# Patient Record
Sex: FEMALE | Race: WHITE | NOT HISPANIC OR LATINO | Employment: OTHER | URBAN - METROPOLITAN AREA
[De-identification: names, ages, dates, MRNs, and addresses within clinical notes are randomized per-mention and may not be internally consistent; named-entity substitution may affect disease eponyms.]

---

## 2017-01-09 ENCOUNTER — DOCUMENTATION ONLY (OUTPATIENT)
Dept: OTHER | Facility: CLINIC | Age: 77
End: 2017-01-09

## 2017-01-09 DIAGNOSIS — E03.9 ACQUIRED HYPOTHYROIDISM: Primary | ICD-10-CM

## 2017-01-09 DIAGNOSIS — Z71.89 ADVANCED DIRECTIVES, COUNSELING/DISCUSSION: Primary | Chronic | ICD-10-CM

## 2017-01-09 RX ORDER — LEVOTHYROXINE SODIUM 50 UG/1
50 TABLET ORAL DAILY
Qty: 90 TABLET | Refills: 1 | Status: SHIPPED | OUTPATIENT
Start: 2017-01-09 | End: 2017-07-13

## 2017-01-09 NOTE — TELEPHONE ENCOUNTER
Levothyroxine 50mcg    Last Written Prescription Date: 09/08/2016  Last Quantity: 90, # refills: 0  Last Office Visit with G, P or [facility] prescribing provider: 11/21/2016        TSH   Date Value Ref Range Status   09/08/2016 1.11 0.40 - 4.00 mU/L Final     Viky Champagne CPhT  [facility]  [phone]

## 2017-01-09 NOTE — TELEPHONE ENCOUNTER
Levothyroxine 50 mcg  Prescription approved per [facility] to the [facility] Refill Protocol.    Thank You   [facility]

## 2017-01-30 ENCOUNTER — MYC MEDICAL ADVICE (OUTPATIENT)
Dept: FAMILY MEDICINE | Facility: CLINIC | Age: 77
End: 2017-01-30

## 2017-01-30 NOTE — TELEPHONE ENCOUNTER
Called patient to schedule appointment, 4 pm spot is taken, so rescheduled her for Wednesday am at 10.  Has seen urology, and has had Botox injections, but unable to do so currently at that office.   Bernadette Ramos, BEN  Triage Nurse

## 2017-02-01 ENCOUNTER — OFFICE VISIT (OUTPATIENT)
Dept: FAMILY MEDICINE | Facility: CLINIC | Age: 77
End: 2017-02-01
Payer: COMMERCIAL

## 2017-02-01 VITALS
SYSTOLIC BLOOD PRESSURE: 128 MMHG | DIASTOLIC BLOOD PRESSURE: 66 MMHG | OXYGEN SATURATION: 99 % | TEMPERATURE: 97.8 F | WEIGHT: 218.7 LBS | RESPIRATION RATE: 17 BRPM | HEART RATE: 59 BPM | BODY MASS INDEX: 37.52 KG/M2

## 2017-02-01 DIAGNOSIS — R35.0 URINARY FREQUENCY: Primary | ICD-10-CM

## 2017-02-01 DIAGNOSIS — N32.81 OAB (OVERACTIVE BLADDER): ICD-10-CM

## 2017-02-01 LAB
ALBUMIN UR-MCNC: NEGATIVE MG/DL
APPEARANCE UR: CLEAR
BILIRUB UR QL STRIP: NEGATIVE
COLOR UR AUTO: YELLOW
GLUCOSE UR STRIP-MCNC: NEGATIVE MG/DL
HGB UR QL STRIP: NEGATIVE
KETONES UR STRIP-MCNC: NEGATIVE MG/DL
LEUKOCYTE ESTERASE UR QL STRIP: NEGATIVE
NITRATE UR QL: NEGATIVE
PH UR STRIP: 6.5 PH (ref 5–7)
SP GR UR STRIP: 1.02 (ref 1–1.03)
URN SPEC COLLECT METH UR: NORMAL
UROBILINOGEN UR STRIP-ACNC: 0.2 EU/DL (ref 0.2–1)

## 2017-02-01 PROCEDURE — 99213 OFFICE O/P EST LOW 20 MIN: CPT | Performed by: INTERNAL MEDICINE

## 2017-02-01 PROCEDURE — 81003 URINALYSIS AUTO W/O SCOPE: CPT | Performed by: INTERNAL MEDICINE

## 2017-02-01 NOTE — MR AVS SNAPSHOT
After Visit Summary   2/1/2017    Lexii Stratton    MRN: [id number]           Patient Information     Date Of Birth          1940        Visit Information        Provider Department      2/1/2017 10:00 AM Jesenia Madrigal MD [facility]        Today's Diagnoses     Urinary frequency    -  1     OAB (overactive bladder)           Care Instructions    Overactive Bladder  No infection  Follow up with Dr. Bai        Follow-ups after your visit        Who to contact     If you have questions or need follow up information about today's clinic visit or your schedule please contact [facility] directly at [phone].  Normal or non-critical lab and imaging results will be communicated to you by MyChart, letter or phone within 4 business days after the clinic has received the results. If you do not hear from us within 7 days, please contact the clinic through [employer] or phone. If you have a critical or abnormal lab result, we will notify you by phone as soon as possible.  Submit refill requests through [employer] or call your pharmacy and they will forward the refill request to us. Please allow 3 business days for your refill to be completed.          Additional Information About Your Visit        MyChart Information     [employer] gives you secure access to your electronic health record. If you see a primary care provider, you can also send messages to your care team and make appointments. If you have questions, please call your primary care clinic.  If you do not have a primary care provider, please call [phone] and they will assist you.        Care EveryWhere ID     This is your Care EveryWhere ID. This could be used by other organizations to access your [facility] medical records  [id number]        Your Vitals Were     Pulse Temperature Respirations Pulse Oximetry Last Period       59 97.8  F (36.6  C) (Oral) 17 99% (Approximate)        Blood Pressure from  Last 3 Encounters:   02/01/17 128/66   11/21/16 122/64   11/12/16 139/66    Weight from Last 3 Encounters:   02/01/17 218 lb 11.2 oz (99.202 kg)   11/21/16 218 lb 9.6 oz (99.156 kg)   11/09/16 213 lb 6.4 oz (96.798 kg)              We Performed the Following     UA reflex to Microscopic and Culture        Primary Care Provider Office Phone # Fax #    Jesenia Madrigal [phone]       [facility] [address]  [facility] [address]        Thank you!     Thank you for choosing [facility]  for your care. Our goal is always to provide you with excellent care. Hearing back from our patients is one way we can continue to improve our services. Please take a few minutes to complete the written survey that you may receive in the mail after your visit with us. Thank you!             Your Updated Medication List - Protect others around you: Learn how to safely use, store and throw away your medicines at www.disposemymeds.org.          This list is accurate as of: 2/1/17 10:43 AM.  Always use your most recent med list.                   Brand Name Dispense Instructions for use    acetaminophen 325 MG tablet    TYLENOL    500 tablet    Take 3 tablets (975 mg) by mouth every 8 hours       ACIDOPHILUS PROBIOTIC BLEND Caps     30 capsule    Take 1 capsule by mouth daily       amLODIPine 5 MG tablet    NORVASC    90 tablet    Take 1 tablet (5 mg) by mouth At Bedtime       calcium + D 600-200 MG-UNIT Tabs   Generic drug:  calcium carbonate-vitamin D      1 TABLET  DAILY WITH FOOD       CENTRUM SILVER per tablet     30    1 TABLET DAILY       COLACE PO      Take 100 mg by mouth daily       fenofibrate 160 MG tablet     3090 tablet    Take 1 tablet (160 mg) by mouth daily Requests generic due to formulary       ferrous gluconate 324 (38 FE) MG tablet    FERGON    100 tablet    Take 1 tablet (324 mg) by mouth daily (with breakfast)       levothyroxine 50 MCG tablet    SYNTHROID     90 tablet    Take 1 tablet (50 mcg) by mouth daily       lisinopril 30 MG tablet    PRINIVIL,ZESTRIL    90 tablet    Take 1 tablet (30 mg) by mouth daily 1 po daily for HTN Profile Rx: patient will contact pharmacy when needed       meloxicam 15 MG tablet    MOBIC    30 tablet    Take 1 tablet (15 mg) by mouth daily as needed       omega-3 fatty acids 1200 MG capsule      1 TABLET DAILY       pantoprazole 20 MG EC tablet    PROTONIX    30 tablet    Take 1 tablet (20 mg) by mouth daily       potassium 99 MG Tabs      Take 1 tablet by mouth daily (with dinner)       simvastatin 20 MG tablet    ZOCOR    90 tablet    Take 1 tablet (20 mg) by mouth At Bedtime       UNABLE TO FIND      MEDICATION NAME Cranberry.  2 capsules daily       VITAMIN D (CHOLECALCIFEROL) PO      Take 2,000 Units by mouth daily

## 2017-02-01 NOTE — PROGRESS NOTES
SUBJECTIVE:                                                    Lexii Stratton is a 76 year old female who presents to clinic today for the following health issues:      URINARY TRACT SYMPTOMS     Onset: Symptoms presented 4-5 days ago.  Description: Increased urinary urgency and frequency  Painful urination (Dysuria): No   Blood in urine (Hematuria): No   Delay in urine (Hesitency): No     Intensity: Moderate to severe, waxes and wanes.    Progression of Symptoms: Worsening.    Accompanying Signs & Symptoms:  Fever/chills: Chills  Flank pain: No   Nausea and vomiting: No   Any vaginal symptoms: None  Abdominal/Pelvic Pain: No    History:   History of frequent UTI's: YES  History of kidney stones: No   Sexually Active: No   Possibility of pregnancy: No    Precipitating factors:   Unknown, no changes in diet.    Therapies Tried and outcome: Cranberry juice in the evening and increased fluid intake.       Problem list and histories reviewed & adjusted, as indicated.  Additional history: as documented    Labs reviewed in EPIC  Problem list, Medication list, Allergies, and Medical/Social/Surgical histories reviewed in [facility] and updated as appropriate.      REVIEW OF SYSTEMS:  C: POSITIVE for chills; NEGATIVE for fever or change in weight  R: NEGATIVE for significant cough or SOB  CV: NEGATIVE for chest pain, palpitations or peripheral edema  GI: NEGATIVE for nausea, abdominal pain, heartburn, or change in bowel habits  : POSITIVE for nocturia and increased urinary urgency and frequency - use of Cranberry Juice; NEGATIVE for dysuria, hematuria, or hesitancy  M: NEGATIVE for significant arthralgias or myalgia  N: NEGATIVE for weakness, dizziness or paresthesias  P: NEGATIVE for changes in mood or affect    This document serves as a record of the services and decisions personally performed and made by Jesenia Madrigal MD. It was created on her behalf by Urvashi Haley, a trained medical scribe. The creation of this  document is based the provider's statements to the medical scribe.  Urvashi Haley, February 1, 2017 10:30 AM     OBJECTIVE:                                                    /66 mmHg  Pulse 59  Temp(Src) 97.8  F (36.6  C) (Oral)  Resp 17  Wt 99.202 kg (218 lb 11.2 oz)  SpO2 99%  LMP  (Approximate)  Body mass index is 37.52 kg/(m^2).    EXAM:  GENERAL: Patient appears healthy, alert and not distressed.  EYES: Eyes appear grossly normal to inspection, with normal conjunctivae and sclerae  RESP: Lungs are clear to auscultation - no rales, rhonchi or wheezes present  CV: Regular rate and rhythm, normal S1 S2 heart sounds, no ectopy, peripheral pulses normal, no carotid bruit; Mild right-sided peripheral edema present  ABDOMEN: Soft, nontender, no hepatosplenomegaly, no masses, normal bowel sounds  MS: No gross musculoskeletal defects noted, no edema, gait is age appropriate without ataxia  NEURO: Patient exhibits normal strength and tone, normal speech and mentation  PSYCH: Mentation appears normal, affect is normal/bright    Diagnostic Test Results:  Results for orders placed or performed in visit on 02/01/17 (from the past 24 hour(s))   UA reflex to Microscopic and Culture   Result Value Ref Range    Color Urine Yellow     Appearance Urine Clear     Glucose Urine Negative NEG mg/dL    Bilirubin Urine Negative NEG    Ketones Urine Negative NEG mg/dL    Specific Gravity Urine 1.020 1.003 - 1.035    Blood Urine Negative NEG    pH Urine 6.5 5.0 - 7.0 pH    Protein Albumin Urine Negative NEG mg/dL    Urobilinogen Urine 0.2 0.2 - 1.0 EU/dL    Nitrite Urine Negative NEG    Leukocyte Esterase Urine Negative NEG    Source Midstream Urine          ASSESSMENT/PLAN:                                                    (R35.0) Urinary frequency  (primary encounter diagnosis)  Comment: Urinalysis at time of visit; Results are unremarkable with no signs of acute infection.  Plan: UA reflex to Microscopic and Culture           (N32.81) OAB (overactive bladder)  Comment: Following with Dr. B[facility]; Tried several medications without benefit; Interstim x2; Botox injections in bladder x 5; No evidence of acute infection currently; Recommended to follow-up with Dr. Bai for re-evaluation.   Plan: Follow-up with Dr. Bai at [facility].      The information in this document, created by a medical scribe for me, accurately reflects the services I personally performed and the decisions made by me. I have reviewed and approved this document for accuracy.  Dr. Jesenia Madrigal, 10:40 AM, February 1, 2017    Jesenia Madrigal MD  Internal Medicine   [facility]

## 2017-02-01 NOTE — NURSING NOTE
"Chief Complaint   Patient presents with     Urinary Problem       Initial /66 mmHg  Pulse 59  Temp(Src) 97.8  F (36.6  C) (Oral)  Resp 17  Wt 218 lb 11.2 oz (99.202 kg)  SpO2 99%  LMP  (Approximate) Estimated body mass index is 37.52 kg/(m^2) as calculated from the following:    Height as of 11/9/16: 5' 4\" (1.626 m).    Weight as of this encounter: 218 lb 11.2 oz (99.202 kg).  BP completed using cuff size: large    "

## 2017-02-17 DIAGNOSIS — M15.0 PRIMARY OSTEOARTHRITIS INVOLVING MULTIPLE JOINTS: ICD-10-CM

## 2017-02-17 RX ORDER — MELOXICAM 15 MG/1
15 TABLET ORAL DAILY PRN
Qty: 30 TABLET | Refills: 2 | Status: SHIPPED | OUTPATIENT
Start: 2017-02-17 | End: 2017-05-18

## 2017-02-17 NOTE — TELEPHONE ENCOUNTER
Meloxicam 15mg      Last Written Prescription Date: 06/13/2016  Last Quantity: 30, # refills: 6  Last Office Visit with G, P or [facility] prescribing provider: 02/01/2017       Creatinine   Date Value Ref Range Status   11/09/2016 0.90 0.52 - 1.04 mg/dL Final     Lab Results   Component Value Date    AST 40 09/08/2016     Lab Results   Component Value Date    ALT 22 09/08/2016     BP Readings from Last 3 Encounters:   02/01/17 128/66   11/21/16 122/64   11/12/16 139/66     Viky Champagne CPhT  [facility]  [phone]

## 2017-03-15 ENCOUNTER — HOSPITAL ENCOUNTER (OUTPATIENT)
Dept: MAMMOGRAPHY | Facility: CLINIC | Age: 77
Discharge: HOME | End: 2017-03-15
Attending: INTERNAL MEDICINE | Admitting: INTERNAL MEDICINE
Payer: MEDICARE

## 2017-03-15 DIAGNOSIS — Z12.31 VISIT FOR SCREENING MAMMOGRAM: ICD-10-CM

## 2017-03-15 PROCEDURE — G0202 SCR MAMMO BI INCL CAD: HCPCS

## 2017-03-15 PROCEDURE — 77063 BREAST TOMOSYNTHESIS BI: CPT

## 2017-03-17 ENCOUNTER — HOSPITAL ENCOUNTER (OUTPATIENT)
Dept: MAMMOGRAPHY | Facility: CLINIC | Age: 77
Discharge: HOME | End: 2017-03-17
Attending: INTERNAL MEDICINE | Admitting: INTERNAL MEDICINE
Payer: MEDICARE

## 2017-03-17 DIAGNOSIS — R92.8 ABNORMAL MAMMOGRAM: ICD-10-CM

## 2017-03-17 PROCEDURE — G0206 DX MAMMO INCL CAD UNI: HCPCS

## 2017-03-28 ENCOUNTER — TRANSFERRED RECORDS (OUTPATIENT)
Dept: HEALTH INFORMATION MANAGEMENT | Facility: CLINIC | Age: 77
End: 2017-03-28

## 2017-04-05 DIAGNOSIS — E78.5 HYPERLIPIDEMIA LDL GOAL <130: ICD-10-CM

## 2017-04-06 ENCOUNTER — MYC MEDICAL ADVICE (OUTPATIENT)
Dept: FAMILY MEDICINE | Facility: CLINIC | Age: 77
End: 2017-04-06

## 2017-04-06 DIAGNOSIS — H40.9 GLAUCOMA: Primary | ICD-10-CM

## 2017-04-06 RX ORDER — FENOFIBRATE 160 MG/1
TABLET ORAL
Qty: 90 TABLET | Refills: 1 | Status: SHIPPED | OUTPATIENT
Start: 2017-04-06 | End: 2017-10-16

## 2017-04-06 NOTE — TELEPHONE ENCOUNTER
Prescription approved per [facility] Refill Protocol.  Bernadette Ramos, RN  Triage Nurse

## 2017-04-06 NOTE — TELEPHONE ENCOUNTER
fenofibrate 160 MG       Last Written Prescription Date: 9/8/16  Last Fill Quantity: 90, # refills: 1    Last Office Visit with G, P or [facility] prescribing provider:  2/1/17   Future Office Visit:      Cholesterol   Date Value Ref Range Status   09/08/2016 142 <200 mg/dL Final     HDL Cholesterol   Date Value Ref Range Status   09/08/2016 43 (L) >49 mg/dL Final     LDL Cholesterol Calculated   Date Value Ref Range Status   09/08/2016 75 <100 mg/dL Final     Comment:     Desirable:       <100 mg/dl     Triglycerides   Date Value Ref Range Status   09/08/2016 120 <150 mg/dL Final     Comment:     Fasting specimen     Cholesterol/HDL Ratio   Date Value Ref Range Status   06/29/2015 3.3 0.0 - 5.0 Final     ALT   Date Value Ref Range Status   09/08/2016 22 0 - 50 U/L Final

## 2017-04-07 RX ORDER — TRAVOPROST OPHTHALMIC SOLUTION 0.04 MG/ML
1 SOLUTION OPHTHALMIC AT BEDTIME
Qty: 2.5 ML | Refills: 1 | COMMUNITY
Start: 2017-04-07 | End: 2021-02-24

## 2017-04-07 NOTE — TELEPHONE ENCOUNTER
Tried to add this drop to her medication list as historical.  Will it interfere with her other meds?  Bernadette Ramos, RN  Triage Nurse

## 2017-04-18 ENCOUNTER — TRANSFERRED RECORDS (OUTPATIENT)
Dept: HEALTH INFORMATION MANAGEMENT | Facility: CLINIC | Age: 77
End: 2017-04-18

## 2017-04-28 ENCOUNTER — OFFICE VISIT (OUTPATIENT)
Dept: URGENT CARE | Facility: URGENT CARE | Age: 77
End: 2017-04-28
Payer: COMMERCIAL

## 2017-04-28 VITALS
BODY MASS INDEX: 38.28 KG/M2 | HEART RATE: 96 BPM | DIASTOLIC BLOOD PRESSURE: 56 MMHG | SYSTOLIC BLOOD PRESSURE: 146 MMHG | OXYGEN SATURATION: 96 % | WEIGHT: 223 LBS | TEMPERATURE: 101.2 F

## 2017-04-28 DIAGNOSIS — N10 PYELONEPHRITIS, ACUTE: Primary | ICD-10-CM

## 2017-04-28 DIAGNOSIS — R30.0 DYSURIA: ICD-10-CM

## 2017-04-28 LAB
ALBUMIN UR-MCNC: 30 MG/DL
APPEARANCE UR: ABNORMAL
BACTERIA #/AREA URNS HPF: ABNORMAL /HPF
BILIRUB UR QL STRIP: NEGATIVE
COLOR UR AUTO: YELLOW
GLUCOSE UR STRIP-MCNC: NEGATIVE MG/DL
HGB UR QL STRIP: ABNORMAL
KETONES UR STRIP-MCNC: NEGATIVE MG/DL
LEUKOCYTE ESTERASE UR QL STRIP: ABNORMAL
NITRATE UR QL: NEGATIVE
PH UR STRIP: 7 PH (ref 5–7)
RBC #/AREA URNS AUTO: ABNORMAL /HPF (ref 0–2)
SP GR UR STRIP: 1.01 (ref 1–1.03)
URN SPEC COLLECT METH UR: ABNORMAL
UROBILINOGEN UR STRIP-ACNC: 0.2 EU/DL (ref 0.2–1)
WBC #/AREA URNS AUTO: ABNORMAL /HPF (ref 0–2)

## 2017-04-28 PROCEDURE — 87086 URINE CULTURE/COLONY COUNT: CPT | Performed by: PHYSICIAN ASSISTANT

## 2017-04-28 PROCEDURE — 87186 SC STD MICRODIL/AGAR DIL: CPT | Performed by: PHYSICIAN ASSISTANT

## 2017-04-28 PROCEDURE — 99214 OFFICE O/P EST MOD 30 MIN: CPT | Performed by: PHYSICIAN ASSISTANT

## 2017-04-28 PROCEDURE — 81001 URINALYSIS AUTO W/SCOPE: CPT | Performed by: FAMILY MEDICINE

## 2017-04-28 PROCEDURE — 87088 URINE BACTERIA CULTURE: CPT | Performed by: PHYSICIAN ASSISTANT

## 2017-04-28 RX ORDER — CIPROFLOXACIN 500 MG/1
500 TABLET, FILM COATED ORAL 2 TIMES DAILY
Qty: 20 TABLET | Refills: 0 | Status: SHIPPED | OUTPATIENT
Start: 2017-04-28 | End: 2017-05-10

## 2017-04-28 NOTE — PROGRESS NOTES
SUBJECTIVE:   Lexii Stratton is a 76 year old female who  presents today for a possible UTI. Symptoms of dysuria and frequency have been going on for 1day(s).  Hematuria no.  sudden onsetand moderate.  She has had a low grade fever since last night, greatest temp 100.4. No history of vaginal discharge. This patient does have a history of urinary tract infections. Patient denies long duration, rigors, flank pain, Vomiting, significant nausea or diarrhea, taking Coumadin and GFR less than 30 within the last year or vaginal discharge, vaginal odor and vaginal itching.     Patient has overactive bladder -- 6 botox injection, first couple worked well. She had one 10 days ago and nothing changed. She has tried 7 different medications. Interstem X2.   Past Medical History:   Diagnosis Date     Chronic infection     Recent UTI cleared now     Esophageal reflux      Hypertension     No cardiologist     Incontinence of urine      Osteoarthritis      Other and unspecified hyperlipidemia      Other chronic pain     back     Peptic ulcer, unspecified site, unspecified as acute or chronic, without mention of hemorrhage, perforation, or obstruction      Small bowel obstruction (H)      Thyroid disease hypothyroidism     Urinary incontinence      Current Outpatient Prescriptions   Medication Sig Dispense Refill     travoprost, BAK Free, (TRAVATAN Z) 0.004 % ophthalmic solution Apply 1 drop to eye At Bedtime 2.5 mL 1     fenofibrate 160 MG tablet TAKE ONE TABLET BY MOUTH EVERY DAY 90 tablet 1     meloxicam (MOBIC) 15 MG tablet Take 1 tablet (15 mg) by mouth daily as needed 30 tablet 2     levothyroxine (SYNTHROID) 50 MCG tablet Take 1 tablet (50 mcg) by mouth daily 90 tablet 1     acetaminophen (TYLENOL) 325 MG tablet Take 3 tablets (975 mg) by mouth every 8 hours 500 tablet 1     UNABLE TO FIND MEDICATION NAME Cranberry.  2 capsules daily       VITAMIN D, CHOLECALCIFEROL, PO Take 2,000 Units by mouth daily       Docusate Sodium  (COLACE PO) Take 100 mg by mouth daily       simvastatin (ZOCOR) 20 MG tablet Take 1 tablet (20 mg) by mouth At Bedtime 90 tablet 1     pantoprazole (PROTONIX) 20 MG tablet Take 1 tablet (20 mg) by mouth daily 30 tablet 11     amLODIPine (NORVASC) 5 MG tablet Take 1 tablet (5 mg) by mouth At Bedtime 90 tablet 1     lisinopril (PRINIVIL,ZESTRIL) 30 MG tablet Take 1 tablet (30 mg) by mouth daily 1 po daily for HTN  Profile Rx: patient will contact pharmacy when needed 90 tablet 1     Probiotic Product (ACIDOPHILUS PROBIOTIC BLEND) CAPS Take 1 capsule by mouth daily 30 capsule 0     ferrous gluconate (FERGON) 324 (38 FE) MG tablet Take 1 tablet (324 mg) by mouth daily (with breakfast) 100 tablet 0     potassium 99 MG TABS Take 1 tablet by mouth daily (with dinner)        CALCIUM + D 600-200 MG-UNIT PO TABS 1 TABLET  DAILY WITH FOOD       OMEGA-3 FATTY ACIDS 1200 MG OR CAPS 1 TABLET DAILY  0     CENTRUM SILVER OR TABS 1 TABLET DAILY 30 0     Social History   Substance Use Topics     Smoking status: Never Smoker     Smokeless tobacco: Never Used     Alcohol use No       ROS:   Review of systems negative except as stated above.    OBJECTIVE:  /56 (BP Location: Right arm, Patient Position: Chair, Cuff Size: Adult Large)  Pulse 96  Temp 101.2  F (38.4  C) (Tympanic)  Wt 223 lb (101.2 kg)  LMP  (Approximate)  SpO2 96%  BMI 38.28 kg/m2  GENERAL APPEARANCE: healthy, alert and no distress  RESP: lungs clear to auscultation - no rales, rhonchi or wheezes  CV: regular rates and rhythm, normal S1 S2, no murmur noted  ABDOMEN:  soft, nontender, no HSM or masses and bowel sounds normal  BACK: Left mild CVA tenderness  SKIN: no suspicious lesions or rashes    Results for orders placed or performed in visit on 04/28/17   UA reflex to Microscopic and Culture   Result Value Ref Range    Color Urine Yellow     Appearance Urine Cloudy     Glucose Urine Negative NEG mg/dL    Bilirubin Urine Negative NEG    Ketones Urine  Negative NEG mg/dL    Specific Gravity Urine 1.015 1.003 - 1.035    Blood Urine Small (A) NEG    pH Urine 7.0 5.0 - 7.0 pH    Protein Albumin Urine 30 (A) NEG mg/dL    Urobilinogen Urine 0.2 0.2 - 1.0 EU/dL    Nitrite Urine Negative NEG    Leukocyte Esterase Urine Moderate (A) NEG    Source Midstream Urine    Urine Microscopic   Result Value Ref Range    WBC Urine  (A) 0 - 2 /HPF    RBC Urine O - 2 0 - 2 /HPF    Bacteria Urine Many (A) NEG /HPF       ASSESSMENT/PLAN:  1. Pyelonephritis, acute  - ciprofloxacin (CIPRO) 500 MG tablet; Take 1 tablet (500 mg) by mouth 2 times daily  Dispense: 20 tablet; Refill: 0    2. Dysuria  - UA reflex to Microscopic and Culture  - Urine Microscopic    Went over serious condition that pyelo is and to have very low threshold for presenting to the ED, including vomiting, severe pain in the back, worsening dysuria, increasing fever, lightheadedness or dizziness. Patient agrees with treatment plan.     Shahnaz Vasquez PA-C

## 2017-04-28 NOTE — MR AVS SNAPSHOT
"              After Visit Summary   4/28/2017    Lexii Stratton    MRN: [id number]           Patient Information     Date Of Birth          1940        Visit Information        Provider Department      4/28/2017 1:00 PM Shahnaz Vasquez PA-C [facility]        Today's Diagnoses     Dysuria    -  1    Nonspecific finding on examination of urine        Pyelonephritis, acute          Care Instructions      Kidney Infection (Adult, Female)    An infection of the kidney is also called \"pyelonephritis.\" It usually starts as a bladder infection (\"cystitis\") that spreads to the kidneys. Pyelonephritis is more serious than a bladder infection. It can cause severe illness if not treated properly.  The usual symptoms include an aching pain in the back, side, or lower abdomen. Other symptoms may include fever, chills, nausea, vomiting, blood in the urine, an urge to urinate, and a burning sensation when urinating. Treatment is usually oral antibiotics, or in more severe cases, intramuscular or IV antibiotics. These are started right away and may be changed once urine culture results determine the infecting organisms.  Home care  The following are general care guidelines:  1. Stay home from work or school. Rest in bed until your fever breaks and you are feeling better.  2. Drink lots of fluid (at least 6 to 8 glasses a day, unless you must restrict fluids for other medical reasons). This will force the medicine into your urinary system and flush the bacteria out of your body.  3. Avoid sex until you have finished all of your medicine and your symptoms are gone.  4. Avoid caffeine, alcohol, and spicy foods. These foods may irritate the kidney and bladder.  5. You may use acetaminophen or ibuprofen to control pain, unless another pain medicine was prescribed. [NOTE: If you have chronic liver or kidney disease or ever had a stomach ulcer or GI bleeding, talk with your doctor before using these " medicines.]  Follow-up care  Follow up with your doctor or as advised by our staff for a repeat urine test in 10 days. This will ensure that your infection is fully cleared.  [NOTE: If you had an X-ray, CT scan, or other diagnostic test, it will be reviewed by a specialist. You will be notified of any new findings that may affect your care.]  When to seek medical care  Get prompt medical attention if any of the following occur:    Fever over 100.4 F (38.0 C) after 48 hours of treatment    No improvement by the third day of treatment    Increasing back or abdominal pain    Repeated vomiting or inability to take oral medicine    Weakness, dizziness, or fainting    3822-2290 [employer]. [address] [place] [address], [place] [address]. All rights reserved. This information is not intended as a substitute for professional medical care. Always follow your healthcare professional's instructions.              Follow-ups after your visit        Your next 10 appointments already scheduled     May 10, 2017  3:00 PM CDT   Office Visit with Jesenia Madrigal MD   [facility] ([facility])    [address] [place] [id number] [phone]           Bring a current list of meds and any records pertaining to this visit.  For Physicals, please bring immunization records and any forms needing to be filled out.  Please arrive 10 minutes early to complete paperwork.              Who to contact     If you have questions or need follow up information about today's clinic visit or your schedule please contact [facility] directly at [phone].  Normal or non-critical lab and imaging results will be communicated to you by MyChart, letter or phone within 4 business days after the clinic has received the results. If you do not hear from us within 7 days, please contact the clinic through MyChart or phone. If you have a critical or abnormal lab result, we will notify  you by phone as soon as possible.  Submit refill requests through [employer] or call your pharmacy and they will forward the refill request to us. Please allow 3 business days for your refill to be completed.          Additional Information About Your Visit        [employer] Information     [employer] gives you secure access to your electronic health record. If you see a primary care provider, you can also send messages to your care team and make appointments. If you have questions, please call your primary care clinic.  If you do not have a primary care provider, please call [phone] and they will assist you.        Care EveryWhere ID     This is your Care EveryWhere ID. This could be used by other organizations to access your [facility] medical records  [id number]        Your Vitals Were     Pulse Temperature Last Period Pulse Oximetry BMI (Body Mass Index)       96 101.2  F (38.4  C) (Tympanic) (Approximate) 96% 38.28 kg/m2        Blood Pressure from Last 3 Encounters:   04/28/17 146/56   02/01/17 128/66   11/21/16 122/64    Weight from Last 3 Encounters:   04/28/17 223 lb (101.2 kg)   02/01/17 218 lb 11.2 oz (99.2 kg)   11/21/16 218 lb 9.6 oz (99.2 kg)              We Performed the Following     UA reflex to Microscopic and Culture     Urine Culture Aerobic Bacterial     Urine Microscopic          Today's Medication Changes          These changes are accurate as of: 4/28/17  1:34 PM.  If you have any questions, ask your nurse or doctor.               Start taking these medicines.        Dose/Directions    ciprofloxacin 500 MG tablet   Commonly known as:  CIPRO   Used for:  Pyelonephritis, acute   Started by:  Shahnaz Vasquez PA-C        Dose:  500 mg   Take 1 tablet (500 mg) by mouth 2 times daily   Quantity:  20 tablet   Refills:  0            Where to get your medicines      These medications were sent to [facility] - [address] [facility] [address] [facility] [address], [place] [address]     Phone:  [phone]     ciprofloxacin 500 MG tablet                Primary Care Provider Office Phone # Fax #    Jesenia Madrigal [phone]       [facility] [address]  [facility] [address]        Thank you!     Thank you for choosing [facility]  for your care. Our goal is always to provide you with excellent care. Hearing back from our patients is one way we can continue to improve our services. Please take a few minutes to complete the written survey that you may receive in the mail after your visit with us. Thank you!             Your Updated Medication List - Protect others around you: Learn how to safely use, store and throw away your medicines at www.disposemymeds.org.          This list is accurate as of: 4/28/17  1:34 PM.  Always use your most recent med list.                   Brand Name Dispense Instructions for use    acetaminophen 325 MG tablet    TYLENOL    500 tablet    Take 3 tablets (975 mg) by mouth every 8 hours       ACIDOPHILUS PROBIOTIC BLEND Caps     30 capsule    Take 1 capsule by mouth daily       amLODIPine 5 MG tablet    NORVASC    90 tablet    Take 1 tablet (5 mg) by mouth At Bedtime       calcium + D 600-200 MG-UNIT Tabs   Generic drug:  calcium carbonate-vitamin D      1 TABLET  DAILY WITH FOOD       CENTRUM SILVER per tablet     30    1 TABLET DAILY       ciprofloxacin 500 MG tablet    CIPRO    20 tablet    Take 1 tablet (500 mg) by mouth 2 times daily       COLACE PO      Take 100 mg by mouth daily       fenofibrate 160 MG tablet     90 tablet    TAKE ONE TABLET BY MOUTH EVERY DAY       ferrous gluconate 324 (38 FE) MG tablet    FERGON    100 tablet    Take 1 tablet (324 mg) by mouth daily (with breakfast)       levothyroxine 50 MCG tablet    SYNTHROID    90 tablet    Take 1 tablet (50 mcg) by mouth daily       lisinopril 30 MG tablet    PRINIVIL,ZESTRIL    90 tablet    Take 1 tablet (30 mg) by mouth  daily 1 po daily for HTN Profile Rx: patient will contact pharmacy when needed       meloxicam 15 MG tablet    MOBIC    30 tablet    Take 1 tablet (15 mg) by mouth daily as needed       omega-3 fatty acids 1200 MG capsule      1 TABLET DAILY       pantoprazole 20 MG EC tablet    PROTONIX    30 tablet    Take 1 tablet (20 mg) by mouth daily       potassium 99 MG Tabs      Take 1 tablet by mouth daily (with dinner)       simvastatin 20 MG tablet    ZOCOR    90 tablet    Take 1 tablet (20 mg) by mouth At Bedtime       travoprost (BAK Free) 0.004 % ophthalmic solution    TRAVATAN Z    2.5 mL    Apply 1 drop to eye At Bedtime       UNABLE TO FIND      MEDICATION NAME Cranberry.  2 capsules daily       VITAMIN D (CHOLECALCIFEROL) PO      Take 2,000 Units by mouth daily

## 2017-04-28 NOTE — PATIENT INSTRUCTIONS
"  Kidney Infection (Adult, Female)    An infection of the kidney is also called \"pyelonephritis.\" It usually starts as a bladder infection (\"cystitis\") that spreads to the kidneys. Pyelonephritis is more serious than a bladder infection. It can cause severe illness if not treated properly.  The usual symptoms include an aching pain in the back, side, or lower abdomen. Other symptoms may include fever, chills, nausea, vomiting, blood in the urine, an urge to urinate, and a burning sensation when urinating. Treatment is usually oral antibiotics, or in more severe cases, intramuscular or IV antibiotics. These are started right away and may be changed once urine culture results determine the infecting organisms.  Home care  The following are general care guidelines:  1. Stay home from work or school. Rest in bed until your fever breaks and you are feeling better.  2. Drink lots of fluid (at least 6 to 8 glasses a day, unless you must restrict fluids for other medical reasons). This will force the medicine into your urinary system and flush the bacteria out of your body.  3. Avoid sex until you have finished all of your medicine and your symptoms are gone.  4. Avoid caffeine, alcohol, and spicy foods. These foods may irritate the kidney and bladder.  5. You may use acetaminophen or ibuprofen to control pain, unless another pain medicine was prescribed. [NOTE: If you have chronic liver or kidney disease or ever had a stomach ulcer or GI bleeding, talk with your doctor before using these medicines.]  Follow-up care  Follow up with your doctor or as advised by our staff for a repeat urine test in 10 days. This will ensure that your infection is fully cleared.  [NOTE: If you had an X-ray, CT scan, or other diagnostic test, it will be reviewed by a specialist. You will be notified of any new findings that may affect your care.]  When to seek medical care  Get prompt medical attention if any of the following occur:    Fever " over 100.4 F (38.0 C) after 48 hours of treatment    No improvement by the third day of treatment    Increasing back or abdominal pain    Repeated vomiting or inability to take oral medicine    Weakness, dizziness, or fainting    1124-0484 [employer]. [address] [place] [address], [place] [address]. All rights reserved. This information is not intended as a substitute for professional medical care. Always follow your healthcare professional's instructions.

## 2017-04-28 NOTE — NURSING NOTE
"Chief Complaint   Patient presents with     Urgent Care     Urinary Problem     Pt states has had burning, frequency, and fever x 1 day.        Initial /56 (BP Location: Right arm, Patient Position: Chair, Cuff Size: Adult Large)  Pulse 108  Temp 101.2  F (38.4  C) (Tympanic)  Wt 223 lb (101.2 kg)  LMP  (Approximate)  SpO2 96%  BMI 38.28 kg/m2 Estimated body mass index is 38.28 kg/(m^2) as calculated from the following:    Height as of 11/9/16: 5' 4\" (1.626 m).    Weight as of this encounter: 223 lb (101.2 kg).  Medication Reconciliation: unable or not appropriate to perform   Radha Saldana CMA (AAMA) 4/28/2017 1:15 PM    "

## 2017-04-30 DIAGNOSIS — N10 ACUTE PYELONEPHRITIS: Primary | ICD-10-CM

## 2017-04-30 LAB
BACTERIA SPEC CULT: ABNORMAL
MICRO REPORT STATUS: ABNORMAL
MICROORGANISM SPEC CULT: ABNORMAL
SPECIMEN SOURCE: ABNORMAL

## 2017-04-30 RX ORDER — CEPHALEXIN 500 MG/1
500 CAPSULE ORAL 2 TIMES DAILY
Qty: 20 CAPSULE | Refills: 0 | Status: SHIPPED | OUTPATIENT
Start: 2017-04-30 | End: 2017-08-21

## 2017-04-30 NOTE — TELEPHONE ENCOUNTER
Clinic Action Needed:No  Reason for Call: Pt returned the UC call and Shahnaz Vasquez wanted the Cipro changed to keflex becuase the bacteria cultured in the UC is resistant to Cipro. The patient wanted Rx sent to the [facility] in [place].  I sent it and let the patient know to pick it up today and stop the Cipro and get at least one dose of the keflex in today. Advised to finish full course of antibiotics as prescribed and drink plenty of fluids. And follow up with your PCP as the ED provider recommended. Patient voices understanding and is in agreement with this plan.  Routed to: None    Celia Wallace RN  [facility] RN  Lung Nodule and ED Lab Result F/u RN

## 2017-05-09 ENCOUNTER — TRANSFERRED RECORDS (OUTPATIENT)
Dept: HEALTH INFORMATION MANAGEMENT | Facility: CLINIC | Age: 77
End: 2017-05-09

## 2017-05-10 ENCOUNTER — OFFICE VISIT (OUTPATIENT)
Dept: FAMILY MEDICINE | Facility: CLINIC | Age: 77
End: 2017-05-10
Payer: COMMERCIAL

## 2017-05-10 VITALS
HEIGHT: 65 IN | HEART RATE: 80 BPM | TEMPERATURE: 97.6 F | BODY MASS INDEX: 37.32 KG/M2 | WEIGHT: 224 LBS | RESPIRATION RATE: 20 BRPM | DIASTOLIC BLOOD PRESSURE: 60 MMHG | OXYGEN SATURATION: 94 % | SYSTOLIC BLOOD PRESSURE: 134 MMHG

## 2017-05-10 DIAGNOSIS — L60.3 DYSTROPHIC NAIL: Primary | ICD-10-CM

## 2017-05-10 DIAGNOSIS — I10 ESSENTIAL HYPERTENSION, BENIGN: ICD-10-CM

## 2017-05-10 DIAGNOSIS — E66.01 MORBID OBESITY, UNSPECIFIED OBESITY TYPE (H): ICD-10-CM

## 2017-05-10 PROCEDURE — 99213 OFFICE O/P EST LOW 20 MIN: CPT | Performed by: INTERNAL MEDICINE

## 2017-05-10 NOTE — PATIENT INSTRUCTIONS
I recommend you start applying TEA TREE OIL to your nails on a daily basis, especially at nighttime. It may be available in our pharmacy.

## 2017-05-10 NOTE — NURSING NOTE
"Chief Complaint   Patient presents with     Finger     Finger Nail       Initial /60 (BP Location: Other (Comment), Patient Position: Chair, Cuff Size: Adult Large)  Pulse 80  Temp 97.6  F (36.4  C) (Oral)  Resp 20  Ht 5' 4.5\" (1.638 m)  Wt 224 lb (101.6 kg)  LMP  (Approximate)  SpO2 94%  Breastfeeding? No  BMI 37.86 kg/m2 Estimated body mass index is 37.86 kg/(m^2) as calculated from the following:    Height as of this encounter: 5' 4.5\" (1.638 m).    Weight as of this encounter: 224 lb (101.6 kg).  Medication Reconciliation: lisette Navarro CMA (AAMA) 5/10/2017 2:58 PM      "

## 2017-05-10 NOTE — MR AVS SNAPSHOT
After Visit Summary   5/10/2017    Lexii Stratton    MRN: [id number]           Patient Information     Date Of Birth          1940        Visit Information        Provider Department      5/10/2017 3:00 PM Jesenia Madrigal MD [facility]        Today's Diagnoses     Dystrophic nail    -  1    Essential hypertension, benign          Care Instructions    I recommend you start applying TEA TREE OIL to your nails on a daily basis, especially at nighttime. It may be available in our pharmacy.        Follow-ups after your visit        Who to contact     If you have questions or need follow up information about today's clinic visit or your schedule please contact [facility] directly at [phone].  Normal or non-critical lab and imaging results will be communicated to you by MyChart, letter or phone within 4 business days after the clinic has received the results. If you do not hear from us within 7 days, please contact the clinic through [employer] or phone. If you have a critical or abnormal lab result, we will notify you by phone as soon as possible.  Submit refill requests through [employer] or call your pharmacy and they will forward the refill request to us. Please allow 3 business days for your refill to be completed.          Additional Information About Your Visit        MyChart Information     [employer] gives you secure access to your electronic health record. If you see a primary care provider, you can also send messages to your care team and make appointments. If you have questions, please call your primary care clinic.  If you do not have a primary care provider, please call [phone] and they will assist you.        Care EveryWhere ID     This is your Care EveryWhere ID. This could be used by other organizations to access your [place] medical records  [id number]        Your Vitals Were     Pulse Temperature Respirations Height Last Period Pulse  "Oximetry    80 97.6  F (36.4  C) (Oral) 20 5' 4.5\" (1.638 m) (Approximate) 94%    Breastfeeding? BMI (Body Mass Index)                No 37.86 kg/m2           Blood Pressure from Last 3 Encounters:   05/10/17 134/60   04/28/17 146/56   02/01/17 128/66    Weight from Last 3 Encounters:   05/10/17 224 lb (101.6 kg)   04/28/17 223 lb (101.2 kg)   02/01/17 218 lb 11.2 oz (99.2 kg)              Today, you had the following     No orders found for display       Primary Care Provider Office Phone # Fax #    Jesenia Madrigal [phone]       [facility] [address] [facility] [address]        Thank you!     Thank you for choosing [facility]  for your care. Our goal is always to provide you with excellent care. Hearing back from our patients is one way we can continue to improve our services. Please take a few minutes to complete the written survey that you may receive in the mail after your visit with us. Thank you!             Your Updated Medication List - Protect others around you: Learn how to safely use, store and throw away your medicines at www.disposemymeds.org.          This list is accurate as of: 5/10/17  3:31 PM.  Always use your most recent med list.                   Brand Name Dispense Instructions for use    acetaminophen 325 MG tablet    TYLENOL    500 tablet    Take 3 tablets (975 mg) by mouth every 8 hours       ACIDOPHILUS PROBIOTIC BLEND Caps     30 capsule    Take 1 capsule by mouth daily       amLODIPine 5 MG tablet    NORVASC    90 tablet    Take 1 tablet (5 mg) by mouth At Bedtime       calcium + D 600-200 MG-UNIT Tabs   Generic drug:  calcium carbonate-vitamin D      1 TABLET  DAILY WITH FOOD       CENTRUM SILVER per tablet     30    1 TABLET DAILY       cephALEXin 500 MG capsule    KEFLEX    20 capsule    Take 1 capsule (500 mg) by mouth 2 times daily       COLACE PO      Take 100 mg by mouth daily       fenofibrate 160 MG tablet     90 " tablet    TAKE ONE TABLET BY MOUTH EVERY DAY       ferrous gluconate 324 (38 FE) MG tablet    FERGON    100 tablet    Take 1 tablet (324 mg) by mouth daily (with breakfast)       levothyroxine 50 MCG tablet    SYNTHROID    90 tablet    Take 1 tablet (50 mcg) by mouth daily       lisinopril 30 MG tablet    PRINIVIL,ZESTRIL    90 tablet    Take 1 tablet (30 mg) by mouth daily 1 po daily for HTN Profile Rx: patient will contact pharmacy when needed       meloxicam 15 MG tablet    MOBIC    30 tablet    Take 1 tablet (15 mg) by mouth daily as needed       omega-3 fatty acids 1200 MG capsule      1 TABLET DAILY       pantoprazole 20 MG EC tablet    PROTONIX    30 tablet    Take 1 tablet (20 mg) by mouth daily       potassium 99 MG Tabs      Take 1 tablet by mouth daily (with dinner)       simvastatin 20 MG tablet    ZOCOR    90 tablet    Take 1 tablet (20 mg) by mouth At Bedtime       travoprost (BAK Free) 0.004 % ophthalmic solution    TRAVATAN Z    2.5 mL    Apply 1 drop to eye At Bedtime       UNABLE TO FIND      MEDICATION NAME Cranberry.  2 capsules daily       VITAMIN D (CHOLECALCIFEROL) PO      Take 2,000 Units by mouth daily

## 2017-05-10 NOTE — PROGRESS NOTES
"  SUBJECTIVE:                                                    Lexii Stratton is a 76 year old female who presents to clinic today for the following health issues:      Finger Nail Problem    Duration: 8-10 months    Description (location/character/radiation): Fungal growth on right thumbnail and fourth fingernail.    Intensity: Moderate    Accompanying signs and symptoms: Peeling and breaking with intermittent redness.    History (similar episodes/previous evaluation): None    Precipitating or alleviating factors: None    Therapies tried and outcome: She has been clipping off her nails, but that has not completely resolved the fungal growth.       Problem list and histories reviewed & adjusted, as indicated.  Additional history: as documented    Labs reviewed in EPIC    Reviewed and updated as needed this visit by clinical staff       Reviewed and updated as needed this visit by Provider         REVIEW OF SYSTEMS:  C: NEGATIVE for fever, chills, or change in weight  I: POSITIVE for fungal growth on the right thumbnail and fourth fingernail; NEGATIVE for worrisome rashes, moles or lesions  R: NEGATIVE for significant cough or SOB  CV: NEGATIVE for chest pain, palpitations or peripheral edema  P: NEGATIVE for changes in mood or affect    This document serves as a record of the services and decisions personally performed and made by Jesenia Madrigal MD. It was created on her behalf by Urvashi Haley, a trained medical scribe. The creation of this document is based the provider's statements to the medical scribe.  Urvashi Haley, May 10, 2017 3:22 PM     OBJECTIVE:                                                    /60 (BP Location: Other (Comment), Patient Position: Chair, Cuff Size: Adult Large)  Pulse 80  Temp 97.6  F (36.4  C) (Oral)  Resp 20  Ht 1.638 m (5' 4.5\")  Wt 101.6 kg (224 lb)  LMP  (Approximate)  SpO2 94%  Breastfeeding? No  BMI 37.86 kg/m2  Body mass index is 37.86 " "kg/(m^2).    EXAM:  GENERAL: Patient appears healthy, alert and not distressed.  EYES: Eyes appear grossly normal to inspection, with normal conjunctivae and sclerae  RESP: Lungs are clear to auscultation - no rales, rhonchi or wheezes present  CV: Regular rate and rhythm, normal S1 S2 heart sounds, no ectopy, no peripheral edema present,  SKIN: Trophic changes noted to the right thumb (appears flattened) and to the fourth finger (lateral nail edge) with minimal presence of fungal growth; No suspicious rashes, lesions, or moles  NEURO: Patient exhibits normal strength and tone, normal speech and mentation  PSYCH: Mentation appears normal, affect is normal/bright    Diagnostic Test Results:  No results found for this or any previous visit (from the past 24 hour(s)).      ASSESSMENT/PLAN:                                                    (L60.3) Dystrophic nail  (primary encounter diagnosis)  Comment: Notable trophic changes to the right thumb, which appears flattened, and to the fourth finger, specifically the lateral edge of the nail. Minimal fungal growth noted in these areas.  Hx of artificial nails; best to keep nails short and see if they will grow out; encourage regular use of Tea Tree Oil.  Could consider nail removal ad  See if nail may grow back.  Plan: Recommended she use topical tea tree oil on a daily basis.     (I10) Essential hypertension, benign  Comment: Blood pressure is stable and optimally controlled. Amlodipine and Lisinopril are effective and well-tolerated, with no reported side effects including dry cough. Not in need of refills at this time. No dose adjustments today.     Plan: Will continue to monitor her blood pressure with every clinical visit.     Morbid obesity  Estimated body mass index is 37.86 kg/(m^2) as calculated from the following:    Height as of this encounter: 5' 4.5\" (1.638 m).    Weight as of this encounter: 224 lb (101.6 kg).   Encourage regular exercise and heathy " diet.    The information in this document, created by a medical scribe for me, accurately reflects the services I personally performed and the decisions made by me. I have reviewed and approved this document for accuracy.  Dr. Jesenia Madrigal, May 10, 2017, 3:33 PM     Jesenai Madrigal MD  Internal Medicine   [facility]

## 2017-05-18 DIAGNOSIS — M15.0 PRIMARY OSTEOARTHRITIS INVOLVING MULTIPLE JOINTS: ICD-10-CM

## 2017-05-18 DIAGNOSIS — I10 ESSENTIAL HYPERTENSION, BENIGN: ICD-10-CM

## 2017-05-18 DIAGNOSIS — R73.01 IMPAIRED FASTING GLUCOSE: ICD-10-CM

## 2017-05-18 NOTE — TELEPHONE ENCOUNTER
meloxicam (MOBIC) 15 MG      Last Written Prescription Date: 2/17/17  Last Quantity: 30, # refills: 2  Last Office Visit with [facility], [facility] or [facility] prescribing provider: 5/10/17       Creatinine   Date Value Ref Range Status   11/09/2016 0.90 0.52 - 1.04 mg/dL Final     Lab Results   Component Value Date    AST 40 09/08/2016     Lab Results   Component Value Date    ALT 22 09/08/2016     BP Readings from Last 3 Encounters:   05/10/17 134/60   04/28/17 146/56   02/01/17 128/66       lisinopril (PRINIVIL,ZESTRIL) 30 MG      Last Written Prescription Date: 9/8/16  Last Fill Quantity: 90, # refills: 1  Last Office Visit with [facility], [facility] or [facility] prescribing provider: 5/10/17       Potassium   Date Value Ref Range Status   11/09/2016 3.8 3.4 - 5.3 mmol/L Final     Creatinine   Date Value Ref Range Status   11/09/2016 0.90 0.52 - 1.04 mg/dL Final     BP Readings from Last 3 Encounters:   05/10/17 134/60   04/28/17 146/56   02/01/17 128/66

## 2017-05-22 RX ORDER — MELOXICAM 15 MG/1
TABLET ORAL
Qty: 30 TABLET | Refills: 2 | Status: SHIPPED | OUTPATIENT
Start: 2017-05-22 | End: 2017-08-13

## 2017-05-22 RX ORDER — LISINOPRIL 30 MG/1
TABLET ORAL
Qty: 90 TABLET | Refills: 1 | Status: SHIPPED | OUTPATIENT
Start: 2017-05-22 | End: 2017-11-01

## 2017-05-22 NOTE — TELEPHONE ENCOUNTER
Prescription [place] per [facility] [place].  Bernadette Ramos, RN  Triage Nurse

## 2017-05-24 DIAGNOSIS — E78.5 HYPERLIPIDEMIA LDL GOAL <130: ICD-10-CM

## 2017-05-25 NOTE — TELEPHONE ENCOUNTER
simvastatin (ZOCOR) 20 MG     Last Written Prescription Date: 11/2/16  Last Fill Quantity: 90, # refills: 1  Last Office Visit with G, P or [facility] prescribing provider: 5/10/17       Lab Results   Component Value Date    CHOL 142 09/08/2016     Lab Results   Component Value Date    HDL 43 09/08/2016     Lab Results   Component Value Date    LDL 75 09/08/2016     Lab Results   Component Value Date    TRIG 120 09/08/2016     Lab Results   Component Value Date    CHOLHDLRATIO 3.3 06/29/2015

## 2017-05-26 RX ORDER — SIMVASTATIN 20 MG
TABLET ORAL
Qty: 90 TABLET | Refills: 0 | Status: SHIPPED | OUTPATIENT
Start: 2017-05-26 | End: 2017-08-21

## 2017-05-26 NOTE — TELEPHONE ENCOUNTER
Prescription approved per [facility] Refill Protocol.      Laure Reid RN

## 2017-06-06 ENCOUNTER — TRANSFERRED RECORDS (OUTPATIENT)
Dept: HEALTH INFORMATION MANAGEMENT | Facility: CLINIC | Age: 77
End: 2017-06-06

## 2017-07-13 DIAGNOSIS — E03.9 ACQUIRED HYPOTHYROIDISM: ICD-10-CM

## 2017-07-13 NOTE — TELEPHONE ENCOUNTER
levothyroxine (SYNTHROID) 50 MCG     Last Written Prescription Date: 1/9/17  Last Quantity: 90, # refills: 1  Last Office Visit with G, P or [facility] prescribing provider: 5/10/17        TSH   Date Value Ref Range Status   09/08/2016 1.11 0.40 - 4.00 mU/L Final

## 2017-07-14 ENCOUNTER — TRANSFERRED RECORDS (OUTPATIENT)
Dept: HEALTH INFORMATION MANAGEMENT | Facility: CLINIC | Age: 77
End: 2017-07-14

## 2017-07-14 RX ORDER — LEVOTHYROXINE SODIUM 50 UG/1
50 TABLET ORAL DAILY
Qty: 90 TABLET | Refills: 0 | Status: SHIPPED | OUTPATIENT
Start: 2017-07-14 | End: 2017-10-16

## 2017-07-14 NOTE — TELEPHONE ENCOUNTER
Prescription approved per [facility] Refill Protocol.  Next thyroid labs in September.  Bernadette Ramos, RN  Triage Nurse

## 2017-07-15 ENCOUNTER — MYC MEDICAL ADVICE (OUTPATIENT)
Dept: FAMILY MEDICINE | Facility: CLINIC | Age: 77
End: 2017-07-15

## 2017-08-06 DIAGNOSIS — I10 ESSENTIAL HYPERTENSION, BENIGN: ICD-10-CM

## 2017-08-07 RX ORDER — AMLODIPINE BESYLATE 5 MG/1
TABLET ORAL
Qty: 90 TABLET | Refills: 2 | Status: SHIPPED | OUTPATIENT
Start: 2017-08-07 | End: 2018-05-13

## 2017-08-07 NOTE — TELEPHONE ENCOUNTER
amLODIPine (NORVASC) 5 MG      Last Written Prescription Date: 9/8/17  Last Fill Quantity: 90, # refills: 1    Last Office Visit with G, [facility] or [facility] prescribing provider:  5/10/17   Future Office Visit:    Next 5 appointments (look out 90 days)     Aug 21, 2017 10:00 AM CDT   Pre-Op physical with Jesenia Madrigal MD   [facility]    [address] [facility] [address] [phone]                    BP Readings from Last 3 Encounters:   05/10/17 134/60   04/28/17 146/56   02/01/17 128/66

## 2017-08-07 NOTE — TELEPHONE ENCOUNTER
Prescription approved per [facility] Refill Protocol.  Wendi Goncalves RN

## 2017-08-13 DIAGNOSIS — M15.0 PRIMARY OSTEOARTHRITIS INVOLVING MULTIPLE JOINTS: ICD-10-CM

## 2017-08-14 NOTE — TELEPHONE ENCOUNTER
meloxicam (MOBIC) 15 MG      Last Written Prescription Date: 5/22/17  Last Quantity: 30, # refills: 2  Last Office Visit with [facility] or [facility] prescribing provider: 5/10/17  Next 5 appointments (look out 90 days)     Aug 21, 2017 10:00 AM CDT   Pre-Op physical with Jesenia Madrigal MD   [facility]    [address] [place] [address] [phone]                   Creatinine   Date Value Ref Range Status   11/09/2016 0.90 0.52 - 1.04 mg/dL Final     Lab Results   Component Value Date    AST 40 09/08/2016     Lab Results   Component Value Date    ALT 22 09/08/2016     BP Readings from Last 3 Encounters:   05/10/17 134/60   04/28/17 146/56   02/01/17 128/66

## 2017-08-15 RX ORDER — MELOXICAM 15 MG/1
TABLET ORAL
Qty: 30 TABLET | Refills: 2 | Status: SHIPPED | OUTPATIENT
Start: 2017-08-15 | End: 2017-11-21

## 2017-08-21 ENCOUNTER — OFFICE VISIT (OUTPATIENT)
Dept: FAMILY MEDICINE | Facility: CLINIC | Age: 77
End: 2017-08-21
Payer: COMMERCIAL

## 2017-08-21 ENCOUNTER — TRANSFERRED RECORDS (OUTPATIENT)
Dept: HEALTH INFORMATION MANAGEMENT | Facility: CLINIC | Age: 77
End: 2017-08-21

## 2017-08-21 VITALS
HEIGHT: 65 IN | DIASTOLIC BLOOD PRESSURE: 64 MMHG | WEIGHT: 224.5 LBS | TEMPERATURE: 98.6 F | RESPIRATION RATE: 17 BRPM | SYSTOLIC BLOOD PRESSURE: 120 MMHG | OXYGEN SATURATION: 100 % | HEART RATE: 64 BPM | BODY MASS INDEX: 37.4 KG/M2

## 2017-08-21 DIAGNOSIS — I10 ESSENTIAL HYPERTENSION, BENIGN: ICD-10-CM

## 2017-08-21 DIAGNOSIS — E78.5 HYPERLIPIDEMIA LDL GOAL <130: ICD-10-CM

## 2017-08-21 DIAGNOSIS — E66.01 MORBID OBESITY DUE TO EXCESS CALORIES (H): ICD-10-CM

## 2017-08-21 DIAGNOSIS — Z01.818 PREOP GENERAL PHYSICAL EXAM: Primary | ICD-10-CM

## 2017-08-21 DIAGNOSIS — N32.81 OAB (OVERACTIVE BLADDER): ICD-10-CM

## 2017-08-21 DIAGNOSIS — E03.9 ACQUIRED HYPOTHYROIDISM: ICD-10-CM

## 2017-08-21 LAB — HGB BLD-MCNC: 9.3 G/DL (ref 11.7–15.7)

## 2017-08-21 PROCEDURE — 84443 ASSAY THYROID STIM HORMONE: CPT | Performed by: INTERNAL MEDICINE

## 2017-08-21 PROCEDURE — 36415 COLL VENOUS BLD VENIPUNCTURE: CPT | Performed by: INTERNAL MEDICINE

## 2017-08-21 PROCEDURE — 84439 ASSAY OF FREE THYROXINE: CPT | Performed by: INTERNAL MEDICINE

## 2017-08-21 PROCEDURE — 99214 OFFICE O/P EST MOD 30 MIN: CPT | Performed by: INTERNAL MEDICINE

## 2017-08-21 PROCEDURE — 80048 BASIC METABOLIC PNL TOTAL CA: CPT | Performed by: INTERNAL MEDICINE

## 2017-08-21 PROCEDURE — 93000 ELECTROCARDIOGRAM COMPLETE: CPT | Performed by: INTERNAL MEDICINE

## 2017-08-21 PROCEDURE — 85018 HEMOGLOBIN: CPT | Performed by: INTERNAL MEDICINE

## 2017-08-21 NOTE — TELEPHONE ENCOUNTER
Medication Detail      Disp Refills Start End ALVARADO   simvastatin (ZOCOR) 20 MG tablet 90 tablet 0 5/26/2017  No   Sig: TAKE ONE TABLET BY MOUTH EVERY NIGHT AT BEDTIME       Last Office Visit with [facility], [facility] or [facility] prescribing provider: 8/21/17       Lab Results   Component Value Date    CHOL 142 09/08/2016     Lab Results   Component Value Date    HDL 43 09/08/2016     Lab Results   Component Value Date    LDL 75 09/08/2016     Lab Results   Component Value Date    TRIG 120 09/08/2016     Lab Results   Component Value Date    CHOLHDLRATIO 3.3 06/29/2015

## 2017-08-21 NOTE — MR AVS SNAPSHOT
After Visit Summary   8/21/2017    Lexii Stratton    MRN: [id number]           Patient Information     Date Of Birth          1940        Visit Information        Provider Department      8/21/2017 10:00 AM Jesenia Madrigal MD [facility]        Today's Diagnoses     Preop general physical exam    -  1    OAB (overactive bladder)        Essential hypertension, benign        Hyperlipidemia LDL goal <130        Morbid obesity due to excess calories (H)        Acquired hypothyroidism          Care Instructions      Before Your Surgery      Call your surgeon if there is any change in your health. This includes signs of a cold or flu (such as a sore throat, runny nose, cough, rash or fever).    Do not smoke, drink alcohol or take over the counter medicine (unless your surgeon or primary care doctor tells you to) for the 24 hours before and after surgery.    If you take prescribed drugs: Follow your doctor s orders about which medicines to take and which to stop until after surgery.    Eating and drinking prior to surgery: follow the instructions from your surgeon    Take a shower or bath the night before surgery. Use the soap your surgeon gave you to gently clean your skin. If you do not have soap from your surgeon, use your regular soap. Do not shave or scrub the surgery site.  Wear clean pajamas and have clean sheets on your bed.           Follow-ups after your visit        Your next 10 appointments already scheduled     Sep 06, 2017   Procedure with Eyal Bai MD   [facility] (--)    [address]  [facility] [id number] [phone]              Who to contact     If you have questions or need follow up information about today's clinic visit or your schedule please contact [facility] directly at [phone].  Normal or non-critical lab and imaging results will be communicated to you by MyChart, letter or phone  "within 4 business days after the clinic has received the results. If you do not hear from us within 7 days, please contact the clinic through [employer] or phone. If you have a critical or abnormal lab result, we will notify you by phone as soon as possible.  Submit refill requests through [employer] or call your pharmacy and they will forward the refill request to us. Please allow 3 business days for your refill to be completed.          Additional Information About Your Visit        [employer] Information     [employer] gives you secure access to your electronic health record. If you see a primary care provider, you can also send messages to your care team and make appointments. If you have questions, please call your primary care clinic.  If you do not have a primary care provider, please call [phone] and they will assist you.        Care EveryWhere ID     This is your Care EveryWhere ID. This could be used by other organizations to access your [facility] medical records  [id number]        Your Vitals Were     Pulse Temperature Respirations Height Last Period Pulse Oximetry    64 98.6  F (37  C) (Oral) 17 5' 4.5\" (1.638 m) (Approximate) 100%    BMI (Body Mass Index)                   37.94 kg/m2            Blood Pressure from Last 3 Encounters:   08/21/17 120/64   05/10/17 134/60   04/28/17 146/56    Weight from Last 3 Encounters:   08/21/17 224 lb 8 oz (101.8 kg)   05/10/17 224 lb (101.6 kg)   04/28/17 223 lb (101.2 kg)              We Performed the Following     Basic metabolic panel     EKG 12-lead complete w/read - Clinics     Hemoglobin     T4 free     TSH        Primary Care Provider Office Phone # Fax #    Jesenia Priscilla Madrigal [phone]       [address] [facility]  [facility] [address]        Equal Access to Services     LUCY MILLS : Kory Martin, parisa jarvis, molly moreno, navin boateng. So [facility] [phone].    ATENCIÓN: Si habla " español, tiene a irene disposición servicios gratuitos de asistencia lingüística. Keegan sands [phone].    We comply with applicable federal civil rights laws and [place] laws. We do not discriminate on the basis of race, color, national origin, age, disability sex, sexual orientation or gender identity.            Thank you!     Thank you for choosing [facility]  for your care. Our goal is always to provide you with excellent care. Hearing back from our patients is one way we can continue to improve our services. Please take a few minutes to complete the written survey that you may receive in the mail after your visit with us. Thank you!             Your Updated Medication List - Protect others around you: Learn how to safely use, store and throw away your medicines at www.disposemymeds.org.          This list is accurate as of: 8/21/17 11:20 AM.  Always use your most recent med list.                   Brand Name Dispense Instructions for use Diagnosis    acetaminophen 325 MG tablet    TYLENOL    500 tablet    Take 3 tablets (975 mg) by mouth every 8 hours    Status post total replacement of right hip       ACIDOPHILUS PROBIOTIC BLEND Caps     30 capsule    Take 1 capsule by mouth daily        amLODIPine 5 MG tablet    NORVASC    90 tablet    TAKE ONE TABLET BY MOUTH EVERY NIGHT AT BEDTIME    Essential hypertension, benign       calcium + D 600-200 MG-UNIT Tabs   Generic drug:  calcium carbonate-vitamin D      1 TABLET  DAILY WITH FOOD    Preop general physical exam, Umbilical hernia       CENTRUM SILVER per tablet     30    1 TABLET DAILY    Need for prophylactic vaccination and inoculation against influenza, Routine general medical examination at a health care facility, Generalized osteoarthrosis, unspecified site, Other and unspecified hyperlipidemia, Esophageal reflux, Obesity, unspecified, Urinary sys symptom NEC, Other malaise and fatigue, Sleep disturbance, unspecified       COLACE PO       Take 100 mg by mouth daily        fenofibrate 160 MG tablet     90 tablet    TAKE ONE TABLET BY MOUTH EVERY DAY    Hyperlipidemia LDL goal <130       ferrous gluconate 324 (38 FE) MG tablet    FERGON    100 tablet    Take 1 tablet (324 mg) by mouth daily (with breakfast)    Anemia, unspecified       levothyroxine 50 MCG tablet    SYNTHROID/LEVOTHROID    90 tablet    Take 1 tablet (50 mcg) by mouth daily Thyroid labs due 9/17.    Acquired hypothyroidism       lisinopril 30 MG tablet    PRINIVIL,ZESTRIL    90 tablet    TAKE ONE TABLET BY MOUTH EVERY DAY FOR HYPERTENSION    Essential hypertension, benign, Impaired fasting glucose       meloxicam 15 MG tablet    MOBIC    30 tablet    TAKE ONE TABLET BY MOUTH EVERY DAY AS NEEDED    Primary osteoarthritis involving multiple joints       omega-3 fatty acids 1200 MG capsule      1 TABLET DAILY    Preop general physical exam, Stress incontinence - female       pantoprazole 20 MG EC tablet    PROTONIX    30 tablet    Take 1 tablet (20 mg) by mouth daily    Gastroesophageal reflux disease without esophagitis       potassium 99 MG Tabs      Take 1 tablet by mouth daily (with dinner)        simvastatin 20 MG tablet    ZOCOR    90 tablet    TAKE ONE TABLET BY MOUTH EVERY NIGHT AT BEDTIME    Hyperlipidemia LDL goal <130       travoprost (BAK Free) 0.004 % ophthalmic solution    TRAVATAN Z    2.5 mL    Apply 1 drop to eye At Bedtime        UNABLE TO FIND      MEDICATION NAME Cranberry.  2 capsules daily        VITAMIN D (CHOLECALCIFEROL) PO      Take 2,000 Units by mouth daily

## 2017-08-21 NOTE — NURSING NOTE
"Chief Complaint   Patient presents with     Pre-Op Exam       Initial /64  Pulse 64  Temp 98.6  F (37  C) (Oral)  Resp 17  Ht 5' 4.5\" (1.638 m)  Wt 224 lb 8 oz (101.8 kg)  LMP  (Approximate)  SpO2 100%  BMI 37.94 kg/m2 Estimated body mass index is 37.94 kg/(m^2) as calculated from the following:    Height as of this encounter: 5' 4.5\" (1.638 m).    Weight as of this encounter: 224 lb 8 oz (101.8 kg).  Medication Reconciliation: complete    "

## 2017-08-21 NOTE — PROGRESS NOTES
[facility]  [address] [facility] [id number] [phone]  Dept: [phone]    PRE-OP EVALUATION:  Today's date: 2017    Lexii Stratton (: 1940) presents for pre-operative evaluation assessment as requested by Dr. Eyal Bai.  She requires evaluation and anesthesia risk assessment prior to undergoing surgery/procedure for treatment of urinary bladder issues .  Proposed procedure: cystoscopy    Date of Surgery/ Procedure: 17  Time of Surgery/ Procedure: 12:00 pm  Hospital/Surgical Facility: [facility]  Fax number for surgical facility:   Primary Physician: Jesenia Madrigal  Type of Anesthesia Anticipated: General    Patient has a Health Care Directive or Living Will:  YES     Preop Questions 2017   1.  Do you have a history of heart attack, stroke, stent, bypass or surgery on an artery in the head, neck, heart or legs? No   2.  Do you ever have any pain or discomfort in your chest? No   3.  Do you have a history of  Heart Failure? No   4.   Are you troubled by shortness of breath when:  walking on a level surface, or up a slight hill, or at night? YES - SOB with activity on stairs. Flat ground without difficulty   5.  Do you currently have a cold, bronchitis or other respiratory infection? No   6.  Do you have a cough, shortness of breath, or wheezing? No   7.  Do you sometimes get pains in the calves of your legs when you walk? No   8. Do you or anyone in your family have previous history of blood clots? No   9.  Do you or does anyone in your family have a serious bleeding problem such as prolonged bleeding following surgeries or cuts? No   10. Have you ever had problems with anemia or been told to take iron pills? YES - Iron tablets daily;   Lab Results   Component Value Date          HGB 9.2 2016         11. Have you had any abnormal blood loss such as black, tarry or bloody stools, or abnormal vaginal bleeding? No   12. Have  "you ever had a blood transfusion? YES - no complications   13. Have you or any of your relatives ever had problems with anesthesia? YES - Mother had issues with waking up and then confused and disoriented afterwards.    14. Do you have sleep apnea, excessive snoring or daytime drowsiness? No   15. Do you have any prosthetic heart valves? No   16. Do you have prosthetic joints? YES - bilateral knees and right hip    17. Is there any chance that you may be pregnant? No       HPI:                                                      Brief HPI related to upcoming procedure: Cystoscopy under general anesthesia scheduled for \"stretching of bladder\" to treat OAB, urinary frequency, urgency and nocturia. She has tried non surgical methods including medication, Botox injections and InterStim implant, without continuous relief. She was asked to hold fish oil and aspirin prior to surgery. She denies palpitation, chest pain. She mentions exertional shortness of breath alleviated with rest.    She takes Colace every day for constipation. Hx of partial bowel obstruction. She denies nausea, diarrhea and heartburn.    Skin concern: Changing skin spot on her right shin for 3 weeks. Initially, it was brown in appearance. Now it is is light pink and intermittently itchy. She has been applying lotion.    HYPERTENSION - Patient has longstanding history of mod-severe HTN , currently denies any symptoms referable to elevated blood pressure. Specifically denies chest pain, palpitations, dyspnea, orthopnea, PND or peripheral edema. Blood pressure readings have been in normal range. Current medication regimen is as listed below. Patient denies any side effects of medication.                                                                                                                                                                                          .  HYPERLIPIDEMIA - Patient has a long history of significant Hyperlipidemia requiring " medication for treatment with recent good control. Patient reports no problems or side effects with the medication.                                                                                                                                                       .  ASTHMA - Patient has a longstanding history of moderate-severe Asthma . Patient has been doing well overall continues on medication regimen consisting of iron supplement without adverse reactions or side effects.                                                                                                                                                 .    MEDICAL HISTORY:                                                    Patient Active Problem List    Diagnosis Date Noted     Hyperlipidemia LDL goal <130 10/31/2010     Priority: High     Impaired fasting glucose 07/16/2007     Priority: High     Essential hypertension, benign 06/25/2006     Priority: High     OAB (overactive bladder) 02/01/2017     Priority: Medium     [facility] Dr. Bai; has tried 7 medications without benefit, Interstim x 2, Botox in bladder, x 5; no infection now. Ongoing symptoms; defer to Urology.       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 Provider to review       Acquired hypothyroidism 11/11/2004     Priority: Medium     Other symptoms involving urinary system 10/05/2002     Priority: Medium      Problem list name updated by automated process. Provider to review and confirm       Generalized osteoarthrosis, unspecified site 10/03/2002     Priority: Medium     Esophageal reflux 10/03/2002     Priority: Medium     Obesity 10/03/2002     Priority: Medium     Problem list name updated by automated process. Provider to review       Advance Care Planning 06/07/2011     Priority: Low     Advance Care Planning 1/9/2017: Receipt of ACP document:  Received: Health Care Directive which was witnessed or notarized on 5/27/09.  Document previously scanned on 11/14/16.  Validation form completed and sent to be scanned.  Code Status reflects choices in most recent ACP document.  Confirmed/documented designated decision maker(s).  Added by Beatriz Esteban RN, Advance Care Planning Liaison.          Past Medical History:   Diagnosis Date     Chronic infection     Recent UTI cleared now     Esophageal reflux      Hypertension     No cardiologist     Incontinence of urine      Osteoarthritis      Other and unspecified hyperlipidemia      Other chronic pain     back     Peptic ulcer, unspecified site, unspecified as acute or chronic, without mention of hemorrhage, perforation, or obstruction      Small bowel obstruction (H)      Thyroid disease hypothyroidism     Urinary incontinence      Past Surgical History:   Procedure Laterality Date     ARTHROPLASTY HIP Right 11/9/2016    Procedure: ARTHROPLASTY HIP;  Surgeon: Angel Lund MD;  Location: [facility] OR     AS REPAIR INCISIONAL HERNIA,REDUCIBLE  1998    inc hernia ( [facility] surgery)     C NONSPECIFIC PROCEDURE  1946    T&A     C NONSPECIFIC PROCEDURE  1984    Vaginal Hysterectomy (has her ovaries)     C NONSPECIFIC PROCEDURE  1973    PPTL     C NONSPECIFIC PROCEDURE  1994    L shoulder to remove bone spurs     C NONSPECIFIC PROCEDURE  2004    cysto and durasphere Dr Demarco     C NONSPECIFIC PROCEDURE  2005    cysto and durasphere     C NONSPECIFIC PROCEDURE  2007     cysto and durasphere     C NONSPECIFIC PROCEDURE  2009    retropubic TVT sling     CHOLECYSTECTOMY  1987     COLONOSCOPY  12/3/2011    Procedure:COLONOSCOPY; COLONOSCOPY; Surgeon:SEMAJ GRAHAM; Location: GI     ENT SURGERY      Tonsillectomy     ESOPHAGOSCOPY, GASTROSCOPY, DUODENOSCOPY (EGD), COMBINED N/A 9/26/2016    Procedure: COMBINED ESOPHAGOSCOPY, GASTROSCOPY, DUODENOSCOPY (EGD), BIOPSY SINGLE OR MULTIPLE;  Surgeon: Marco Monaco MD;  Location:  GI     GYN SURGERY      Hysterectomy     HERNIA REPAIR, UMBILICAL  7/8/2010    Dr. Kirt Franco times 3     ORTHOPEDIC SURGERY  2009    [facility] - Dr. Lund- bilateral knee replacement     Current Outpatient Prescriptions   Medication Sig Dispense Refill     meloxicam (MOBIC) 15 MG tablet TAKE ONE TABLET BY MOUTH EVERY DAY AS NEEDED 30 tablet 2     amLODIPine (NORVASC) 5 MG tablet TAKE ONE TABLET BY MOUTH EVERY NIGHT AT BEDTIME 90 tablet 2     levothyroxine (SYNTHROID/LEVOTHROID) 50 MCG tablet Take 1 tablet (50 mcg) by mouth daily Thyroid labs due 9/17. 90 tablet 0     simvastatin (ZOCOR) 20 MG tablet TAKE ONE TABLET BY MOUTH EVERY NIGHT AT BEDTIME 90 tablet 0     lisinopril (PRINIVIL,ZESTRIL) 30 MG tablet TAKE ONE TABLET BY MOUTH EVERY DAY FOR HYPERTENSION 90 tablet 1     travoprost, BAK Free, (TRAVATAN Z) 0.004 % ophthalmic solution Apply 1 drop to eye At Bedtime 2.5 mL 1     fenofibrate 160 MG tablet TAKE ONE TABLET BY MOUTH EVERY DAY 90 tablet 1     acetaminophen (TYLENOL) 325 MG tablet Take 3 tablets (975 mg) by mouth every 8 hours 500 tablet 1     UNABLE TO FIND MEDICATION NAME Cranberry.  2 capsules daily       VITAMIN D, CHOLECALCIFEROL, PO Take 2,000 Units by mouth daily       Docusate Sodium (COLACE PO) Take 100 mg by mouth daily       pantoprazole (PROTONIX) 20 MG tablet Take 1 tablet (20 mg) by mouth daily 30 tablet 11     Probiotic Product (ACIDOPHILUS PROBIOTIC BLEND) CAPS Take 1 capsule by mouth daily 30 capsule 0     ferrous gluconate (FERGON) 324  (38 FE) MG tablet Take 1 tablet (324 mg) by mouth daily (with breakfast) 100 tablet 0     potassium 99 MG TABS Take 1 tablet by mouth daily (with dinner)        CALCIUM + D 600-200 MG-UNIT PO TABS 1 TABLET  DAILY WITH FOOD       OMEGA-3 FATTY ACIDS 1200 MG OR CAPS 1 TABLET DAILY  0     CENTRUM SILVER OR TABS 1 TABLET DAILY 30 0     OTC products: None, except as noted above    Allergies   Allergen Reactions     Augmentin Diarrhea     Codeine Nausea and Vomiting     Hydrocodone      Keeps patient awake     Naproxen Itching and Rash     Sulfa Drugs Nausea      Latex Allergy: NO    Social History   Substance Use Topics     Smoking status: Never Smoker     Smokeless tobacco: Never Used     Alcohol use No     History   Drug Use No       REVIEW OF SYSTEMS:                                                    CONSTITUTIONAL:NEGATIVE for fever, chills, change in weight  INTEGUMENTARY/SKIN: POSITIVE for healing lesion on right shin NEGATIVE for worrisome rashes, moles or lesions  ENT/MOUTH: NEGATIVE for ear, mouth and throat problems  RESP:NEGATIVE for significant cough or SOB  CV: Hypertension - use of amlodipine and lisinopril NEGATIVE for chest pain, palpitations or peripheral edema  GI: POSITIVE for constipation - use of Colace NEGATIVE for nausea, abdominal pain, heartburn, or change in bowel habits  : POSITIVE for urinary frequency, urgency and nocturia NEGATIVE for dysuria, or hematuria  MUSCULOSKELETAL: Right total hip arthroplasty in 11/2016 NEGATIVE for significant arthralgias or myalgia  NEURO: NEGATIVE for weakness, dizziness or paresthesias  ENDOCRINE: Hyperlipidemia and hypothyroidism - use of simvastatin, fenofibrate and levothyroxine NEGATIVE for temperature intolerance, skin/hair changes  HEME/ALLERGY/IMMUNE: Hx of Anemia - use of iron supplement NEGATIVE for bleeding problems  PSYCHIATRIC: NEGATIVE for changes in mood or affect    Review of Systems are otherwise negative      This document serves as a record  "of the services and decisions personally performed and made by Jesenia Madrigal MD. It was created on her behalf by Deepa Stern, a trained medical scribe. The creation of this document is based on the provider's statements to the medical scribe.  Deepa Stern 11:20 AM August 21, 2017    EXAM:                                                    /64  Pulse 64  Temp 98.6  F (37  C) (Oral)  Resp 17  Ht 1.638 m (5' 4.5\")  Wt 101.8 kg (224 lb 8 oz)  LMP  (Approximate)  SpO2 100%  BMI 37.94 kg/m2    GENERAL: healthy, alert and no distress.  EYES: Eyes grossly normal to inspection, conjunctivae and sclerae normal   HENT: ear canals and TM's normal, nose and mouth without ulcers or lesions   NECK: no adenopathy, no asymmetry, masses, or scars and thyroid normal to palpation,   RESP: lungs clear to auscultation - no rales, rhonchi or wheezes   CV: regular rate and rhythm, normal S1 S2, no ectopy, no peripheral edema, strong peripheral pulses. no carotid bruit  ABDOMEN: soft, nontender, no hepatosplenomegaly, no masses, bowel sounds normal  MS: no gross musculoskeletal defects noted, no edema, FROM (passive ROM)  DTR in extremities normal; No CVA tenderness  SKIN: Right shin: skin intact, no open lesions. no suspicious rashes, lesions, or moles.  NEURO: Normal strength and tone, mentation intact and speech normal  PSYCH: mentation appears normal, affect normal/bright    DIAGNOSTICS:                                                      EKG: appears normal, NSR, rate 76, normal axis, normal intervals, no acute ST/T changes c/w ischemia, no LVH by voltage criteria      Labs Drawn and in Process:   Unresulted Labs Ordered in the Past 30 Days of this Admission     Date and Time Order Name Status Description    8/21/2017 1120 BASIC METABOLIC PANEL In process     8/21/2017 1120 T4 FREE In process     8/21/2017 1120 TSH In process     8/21/2017 1120 HEMOGLOBIN In process           Recent Labs   Lab Test  " "11/21/16   1456  11/11/16   0626   11/09/16   1225  11/02/16   1409  10/06/16   1225   09/14/16   1105  09/08/16   1138   06/29/15   0858   07/03/09   0650  07/02/09   0635   HGB  9.2*  7.9*   < >   --   10.5*  Canceled, Test credited   Duplicate request  CORRECTED ON 11/03 AT 1021: PREVIOUSLY REPORTED AS 10.5    11.7   < >  9.9*  10.5*   < >   --    < >  10.5*  7.4*   PLT   --    --    --    --   258  217   --    --   244   < >   --    < >   --    --    INR   --    --    --    --    --    --    --    --    --    --    --    --   1.65*  1.44*   NA   --    --    --    --    --    --    --   139  138   < >  141   < >   --    --    POTASSIUM   --    --    --   3.8   --    --    --   4.3  3.9   < >  4.4   < >   --    --    CR   --    --    --   0.90   --    --    --   0.92  1.17*   < >  0.85   < >   --    --    A1C   --    --    --    --    --    --    --    --   6.0   --   6.4*   < >   --    --     < > = values in this interval not displayed.        IMPRESSION:                                                    Reason for surgery/procedure: Overactive bladder, urinary frequency and urgency  Diagnosis/reason for consult: evaluation and anesthesia risk assessment     The proposed surgical procedure is considered LOW risk.    REVISED CARDIAC RISK INDEX  The patient has the following serious cardiovascular risks for perioperative complications such as (MI, PE, VFib and 3  AV Block):  No serious cardiac risks  INTERPRETATION: 0 risks: Class I (very low risk - 0.4% complication rate)    The patient has the following additional risks for perioperative complications:  No identified additional risks      ICD-10-CM    1. Preop general physical exam Z01.818 EKG 12-lead complete w/read - Clinics     Hemoglobin     Basic metabolic panel   2. OAB (overactive bladder) N32.81     Procedure to \"stretch the bladder\"   3. Essential hypertension, benign I10 Hemoglobin     Basic metabolic panel   4. Hyperlipidemia LDL goal <130 E78.5  "   5. Morbid obesity due to excess calories (H) E66.01    6. Acquired hypothyroidism E03.9 TSH     T4 free       RECOMMENDATIONS:                                                      Patient to hold all medications the morning of surgery     --Pt advised to avoid NSAIDS (Motrin, Ibuprofen, Aleve or Naprosyn);  If needed, Tylenol or Acetaminophen are fine to use. She is also to hold Fish oil one week prior  --meds reviewed; may hold all AM meds on the morning of surgery. She can resume meds upon return home  --Pain medications, time off from work and FMLA following surgery deferred to surgeon.     APPROVAL GIVEN to proceed with proposed procedure, without further diagnostic evaluation     The information in this document, created by the medical scribe for me, accurately reflects the services I personally performed and the decisions made by me. I have reviewed and approved this document for accuracy prior to leaving the patient care area.  August 21, 2017 11:21 AM    Signed Electronically by: Jesenia Madrigal MD    Copy of this evaluation report is provided to requesting physician.    [place] Preop Guidelines

## 2017-08-22 LAB
ANION GAP SERPL CALCULATED.3IONS-SCNC: 5 MMOL/L (ref 3–14)
BUN SERPL-MCNC: 28 MG/DL (ref 7–30)
CALCIUM SERPL-MCNC: 9.5 MG/DL (ref 8.5–10.1)
CHLORIDE SERPL-SCNC: 107 MMOL/L (ref 94–109)
CO2 SERPL-SCNC: 29 MMOL/L (ref 20–32)
CREAT SERPL-MCNC: 0.89 MG/DL (ref 0.52–1.04)
GFR SERPL CREATININE-BSD FRML MDRD: 61 ML/MIN/1.7M2
GLUCOSE SERPL-MCNC: 116 MG/DL (ref 70–99)
POTASSIUM SERPL-SCNC: 4.4 MMOL/L (ref 3.4–5.3)
SODIUM SERPL-SCNC: 141 MMOL/L (ref 133–144)
T4 FREE SERPL-MCNC: 1.34 NG/DL (ref 0.76–1.46)
TSH SERPL DL<=0.005 MIU/L-ACNC: 1.68 MU/L (ref 0.4–4)

## 2017-08-23 RX ORDER — SIMVASTATIN 20 MG
TABLET ORAL
Qty: 90 TABLET | Refills: 2 | Status: SHIPPED | OUTPATIENT
Start: 2017-08-23 | End: 2018-05-28

## 2017-08-31 NOTE — H&P (VIEW-ONLY)
[facility]  [id number] [facility] [id number] [phone]  Dept: [phone]    PRE-OP EVALUATION:  Today's date: 2017    Lexii Stratton (: 1940) presents for pre-operative evaluation assessment as requested by Dr. Eyal Bai.  She requires evaluation and anesthesia risk assessment prior to undergoing surgery/procedure for treatment of urinary bladder issues .  Proposed procedure: cystoscopy    Date of Surgery/ Procedure: 17  Time of Surgery/ Procedure: 12:00 pm  Hospital/Surgical Facility: [facility]  Fax number for surgical facility:   Primary Physician: Jesenia Madrigal  Type of Anesthesia Anticipated: General    Patient has a Health Care Directive or Living Will:  YES     Preop Questions 2017   1.  Do you have a history of heart attack, stroke, stent, bypass or surgery on an artery in the head, neck, heart or legs? No   2.  Do you ever have any pain or discomfort in your chest? No   3.  Do you have a history of  Heart Failure? No   4.   Are you troubled by shortness of breath when:  walking on a level surface, or up a slight hill, or at night? YES - SOB with activity on stairs. Flat ground without difficulty   5.  Do you currently have a cold, bronchitis or other respiratory infection? No   6.  Do you have a cough, shortness of breath, or wheezing? No   7.  Do you sometimes get pains in the calves of your legs when you walk? No   8. Do you or anyone in your family have previous history of blood clots? No   9.  Do you or does anyone in your family have a serious bleeding problem such as prolonged bleeding following surgeries or cuts? No   10. Have you ever had problems with anemia or been told to take iron pills? YES - Iron tablets daily;   Lab Results   Component Value Date          HGB 9.2 2016         11. Have you had any abnormal blood loss such as black, tarry or bloody stools, or abnormal vaginal bleeding? No   12. Have  "you ever had a blood transfusion? YES - no complications   13. Have you or any of your relatives ever had problems with anesthesia? YES - Mother had issues with waking up and then confused and disoriented afterwards.    14. Do you have sleep apnea, excessive snoring or daytime drowsiness? No   15. Do you have any prosthetic heart valves? No   16. Do you have prosthetic joints? YES - bilateral knees and right hip    17. Is there any chance that you may be pregnant? No       HPI:                                                      Brief HPI related to upcoming procedure: Cystoscopy under general anesthesia scheduled for \"stretching of bladder\" to treat OAB, urinary frequency, urgency and nocturia. She has tried non surgical methods including medication, Botox injections and InterStim implant, without continuous relief. She was asked to hold fish oil and aspirin prior to surgery. She denies palpitation, chest pain. She mentions exertional shortness of breath alleviated with rest.    She takes Colace every day for constipation. Hx of partial bowel obstruction. She denies nausea, diarrhea and heartburn.    Skin concern: Changing skin spot on her right shin for 3 weeks. Initially, it was brown in appearance. Now it is is light pink and intermittently itchy. She has been applying lotion.    HYPERTENSION - Patient has longstanding history of mod-severe HTN , currently denies any symptoms referable to elevated blood pressure. Specifically denies chest pain, palpitations, dyspnea, orthopnea, PND or peripheral edema. Blood pressure readings have been in normal range. Current medication regimen is as listed below. Patient denies any side effects of medication.                                                                                                                                                                                          .  HYPERLIPIDEMIA - Patient has a long history of significant Hyperlipidemia requiring " medication for treatment with recent good control. Patient reports no problems or side effects with the medication.                                                                                                                                                       .  ASTHMA - Patient has a longstanding history of moderate-severe Asthma . Patient has been doing well overall continues on medication regimen consisting of iron supplement without adverse reactions or side effects.                                                                                                                                                 .    MEDICAL HISTORY:                                                    Patient Active Problem List    Diagnosis Date Noted     Hyperlipidemia LDL goal <130 10/31/2010     Priority: High     Impaired fasting glucose 07/16/2007     Priority: High     Essential hypertension, benign 06/25/2006     Priority: High     OAB (overactive bladder) 02/01/2017     Priority: Medium     [facility] Dr. Bai; has tried 7 medications without benefit, Interstim x 2, Botox in bladder, x 5; no infection now. Ongoing symptoms; defer to Urology.       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 Provider to review       Acquired hypothyroidism 11/11/2004     Priority: Medium     Other symptoms involving urinary system 10/05/2002     Priority: Medium      Problem list name updated by automated process. Provider to review and confirm       Generalized osteoarthrosis, unspecified site 10/03/2002     Priority: Medium     Esophageal reflux 10/03/2002     Priority: Medium     Obesity 10/03/2002     Priority: Medium     Problem list name updated by automated process. Provider to review       Advance Care Planning 06/07/2011     Priority: Low     Advance Care Planning 1/9/2017: Receipt of ACP document:  Received: Health Care Directive which was witnessed or notarized on 5/27/09.  Document previously scanned on 11/14/16.  Validation form completed and sent to be scanned.  Code Status reflects choices in most recent ACP document.  Confirmed/documented designated decision maker(s).  Added by Beatriz Esteban RN, Advance Care Planning Liaison.          Past Medical History:   Diagnosis Date     Chronic infection     Recent UTI cleared now     Esophageal reflux      Hypertension     No cardiologist     Incontinence of urine      Osteoarthritis      Other and unspecified hyperlipidemia      Other chronic pain     back     Peptic ulcer, unspecified site, unspecified as acute or chronic, without mention of hemorrhage, perforation, or obstruction      Small bowel obstruction (H)      Thyroid disease hypothyroidism     Urinary incontinence      Past Surgical History:   Procedure Laterality Date     ARTHROPLASTY HIP Right 11/9/2016    Procedure: ARTHROPLASTY HIP;  Surgeon: Angel Lund MD;  Location: [facility] OR     AS REPAIR INCISIONAL HERNIA,REDUCIBLE  1998    inc hernia ( [facility] surgery)     C NONSPECIFIC PROCEDURE  1946    T&A     C NONSPECIFIC PROCEDURE  1984    Vaginal Hysterectomy (has her ovaries)     C NONSPECIFIC PROCEDURE  1973    PPTL     C NONSPECIFIC PROCEDURE  1994    L shoulder to remove bone spurs     C NONSPECIFIC PROCEDURE  2004    cysto and durasphere Dr Demarco     C NONSPECIFIC PROCEDURE  2005    cysto and durasphere     C NONSPECIFIC PROCEDURE  2007     cysto and durasphere     C NONSPECIFIC PROCEDURE  2009    retropubic TVT sling     CHOLECYSTECTOMY  1987     COLONOSCOPY  12/3/2011    Procedure:COLONOSCOPY; COLONOSCOPY; Surgeon:SEMAJ GRAHAM; Location: GI     ENT SURGERY      Tonsillectomy     ESOPHAGOSCOPY, GASTROSCOPY, DUODENOSCOPY (EGD), COMBINED N/A 9/26/2016    Procedure: COMBINED ESOPHAGOSCOPY, GASTROSCOPY, DUODENOSCOPY (EGD), BIOPSY SINGLE OR MULTIPLE;  Surgeon: Marco Monaco MD;  Location:  GI     GYN SURGERY      Hysterectomy     HERNIA REPAIR, UMBILICAL  7/8/2010    Dr. Kirt Franco times 3     ORTHOPEDIC SURGERY  2009    [facility] - Dr. Lund- bilateral knee replacement     Current Outpatient Prescriptions   Medication Sig Dispense Refill     meloxicam (MOBIC) 15 MG tablet TAKE ONE TABLET BY MOUTH EVERY DAY AS NEEDED 30 tablet 2     amLODIPine (NORVASC) 5 MG tablet TAKE ONE TABLET BY MOUTH EVERY NIGHT AT BEDTIME 90 tablet 2     levothyroxine (SYNTHROID/LEVOTHROID) 50 MCG tablet Take 1 tablet (50 mcg) by mouth daily Thyroid labs due 9/17. 90 tablet 0     simvastatin (ZOCOR) 20 MG tablet TAKE ONE TABLET BY MOUTH EVERY NIGHT AT BEDTIME 90 tablet 0     lisinopril (PRINIVIL,ZESTRIL) 30 MG tablet TAKE ONE TABLET BY MOUTH EVERY DAY FOR HYPERTENSION 90 tablet 1     travoprost, BAK Free, (TRAVATAN Z) 0.004 % ophthalmic solution Apply 1 drop to eye At Bedtime 2.5 mL 1     fenofibrate 160 MG tablet TAKE ONE TABLET BY MOUTH EVERY DAY 90 tablet 1     acetaminophen (TYLENOL) 325 MG tablet Take 3 tablets (975 mg) by mouth every 8 hours 500 tablet 1     UNABLE TO FIND MEDICATION NAME Cranberry.  2 capsules daily       VITAMIN D, CHOLECALCIFEROL, PO Take 2,000 Units by mouth daily       Docusate Sodium (COLACE PO) Take 100 mg by mouth daily       pantoprazole (PROTONIX) 20 MG tablet Take 1 tablet (20 mg) by mouth daily 30 tablet 11     Probiotic Product (ACIDOPHILUS PROBIOTIC BLEND) CAPS Take 1 capsule by mouth daily 30 capsule 0     ferrous gluconate (FERGON) 324  (38 FE) MG tablet Take 1 tablet (324 mg) by mouth daily (with breakfast) 100 tablet 0     potassium 99 MG TABS Take 1 tablet by mouth daily (with dinner)        CALCIUM + D 600-200 MG-UNIT PO TABS 1 TABLET  DAILY WITH FOOD       OMEGA-3 FATTY ACIDS 1200 MG OR CAPS 1 TABLET DAILY  0     CENTRUM SILVER OR TABS 1 TABLET DAILY 30 0     OTC products: None, except as noted above    Allergies   Allergen Reactions     Augmentin Diarrhea     Codeine Nausea and Vomiting     Hydrocodone      Keeps patient awake     Naproxen Itching and Rash     Sulfa Drugs Nausea      Latex Allergy: NO    Social History   Substance Use Topics     Smoking status: Never Smoker     Smokeless tobacco: Never Used     Alcohol use No     History   Drug Use No       REVIEW OF SYSTEMS:                                                    CONSTITUTIONAL:NEGATIVE for fever, chills, change in weight  INTEGUMENTARY/SKIN: POSITIVE for healing lesion on right shin NEGATIVE for worrisome rashes, moles or lesions  ENT/MOUTH: NEGATIVE for ear, mouth and throat problems  RESP:NEGATIVE for significant cough or SOB  CV: Hypertension - use of amlodipine and lisinopril NEGATIVE for chest pain, palpitations or peripheral edema  GI: POSITIVE for constipation - use of Colace NEGATIVE for nausea, abdominal pain, heartburn, or change in bowel habits  : POSITIVE for urinary frequency, urgency and nocturia NEGATIVE for dysuria, or hematuria  MUSCULOSKELETAL: Right total hip arthroplasty in 11/2016 NEGATIVE for significant arthralgias or myalgia  NEURO: NEGATIVE for weakness, dizziness or paresthesias  ENDOCRINE: Hyperlipidemia and hypothyroidism - use of simvastatin, fenofibrate and levothyroxine NEGATIVE for temperature intolerance, skin/hair changes  HEME/ALLERGY/IMMUNE: Hx of Anemia - use of iron supplement NEGATIVE for bleeding problems  PSYCHIATRIC: NEGATIVE for changes in mood or affect    Review of Systems are otherwise negative      This document serves as a record  "of the services and decisions personally performed and made by Jesenia Madrigal MD. It was created on her behalf by Deepa Stern, a trained medical scribe. The creation of this document is based on the provider's statements to the medical scribe.  Deepa Stern 11:20 AM August 21, 2017    EXAM:                                                    /64  Pulse 64  Temp 98.6  F (37  C) (Oral)  Resp 17  Ht 1.638 m (5' 4.5\")  Wt 101.8 kg (224 lb 8 oz)  LMP  (Approximate)  SpO2 100%  BMI 37.94 kg/m2    GENERAL: healthy, alert and no distress.  EYES: Eyes grossly normal to inspection, conjunctivae and sclerae normal   HENT: ear canals and TM's normal, nose and mouth without ulcers or lesions   NECK: no adenopathy, no asymmetry, masses, or scars and thyroid normal to palpation,   RESP: lungs clear to auscultation - no rales, rhonchi or wheezes   CV: regular rate and rhythm, normal S1 S2, no ectopy, no peripheral edema, strong peripheral pulses. no carotid bruit  ABDOMEN: soft, nontender, no hepatosplenomegaly, no masses, bowel sounds normal  MS: no gross musculoskeletal defects noted, no edema, FROM (passive ROM)  DTR in extremities normal; No CVA tenderness  SKIN: Right shin: skin intact, no open lesions. no suspicious rashes, lesions, or moles.  NEURO: Normal strength and tone, mentation intact and speech normal  PSYCH: mentation appears normal, affect normal/bright    DIAGNOSTICS:                                                      EKG: appears normal, NSR, rate 76, normal axis, normal intervals, no acute ST/T changes c/w ischemia, no LVH by voltage criteria      Labs Drawn and in Process:   Unresulted Labs Ordered in the Past 30 Days of this Admission     Date and Time Order Name Status Description    8/21/2017 1120 BASIC METABOLIC PANEL In process     8/21/2017 1120 T4 FREE In process     8/21/2017 1120 TSH In process     8/21/2017 1120 HEMOGLOBIN In process           Recent Labs   Lab Test  " "11/21/16   1456  11/11/16   0626   11/09/16   1225  11/02/16   1409  10/06/16   1225   09/14/16   1105  09/08/16   1138   06/29/15   0858   07/03/09   0650  07/02/09   0635   HGB  9.2*  7.9*   < >   --   10.5*  Canceled, Test credited   Duplicate request  CORRECTED ON 11/03 AT 1021: PREVIOUSLY REPORTED AS 10.5    11.7   < >  9.9*  10.5*   < >   --    < >  10.5*  7.4*   PLT   --    --    --    --   258  217   --    --   244   < >   --    < >   --    --    INR   --    --    --    --    --    --    --    --    --    --    --    --   1.65*  1.44*   NA   --    --    --    --    --    --    --   139  138   < >  141   < >   --    --    POTASSIUM   --    --    --   3.8   --    --    --   4.3  3.9   < >  4.4   < >   --    --    CR   --    --    --   0.90   --    --    --   0.92  1.17*   < >  0.85   < >   --    --    A1C   --    --    --    --    --    --    --    --   6.0   --   6.4*   < >   --    --     < > = values in this interval not displayed.        IMPRESSION:                                                    Reason for surgery/procedure: Overactive bladder, urinary frequency and urgency  Diagnosis/reason for consult: evaluation and anesthesia risk assessment     The proposed surgical procedure is considered LOW risk.    REVISED CARDIAC RISK INDEX  The patient has the following serious cardiovascular risks for perioperative complications such as (MI, PE, VFib and 3  AV Block):  No serious cardiac risks  INTERPRETATION: 0 risks: Class I (very low risk - 0.4% complication rate)    The patient has the following additional risks for perioperative complications:  No identified additional risks      ICD-10-CM    1. Preop general physical exam Z01.818 EKG 12-lead complete w/read - Clinics     Hemoglobin     Basic metabolic panel   2. OAB (overactive bladder) N32.81     Procedure to \"stretch the bladder\"   3. Essential hypertension, benign I10 Hemoglobin     Basic metabolic panel   4. Hyperlipidemia LDL goal <130 E78.5  "   5. Morbid obesity due to excess calories (H) E66.01    6. Acquired hypothyroidism E03.9 TSH     T4 free       RECOMMENDATIONS:                                                      Patient to hold all medications the morning of surgery     --Pt advised to avoid NSAIDS (Motrin, Ibuprofen, Aleve or Naprosyn);  If needed, Tylenol or Acetaminophen are fine to use. She is also to hold Fish oil one week prior  --meds reviewed; may hold all AM meds on the morning of surgery. She can resume meds upon return home  --Pain medications, time off from work and FMLA following surgery deferred to surgeon.     APPROVAL GIVEN to proceed with proposed procedure, without further diagnostic evaluation     The information in this document, created by the medical scribe for me, accurately reflects the services I personally performed and the decisions made by me. I have reviewed and approved this document for accuracy prior to leaving the patient care area.  August 21, 2017 11:21 AM    Signed Electronically by: Jesenia Madrigal MD    Copy of this evaluation report is provided to requesting physician.    [facility] Preop Guidelines

## 2017-09-06 ENCOUNTER — ANESTHESIA (OUTPATIENT)
Dept: SURGERY | Facility: CLINIC | Age: 77
End: 2017-09-06
Payer: MEDICARE

## 2017-09-06 ENCOUNTER — HOSPITAL ENCOUNTER (OUTPATIENT)
Facility: CLINIC | Age: 77
Discharge: HOME | End: 2017-09-06
Attending: UROLOGY | Admitting: UROLOGY
Payer: MEDICARE

## 2017-09-06 ENCOUNTER — ANESTHESIA EVENT (OUTPATIENT)
Dept: SURGERY | Facility: CLINIC | Age: 77
End: 2017-09-06
Payer: MEDICARE

## 2017-09-06 ENCOUNTER — TRANSFERRED RECORDS (OUTPATIENT)
Dept: HEALTH INFORMATION MANAGEMENT | Facility: CLINIC | Age: 77
End: 2017-09-06

## 2017-09-06 ENCOUNTER — SURGERY (OUTPATIENT)
Age: 77
End: 2017-09-06

## 2017-09-06 VITALS
SYSTOLIC BLOOD PRESSURE: 149 MMHG | HEIGHT: 65 IN | BODY MASS INDEX: 36.99 KG/M2 | TEMPERATURE: 97.2 F | OXYGEN SATURATION: 99 % | WEIGHT: 222 LBS | RESPIRATION RATE: 16 BRPM | DIASTOLIC BLOOD PRESSURE: 68 MMHG

## 2017-09-06 DIAGNOSIS — N32.81 OAB (OVERACTIVE BLADDER): Primary | ICD-10-CM

## 2017-09-06 PROCEDURE — 25000125 ZZHC RX 250: Performed by: UROLOGY

## 2017-09-06 PROCEDURE — 25000128 H RX IP 250 OP 636: Performed by: NURSE ANESTHETIST, CERTIFIED REGISTERED

## 2017-09-06 PROCEDURE — 71000012 ZZH RECOVERY PHASE 1 LEVEL 1 FIRST HR: Performed by: UROLOGY

## 2017-09-06 PROCEDURE — 36000050 ZZH SURGERY LEVEL 2 1ST 30 MIN: Performed by: UROLOGY

## 2017-09-06 PROCEDURE — 40000170 ZZH STATISTIC PRE-PROCEDURE ASSESSMENT II: Performed by: UROLOGY

## 2017-09-06 PROCEDURE — 71000027 ZZH RECOVERY PHASE 2 EACH 15 MINS: Performed by: UROLOGY

## 2017-09-06 PROCEDURE — 27210794 ZZH OR GENERAL SUPPLY STERILE: Performed by: UROLOGY

## 2017-09-06 PROCEDURE — A9270 NON-COVERED ITEM OR SERVICE: HCPCS | Mod: GY | Performed by: UROLOGY

## 2017-09-06 PROCEDURE — 37000008 ZZH ANESTHESIA TECHNICAL FEE, 1ST 30 MIN: Performed by: UROLOGY

## 2017-09-06 PROCEDURE — 25800025 ZZH RX 258: Performed by: UROLOGY

## 2017-09-06 PROCEDURE — 36000052 ZZH SURGERY LEVEL 2 EA 15 ADDTL MIN: Performed by: UROLOGY

## 2017-09-06 PROCEDURE — 27210995 ZZH RX 272: Performed by: UROLOGY

## 2017-09-06 PROCEDURE — 25000566 ZZH SEVOFLURANE, EA 15 MIN: Performed by: UROLOGY

## 2017-09-06 PROCEDURE — 37000009 ZZH ANESTHESIA TECHNICAL FEE, EACH ADDTL 15 MIN: Performed by: UROLOGY

## 2017-09-06 PROCEDURE — 25000128 H RX IP 250 OP 636: Performed by: UROLOGY

## 2017-09-06 PROCEDURE — 25000125 ZZHC RX 250: Performed by: NURSE ANESTHETIST, CERTIFIED REGISTERED

## 2017-09-06 RX ORDER — CLINDAMYCIN HCL 300 MG
300 CAPSULE ORAL 3 TIMES DAILY
Qty: 15 CAPSULE | Refills: 0 | Status: SHIPPED | OUTPATIENT
Start: 2017-09-06 | End: 2017-09-11

## 2017-09-06 RX ORDER — NALOXONE HYDROCHLORIDE 0.4 MG/ML
.1-.4 INJECTION, SOLUTION INTRAMUSCULAR; INTRAVENOUS; SUBCUTANEOUS
Status: DISCONTINUED | OUTPATIENT
Start: 2017-09-06 | End: 2017-09-06 | Stop reason: HOSPADM

## 2017-09-06 RX ORDER — DEXAMETHASONE SODIUM PHOSPHATE 4 MG/ML
INJECTION, SOLUTION INTRA-ARTICULAR; INTRALESIONAL; INTRAMUSCULAR; INTRAVENOUS; SOFT TISSUE PRN
Status: DISCONTINUED | OUTPATIENT
Start: 2017-09-06 | End: 2017-09-06

## 2017-09-06 RX ORDER — MEPERIDINE HYDROCHLORIDE 25 MG/ML
12.5 INJECTION INTRAMUSCULAR; INTRAVENOUS; SUBCUTANEOUS
Status: DISCONTINUED | OUTPATIENT
Start: 2017-09-06 | End: 2017-09-06 | Stop reason: HOSPADM

## 2017-09-06 RX ORDER — LIDOCAINE HYDROCHLORIDE 20 MG/ML
INJECTION, SOLUTION INFILTRATION; PERINEURAL PRN
Status: DISCONTINUED | OUTPATIENT
Start: 2017-09-06 | End: 2017-09-06

## 2017-09-06 RX ORDER — SODIUM CHLORIDE, SODIUM LACTATE, POTASSIUM CHLORIDE, CALCIUM CHLORIDE 600; 310; 30; 20 MG/100ML; MG/100ML; MG/100ML; MG/100ML
INJECTION, SOLUTION INTRAVENOUS CONTINUOUS PRN
Status: DISCONTINUED | OUTPATIENT
Start: 2017-09-06 | End: 2017-09-06

## 2017-09-06 RX ORDER — ONDANSETRON 2 MG/ML
4 INJECTION INTRAMUSCULAR; INTRAVENOUS EVERY 30 MIN PRN
Status: DISCONTINUED | OUTPATIENT
Start: 2017-09-06 | End: 2017-09-06 | Stop reason: HOSPADM

## 2017-09-06 RX ORDER — ALBUTEROL SULFATE 0.83 MG/ML
2.5 SOLUTION RESPIRATORY (INHALATION) EVERY 4 HOURS PRN
Status: DISCONTINUED | OUTPATIENT
Start: 2017-09-06 | End: 2017-09-06 | Stop reason: HOSPADM

## 2017-09-06 RX ORDER — ONDANSETRON 2 MG/ML
INJECTION INTRAMUSCULAR; INTRAVENOUS PRN
Status: DISCONTINUED | OUTPATIENT
Start: 2017-09-06 | End: 2017-09-06

## 2017-09-06 RX ORDER — HYDROMORPHONE HYDROCHLORIDE 1 MG/ML
.3-.5 INJECTION, SOLUTION INTRAMUSCULAR; INTRAVENOUS; SUBCUTANEOUS EVERY 10 MIN PRN
Status: DISCONTINUED | OUTPATIENT
Start: 2017-09-06 | End: 2017-09-06 | Stop reason: HOSPADM

## 2017-09-06 RX ORDER — FENTANYL CITRATE 50 UG/ML
INJECTION, SOLUTION INTRAMUSCULAR; INTRAVENOUS PRN
Status: DISCONTINUED | OUTPATIENT
Start: 2017-09-06 | End: 2017-09-06

## 2017-09-06 RX ORDER — ONDANSETRON 4 MG/1
4 TABLET, ORALLY DISINTEGRATING ORAL EVERY 30 MIN PRN
Status: DISCONTINUED | OUTPATIENT
Start: 2017-09-06 | End: 2017-09-06 | Stop reason: HOSPADM

## 2017-09-06 RX ORDER — OXYCODONE HYDROCHLORIDE 5 MG/1
5 TABLET ORAL ONCE
Status: DISCONTINUED | OUTPATIENT
Start: 2017-09-06 | End: 2017-09-06 | Stop reason: HOSPADM

## 2017-09-06 RX ORDER — CEFAZOLIN SODIUM 2 G/100ML
2 INJECTION, SOLUTION INTRAVENOUS
Status: COMPLETED | OUTPATIENT
Start: 2017-09-06 | End: 2017-09-06

## 2017-09-06 RX ORDER — SODIUM CHLORIDE, SODIUM LACTATE, POTASSIUM CHLORIDE, CALCIUM CHLORIDE 600; 310; 30; 20 MG/100ML; MG/100ML; MG/100ML; MG/100ML
INJECTION, SOLUTION INTRAVENOUS CONTINUOUS
Status: DISCONTINUED | OUTPATIENT
Start: 2017-09-06 | End: 2017-09-06 | Stop reason: HOSPADM

## 2017-09-06 RX ORDER — HYDRALAZINE HYDROCHLORIDE 20 MG/ML
2.5-5 INJECTION INTRAMUSCULAR; INTRAVENOUS EVERY 10 MIN PRN
Status: DISCONTINUED | OUTPATIENT
Start: 2017-09-06 | End: 2017-09-06 | Stop reason: HOSPADM

## 2017-09-06 RX ORDER — LABETALOL HYDROCHLORIDE 5 MG/ML
10 INJECTION, SOLUTION INTRAVENOUS
Status: DISCONTINUED | OUTPATIENT
Start: 2017-09-06 | End: 2017-09-06 | Stop reason: HOSPADM

## 2017-09-06 RX ORDER — PROPOFOL 10 MG/ML
INJECTION, EMULSION INTRAVENOUS PRN
Status: DISCONTINUED | OUTPATIENT
Start: 2017-09-06 | End: 2017-09-06

## 2017-09-06 RX ORDER — FENTANYL CITRATE 50 UG/ML
25-50 INJECTION, SOLUTION INTRAMUSCULAR; INTRAVENOUS
Status: DISCONTINUED | OUTPATIENT
Start: 2017-09-06 | End: 2017-09-06 | Stop reason: HOSPADM

## 2017-09-06 RX ADMIN — DEXAMETHASONE SODIUM PHOSPHATE 4 MG: 4 INJECTION, SOLUTION INTRA-ARTICULAR; INTRALESIONAL; INTRAMUSCULAR; INTRAVENOUS; SOFT TISSUE at 14:15

## 2017-09-06 RX ADMIN — FENTANYL CITRATE 50 MCG: 50 INJECTION, SOLUTION INTRAMUSCULAR; INTRAVENOUS at 13:59

## 2017-09-06 RX ADMIN — SODIUM CHLORIDE, POTASSIUM CHLORIDE, SODIUM LACTATE AND CALCIUM CHLORIDE: 600; 310; 30; 20 INJECTION, SOLUTION INTRAVENOUS at 13:49

## 2017-09-06 RX ADMIN — FENTANYL CITRATE 25 MCG: 50 INJECTION, SOLUTION INTRAMUSCULAR; INTRAVENOUS at 14:29

## 2017-09-06 RX ADMIN — ATROPA BELLADONNA AND OPIUM 30 MG: 16.2; 3 SUPPOSITORY RECTAL at 14:23

## 2017-09-06 RX ADMIN — MIDAZOLAM HYDROCHLORIDE 1 MG: 1 INJECTION, SOLUTION INTRAMUSCULAR; INTRAVENOUS at 13:53

## 2017-09-06 RX ADMIN — CEFAZOLIN SODIUM 2 G: 2 INJECTION, SOLUTION INTRAVENOUS at 14:02

## 2017-09-06 RX ADMIN — LIDOCAINE HYDROCHLORIDE 50 MG: 20 INJECTION, SOLUTION INFILTRATION; PERINEURAL at 13:59

## 2017-09-06 RX ADMIN — FENTANYL CITRATE 25 MCG: 50 INJECTION, SOLUTION INTRAMUSCULAR; INTRAVENOUS at 14:17

## 2017-09-06 RX ADMIN — LIDOCAINE HYDROCHLORIDE 10 ML: 20 JELLY TOPICAL at 14:23

## 2017-09-06 RX ADMIN — ONDANSETRON 4 MG: 2 INJECTION INTRAMUSCULAR; INTRAVENOUS at 14:15

## 2017-09-06 RX ADMIN — WATER 1000 ML: 100 IRRIGANT IRRIGATION at 14:10

## 2017-09-06 RX ADMIN — PROPOFOL 150 MG: 10 INJECTION, EMULSION INTRAVENOUS at 13:59

## 2017-09-06 RX ADMIN — WATER 3000 ML: 100 INJECTION, SOLUTION INTRAVENOUS at 14:10

## 2017-09-06 ASSESSMENT — LIFESTYLE VARIABLES: TOBACCO_USE: 0

## 2017-09-06 ASSESSMENT — ENCOUNTER SYMPTOMS
SEIZURES: 0
DYSRHYTHMIAS: 0

## 2017-09-06 ASSESSMENT — COPD QUESTIONNAIRES: COPD: 0

## 2017-09-06 NOTE — ANESTHESIA POSTPROCEDURE EVALUATION
Patient: Lexii Stratton    Procedure(s):  CYSTOSCOPY AND HYDRODISTENTION  - Wound Class: II-Clean Contaminated    Diagnosis:URINE INCONTINENCE   Diagnosis Additional Information: No value filed.    Anesthesia Type:  General, LMA    Note:  Anesthesia Post Evaluation    Patient location during evaluation: PACU  Patient participation: Able to fully participate in evaluation  Level of consciousness: awake  Pain management: adequate  Airway patency: patent  Cardiovascular status: acceptable  Respiratory status: acceptable  Hydration status: acceptable  PONV: none     Anesthetic complications: None          Last vitals:  Vitals:    09/06/17 1224 09/06/17 1434   BP: 197/77 140/72   Resp: 18 19   Temp: 36.1  C (97  F) 36.6  C (97.9  F)   SpO2: 99% 100%         Electronically Signed By: Amanda Palacios MD, MD  September 6, 2017  2:54 PM

## 2017-09-06 NOTE — IP AVS SNAPSHOT
[place] [address]    [place] [address]    Phone:  [phone]                                       After Visit Summary   9/6/2017    Lexii Stratton    MRN: [id number]           After Visit Summary Signature Page     I have received my discharge instructions, and my questions have been answered. I have discussed any challenges I see with this plan with the nurse or doctor.    ..........................................................................................................................................  Patient/Patient Representative Signature      ..........................................................................................................................................  Patient Representative Print Name and Relationship to Patient    ..................................................               ................................................  Date                                            Time    ..........................................................................................................................................  Reviewed by Signature/Title    ...................................................              ..............................................  Date                                                            Time

## 2017-09-06 NOTE — ANESTHESIA CARE TRANSFER NOTE
Patient: Lexii Stratton    Procedure(s):  CYSTOSCOPY AND HYDRODISTENTION  - Wound Class: II-Clean Contaminated    Diagnosis: URINE INCONTINENCE   Diagnosis Additional Information: No value filed.    Anesthesia Type:   General, LMA     Note:  Airway :Face Mask  Patient transferred to:PACU  Comments: Patient with good spontaneous respirations, tidal volumes 300-500cc. Patient awake, follows commands, LMA removed. Dentition unchanged from preop. Patient transported to recovery with oxygen. IV patent. Report given to RN, care transferred, questions answered.  Vitals in PACU:  Bp 140/72  HR 74  RR 18  Sats 100%      Vitals: (Last set prior to Anesthesia Care Transfer)    CRNA VITALS  9/6/2017 1400 - 9/6/2017 1439      9/6/2017             Pulse: 80    SpO2: 97 %    Resp Rate (set): 10    EKG: NSR                Electronically Signed By: ANDREW Mccormack CRNA  September 6, 2017  2:39 PM

## 2017-09-06 NOTE — ANESTHESIA PREPROCEDURE EVALUATION
Procedure: Procedure(s):  CYSTOSCOPY  Preop diagnosis: URINE INCONTINENCE     Allergies   Allergen Reactions     Augmentin Diarrhea     Codeine Nausea and Vomiting     Hydrocodone      Keeps patient awake     Naproxen Itching and Rash     Sulfa Drugs Nausea     Past Medical History:   Diagnosis Date     Chronic infection     Recent UTI cleared now     Esophageal reflux      Hypertension     No cardiologist     Incontinence of urine      Osteoarthritis      Other and unspecified hyperlipidemia      Other chronic pain     back     Peptic ulcer, unspecified site, unspecified as acute or chronic, without mention of hemorrhage, perforation, or obstruction      Small bowel obstruction (H)      Thyroid disease hypothyroidism     Urinary incontinence      Past Surgical History:   Procedure Laterality Date     ARTHROPLASTY HIP Right 11/9/2016    Procedure: ARTHROPLASTY HIP;  Surgeon: Angel Lund MD;  Location:  OR     AS REPAIR INCISIONAL HERNIA,REDUCIBLE  1998    inc hernia ( [facility] surgery)     C NONSPECIFIC PROCEDURE  1946    T&A     C NONSPECIFIC PROCEDURE  1984    Vaginal Hysterectomy (has her ovaries)     C NONSPECIFIC PROCEDURE  1973    PPTL     C NONSPECIFIC PROCEDURE  1994    L shoulder to remove bone spurs     C NONSPECIFIC PROCEDURE  2004    cysto and durasphere Dr Demarco     C NONSPECIFIC PROCEDURE  2005    cysto and durasphere     C NONSPECIFIC PROCEDURE  2007    cysto and durasphere     C NONSPECIFIC PROCEDURE  2009    retropubic TVT sling     CHOLECYSTECTOMY  1987     COLONOSCOPY  12/3/2011    Procedure:COLONOSCOPY; COLONOSCOPY; Surgeon:SEMAJ GRAHAM; Location: GI     ENT SURGERY      Tonsillectomy     ESOPHAGOSCOPY, GASTROSCOPY, DUODENOSCOPY (EGD), COMBINED N/A 9/26/2016    Procedure: COMBINED ESOPHAGOSCOPY, GASTROSCOPY, DUODENOSCOPY (EGD), BIOPSY SINGLE OR MULTIPLE;  Surgeon: Marco Monaco MD;  Location:  GI     GYN SURGERY      Hysterectomy     HERNIA REPAIR, UMBILICAL  7/8/2010     Dr. Kirt Franco times 3     ORTHOPEDIC SURGERY  2009    [facility] - Dr. Lund- bilateral knee replacement     Prior to Admission medications    Medication Sig Start Date End Date Taking? Authorizing Provider   simvastatin (ZOCOR) 20 MG tablet TAKE ONE TABLET BY MOUTH EVERY NIGHT AT BEDTIME 8/23/17   Jesenia Madrigal MD   meloxicam (MOBIC) 15 MG tablet TAKE ONE TABLET BY MOUTH EVERY DAY AS NEEDED 8/15/17   Jesenia Madrigal MD   amLODIPine (NORVASC) 5 MG tablet TAKE ONE TABLET BY MOUTH EVERY NIGHT AT BEDTIME 8/7/17   Jesenia Madrigal MD   levothyroxine (SYNTHROID/LEVOTHROID) 50 MCG tablet Take 1 tablet (50 mcg) by mouth daily Thyroid labs due 9/17. 7/14/17   Jesenia Madrigal MD   lisinopril (PRINIVIL,ZESTRIL) 30 MG tablet TAKE ONE TABLET BY MOUTH EVERY DAY FOR HYPERTENSION 5/22/17   Jesenia Madrigal MD   travoprost, BAK Free, (TRAVATAN Z) 0.004 % ophthalmic solution Apply 1 drop to eye At Bedtime 4/7/17   Jesenia Madrigal MD   fenofibrate 160 MG tablet TAKE ONE TABLET BY MOUTH EVERY DAY 4/6/17   Jesenia Madrigal MD   acetaminophen (TYLENOL) 325 MG tablet Take 3 tablets (975 mg) by mouth every 8 hours 11/11/16   Kecia Hoyt PA-C   UNABLE TO FIND MEDICATION NAME Cranberry.  2 capsules daily    Reported, Patient   VITAMIN D, CHOLECALCIFEROL, PO Take 2,000 Units by mouth daily    Reported, Patient   Docusate Sodium (COLACE PO) Take 100 mg by mouth daily    Reported, Patient   pantoprazole (PROTONIX) 20 MG tablet Take 1 tablet (20 mg) by mouth daily 9/20/16   Jesenia Madrigal MD   Probiotic Product (ACIDOPHILUS PROBIOTIC BLEND) CAPS Take 1 capsule by mouth daily 3/14/16   Jesenia Madrigal MD   ferrous gluconate (FERGON) 324 (38 FE) MG tablet Take 1 tablet (324 mg) by mouth daily (with breakfast) 5/22/15   Jesenia Madrigal MD   potassium 99 MG TABS Take 1 tablet by mouth daily (with dinner)     Reported, Patient   CALCIUM + D 600-200 MG-UNIT PO TABS 1 TABLET  DAILY  WITH FOOD    Reported, Patient   OMEGA-3 FATTY ACIDS 1200 MG OR CAPS 1 TABLET DAILY 1/16/09   John Daniels MD   CENTRUM SILVER OR TABS 1 TABLET DAILY 10/5/04   John Daniels MD     Current Facility-Administered Medications Ordered in Epic   Medication Dose Route Frequency Last Rate Last Dose     ceFAZolin sodium-dextrose (ANCEF) infusion 2 g  2 g Intravenous Pre-Op/Pre-procedure x 1 dose         No current [facility]-ordered outpatient prescriptions on file.     Wt Readings from Last 1 Encounters:   08/21/17 101.8 kg (224 lb 8 oz)     Temp Readings from Last 1 Encounters:   08/21/17 37  C (98.6  F) (Oral)     BP Readings from Last 6 Encounters:   08/21/17 120/64   05/10/17 134/60   04/28/17 146/56   02/01/17 128/66   11/21/16 122/64   11/12/16 139/66     Pulse Readings from Last 4 Encounters:   08/21/17 64   05/10/17 80   04/28/17 96   02/01/17 59     Resp Readings from Last 1 Encounters:   08/21/17 17     SpO2 Readings from Last 1 Encounters:   08/21/17 100%     Recent Labs   Lab Test  08/21/17   1120  11/09/16   1225  09/14/16   1105   NA  141   --   139   POTASSIUM  4.4  3.8  4.3   CHLORIDE  107   --   105   CO2  29   --   26   ANIONGAP  5   --   8   GLC  116*   --   129*   BUN  28   --   18   CR  0.89  0.90  0.92   CINDY  9.5   --   9.2     Recent Labs   Lab Test  08/21/17   1120  11/21/16   1456   11/02/16   1409  10/06/16   1225   WBC   --    --    --   7.7  6.2   HGB  9.3*  9.2*   < >  10.5*  Canceled, Test credited   Duplicate request  CORRECTED ON 11/03 AT 1021: PREVIOUSLY REPORTED AS 10.5    11.7   PLT   --    --    --   258  217    < > = values in this interval not displayed.     Recent Labs   Lab Test  07/03/09   0650  07/02/09   0635   INR  1.65*  1.44*     ECG: NSR, no st or twave abnormalities.       Anesthesia Evaluation     .             ROS/MED HX    ENT/Pulmonary:     (+)RAMIRO risk factors hypertension, obese, , . .   (-) tobacco use, asthma, COPD and sleep apnea   Neurologic:      (+)other neuro low back pain   (-) seizures, CVA and migraines   Cardiovascular:     (+) Dyslipidemia, hypertension----. : . . . :. .      (-) CAD, BAL, arrhythmias and valvular problems/murmurs   METS/Exercise Tolerance:  3 - Able to walk 1-2 blocks without stopping   Hematologic:     (+) Anemia, -     (-) history of blood clots and other hematologic disorder   Musculoskeletal:   (+) arthritis, , , -       GI/Hepatic:     (+) GERD Asymptomatic on medication, Other GI/Hepatic hx SBO, peptic ulcer disease     (-) liver disease   Renal/Genitourinary:     (+) Other Renal/ Genitourinary, urgency, frequency, incontinence   (-) renal disease and nephrolithiasis   Endo:     (+) thyroid problem Obesity, .   (-) Type I DM and Type II DM   Psychiatric:         Infectious Disease:        (-) Recent Fever   Malignancy:         Other:    (+) H/O Chronic Pain,                   Physical Exam  Normal systems: cardiovascular and pulmonary    Airway   Mallampati: III  TM distance: >3 FB  Neck ROM: full    Dental   (+) caps    Cardiovascular   Rhythm and rate: regular and normal  (-) no murmur    Pulmonary    breath sounds clear to auscultation                    Anesthesia Plan      History & Physical Review      ASA Status:  3 .    NPO Status:  > 8 hours    Plan for General and LMA with Propofol induction. Maintenance will be Balanced.    PONV prophylaxis:  Ondansetron (or other 5HT-3) and Dexamethasone or Solumedrol       Postoperative Care  Postoperative pain management:  Multi-modal analgesia.      Consents  Anesthetic plan, risks, benefits and alternatives discussed with:  Patient..                          .

## 2017-09-06 NOTE — IP AVS SNAPSHOT
MRN:[id number]                      After Visit Summary   9/6/2017    Lexii Stratton    MRN: [id number]           Thank you!     Thank you for choosing [facility] for your care. Our goal is always to provide you with excellent care. Hearing back from our patients is one way we can continue to improve our services. Please take a few minutes to complete the written survey that you may receive in the mail after you visit with us. Thank you!        Patient Information     Date Of Birth          1940        About your hospital stay     You were admitted on:  September 6, 2017 You last received care in the:  [facility] PACU    You were discharged on:  September 6, 2017       Who to Call     For medical emergencies, please call 911.  For non-urgent questions about your medical care, please call your primary care provider or clinic, [phone]  For questions related to your surgery, please call your surgery clinic        Attending Provider     Provider Specialty    Eyal Bai MD Urology       Primary Care Provider Office Phone # Fax #    Jesenia Priscilla Madrigal [phone]      After Care Instructions     Diet Instructions       Resume pre procedure diet            Discharge Instructions       Patient to arrange for follow up appointment in 6-8 weeks            Encourage fluids       Encourage fluids at home to keep urine clear to light pink                  Your next 10 appointments already scheduled     Sep 14, 2017 10:00 AM CDT   MA DIAGNOSTIC DIGITAL LEFT with [id number]   [facility] ([facility])    [address], [address]  [facility] [id number] [phone]           Do not use any powder, lotion or deodorant under your arms or on your breast. If you do, we will ask you to remove it before your exam.  Wear comfortable, two-piece clothing.  If you have any allergies, tell your care team.  Bring any previous mammograms from  "other facilities or have them mailed to the breast center.  Three-dimensional (3D) mammograms are available at [facility] locations in [facility], [facility], [facility], and [place]. [facility] locations include [place] and [facility] in [place]. Benefits of 3D mammograms include: - Improved rate of cancer detection - Decreases your chance of having to go back for more tests, which means fewer: - \"False-positive\" results (This means that there is an abnormal area but it isn't cancer.) - Invasive testing procedures, such as a biopsy or surgery - Can provide clearer images of the breast if you have dense breast tissue. 3D mammography is an optional exam that anyone can have with a 2D mammogram. It doesn't replace or take the place of a 2D mammogram. 2D mammograms remain an effective screening test for all women.  Not all insurance companies cover the cost of a 3D mammogram. Check with your insurance.              Further instructions from your care team       Same Day Surgery Discharge Instructions for  Sedation and General Anesthesia       It's not unusual to feel dizzy, light-headed or faint for up to 24 hours after surgery or while taking pain medication.  If you have these symptoms: sit for a few minutes before standing and have someone assist you when you get up to walk or use the bathroom.      You should rest and relax for the next 24 hours. We recommend you make arrangements to have an adult stay with you for at least 24 hours after your discharge.  Avoid hazardous and strenuous activity.      DO NOT DRIVE any vehicle or operate mechanical equipment for 24 hours following the end of your surgery.  Even though you may feel normal, your reactions may be affected by the medication you have received.      Do not drink alcoholic beverages for 24 hours following surgery.       Slowly progress to your regular diet as you feel able. It's not unusual to feel nauseated and/or vomit " after receiving anesthesia.  If you develop these symptoms, drink clear liquids (apple juice, ginger ale, broth, 7-up, etc. ) until you feel better.  If your nausea and vomiting persists for 24 hours, please notify your surgeon.        All narcotic pain medications, along with inactivity and anesthesia, can cause constipation. Drinking plenty of liquids and increasing fiber intake will help.      For any questions of a medical nature, call your surgeon.      Do not make important decisions for 24 hours.      If you had general anesthesia, you may have a sore throat for a couple of days related to the breathing tube used during surgery.  You may use Cepacol lozenges to help with this discomfort.  If it worsens or if you develop a fever, contact your surgeon.       If you feel your pain is not well managed with the pain medications prescribed by your surgeon, please contact your surgeon's office to let them know so they can address your concerns.     Cystoscopy Discharge Instructions      Diet:    Return to the diet that you were on before the procedure, unless you are given specific diet instructions.    It is important to drink 6-8 glasses of fluids per day at home - at least 3-4 glasses should be water.    Activity:    Walk short distances and increase as your strength allows.    You may climb stairs.    Do not do strenuous exercise or heavy lifting until approved by surgeon.    Do not drive while taking narcotic pain medications.    Bathing:    You may take a shower.    Call your physician if these signs/symptoms are present:    Pain that is not relieved by a short rest or ordered pain medications.    Temperature at or above 101.0 F or chills.    Inability or difficulty urinating.  Excessive blood in urine.    Any questions or concerns.    **If you have questions or concerns about your procedure,   call Dr. Bai at [phone]**      Pending Results     No orders found from 9/4/2017 to 9/7/2017.           "  Admission Information     Date & Time Provider Department Dept. Phone    9/6/2017 Eyal Bai MD [facility] [phone]      Your Vitals Were     Blood Pressure Temperature Respirations Height Weight Last Period    156/76 97.2  F (36.2  C) 28 1.638 m (5' 4.5\") 100.7 kg (222 lb) (Approximate)    Pulse Oximetry BMI (Body Mass Index)                98% 37.52 kg/m2          MyChart Information     [employer] gives you secure access to your electronic health record. If you see a primary care provider, you can also send messages to your care team and make appointments. If you have questions, please call your primary care clinic.  If you do not have a primary care provider, please call [phone] and they will assist you.        Care EveryWhere ID     This is your Care EveryWhere ID. This could be used by other organizations to access your [facility] medical records  [id number]        Equal Access to Services     LUCY MILLS AH: Hadsarah shettyo Sotyra, waaxda luqadaha, qaybta kaalmada adebeatriz, navin boateng. So [facility] [phone].    ATENCIÓN: Si habla español, tiene a irene disposición servicios gratuitos de asistencia lingüística. Llame al [phone].    We comply with applicable federal civil rights laws and [place] laws. We do not discriminate on the basis of race, color, national origin, age, disability sex, sexual orientation or gender identity.               Review of your medicines      START taking        Dose / Directions    clindamycin 300 MG capsule   Commonly known as:  CLEOCIN   Used for:  OAB (overactive bladder)        Dose:  300 mg   Take 1 capsule (300 mg) by mouth 3 times daily for 5 days   Quantity:  15 capsule   Refills:  0         CONTINUE these medicines which have NOT CHANGED        Dose / Directions    acetaminophen 325 MG tablet   Commonly known as:  TYLENOL   Used for:  Status post total replacement of right hip        Dose:  975 mg "   Take 3 tablets (975 mg) by mouth every 8 hours   Quantity:  500 tablet   Refills:  1       ACIDOPHILUS PROBIOTIC BLEND Caps        Dose:  1 capsule   Take 1 capsule by mouth daily   Quantity:  30 capsule   Refills:  0       amLODIPine 5 MG tablet   Commonly known as:  NORVASC   Used for:  Essential hypertension, benign        TAKE ONE TABLET BY MOUTH EVERY NIGHT AT BEDTIME   Quantity:  90 tablet   Refills:  2       ASPIRIN PO        Dose:  81 mg   Take 81 mg by mouth daily   Refills:  0       calcium + D 600-200 MG-UNIT Tabs   Used for:  Preop general physical exam, Umbilical hernia   Generic drug:  calcium carbonate-vitamin D        1 TABLET  DAILY WITH FOOD   Refills:  0       CENTRUM SILVER per tablet   Used for:  Need for prophylactic vaccination and inoculation against influenza, Routine general medical examination at a health care facility, Generalized osteoarthrosis, unspecified site, Other and unspecified hyperlipidemia, Esophageal reflux, Obesity, unspecified, Urinary sys symptom NEC, Other malaise and fatigue, Sleep disturbance, unspecified        1 TABLET DAILY   Quantity:  30   Refills:  0       COLACE PO        Dose:  100 mg   Take 100 mg by mouth daily   Refills:  0       fenofibrate 160 MG tablet   Used for:  Hyperlipidemia LDL goal <130        TAKE ONE TABLET BY MOUTH EVERY DAY   Quantity:  90 tablet   Refills:  1       ferrous gluconate 324 (38 FE) MG tablet   Commonly known as:  FERGON   Used for:  Anemia, unspecified        Dose:  1 tablet   Take 1 tablet (324 mg) by mouth daily (with breakfast)   Quantity:  100 tablet   Refills:  0       levothyroxine 50 MCG tablet   Commonly known as:  SYNTHROID/LEVOTHROID   Used for:  Acquired hypothyroidism        Dose:  50 mcg   Take 1 tablet (50 mcg) by mouth daily Thyroid labs due 9/17.   Quantity:  90 tablet   Refills:  0       lisinopril 30 MG tablet   Commonly known as:  PRINIVIL,ZESTRIL   Used for:  Essential hypertension, benign, Impaired fasting  glucose        TAKE ONE TABLET BY MOUTH EVERY DAY FOR HYPERTENSION   Quantity:  90 tablet   Refills:  1       meloxicam 15 MG tablet   Commonly known as:  MOBIC   Used for:  Primary osteoarthritis involving multiple joints        TAKE ONE TABLET BY MOUTH EVERY DAY AS NEEDED   Quantity:  30 tablet   Refills:  2       omega-3 fatty acids 1200 MG capsule   Used for:  Preop general physical exam, Stress incontinence - female        1 TABLET DAILY   Refills:  0       pantoprazole 20 MG EC tablet   Commonly known as:  PROTONIX   Used for:  Gastroesophageal reflux disease without esophagitis        Dose:  20 mg   Take 1 tablet (20 mg) by mouth daily   Quantity:  30 tablet   Refills:  11       potassium 99 MG Tabs        Dose:  1 tablet   Take 1 tablet by mouth daily (with dinner)   Refills:  0       simvastatin 20 MG tablet   Commonly known as:  ZOCOR   Used for:  Hyperlipidemia LDL goal <130        TAKE ONE TABLET BY MOUTH EVERY NIGHT AT BEDTIME   Quantity:  90 tablet   Refills:  2       travoprost (BAK Free) 0.004 % ophthalmic solution   Commonly known as:  TRAVATAN Z        Dose:  1 drop   Apply 1 drop to eye At Bedtime   Quantity:  2.5 mL   Refills:  1       UNABLE TO FIND        MEDICATION NAME Cranberry.  2 capsules daily   Refills:  0       VITAMIN D (CHOLECALCIFEROL) PO        Dose:  2000 Units   Take 2,000 Units by mouth daily   Refills:  0            Where to get your medicines      Some of these will need a paper prescription and others can be bought over the counter. Ask your nurse if you have questions.     Bring a paper prescription for each of these medications     clindamycin 300 MG capsule                Protect others around you: Learn how to safely use, store and throw away your medicines at www.disposemymeds.org.             Medication List: This is a list of all your medications and when to take them. Check marks below indicate your daily home schedule. Keep this list as a reference.      Medications            Morning Afternoon Evening Bedtime As Needed    acetaminophen 325 MG tablet   Commonly known as:  TYLENOL   Take 3 tablets (975 mg) by mouth every 8 hours                                ACIDOPHILUS PROBIOTIC BLEND Caps   Take 1 capsule by mouth daily                                amLODIPine 5 MG tablet   Commonly known as:  NORVASC   TAKE ONE TABLET BY MOUTH EVERY NIGHT AT BEDTIME                                ASPIRIN PO   Take 81 mg by mouth daily                                calcium + D 600-200 MG-UNIT Tabs   1 TABLET  DAILY WITH FOOD   Generic drug:  calcium carbonate-vitamin D                                CENTRUM SILVER per tablet   1 TABLET DAILY                                clindamycin 300 MG capsule   Commonly known as:  CLEOCIN   Take 1 capsule (300 mg) by mouth 3 times daily for 5 days                                COLACE PO   Take 100 mg by mouth daily                                fenofibrate 160 MG tablet   TAKE ONE TABLET BY MOUTH EVERY DAY                                ferrous gluconate 324 (38 FE) MG tablet   Commonly known as:  FERGON   Take 1 tablet (324 mg) by mouth daily (with breakfast)                                levothyroxine 50 MCG tablet   Commonly known as:  SYNTHROID/LEVOTHROID   Take 1 tablet (50 mcg) by mouth daily Thyroid labs due 9/17.                                lisinopril 30 MG tablet   Commonly known as:  PRINIVIL,ZESTRIL   TAKE ONE TABLET BY MOUTH EVERY DAY FOR HYPERTENSION                                meloxicam 15 MG tablet   Commonly known as:  MOBIC   TAKE ONE TABLET BY MOUTH EVERY DAY AS NEEDED                                omega-3 fatty acids 1200 MG capsule   1 TABLET DAILY                                pantoprazole 20 MG EC tablet   Commonly known as:  PROTONIX   Take 1 tablet (20 mg) by mouth daily                                potassium 99 MG Tabs   Take 1 tablet by mouth daily (with dinner)                                simvastatin 20 MG  tablet   Commonly known as:  ZOCOR   TAKE ONE TABLET BY MOUTH EVERY NIGHT AT BEDTIME                                travoprost (TIMI Free) 0.004 % ophthalmic solution   Commonly known as:  TRAVATAN Z   Apply 1 drop to eye At Bedtime                                UNABLE TO FIND   MEDICATION NAME Cranberry.  2 capsules daily                                VITAMIN D (CHOLECALCIFEROL) PO   Take 2,000 Units by mouth daily

## 2017-09-06 NOTE — DISCHARGE INSTRUCTIONS
Same Day Surgery Discharge Instructions for  Sedation and General Anesthesia       It's not unusual to feel dizzy, light-headed or faint for up to 24 hours after surgery or while taking pain medication.  If you have these symptoms: sit for a few minutes before standing and have someone assist you when you get up to walk or use the bathroom.      You should rest and relax for the next 24 hours. We recommend you make arrangements to have an adult stay with you for at least 24 hours after your discharge.  Avoid hazardous and strenuous activity.      DO NOT DRIVE any vehicle or operate mechanical equipment for 24 hours following the end of your surgery.  Even though you may feel normal, your reactions may be affected by the medication you have received.      Do not drink alcoholic beverages for 24 hours following surgery.       Slowly progress to your regular diet as you feel able. It's not unusual to feel nauseated and/or vomit after receiving anesthesia.  If you develop these symptoms, drink clear liquids (apple juice, ginger ale, broth, 7-up, etc. ) until you feel better.  If your nausea and vomiting persists for 24 hours, please notify your surgeon.        All narcotic pain medications, along with inactivity and anesthesia, can cause constipation. Drinking plenty of liquids and increasing fiber intake will help.      For any questions of a medical nature, call your surgeon.      Do not make important decisions for 24 hours.      If you had general anesthesia, you may have a sore throat for a couple of days related to the breathing tube used during surgery.  You may use Cepacol lozenges to help with this discomfort.  If it worsens or if you develop a fever, contact your surgeon.       If you feel your pain is not well managed with the pain medications prescribed by your surgeon, please contact your surgeon's office to let them know so they can address your concerns.     Cystoscopy Discharge  Instructions      Diet:    Return to the diet that you were on before the procedure, unless you are given specific diet instructions.    It is important to drink 6-8 glasses of fluids per day at home - at least 3-4 glasses should be water.    Activity:    Walk short distances and increase as your strength allows.    You may climb stairs.    Do not do strenuous exercise or heavy lifting until approved by surgeon.    Do not drive while taking narcotic pain medications.    Bathing:    You may take a shower.    Call your physician if these signs/symptoms are present:    Pain that is not relieved by a short rest or ordered pain medications.    Temperature at or above 101.0 F or chills.    Inability or difficulty urinating.  Excessive blood in urine.    Any questions or concerns.    **If you have questions or concerns about your procedure,   call Dr. Bai at [phone]**

## 2017-09-06 NOTE — ANESTHESIA POSTPROCEDURE EVALUATION
Patient: Lexii Stratton    Procedure(s):  CYSTOSCOPY AND HYDRODISTENTION  - Wound Class: II-Clean Contaminated    Diagnosis:URINE INCONTINENCE   Diagnosis Additional Information: No value filed.    Anesthesia Type:  General, LMA    Note:  Anesthesia Post Evaluation    Patient location during evaluation: PACU  Patient participation: Able to fully participate in evaluation  Level of consciousness: awake and alert  Pain management: adequate  Airway patency: patent  Cardiovascular status: acceptable, blood pressure returned to baseline and hemodynamically stable  Respiratory status: acceptable  Hydration status: acceptable  PONV: none     Anesthetic complications: None          Last vitals:  Vitals:    09/06/17 1224   BP: 197/77   Resp: 18   Temp: 36.1  C (97  F)   SpO2: 99%         Electronically Signed By: Gela Aguilar MD  September 6, 2017  2:30 PM

## 2017-09-07 NOTE — OP NOTE
DATE OF PROCEDURE:  2017      PROCEDURE:  Cystoscopy and hydrodistention of the bladder.      PREOPERATIVE DIAGNOSIS:  Overactive bladder, frequency.      POSTOPERATIVE DIAGNOSIS:  Overactive bladder, frequency.      OPERATIVE NOTE:  Informed consent was obtained from Lexii Stratton.  She was brought to the operating room, given anesthesia and placed in lithotomy position.  Sterile prep and drape were applied and surgical timeout was taken.  Cystoscope was introduced into the bladder and inspection revealed no mucosal lesions.  The bladder was distended under 80-90 cm of water pressure and this was held in for 10 minutes.  Bladder was then drained approximately 650 mL capacity.  Inspection of the bladder and revealed a few tiny areas of petechial hemorrhaging.  Bladder was drained again and scope was removed and 10 mL of viscous lidocaine was injected per urethra and then B&O suppository was given rectally.  She tolerated the procedure well and there were no complications essentially no blood loss.  She will be discharged home from the recovery room and follow up in 6-8 weeks in clinic.         EYAL BAI MD             D: 2017 14:32   T: 2017 19:23   MT: [id number]      Name:     LEXII STRATTON   MRN:      [id number]        Account:        [id number]   :      1940           Procedure Date: 2017      Document: [id number]       cc: Eyal Bai MD

## 2017-09-14 ENCOUNTER — HOSPITAL ENCOUNTER (OUTPATIENT)
Dept: MAMMOGRAPHY | Facility: CLINIC | Age: 77
Discharge: HOME | End: 2017-09-14
Attending: INTERNAL MEDICINE | Admitting: INTERNAL MEDICINE
Payer: MEDICARE

## 2017-09-14 DIAGNOSIS — R92.0 BREAST MICROCALCIFICATION, MAMMOGRAPHIC: ICD-10-CM

## 2017-09-14 PROCEDURE — G0206 DX MAMMO INCL CAD UNI: HCPCS

## 2017-09-26 DIAGNOSIS — K21.9 GASTROESOPHAGEAL REFLUX DISEASE WITHOUT ESOPHAGITIS: ICD-10-CM

## 2017-09-26 NOTE — TELEPHONE ENCOUNTER
pantoprazole (PROTONIX) 20 MG      Last Written Prescription Date: 9/20/16  Last Fill Quantity: 30,  # refills: 11   Last Office Visit with [facility], [facility] or [facility] prescribing provider: 8/21/17

## 2017-09-27 RX ORDER — PANTOPRAZOLE SODIUM 20 MG/1
TABLET, DELAYED RELEASE ORAL
Qty: 30 TABLET | Refills: 11 | Status: SHIPPED | OUTPATIENT
Start: 2017-09-27 | End: 2018-09-06

## 2017-10-17 ENCOUNTER — TRANSFERRED RECORDS (OUTPATIENT)
Dept: HEALTH INFORMATION MANAGEMENT | Facility: CLINIC | Age: 77
End: 2017-10-17

## 2017-11-01 ENCOUNTER — OFFICE VISIT (OUTPATIENT)
Dept: FAMILY MEDICINE | Facility: CLINIC | Age: 77
End: 2017-11-01
Payer: COMMERCIAL

## 2017-11-01 VITALS
SYSTOLIC BLOOD PRESSURE: 134 MMHG | DIASTOLIC BLOOD PRESSURE: 70 MMHG | TEMPERATURE: 98.1 F | BODY MASS INDEX: 37.34 KG/M2 | RESPIRATION RATE: 17 BRPM | OXYGEN SATURATION: 99 % | HEART RATE: 65 BPM | WEIGHT: 224.1 LBS | HEIGHT: 65 IN

## 2017-11-01 DIAGNOSIS — R73.01 IMPAIRED FASTING GLUCOSE: ICD-10-CM

## 2017-11-01 DIAGNOSIS — E78.5 HYPERLIPIDEMIA LDL GOAL <130: ICD-10-CM

## 2017-11-01 DIAGNOSIS — I10 ESSENTIAL HYPERTENSION, BENIGN: ICD-10-CM

## 2017-11-01 LAB — HBA1C MFR BLD: 5.9 % (ref 4.3–6)

## 2017-11-01 PROCEDURE — 99214 OFFICE O/P EST MOD 30 MIN: CPT | Performed by: INTERNAL MEDICINE

## 2017-11-01 PROCEDURE — 36415 COLL VENOUS BLD VENIPUNCTURE: CPT | Performed by: INTERNAL MEDICINE

## 2017-11-01 PROCEDURE — 83036 HEMOGLOBIN GLYCOSYLATED A1C: CPT | Performed by: INTERNAL MEDICINE

## 2017-11-01 PROCEDURE — 80061 LIPID PANEL: CPT | Performed by: INTERNAL MEDICINE

## 2017-11-01 PROCEDURE — 80053 COMPREHEN METABOLIC PANEL: CPT | Performed by: INTERNAL MEDICINE

## 2017-11-01 RX ORDER — FENOFIBRATE 160 MG/1
160 TABLET ORAL DAILY
Qty: 90 TABLET | Refills: 3 | Status: SHIPPED | OUTPATIENT
Start: 2017-11-01 | End: 2018-09-06

## 2017-11-01 RX ORDER — LISINOPRIL 30 MG/1
TABLET ORAL
Qty: 90 TABLET | Refills: 3 | Status: SHIPPED | OUTPATIENT
Start: 2017-11-01 | End: 2018-05-31

## 2017-11-01 NOTE — NURSING NOTE
"Chief Complaint   Patient presents with     Refill Request     Hypertension     Lipids       Initial /70  Pulse 65  Temp 98.1  F (36.7  C) (Oral)  Resp 17  Ht 5' 4.5\" (1.638 m)  Wt 224 lb 1.6 oz (101.7 kg)  LMP  (Approximate)  SpO2 99%  BMI 37.87 kg/m2 Estimated body mass index is 37.87 kg/(m^2) as calculated from the following:    Height as of this encounter: 5' 4.5\" (1.638 m).    Weight as of this encounter: 224 lb 1.6 oz (101.7 kg).  Medication Reconciliation: complete    "

## 2017-11-01 NOTE — MR AVS SNAPSHOT
"              After Visit Summary   11/1/2017    Lexii Stratton    MRN: [id number]           Patient Information     Date Of Birth          1940        Visit Information        Provider Department      11/1/2017 2:30 PM Jesenia Madrigal MD [facility]        Today's Diagnoses     Hyperlipidemia LDL goal <130        Essential hypertension, benign        Impaired fasting glucose           Follow-ups after your visit        Who to contact     If you have questions or need follow up information about today's clinic visit or your schedule please contact [facility] directly at [phone].  Normal or non-critical lab and imaging results will be communicated to you by [employer], letter or phone within 4 business days after the clinic has received the results. If you do not hear from us within 7 days, please contact the clinic through [employer] or phone. If you have a critical or abnormal lab result, we will notify you by phone as soon as possible.  Submit refill requests through [employer] or call your pharmacy and they will forward the refill request to us. Please allow 3 business days for your refill to be completed.          Additional Information About Your Visit        MyChart Information     [employer] gives you secure access to your electronic health record. If you see a primary care provider, you can also send messages to your care team and make appointments. If you have questions, please call your primary care clinic.  If you do not have a primary care provider, please call [phone] and they will assist you.        Care EveryWhere ID     This is your Care EveryWhere ID. This could be used by other organizations to access your [facility] medical records  [id number]        Your Vitals Were     Pulse Temperature Respirations Height Last Period Pulse Oximetry    65 98.1  F (36.7  C) (Oral) 17 5' 4.5\" (1.638 m) (Approximate) 99%    BMI (Body Mass Index)                   37.87 " kg/m2            Blood Pressure from Last 3 Encounters:   11/01/17 134/70   09/06/17 149/68   08/21/17 120/64    Weight from Last 3 Encounters:   11/01/17 224 lb 1.6 oz (101.7 kg)   09/06/17 222 lb (100.7 kg)   08/21/17 224 lb 8 oz (101.8 kg)              We Performed the Following     ALT     Comprehensive metabolic panel     Hemoglobin A1c     Lipid panel reflex to direct LDL          Today's Medication Changes          These changes are accurate as of: 11/1/17  3:18 PM.  If you have any questions, ask your nurse or doctor.               These medicines have changed or have updated prescriptions.        Dose/Directions    fenofibrate 160 MG tablet   This may have changed:  See the new instructions.   Used for:  Hyperlipidemia LDL goal <130   Changed by:  Jesenia Madrigal MD        Dose:  160 mg   Take 1 tablet (160 mg) by mouth daily   Quantity:  90 tablet   Refills:  3       lisinopril 30 MG tablet   Commonly known as:  PRINIVIL,ZESTRIL   This may have changed:  See the new instructions.   Used for:  Essential hypertension, benign   Changed by:  Jesenia Madrigal MD        TAKE ONE TABLET BY MOUTH EVERY DAY FOR HYPERTENSION   Quantity:  90 tablet   Refills:  3            Where to get your medicines      These medications were sent to [facility] [place]  [place] - [address]  [address] [place] [address]     Phone:  [phone]     fenofibrate 160 MG tablet    lisinopril 30 MG tablet                Primary Care Provider Office Phone # Fax #    Jesenia Madrigal [phone]       [address] [address] [facility] [address]        Equal Access to Services     [facility]: Hadii aad ku hadasho Soomaali, waaxda luqadaha, qaybta kaalmada adeegyada, navin boateng. So [facility] [phone].    ATENCIÓN: Si habla español, tiene a irene disposición servicios gratuitos de asistencia lingüística. Llame al [phone].    We comply with  applicable federal civil rights laws and [place] laws. We do not discriminate on the basis of race, color, national origin, age, disability, sex, sexual orientation, or gender identity.            Thank you!     Thank you for choosing [facility]  for your care. Our goal is always to provide you with excellent care. Hearing back from our patients is one way we can continue to improve our services. Please take a few minutes to complete the written survey that you may receive in the mail after your visit with us. Thank you!             Your Updated Medication List - Protect others around you: Learn how to safely use, store and throw away your medicines at www.disposemymeds.org.          This list is accurate as of: 11/1/17  3:18 PM.  Always use your most recent med list.                   Brand Name Dispense Instructions for use Diagnosis    acetaminophen 325 MG tablet    TYLENOL    500 tablet    Take 3 tablets (975 mg) by mouth every 8 hours    Status post total replacement of right hip       ACIDOPHILUS PROBIOTIC BLEND Caps     30 capsule    Take 1 capsule by mouth daily        amLODIPine 5 MG tablet    NORVASC    90 tablet    TAKE ONE TABLET BY MOUTH EVERY NIGHT AT BEDTIME    Essential hypertension, benign       ASPIRIN PO      Take 81 mg by mouth daily        calcium + D 600-200 MG-UNIT Tabs   Generic drug:  calcium carbonate-vitamin D      1 TABLET  DAILY WITH FOOD    Preop general physical exam, Umbilical hernia       CENTRUM SILVER per tablet     30    1 TABLET DAILY    Need for prophylactic vaccination and inoculation against influenza, Routine general medical examination at a health care facility, Generalized osteoarthrosis, unspecified site, Other and unspecified hyperlipidemia, Esophageal reflux, Obesity, unspecified, Urinary sys symptom NEC, Other malaise and fatigue, Sleep disturbance, unspecified       COLACE PO      Take 100 mg by mouth daily        fenofibrate 160 MG tablet     90  tablet    Take 1 tablet (160 mg) by mouth daily    Hyperlipidemia LDL goal <130       ferrous gluconate 324 (38 FE) MG tablet    FERGON    100 tablet    Take 1 tablet (324 mg) by mouth daily (with breakfast)    Anemia, unspecified       levothyroxine 50 MCG tablet    SYNTHROID/LEVOTHROID    90 tablet    Take 1 tablet (50 mcg) by mouth daily    Acquired hypothyroidism       lisinopril 30 MG tablet    PRINIVIL,ZESTRIL    90 tablet    TAKE ONE TABLET BY MOUTH EVERY DAY FOR HYPERTENSION    Essential hypertension, benign       meloxicam 15 MG tablet    MOBIC    30 tablet    TAKE ONE TABLET BY MOUTH EVERY DAY AS NEEDED    Primary osteoarthritis involving multiple joints       omega-3 fatty acids 1200 MG capsule      1 TABLET DAILY    Preop general physical exam, Stress incontinence - female       pantoprazole 20 MG EC tablet    PROTONIX    30 tablet    TAKE ONE TABLET BY MOUTH EVERY DAY    Gastroesophageal reflux disease without esophagitis       potassium 99 MG Tabs      Take 1 tablet by mouth daily (with dinner)        simvastatin 20 MG tablet    ZOCOR    90 tablet    TAKE ONE TABLET BY MOUTH EVERY NIGHT AT BEDTIME    Hyperlipidemia LDL goal <130       travoprost (BAK Free) 0.004 % ophthalmic solution    TRAVATAN Z    2.5 mL    Apply 1 drop to eye At Bedtime        UNABLE TO FIND      MEDICATION NAME Cranberry.  2 capsules daily        VITAMIN D (CHOLECALCIFEROL) PO      Take 2,000 Units by mouth daily

## 2017-11-01 NOTE — PROGRESS NOTES
SUBJECTIVE:   Lexii Stratton is a 76 year old female who presents to clinic today for the following health issues:    Medication Follow-up for Cholesterol and Blood Pressure    Taking Medication as prescribed: yes    Side Effects:  None    Medication Helping Symptoms:  Yes  Was advised she needs labs prior to refills.        Hyperlipidemia Follow-Up    Rate your low fat/cholesterol diet?: fair    Taking statin?  Yes    Other lipid medications/supplements?:  Fenofibrate, without side effects    Patient is currently taking Simvastatin 20 mg QD and Fenofibrate 160 mg QD  Recent Labs   Lab Test  09/08/16   1138  06/29/15   0858  02/13/14   0958   CHOL  142  160  140   HDL  43*  48*  38*   LDL  75  79  78   TRIG  120  163*  118   CHOLHDLRATIO   --   3.3  3.7       Hypertension Follow-up    Patient is currently taking Lisinopril 30 mg QD and Amlodipine 5 mg QD  BP Readings from Last 3 Encounters:   11/01/17 134/70   09/06/17 149/68   08/21/17 120/64       Problems taking medications regularly: No    Medication side effects: none    Diet: regular (no restrictions)    Problem list and histories reviewed & adjusted, as indicated.  Additional history: as documented    Labs reviewed in EPIC    Reviewed and updated as needed this visit by clinical staff       Reviewed and updated as needed this visit by Provider       ROS:  CONSTITUTIONAL: NEGATIVE for fever, chills, change in weight  RESP: NEGATIVE for significant cough or SOB  CV: Hypertension - use of amlodipine and lisinopril; NEGATIVE for chest pain, palpitations or peripheral edema  GI: Hx of GERD - use of protonix; Hx of partial bowel obstruction, 3 episodes in 1 month; NEGATIVE for nausea, abdominal pain, heartburn, or change in bowel habits  : POSITIVE for nocturia x5; Overactive bladder, follows with urology, Dr. Bai  MUSCULOSKELETAL: Hx of primary osteoarthritis, multiple joints - use of meloxicam; NEGATIVE for significant arthralgias or myalgia  NEURO:  "NEGATIVE for weakness, dizziness or paresthesias  ENDOCRINE: Hyperlipidemia - use of fenofibrate and simvastatin; Hypothyroidism - use of levothyroxine; Fasting for labs today; NEGATIVE for temperature intolerance, skin/hair changes  HEME/ALLERGY/IMMUNE: Hx of anemia - use of fergon  PSYCHIATRIC: NEGATIVE for changes in mood or affect    This document serves as a record of the services and decisions personally performed and made by Jesenia Madrigal MD. It was created on her behalf by Kriss Harper, a trained medical scribe. The creation of this document is based on the provider's statements to the medical scribe.   Kriss Harper, 3:06 PM, November 1, 2017    OBJECTIVE:     /70  Pulse 65  Temp 98.1  F (36.7  C) (Oral)  Resp 17  Ht 1.638 m (5' 4.5\")  Wt 101.7 kg (224 lb 1.6 oz)  LMP  (Approximate)  SpO2 99%  BMI 37.87 kg/m2  Body mass index is 37.87 kg/(m^2).     EXAM:  GENERAL: healthy, alert and no distress  NECK: no adenopathy, no asymmetry, masses, or scars, thyroid normal to palpation and no carotid bruits  RESP: lungs clear to auscultation - no rales, rhonchi or wheezes  CV: regular rates and rhythm, normal S1 S2, no murmur, peripheral pulses strong and no peripheral edema  ABDOMEN: soft, nontender, without hepatosplenomegaly or masses and bowel sounds normal  MS: extremities normal- no gross deformities noted  SKIN: no suspicious lesions or rashes  NEURO: Normal strength and tone, sensory exam grossly normal and mentation intact  PSYCH: mentation appears normal and affect normal/bright    Diagnostic Test Results:  none     ASSESSMENT/PLAN:     (E78.5) Hyperlipidemia LDL goal <130  Comment: LDL at goal with fenofibrate and simvastatin; no change in medications/dosage at this time; fasting for labs today; will continue to monitor  Plan: fenofibrate 160 MG tablet, Comprehensive         metabolic panel    (I10) Essential hypertension, benign  Comment: BP reviewed and well-controlled with " lisinopril and amlodipine; /70 today; will continue to monitor; no change in medications/dosage at this time  Plan: lisinopril (PRINIVIL,ZESTRIL) 30 MG tablet,         Comprehensive metabolic panel    (R73.01) Impaired fasting glucose  Comment: fasting for labs today; will continue to monitor.  Lab Results   Component Value Date    A1C 5.9 11/01/2017    A1C 6.0 09/08/2016     Plan: Comprehensive metabolic panel, Hemoglobin A1C; healthy diet and regular exercise      MEDICATIONS:   Orders Placed This Encounter   Medications     fenofibrate 160 MG tablet     Sig: Take 1 tablet (160 mg) by mouth daily     Dispense:  90 tablet     Refill:  3     lisinopril (PRINIVIL,ZESTRIL) 30 MG tablet     Sig: TAKE ONE TABLET BY MOUTH EVERY DAY FOR HYPERTENSION     Dispense:  90 tablet     Refill:  3     Medications Discontinued During This Encounter   Medication Reason     fenofibrate 160 MG tablet Reorder     lisinopril (PRINIVIL,ZESTRIL) 30 MG tablet Reorder       Jesenia Madrigal MD  Internal Medicine  [facility]    The information in this document, created by a medical scribe for me, accurately reflects the services I personally performed and the decisions made by me. I have reviewed and approved this document for accuracy.  Dr. Jesenia Madrigal, 3:21 PM, November 1, 2017

## 2017-11-02 LAB
ALBUMIN SERPL-MCNC: 3.4 G/DL (ref 3.4–5)
ALP SERPL-CCNC: 64 U/L (ref 40–150)
ALT SERPL W P-5'-P-CCNC: 19 U/L (ref 0–50)
ANION GAP SERPL CALCULATED.3IONS-SCNC: 11 MMOL/L (ref 3–14)
AST SERPL W P-5'-P-CCNC: 29 U/L (ref 0–45)
BILIRUB SERPL-MCNC: 0.4 MG/DL (ref 0.2–1.3)
BUN SERPL-MCNC: 23 MG/DL (ref 7–30)
CALCIUM SERPL-MCNC: 9.5 MG/DL (ref 8.5–10.1)
CHLORIDE SERPL-SCNC: 108 MMOL/L (ref 94–109)
CHOLEST SERPL-MCNC: 157 MG/DL
CO2 SERPL-SCNC: 23 MMOL/L (ref 20–32)
CREAT SERPL-MCNC: 0.85 MG/DL (ref 0.52–1.04)
GFR SERPL CREATININE-BSD FRML MDRD: 65 ML/MIN/1.7M2
GLUCOSE SERPL-MCNC: 107 MG/DL (ref 70–99)
HDLC SERPL-MCNC: 50 MG/DL
LDLC SERPL CALC-MCNC: 89 MG/DL
NONHDLC SERPL-MCNC: 107 MG/DL
POTASSIUM SERPL-SCNC: 4.4 MMOL/L (ref 3.4–5.3)
PROT SERPL-MCNC: 7 G/DL (ref 6.8–8.8)
SODIUM SERPL-SCNC: 142 MMOL/L (ref 133–144)
TRIGL SERPL-MCNC: 91 MG/DL

## 2017-11-08 ENCOUNTER — TRANSFERRED RECORDS (OUTPATIENT)
Dept: HEALTH INFORMATION MANAGEMENT | Facility: CLINIC | Age: 77
End: 2017-11-08

## 2017-11-21 DIAGNOSIS — M15.0 PRIMARY OSTEOARTHRITIS INVOLVING MULTIPLE JOINTS: ICD-10-CM

## 2017-11-24 RX ORDER — MELOXICAM 15 MG/1
TABLET ORAL
Qty: 30 TABLET | Refills: 2 | Status: SHIPPED | OUTPATIENT
Start: 2017-11-24 | End: 2018-02-14

## 2017-11-24 NOTE — TELEPHONE ENCOUNTER
jacinta       Last Written Prescription Date: 8/15/17  Last Quantity: 30, # refills: 2  Last Office Visit with [facility], [facility] or [facility] prescribing provider: 11/01/17       Creatinine   Date Value Ref Range Status   11/01/2017 0.85 0.52 - 1.04 mg/dL Final     Lab Results   Component Value Date    AST 29 11/01/2017     Lab Results   Component Value Date    ALT 19 11/01/2017     BP Readings from Last 3 Encounters:   11/01/17 134/70   09/06/17 149/68   08/21/17 120/64     Will check with pharmacy to see if they have this on hand, sent with refills in august as prn.  Pharmacy does not have refills on file, so these are sent.  Bernadette Ramos, RN  Triage Nurse

## 2018-02-12 ENCOUNTER — TRANSFERRED RECORDS (OUTPATIENT)
Dept: HEALTH INFORMATION MANAGEMENT | Facility: CLINIC | Age: 78
End: 2018-02-12

## 2018-02-13 ENCOUNTER — MYC MEDICAL ADVICE (OUTPATIENT)
Dept: FAMILY MEDICINE | Facility: CLINIC | Age: 78
End: 2018-02-13

## 2018-02-13 ENCOUNTER — OFFICE VISIT (OUTPATIENT)
Dept: FAMILY MEDICINE | Facility: CLINIC | Age: 78
End: 2018-02-13
Payer: COMMERCIAL

## 2018-02-13 VITALS
BODY MASS INDEX: 38.55 KG/M2 | HEART RATE: 77 BPM | TEMPERATURE: 97.9 F | DIASTOLIC BLOOD PRESSURE: 68 MMHG | OXYGEN SATURATION: 100 % | WEIGHT: 231.4 LBS | HEIGHT: 65 IN | SYSTOLIC BLOOD PRESSURE: 126 MMHG | RESPIRATION RATE: 16 BRPM

## 2018-02-13 DIAGNOSIS — H57.12 LEFT EYE PAIN: Primary | ICD-10-CM

## 2018-02-13 DIAGNOSIS — H53.8 BLURRED VISION: ICD-10-CM

## 2018-02-13 DIAGNOSIS — Z86.69 HISTORY OF GLAUCOMA: ICD-10-CM

## 2018-02-13 PROCEDURE — 99214 OFFICE O/P EST MOD 30 MIN: CPT | Performed by: PHYSICIAN ASSISTANT

## 2018-02-13 NOTE — MR AVS SNAPSHOT
After Visit Summary   2/13/2018    Lexii Stratton    MRN: [id number]           Patient Information     Date Of Birth          1940        Visit Information        Provider Department      2/13/2018 2:30 PM Sunshine Dos Santos PA-C [facility]        Today's Diagnoses     Left eye pain    -  1    Blurred vision        History of glaucoma           Follow-ups after your visit        Your next 10 appointments already scheduled     Feb 21, 2018  3:00 PM CST   New Patient Visit with Jenny Ayon MD   [facility] ([facility])    [address]  [address]  [place] [address] [phone]              Who to contact     If you have questions or need follow up information about today's clinic visit or your schedule please contact [facility] directly at [phone].  Normal or non-critical lab and imaging results will be communicated to you by MyChart, letter or phone within 4 business days after the clinic has received the results. If you do not hear from us within 7 days, please contact the clinic through [employer] or phone. If you have a critical or abnormal lab result, we will notify you by phone as soon as possible.  Submit refill requests through [employer] or call your pharmacy and they will forward the refill request to us. Please allow 3 business days for your refill to be completed.          Additional Information About Your Visit        MyChart Information     [employer] gives you secure access to your electronic health record. If you see a primary care provider, you can also send messages to your care team and make appointments. If you have questions, please call your primary care clinic.  If you do not have a primary care provider, please call [phone] and they will assist you.        Care EveryWhere ID     This is your Care EveryWhere ID. This could be used by other organizations to  "access your [place] medical records  [id number]        Your Vitals Were     Pulse Temperature Respirations Height Last Period Pulse Oximetry    77 97.9  F (36.6  C) (Oral) 16 5' 4.5\" (1.638 m) (LMP Unknown) 100%    Breastfeeding? BMI (Body Mass Index)                No 39.11 kg/m2           Blood Pressure from Last 3 Encounters:   02/13/18 126/68   11/01/17 134/70   09/06/17 149/68    Weight from Last 3 Encounters:   02/13/18 231 lb 6.4 oz (105 kg)   11/01/17 224 lb 1.6 oz (101.7 kg)   09/06/17 222 lb (100.7 kg)              Today, you had the following     No orders found for display       Primary Care Provider Office Phone # Fax #    Jesenia Madrigal [phone] [address] [address] [address]        Equal Access to Services     MIRIAM MILLS : Hadii aad ku hadasho Soomaali, waaxda luqadaha, qaybta kaalmada adeegyada, waxay idiin hayadolfon dave kharash laailyn . So [facility] [phone].    ATENCIÓN: Si habla español, tiene a irene disposición servicios gratuitos de asistencia lingüística. Llame al [phone].    We comply with applicable federal civil rights laws and [place] laws. We do not discriminate on the basis of race, color, national origin, age, disability, sex, sexual orientation, or gender identity.            Thank you!     Thank you for choosing [facility]  for your care. Our goal is always to provide you with excellent care. Hearing back from our patients is one way we can continue to improve our services. Please take a few minutes to complete the written survey that you may receive in the mail after your visit with us. Thank you!             Your Updated Medication List - Protect others around you: Learn how to safely use, store and throw away your medicines at www.disposemymeds.org.          This list is accurate as of 2/13/18  2:58 PM.  Always use your most recent med list.                   Brand Name Dispense Instructions for use Diagnosis    " acetaminophen 325 MG tablet    TYLENOL    500 tablet    Take 3 tablets (975 mg) by mouth every 8 hours    Status post total replacement of right hip       ACIDOPHILUS PROBIOTIC BLEND Caps     30 capsule    Take 1 capsule by mouth daily        amLODIPine 5 MG tablet    NORVASC    90 tablet    TAKE ONE TABLET BY MOUTH EVERY NIGHT AT BEDTIME    Essential hypertension, benign       ASPIRIN PO      Take 81 mg by mouth daily        calcium + D 600-200 MG-UNIT Tabs   Generic drug:  calcium carbonate-vitamin D      1 TABLET  DAILY WITH FOOD    Preop general physical exam, Umbilical hernia       CENTRUM SILVER per tablet     30    1 TABLET DAILY    Need for prophylactic vaccination and inoculation against influenza, Routine general medical examination at a health care facility, Generalized osteoarthrosis, unspecified site, Other and unspecified hyperlipidemia, Esophageal reflux, Obesity, unspecified, Urinary sys symptom NEC, Other malaise and fatigue, Sleep disturbance, unspecified       COLACE PO      Take 100 mg by mouth daily        fenofibrate 160 MG tablet     90 tablet    Take 1 tablet (160 mg) by mouth daily    Hyperlipidemia LDL goal <130       ferrous gluconate 324 (38 FE) MG tablet    FERGON    100 tablet    Take 1 tablet (324 mg) by mouth daily (with breakfast)    Anemia, unspecified       levothyroxine 50 MCG tablet    SYNTHROID/LEVOTHROID    90 tablet    Take 1 tablet (50 mcg) by mouth daily    Acquired hypothyroidism       lisinopril 30 MG tablet    PRINIVIL,ZESTRIL    90 tablet    TAKE ONE TABLET BY MOUTH EVERY DAY FOR HYPERTENSION    Essential hypertension, benign       meloxicam 15 MG tablet    MOBIC    30 tablet    TAKE ONE TABLET BY MOUTH EVERY DAY AS NEEDED    Primary osteoarthritis involving multiple joints       omega-3 fatty acids 1200 MG capsule      1 TABLET DAILY    Preop general physical exam, Stress incontinence - female       pantoprazole 20 MG EC tablet    PROTONIX    30 tablet    TAKE ONE  TABLET BY MOUTH EVERY DAY    Gastroesophageal reflux disease without esophagitis       potassium 99 MG Tabs      Take 1 tablet by mouth daily (with dinner)        simvastatin 20 MG tablet    ZOCOR    90 tablet    TAKE ONE TABLET BY MOUTH EVERY NIGHT AT BEDTIME    Hyperlipidemia LDL goal <130       travoprost (TIMI Free) 0.004 % ophthalmic solution    TRAVATAN Z    2.5 mL    Apply 1 drop to eye At Bedtime        UNABLE TO FIND      MEDICATION NAME Cranberry.  2 capsules daily        VITAMIN D (CHOLECALCIFEROL) PO      Take 2,000 Units by mouth daily

## 2018-02-13 NOTE — PROGRESS NOTES
SUBJECTIVE:   Lexii Stratton is a 77 year old female who presents to clinic today for the following health issues:    Eye(s) Problem      Duration: 8-9 days    Description:  Location: left  Pain: YES, severe  Redness: no   Discharge: no    Accompanying signs and symptoms: blurry vision in left eye    History (Trauma, foreign body exposure,): patient reports it feels like there is a film but can't get anything out    Precipitating or alleviating factors (contact use): None    Therapies tried and outcome: saline nasal spray and humidifier; neither helped; tylenol only slightly helped with pain    Saw an ENT who suggested seeing an eye doctor; has an appt on Friday      Patient is here today complaining of left eye pain  Started about 8 days ago out of the blue  She feels a shooting pain up into her nose and into her eye socket  No headache, no ear pain, cough or cold symptoms  She notes some left eye blurred vision which is also getting worse  No eye redness, discharge, change in color vision, double vision, pain with eye movements, does not feel like there is a foreign body in there  Saw her ENT on Monday, he recommend eye appt  Has eye check on Friday- sees [facility]  Has hx of glaucoma      Problem list and histories reviewed & adjusted, as indicated.  Additional history: as documented    Patient Active Problem List   Diagnosis     Generalized osteoarthrosis, unspecified site     Esophageal reflux     Obesity     Other symptoms involving urinary system     Acquired hypothyroidism     Hypersomnia with sleep apnea     Essential hypertension, benign     Impaired fasting glucose     Hyperlipidemia LDL goal <130     Advance Care Planning     Chest pain syndrome     Partial small bowel obstruction     Anemia, unspecified     Poor iron absorption     Iron and its compounds causing adverse effect in therapeutic use(E934.0)     S/P total hip arthroplasty     OAB (overactive bladder)     Morbid obesity due to excess  calories (H)     Past Surgical History:   Procedure Laterality Date     ARTHROPLASTY HIP Right 11/9/2016    Procedure: ARTHROPLASTY HIP;  Surgeon: Angel Lund MD;  Location:  OR     AS REPAIR INCISIONAL HERNIA,REDUCIBLE  1998    inc hernia ( Yamileth surgery)     C NONSPECIFIC PROCEDURE  1946    T&A     C NONSPECIFIC PROCEDURE  1984    Vaginal Hysterectomy (has her ovaries)     C NONSPECIFIC PROCEDURE  1973    PPTL     C NONSPECIFIC PROCEDURE  1994    L shoulder to remove bone spurs     C NONSPECIFIC PROCEDURE  2004    cysto and durasphere Dr Demarco     C NONSPECIFIC PROCEDURE  2005    cysto and durasphere     C NONSPECIFIC PROCEDURE  2007    cysto and durasphere     C NONSPECIFIC PROCEDURE  2009    retropubic TVT sling     CHOLECYSTECTOMY  1987     COLONOSCOPY  12/3/2011    Procedure:COLONOSCOPY; COLONOSCOPY; Surgeon:SEMAJ GRAHAM; Location: GI     CYSTOSCOPY N/A 9/6/2017    Procedure: CYSTOSCOPY;  CYSTOSCOPY AND HYDRODISTENTION ;  Surgeon: Eyal Bai MD;  Location:  OR     ENT SURGERY      Tonsillectomy     ESOPHAGOSCOPY, GASTROSCOPY, DUODENOSCOPY (EGD), COMBINED N/A 9/26/2016    Procedure: COMBINED ESOPHAGOSCOPY, GASTROSCOPY, DUODENOSCOPY (EGD), BIOPSY SINGLE OR MULTIPLE;  Surgeon: Marco Monaco MD;  Location:  GI     GENITOURINARY SURGERY       GYN SURGERY      Hysterectomy     HERNIA REPAIR, UMBILICAL  7/8/2010    Dr. Kirt Franco times 3     ORTHOPEDIC SURGERY  2009    [facility] - Dr. Lund- bilateral knee replacement       Social History   Substance Use Topics     Smoking status: Never Smoker     Smokeless tobacco: Never Used     Alcohol use No     Family History   Problem Relation Age of Onset     HEART DISEASE Mother      heart surgery , new valve     Gallbladder Disease Mother      Coronary Artery Disease Mother      Hyperlipidemia Mother      Asthma Mother      HEART DISEASE Maternal Grandmother      Coronary Artery Disease Maternal Grandmother      HEART DISEASE Maternal  Grandfather      Coronary Artery Disease Maternal Grandfather      CANCER Father      throat ca,  76     Coronary Artery Disease Father      DIABETES Brother      Half Brother     Cardiovascular Brother      lung ca, Half brother     CANCER Brother      half brother  lung          Current Outpatient Prescriptions   Medication Sig Dispense Refill     meloxicam (MOBIC) 15 MG tablet TAKE ONE TABLET BY MOUTH EVERY DAY AS NEEDED 30 tablet 2     fenofibrate 160 MG tablet Take 1 tablet (160 mg) by mouth daily 90 tablet 3     lisinopril (PRINIVIL,ZESTRIL) 30 MG tablet TAKE ONE TABLET BY MOUTH EVERY DAY FOR HYPERTENSION 90 tablet 3     levothyroxine (SYNTHROID/LEVOTHROID) 50 MCG tablet Take 1 tablet (50 mcg) by mouth daily 90 tablet 1     pantoprazole (PROTONIX) 20 MG EC tablet TAKE ONE TABLET BY MOUTH EVERY DAY 30 tablet 11     ASPIRIN PO Take 81 mg by mouth daily       simvastatin (ZOCOR) 20 MG tablet TAKE ONE TABLET BY MOUTH EVERY NIGHT AT BEDTIME 90 tablet 2     amLODIPine (NORVASC) 5 MG tablet TAKE ONE TABLET BY MOUTH EVERY NIGHT AT BEDTIME 90 tablet 2     travoprost, BAK Free, (TRAVATAN Z) 0.004 % ophthalmic solution Apply 1 drop to eye At Bedtime 2.5 mL 1     acetaminophen (TYLENOL) 325 MG tablet Take 3 tablets (975 mg) by mouth every 8 hours 500 tablet 1     UNABLE TO FIND MEDICATION NAME Cranberry.  2 capsules daily       VITAMIN D, CHOLECALCIFEROL, PO Take 2,000 Units by mouth daily       Docusate Sodium (COLACE PO) Take 100 mg by mouth daily       Probiotic Product (ACIDOPHILUS PROBIOTIC BLEND) CAPS Take 1 capsule by mouth daily 30 capsule 0     ferrous gluconate (FERGON) 324 (38 FE) MG tablet Take 1 tablet (324 mg) by mouth daily (with breakfast) 100 tablet 0     potassium 99 MG TABS Take 1 tablet by mouth daily (with dinner)        CALCIUM + D 600-200 MG-UNIT PO TABS 1 TABLET  DAILY WITH FOOD       OMEGA-3 FATTY ACIDS 1200 MG OR CAPS 1 TABLET DAILY  0     CENTRUM SILVER OR TABS 1 TABLET DAILY 30 0  "    Allergies   Allergen Reactions     Augmentin Diarrhea     Codeine Nausea and Vomiting     Hydrocodone      Keeps patient awake     Naproxen Itching and Rash     Sulfa Drugs Nausea       Reviewed and updated as needed this visit by clinical staff  Tobacco  Allergies  Med Hx  Surg Hx  Fam Hx  Soc Hx      Reviewed and updated as needed this visit by Provider         ROS:  Constitutional, HEENT, cardiovascular, pulmonary, gi and gu systems are negative, except as otherwise noted.    OBJECTIVE:     /68 (BP Location: Right arm, Patient Position: Chair, Cuff Size: Adult Regular)  Pulse 77  Temp 97.9  F (36.6  C) (Oral)  Resp 16  Ht 5' 4.5\" (1.638 m)  Wt 231 lb 6.4 oz (105 kg)  LMP  (LMP Unknown)  SpO2 100%  Breastfeeding? No  BMI 39.11 kg/m2  Body mass index is 39.11 kg/(m^2).  GENERAL: healthy, alert and no distress  EYES: Eyes grossly normal to inspection, PERRL and conjunctivae and sclerae normal  HENT: ear canals and TM's normal, nose and mouth without ulcers or lesions  NECK: no adenopathy, no asymmetry, masses, or scars and thyroid normal to palpation  RESP: lungs clear to auscultation - no rales, rhonchi or wheezes  CV: regular rate and rhythm, normal S1 S2, no S3 or S4, + 2/6 systolic murmur, click or rub, no peripheral edema and peripheral pulses strong  MS: no gross musculoskeletal defects noted, no edema    Diagnostic Test Results:  none     Vision screen:  Right eye: 20/40  Left eye: 20/80    ASSESSMENT/PLAN:             1. Left eye pain    2. Blurred vision    3. History of glaucoma    New problem, unclear cause, am very concerned given level of pain, blurred vision and symptoms worsening. Recommend eye exam today, referred to [facility]- was able to secure appt today, she went over after this visit for further assessment.    Discussed that if vision loss, s/sxs of stroke, needs to go to the ER.      Risks, benefits and alternatives were discussed with patient. Agreeable to " the plan of care.    Sunshine Dos Santos PA-C  [facility]

## 2018-02-13 NOTE — NURSING NOTE
"Chief Complaint   Patient presents with     Sinus Problem       Initial /68 (BP Location: Right arm, Patient Position: Chair, Cuff Size: Adult Regular)  Pulse 77  Temp 97.9  F (36.6  C) (Oral)  Resp 16  Ht 5' 4.5\" (1.638 m)  Wt 231 lb 6.4 oz (105 kg)  LMP  (LMP Unknown)  SpO2 100%  Breastfeeding? No  BMI 39.11 kg/m2 Estimated body mass index is 39.11 kg/(m^2) as calculated from the following:    Height as of this encounter: 5' 4.5\" (1.638 m).    Weight as of this encounter: 231 lb 6.4 oz (105 kg).  Medication Reconciliation: complete   Jimbo Roman CMA (AAMA)    "

## 2018-02-14 DIAGNOSIS — M15.0 PRIMARY OSTEOARTHRITIS INVOLVING MULTIPLE JOINTS: ICD-10-CM

## 2018-02-14 NOTE — TELEPHONE ENCOUNTER
"Requested Prescriptions   Pending Prescriptions Disp Refills     meloxicam (MOBIC) 15 MG tablet [Pharmacy Med Name: MELOXICAM 15MG TABS]  Last Written Prescription Date:  11/24/17  Last Fill Quantity: 30 TABLET,  # refills: 2   Last office visit: 2/13/2018 with prescribing provider:  2/13/18   Future Office Visit:     30 tablet 2     Sig: TAKE ONE TABLET BY MOUTH EVERY DAY AS NEEDED    NSAID Medications Failed    2/14/2018  9:35 AM       Failed - Patient is age 6-64 years       Failed - Normal CBC on file in past 12 months    Recent Labs   Lab Test  08/21/17   1120   11/02/16   1409   WBC   --    --   7.7   RBC   --    --   3.61*   HGB  9.3*   < >  10.5*  Canceled, Test credited   Duplicate request  CORRECTED ON 11/03 AT 1021: PREVIOUSLY REPORTED AS 10.5     HCT   --    --   33.6*   PLT   --    --   258    < > = values in this interval not displayed.            Passed - Blood pressure under 140/90 in past 12 months    BP Readings from Last 3 Encounters:   02/13/18 126/68   11/01/17 134/70   09/06/17 149/68                Passed - Normal ALT on file in past 12 months    Recent Labs   Lab Test  11/01/17   1519   ALT  19            Passed - Normal AST on file in past 12 months    Recent Labs   Lab Test  11/01/17   1519   AST  29            Passed - Recent or future visit with authorizing provider's specialty    Patient had office visit in the last year or has a visit in the next 30 days with authorizing provider.  See \"Patient Info\" tab in inbasket, or \"Choose Columns\" in Meds & Orders section of the refill encounter.            Passed - No active pregnancy on record       Passed - Normal serum creatinine on file in past 12 months    Recent Labs   Lab Test  11/01/17   1519   CR  0.85            Passed - No positive pregnancy test in past 12 months          "

## 2018-02-16 DIAGNOSIS — M15.0 PRIMARY OSTEOARTHRITIS INVOLVING MULTIPLE JOINTS: ICD-10-CM

## 2018-02-16 LAB
ERYTHROCYTE [DISTWIDTH] IN BLOOD BY AUTOMATED COUNT: 13.7 % (ref 10–15)
HCT VFR BLD AUTO: 28.9 % (ref 35–47)
HGB BLD-MCNC: 8.4 G/DL (ref 11.7–15.7)
MCH RBC QN AUTO: 25.8 PG (ref 26.5–33)
MCHC RBC AUTO-ENTMCNC: 29.1 G/DL (ref 31.5–36.5)
MCV RBC AUTO: 89 FL (ref 78–100)
PLATELET # BLD AUTO: 284 10E9/L (ref 150–450)
RBC # BLD AUTO: 3.25 10E12/L (ref 3.8–5.2)
WBC # BLD AUTO: 7.8 10E9/L (ref 4–11)

## 2018-02-16 PROCEDURE — 85027 COMPLETE CBC AUTOMATED: CPT | Performed by: INTERNAL MEDICINE

## 2018-02-16 PROCEDURE — 36415 COLL VENOUS BLD VENIPUNCTURE: CPT | Performed by: INTERNAL MEDICINE

## 2018-02-16 RX ORDER — MELOXICAM 15 MG/1
TABLET ORAL
Qty: 30 TABLET | Refills: 0 | Status: SHIPPED | OUTPATIENT
Start: 2018-02-16 | End: 2018-03-21

## 2018-02-16 NOTE — TELEPHONE ENCOUNTER
Medication is being filled for 1 time refill only due to:  needs labs.   Refill sent, lab order in place for CBC. Pharmacy will have her do this today  If she can. Need to add her to the lab schedule. This is done, patient is going there  Now.   Bernadette Ramos, BEN  Triage Nurse

## 2018-02-19 ASSESSMENT — ENCOUNTER SYMPTOMS
FLANK PAIN: 0
STIFFNESS: 1
NECK PAIN: 0
NIGHT SWEATS: 0
DECREASED APPETITE: 0
DYSURIA: 0
EYE IRRITATION: 1
WEIGHT LOSS: 0
POLYDIPSIA: 0
EYE PAIN: 1
FEVER: 0
INCREASED ENERGY: 0
FATIGUE: 1
MYALGIAS: 1
HEMATURIA: 0
POLYPHAGIA: 0
HALLUCINATIONS: 0
DIFFICULTY URINATING: 0
DOUBLE VISION: 0
BACK PAIN: 1
ALTERED TEMPERATURE REGULATION: 1
MUSCLE CRAMPS: 1
EYE WATERING: 1
JOINT SWELLING: 0
EYE REDNESS: 0
ARTHRALGIAS: 1
MUSCLE WEAKNESS: 0
WEIGHT GAIN: 0
CHILLS: 1

## 2018-02-21 ENCOUNTER — OFFICE VISIT (OUTPATIENT)
Dept: UROLOGY | Facility: CLINIC | Age: 78
End: 2018-02-21
Payer: COMMERCIAL

## 2018-02-21 VITALS
WEIGHT: 230 LBS | BODY MASS INDEX: 39.27 KG/M2 | DIASTOLIC BLOOD PRESSURE: 70 MMHG | HEART RATE: 80 BPM | HEIGHT: 64 IN | SYSTOLIC BLOOD PRESSURE: 142 MMHG

## 2018-02-21 DIAGNOSIS — R35.1 NOCTURIA: Primary | ICD-10-CM

## 2018-02-21 DIAGNOSIS — R32 URINARY INCONTINENCE, UNSPECIFIED TYPE: ICD-10-CM

## 2018-02-21 DIAGNOSIS — R39.15 URINARY URGENCY: ICD-10-CM

## 2018-02-21 DIAGNOSIS — N39.46 MIXED INCONTINENCE: ICD-10-CM

## 2018-02-21 LAB
ALBUMIN UR-MCNC: NEGATIVE MG/DL
APPEARANCE UR: CLEAR
BILIRUB UR QL STRIP: NEGATIVE
COLOR UR AUTO: YELLOW
GLUCOSE UR STRIP-MCNC: NEGATIVE MG/DL
HGB UR QL STRIP: NEGATIVE
KETONES UR STRIP-MCNC: NEGATIVE MG/DL
LEUKOCYTE ESTERASE UR QL STRIP: NEGATIVE
NITRATE UR QL: NEGATIVE
PH UR STRIP: 6 PH (ref 5–7)
RESIDUAL VOLUME (RV) (EXTERNAL): 62
SOURCE: NORMAL
SP GR UR STRIP: 1.02 (ref 1–1.03)
UROBILINOGEN UR STRIP-ACNC: 0.2 EU/DL (ref 0.2–1)

## 2018-02-21 PROCEDURE — 51798 US URINE CAPACITY MEASURE: CPT | Performed by: UROLOGY

## 2018-02-21 PROCEDURE — 81003 URINALYSIS AUTO W/O SCOPE: CPT | Performed by: UROLOGY

## 2018-02-21 PROCEDURE — 99203 OFFICE O/P NEW LOW 30 MIN: CPT | Mod: 25 | Performed by: UROLOGY

## 2018-02-21 ASSESSMENT — ENCOUNTER SYMPTOMS
EXERCISE INTOLERANCE: 0
POSTURAL DYSPNEA: 0
NECK MASS: 0
COUGH: 0
DEPRESSION: 0
DYSPNEA ON EXERTION: 0
CONSTIPATION: 0
SEIZURES: 0
BRUISES/BLEEDS EASILY: 0
HEARTBURN: 0
FEVER: 0
HOT FLASHES: 0
HALLUCINATIONS: 0
EYE REDNESS: 0
SPEECH CHANGE: 0
STIFFNESS: 1
NIGHT SWEATS: 0
RESPIRATORY PAIN: 0
PALPITATIONS: 0
DOUBLE VISION: 0
MUSCLE WEAKNESS: 0
PANIC: 0
MYALGIAS: 1
HYPOTENSION: 0
DECREASED LIBIDO: 0
FATIGUE: 1
INSOMNIA: 0
DIFFICULTY URINATING: 0
TACHYCARDIA: 0
BREAST PAIN: 0
LEG SWELLING: 0
BLOATING: 0
SYNCOPE: 0
RECTAL PAIN: 0
JAUNDICE: 0
CLAUDICATION: 0
WEIGHT LOSS: 0
SLEEP DISTURBANCES DUE TO BREATHING: 0
MEMORY LOSS: 0
BREAST MASS: 0
SORE THROAT: 0
BOWEL INCONTINENCE: 0
TASTE DISTURBANCE: 0
LEG PAIN: 0
WEAKNESS: 0
DIARRHEA: 0
DISTURBANCES IN COORDINATION: 0
INCREASED ENERGY: 0
BLOOD IN STOOL: 0
ARTHRALGIAS: 1
WHEEZING: 0
EXTREMITY NUMBNESS: 0
DECREASED APPETITE: 0
LIGHT-HEADEDNESS: 0
PARALYSIS: 0
ORTHOPNEA: 0
DIZZINESS: 0
HYPERTENSION: 0
LOSS OF CONSCIOUSNESS: 0
SNORES LOUDLY: 0
BACK PAIN: 1
POLYPHAGIA: 0
NAIL CHANGES: 0
EYE IRRITATION: 1
SMELL DISTURBANCE: 0
NECK PAIN: 0
DECREASED CONCENTRATION: 0
TROUBLE SWALLOWING: 0
ALTERED TEMPERATURE REGULATION: 1
HEMATURIA: 0
POOR WOUND HEALING: 0
HEADACHES: 0
WEIGHT GAIN: 0
EYE PAIN: 1
SINUS PAIN: 0
JOINT SWELLING: 0
VOMITING: 0
SWOLLEN GLANDS: 0
ABDOMINAL PAIN: 0
SPUTUM PRODUCTION: 0
NAUSEA: 0
EYE WATERING: 1
COUGH DISTURBING SLEEP: 0
RECTAL BLEEDING: 0
POLYDIPSIA: 0
HEMOPTYSIS: 0
HOARSE VOICE: 0
SHORTNESS OF BREATH: 0
NUMBNESS: 0
SINUS CONGESTION: 0
MUSCLE CRAMPS: 1
FLANK PAIN: 0
NERVOUS/ANXIOUS: 0
TINGLING: 0
SKIN CHANGES: 0
CHILLS: 1
DYSURIA: 0
TREMORS: 0

## 2018-02-21 ASSESSMENT — PAIN SCALES - GENERAL: PAINLEVEL: NO PAIN (0)

## 2018-02-21 NOTE — PATIENT INSTRUCTIONS
Please do the bladder diary    Please return to see us for a cystoscopy (procedure to look in your bladder) and pelvic exam    It was a pleasure meeting with you today.  Thank you for allowing me and my team the privilege of caring for you today.  YOU are the reason we are here, and I truly hope we provided you with the excellent service you deserve.  Please let us know if there is anything else we can do for you so that we can be sure you are leaving completely satisfied with your care experience.

## 2018-02-21 NOTE — LETTER
2/21/2018     RE: Lexii Stratton  [address] [address] [facility] [address]     Dear Colleague,    Thank you for referring your patient, Lexii Stratton, to the [facility] at [facility]. Please see a copy of my visit note below.    February 21, 2018    Referring Provider/Primary Care Provider: Jesenia Madrigal    CC: Urinary incontinence    HPI:  Lexii Stratton is a 77 year old female who presents for evaluation of her pelvic floor symptoms.  She has a long history of urinary incontinence and has seen other providers.    Her bladder problems date back to the late 1990s when she had UTIs.  She saw Dr Dawn at that point and did biofeedback.  She then tried several medications which did not help and then was referred to Dr Demarco in 2004.     Durasphere in 2004, 2005, 2007    Dr Singh did a TVT 2009    PNE with Dr Romero but cancelled the implant secondary to needing knee surgery in 2009    Started seeing Dr Bai at  in 2012.  Did a stage I interstim which did not help and was removed.  Has had 7 botox injections first in 2013, most recent 4/2017.  Then in 9/2017 had a hydrodistension to 650mL which she reports did not change her symptoms.    UDS 7/2017-reports normal compliancs, low capacity of 185 mL  UDS 1/2013 senior care 227 mL    Past Medical History:   Diagnosis Date     Arthritis      Chronic infection     Recent UTI cleared now     Esophageal reflux      Hernia, abdominal      Hypertension     No cardiologist     Incontinence of urine      Mumps     6 years old     Obese      Osteoarthritis      Other and unspecified hyperlipidemia      Other chronic pain     back     Peptic ulcer, unspecified site, unspecified as acute or chronic, without mention of hemorrhage, perforation, or obstruction      Small bowel obstruction      Thyroid disease hypothyroidism     Urinary incontinence      Walking troubles      Past Surgical  History:   Procedure Laterality Date     ARTHROPLASTY HIP Right 11/9/2016    Procedure: ARTHROPLASTY HIP;  Surgeon: Angel Lund MD;  Location:  OR     AS REPAIR INCISIONAL HERNIA,REDUCIBLE  1998    inc hernia ( Yamileth surgery)     BLADDER SURGERY      hyperdistention surgery and sling     C NONSPECIFIC PROCEDURE  1946    T&A     C NONSPECIFIC PROCEDURE  1984    Vaginal Hysterectomy (has her ovaries)     C NONSPECIFIC PROCEDURE  1973    PPTL     C NONSPECIFIC PROCEDURE  1994    L shoulder to remove bone spurs     C NONSPECIFIC PROCEDURE  2004    cysto and durasphere Dr Demarco     C NONSPECIFIC PROCEDURE  2005    cysto and durasphere     C NONSPECIFIC PROCEDURE  2007    cysto and durasphere     C NONSPECIFIC PROCEDURE  2009    retropubic TVT sling     CHOLECYSTECTOMY  1987     COLONOSCOPY  12/3/2011    Procedure:COLONOSCOPY; COLONOSCOPY; Surgeon:SEMAJ GRAHAM; Location: GI     CYSTOSCOPY N/A 9/6/2017    Procedure: CYSTOSCOPY;  CYSTOSCOPY AND HYDRODISTENTION ;  Surgeon: Eyal Bai MD;  Location:  OR     ENT SURGERY      Tonsillectomy     ESOPHAGOSCOPY, GASTROSCOPY, DUODENOSCOPY (EGD), COMBINED N/A 9/26/2016    Procedure: COMBINED ESOPHAGOSCOPY, GASTROSCOPY, DUODENOSCOPY (EGD), BIOPSY SINGLE OR MULTIPLE;  Surgeon: Marco Monaco MD;  Location:  GI     GENITOURINARY SURGERY       GYN SURGERY      Hysterectomy     HERNIA REPAIR, UMBILICAL  7/8/2010    Dr. Kirt Franco times 3     ORTHOPEDIC SURGERY  2009    [facility] - Dr. Lund- bilateral knee replacement     Social History     Social History     Marital status: Single     Spouse name: N/A     Number of children: N/A     Years of education: N/A     Occupational History     Not on file.     Social History Main Topics     Smoking status: Never Smoker     Smokeless tobacco: Never Used     Alcohol use No     Drug use: No     Sexual activity: Not Currently     Other Topics Concern     Parent/Sibling W/ Cabg, Mi Or Angioplasty Before 65f 55m?  No     Social History Narrative     Family History   Problem Relation Age of Onset     HEART DISEASE Mother      heart surgery , new valve     Gallbladder Disease Mother      Coronary Artery Disease Mother      Hyperlipidemia Mother      Asthma Mother      HEART DISEASE Maternal Grandmother      Coronary Artery Disease Maternal Grandmother      HEART DISEASE Maternal Grandfather      Coronary Artery Disease Maternal Grandfather      CANCER Father      throat ca,  76     Coronary Artery Disease Father      DIABETES Brother      Half Brother     Cardiovascular Brother      lung ca, Half brother     CANCER Brother      half brother  lung      Review of Systems     Constitutional:  Positive for chills and fatigue. Negative for fever, weight loss, weight gain, decreased appetite, night sweats, recent stressors, height loss, post-operative complications, incisional pain, hallucinations, increased energy, hyperactivity and confused.   HENT:  Negative for ear pain, hearing loss, tinnitus, nosebleeds, trouble swallowing, hoarse voice, mouth sores, sore throat, ear discharge, tooth pain, gum tenderness, taste disturbance, smell disturbance, hearing aid, bleeding gums, dry mouth, sinus pain, sinus congestion and neck mass.    Eyes:  Positive for pain, eye pain, decreased vision, eye watering and eye irritation. Negative for double vision, redness, eye bulging, eye dryness, flashing lights, spots, floaters, strabismus, tunnel vision and jaundice.   Respiratory:   Negative for cough, hemoptysis, sputum production, shortness of breath, wheezing, sleep disturbances due to breathing, snores loudly, respiratory pain, dyspnea on exertion, cough disturbing sleep and postural dyspnea.    Cardiovascular:  Negative for chest pain, dyspnea on exertion, palpitations, orthopnea, claudication, leg swelling, fingers/toes turn blue, hypertension, hypotension, syncope, history of heart murmur, chest pain on exertion, chest pain at rest,  pacemaker, few scattered varicosities, leg pain, sleep disturbances due to breathing, tachycardia, light-headedness, exercise intolerance and edema.   Gastrointestinal:  Negative for heartburn, nausea, vomiting, abdominal pain, diarrhea, constipation, blood in stool, melena, rectal pain, bloating, hemorrhoids, bowel incontinence, jaundice, rectal bleeding, coffee ground emesis and change in stool.   Genitourinary:  Positive for bladder incontinence and nocturia. Negative for dysuria, urgency, hematuria, flank pain, vaginal discharge, difficulty urinating, genital sores, dyspareunia, decreased libido, voiding less frequently, arousal difficulty, abnormal vaginal bleeding, excessive menstruation, menstrual changes, hot flashes, vaginal dryness and postmenopausal bleeding.   Musculoskeletal:  Positive for myalgias, back pain, arthralgias, stiffness and muscle cramps. Negative for joint swelling, neck pain, bone pain, muscle weakness and fracture.   Skin:  Negative for nail changes, itching, poor wound healing, rash, hair changes, skin changes, acne, warts, poor wound healing, scarring, flaky skin, Raynaud's phenomenon, sensitivity to sunlight and skin thickening.   Neurological:  Negative for dizziness, tingling, tremors, speech change, seizures, loss of consciousness, weakness, light-headedness, numbness, headaches, disturbances in coordination, extremity numbness, memory loss, difficulty walking and paralysis.   Endo/Heme:  Negative for anemia, swollen glands and bruises/bleeds easily.   Psychiatric/Behavioral:  Negative for depression, hallucinations, memory loss, decreased concentration, mood swings and panic attacks.    Breast:  Negative for breast discharge, breast mass, breast pain and nipple retraction.   Endocrine:  Positive for altered temperature regulation.Negative for polyphagia, polydipsia, unwanted hair growth and change in facial hair.    Allergies   Allergen Reactions     Augmentin Diarrhea     Codeine  "Nausea and Vomiting     Hydrocodone      Keeps patient awake     Naproxen Itching and Rash     Sulfa Drugs Nausea     Current Outpatient Prescriptions   Medication     meloxicam (MOBIC) 15 MG tablet     fenofibrate 160 MG tablet     lisinopril (PRINIVIL,ZESTRIL) 30 MG tablet     levothyroxine (SYNTHROID/LEVOTHROID) 50 MCG tablet     pantoprazole (PROTONIX) 20 MG EC tablet     ASPIRIN PO     simvastatin (ZOCOR) 20 MG tablet     amLODIPine (NORVASC) 5 MG tablet     travoprost, TIMI Free, (TRAVATAN Z) 0.004 % ophthalmic solution     acetaminophen (TYLENOL) 325 MG tablet     UNABLE TO FIND     VITAMIN D, CHOLECALCIFEROL, PO     Docusate Sodium (COLACE PO)     Probiotic Product (ACIDOPHILUS PROBIOTIC BLEND) CAPS     ferrous gluconate (FERGON) 324 (38 FE) MG tablet     potassium 99 MG TABS     CALCIUM + D 600-200 MG-UNIT PO TABS     OMEGA-3 FATTY ACIDS 1200 MG OR CAPS     CENTRUM SILVER OR TABS     No current facility-administered medications for this visit.      /70  Pulse 80  Ht 1.626 m (5' 4\")  Wt 104.3 kg (230 lb)  LMP  (LMP Unknown)  Breastfeeding? No  BMI 39.48 kg/m2 No LMP recorded (lmp unknown). Patient has had a hysterectomy. Body mass index is 39.48 kg/(m^2).  She is alert and oriented.  She is well groomed, comfortable in no acute distress. Normal mood and affect.   Non-labored breathing. Normocephalic without masses, lesions, obvious abnormalities.  Pelvic exam deferred to time of cystoscopy    Urine dip negative    PVR 62 mL by bladder scan    A/P: Lexii Stratton is a 77 year old F with mixed urinary incontinence, nocturia, urinary urgency despite multiple treatments    Voiding diary    Given her multiple treatments have asked that she return for cystoscopy and pelvic exam in the near future    35 minutes were spent with the patient today, > 50% in reviewing records, counseling and coordination of care    Jenny Ayon MD MPH    Urology    CC  Patient Care Team:  Jesenia Madrigal " MD Priscilla as PCP - General (Internal Medicine)

## 2018-02-21 NOTE — MR AVS SNAPSHOT
After Visit Summary   2/21/2018    Lexii Stratton    MRN: [id number]           Patient Information     Date Of Birth          1940        Visit Information        Provider Department      2/21/2018 3:00 PM Jenny Ayon MD [facility]        Today's Diagnoses     Nocturia    -  1    Urinary incontinence, unspecified type        Urinary urgency        Mixed incontinence          Care Instructions    Please do the bladder diary    Please return to see us for a cystoscopy (procedure to look in your bladder) and pelvic exam    It was a pleasure meeting with you today.  Thank you for allowing me and my team the privilege of caring for you today.  YOU are the reason we are here, and I truly hope we provided you with the excellent service you deserve.  Please let us know if there is anything else we can do for you so that we can be sure you are leaving completely satisfied with your care experience.            Follow-ups after your visit        Your next 10 appointments already scheduled     Mar 21, 2018 11:30 AM CDT   Cystoscopy with Jenny Ayon MD, UA CYF   [facility] ([facility])    [address]  [address]  [facility] [id number] [phone]              Who to contact     If you have questions or need follow up information about today's clinic visit or your schedule please contact [facility]OLOG[facility] directly at [phone].  Normal or non-critical lab and imaging results will be communicated to you by MyChart, letter or phone within 4 business days after the clinic has received the results. If you do not hear from us within 7 days, please contact the clinic through MyChart or phone. If you have a critical or abnormal lab result, we will notify you by phone as soon as possible.  Submit refill requests through [employer] or call your pharmacy and  "they will forward the refill request to us. Please allow 3 business days for your refill to be completed.          Additional Information About Your Visit        MyChart Information     [employer] gives you secure access to your electronic health record. If you see a primary care provider, you can also send messages to your care team and make appointments. If you have questions, please call your primary care clinic.  If you do not have a primary care provider, please call [phone] and they will assist you.        Care EveryWhere ID     This is your Care EveryWhere ID. This could be used by other organizations to access your [facility] medical records  [id number]        Your Vitals Were     Pulse Height Last Period Breastfeeding? BMI (Body Mass Index)       80 1.626 m (5' 4\") (LMP Unknown) No 39.48 kg/m2        Blood Pressure from Last 3 Encounters:   02/21/18 142/70   02/13/18 126/68   11/01/17 134/70    Weight from Last 3 Encounters:   02/21/18 104.3 kg (230 lb)   02/13/18 105 kg (231 lb 6.4 oz)   11/01/17 101.7 kg (224 lb 1.6 oz)              We Performed the Following     MEASURE POST-VOID RESIDUAL URINE/BLADDER CAPACITY, US NON-IMAGING     UA without Microscopic        Primary Care Provider Office Phone # Fax #    Jesenia Priscilla Madrigal [phone] [address] [facility] [address]        Equal Access to Services     [facility]: Hadii aad ku hadasho Soomaali, waaxda luqadaha, qaybta kaalmada adeegyada, navin roman . So [facility] [phone].    ATENCIÓN: Si habla español, tiene a irene disposición servicios gratuitos de asistencia lingüística. Llame al [phone].    We comply with applicable federal civil rights laws and [place] laws. We do not discriminate on the basis of race, color, national origin, age, disability, sex, sexual orientation, or gender identity.            Thank you!     Thank you for choosing [facility]OLOGY [facility] " [facility]  for your care. Our goal is always to provide you with excellent care. Hearing back from our patients is one way we can continue to improve our services. Please take a few minutes to complete the written survey that you may receive in the mail after your visit with us. Thank you!             Your Updated Medication List - Protect others around you: Learn how to safely use, store and throw away your medicines at www.disposemymeds.org.          This list is accurate as of 2/21/18  3:48 PM.  Always use your most recent med list.                   Brand Name Dispense Instructions for use Diagnosis    acetaminophen 325 MG tablet    TYLENOL    500 tablet    Take 3 tablets (975 mg) by mouth every 8 hours    Status post total replacement of right hip       ACIDOPHILUS PROBIOTIC BLEND Caps     30 capsule    Take 1 capsule by mouth daily        amLODIPine 5 MG tablet    NORVASC    90 tablet    TAKE ONE TABLET BY MOUTH EVERY NIGHT AT BEDTIME    Essential hypertension, benign       ASPIRIN PO      Take 81 mg by mouth daily        calcium + D 600-200 MG-UNIT Tabs   Generic drug:  calcium carbonate-vitamin D      1 TABLET  DAILY WITH FOOD    Preop general physical exam, Umbilical hernia       CENTRUM SILVER per tablet     30    1 TABLET DAILY    Need for prophylactic vaccination and inoculation against influenza, Routine general medical examination at a health care facility, Generalized osteoarthrosis, unspecified site, Other and unspecified hyperlipidemia, Esophageal reflux, Obesity, unspecified, Urinary sys symptom NEC, Other malaise and fatigue, Sleep disturbance, unspecified       COLACE PO      Take 100 mg by mouth daily        fenofibrate 160 MG tablet     90 tablet    Take 1 tablet (160 mg) by mouth daily    Hyperlipidemia LDL goal <130       ferrous gluconate 324 (38 FE) MG tablet    FERGON    100 tablet    Take 1 tablet (324 mg) by mouth daily (with breakfast)    Anemia, unspecified       levothyroxine 50 MCG  tablet    SYNTHROID/LEVOTHROID    90 tablet    Take 1 tablet (50 mcg) by mouth daily    Acquired hypothyroidism       lisinopril 30 MG tablet    PRINIVIL,ZESTRIL    90 tablet    TAKE ONE TABLET BY MOUTH EVERY DAY FOR HYPERTENSION    Essential hypertension, benign       meloxicam 15 MG tablet    MOBIC    30 tablet    TAKE ONE TABLET BY MOUTH EVERY DAY AS NEEDED    Primary osteoarthritis involving multiple joints       omega-3 fatty acids 1200 MG capsule      1 TABLET DAILY    Preop general physical exam, Stress incontinence - female       pantoprazole 20 MG EC tablet    PROTONIX    30 tablet    TAKE ONE TABLET BY MOUTH EVERY DAY    Gastroesophageal reflux disease without esophagitis       potassium 99 MG Tabs      Take 1 tablet by mouth daily (with dinner)        simvastatin 20 MG tablet    ZOCOR    90 tablet    TAKE ONE TABLET BY MOUTH EVERY NIGHT AT BEDTIME    Hyperlipidemia LDL goal <130       travoprost (BAK Free) 0.004 % ophthalmic solution    TRAVATAN Z    2.5 mL    Apply 1 drop to eye At Bedtime        UNABLE TO FIND      MEDICATION NAME Cranberry.  2 capsules daily        VITAMIN D (CHOLECALCIFEROL) PO      Take 2,000 Units by mouth daily

## 2018-02-21 NOTE — PROGRESS NOTES
February 21, 2018    Referring Provider/Primary Care Provider: Jesenia Madrigal    CC: Urinary incontinence    HPI:  Lexii Stratton is a 77 year old female who presents for evaluation of her pelvic floor symptoms.  She has a long history of urinary incontinence and has seen other providers.    Her bladder problems date back to the late 1990s when she had UTIs.  She saw Dr Dawn at that point and did biofeedback.  She then tried several medications which did not help and then was referred to Dr Demarco in 2004.     Durasphere in 2004, 2005, 2007    Dr Singh did a TVT 2009    PNE with Dr Romero but cancelled the implant secondary to needing knee surgery in 2009    Started seeing Dr Bai at  in 2012.  Did a stage I interstim which did not help and was removed.  Has had 7 botox injections first in 2013, most recent 4/2017.  Then in 9/2017 had a hydrodistension to 650mL which she reports did not change her symptoms.    UDS 7/2017-reports normal compliancs, low capacity of 185 mL  UDS 1/2013 alf 227 mL    Past Medical History:   Diagnosis Date     Arthritis      Chronic infection     Recent UTI cleared now     Esophageal reflux      Hernia, abdominal      Hypertension     No cardiologist     Incontinence of urine      Mumps     6 years old     Obese      Osteoarthritis      Other and unspecified hyperlipidemia      Other chronic pain     back     Peptic ulcer, unspecified site, unspecified as acute or chronic, without mention of hemorrhage, perforation, or obstruction      Small bowel obstruction      Thyroid disease hypothyroidism     Urinary incontinence      Walking troubles      Past Surgical History:   Procedure Laterality Date     ARTHROPLASTY HIP Right 11/9/2016    Procedure: ARTHROPLASTY HIP;  Surgeon: Angel Lund MD;  Location: [facility] OR     AS REPAIR INCISIONAL HERNIA,REDUCIBLE  1998    inc hernia ( Yamileth surgery)     BLADDER SURGERY      hyperdistention surgery and sling     C NONSPECIFIC  PROCEDURE  1946    T&A     C NONSPECIFIC PROCEDURE  1984    Vaginal Hysterectomy (has her ovaries)     C NONSPECIFIC PROCEDURE  1973    PPTL     C NONSPECIFIC PROCEDURE  1994    L shoulder to remove bone spurs     C NONSPECIFIC PROCEDURE  2004    cysto and durasphere Dr Demarco     C NONSPECIFIC PROCEDURE  2005    cysto and durasphere     C NONSPECIFIC PROCEDURE  2007    cysto and durasphere     C NONSPECIFIC PROCEDURE  2009    retropubic TVT sling     CHOLECYSTECTOMY  1987     COLONOSCOPY  12/3/2011    Procedure:COLONOSCOPY; COLONOSCOPY; Surgeon:SEMAJ GRAHAM; Location: GI     CYSTOSCOPY N/A 9/6/2017    Procedure: CYSTOSCOPY;  CYSTOSCOPY AND HYDRODISTENTION ;  Surgeon: Eyal Bai MD;  Location:  OR     ENT SURGERY      Tonsillectomy     ESOPHAGOSCOPY, GASTROSCOPY, DUODENOSCOPY (EGD), COMBINED N/A 9/26/2016    Procedure: COMBINED ESOPHAGOSCOPY, GASTROSCOPY, DUODENOSCOPY (EGD), BIOPSY SINGLE OR MULTIPLE;  Surgeon: Marco Monaco MD;  Location:  GI     GENITOURINARY SURGERY       GYN SURGERY      Hysterectomy     HERNIA REPAIR, UMBILICAL  7/8/2010    Dr. Kirt Franco times 3     ORTHOPEDIC SURGERY  2009    [facility] - Dr. Lund- bilateral knee replacement     Social History     Social History     Marital status: Single     Spouse name: N/A     Number of children: N/A     Years of education: N/A     Occupational History     Not on file.     Social History Main Topics     Smoking status: Never Smoker     Smokeless tobacco: Never Used     Alcohol use No     Drug use: No     Sexual activity: Not Currently     Other Topics Concern     Parent/Sibling W/ Cabg, Mi Or Angioplasty Before 65f 55m? No     Social History Narrative     Family History   Problem Relation Age of Onset     HEART DISEASE Mother      heart surgery , new valve     Gallbladder Disease Mother      Coronary Artery Disease Mother      Hyperlipidemia Mother      Asthma Mother      HEART DISEASE Maternal Grandmother      Coronary Artery  Disease Maternal Grandmother      HEART DISEASE Maternal Grandfather      Coronary Artery Disease Maternal Grandfather      CANCER Father      throat ca,  76     Coronary Artery Disease Father      DIABETES Brother      Half Brother     Cardiovascular Brother      lung ca, Half brother     CANCER Brother      half brother  lung      Review of Systems     Constitutional:  Positive for chills and fatigue. Negative for fever, weight loss, weight gain, decreased appetite, night sweats, recent stressors, height loss, post-operative complications, incisional pain, hallucinations, increased energy, hyperactivity and confused.   HENT:  Negative for ear pain, hearing loss, tinnitus, nosebleeds, trouble swallowing, hoarse voice, mouth sores, sore throat, ear discharge, tooth pain, gum tenderness, taste disturbance, smell disturbance, hearing aid, bleeding gums, dry mouth, sinus pain, sinus congestion and neck mass.    Eyes:  Positive for pain, eye pain, decreased vision, eye watering and eye irritation. Negative for double vision, redness, eye bulging, eye dryness, flashing lights, spots, floaters, strabismus, tunnel vision and jaundice.   Respiratory:   Negative for cough, hemoptysis, sputum production, shortness of breath, wheezing, sleep disturbances due to breathing, snores loudly, respiratory pain, dyspnea on exertion, cough disturbing sleep and postural dyspnea.    Cardiovascular:  Negative for chest pain, dyspnea on exertion, palpitations, orthopnea, claudication, leg swelling, fingers/toes turn blue, hypertension, hypotension, syncope, history of heart murmur, chest pain on exertion, chest pain at rest, pacemaker, few scattered varicosities, leg pain, sleep disturbances due to breathing, tachycardia, light-headedness, exercise intolerance and edema.   Gastrointestinal:  Negative for heartburn, nausea, vomiting, abdominal pain, diarrhea, constipation, blood in stool, melena, rectal pain, bloating, hemorrhoids,  bowel incontinence, jaundice, rectal bleeding, coffee ground emesis and change in stool.   Genitourinary:  Positive for bladder incontinence and nocturia. Negative for dysuria, urgency, hematuria, flank pain, vaginal discharge, difficulty urinating, genital sores, dyspareunia, decreased libido, voiding less frequently, arousal difficulty, abnormal vaginal bleeding, excessive menstruation, menstrual changes, hot flashes, vaginal dryness and postmenopausal bleeding.   Musculoskeletal:  Positive for myalgias, back pain, arthralgias, stiffness and muscle cramps. Negative for joint swelling, neck pain, bone pain, muscle weakness and fracture.   Skin:  Negative for nail changes, itching, poor wound healing, rash, hair changes, skin changes, acne, warts, poor wound healing, scarring, flaky skin, Raynaud's phenomenon, sensitivity to sunlight and skin thickening.   Neurological:  Negative for dizziness, tingling, tremors, speech change, seizures, loss of consciousness, weakness, light-headedness, numbness, headaches, disturbances in coordination, extremity numbness, memory loss, difficulty walking and paralysis.   Endo/Heme:  Negative for anemia, swollen glands and bruises/bleeds easily.   Psychiatric/Behavioral:  Negative for depression, hallucinations, memory loss, decreased concentration, mood swings and panic attacks.    Breast:  Negative for breast discharge, breast mass, breast pain and nipple retraction.   Endocrine:  Positive for altered temperature regulation.Negative for polyphagia, polydipsia, unwanted hair growth and change in facial hair.    Allergies   Allergen Reactions     Augmentin Diarrhea     Codeine Nausea and Vomiting     Hydrocodone      Keeps patient awake     Naproxen Itching and Rash     Sulfa Drugs Nausea     Current Outpatient Prescriptions   Medication     meloxicam (MOBIC) 15 MG tablet     fenofibrate 160 MG tablet     lisinopril (PRINIVIL,ZESTRIL) 30 MG tablet     levothyroxine  "(SYNTHROID/LEVOTHROID) 50 MCG tablet     pantoprazole (PROTONIX) 20 MG EC tablet     ASPIRIN PO     simvastatin (ZOCOR) 20 MG tablet     amLODIPine (NORVASC) 5 MG tablet     travoprost, TIMI Free, (TRAVATAN Z) 0.004 % ophthalmic solution     acetaminophen (TYLENOL) 325 MG tablet     UNABLE TO FIND     VITAMIN D, CHOLECALCIFEROL, PO     Docusate Sodium (COLACE PO)     Probiotic Product (ACIDOPHILUS PROBIOTIC BLEND) CAPS     ferrous gluconate (FERGON) 324 (38 FE) MG tablet     potassium 99 MG TABS     CALCIUM + D 600-200 MG-UNIT PO TABS     OMEGA-3 FATTY ACIDS 1200 MG OR CAPS     CENTRUM SILVER OR TABS     No current facility-administered medications for this visit.      /70  Pulse 80  Ht 1.626 m (5' 4\")  Wt 104.3 kg (230 lb)  LMP  (LMP Unknown)  Breastfeeding? No  BMI 39.48 kg/m2 No LMP recorded (lmp unknown). Patient has had a hysterectomy. Body mass index is 39.48 kg/(m^2).  She is alert and oriented.  She is well groomed, comfortable in no acute distress. Normal mood and affect.   Non-labored breathing. Normocephalic without masses, lesions, obvious abnormalities.  Pelvic exam deferred to time of cystoscopy    Urine dip negative    PVR 62 mL by bladder scan    A/P: Lexii Stratton is a 77 year old F with mixed urinary incontinence, nocturia, urinary urgency despite multiple treatments    Voiding diary    Given her multiple treatments have asked that she return for cystoscopy and pelvic exam in the near future    35 minutes were spent with the patient today, > 50% in reviewing records, counseling and coordination of care    Jenny Ayon MD MPH    Urology    CC  Patient Care Team:  Jesenia Madrigal MD as PCP - General (Internal Medicine)                "

## 2018-02-21 NOTE — NURSING NOTE
Chief Complaint   Patient presents with     Incontinence     Patient is experiencing some leakage.     Nocturia     Patient said that she is up atleast 4 times a night to urinate.      Gege Soto LPN 2:58 PM February 21, 2018

## 2018-03-07 ENCOUNTER — TRANSFERRED RECORDS (OUTPATIENT)
Dept: HEALTH INFORMATION MANAGEMENT | Facility: CLINIC | Age: 78
End: 2018-03-07

## 2018-03-18 ENCOUNTER — TRANSFERRED RECORDS (OUTPATIENT)
Dept: HEALTH INFORMATION MANAGEMENT | Facility: CLINIC | Age: 78
End: 2018-03-18

## 2018-03-21 ENCOUNTER — OFFICE VISIT (OUTPATIENT)
Dept: UROLOGY | Facility: CLINIC | Age: 78
End: 2018-03-21
Payer: COMMERCIAL

## 2018-03-21 VITALS
HEIGHT: 65 IN | SYSTOLIC BLOOD PRESSURE: 140 MMHG | DIASTOLIC BLOOD PRESSURE: 72 MMHG | WEIGHT: 220 LBS | BODY MASS INDEX: 36.65 KG/M2 | HEART RATE: 80 BPM

## 2018-03-21 DIAGNOSIS — M79.18 MYALGIA OF PELVIC FLOOR: ICD-10-CM

## 2018-03-21 DIAGNOSIS — M15.0 PRIMARY OSTEOARTHRITIS INVOLVING MULTIPLE JOINTS: ICD-10-CM

## 2018-03-21 DIAGNOSIS — M62.89 PELVIC FLOOR DYSFUNCTION: ICD-10-CM

## 2018-03-21 DIAGNOSIS — R35.1 NOCTURIA: Primary | ICD-10-CM

## 2018-03-21 DIAGNOSIS — R39.15 URGENCY OF URINATION: ICD-10-CM

## 2018-03-21 DIAGNOSIS — N39.46 MIXED INCONTINENCE: Primary | ICD-10-CM

## 2018-03-21 LAB
ALBUMIN UR-MCNC: NEGATIVE MG/DL
APPEARANCE UR: CLEAR
BILIRUB UR QL STRIP: NEGATIVE
COLOR UR AUTO: YELLOW
GLUCOSE UR STRIP-MCNC: NEGATIVE MG/DL
HGB UR QL STRIP: NEGATIVE
KETONES UR STRIP-MCNC: NEGATIVE MG/DL
LEUKOCYTE ESTERASE UR QL STRIP: NEGATIVE
NITRATE UR QL: NEGATIVE
PH UR STRIP: 6 PH (ref 5–7)
SOURCE: NORMAL
SP GR UR STRIP: 1.02 (ref 1–1.03)
UROBILINOGEN UR STRIP-ACNC: 0.2 EU/DL (ref 0.2–1)

## 2018-03-21 PROCEDURE — 52000 CYSTOURETHROSCOPY: CPT | Performed by: UROLOGY

## 2018-03-21 PROCEDURE — 99207 ZZC NO CHARGE LOS: CPT | Performed by: UROLOGY

## 2018-03-21 PROCEDURE — 81003 URINALYSIS AUTO W/O SCOPE: CPT | Performed by: UROLOGY

## 2018-03-21 ASSESSMENT — PAIN SCALES - GENERAL: PAINLEVEL: SEVERE PAIN (6)

## 2018-03-21 NOTE — NURSING NOTE
Prior to the start of the procedure and with procedural staff participation, I verbally confirmed the patient s identity using two indicators, relevant allergies, that the procedure was appropriate and matched the consent or emergent situation, and that the correct equipment/implants were available. Immediately prior to starting the procedure I conducted the Time Out with the procedural staff and re-confirmed the patient s name, procedure, and site/side. (The Joint Commission universal protocol was followed.)  Yes    Sedation (Moderate or Deep): None  Lexii was prepped with Betadine. Jaimie Peña LPN

## 2018-03-21 NOTE — MR AVS SNAPSHOT
"              After Visit Summary   3/21/2018    Lexii Stratton    MRN: [id number]           Patient Information     Date Of Birth          1940        Visit Information        Provider Department      3/21/2018 11:30 AM Jenny Ayon MD; UA CYF [facility] [place]        Today's Diagnoses     Mixed incontinence    -  1    Urgency of urination        Myalgia of pelvic floor        Pelvic floor dysfunction          Care Instructions    Websites with free information:    American Urogynecologic Society patient website: www.voicesforpfd.org    Total Control Program: www.totalcontrolprogram.com    Please see one of the dedicated pelvic floor physical therapist ([facility] [phone])  Bren is in [facility]    Try to limit your fluids after dinner    Please return to see me in 4 months, sooner if needed    It was a pleasure meeting with you today.  Thank you for allowing me and my team the privilege of caring for you today.  YOU are the reason we are here, and I truly hope we provided you with the excellent service you deserve.  Please let us know if there is anything else we can do for you so that we can be sure you are leaving completely satisfied with your care experience.      AFTER YOUR CYSTOSCOPY        You have just completed a cystoscopy, or \"cysto\", which allowed your physician to learn more about your bladder (or to remove a stent placed after surgery). We suggest that you continue to avoid caffeine, fruit juice, and alcohol for the next 24 hours, however, you are encouraged to return to your normal activities.         A few things that are considered normal after your cystoscopy:     * Small amount of bleeding (or spotting) that clears within the next 24 hours     * Slight burning sensation with urination     * Sensation to of needing to avoid more frequently     * The feeling of \"air\" in your urine     * " Mild discomfort that is relieved with Tylenol        Please contact our office promptly if you:     * Develop a fever above 101 degrees     * Are unable to urinate     * Develop bright red blood that does not stop     * Severe pain or swelling         Please contact our office with any concerns or questions @ [phone]          Follow-ups after your visit        Additional Services     [facility] Physical Therapy Referral       **This order will print in the [facility] Scheduling Office**    Physical Therapy, Hand Therapy and Chiropractic Care are available through:    *[facility]  *[facility]  *[facility]    Call one number to schedule at any of the above locations: [phone].    Your provider has referred you to: Physical [place] at [facility] or [facility]    Indication/Reason for Referral: Women's Health (Please Complete Special Programs SmartList)  Onset of Illness: years  Therapy Orders: Evaluate and Treat  Special Programs: None and Women's Health: Pelvic Dysfunction: myofascial tenderness of the pelvic floor, pelvic floor dysfunction  Pelvic Floor Weakness: mixed incontinence, urinary urgency and  Biofeedback, E-Stim/TENS/TENS Rental if deemed appropriate by therapist, Exercise/HEP and Myofascial Release/Massage (internal)  Special Request: Exercise: Home Exercise Program  Manual Therapy: Myofascial Release/Massage (internal)  Modalities: As Indicated E-Stim/TENS    Additional Comments for the Therapist or Chiropractor: Emelia hauser please.  Core strengthening    Please be aware that coverage of these services is subject to the terms and limitations of your health insurance plan.  Call member services at your health plan with any benefit or coverage questions.      Please bring the following to your appointment:    *Your personal calendar for scheduling future appointments  *Comfortable clothing                  Your next 10 appointments already scheduled     Jul 18, 2018  "11:45 AM CDT   Return Visit with Jenny Ayon MD   [facility] ([facility])    [address]  [address]  [facility] [address] [phone]              Who to contact     If you have questions or need follow up information about today's clinic visit or your schedule please contact [facility] directly at [phone].  Normal or non-critical lab and imaging results will be communicated to you by [employer], letter or phone within 4 business days after the clinic has received the results. If you do not hear from us within 7 days, please contact the clinic through [employer] or phone. If you have a critical or abnormal lab result, we will notify you by phone as soon as possible.  Submit refill requests through [employer] or call your pharmacy and they will forward the refill request to us. Please allow 3 business days for your refill to be completed.          Additional Information About Your Visit        [employer] Information     [employer] gives you secure access to your electronic health record. If you see a primary care provider, you can also send messages to your care team and make appointments. If you have questions, please call your primary care clinic.  If you do not have a primary care provider, please call [phone] and they will assist you.        Care EveryWhere ID     This is your Care EveryWhere ID. This could be used by other organizations to access your [place] medical records  [id number]        Your Vitals Were     Pulse Height Last Period BMI (Body Mass Index)          80 1.638 m (5' 4.5\") (LMP Unknown) 37.18 kg/m2         Blood Pressure from Last 3 Encounters:   03/21/18 140/72   02/21/18 142/70   02/13/18 126/68    Weight from Last 3 Encounters:   03/21/18 99.8 kg (220 lb)   02/21/18 104.3 kg (230 lb)   02/13/18 105 kg (231 lb 6.4 oz)              We Performed the Following     CYSTOURETHROSCOPY     " [facility] Physical Therapy Referral        Primary Care Provider Office Phone # Fax #    Jesenia Madrigal [phone]       [address]  [facility] [address]        Equal Access to Services     LUCY MILLS : Hadsarah hook ku diogeneso Soabaali, waaxda luqadaha, qaybta kaalmada adeegyada, navin prieton dave mondragon laCrowbay boateng. So [facility] [phone].    ATENCIÓN: Si habla español, tiene a irene disposición servicios gratuitos de asistencia lingüística. Llame al [phone].    We comply with applicable federal civil rights laws and [place] laws. We do not discriminate on the basis of race, color, national origin, age, disability, sex, sexual orientation, or gender identity.            Thank you!     Thank you for choosing [facility] [place]  for your care. Our goal is always to provide you with excellent care. Hearing back from our patients is one way we can continue to improve our services. Please take a few minutes to complete the written survey that you may receive in the mail after your visit with us. Thank you!             Your Updated Medication List - Protect others around you: Learn how to safely use, store and throw away your medicines at www.disposemymeds.org.          This list is accurate as of 3/21/18 12:25 PM.  Always use your most recent med list.                   Brand Name Dispense Instructions for use Diagnosis    acetaminophen 325 MG tablet    TYLENOL    500 tablet    Take 3 tablets (975 mg) by mouth every 8 hours    Status post total replacement of right hip       ACIDOPHILUS PROBIOTIC BLEND Caps     30 capsule    Take 1 capsule by mouth daily        amLODIPine 5 MG tablet    NORVASC    90 tablet    TAKE ONE TABLET BY MOUTH EVERY NIGHT AT BEDTIME    Essential hypertension, benign       ASPIRIN PO      Take 81 mg by mouth daily        calcium + D 600-200 MG-UNIT Tabs   Generic drug:  calcium carbonate-vitamin D      1 TABLET  DAILY WITH FOOD     Preop general physical exam, Umbilical hernia       CENTRUM SILVER per tablet     30    1 TABLET DAILY    Need for prophylactic vaccination and inoculation against influenza, Routine general medical examination at a health care facility, Generalized osteoarthrosis, unspecified site, Other and unspecified hyperlipidemia, Esophageal reflux, Obesity, unspecified, Urinary sys symptom NEC, Other malaise and fatigue, Sleep disturbance, unspecified       COLACE PO      Take 100 mg by mouth daily        fenofibrate 160 MG tablet     90 tablet    Take 1 tablet (160 mg) by mouth daily    Hyperlipidemia LDL goal <130       ferrous gluconate 324 (38 FE) MG tablet    FERGON    100 tablet    Take 1 tablet (324 mg) by mouth daily (with breakfast)    Anemia, unspecified       levothyroxine 50 MCG tablet    SYNTHROID/LEVOTHROID    90 tablet    Take 1 tablet (50 mcg) by mouth daily    Acquired hypothyroidism       lisinopril 30 MG tablet    PRINIVIL,ZESTRIL    90 tablet    TAKE ONE TABLET BY MOUTH EVERY DAY FOR HYPERTENSION    Essential hypertension, benign       magnesium citrate solution      Take 296 mLs by mouth once        meloxicam 15 MG tablet    MOBIC    30 tablet    TAKE ONE TABLET BY MOUTH EVERY DAY AS NEEDED    Primary osteoarthritis involving multiple joints       omega-3 fatty acids 1200 MG capsule      1 TABLET DAILY    Preop general physical exam, Stress incontinence - female       pantoprazole 20 MG EC tablet    PROTONIX    30 tablet    TAKE ONE TABLET BY MOUTH EVERY DAY    Gastroesophageal reflux disease without esophagitis       potassium 99 MG Tabs      Take 1 tablet by mouth daily (with dinner)        simvastatin 20 MG tablet    ZOCOR    90 tablet    TAKE ONE TABLET BY MOUTH EVERY NIGHT AT BEDTIME    Hyperlipidemia LDL goal <130       travoprost (BAK Free) 0.004 % ophthalmic solution    TRAVATAN Z    2.5 mL    Apply 1 drop to eye At Bedtime        UNABLE TO FIND      MEDICATION NAME Cranberry.  2 capsules daily         VITAMIN D (CHOLECALCIFEROL) PO      Take 2,000 Units by mouth daily

## 2018-03-21 NOTE — PROGRESS NOTES
"March 21, 2018    Return visit    Patient returns today for follow up. She denies any changes in her health since last visit.    Her voiding diary shows that she can void anywhere from 2-6 oz with most voids being about 4oz.  The duration between voids is anywhere from 30 minutes to 4.5 hrs.  From the intake side it is notable for the fact that she drinks a lot of water in the evening and prior to bed for which she states it is to take her medications.  She also at times has large amounts of fluid all at once.    /72  Pulse 80  Ht 1.638 m (5' 4.5\")  Wt 99.8 kg (220 lb)  LMP  (LMP Unknown)  BMI 37.18 kg/m2  She is comfortable, in no distress, non-labored breathing.  Abdomen is soft, non-tender, non-distended.  Normal external female genitalia.  Negative CST.  Pelvic exam is remarkable for diffuse myofascial tenderness of the pelvic floor, 0/5 kegels.    Cystoscopy Note: After informed consent was obtained patient was prepped and draped in the standard fashion.  The flexible cystoscope was inserted into a normal appearing urethral meatus.  The urothelium was carefully examined and there were no tumors, masses, stones, foreign bodies, or other urothelial abnormalities noted.  Bilateral ureteral orifices were noted in the normal orthotopic position and both effluxed clear urine.  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  The urethra was carefully examined upon removing the cystoscope and was remarkable for durasphere material underneath a thin urothelium but there was no obvious erosion of bulking material or sling.  Patient tolerated the procedure without complications noted.      A/P: 77 year old F with urinary urgency frequency, mixed incontinence with history of multiple multiple procedures and interventions with myofascial tenderness of the pelvic floor and pelvic floor dysfunction    We discussed how her pelvic floor symptoms are " related to the physical exam findings and her pelvic floor myofascial dysfunction.  We discussed how the recommended treatment is dedicated pelvic floor therapy.  We discussed how the pelvic floor physical therapy works and patient is agreeable.  Referral was placed.      Also discussed intake modification to decrease her night time fluid intake    RTC 4 months, sooner if needed    Jenny Ayon MD MPH   of Urology    CC  Patient Care Team:  Jesenia Madrigal MD as PCP - General (Internal Medicine)

## 2018-03-21 NOTE — PATIENT INSTRUCTIONS
"Websites with free information:    [employer] patient website: www.voicesforpfd.org    Total Control Program: www.totalcontrolprogram.com    Please see one of the dedicated pelvic floor physical therapist ([facility] [phone])  Bren is in [facility]    Try to limit your fluids after dinner    Please return to see me in 4 months, sooner if needed    It was a pleasure meeting with you today.  Thank you for allowing me and my team the privilege of caring for you today.  YOU are the reason we are here, and I truly hope we provided you with the excellent service you deserve.  Please let us know if there is anything else we can do for you so that we can be sure you are leaving completely satisfied with your care experience.      AFTER YOUR CYSTOSCOPY        You have just completed a cystoscopy, or \"cysto\", which allowed your physician to learn more about your bladder (or to remove a stent placed after surgery). We suggest that you continue to avoid caffeine, fruit juice, and alcohol for the next 24 hours, however, you are encouraged to return to your normal activities.         A few things that are considered normal after your cystoscopy:     * Small amount of bleeding (or spotting) that clears within the next 24 hours     * Slight burning sensation with urination     * Sensation to of needing to avoid more frequently     * The feeling of \"air\" in your urine     * Mild discomfort that is relieved with Tylenol        Please contact our office promptly if you:     * Develop a fever above 101 degrees     * Are unable to urinate     * Develop bright red blood that does not stop     * Severe pain or swelling         Please contact our office with any concerns or questions @ [phone]  "

## 2018-03-21 NOTE — LETTER
"3/21/2018       RE: Lexii Stratton  [address] [address] [place] [address]     Dear Colleague,    Thank you for referring your patient, Lexii Stratton, to the [facility] [place] at [facility]. Please see a copy of my visit note below.    March 21, 2018    Return visit    Patient returns today for follow up. She denies any changes in her health since last visit.    Her voiding diary shows that she can void anywhere from 2-6 oz with most voids being about 4oz.  The duration between voids is anywhere from 30 minutes to 4.5 hrs.  From the intake side it is notable for the fact that she drinks a lot of water in the evening and prior to bed for which she states it is to take her medications.  She also at times has large amounts of fluid all at once.    /72  Pulse 80  Ht 1.638 m (5' 4.5\")  Wt 99.8 kg (220 lb)  LMP  (LMP Unknown)  BMI 37.18 kg/m2  She is comfortable, in no distress, non-labored breathing.  Abdomen is soft, non-tender, non-distended.  Normal external female genitalia.  Negative CST.  Pelvic exam is remarkable for diffuse myofascial tenderness of the pelvic floor, 0/5 kegels.    Cystoscopy Note: After informed consent was obtained patient was prepped and draped in the standard fashion.  The flexible cystoscope was inserted into a normal appearing urethral meatus.  The urothelium was carefully examined and there were no tumors, masses, stones, foreign bodies, or other urothelial abnormalities noted.  Bilateral ureteral orifices were noted in the normal orthotopic position and both effluxed clear urine.  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  The urethra was carefully examined upon removing the cystoscope and was remarkable for durasphere material underneath a thin urothelium but there was no obvious erosion of bulking material or sling.  Patient " tolerated the procedure without complications noted.      A/P: 77 year old F with urinary urgency frequency, mixed incontinence with history of multiple multiple procedures and interventions with myofascial tenderness of the pelvic floor and pelvic floor dysfunction    We discussed how her pelvic floor symptoms are related to the physical exam findings and her pelvic floor myofascial dysfunction.  We discussed how the recommended treatment is dedicated pelvic floor therapy.  We discussed how the pelvic floor physical therapy works and patient is agreeable.  Referral was placed.      Also discussed intake modification to decrease her night time fluid intake    RTC 4 months, sooner if needed    Jenny Ayon MD MPH   of Urology    CC  Patient Care Team:  Jesenia Madrigal MD as PCP - General (Internal Medicine)

## 2018-03-22 NOTE — TELEPHONE ENCOUNTER
Routing refill request to provider for review/approval because:  Labs out of range:  CBC. Please review lab done in February.  Bernadette Ramos, BEN  Triage Nurse

## 2018-03-22 NOTE — TELEPHONE ENCOUNTER
"Requested Prescriptions   Pending Prescriptions Disp Refills     meloxicam (MOBIC) 15 MG tablet [Pharmacy Med Name: MELOXICAM 15MG TABS]  Last Written Prescription Date:  2/16/2018  Last Fill Quantity: 30 tablet,  # refills: 0   Last office visit: 2/13/2018 with prescribing provider:  Sunshine Dos Santos PA-C   Future Office Visit:     30 tablet 0     Sig: TAKE ONE TABLET BY MOUTH EVERY DAY AS NEEDED    NSAID Medications Failed    3/21/2018  7:15 PM       Failed - Blood pressure under 140/90 in past 12 months    BP Readings from Last 3 Encounters:   03/21/18 140/72   02/21/18 142/70   02/13/18 126/68                Failed - Patient is age 6-64 years       Passed - Normal ALT on file in past 12 months    Recent Labs   Lab Test  11/01/17   1519   ALT  19            Passed - Normal AST on file in past 12 months    Recent Labs   Lab Test  11/01/17   1519   AST  29            Passed - Recent (12 mo) or future (30 days) visit within the authorizing provider's specialty    Patient had office visit in the last 12 months or has a visit in the next 30 days with authorizing provider or within the authorizing provider's specialty.  See \"Patient Info\" tab in inbasket, or \"Choose Columns\" in Meds & Orders section of the refill encounter.           Passed - Normal CBC on file in past 12 months    Recent Labs   Lab Test  02/16/18   1030   WBC  7.8   RBC  3.25*   HGB  8.4*   HCT  28.9*   PLT  284            Passed - No active pregnancy on record       Passed - Normal serum creatinine on file in past 12 months    Recent Labs   Lab Test  11/01/17   1519   CR  0.85            Passed - No positive pregnancy test in past 12 months          "

## 2018-03-23 RX ORDER — MELOXICAM 15 MG/1
15 TABLET ORAL DAILY
Qty: 90 TABLET | Refills: 0 | Status: ON HOLD | OUTPATIENT
Start: 2018-03-23 | End: 2018-03-31

## 2018-03-23 NOTE — TELEPHONE ENCOUNTER
Last seen 11/1/2017  Will be due to reassess labs and meds in 6 months; due 5/1/2018 ; interim refill provided.

## 2018-03-28 ENCOUNTER — HOSPITAL ENCOUNTER (INPATIENT)
Facility: CLINIC | Age: 78
LOS: 3 days | Discharge: HOME | DRG: 378 | End: 2018-03-31
Attending: EMERGENCY MEDICINE | Admitting: HOSPITALIST
Payer: MEDICARE

## 2018-03-28 ENCOUNTER — APPOINTMENT (OUTPATIENT)
Dept: GENERAL RADIOLOGY | Facility: CLINIC | Age: 78
DRG: 378 | End: 2018-03-28
Attending: EMERGENCY MEDICINE
Payer: MEDICARE

## 2018-03-28 ENCOUNTER — APPOINTMENT (OUTPATIENT)
Dept: ULTRASOUND IMAGING | Facility: CLINIC | Age: 78
DRG: 378 | End: 2018-03-28
Attending: HOSPITALIST
Payer: MEDICARE

## 2018-03-28 DIAGNOSIS — R06.09 DYSPNEA ON EXERTION: ICD-10-CM

## 2018-03-28 DIAGNOSIS — D62 ANEMIA DUE TO BLOOD LOSS, ACUTE: ICD-10-CM

## 2018-03-28 DIAGNOSIS — K92.2 GASTROINTESTINAL HEMORRHAGE, UNSPECIFIED GASTROINTESTINAL HEMORRHAGE TYPE: ICD-10-CM

## 2018-03-28 LAB
ALBUMIN SERPL-MCNC: 2.6 G/DL (ref 3.4–5)
ALP SERPL-CCNC: 56 U/L (ref 40–150)
ALT SERPL W P-5'-P-CCNC: 14 U/L (ref 0–50)
ANION GAP SERPL CALCULATED.3IONS-SCNC: 8 MMOL/L (ref 3–14)
APTT PPP: 28 SEC (ref 22–37)
AST SERPL W P-5'-P-CCNC: 13 U/L (ref 0–45)
BASOPHILS # BLD AUTO: 0.1 10E9/L (ref 0–0.2)
BASOPHILS NFR BLD AUTO: 0.5 %
BILIRUB SERPL-MCNC: 0.2 MG/DL (ref 0.2–1.3)
BLD PROD TYP BPU: NORMAL
BLD UNIT ID BPU: 0
BLOOD PRODUCT CODE: NORMAL
BPU ID: NORMAL
BUN SERPL-MCNC: 32 MG/DL (ref 7–30)
CALCIUM SERPL-MCNC: 8.7 MG/DL (ref 8.5–10.1)
CHLORIDE SERPL-SCNC: 108 MMOL/L (ref 94–109)
CO2 SERPL-SCNC: 26 MMOL/L (ref 20–32)
CREAT SERPL-MCNC: 0.99 MG/DL (ref 0.52–1.04)
DIFFERENTIAL METHOD BLD: ABNORMAL
ELLIPTOCYTES BLD QL SMEAR: SLIGHT
EOSINOPHIL # BLD AUTO: 0.2 10E9/L (ref 0–0.7)
EOSINOPHIL NFR BLD AUTO: 1.9 %
ERYTHROCYTE [DISTWIDTH] IN BLOOD BY AUTOMATED COUNT: 14.4 % (ref 10–15)
GFR SERPL CREATININE-BSD FRML MDRD: 54 ML/MIN/1.7M2
GLUCOSE SERPL-MCNC: 197 MG/DL (ref 70–99)
HCT VFR BLD AUTO: 19.7 % (ref 35–47)
HGB BLD-MCNC: 5.7 G/DL (ref 11.7–15.7)
HYPOCHROMIA BLD QL: PRESENT
IMM GRANULOCYTES # BLD: 0 10E9/L (ref 0–0.4)
IMM GRANULOCYTES NFR BLD: 0.4 %
INR PPP: 1.15 (ref 0.86–1.14)
LYMPHOCYTES # BLD AUTO: 1 10E9/L (ref 0.8–5.3)
LYMPHOCYTES NFR BLD AUTO: 10.8 %
MCH RBC QN AUTO: 24.8 PG (ref 26.5–33)
MCHC RBC AUTO-ENTMCNC: 28.9 G/DL (ref 31.5–36.5)
MCV RBC AUTO: 86 FL (ref 78–100)
MONOCYTES # BLD AUTO: 0.6 10E9/L (ref 0–1.3)
MONOCYTES NFR BLD AUTO: 6.7 %
NEUTROPHILS # BLD AUTO: 7.3 10E9/L (ref 1.6–8.3)
NEUTROPHILS NFR BLD AUTO: 79.7 %
NRBC # BLD AUTO: 0 10*3/UL
NRBC BLD AUTO-RTO: 0 /100
PLATELET # BLD AUTO: 275 10E9/L (ref 150–450)
PLATELET # BLD EST: ABNORMAL 10*3/UL
POIKILOCYTOSIS BLD QL SMEAR: SLIGHT
POTASSIUM SERPL-SCNC: 4 MMOL/L (ref 3.4–5.3)
PROT SERPL-MCNC: 5.9 G/DL (ref 6.8–8.8)
RBC # BLD AUTO: 2.3 10E12/L (ref 3.8–5.2)
SODIUM SERPL-SCNC: 142 MMOL/L (ref 133–144)
TRANSFUSION STATUS PATIENT QL: NORMAL
TRANSFUSION STATUS PATIENT QL: NORMAL
TSH SERPL DL<=0.005 MIU/L-ACNC: 0.98 MU/L (ref 0.4–4)
WBC # BLD AUTO: 9.1 10E9/L (ref 4–11)

## 2018-03-28 PROCEDURE — 86923 COMPATIBILITY TEST ELECTRIC: CPT | Performed by: EMERGENCY MEDICINE

## 2018-03-28 PROCEDURE — 25000125 ZZHC RX 250: Performed by: EMERGENCY MEDICINE

## 2018-03-28 PROCEDURE — P9016 RBC LEUKOCYTES REDUCED: HCPCS | Performed by: EMERGENCY MEDICINE

## 2018-03-28 PROCEDURE — 85730 THROMBOPLASTIN TIME PARTIAL: CPT | Performed by: EMERGENCY MEDICINE

## 2018-03-28 PROCEDURE — 86900 BLOOD TYPING SEROLOGIC ABO: CPT | Performed by: EMERGENCY MEDICINE

## 2018-03-28 PROCEDURE — 25000128 H RX IP 250 OP 636: Performed by: EMERGENCY MEDICINE

## 2018-03-28 PROCEDURE — 99223 1ST HOSP IP/OBS HIGH 75: CPT | Mod: AI | Performed by: HOSPITALIST

## 2018-03-28 PROCEDURE — 93005 ELECTROCARDIOGRAM TRACING: CPT

## 2018-03-28 PROCEDURE — 86901 BLOOD TYPING SEROLOGIC RH(D): CPT | Performed by: EMERGENCY MEDICINE

## 2018-03-28 PROCEDURE — 12000000 ZZH R&B MED SURG/OB

## 2018-03-28 PROCEDURE — 80053 COMPREHEN METABOLIC PANEL: CPT | Performed by: EMERGENCY MEDICINE

## 2018-03-28 PROCEDURE — 96374 THER/PROPH/DIAG INJ IV PUSH: CPT

## 2018-03-28 PROCEDURE — 86850 RBC ANTIBODY SCREEN: CPT | Performed by: EMERGENCY MEDICINE

## 2018-03-28 PROCEDURE — 84443 ASSAY THYROID STIM HORMONE: CPT | Performed by: EMERGENCY MEDICINE

## 2018-03-28 PROCEDURE — 99285 EMERGENCY DEPT VISIT HI MDM: CPT | Mod: 25

## 2018-03-28 PROCEDURE — 36430 TRANSFUSION BLD/BLD COMPNT: CPT

## 2018-03-28 PROCEDURE — 96361 HYDRATE IV INFUSION ADD-ON: CPT

## 2018-03-28 PROCEDURE — 85025 COMPLETE CBC W/AUTO DIFF WBC: CPT | Performed by: EMERGENCY MEDICINE

## 2018-03-28 PROCEDURE — 85610 PROTHROMBIN TIME: CPT | Performed by: EMERGENCY MEDICINE

## 2018-03-28 PROCEDURE — 93971 EXTREMITY STUDY: CPT | Mod: LT

## 2018-03-28 PROCEDURE — 71046 X-RAY EXAM CHEST 2 VIEWS: CPT

## 2018-03-28 RX ORDER — SODIUM CHLORIDE 9 MG/ML
INJECTION, SOLUTION INTRAVENOUS CONTINUOUS
Status: DISCONTINUED | OUTPATIENT
Start: 2018-03-28 | End: 2018-03-29

## 2018-03-28 RX ORDER — DOCUSATE SODIUM 100 MG/1
100 CAPSULE, LIQUID FILLED ORAL AT BEDTIME
COMMUNITY
End: 2019-08-27

## 2018-03-28 RX ORDER — MULTIVITAMIN WITH IRON
1 TABLET ORAL DAILY
COMMUNITY
End: 2018-03-28

## 2018-03-28 RX ORDER — DOCUSATE SODIUM 100 MG/1
100 CAPSULE, LIQUID FILLED ORAL EVERY OTHER DAY
COMMUNITY
End: 2019-08-27

## 2018-03-28 RX ORDER — ASPIRIN 81 MG/1
81 TABLET ORAL AT BEDTIME
Status: ON HOLD | COMMUNITY
End: 2018-03-31

## 2018-03-28 RX ORDER — CEFTRIAXONE 2 G/1
2 INJECTION, POWDER, FOR SOLUTION INTRAMUSCULAR; INTRAVENOUS ONCE
Status: DISCONTINUED | OUTPATIENT
Start: 2018-03-28 | End: 2018-03-28

## 2018-03-28 RX ADMIN — PANTOPRAZOLE SODIUM 40 MG: 40 INJECTION, POWDER, FOR SOLUTION INTRAVENOUS at 22:14

## 2018-03-28 RX ADMIN — SODIUM CHLORIDE: 9 INJECTION, SOLUTION INTRAVENOUS at 21:33

## 2018-03-28 ASSESSMENT — ENCOUNTER SYMPTOMS
DIARRHEA: 1
HEMATURIA: 0
ANAL BLEEDING: 1
COUGH: 0
FREQUENCY: 0
FEVER: 0
DIFFICULTY URINATING: 0
DYSURIA: 0
SHORTNESS OF BREATH: 1
WEAKNESS: 1
RHINORRHEA: 0

## 2018-03-28 NOTE — Clinical Note
Admitting Physician: JUANCHO RATLIFF [[id number]]   [id number]   Bed Type: Adult ICU [6]   Special needs: Fall Risk [8]   Bed request comments: ICU ADMIT / BED REQUEST AT 3074

## 2018-03-28 NOTE — IP AVS SNAPSHOT
[place] [address] [facility]    [address]    [facility] [address]    Phone:  [phone]    Fax:  [phone]                                       After Visit Summary   3/28/2018    Lexii Stratton    MRN: [id number]           After Visit Summary Signature Page     I have received my discharge instructions, and my questions have been answered. I have discussed any challenges I see with this plan with the nurse or doctor.    ..........................................................................................................................................  Patient/Patient Representative Signature      ..........................................................................................................................................  Patient Representative Print Name and Relationship to Patient    ..................................................               ................................................  Date                                            Time    ..........................................................................................................................................  Reviewed by Signature/Title    ...................................................              ..............................................  Date                                                            Time

## 2018-03-28 NOTE — IP AVS SNAPSHOT
MRN:[id number]                      After Visit Summary   3/28/2018    Lexii Stratton    MRN: [id number]           Thank you!     Thank you for choosing [facility] for your care. Our goal is always to provide you with excellent care. Hearing back from our patients is one way we can continue to improve our services. Please take a few minutes to complete the written survey that you may receive in the mail after you visit. If you would like to speak to someone directly about your visit please contact Patient Relations at [phone]. Thank you!          Patient Information     Date Of Birth          1940        Designated Caregiver       Most Recent Value    Caregiver    Will someone help with your care after discharge? no      About your hospital stay     You were admitted on:  March 28, 2018 You last received care in the:  [place] [address] [facility]    You were discharged on:  March 31, 2018       Who to Call     For medical emergencies, please call 911.  For non-urgent questions about your medical care, please call your primary care provider or clinic, [phone]  For questions related to your surgery, please call your surgery clinic        Attending Provider     Provider Specialty    Nakul Aden MD Emergency Medicine    Chas, John Christian DO Internal Medicine       Primary Care Provider Office Phone # Fax #    Jesenia Priscilla Madrigal [phone]      After Care Instructions     Activity       Your activity upon discharge: activity as tolerated            Diet       Follow this diet upon discharge: regular                  Follow-up Appointments     Follow-up and recommended labs and tests        See primary provider next week and have hemoglobin rechecked.  It is 8 today  Increase iron supplement to twice a day and metamucil to three times a day  Stop aspirin and Mobic                  Your next 10 appointments already scheduled     Apr  "16, 2018 10:25 AM CDT   (Arrive by 10:10 AM)   [facility] with Bren Floris Weide, PT   [facility] ([facility]  )    [address]  [address]  [facility] [address]   [phone]            Apr 23, 2018 10:25 AM CDT   [facility] with Bren Floris Weide, PT   [facility] ([facility]  )    [address]  [address]  [facility] [address]   [phone]            Apr 30, 2018 10:25 AM CDT   [facility] with Bren Floris Weide, PT   [facility] ([facility]  )    [address]  [address]  [facility] [address]   [phone]            Jul 18, 2018 11:45 AM CDT   Return Visit with Jenny Ayon MD   [facility] [place] ([facility])    [address]  [address]  [facility] [id number] [phone]              Pending Results     No orders found from 3/26/2018 to 3/29/2018.            Statement of Approval     Ordered          03/31/18 1220  I have reviewed and agree with all the recommendations and orders detailed in this document.  EFFECTIVE NOW     Approved and electronically signed by:  Maximino Shabazz MD             Admission Information     Date & Time Provider Department Dept. Phone    3/28/2018 John Doherty,  [place] [address] [facility] [phone]      Your Vitals Were     Blood Pressure Pulse Temperature Respirations Height Weight    154/63 (BP Location: Left arm) 59 98.4  F (36.9  C) (Oral) 20 1.626 m (5' 4\") 103.1 kg (227 lb 6.4 oz)    Last Period Pulse Oximetry BMI (Body Mass Index)             (LMP Unknown) 96% 39.03 kg/m2         MyChart Information     [facility] gives you secure access to your electronic health record. If you see a primary care provider, you can also send messages to your care team and make appointments. If you have questions, please call your primary care clinic.  If you do not have a primary care provider, please call " [phone] and they will assist you.        Care EveryWhere ID     This is your Care EveryWhere ID. This could be used by other organizations to access your [place] medical records  [id number]        Equal Access to Services     LUCY MILLS : Hadii aad ku hadhiltonbertha Smallali, wageraldineda luqadaha, qaybta kaalmada josh, navin jacin hayaaaneta calixtojajamahsa boateng. So [facility] [phone].    ATENCIÓN: Si habla español, tiene a irene disposición servicios gratuitos de asistencia lingüística. Ll[facility] [phone].    We comply with applicable federal civil rights laws and [place] laws. We do not discriminate on the basis of race, color, national origin, age, disability, sex, sexual orientation, or gender identity.               Review of your medicines      CONTINUE these medicines which may have CHANGED, or have new prescriptions. If we are uncertain of the size of tablets/capsules you have at home, strength may be listed as something that might have changed.        Dose / Directions    ferrous gluconate 324 (38 FE) MG tablet   Commonly known as:  FERGON   This may have changed:  when to take this        Dose:  1 tablet   Take 1 tablet (324 mg) by mouth 2 times daily   Quantity:  100 tablet   Refills:  0       psyllium 0.52 G capsule   Commonly known as:  METAMUCIL   This may have changed:  when to take this        Dose:  2 capsule   Take 2 capsules (1.04 g) by mouth 3 times daily To take with fluid   Quantity:  540 capsule   Refills:  0         CONTINUE these medicines which have NOT CHANGED        Dose / Directions    ACIDOPHILUS PROBIOTIC BLEND Caps        Dose:  1 capsule   Take 1 capsule by mouth daily (with breakfast)   Quantity:  30 capsule   Refills:  0       amLODIPine 5 MG tablet   Commonly known as:  NORVASC   Used for:  Essential hypertension, benign        TAKE ONE TABLET BY MOUTH EVERY NIGHT AT BEDTIME   Quantity:  90 tablet   Refills:  2       CENTRUM SILVER per tablet   Used for:  Need for prophylactic  vaccination and inoculation against influenza, Routine general medical examination at a health care facility, Generalized osteoarthrosis, unspecified site, Other and unspecified hyperlipidemia, Esophageal reflux, Obesity, unspecified, Urinary sys symptom NEC, Other malaise and fatigue, Sleep disturbance, unspecified        1 TABLET DAILY   Quantity:  30   Refills:  0       * docusate sodium 100 MG capsule   Commonly known as:  COLACE        Dose:  100 mg   Take 100 mg by mouth every other day Every other day at Breakfast   Refills:  0       * COLACE 100 MG capsule   Generic drug:  docusate sodium        Dose:  100 mg   Take 100 mg by mouth At Bedtime   Refills:  0       fenofibrate 160 MG tablet   Used for:  Hyperlipidemia LDL goal <130        Dose:  160 mg   Take 1 tablet (160 mg) by mouth daily   Quantity:  90 tablet   Refills:  3       levothyroxine 50 MCG tablet   Commonly known as:  SYNTHROID/LEVOTHROID   Used for:  Acquired hypothyroidism        Dose:  50 mcg   Take 1 tablet (50 mcg) by mouth daily   Quantity:  90 tablet   Refills:  1       lisinopril 30 MG tablet   Commonly known as:  PRINIVIL,ZESTRIL   Used for:  Essential hypertension, benign        TAKE ONE TABLET BY MOUTH EVERY DAY FOR HYPERTENSION   Quantity:  90 tablet   Refills:  3       MAGNESIUM PO        Dose:  1 tablet   Take 1 tablet by mouth daily (with breakfast)   Refills:  0       omega-3 fatty acids 1200 MG capsule   Used for:  Preop general physical exam, Stress incontinence - female        1 TABLET DAILY   Refills:  0       pantoprazole 20 MG EC tablet   Commonly known as:  PROTONIX   Used for:  Gastroesophageal reflux disease without esophagitis        TAKE ONE TABLET BY MOUTH EVERY DAY   Quantity:  30 tablet   Refills:  11       potassium 99 MG Tabs        Dose:  1 tablet   Take 1 tablet by mouth daily (with dinner)   Refills:  0       RA CALCIUM PLUS MINERALS/VIT D PO        Dose:  1 tablet   Take 1 tablet by mouth every other day Every  other day at Lunch   Refills:  0       simvastatin 20 MG tablet   Commonly known as:  ZOCOR   Used for:  Hyperlipidemia LDL goal <130        TAKE ONE TABLET BY MOUTH EVERY NIGHT AT BEDTIME   Quantity:  90 tablet   Refills:  2       travoprost (BAK Free) 0.004 % ophthalmic solution   Commonly known as:  TRAVATAN Z        Dose:  1 drop   Place 1 drop into both eyes At Bedtime   Quantity:  2.5 mL   Refills:  1       UNABLE TO FIND        daily (with dinner) MEDICATION NAME Cranberry.  2 capsules daily   Refills:  0       VITAMIN D (CHOLECALCIFEROL) PO        Dose:  2000 Units   Take 2,000 Units by mouth every other day Every other day at Supper   Refills:  0       * Notice:  This list has 2 medication(s) that are the same as other medications prescribed for you. Read the directions carefully, and ask your doctor or other care provider to review them with you.      STOP taking     aspirin EC 81 MG EC tablet           meloxicam 15 MG tablet   Commonly known as:  MOBIC                Where to get your medicines      Some of these will need a paper prescription and others can be bought over the counter. Ask your nurse if you have questions.     You don't need a prescription for these medications     ferrous gluconate 324 (38 FE) MG tablet    psyllium 0.52 G capsule                Protect others around you: Learn how to safely use, store and throw away your medicines at www.disposemymeds.org.             Medication List: This is a list of all your medications and when to take them. Check marks below indicate your daily home schedule. Keep this list as a reference.      Medications           Morning Afternoon Evening Bedtime As Needed    ACIDOPHILUS PROBIOTIC BLEND Caps   Take 1 capsule by mouth daily (with breakfast)                                   amLODIPine 5 MG tablet   Commonly known as:  NORVASC   TAKE ONE TABLET BY MOUTH EVERY NIGHT AT BEDTIME   Last time this was given:  5 mg on 3/30/2018  9:20 PM                                    CENTRUM SILVER per tablet   1 TABLET DAILY                                   * docusate sodium 100 MG capsule   Commonly known as:  COLACE   Take 100 mg by mouth every other day Every other day at Breakfast                                * COLACE 100 MG capsule   Take 100 mg by mouth At Bedtime   Generic drug:  docusate sodium                                   fenofibrate 160 MG tablet   Take 1 tablet (160 mg) by mouth daily                                   ferrous gluconate 324 (38 FE) MG tablet   Commonly known as:  FERGON   Take 1 tablet (324 mg) by mouth 2 times daily                                      levothyroxine 50 MCG tablet   Commonly known as:  SYNTHROID/LEVOTHROID   Take 1 tablet (50 mcg) by mouth daily   Last time this was given:  50 mcg on 3/31/2018  6:58 AM                                   lisinopril 30 MG tablet   Commonly known as:  PRINIVIL,ZESTRIL   TAKE ONE TABLET BY MOUTH EVERY DAY FOR HYPERTENSION                                   MAGNESIUM PO   Take 1 tablet by mouth daily (with breakfast)                                   omega-3 fatty acids 1200 MG capsule   1 TABLET DAILY                                   pantoprazole 20 MG EC tablet   Commonly known as:  PROTONIX   TAKE ONE TABLET BY MOUTH EVERY DAY   Last time this was given:  40 mg on 3/31/2018  8:54 AM                                   potassium 99 MG Tabs   Take 1 tablet by mouth daily (with dinner)                                   psyllium 0.52 G capsule   Commonly known as:  METAMUCIL   Take 2 capsules (1.04 g) by mouth 3 times daily To take with fluid                                         RA CALCIUM PLUS MINERALS/VIT D PO   Take 1 tablet by mouth every other day Every other day at Lunch                                simvastatin 20 MG tablet   Commonly known as:  ZOCOR   TAKE ONE TABLET BY MOUTH EVERY NIGHT AT BEDTIME                                   travoprost (TIMI Free) 0.004 % ophthalmic solution    Commonly known as:  TRAVATAN Z   Place 1 drop into both eyes At Bedtime   Last time this was given:  1 drop on 3/30/2018  9:54 PM                                   UNABLE TO FIND   daily (with dinner) MEDICATION NAME Cranberry.  2 capsules daily                                   VITAMIN D (CHOLECALCIFEROL) PO   Take 2,000 Units by mouth every other day Every other day at Supper                                * Notice:  This list has 2 medication(s) that are the same as other medications prescribed for you. Read the directions carefully, and ask your doctor or other care provider to review them with you.

## 2018-03-29 PROBLEM — K92.2 GI BLEED: Status: ACTIVE | Noted: 2018-03-29

## 2018-03-29 LAB
ABO + RH BLD: NORMAL
ABO + RH BLD: NORMAL
ANION GAP SERPL CALCULATED.3IONS-SCNC: 6 MMOL/L (ref 3–14)
BLD GP AB SCN SERPL QL: NORMAL
BLD PROD TYP BPU: NORMAL
BLD UNIT ID BPU: 0
BLD UNIT ID BPU: 0
BLOOD BANK CMNT PATIENT-IMP: NORMAL
BLOOD PRODUCT CODE: NORMAL
BLOOD PRODUCT CODE: NORMAL
BPU ID: NORMAL
BPU ID: NORMAL
BUN SERPL-MCNC: 28 MG/DL (ref 7–30)
CALCIUM SERPL-MCNC: 8.3 MG/DL (ref 8.5–10.1)
CHLORIDE SERPL-SCNC: 111 MMOL/L (ref 94–109)
CO2 SERPL-SCNC: 26 MMOL/L (ref 20–32)
COLONOSCOPY: NORMAL
CREAT SERPL-MCNC: 0.88 MG/DL (ref 0.52–1.04)
ERYTHROCYTE [DISTWIDTH] IN BLOOD BY AUTOMATED COUNT: 14.5 % (ref 10–15)
FERRITIN SERPL-MCNC: 14 NG/ML (ref 8–252)
GFR SERPL CREATININE-BSD FRML MDRD: 63 ML/MIN/1.7M2
GLUCOSE SERPL-MCNC: 151 MG/DL (ref 70–99)
HCT VFR BLD AUTO: 24.3 % (ref 35–47)
HGB BLD-MCNC: 6.7 G/DL (ref 11.7–15.7)
HGB BLD-MCNC: 7.2 G/DL (ref 11.7–15.7)
HGB BLD-MCNC: 7.6 G/DL (ref 11.7–15.7)
INTERPRETATION ECG - MUSE: NORMAL
MCH RBC QN AUTO: 25.1 PG (ref 26.5–33)
MCHC RBC AUTO-ENTMCNC: 29.6 G/DL (ref 31.5–36.5)
MCV RBC AUTO: 85 FL (ref 78–100)
NUM BPU REQUESTED: 3
PLATELET # BLD AUTO: 246 10E9/L (ref 150–450)
POTASSIUM SERPL-SCNC: 4 MMOL/L (ref 3.4–5.3)
RBC # BLD AUTO: 2.87 10E12/L (ref 3.8–5.2)
SODIUM SERPL-SCNC: 143 MMOL/L (ref 133–144)
SPECIMEN EXP DATE BLD: NORMAL
TRANSFUSION STATUS PATIENT QL: NORMAL
WBC # BLD AUTO: 8.1 10E9/L (ref 4–11)

## 2018-03-29 PROCEDURE — 99207 ZZC MOONLIGHTING INDICATOR: CPT | Performed by: INTERNAL MEDICINE

## 2018-03-29 PROCEDURE — 25000128 H RX IP 250 OP 636: Performed by: INTERNAL MEDICINE

## 2018-03-29 PROCEDURE — 25000128 H RX IP 250 OP 636: Performed by: HOSPITALIST

## 2018-03-29 PROCEDURE — 45378 DIAGNOSTIC COLONOSCOPY: CPT | Performed by: INTERNAL MEDICINE

## 2018-03-29 PROCEDURE — 80048 BASIC METABOLIC PNL TOTAL CA: CPT | Performed by: HOSPITALIST

## 2018-03-29 PROCEDURE — P9016 RBC LEUKOCYTES REDUCED: HCPCS | Performed by: EMERGENCY MEDICINE

## 2018-03-29 PROCEDURE — 85027 COMPLETE CBC AUTOMATED: CPT | Performed by: HOSPITALIST

## 2018-03-29 PROCEDURE — 0DJD8ZZ INSPECTION OF LOWER INTESTINAL TRACT, VIA NATURAL OR ARTIFICIAL OPENING ENDOSCOPIC: ICD-10-PCS | Performed by: INTERNAL MEDICINE

## 2018-03-29 PROCEDURE — G0500 MOD SEDAT ENDO SERVICE >5YRS: HCPCS | Performed by: INTERNAL MEDICINE

## 2018-03-29 PROCEDURE — 85018 HEMOGLOBIN: CPT | Performed by: HOSPITALIST

## 2018-03-29 PROCEDURE — 25000132 ZZH RX MED GY IP 250 OP 250 PS 637: Mod: GY | Performed by: INTERNAL MEDICINE

## 2018-03-29 PROCEDURE — 36415 COLL VENOUS BLD VENIPUNCTURE: CPT | Performed by: HOSPITALIST

## 2018-03-29 PROCEDURE — 12000000 ZZH R&B MED SURG/OB

## 2018-03-29 PROCEDURE — 25000125 ZZHC RX 250: Performed by: HOSPITALIST

## 2018-03-29 PROCEDURE — 99232 SBSQ HOSP IP/OBS MODERATE 35: CPT | Performed by: INTERNAL MEDICINE

## 2018-03-29 PROCEDURE — 82728 ASSAY OF FERRITIN: CPT | Performed by: HOSPITALIST

## 2018-03-29 RX ORDER — NALOXONE HYDROCHLORIDE 0.4 MG/ML
.1-.4 INJECTION, SOLUTION INTRAMUSCULAR; INTRAVENOUS; SUBCUTANEOUS
Status: DISCONTINUED | OUTPATIENT
Start: 2018-03-29 | End: 2018-03-31 | Stop reason: HOSPADM

## 2018-03-29 RX ORDER — SODIUM FERRIC GLUCONATE COMPLEX IN SUCROSE 12.5 MG/ML
125 INJECTION INTRAVENOUS ONCE
Status: DISCONTINUED | OUTPATIENT
Start: 2018-03-29 | End: 2018-03-29

## 2018-03-29 RX ORDER — FENTANYL CITRATE 50 UG/ML
INJECTION, SOLUTION INTRAMUSCULAR; INTRAVENOUS PRN
Status: DISCONTINUED | OUTPATIENT
Start: 2018-03-29 | End: 2018-03-29 | Stop reason: HOSPADM

## 2018-03-29 RX ORDER — PROCHLORPERAZINE MALEATE 5 MG
5 TABLET ORAL EVERY 6 HOURS PRN
Status: DISCONTINUED | OUTPATIENT
Start: 2018-03-29 | End: 2018-03-31 | Stop reason: HOSPADM

## 2018-03-29 RX ORDER — POTASSIUM CHLORIDE 1.5 G/1.58G
20-40 POWDER, FOR SOLUTION ORAL
Status: DISCONTINUED | OUTPATIENT
Start: 2018-03-29 | End: 2018-03-31 | Stop reason: HOSPADM

## 2018-03-29 RX ORDER — NALOXONE HYDROCHLORIDE 0.4 MG/ML
.1-.4 INJECTION, SOLUTION INTRAMUSCULAR; INTRAVENOUS; SUBCUTANEOUS
Status: ACTIVE | OUTPATIENT
Start: 2018-03-29 | End: 2018-03-30

## 2018-03-29 RX ORDER — PROCHLORPERAZINE 25 MG
12.5 SUPPOSITORY, RECTAL RECTAL EVERY 12 HOURS PRN
Status: DISCONTINUED | OUTPATIENT
Start: 2018-03-29 | End: 2018-03-31 | Stop reason: HOSPADM

## 2018-03-29 RX ORDER — POLYETHYLENE GLYCOL 3350 17 G/17G
238 POWDER, FOR SOLUTION ORAL ONCE
Status: COMPLETED | OUTPATIENT
Start: 2018-03-29 | End: 2018-03-29

## 2018-03-29 RX ORDER — POTASSIUM CHLORIDE 7.45 MG/ML
10 INJECTION INTRAVENOUS
Status: DISCONTINUED | OUTPATIENT
Start: 2018-03-29 | End: 2018-03-31 | Stop reason: HOSPADM

## 2018-03-29 RX ORDER — FLUMAZENIL 0.1 MG/ML
0.2 INJECTION, SOLUTION INTRAVENOUS
Status: ACTIVE | OUTPATIENT
Start: 2018-03-29 | End: 2018-03-30

## 2018-03-29 RX ORDER — ONDANSETRON 4 MG/1
4 TABLET, ORALLY DISINTEGRATING ORAL EVERY 6 HOURS PRN
Status: DISCONTINUED | OUTPATIENT
Start: 2018-03-29 | End: 2018-03-31 | Stop reason: HOSPADM

## 2018-03-29 RX ORDER — POLYETHYLENE GLYCOL 3350 17 G/17G
238 POWDER, FOR SOLUTION ORAL ONCE
Status: DISCONTINUED | OUTPATIENT
Start: 2018-03-29 | End: 2018-03-29

## 2018-03-29 RX ORDER — DIPHENHYDRAMINE HYDROCHLORIDE 50 MG/ML
50 INJECTION INTRAMUSCULAR; INTRAVENOUS
Status: DISCONTINUED | OUTPATIENT
Start: 2018-03-29 | End: 2018-03-31 | Stop reason: HOSPADM

## 2018-03-29 RX ORDER — POTASSIUM CL/LIDO/0.9 % NACL 10MEQ/0.1L
10 INTRAVENOUS SOLUTION, PIGGYBACK (ML) INTRAVENOUS
Status: DISCONTINUED | OUTPATIENT
Start: 2018-03-29 | End: 2018-03-31 | Stop reason: HOSPADM

## 2018-03-29 RX ORDER — SODIUM CHLORIDE, SODIUM LACTATE, POTASSIUM CHLORIDE, CALCIUM CHLORIDE 600; 310; 30; 20 MG/100ML; MG/100ML; MG/100ML; MG/100ML
INJECTION, SOLUTION INTRAVENOUS CONTINUOUS
Status: DISCONTINUED | OUTPATIENT
Start: 2018-03-29 | End: 2018-03-31 | Stop reason: HOSPADM

## 2018-03-29 RX ORDER — POTASSIUM CHLORIDE 1500 MG/1
20-40 TABLET, EXTENDED RELEASE ORAL
Status: DISCONTINUED | OUTPATIENT
Start: 2018-03-29 | End: 2018-03-31 | Stop reason: HOSPADM

## 2018-03-29 RX ORDER — ONDANSETRON 2 MG/ML
4 INJECTION INTRAMUSCULAR; INTRAVENOUS EVERY 6 HOURS PRN
Status: DISCONTINUED | OUTPATIENT
Start: 2018-03-29 | End: 2018-03-31 | Stop reason: HOSPADM

## 2018-03-29 RX ORDER — POTASSIUM CHLORIDE 29.8 MG/ML
20 INJECTION INTRAVENOUS
Status: DISCONTINUED | OUTPATIENT
Start: 2018-03-29 | End: 2018-03-31 | Stop reason: HOSPADM

## 2018-03-29 RX ORDER — MAGNESIUM SULFATE HEPTAHYDRATE 40 MG/ML
4 INJECTION, SOLUTION INTRAVENOUS EVERY 4 HOURS PRN
Status: DISCONTINUED | OUTPATIENT
Start: 2018-03-29 | End: 2018-03-31 | Stop reason: HOSPADM

## 2018-03-29 RX ORDER — ACETAMINOPHEN 325 MG/1
650 TABLET ORAL EVERY 4 HOURS PRN
Status: DISCONTINUED | OUTPATIENT
Start: 2018-03-29 | End: 2018-03-31 | Stop reason: HOSPADM

## 2018-03-29 RX ORDER — LIDOCAINE 40 MG/G
CREAM TOPICAL
Status: DISCONTINUED | OUTPATIENT
Start: 2018-03-29 | End: 2018-03-31 | Stop reason: HOSPADM

## 2018-03-29 RX ORDER — METHYLPREDNISOLONE SODIUM SUCCINATE 125 MG/2ML
125 INJECTION, POWDER, LYOPHILIZED, FOR SOLUTION INTRAMUSCULAR; INTRAVENOUS
Status: DISCONTINUED | OUTPATIENT
Start: 2018-03-29 | End: 2018-03-31 | Stop reason: HOSPADM

## 2018-03-29 RX ADMIN — PANTOPRAZOLE SODIUM 40 MG: 40 INJECTION, POWDER, FOR SOLUTION INTRAVENOUS at 21:31

## 2018-03-29 RX ADMIN — PANTOPRAZOLE SODIUM 40 MG: 40 INJECTION, POWDER, FOR SOLUTION INTRAVENOUS at 08:10

## 2018-03-29 RX ADMIN — SODIUM CHLORIDE, POTASSIUM CHLORIDE, SODIUM LACTATE AND CALCIUM CHLORIDE: 600; 310; 30; 20 INJECTION, SOLUTION INTRAVENOUS at 14:01

## 2018-03-29 RX ADMIN — IRON SUCROSE 300 MG: 20 INJECTION, SOLUTION INTRAVENOUS at 17:56

## 2018-03-29 RX ADMIN — POLYETHYLENE GLYCOL 3350 238 G: 17 POWDER, FOR SOLUTION ORAL at 05:57

## 2018-03-29 RX ADMIN — SODIUM CHLORIDE, POTASSIUM CHLORIDE, SODIUM LACTATE AND CALCIUM CHLORIDE: 600; 310; 30; 20 INJECTION, SOLUTION INTRAVENOUS at 05:56

## 2018-03-29 ASSESSMENT — ACTIVITIES OF DAILY LIVING (ADL)
ADLS_ACUITY_SCORE: 14
ADLS_ACUITY_SCORE: 15
ADLS_ACUITY_SCORE: 14
ADLS_ACUITY_SCORE: 14

## 2018-03-29 NOTE — PROGRESS NOTES
H&P update:    Patient also mentioned a fall a few weeks back, injuring her left leg that is still bruised and painful. It is clearly swollen, larger than the right. Will check venous US to r/o DVT.    John Doherty  March 28, 2018

## 2018-03-29 NOTE — ED NOTES
Pt comes in with bloody diarrhea x3 that started 2 hours PTA. Pt also reports SOB and weakness for last 3 days, progressively getting worse.

## 2018-03-29 NOTE — H&P
HISTORY AND PHYSICAL     Admitted:     03/28/2018      PRIMARY CARE PHYSICIAN:  Dr. Jesenia Madrigal MD.      CHIEF COMPLAINT:  Shortness of breath.      HISTORY OF PRESENT ILLNESS:  This is a 77-year-old female with a history of hyperlipidemia, hypertension, small-bowel obstruction, hypothyroidism and chronic pain presenting to [facility] for evaluation of shortness of breath.  The patient indicates that actually she has been experiencing shortness of breath for about a week.  This is most concerning and bothersome when she is exerting herself.  This has slowly worsened over the course of the past week.  Earlier today she had about 2 episodes of diarrhea.  Subsequent episodes of diarrhea were accompanied by a significant amount of bright red blood.  She did not notice the passing of any clots.  She denies any abdominal pain, nausea or vomiting.  Due to these issues, she came to the Emergency Department for evaluation.      I discussed the case with Dr. Aden, the Emergency Room physician.  The patient's vital signs were stable upon arrival.  Heart rate was 98, blood pressure was 168/75 with no fever and good oxygen saturation and normal respiratory rate.  Labs show a hemoglobin of 5.7, dropped down from 8.4 about 1 month ago.  Basic metabolic panel, liver function tests are largely unremarkable except for some hypoalbuminemia.  Platelets are normal and white blood cells are as well.  INR is 1.15.  The patient is being provided 2 units of packed red blood cells and 40 units of Protonix.  She was given some IV fluids.  A chest x-ray is negative and EKG shows normal sinus rhythm without evidence of ischemia.      PAST MEDICAL HISTORY:   1. Osteoarthritis.   2. Gastroesophageal reflux disease.   3. Abdominal hernia.   4. Hypertension.   5. Obesity.   6. Chronic pain syndrome.   7. Hyperlipidemia.   8. Peptic ulcer disease.   9. History of small-bowel obstruction.   10. Hypothyroidism.   11. Urinary  incontinence.   12. Iron deficiency anemia and anemia of chronic disease.      Prior to Admission medications    Medication Sig Last Dose Taking? Auth Provider   aspirin EC 81 MG EC tablet Take 81 mg by mouth At Bedtime 3/27/2018 at Unknown time Yes Unknown, Entered By History   psyllium (METAMUCIL) 0.52 G capsule Take 2 capsules by mouth At Bedtime To take with fluid  Yes Unknown, Entered By History   Calcium Carbonate-Vit D-Min (RA CALCIUM PLUS MINERALS/VIT D PO) Take 1 tablet by mouth every other day Every other day at Lunch 3/27/2018 at Unknown time Yes Unknown, Entered By History   docusate sodium (COLACE) 100 MG capsule Take 100 mg by mouth every other day Every other day at Breakfast  Yes Unknown, Entered By History   docusate sodium (COLACE) 100 MG capsule Take 100 mg by mouth At Bedtime  Yes Unknown, Entered By History   MAGNESIUM PO Take 1 tablet by mouth daily (with breakfast)  Yes Unknown, Entered By History   meloxicam (MOBIC) 15 MG tablet Take 1 tablet (15 mg) by mouth daily Due for follow up by 5/1/2018 3/28/2018 at am Yes Jesenia Madrigal MD   fenofibrate 160 MG tablet Take 1 tablet (160 mg) by mouth daily 3/27/2018 at Unknown time Yes Jesenia Madrigal MD   lisinopril (PRINIVIL,ZESTRIL) 30 MG tablet TAKE ONE TABLET BY MOUTH EVERY DAY FOR HYPERTENSION 3/27/2018 at Unknown time Yes Jesenia Madrigal MD   levothyroxine (SYNTHROID/LEVOTHROID) 50 MCG tablet Take 1 tablet (50 mcg) by mouth daily 3/28/2018 at am Yes Jesenia Madrigal MD   pantoprazole (PROTONIX) 20 MG EC tablet TAKE ONE TABLET BY MOUTH EVERY DAY 3/27/2018 at hs Yes Jesenia Madrigal MD   simvastatin (ZOCOR) 20 MG tablet TAKE ONE TABLET BY MOUTH EVERY NIGHT AT BEDTIME 3/27/2018 at hs Yes Jesenia Madrigal MD   amLODIPine (NORVASC) 5 MG tablet TAKE ONE TABLET BY MOUTH EVERY NIGHT AT BEDTIME 3/27/2018 at Unknown time Yes Jesenia Madrigal MD   travoprost, TIMI Free, (TRAVATAN Z) 0.004 % ophthalmic solution Place 1  drop into both eyes At Bedtime  3/27/2018 at Unknown time Yes Jesenia Madrigal MD   UNABLE TO FIND daily (with dinner) MEDICATION NAME Cranberry.  2 capsules daily   Yes Reported, Patient   VITAMIN D, CHOLECALCIFEROL, PO Take 2,000 Units by mouth every other day Every other day at Supper 3/27/2018 at Unknown time Yes Reported, Patient   Probiotic Product (ACIDOPHILUS PROBIOTIC BLEND) CAPS Take 1 capsule by mouth daily (with breakfast)  3/28/2018 at am Yes Jesenia Madrigal MD   ferrous gluconate (FERGON) 324 (38 FE) MG tablet Take 1 tablet (324 mg) by mouth daily (with breakfast) 3/28/2018 at am Yes Jesenia Madrigal MD   potassium 99 MG TABS Take 1 tablet by mouth daily (with dinner)  3/27/2018 at Unknown time Yes Reported, Patient   OMEGA-3 FATTY ACIDS 1200 MG OR CAPS 1 TABLET DAILY 3/28/2018 at Unknown time Yes John Daniels MD   CENTRUM SILVER OR TABS 1 TABLET DAILY 3/27/2018 at Unknown time Yes John Daniels MD     FAMILY HISTORY:  Assessed and noncontributory.      SOCIAL HISTORY:  The patient denies smoking, drinking or drug use.      ALLERGIES:   1. AUGMENTIN.   2. CODEINE.   3. HYDROCODONE.   4. NAPROXEN.   5. SULFA.      REVIEW OF SYSTEMS:  A 10-point review of systems was performed and the pertinent positive findings are presented in history of present illness.  The remainder of the review of systems is negative.      PHYSICAL EXAMINATION:   VITAL SIGNS:  Temperature 98.2, heart rate 98, blood pressure 168/75, respiratory rate 20, oxygen saturation 99% on room air.   GENERAL:  No apparent distress, awake and oriented x 3.   HEENT:  Normocephalic, atraumatic.  Extraocular movements are intact.   NECK:  Supple, without JVD.   CARDIOVASCULAR:  Regular rate and rhythm without murmurs, rubs or gallops.   PULMONARY:  Clear to auscultation bilaterally.   ABDOMEN:  Soft, nontender, nondistended, bowel sounds are present.   EXTREMITIES:  No cyanosis, clubbing or edema.   SKIN:   No rashes, ulcers or jaundice.   NEUROLOGICAL:  Cranial nerves II through XII are grossly intact.  No focal neurologic deficits.   PSYCHIATRIC:  Mood and affect are appropriate.      LABORATORY AND IMAGING:  Personally reviewed and discussed pertinent findings in the HPI.      ASSESSMENT AND PLAN:  This is a 77-year-old female with a history of hypertension, hyperlipidemia, gastroesophageal reflux disease and peptic ulcer disease, hypothyroidism, chronic pain syndrome, previous small-bowel obstruction resolved without surgical intervention and iron deficiency anemia with anemia of chronic disease presenting to [facility] for evaluation of rectal bleeding.   1.  Rectal bleeding:  This is currently of unclear etiology.  I have reviewed previous CT scans and do not appreciate any evidence of masses, nor diverticulosis.  She had a colonoscopy and EGD in 2016 by Dr. Monaco for an evaluation of iron-deficiency anemia with no significant abnormalities identified.  The current bleeding certainly sounds to be lower in origin.  Nevertheless, I will start her on an intravenous proton pump inhibitor.  She will be on IV fluids overnight and placed n.p.o.  A formal GI consultation will be requested in anticipation of a sigmoidoscopy or colonoscopy in the morning.  She is having symptomatic anemia, so will give her 2 units of packed red blood cells and continue serial hemoglobins overnight.   2.  Acute on chronic symptomatic anemia:  The patient has baseline iron deficiency anemia and anemia of chronic disease that has been worked up in the past by Gastroenterology.  Again, there has not been any clearcut cause identified.  Currently, her hemoglobin is 5.7 with a value of 8.4 about a month and a half ago.  She is clearly symptomatic and pale and short of breath.  I will give her 2 units packed red blood cells at this time and continue to check serial hemoglobins overnight.   3.  History of hypertension:  In the  context of active GI bleeding.  I would like to hold her antihypertensive medications, which include amlodipine and lisinopril.   4.  Hyperlipidemia:  We will hold her prior to admission simvastatin.   5.  Previous small-bowel obstruction:  This responded with an NG tube and conservative treatment.  No surgical intervention warranted.  She does have a history of some abdominal hernia as it appears based on the electronic medical review.   6.  The patient will be admitted to inpatient status.  Would expect a 2-midnight hospitalization.   7.  CODE STATUS:  Full code as discussed with the patient.         JUACNHO RATLIFF MD             D: 2018   T: 2018   MT: [email or web address]      Name:     AKSHAT ONEIL   MRN:      [id number]        Account:      [id number]   :      1940        Admitted:     2018                   Document: [id number]

## 2018-03-29 NOTE — H&P
Pre-Endoscopy History and Physical     Lexii Stratton MRN# [id number]   YOB: 1940 Age: 77 year old     Date of Procedure: 3/28/2018  Primary care provider: Jesenia Madrigal  Type of Endoscopy: Colonoscopy with possible biopsy, possible polypectomy  Reason for Procedure: bleed  Type of Anesthesia Anticipated: Conscious Sedation    HPI:    Lexii is a 77 year old female who will be undergoing the above procedure.      A history and physical has been performed. The patient's medications and allergies have been reviewed. The risks and benefits of the procedure and the sedation options and risks were discussed with the patient.  All questions were answered and informed consent was obtained.      She denies a personal or family history of anesthesia complications or bleeding disorders.     Patient Active Problem List   Diagnosis     Generalized osteoarthrosis, unspecified site     Esophageal reflux     Obesity     Other symptoms involving urinary system     Acquired hypothyroidism     Hypersomnia with sleep apnea     Essential hypertension, benign     Impaired fasting glucose     Hyperlipidemia LDL goal <130     Advance Care Planning     Chest pain syndrome     Partial small bowel obstruction     Anemia, unspecified     Poor iron absorption     Iron and its compounds causing adverse effect in therapeutic use(E934.0)     S/P total hip arthroplasty     OAB (overactive bladder)     Morbid obesity due to excess calories (H)     GI bleed        Past Medical History:   Diagnosis Date     Arthritis      Chronic infection     Recent UTI cleared now     Esophageal reflux      Hernia, abdominal      Hypertension     No cardiologist     Incontinence of urine      Mumps     6 years old     Obese      Osteoarthritis      Other and unspecified hyperlipidemia      Other chronic pain     back     Peptic ulcer, unspecified site, unspecified as acute or chronic, without mention of hemorrhage, perforation, or  obstruction      Small bowel obstruction      Thyroid disease hypothyroidism     Urinary incontinence      Walking troubles         Past Surgical History:   Procedure Laterality Date     ARTHROPLASTY HIP Right 11/9/2016    Procedure: ARTHROPLASTY HIP;  Surgeon: Angel Lund MD;  Location:  OR     AS REPAIR INCISIONAL HERNIA,REDUCIBLE  1998    inc hernia ( Yamileth surgery)     BLADDER SURGERY      hyperdistention surgery and sling     C NONSPECIFIC PROCEDURE  1946    T&A     C NONSPECIFIC PROCEDURE  1984    Vaginal Hysterectomy (has her ovaries)     C NONSPECIFIC PROCEDURE  1973    PPTL     C NONSPECIFIC PROCEDURE  1994    L shoulder to remove bone spurs     C NONSPECIFIC PROCEDURE  2004    cysto and durasphere Dr Demarco     C NONSPECIFIC PROCEDURE  2005    cysto and durasphere     C NONSPECIFIC PROCEDURE  2007    cysto and durasphere     C NONSPECIFIC PROCEDURE  2009    retropubic TVT sling     CHOLECYSTECTOMY  1987     COLONOSCOPY  12/3/2011    Procedure:COLONOSCOPY; COLONOSCOPY; Surgeon:SEMAJ GRAHAM; Location: GI     CYSTOSCOPY N/A 9/6/2017    Procedure: CYSTOSCOPY;  CYSTOSCOPY AND HYDRODISTENTION ;  Surgeon: Eyal Bai MD;  Location:  OR     ENT SURGERY      Tonsillectomy     ESOPHAGOSCOPY, GASTROSCOPY, DUODENOSCOPY (EGD), COMBINED N/A 9/26/2016    Procedure: COMBINED ESOPHAGOSCOPY, GASTROSCOPY, DUODENOSCOPY (EGD), BIOPSY SINGLE OR MULTIPLE;  Surgeon: Marco Monaco MD;  Location:  GI     GENITOURINARY SURGERY       GYN SURGERY      Hysterectomy     HERNIA REPAIR, UMBILICAL  7/8/2010    Dr. Kirt Franco times 3     ORTHOPEDIC SURGERY  2009    O - Dr. Lund- bilateral knee replacement       Social History   Substance Use Topics     Smoking status: Never Smoker     Smokeless tobacco: Never Used     Alcohol use No       Family History   Problem Relation Age of Onset     HEART DISEASE Mother      heart surgery , new valve     Gallbladder Disease Mother      Coronary Artery  Disease Mother      Hyperlipidemia Mother      Asthma Mother      HEART DISEASE Maternal Grandmother      Coronary Artery Disease Maternal Grandmother      HEART DISEASE Maternal Grandfather      Coronary Artery Disease Maternal Grandfather      CANCER Father      throat ca,  76     Coronary Artery Disease Father      DIABETES Brother      Half Brother     Cardiovascular Brother      lung ca, Half brother     CANCER Brother      half brother  lung        Prior to Admission medications    Medication Sig Start Date End Date Taking? Authorizing Provider   aspirin EC 81 MG EC tablet Take 81 mg by mouth At Bedtime   Yes Unknown, Entered By History   psyllium (METAMUCIL) 0.52 G capsule Take 2 capsules by mouth At Bedtime To take with fluid   Yes Unknown, Entered By History   Calcium Carbonate-Vit D-Min (RA CALCIUM PLUS MINERALS/VIT D PO) Take 1 tablet by mouth every other day Every other day at Lunch   Yes Unknown, Entered By History   docusate sodium (COLACE) 100 MG capsule Take 100 mg by mouth every other day Every other day at Breakfast   Yes Unknown, Entered By History   docusate sodium (COLACE) 100 MG capsule Take 100 mg by mouth At Bedtime   Yes Unknown, Entered By History   MAGNESIUM PO Take 1 tablet by mouth daily (with breakfast)   Yes Unknown, Entered By History   meloxicam (MOBIC) 15 MG tablet Take 1 tablet (15 mg) by mouth daily Due for follow up by 2018 3/23/18  Yes Jesenia Madrigal MD   fenofibrate 160 MG tablet Take 1 tablet (160 mg) by mouth daily 17  Yes Jesenia Madrigal MD   lisinopril (PRINIVIL,ZESTRIL) 30 MG tablet TAKE ONE TABLET BY MOUTH EVERY DAY FOR HYPERTENSION 17  Yes Jesenia Madrigal MD   levothyroxine (SYNTHROID/LEVOTHROID) 50 MCG tablet Take 1 tablet (50 mcg) by mouth daily 10/17/17  Yes Jesenia Madrigal MD   pantoprazole (PROTONIX) 20 MG EC tablet TAKE ONE TABLET BY MOUTH EVERY DAY 17  Yes Jesenia Madrigal MD   simvastatin (ZOCOR) 20 MG tablet  "TAKE ONE TABLET BY MOUTH EVERY NIGHT AT BEDTIME 8/23/17  Yes Jesenia Madrigal MD   amLODIPine (NORVASC) 5 MG tablet TAKE ONE TABLET BY MOUTH EVERY NIGHT AT BEDTIME 8/7/17  Yes Jesenia Madrigal MD   travoprost, TIMI Free, (TRAVATAN Z) 0.004 % ophthalmic solution Place 1 drop into both eyes At Bedtime  4/7/17  Yes Jesenia Madrigal MD   UNABLE TO FIND daily (with dinner) MEDICATION NAME Cranberry.  2 capsules daily    Yes Reported, Patient   VITAMIN D, CHOLECALCIFEROL, PO Take 2,000 Units by mouth every other day Every other day at Supper   Yes Reported, Patient   Probiotic Product (ACIDOPHILUS PROBIOTIC BLEND) CAPS Take 1 capsule by mouth daily (with breakfast)  3/14/16  Yes Jesenia Madrigal MD   ferrous gluconate (FERGON) 324 (38 FE) MG tablet Take 1 tablet (324 mg) by mouth daily (with breakfast) 5/22/15  Yes Jesenia Madrigal MD   potassium 99 MG TABS Take 1 tablet by mouth daily (with dinner)    Yes Reported, Patient   OMEGA-3 FATTY ACIDS 1200 MG OR CAPS 1 TABLET DAILY 1/16/09  Yes John Daniels MD   CENTRUM SILVER OR TABS 1 TABLET DAILY 10/5/04  Yes John Daniels MD       Allergies   Allergen Reactions     Augmentin Diarrhea     Codeine Nausea and Vomiting     Hydrocodone      Keeps patient awake     Naproxen Itching and Rash     Sulfa Drugs Nausea        REVIEW OF SYSTEMS:   5 point ROS negative except as noted above in HPI, including Gen., Resp., CV, GI &  system review.    PHYSICAL EXAM:   /81 (BP Location: Left arm)  Pulse 77  Temp 98.3  F (36.8  C) (Axillary)  Resp 20  Ht 1.626 m (5' 4\")  Wt 107.5 kg (237 lb 1.6 oz)  LMP  (LMP Unknown)  SpO2 100%  BMI 40.7 kg/m2 Estimated body mass index is 40.7 kg/(m^2) as calculated from the following:    Height as of this encounter: 1.626 m (5' 4\").    Weight as of this encounter: 107.5 kg (237 lb 1.6 oz).   GENERAL APPEARANCE: alert, and oriented  MENTAL STATUS: alert  AIRWAY EXAM: Mallampatti Class II " (visualization of the soft palate, fauces, and uvula)  RESP: lungs clear to auscultation - no rales, rhonchi or wheezes  CV: regular rates and rhythm  DIAGNOSTICS:    Not indicated    IMPRESSION   ASA Class 2 - Mild systemic disease    PLAN:   Plan for Colonoscopy with possible biopsy, possible polypectomy. We discussed the risks, benefits and alternatives and the patient wished to proceed.    The above has been forwarded to the consulting provider.      Signed Electronically by: Marco Gusman  March 29, 2018

## 2018-03-29 NOTE — PHARMACY-ADMISSION MEDICATION HISTORY
Admission medication history interview status for this patient is complete. See [facility] admission navigator for allergy information, prior to admission medications and immunization status.     Medication history interview source(s):Patient  Medication history resources (including written lists, pill bottles, clinic record):Med list    Changes made to PTA medication list:  Added: Metamucil caps, Colace qod in am;  Deleted: Magnesium citrate x once;  Changed: Calcium/d to qod; Times when takes meds      Medication reconciliation/reorder completed by provider prior to medication history? Yes    Do you take OTC medications (eg tylenol, ibuprofen, fish oil, eye/ear drops, etc)? Yes    For patients on insulin therapy: N      Prior to Admission medications    Medication Sig Last Dose Taking? Auth Provider   magnesium  Take 1 tablet by mouth daily  Yes Reported, Patient   aspirin EC 81 MG EC tablet Take 81 mg by mouth At Bedtime 3/27/2018 at Unknown time Yes Unknown, Entered By History   psyllium (METAMUCIL) 0.52 G capsule Take 2 capsules by mouth At Bedtime  Yes Unknown, Entered By History   Calcium Carbonate-Vit D-Min (RA CALCIUM PLUS MINERALS/VIT D PO) Take 1 tablet by mouth every other day Every other day at Lunch 3/27/2018 at Unknown time Yes Unknown, Entered By History   docusate sodium (COLACE) 100 MG capsule Take 100 mg by mouth every other day Every other day at Breakfast  Yes Unknown, Entered By History   docusate sodium (COLACE) 100 MG capsule Take 100 mg by mouth At Bedtime  Yes Unknown, Entered By History   meloxicam (MOBIC) 15 MG tablet Take 1 tablet (15 mg) by mouth daily Due for follow up by 5/1/2018 3/28/2018 at am Yes Jesenia Madrigal MD   fenofibrate 160 MG tablet Take 1 tablet (160 mg) by mouth daily 3/27/2018 at Unknown time Yes Jesenia Madrigal MD   lisinopril (PRINIVIL,ZESTRIL) 30 MG tablet TAKE ONE TABLET BY MOUTH EVERY DAY FOR HYPERTENSION 3/27/2018 at Unknown time Yes Jesenia Madrigal  MD   levothyroxine (SYNTHROID/LEVOTHROID) 50 MCG tablet Take 1 tablet (50 mcg) by mouth daily 3/28/2018 at am Yes Jesenia Madrigal MD   pantoprazole (PROTONIX) 20 MG EC tablet TAKE ONE TABLET BY MOUTH EVERY DAY 3/27/2018 at hs Yes Jesenia Madrigal MD   simvastatin (ZOCOR) 20 MG tablet TAKE ONE TABLET BY MOUTH EVERY NIGHT AT BEDTIME 3/27/2018 at hs Yes Jesenia Madrigal MD   amLODIPine (NORVASC) 5 MG tablet TAKE ONE TABLET BY MOUTH EVERY NIGHT AT BEDTIME 3/27/2018 at Unknown time Yes Jesenia Madrigal MD   travoprost, TIMI Free, (TRAVATAN Z) 0.004 % ophthalmic solution Place 1 drop into both eyes At Bedtime  3/27/2018 at Unknown time Yes Jesenia Madrigal MD   UNABLE TO FIND daily (with dinner) MEDICATION NAME Cranberry.  2 capsules daily   Yes Reported, Patient   VITAMIN D, CHOLECALCIFEROL, PO Take 2,000 Units by mouth every other day Every other day at Supper 3/27/2018 at Unknown time Yes Reported, Patient   Probiotic Product (ACIDOPHILUS PROBIOTIC BLEND) CAPS Take 1 capsule by mouth daily (with breakfast)  3/28/2018 at am Yes Jesenia Madrigal MD   ferrous gluconate (FERGON) 324 (38 FE) MG tablet Take 1 tablet (324 mg) by mouth daily (with breakfast) 3/28/2018 at am Yes Jesenia Madrigal MD   potassium 99 MG TABS Take 1 tablet by mouth daily (with dinner)  3/27/2018 at Unknown time Yes Reported, Patient   OMEGA-3 FATTY ACIDS 1200 MG OR CAPS 1 TABLET DAILY 3/28/2018 at Unknown time Yes John Daniels MD   CENTRUM SILVER OR TABS 1 TABLET DAILY 3/27/2018 at Unknown time Yes John Daniels MD

## 2018-03-29 NOTE — ED NOTES
"[facility]  ED Nurse Handoff Report    Lexii Stratton is a 77 year old female   ED Chief complaint: Shortness of Breath and Rectal Bleeding  . ED Diagnosis:   Final diagnoses:   Gastrointestinal hemorrhage, unspecified gastrointestinal hemorrhage type   Anemia due to blood loss, acute   Dyspnea on exertion     Allergies:   Allergies   Allergen Reactions     Augmentin Diarrhea     Codeine Nausea and Vomiting     Hydrocodone      Keeps patient awake     Naproxen Itching and Rash     Sulfa Drugs Nausea       Code Status: Full Code  Activity level - Baseline/Home:  Independent. Activity Level - Current:   Stand with Assist. Lift room needed: No. Bariatric: No   Needed: No   Isolation: No. Infection: Not Applicable.     Vital Signs:   Vitals:    03/28/18 2010 03/28/18 2015 03/28/18 2209 03/28/18 2215   BP: 168/75  176/74 155/62   Pulse: 98  90    Resp: 20  22    Temp: 98.2  F (36.8  C)  97.8  F (36.6  C)    TempSrc: Oral      SpO2: 99% 98%  96%   Weight: 101.2 kg (223 lb)      Height: 1.626 m (5' 4\")          Cardiac Rhythm:  ,    NSR  Pain level:  0  Patient confused: No. Patient Falls Risk: Yes.   Elimination Status: Has voided   Patient Report - Initial Complaint: Pt comes in with bloody diarrhea x3 that started 2 hours PTA. Pt also reports SOB and weakness for last 3 days, progressively getting worse.   Focused Assessment:   Gastrointestinal - GI WDL:  WDL except (C/O diarrhea with blood for past 2 hours) Gastrointestinal Comment: denies pain or cramping BH    20:22 Respiratory Respiratory - Respiratory WDL:  WDL except (C/O SOB with exertion for a few days)       Tests Performed: Xray, Extremity US, Labs,   Abnormal Results:   Labs Ordered and Resulted from Time of ED Arrival Up to the Time of Departure from the ED   CBC WITH PLATELETS DIFFERENTIAL - Abnormal; Notable for the following:        Result Value    RBC Count 2.30 (*)     Hemoglobin 5.7 (*)     Hematocrit 19.7 (*)     MCH 24.8 " (*)     MCHC 28.9 (*)     All other components within normal limits   COMPREHENSIVE METABOLIC PANEL - Abnormal; Notable for the following:     Glucose 197 (*)     Urea Nitrogen 32 (*)     GFR Estimate 54 (*)     Albumin 2.6 (*)     Protein Total 5.9 (*)     All other components within normal limits   INR - Abnormal; Notable for the following:     INR 1.15 (*)     All other components within normal limits   PARTIAL THROMBOPLASTIN TIME   TSH WITH FREE T4 REFLEX   CARDIAC CONTINUOUS MONITORING   VITAL SIGNS   PULSE OXIMETRY NURSING   PERIPHERAL IV CATHETER   CARDIAC CONTINUOUS MONITORING   ABO/RH TYPE AND SCREEN   RED BLOOD CELL PREPARE ORDER UNIT   BLOOD COMPONENT       Treatments provided: Blood transfusion 1 unit.  One unit pending.  Protonix. Fluids.   Family Comments: No family with patient  OBS brochure/video discussed/provided to patient:  N/A  ED Medications:   Medications   sodium chloride 0.9% infusion ( Intravenous New Bag 3/28/18 2133)   pantoprazole (PROTONIX) 40 mg IV push injection (40 mg Intravenous Given 3/28/18 2214)     Drips infusing:  Yes  For the majority of the shift, the patient's behavior Green. Interventions performed were None.     Severe Sepsis OR Septic Shock Diagnosis Present: No      ED Nurse Name/Phone Number: Bernadette Orantes,   10:32 PM    RECEIVING UNIT ED HANDOFF REVIEW    Above ED Nurse Handoff Report was reviewed: Yes  Reviewed by: Della Chambers on March 28, 2018 at 10:50 PM

## 2018-03-29 NOTE — PLAN OF CARE
Problem: Patient Care Overview  Goal: Plan of Care/Patient Progress Review  VSS. Denied pain. Bowel prep beginning of shift, multiple watery/bloody stools. Colonoscopy completed, see gi notes. Continues on Tele: Sb/SR St depression. Up w/ SBA. Started on Clear liquid diet. Serial hgb, last check of 7.6, to receive iron infusion. 2+ edema LLE. Urinary frequency/urgency. IV fluids decreased to 75 ml's per hour.

## 2018-03-29 NOTE — ED PROVIDER NOTES
"  History     Chief Complaint:  Shortness of breath and Rectal Bleeding    HPI   Lexii Stratton is a 77 year old female with a history of hyperlipidemia, hypertension, small bowel obstruction, and other chronic pain who presents to the emergency department today for evaluation of shortness of breath and rectal bleeding. The patient reports a few days ago she started becoming weak with short of breath which is worse with walking. These have been progressively worsening since the onset. Then two hours prior to arrival she developed bloody diarrhea which she describes as \"a lot\" of bright red blood with stool. No clots. With progressively worsening shortness of breath and new onset bloody diarrhea she presents to the ED for further evaluation. The patient denies cough, rhinorrhea, fevers, urinary symptoms, and use of OTC pain relievers. Of note, the patient fell on ice three weeks ago and still has some left knee pain and swelling. No other injuries.     Allergies:  Augmentin  Codeine  Hydrocodone  Naproxen  Sulfa Drugs     Medications:    magnesium 250 MG tablet  meloxicam (MOBIC) 15 MG tablet  magnesium citrate solution  fenofibrate 160 MG tablet  lisinopril (PRINIVIL,ZESTRIL) 30 MG tablet  levothyroxine (SYNTHROID/LEVOTHROID) 50 MCG tablet  pantoprazole (PROTONIX) 20 MG EC tablet  ASPIRIN PO  simvastatin (ZOCOR) 20 MG tablet  amLODIPine (NORVASC) 5 MG tablet  Docusate Sodium (COLACE PO)  ferrous gluconate (FERGON) 324 (38 FE) MG tablet  potassium 99 MG TABS  OMEGA-3 FATTY ACIDS 1200 MG OR CAPS  CENTRUM SILVER OR TABS    Past Medical History:    Arthritis  Chronic infection  Esophageal reflux  Abdominal hernia  Mumps  Obesity  Osteoarthritis   Hyperlipidemia   Other chronic pain  Peptic ulcer  Small bowel obstruction  Thyroid disease    Past Surgical History:    Arthroplasty hip - bilateral   Hernia repair  Hyperdistention surgery and sling  Tonsillectomy & Adenoidectomy   Vaginal hysterectomy  PPTL  L shoulder to " "remove bone spurs  Cysto and durasphere x2  Retropubic TVT sling  Cholecystectomy  Colonoscopy  Cystoscopy  EGD combined    Family History:    Heart disease  Gallbladder disease  CAD  Hyperlipidemia   Asthma  Throat cancer  Diabetes  Lung cancer    Social History:  The patient was alone.  Smoking Status: Never  Smokeless Tobacco: Never  Alcohol Use: No  Marital Status:  Single      Review of Systems   Constitutional: Negative for fever.   HENT: Negative for rhinorrhea.    Respiratory: Positive for shortness of breath. Negative for cough.    Gastrointestinal: Positive for anal bleeding and diarrhea.   Genitourinary: Negative for decreased urine volume, difficulty urinating, dysuria, frequency, hematuria and urgency.   Neurological: Positive for weakness.   All other systems reviewed and are negative.    Physical Exam     Patient Vitals for the past 24 hrs:   BP Temp Temp src Pulse Heart Rate Resp SpO2 Height Weight   03/28/18 2215 155/62 - - - 85 - 96 % - -   03/28/18 2209 176/74 97.8  F (36.6  C) - 90 - 22 - - -   03/28/18 2015 - - - - 90 - 98 % - -   03/28/18 2010 168/75 98.2  F (36.8  C) Oral 98 98 20 99 % 1.626 m (5' 4\") 101.2 kg (223 lb)     Physical Exam  General: The patient is alert, in no respiratory distress.    HENT: Mucous membranes moist.    Cardiovascular: Regular rate and rhythm. Good pulses in all four extremities. Normal capillary refill and skin turgor.     Respiratory: Lungs are clear. No nasal flaring. No retractions. No wheezing, no crackles.    Gastrointestinal: Abdomen soft. No guarding, no rebound. No palpable hernias. No abdominal tenderness.     Hematologic: Sclera consistent with anemic female.     Rectal: Bright red blood external on anus. No palpable hemorrhoid. Maroon to bright red blood stool per rectum on exam.     Musculoskeletal: No gross deformity.     Skin: No rashes or petechiae.     Neurologic: The patient is alert and oriented x3. GCS 15. No testable cranial nerve deficit. " Follows commands with clear and appropriate speech. Gives appropriate answers. Good strength in all extremities. No gross neurologic deficit. Gross sensation intact. Pupils are round and reactive. No meningismus.     Lymphatic: No cervical adenopathy. No lower extremity swelling.    Psychiatric: The patient is non-tearful.    Emergency Department Course     ECG:  Indication: shortness of breath   Completed at 2017.  Read at 2120.   Normal sinus rhythm  Normal ECG   Rate 88 bpm. NH interval 176. QRS duration 88. QT/QTc 378/457. P-R-T axes 32 77 12.    Imaging:  Radiology findings were communicated with the patient who voiced understanding of the findings.  XR Chest 2 Views   IMPRESSION: Negative  Report per radiology     Laboratory:  Laboratory findings were communicated with the patient who voiced understanding of the findings.  TSH with free T4 reflex: 0.98  CBC: HGB 5.7 (LL) o/w WNL. (WBC 9.1, )   CMP: Glucose 197 (H), BUN 32 (H), GFR Estimate 54 (L), Albumin 2.6 (L), Protein total 5.9 (L) o/w WNL (Creatinine 0.99)  INR: 1.15 (H)   PTT: 28  ABO/Rh type and screen: O+ (antibody screen negative)    Interventions:  2133 NS Infusion 1,00mL/hr IV  2214 Protonix 40mg IV   2 units of blood      Emergency Department Course:  Nursing notes and vitals reviewed.  The patient was sent for a XR Chest 2 Views while in the emergency department, results above.   IV was inserted and blood was drawn for laboratory testing, results above.  2005: I performed an exam of the patient as documented above.   2126: Patient rechecked and updated.   2141: I spoke with Dr. Doherty of the hospitalist service regarding patient's presentation, findings, and plan of care.  2150: I spoke with Dr. Doherty after his assessment regarding patient's plan of care.   2152:  I spoke with Dr. Monaco of the [facility] service regarding patient's presentation, findings, and plan of care. He has no other recommendations at this time.   Findings and  plan explained to the Patient who consents to admission. Discussed the patient with Dr. Doherty, who will admit the patient to a adult med tele bed for further monitoring, evaluation, and treatment.  I personally reviewed the laboratory and imaging results with the Patient and answered all related questions prior to admission.    Impression & Plan      Medical Decision Making:  The patient's chart does say that she has a history of peptic ulcer, however, on review of her previous endoscopy two years ago, there was no mention of any gastric abnormality. She is on no blood thinners. She mainly reports she has been short of breath over the last several days. She has baseline anemia, but worrisome that she first developed diarrhea and the last 2-3 stools have been bloody. I did discuss the case with Dr. Monaco and we considered the possibility of ischemic bowel, however, the patient has no signs of any abdominal pain or tenderness and I did question her about this because she has a history of bowel obstruction. Her abdomen feels fine, and she is only short of breath on exertion. The rectal exam here did show bright red blood, but there was not a large amount of blood coming out and no palpable hemorrhoids. I order two units to help resuscitate her hemoglobin. She was not particularly tachycardic or hypotensive and the patient was admitted to a medical bed in good condition.     Critical Care time was 60 minutes for this patient excluding procedures.    Diagnosis:    ICD-10-CM    1. Gastrointestinal hemorrhage, unspecified gastrointestinal hemorrhage type K92.2 ABO/Rh type and screen     Blood component     Blood component   2. Anemia due to blood loss, acute D62    3. Dyspnea on exertion R06.09      Disposition:  Admitted to med [facility]    Scribe Disclosure:  YOKASTA, Lenora Funes, am serving as a scribe at 8:05 PM on 3/28/2018 to document services personally performed by Nakul Aden MD based on my observations and the  provider's statements to me.     3/28/2018   [facility] EMERGENCY DEPARTMENT       Nakul Aden MD  03/29/18 0025

## 2018-03-29 NOTE — PROVIDER NOTIFICATION
"Admitting hospitalist paged \"Serial hgb being drawn, latest one 6.7. Venofer currently infusing. Should we give a unit of blood?\"    Dr Jarquin answered page, give one unit of blood, re-check hgb in AM  "

## 2018-03-29 NOTE — PROGRESS NOTES
xcover  Notified about decreasing Hgb trend and earlier bleeding per rectum symptoms.  Transfuse 1 unit of PRBC tonight. Stop IVF during transfusion and will defer resumption of IVF in the morning rounder.    Repeat HGB in AM

## 2018-03-29 NOTE — PROGRESS NOTES
[facility]  Hospitalist Progress Note for 03/29/2018          Assessment and Plan:   Ms Lexii Stratton is a 77 yr old is a 77-year-old female with a history of hypertension, hyperlipidemia, gastroesophageal reflux disease and peptic ulcer disease, hypothyroidism, chronic pain syndrome, previous small-bowel obstruction resolved without surgical intervention and iron deficiency anemia with anemia of chronic disease presenting to [facility] for evaluation of rectal bleeding.      Acute Rectal bleeding:   Hx recurrent GI bleed   Unclear etiology. Hemodynamically stable.Previous CT scans and do not appreciate any evidence of masses, nor diverticulosis.  s/p several colonoscopies  and EGD last in 2016(colonoscopy showed 5 mm tubular adenoma). Previously seen by Dr. Monaco for an evaluation of iron-deficiency anemia with no significant abnormalities identified.   - s/p colonoscopy this am -neg   - GI following, will wait for their further recommendations  - continue IVF, start cl liq diet, cont IV PPI    - Stop PTA ASA & Mobic  will be requested in anticipation of a sigmoidoscopy or colonoscopy in the morning.  She is having symptomatic anemia, so will give her 2 units of packed red blood cells and continue serial hemoglobins overnight.      Acute blood loss anemia on chronic symptomatic anemia:    The patient has baseline iron deficiency anemia and anemia of chronic disease, with hgb of 8.4 about a month and a half ago &  has been worked up in the past by Gastroenterology.  Again, there has not been any clearcut cause identified.   - admission hemoglobin is 5.7   - s/p 2 units PRBC transfused 3/28, f/u hgb 7.2   - cont BRBPR,  hgb stable 7.6   - ferritin level low -likely falsely low 2/2 PRBC received yest  - give 1 dose of Venofer,   - continue to check serial hemoglobins overnight.   - may need a  bowel pill camera study test     History of hypertension:   - BP acceptable   - continue  "to hold PTA  antihypertensive medications for now( amlodipine and lisinopril)     Hyperlipidemia:   - hold her prior to admission simvastatin.      Chronic pain/OA:  - on ASA, Mobic  - both held due to recurrent GIB     Previous small-bowel obstruction:  This responded with an NG tube and conservative treatment.  No surgical intervention warranted.  She does have a history of some abdominal hernia as it appears based on the electronic medical review.       DVT prevention: SCDs   CODE STATUS:  Full code as discussed with the patient.   Disposition: poss d/c in 1-2 days.    Siri Alas MD.  Hospitalist [phone] (7am -6 pm)                     Interval History:   Continued BRBPR- bu less post colonoscopy.denies abd pain.              Medications:       pantoprazole (PROTONIX) IV  40 mg Intravenous BID     sodium chloride (PF)  3 mL Intracatheter Q8H     naloxone, melatonin, - MEDICATION INSTRUCTIONS -, acetaminophen, potassium chloride, potassium chloride, potassium chloride, potassium chloride with lidocaine, potassium chloride, magnesium sulfate, ondansetron **OR** ondansetron, prochlorperazine **OR** prochlorperazine **OR** prochlorperazine, lidocaine (buffered or not buffered), lidocaine 4%, sodium chloride (PF), - MEDICATION INSTRUCTIONS -, - MEDICATION INSTRUCTIONS -, - MEDICATION INSTRUCTIONS -, sodium chloride (PF), naloxone, flumazenil               Physical Exam:   Blood pressure 134/65, pulse 77, temperature 98.3  F (36.8  C), temperature source Axillary, resp. rate 8, height 1.626 m (5' 4\"), weight 107.5 kg (237 lb 1.6 oz), SpO2 96 %, not currently breastfeeding.  Wt Readings from Last 4 Encounters:   18 107.5 kg (237 lb 1.6 oz)   18 99.8 kg (220 lb)   18 104.3 kg (230 lb)   18 105 kg (231 lb 6.4 oz)         Vital Sign Ranges  Temperature Temp  Av.4  F (36.9  C)  Min: 97.8  F (36.6  C)  Max: 98.9  F (37.2  C)   Blood pressure Systolic (24hrs), Av , Min:117 , " Max:176        Diastolic (24hrs), Av, Min:45, Max:81      Pulse Pulse  Av.3  Min: 77  Max: 98   Respirations Resp  Av.1  Min: 8  Max: 28   Pulse oximetry SpO2  Av %  Min: 92 %  Max: 100 %         Intake/Output Summary (Last 24 hours) at 18 1402  Last data filed at 18 1400   Gross per 24 hour   Intake          1295.67 ml   Output              600 ml   Net           695.67 ml       Constitutional: Awake, alert, cooperative, no apparent distress   Lungs: Clear to auscultation bilaterally, no crackles or wheezing   Cardiovascular: Regular rate and rhythm, normal S1 and S2, and no murmur noted   Abdomen: Normal bowel sounds, soft, non-distended, non-tender   Skin: No rashes, no cyanosis, no edema   Neuro:                Data:   All laboratory data reviewed

## 2018-03-29 NOTE — ED NOTES
Assisted patient to bathroom.  Large amount of bright red blood noted in toilet.  Patient very SOB with exertion.

## 2018-03-29 NOTE — PROGRESS NOTES
"SPIRITUAL HEALTH SERVICES  SPIRITUAL ASSESSMENT Progress Note  [facility] Med. Surg. 5    PRIMARY FOCUS:     Goals of care    Emotional/spiritual/Latter day distress    Support for coping    ILLNESS CIRCUMSTANCES:   Reviewed documentation. Reflective conversation shared with pt Lexii which integrated elements of illness and family narratives.     Context of Serious Illness/Symptom(s) - Lexii was admitted with a GI bleed and will have a colonoscopy at noon.    Persons/Resources Involved - Lexii named her three children (two sons and a daughter) and their families and her Jew.    DISTRESS:     Emotional/Existential/Relational Distress - none directly expressed.    Spiritual/Muslim Distress - none named.    Social/Cultural/Economic Distress - none mentioned.    SPIRIT (Coping):     Mormon/Eli - \"I've been Faith all my life.\"  She is affiliated with [employer] in [place]; she plans to call her Jew to let them know she is here.    Spiritual Practice(s) - Jew attendance, prayer.  Lexii welcomed prayer.    Emotional/Existential/Relational Connections - none mentioned beyond family and Jew.    SENSE-MAKING:    Goals of Care - colonoscopy at noon.  Informed Lexii how to request further  support.    Meaning/Sense-Making - Lexii talked about her four grandchildren: two in college and two 10 year-olds (twins).  She provide childcare for the twins when their parents are at work (they are with an aunt the next two days).    PLAN: No further plans; I and other chaplains remain available per pt/family request.    John Coy M.Div., [facility]  Staff   Pager [phone]  "

## 2018-03-29 NOTE — PLAN OF CARE
Problem: Patient Care Overview  Goal: Plan of Care/Patient Progress Review  Outcome: No Change  x1 assist with transfers A&Ox4. Pt admitted to unit around 0000 on 3/28/18 from ED with SOB and bright red loose stools. Hgb 5.7. 2 units RBC given. Regularly incontinent of urine with frequency/urgency Elevated blood pressure with hx of htn. 02 sats within range, tachypnea with accessory muscle use. No complaints of lightheadedness/dizziness. Tele monitoring sinus rhythm with HR in 70's. Colon prep initiated at 0530 for colonoscopy scheduled for 12-1pm today. GI consult.

## 2018-03-30 LAB
HGB BLD-MCNC: 7.8 G/DL (ref 11.7–15.7)
HGB BLD-MCNC: 8.5 G/DL (ref 11.7–15.7)
MAGNESIUM SERPL-MCNC: 2 MG/DL (ref 1.6–2.3)

## 2018-03-30 PROCEDURE — 25000132 ZZH RX MED GY IP 250 OP 250 PS 637: Mod: GY | Performed by: INTERNAL MEDICINE

## 2018-03-30 PROCEDURE — 99207 ZZC MOONLIGHTING INDICATOR: CPT | Performed by: INTERNAL MEDICINE

## 2018-03-30 PROCEDURE — 99232 SBSQ HOSP IP/OBS MODERATE 35: CPT | Performed by: INTERNAL MEDICINE

## 2018-03-30 PROCEDURE — 85018 HEMOGLOBIN: CPT | Performed by: INTERNAL MEDICINE

## 2018-03-30 PROCEDURE — 36415 COLL VENOUS BLD VENIPUNCTURE: CPT | Performed by: HOSPITALIST

## 2018-03-30 PROCEDURE — 25000125 ZZHC RX 250: Performed by: HOSPITALIST

## 2018-03-30 PROCEDURE — 36415 COLL VENOUS BLD VENIPUNCTURE: CPT | Performed by: INTERNAL MEDICINE

## 2018-03-30 PROCEDURE — 83735 ASSAY OF MAGNESIUM: CPT | Performed by: INTERNAL MEDICINE

## 2018-03-30 PROCEDURE — 12000000 ZZH R&B MED SURG/OB

## 2018-03-30 PROCEDURE — A9270 NON-COVERED ITEM OR SERVICE: HCPCS | Mod: GY | Performed by: INTERNAL MEDICINE

## 2018-03-30 PROCEDURE — 85018 HEMOGLOBIN: CPT | Performed by: HOSPITALIST

## 2018-03-30 RX ORDER — TRAVOPROST OPHTHALMIC SOLUTION 0.04 MG/ML
1 SOLUTION OPHTHALMIC AT BEDTIME
Status: DISCONTINUED | OUTPATIENT
Start: 2018-03-30 | End: 2018-03-31 | Stop reason: HOSPADM

## 2018-03-30 RX ORDER — LEVOTHYROXINE SODIUM 50 UG/1
50 TABLET ORAL
Status: DISCONTINUED | OUTPATIENT
Start: 2018-03-31 | End: 2018-03-31 | Stop reason: HOSPADM

## 2018-03-30 RX ORDER — AMLODIPINE BESYLATE 5 MG/1
5 TABLET ORAL AT BEDTIME
Status: DISCONTINUED | OUTPATIENT
Start: 2018-03-30 | End: 2018-03-31 | Stop reason: HOSPADM

## 2018-03-30 RX ADMIN — TRAVOPROST 1 DROP: 0.04 SOLUTION/ DROPS OPHTHALMIC at 21:54

## 2018-03-30 RX ADMIN — PANTOPRAZOLE SODIUM 40 MG: 40 INJECTION, POWDER, FOR SOLUTION INTRAVENOUS at 10:17

## 2018-03-30 RX ADMIN — AMLODIPINE BESYLATE 5 MG: 5 TABLET ORAL at 21:20

## 2018-03-30 RX ADMIN — PANTOPRAZOLE SODIUM 40 MG: 40 INJECTION, POWDER, FOR SOLUTION INTRAVENOUS at 21:20

## 2018-03-30 ASSESSMENT — ACTIVITIES OF DAILY LIVING (ADL)
ADLS_ACUITY_SCORE: 15

## 2018-03-30 NOTE — PROGRESS NOTES
[facility]  Hospitalist Progress Note for 03/30/2018          Assessment and Plan:   Ms Lexii Stratton is a 77 yr old is a 77-year-old female with a history of hypertension, hyperlipidemia, gastroesophageal reflux disease and peptic ulcer disease, hypothyroidism, chronic pain syndrome, previous small-bowel obstruction resolved without surgical intervention and iron deficiency anemia with anemia of chronic disease presenting to [facility] for evaluation of rectal bleeding.       Acute Rectal bleeding:   Hx recurrent GI bleed   Unclear etiology. Hemodynamically stable.Previous CT scans and do not appreciate any evidence of masses, nor diverticulosis.  s/p several colonoscopies  and EGD last in 2016(colonoscopy showed 5 mm tubular adenoma). Previously seen by Dr. Monaco for an evaluation of iron-deficiency anemia with no significant abnormalities identified.   - s/p colonoscopy 3/29 -ve  , did receive 1 more U PRBC last evening, hgb 7.8 this am  - clinically improving, had a sm brown stool with a little bl this am, no and pain, tolerating cl liq diet  - decrease  IVF, start cl liq diet, cont IV PPI    - Stopped PTA ASA & Mobic- not to be restarted at d/c       Acute blood loss anemia on chronic symptomatic anemia:    Hx iron deficiency anemia /Chronic anemia:   with hgb of 8.4 about a month and a half ago &  has been worked up in the past by Gastroenterology.  Again, there has not been any clearcut cause identified.   - admission hemoglobin is 5.7   - s/p 2 units PRBC transfused 3/28 & 1 U later that day for a Hgb 6.7 f/u hgb 7.2   - cont BRBPR,  hgb this am  7.8 , repeat pending  - ferritin level low -likely falsely low 2/2 PRBC received yest  - received 1 dose of Venofer last night, cont po iron at d/c   - continue to check serial hemoglobins Q 12hrs       History of hypertension:   - BP acceptable , starting to increase  -  PTA  ( amlodipine and lisinopril) have been on hold  -  "decrease IVF to TKO   - restart PTA Amlodipine     Hypothyroidism:  - nl TSH, resumed PTA Levothyroxine     Hyperlipidemia:   - hold her prior to admission simvastatin.       Chronic pain/OA:  - on ASA, Mobic  - both held due to recurrent GIB      Previous small-bowel obstruction:  This responded with an NG tube and conservative treatment.  No surgical intervention warranted.  She does have a history of some abdominal hernia as it appears based on the electronic medical review.        DVT prevention: SCDs   CODE STATUS:  Full code as discussed with the patient.   Disposition: poss d/c in am.     Siri Alas MD.  Hospitalist [phone] (7am -6 pm)                      Interval History:   Anxious- explained why her home BP & statin meds have been on hold. Restarted thyroid/ glaucoma meds.              Medications:       pantoprazole (PROTONIX) IV  40 mg Intravenous BID     sodium chloride (PF)  3 mL Intracatheter Q8H     naloxone, melatonin, - MEDICATION INSTRUCTIONS -, acetaminophen, potassium chloride, potassium chloride, potassium chloride, potassium chloride with lidocaine, potassium chloride, magnesium sulfate, ondansetron **OR** ondansetron, prochlorperazine **OR** prochlorperazine **OR** prochlorperazine, lidocaine (buffered or not buffered), lidocaine 4%, sodium chloride (PF), - MEDICATION INSTRUCTIONS -, - MEDICATION INSTRUCTIONS -, - MEDICATION INSTRUCTIONS -, sodium chloride (PF), naloxone, EPINEPHrine, diphenhydrAMINE, methylPREDNISolone, ranitidine               Physical Exam:   Blood pressure 145/47, pulse 76, temperature 98.2  F (36.8  C), temperature source Oral, resp. rate 18, height 1.626 m (5' 4\"), weight 107.5 kg (237 lb 1.6 oz), SpO2 97 %, not currently breastfeeding.  Wt Readings from Last 4 Encounters:   18 107.5 kg (237 lb 1.6 oz)   18 99.8 kg (220 lb)   18 104.3 kg (230 lb)   18 105 kg (231 lb 6.4 oz)         Vital Sign Ranges  Temperature Temp  Av.4  F " (36.9  C)  Min: 97.9  F (36.6  C)  Max: 98.7  F (37.1  C)   Blood pressure Systolic (24hrs), Av , Min:117 , Max:174        Diastolic (24hrs), Av, Min:45, Max:78      Pulse Pulse  Av.5  Min: 71  Max: 76   Respirations Resp  Av.1  Min: 8  Max: 24   Pulse oximetry SpO2  Av.4 %  Min: 92 %  Max: 100 %         Intake/Output Summary (Last 24 hours) at 18 1203  Last data filed at 18 0322   Gross per 24 hour   Intake             1893 ml   Output                0 ml   Net             1893 ml       Constitutional: Awake, alert, cooperative, no apparent distress   Lungs: Clear to auscultation bilaterally, no crackles or wheezing   Cardiovascular: Regular rate and rhythm, normal S1 and S2, and no murmur noted   Abdomen: Normal bowel sounds, soft, non-distended, non-tender   Skin: No rashes, no cyanosis, no edema   Neuro:                Data:   All laboratory data reviewed

## 2018-03-30 NOTE — PLAN OF CARE
Problem: Patient Care Overview  Goal: Plan of Care/Patient Progress Review  Outcome: Improving  Pt up with standby assist. No complaints of SOB or dizziness but labored respirations observed with activity and ambulation in room. VSS. BS active and no BM on shift. Last BM was 3/29/18, formed and brown. Serial hgb recheck this morning. GI is following, clear liquid diet with possible capsule endoscopy today. Moderate edema noted in LLE due to fall/knee injury 3weeks ago and in LUE. IV saline locked. Hospitalst to determine if IVF will be restarted this AM.

## 2018-03-30 NOTE — PLAN OF CARE
Problem: Patient Care Overview  Goal: Plan of Care/Patient Progress Review  Outcome: No Change  Pt up SBA. Alert and oriented x4. Tolerating a clear liq diet w/ fair appetite.  Denies pain. 4 stools on this shift. Small blood x3, brown x1. Hgb 6.7, admitting provider paged (Dr Jarquin), 1 unit of blood currently running. Re-check blood in AM. Iron sucrose given this shift. Pt c/o mild SOB with activity. BP max 174/64. Generalized swelling noted. D/C TBD.

## 2018-03-30 NOTE — PLAN OF CARE
Problem: Gastrointestinal Bleeding (Adult)  Goal: Signs and Symptoms of Listed Potential Problems Will be Absent, Minimized or Managed (Gastrointestinal Bleeding)  Signs and symptoms of listed potential problems will be absent, minimized or managed by discharge/transition of care (reference Gastrointestinal Bleeding (Adult) CPG).   Outcome: No Change  Pt is A&O X3. She is a stand by assist for transfers and is on a clear liquid diet. Pt has been needing to urinate frequently, has urgency, and at times is incontinent of urine. Pt has one sm loose bowel movement this AM that was brown with streaks of bright red blood in it. Pt denies any pain. Hgb was 7.8. Nursing staff spoke with GI Dr over the phone and per GI, pt could DC home if ok with hospitalist. Pt updated and became tearful and frustrated. Nursing staff spoke with primary hospitalist and GI Dr. Plan pending.

## 2018-03-31 VITALS
DIASTOLIC BLOOD PRESSURE: 63 MMHG | RESPIRATION RATE: 20 BRPM | SYSTOLIC BLOOD PRESSURE: 154 MMHG | HEIGHT: 64 IN | BODY MASS INDEX: 38.82 KG/M2 | WEIGHT: 227.4 LBS | TEMPERATURE: 98.4 F | HEART RATE: 59 BPM | OXYGEN SATURATION: 96 %

## 2018-03-31 LAB — HGB BLD-MCNC: 8 G/DL (ref 11.7–15.7)

## 2018-03-31 PROCEDURE — 99239 HOSP IP/OBS DSCHRG MGMT >30: CPT | Performed by: INTERNAL MEDICINE

## 2018-03-31 PROCEDURE — A9270 NON-COVERED ITEM OR SERVICE: HCPCS | Mod: GY | Performed by: INTERNAL MEDICINE

## 2018-03-31 PROCEDURE — 25000132 ZZH RX MED GY IP 250 OP 250 PS 637: Mod: GY | Performed by: HOSPITALIST

## 2018-03-31 PROCEDURE — 36415 COLL VENOUS BLD VENIPUNCTURE: CPT | Performed by: HOSPITALIST

## 2018-03-31 PROCEDURE — 85018 HEMOGLOBIN: CPT | Performed by: HOSPITALIST

## 2018-03-31 PROCEDURE — 25000132 ZZH RX MED GY IP 250 OP 250 PS 637: Mod: GY | Performed by: INTERNAL MEDICINE

## 2018-03-31 PROCEDURE — A9270 NON-COVERED ITEM OR SERVICE: HCPCS | Mod: GY | Performed by: HOSPITALIST

## 2018-03-31 RX ORDER — PANTOPRAZOLE SODIUM 40 MG/1
40 TABLET, DELAYED RELEASE ORAL
Status: DISCONTINUED | OUTPATIENT
Start: 2018-03-31 | End: 2018-03-31 | Stop reason: HOSPADM

## 2018-03-31 RX ORDER — FERROUS GLUCONATE 324(38)MG
1 TABLET ORAL 2 TIMES DAILY
Qty: 100 TABLET | Refills: 0
Start: 2018-03-31 | End: 2024-05-21

## 2018-03-31 RX ADMIN — Medication 1 MG: at 02:22

## 2018-03-31 RX ADMIN — PANTOPRAZOLE SODIUM 40 MG: 40 TABLET, DELAYED RELEASE ORAL at 08:54

## 2018-03-31 RX ADMIN — LEVOTHYROXINE SODIUM 50 MCG: 50 TABLET ORAL at 06:58

## 2018-03-31 ASSESSMENT — ACTIVITIES OF DAILY LIVING (ADL)
ADLS_ACUITY_SCORE: 15
ADLS_ACUITY_SCORE: 11
ADLS_ACUITY_SCORE: 11
ADLS_ACUITY_SCORE: 15

## 2018-03-31 NOTE — PLAN OF CARE
"Problem: Patient Care Overview  Goal: Plan of Care/Patient Progress Review  Outcome: Adequate for Discharge Date Met: 03/31/18  Patient discharged to home, patient verbalized understanding of discharge information. Patient brought to ER entrance via volunteer services with a wheelchair.     Orientation: a&ox4  VS: /63 (BP Location: Left arm)  Pulse 59  Temp 98.4  F (36.9  C) (Oral)  Resp 20  Ht 1.626 m (5' 4\")  Wt 103.1 kg (227 lb 6.4 oz)  LMP  (LMP Unknown)  SpO2 96%  BMI 39.03 kg/m2  LS: clear   Cardiac: murmur detected   GI: passing gas, small BM, regular diet   : frequency   Skin: intact   Activity: SBA  Pain: denies         "

## 2018-03-31 NOTE — PROGRESS NOTES
She is doing better today. Her BM is brown now. She has no belly pain. She feels back to baseline. She was on Mobic and asa and will avoid them in the future. She tolerated solid food. She can probably go home today and should be on a ppi chronically. Her abdominal exam is normal today. She should be on Metamucil to help her constipation (2 caps tid)

## 2018-03-31 NOTE — PLAN OF CARE
Problem: Patient Care Overview  Goal: Plan of Care/Patient Progress Review  Outcome: Improving  Alert and oriented. Denies pain. IV out, not replaced, possible discharge today. hgb this AM 8.0. No complaints of dizziness. Gait steady.

## 2018-03-31 NOTE — PLAN OF CARE
Problem: Patient Care Overview  Goal: Plan of Care/Patient Progress Review  Outcome: No Change  Pt admitted on 03/28 for GI bleed. Hbg came back this shift at 8.5. Scheduled for BID. No BM's this shift. Pt on tele, sinus rhythm. VSS, BP was elevated. MD prescribed amlodopine. IVF 10ml/hr. Regular diet, tolerated well.

## 2018-03-31 NOTE — DISCHARGE SUMMARY
Admit Date:     03/28/2018   Discharge Date:     03/31/2018      PRINCIPAL FINAL DIAGNOSES:  Acute blood loss anemia secondary to gastrointestinal bleeding.        BRIEF HISTORY:  She was transfused 3 units packed red blood cells this admission.  Colonoscopy this admission demonstrated no acute bleeding source.  She has had a previous workup with gastroenterology in the past including EGD and colonoscopy for iron deficiency anemia with no clear cause identified.  There has been discussion in the past about a capsule endoscopy, but this has not been performed.      RECOMMENDATIONS:  This admission including stopping aspirin and Mobic.  By discharge hemoglobin had stabilized and she was no longer having any bloody bowel movements with resolution of GI bleeding.      PAST MEDICAL HISTORY:   1.  Iron deficiency anemia.   2.  Previous GI workup as above.   3.  Hypertension.   4.  Hypothyroidism.   5.  Hyperlipidemia.   6.  Chronic pain.   7.  Previous small bowel obstruction.   8.  Osteoarthritis.   9.  Reflux disease.   10.  Abdominal hernia.   11.  Obesity with a BMI of 39.   12.  Chronic pain syndrome.   13.  Peptic ulcer disease.   14.  Hypothyroidism.   15.  Urinary incontinence.      PRINCIPAL PROCEDURES THIS ADMISSION:   1.  GI consultation by Dr. Gusman.   2.  Colonoscopy showing no acute findings to explain a GI bleed.    3.  Lower extremity ultrasound showing no evidence of DVT.   4.  Chest x-ray.      REASON FOR ADMISSION:  Please see dictated history and physical by Dr. Doherty.  In brief, Ms. Stratton is a 77-year-old female with a history of iron deficiency anemia.  She presented with shortness of breath and bright red blood per rectum.  She has a history of iron deficiency anemia in the past with the previous workup done without a bleeding source found.      HOSPITAL COURSE:  Acute on chronic blood loss anemia in a patient with a history of iron deficiency anemia and previous negative workup.  The patient was  seen by GI this admission.  Colonoscopy was performed which showed no clear cause of a bleeding source.  GI felt that the patient may have had a lower GI bleed not found on colonoscopy due to aspirin and Mobic use.  She was recommended to stop these medications.  She is on a daily PPI, which she was instructed to continue.  At some point in the past there  was discussion about small bowel capsule endoscopy, but this has never been performed.  Would consider this if the bleeding recurs off NSAID medications.        By day of discharge, the patient was having normal-appearing bowel movements with no blood and hemoglobin did stabilize after 3 units of packed red blood cell transfusion with hemoglobin of 8 on discharge today.        I encouraged her to follow with her primary care provider next week for a repeat hemoglobin.  Also, consider repeating hemoglobin with primary MD every several months in the setting of her iron deficiency anemia.  I have also encouraged her to increase her iron supplementation from daily to twice a day.      DISPOSITION:  The patient was discharged to home today.      DISCHARGE MEDICATIONS:   1.  Ferrous gluconate 1 tablet twice a day.  Prior to admission, she was taking 1 tablet daily.   2.  Psyllium 2 capsules 3 times a day.  Prior to admission, she was taking 2 capsules daily.   3.  Probiotic.   4.  Amlodipine 5 mg at bedtime.   5.  Centrum Silver.   6.  Docusate 100 mg every other day.   7.  Colace 100 mg at bedtime.   8.  Fenofibrate 160 mg daily.   9.  Levothyroxine 50 mcg daily.   10.  Lisinopril  daily.   11.  Magnesium 1 tablet daily.   12.  Omega-3 fatty acid.   13.  Pantoprazole 20 mg daily.   14.  Potassium 99 mg tablet 1 tablet daily.   15.  Travatan eyedrops 1 drop in both eyes at bedtime.   16.  Vitamin D.        Medications stopped include aspirin and Mobic.      FOLLOWUP INSTRUCTIONS:   1.  See primary provider next week and have a hemoglobin rechecked, it is 8 on discharge.    2.  Increase iron supplement to twice a day and Metamucil to 3 times a day.   3.  Stop aspirin and Mobic.      The patient was seen and examined on the day of discharge.  I spent greater than 30 minutes discharging this patient from the hospital on day of discharge.         DEMI ANDERSON MD             D: 2018   T: 2018   MT: DONATO      Name:     AKSHAT ONEIL   MRN:      [id number]        Account:        [id number]   :      1940           Admit Date:     2018                                  Discharge Date: 2018      Document: [id number]       cc: Jesenia Madrigal MD

## 2018-04-02 ENCOUNTER — TELEPHONE (OUTPATIENT)
Dept: FAMILY MEDICINE | Facility: CLINIC | Age: 78
End: 2018-04-02

## 2018-04-02 NOTE — TELEPHONE ENCOUNTER
"ED / Discharge Outreach Protocol    Patient Contact    Attempt # 1    Was call answered?  Yes.  \"May I please speak with Lexii\"  Is patient available?   Yes      Hospital/TCU/ED for chronic condition Discharge Protocol    \"Hi, my name is Isaura Guido, a registered nurse, and I am calling from [facility].  I am calling to follow up and see how things are going for you after your recent emergency visit/hospital/TCU stay.\"    Tell me how you are doing now that you are home?\" Doing well since arriving home. Denies any visual blood in the stool. Denies any dizziness or lightheadedness. Feels cold, has felt that way all winter. Taking Iron as directed BID. Monitoring for any visual bleeding.       Discharge Instructions    \"Let's review your discharge instructions.  What is/are the follow-up recommendations?  Pt. Response: Follow up with Dr. Madrigal for exam and Hgb recheck. Monitor for any bleeding, dizziness, lightheadedness or SOB.     \"Has an appointment with your primary care provider been scheduled?\"   Yes. (confirm)    \"When you see the provider, I would recommend that you bring your medications with you.\"    Medications    \"Tell me what changed about your medicines when you discharged?\"    Changes to chronic meds?    0-1    \"What questions do you have about your medications?\"    None     New diagnoses of heart failure, COPD, diabetes, or MI?    No              Medication reconciliation completed? Yes  Was MTM referral placed (*Make sure to put transitions as reason for referral)?   No    Call Summary    \"What questions or concerns do you have about your recent visit and your follow-up care?\"     none    \"If you have questions or things don't continue to improve, we encourage you contact us through the main clinic number (give number).  Even if the clinic is not open, triage nurses are available 24/7 to help you.     We would like you to know that our clinic has extended hours (provide information).  " "We also have urgent care (provide details on closest location and hours/contact info)\"      \"Thank you for your time and take care!\"    Isaura Guido RN -- [facility]                  "

## 2018-04-02 NOTE — TELEPHONE ENCOUNTER
Please contact patient for In-patient follow up.  There are no phone numbers on file.    Visit date: 03/31/2018  Diagnosis listed:Gastrointestinal Hemorrhage, Unspecified Gastrointestinal Hemorrhage Type  Number of visits in past 12 months:0/1

## 2018-04-04 ENCOUNTER — OFFICE VISIT (OUTPATIENT)
Dept: FAMILY MEDICINE | Facility: CLINIC | Age: 78
End: 2018-04-04
Payer: COMMERCIAL

## 2018-04-04 VITALS
BODY MASS INDEX: 38.84 KG/M2 | RESPIRATION RATE: 17 BRPM | TEMPERATURE: 98.1 F | WEIGHT: 226.3 LBS | OXYGEN SATURATION: 100 % | SYSTOLIC BLOOD PRESSURE: 136 MMHG | DIASTOLIC BLOOD PRESSURE: 68 MMHG | HEART RATE: 63 BPM

## 2018-04-04 DIAGNOSIS — K90.89 POOR IRON ABSORPTION: ICD-10-CM

## 2018-04-04 DIAGNOSIS — M19.90 ARTHRITIS: ICD-10-CM

## 2018-04-04 DIAGNOSIS — K92.2 GASTROINTESTINAL HEMORRHAGE, UNSPECIFIED GASTROINTESTINAL HEMORRHAGE TYPE: Primary | ICD-10-CM

## 2018-04-04 DIAGNOSIS — E78.5 HYPERLIPIDEMIA LDL GOAL <130: ICD-10-CM

## 2018-04-04 LAB — HGB BLD-MCNC: 10.2 G/DL (ref 11.7–15.7)

## 2018-04-04 PROCEDURE — 85018 HEMOGLOBIN: CPT | Performed by: INTERNAL MEDICINE

## 2018-04-04 PROCEDURE — 82728 ASSAY OF FERRITIN: CPT | Performed by: INTERNAL MEDICINE

## 2018-04-04 PROCEDURE — 99496 TRANSJ CARE MGMT HIGH F2F 7D: CPT | Performed by: INTERNAL MEDICINE

## 2018-04-04 PROCEDURE — 36415 COLL VENOUS BLD VENIPUNCTURE: CPT | Performed by: INTERNAL MEDICINE

## 2018-04-04 RX ORDER — ACETAMINOPHEN 500 MG
1000 TABLET ORAL 2 TIMES DAILY PRN
Qty: 100 TABLET | Refills: 0 | Status: SHIPPED | OUTPATIENT
Start: 2018-04-04 | End: 2022-07-03

## 2018-04-04 NOTE — MR AVS SNAPSHOT
After Visit Summary   4/4/2018    Lexii Stratton    MRN: [id number]           Patient Information     Date Of Birth          1940        Visit Information        Provider Department      4/4/2018 10:30 AM Jesenia Madrigal MD [facility]        Today's Diagnoses     Gastrointestinal hemorrhage, unspecified gastrointestinal hemorrhage type    -  1    Poor iron absorption        Arthritis        Hyperlipidemia LDL goal <130           Follow-ups after your visit        Your next 10 appointments already scheduled     Apr 16, 2018 10:25 AM CDT   (Arrive by 10:10 AM)   [facility] with Bren Hazelwood Weide, PT   [facility] ([facility] [place]  )    [address]  [address]  [place] [address]   [phone]            Apr 23, 2018 10:25 AM CDT   [facility] with Bren Hazelwood Weide, PT   [facility] ([facility] [place]  )    [address]  [address]  [place] [address]   [phone]            Apr 30, 2018 10:25 AM CDT   [facility] with Bren Hazelwood Weide, PT   [facility] ([facility] [place]  )    [address]  [address]  [place] [address]   [phone]            Jul 18, 2018 11:45 AM CDT   Return Visit with Jenny Ayon MD   [facility] ([facility])    [address]  [address]  [place] [facility] [place] [id number] [phone]              Future tests that were ordered for you today     Open Standing Orders        Priority Remaining Interval Expires Ordered    Hemoglobin Routine 7/7 every 1-3  4/4/2019 4/4/2018    Ferritin Routine 6/6 every 1-3 weeks 4/4/2019 4/4/2018          Open Future Orders        Priority Expected Expires Ordered    Lipid panel reflex to direct LDL Fasting Routine 5/25/2018 8/5/2018 4/4/2018    Comprehensive metabolic panel Routine 5/25/2018 8/5/2018 4/4/2018            Who to contact     If you have questions or need follow up  information about today's clinic visit or your schedule please contact [facility] directly at [phone].  Normal or non-critical lab and imaging results will be communicated to you by [employer], letter or phone within 4 business days after the clinic has received the results. If you do not hear from us within 7 days, please contact the clinic through [employer] or phone. If you have a critical or abnormal lab result, we will notify you by phone as soon as possible.  Submit refill requests through [employer] or call your pharmacy and they will forward the refill request to us. Please allow 3 business days for your refill to be completed.          Additional Information About Your Visit        [employer] Information     [employer] gives you secure access to your electronic health record. If you see a primary care provider, you can also send messages to your care team and make appointments. If you have questions, please call your primary care clinic.  If you do not have a primary care provider, please call [phone] and they will assist you.        Care EveryWhere ID     This is your Care EveryWhere ID. This could be used by other organizations to access your [facility] medical records  [id number]        Your Vitals Were     Pulse Temperature Respirations Last Period Pulse Oximetry BMI (Body Mass Index)    63 98.1  F (36.7  C) (Oral) 17 (LMP Unknown) 100% 38.84 kg/m2       Blood Pressure from Last 3 Encounters:   04/04/18 136/68   03/31/18 154/63   03/21/18 140/72    Weight from Last 3 Encounters:   04/04/18 226 lb 4.8 oz (102.6 kg)   03/31/18 227 lb 6.4 oz (103.1 kg)   03/21/18 220 lb (99.8 kg)                 Today's Medication Changes          These changes are accurate as of 4/4/18 11:27 AM.  If you have any questions, ask your nurse or doctor.               Start taking these medicines.        Dose/Directions    acetaminophen 500 MG tablet   Commonly known as:  TYLENOL   Used for:  Arthritis   Started by:   Jesenia Madrigal MD        Dose:  1000 mg   Take 2 tablets (1,000 mg) by mouth 2 times daily as needed for mild pain   Quantity:  100 tablet   Refills:  0            Where to get your medicines      These medications [place] [facility] [place] - [address]  [address] [place] [address]     Phone:  [phone]     acetaminophen 500 MG tablet                Primary Care Provider Office Phone # Fax #    Jesenia Madrigal [phone] [address]  [facility] [address]        Equal Access to Services     [facility]: Hadii aad ku hadasho Soomaali, waaxda luqadaha, qaybta kaalmada adeegyada, waxay jacin hayaan adetre roman . So [facility] [phone].    ATENCIÓN: Si habla español, tiene a irene disposición servicios gratuitos de asistencia lingüística. Llame al [phone].    We comply with applicable federal civil rights laws and [place] laws. We do not discriminate on the basis of race, color, national origin, age, disability, sex, sexual orientation, or gender identity.            Thank you!     Thank you for choosing [facility]  for your care. Our goal is always to provide you with excellent care. Hearing back from our patients is one way we can continue to improve our services. Please take a few minutes to complete the written survey that you may receive in the mail after your visit with us. Thank you!             Your Updated Medication List - Protect others around you: Learn how to safely use, store and throw away your medicines at www.disposemymeds.org.          This list is accurate as of 4/4/18 11:27 AM.  Always use your most recent med list.                   Brand Name Dispense Instructions for use Diagnosis    acetaminophen 500 MG tablet    TYLENOL    100 tablet    Take 2 tablets (1,000 mg) by mouth 2 times daily as needed for mild pain    Arthritis       ACIDOPHILUS PROBIOTIC BLEND Caps     30 capsule     Take 1 capsule by mouth daily (with breakfast)        amLODIPine 5 MG tablet    NORVASC    90 tablet    TAKE ONE TABLET BY MOUTH EVERY NIGHT AT BEDTIME    Essential hypertension, benign       CENTRUM SILVER per tablet     30    1 TABLET DAILY    Need for prophylactic vaccination and inoculation against influenza, Routine general medical examination at a health care facility, Generalized osteoarthrosis, unspecified site, Other and unspecified hyperlipidemia, Esophageal reflux, Obesity, unspecified, Urinary sys symptom NEC, Other malaise and fatigue, Sleep disturbance, unspecified       * docusate sodium 100 MG capsule    COLACE     Take 100 mg by mouth every other day Every other day at Breakfast        * COLACE 100 MG capsule   Generic drug:  docusate sodium      Take 100 mg by mouth At Bedtime        fenofibrate 160 MG tablet     90 tablet    Take 1 tablet (160 mg) by mouth daily    Hyperlipidemia LDL goal <130       ferrous gluconate 324 (38 FE) MG tablet    FERGON    100 tablet    Take 1 tablet (324 mg) by mouth 2 times daily    Gastrointestinal hemorrhage, unspecified gastrointestinal hemorrhage type       levothyroxine 50 MCG tablet    SYNTHROID/LEVOTHROID    90 tablet    Take 1 tablet (50 mcg) by mouth daily    Acquired hypothyroidism       lisinopril 30 MG tablet    PRINIVIL,ZESTRIL    90 tablet    TAKE ONE TABLET BY MOUTH EVERY DAY FOR HYPERTENSION    Essential hypertension, benign       MAGNESIUM PO      Take 1 tablet by mouth daily (with breakfast)        omega-3 fatty acids 1200 MG capsule      1 TABLET DAILY    Preop general physical exam, Stress incontinence - female       pantoprazole 20 MG EC tablet    PROTONIX    30 tablet    TAKE ONE TABLET BY MOUTH EVERY DAY    Gastroesophageal reflux disease without esophagitis       potassium 99 MG Tabs      Take 1 tablet by mouth daily (with dinner)        psyllium 0.52 G capsule    METAMUCIL    540 capsule    Take 2 capsules (1.04 g) by mouth 3 times daily  To take with fluid    Gastrointestinal hemorrhage, unspecified gastrointestinal hemorrhage type       RA CALCIUM PLUS MINERALS/VIT D PO      Take 1 tablet by mouth every other day Every other day at Lunch        simvastatin 20 MG tablet    ZOCOR    90 tablet    TAKE ONE TABLET BY MOUTH EVERY NIGHT AT BEDTIME    Hyperlipidemia LDL goal <130       travoprost (TIMI Free) 0.004 % ophthalmic solution    TRAVATAN Z    2.5 mL    Place 1 drop into both eyes At Bedtime        UNABLE TO FIND      daily (with dinner) MEDICATION NAME Cranberry.  2 capsules daily        VITAMIN D (CHOLECALCIFEROL) PO      Take 2,000 Units by mouth every other day Every other day at Supper        * Notice:  This list has 2 medication(s) that are the same as other medications prescribed for you. Read the directions carefully, and ask your doctor or other care provider to review them with you.

## 2018-04-04 NOTE — PROGRESS NOTES
SUBJECTIVE:   Lexii Stratton is a 77 year old female who presents to clinic today for the following health issues:      Hospital Follow-Up Visit: Patient presented to the ED on 3/28/18 for shortness of breath and bright red blood per rectum. Upon arrival, patient's hemoglobin was 5.7. While in the ED, a colonoscopy was performed where a bleeding source was not found. Per patients hospital admission note, GI felt that the patient may have had a lower GI bleed not seen on the colonoscopy due to aspirin and Mobic use. A lower extremity ultrasound was also completed while in the ED which showed no evidence of DVT. Patient was provided with 3 units of packed red blood cell transfusion while in the ED. By the time of discharge, on 3/31/18, patient was experiencing normal bowel movements and a hemoglobin of 8. Of note, patient has a history of iron deficiency anemia in the past with the previous workup done showing no bleeding source. Prior to hospital admission, patient was taking ferrous gluconate 1 tablet daily. Upon discharge, she was recommended to increase dosage to 1 tablet twice daily.     Hospital/Nursing Home/IP Rehab Facility: [facility]  Date of Admission: 3/28/18  Date of Discharge: 3/31/18  Reason(s) for Admission: gastrointestinal bleed            Problems taking medications regularly:  None       Medication changes since discharge: None       Problems adhering to non-medication therapy:  None    Summary of hospitalization:  [facility] discharge summary reviewed  Diagnostic Tests/Treatments reviewed.  Follow up needed: none  Other Healthcare Providers Involved in Patient s Care:         None  Update since discharge: improved.     Post Discharge Medication Reconciliation: discharge medications reconciled, continue medications without change.  Plan of care communicated with patient     Coding guidelines for this visit:  Type of Medical   Decision Making Face-to-Face Visit       within  7 Days of discharge Face-to-Face Visit        within 14 days of discharge   Moderate Compleity [id number]   High Compleity [phone]        Problem list and histories reviewed & adjusted, as indicated.  Additional history: as documented    Labs reviewed in EPIC    Reviewed and updated as needed this visit by clinical staff  Tobacco  Allergies  Meds  Problems  Med Hx  Surg Hx  Fam Hx  Soc Hx        Reviewed and updated as needed this visit by Provider  Allergies  Meds  Problems       ROS:  CONSTITUTIONAL: NEGATIVE for fever, chills, change in weight  EYES: POSITIVE for decreased vision; NEGATIVE for irritation  RESP: NEGATIVE for significant cough or SOB  CV: Hypertension - use of lisinopril and amlodipine; NEGATIVE for chest pain, palpitations or peripheral edema  GI: POSITIVE for decreased appetite; Obesity, BMI ~39; Hx of bleeding from the colon; NEGATIVE for nausea, abdominal pain, heartburn, or change in bowel habits  MUSCULOSKELETAL: POSITIVE for bruising to left knee; POSITIVE for swelling to bilateral lower extremities; NEGATIVE for significant arthralgias or myalgia  NEURO: NEGATIVE for weakness, dizziness or paresthesias  ENDOCRINE: Hypothyroidism - use of levothyroine; Hyperlipidemia - use of simvastatin and fenofibrate; NEGATIVE for temperature intolerance, skin/hair changes  HEME/ALLERGY/IMMUNE: anemia - use of fergon; NEGATIVE for bleeding problems  PSYCHIATRIC: NEGATIVE for changes in mood or affect    This document serves as a record of the services and decisions personally performed and made by Jesenia Madrigal MD. It was created on her behalf by Mark Rosado, a trained medical scribe. The creation of this document is based on the provider's statements to the medical scribe.   Mark Rosado, 11:05 AM, April 4, 2018    OBJECTIVE:     /68  Pulse 63  Temp 98.1  F (36.7  C) (Oral)  Resp 17  Wt 226 lb 4.8 oz (102.6 kg)  LMP  (LMP Unknown)  SpO2 100%  BMI 38.84 kg/m2  Body mass index  is 38.84 kg/(m^2).     EXAM:  GENERAL: healthy, alert and no distress  NECK: no adenopathy, no asymmetry, masses, or scars and thyroid normal to palpation, no carotid bruits  RESP: lungs clear to auscultation - no rales, rhonchi or wheezes  CV: regular rates and rhythm, normal S1 S2, no murmur, peripheral pulses strong and no peripheral edema  NEURO: Normal strength and tone, sensory exam grossly normal and mentation intact  PSYCH: mentation appears normal and affect normal/bright    Diagnostic Test Results:  none     ASSESSMENT/PLAN:     (K92.2) Gastrointestinal hemorrhage, unspecified gastrointestinal hemorrhage type  (primary encounter diagnosis)  Comment: colonoscopy done 3/29/2018- no source identified; at D/C HGB 8; recheck; chart indicates hx of poor iron absorption; past EGD, Colon and disc capsule study-not  Plan: Hemoglobin, Ferritin    (K90.9) Poor iron absorption  Comment: ED 3/28/18-3/31/18 for acute blood loss anemia secondary to gastrointestinal bleeding; hemoglobin 5.7 upon admission; hemoglobin improved to 8 upon discharge; 3 units of packed red blood cell transfusion given; colonoscopy completed while in ED; no clear cause of bleeding source shown    Plan: Hemoglobin; will check every 1-3 weeks for next 6 weeks    (M19.90) Arthritis  Comment: unable to take NSAID or Meloxicam; recommended use of acetaminophen 2 times daily   Plan: Hemoglobin, acetaminophen (TYLENOL) 500 MG         tablet    (E78.5) Hyperlipidemia LDL goal <130  Comment: LDL at goal in past; fasting for labs today; will continue to monitor   Plan: Lipid panel reflex to direct LDL Fasting,         Comprehensive metabolic panel  LDL Cholesterol Calculated   Date Value Ref Range Status   11/01/2017 89 <100 mg/dL Final     Comment:     Desirable:       <100 mg/dl        MEDICATIONS:   Orders Placed This Encounter   Medications     acetaminophen (TYLENOL) 500 MG tablet     Sig: Take 2 tablets (1,000 mg) by mouth 2 times daily as needed  for mild pain     Dispense:  100 tablet     Refill:  0     There are no discontinued medications.    Jesenia Madrigal MD  Internal Medicine  [facility] [place]    The information in this document, created by a medical scribe for me, accurately reflects the services I personally performed and the decisions made by me. I have reviewed and approved this document for accuracy.  Dr. Jesenia Madrigal, 11:30 AM, April 4, 2018

## 2018-04-05 LAB — FERRITIN SERPL-MCNC: 110 NG/ML (ref 8–252)

## 2018-04-15 DIAGNOSIS — E03.9 ACQUIRED HYPOTHYROIDISM: ICD-10-CM

## 2018-04-16 ENCOUNTER — THERAPY VISIT (OUTPATIENT)
Dept: PHYSICAL THERAPY | Facility: CLINIC | Age: 78
End: 2018-04-16
Payer: COMMERCIAL

## 2018-04-16 DIAGNOSIS — N39.46 MIXED INCONTINENCE: Primary | ICD-10-CM

## 2018-04-16 DIAGNOSIS — M79.18 MYALGIA OF PELVIC FLOOR: ICD-10-CM

## 2018-04-16 PROCEDURE — 97162 PT EVAL MOD COMPLEX 30 MIN: CPT | Mod: GP | Performed by: PHYSICAL THERAPIST

## 2018-04-16 PROCEDURE — 97112 NEUROMUSCULAR REEDUCATION: CPT | Mod: GP | Performed by: PHYSICAL THERAPIST

## 2018-04-16 PROCEDURE — 97535 SELF CARE MNGMENT TRAINING: CPT | Mod: GP | Performed by: PHYSICAL THERAPIST

## 2018-04-16 NOTE — MR AVS SNAPSHOT
After Visit Summary   4/16/2018    Lexii Stratton    MRN: [id number]           Patient Information     Date Of Birth          1940        Visit Information        Provider Department      4/16/2018 12:55 PM Bren Camp, PT [facility]        Today's Diagnoses     Mixed incontinence    -  1    Myalgia of pelvic floor           Follow-ups after your visit        Your next 10 appointments already scheduled     Apr 18, 2018  9:15 AM CDT   LAB with  LAB   [facility] ([facility])    [address] [address] [phone]           Please do not eat 10-12 hours before your appointment if you are coming in fasting for labs on lipids, cholesterol, or glucose (sugar). This does not apply to pregnant women. Water, hot tea and black coffee (with nothing added) are okay. Do not drink other fluids, diet soda or chew gum.            Apr 23, 2018 10:25 AM CDT   [facility] Only with Bren Giovanni Valede, PT   [facility] ([facility] [place]  )    [id number] [place] [address]   [phone]            Apr 30, 2018 10:25 AM CDT   [facility] with Bren Devon Kavinde, PT   [facility] ([facility] [place]  )    [address] [place] [address]   [phone]            May 07, 2018 11:05 AM CDT   [facility] with Bren Devon Kavinde, PT   [facility] ([facility] [place]  )    [address] [place] [address]   [phone]            May 14, 2018 10:25 AM CDT   [facility] with Bren Devon Kavinde, PT   [facility] ([facility] [place]  )    [address] [address] [address]   [phone]            May 21, 2018 10:25 AM CDT   [facility] with Bren Devon Weide, PT   [facility] ([facility] [place]  )    [address] [address] [address]   [phone]            May 29, 2018   1:10 PM CDT   [facility] with Bren Giovanni Valede, PT   [facility] ([facility]  )    [address] [address]  [address]  [facility] [address]   [phone]            Jun 04, 2018 10:25 AM CDT   [facility] with Bren Giovanni Valede, PT   [facility] ([facility]  )    [address] [address]  [address]  [facility] [address]   [phone]            Jun 11, 2018  2:15 PM CDT   [facility] with Bren Giovanni Valede, PT   [facility] ([facility]  )    [address] [address]  [address]  [facility] [address]   [phone]            Jul 18, 2018 11:45 AM CDT   Return Visit with Jenny Ayon MD   [facility] ([facility])    [address]  [address]  [facility] [address] [phone]              Who to contact     If you have questions or need follow up information about today's clinic visit or your schedule please contact [facility] directly at [phone].  Normal or non-critical lab and imaging results will be communicated to you by NEMOPTIChart, letter or phone within 4 business days after the clinic has received the results. If you do not hear from us within 7 days, please contact the clinic through NEMOPTIChart or phone. If you have a critical or abnormal lab result, we will notify you by phone as soon as possible.  Submit refill requests through Graphicly or call your pharmacy and they will forward the refill request to us. Please allow 3 business days for your refill to be completed.          Additional Information About Your Visit        Graphicly Information     Graph[employer] gives you secure access to your electronic health record. If you see a primary care provider, you can also send messages to your care team and make appointments. If you have questions, please call your primary care clinic.  If you do not have a primary care provider, please call [phone] and they will assist you.         Care EveryWhere ID     This is your Care EveryWhere ID. This could be used by other organizations to access your [facility] medical records  [id number]        Your Vitals Were     Last Period                   (LMP Unknown)            Blood Pressure from Last 3 Encounters:   04/04/18 136/68   03/31/18 154/63   03/21/18 140/72    Weight from Last 3 Encounters:   04/04/18 102.6 kg (226 lb 4.8 oz)   03/31/18 103.1 kg (227 lb 6.4 oz)   03/21/18 99.8 kg (220 lb)              We Performed the Following     HC PT EVAL, MODERATE COMPLEXITY     [facility] INITIAL EVAL REPORT     NEUROMUSCULAR RE-EDUCATION     SELF CARE MNGMENT TRAINING        Primary Care Provider Office Phone # Fax #    Jesenia Priscilla Madrigal [phone] [address]  [facility] [address]        Equal Access to Services     [facility]: Hadii aad ku hadasho Soomaali, waaxda luqadaha, qaybta kaalmada adeegyada, waxay jacin hayaan dave roman . So [facility] [phone].    ATENCIÓN: Si habla español, tiene a irene disposición servicios gratuitos de asistencia lingüística. Llame al [phone].    We comply with applicable federal civil rights laws and [place] laws. We do not discriminate on the basis of race, color, national origin, age, disability, sex, sexual orientation, or gender identity.            Thank you!     Thank you for choosing [facility]  for your care. Our goal is always to provide you with excellent care. Hearing back from our patients is one way we can continue to improve our services. Please take a few minutes to complete the written survey that you may receive in the mail after your visit with us. Thank you!             Your Updated Medication List - Protect others around you: Learn how to safely use, store and throw away your medicines at www.disposemymeds.org.          This list is accurate as of 4/16/18  3:49 PM.  Always use your most recent med list.                   Brand Name Dispense  Instructions for use Diagnosis    acetaminophen 500 MG tablet    TYLENOL    100 tablet    Take 2 tablets (1,000 mg) by mouth 2 times daily as needed for mild pain    Arthritis       ACIDOPHILUS PROBIOTIC BLEND Caps     30 capsule    Take 1 capsule by mouth daily (with breakfast)        amLODIPine 5 MG tablet    NORVASC    90 tablet    TAKE ONE TABLET BY MOUTH EVERY NIGHT AT BEDTIME    Essential hypertension, benign       CENTRUM SILVER per tablet     30    1 TABLET DAILY    Need for prophylactic vaccination and inoculation against influenza, Routine general medical examination at a health care facility, Generalized osteoarthrosis, unspecified site, Other and unspecified hyperlipidemia, Esophageal reflux, Obesity, unspecified, Urinary sys symptom NEC, Other malaise and fatigue, Sleep disturbance, unspecified       * docusate sodium 100 MG capsule    COLACE     Take 100 mg by mouth every other day Every other day at Breakfast        * COLACE 100 MG capsule   Generic drug:  docusate sodium      Take 100 mg by mouth At Bedtime        fenofibrate 160 MG tablet     90 tablet    Take 1 tablet (160 mg) by mouth daily    Hyperlipidemia LDL goal <130       ferrous gluconate 324 (38 Fe) MG tablet    FERGON    100 tablet    Take 1 tablet (324 mg) by mouth 2 times daily    Gastrointestinal hemorrhage, unspecified gastrointestinal hemorrhage type       levothyroxine 50 MCG tablet    SYNTHROID/LEVOTHROID    90 tablet    Take 1 tablet (50 mcg) by mouth daily    Acquired hypothyroidism       lisinopril 30 MG tablet    PRINIVIL,ZESTRIL    90 tablet    TAKE ONE TABLET BY MOUTH EVERY DAY FOR HYPERTENSION    Essential hypertension, benign       MAGNESIUM PO      Take 1 tablet by mouth daily (with breakfast)        omega-3 fatty acids 1200 MG capsule      1 TABLET DAILY    Preop general physical exam, Stress incontinence - female       pantoprazole 20 MG EC tablet    PROTONIX    30 tablet    TAKE ONE TABLET BY MOUTH EVERY DAY     Gastroesophageal reflux disease without esophagitis       potassium 99 MG Tabs      Take 1 tablet by mouth daily (with dinner)        psyllium 0.52 g capsule    METAMUCIL    540 capsule    Take 2 capsules (1.04 g) by mouth 3 times daily To take with fluid    Gastrointestinal hemorrhage, unspecified gastrointestinal hemorrhage type       RA CALCIUM PLUS MINERALS/VIT D PO      Take 1 tablet by mouth every other day Every other day at Lunch        simvastatin 20 MG tablet    ZOCOR    90 tablet    TAKE ONE TABLET BY MOUTH EVERY NIGHT AT BEDTIME    Hyperlipidemia LDL goal <130       travoprost (TIMI Free) 0.004 % ophthalmic solution    TRAVATAN Z    2.5 mL    Place 1 drop into both eyes At Bedtime        UNABLE TO FIND      daily (with dinner) MEDICATION NAME Cranberry.  2 capsules daily        VITAMIN D (CHOLECALCIFEROL) PO      Take 2,000 Units by mouth every other day Every other day at Supper        * Notice:  This list has 2 medication(s) that are the same as other medications prescribed for you. Read the directions carefully, and ask your doctor or other care provider to review them with you.

## 2018-04-16 NOTE — TELEPHONE ENCOUNTER
"Requested Prescriptions   Pending Prescriptions Disp Refills     levothyroxine (SYNTHROID/LEVOTHROID) 50 MCG tablet [Pharmacy Med Name: LEVOTHYROXINE SODIUM 50MCG TABS]  Last Written Prescription Date:  10/17/17  Last Fill Quantity: 90,  # refills: 1   Last office visit: 4/4/2018 with prescribing provider:  4/4/2018     Future Office Visit:     90 tablet 1     Sig: TAKE ONE TABLET BY MOUTH EVERY DAY    Thyroid Protocol Passed    4/15/2018 10:09 AM       Passed - Patient is 12 years or older       Passed - Recent (12 mo) or future (30 days) visit within the authorizing provider's specialty    Patient had office visit in the last 12 months or has a visit in the next 30 days with authorizing provider or within the authorizing provider's specialty.  See \"Patient Info\" tab in inbasket, or \"Choose Columns\" in Meds & Orders section of the refill encounter.           Passed - Normal TSH on file in past 12 months    Recent Labs   Lab Test  03/28/18 2057   TSH  0.98             Passed - No active pregnancy on record    If patient is pregnant or has had a positive pregnancy test, please check TSH.         Passed - No positive pregnancy test in past 12 months    If patient is pregnant or has had a positive pregnancy test, please check TSH.            "

## 2018-04-16 NOTE — PROGRESS NOTES
[facility] Initial Evaluation  Subjective:  Patient is a 77 year old female presenting with rehab pelvic hpi. The history is provided by the patient. No  was used.   Lexii Stratton is a 77 year old female with a incontinence and pelvic dysfunction condition.  Condition occurred with:  Insidious onset.  Condition occurred: for unknown reasons.  This is a chronic condition  20+ Long history of bladder problems. Referred to PT on 18. Recently was hospitalized for 3-4 nights on  due to excessive diarrhea with blood. Was very anemic. Haven't found cause of bleeding but has resolved now. Here today for help with bladder problems. Chief c/o: urgency and urge incontinence. Nighttime voiding 4-5x night. Worse when laying flat, better with being in recliner, only up 1-2x night. Daytime voiding anywhere from 2-4 hours. Unable to ignore urge during day. Wears 1 thick pad/day. Leakage amount usually small to medium amounts. Often voiding small amounts often. Feels weak stream and incomplete emptying. Slight leakage with cough/sneeze. , vaginal deliveries, largest baby 8lb 11oz. Goal is to have better control on way to bathroom and sleep better. Trial of 7 bladder meds, Interstim, and 7 botox injections into bladder, all with very little relief. .    Site of Pain: no pain.         Pain is worse during the night.     Since onset symptoms are gradually worsening.        General health as reported by patient is fair.  Pertinent medical history includes:  Osteoarthritis, high blood pressure, anemia, implanted device and thyroid problems.  Medical allergies: yes (see EPIC).  Other surgeries include:  Orthopedic surgery and other (B TKA's in ~, 2016 R JUANIS, partial hysterectomy late , bladder sling , hernia repair).  Current medications:  Thyroid medication and pain medication.  Current occupation is Retired, lives in home independently, helps with grandchildren  (twins age 10). .                                    Objective:  System                                 Pelvic Dysfunction Evaluation:    Bladder/Pelvic Problems:    Storage Problem:  Mixed incontinence  Emptying Problem:  Incomplete emptying      Diagnostic Tests:    Pelvic Exam:  Yes          Cystoscopy:  Yes      Colonoscopy:  Yes      Flexibility:    Tightness present at:Adductors; Iliopsoas; Hamstrings and Gluteals    Abdominal Wall:  normal        Pelvic Clock Exam:  Pelvic clock exam: R>L tenderness to OI and LA.  Ischiocavernosis pain:  -  Bulbocavernosis pain:  -  Transverse Perineal:  -  Levator ANI:  +++  Perineal Body:  -    Reflex Testing:  normal    External Assessment:    Skin Condition:  Normal    Bearing Down/Coughing:  Normal  Tissue Symmetry:  Normal  Introitus:  Normal  Muscle Contraction/Perineal Mobility:  Substitution and slight lift, no urogential triangle descent  Internal Assessment:      Contraction/Grade:  Weak squeeze, 2 second hold (2) and fllicker muscles stretched (1)          SEMG Biofeedback:  NA                  Additional History:  Delivery History:  Vaginal delivery  Number of Pregnancies: 4  Number of Live Births: 3                       General     ROS    Assessment/Plan:    Patient is a 77 year old female with pelvic complaints.    Patient has the following significant findings with corresponding treatment plan.                Diagnosis 1:  Mixed incontinence  Decreased ROM/flexibility - manual therapy and therapeutic exercise  Decreased joint mobility - manual therapy and therapeutic exercise  Decreased strength - therapeutic exercise and therapeutic activities  Decreased proprioception - neuro re-education and therapeutic activities  Inflammation - self management/home program  Impaired muscle performance - biofeedback and neuro re-education  Decreased function - therapeutic activities  Impaired posture - neuro re-education    Therapy Evaluation Codes:   1) History comprised  of:   Personal factors that impact the plan of care:      Time since onset of symptoms.    Comorbidity factors that impact the plan of care are:      High blood pressure, Implanted device, Osteoarthritis and thyroid problems.     Medications impacting care: High blood pressure and thryoid.  2) Examination of Body Systems comprised of:   Body structures and functions that impact the plan of care:      Pelvis.   Activity limitations that impact the plan of care are:      Stress incontinence and Urge incontinence.  3) Clinical presentation characteristics are:   Evolving/Changing.  4) Decision-Making    Moderate complexity using standardized patient assessment instrument and/or measureable assessment of functional outcome.  Cumulative Therapy Evaluation is: Moderate complexity.    Previous and current functional limitations:  (See Goal Flow Sheet for this information)    Short term and Long term goals: (See Goal Flow Sheet for this information)     Communication ability:  Patient appears to be able to clearly communicate and understand verbal and written communication and follow directions correctly.  Treatment Explanation - The following has been discussed with the patient:   RX ordered/plan of care  Anticipated outcomes  Possible risks and side effects  This patient would benefit from PT intervention to resume normal activities.   Rehab potential is good.    Frequency:  1 X week, once daily  Duration:  for 8 weeks  Discharge Plan:  Achieve all LTG.  Independent in home treatment program.  Reach maximal therapeutic benefit.    Please refer to the daily flowsheet for treatment today, total treatment time and time spent performing 1:1 timed codes.

## 2018-04-17 RX ORDER — LEVOTHYROXINE SODIUM 50 UG/1
TABLET ORAL
Qty: 90 TABLET | Refills: 3 | Status: SHIPPED | OUTPATIENT
Start: 2018-04-17 | End: 2019-02-25

## 2018-04-18 DIAGNOSIS — K92.2 GASTROINTESTINAL HEMORRHAGE, UNSPECIFIED GASTROINTESTINAL HEMORRHAGE TYPE: ICD-10-CM

## 2018-04-18 DIAGNOSIS — M19.90 ARTHRITIS: ICD-10-CM

## 2018-04-18 LAB — HGB BLD-MCNC: 10.3 G/DL (ref 11.7–15.7)

## 2018-04-18 PROCEDURE — 36415 COLL VENOUS BLD VENIPUNCTURE: CPT | Performed by: INTERNAL MEDICINE

## 2018-04-18 PROCEDURE — 82728 ASSAY OF FERRITIN: CPT | Performed by: INTERNAL MEDICINE

## 2018-04-18 PROCEDURE — 85018 HEMOGLOBIN: CPT | Performed by: INTERNAL MEDICINE

## 2018-04-19 LAB — FERRITIN SERPL-MCNC: 98 NG/ML (ref 8–252)

## 2018-04-23 ENCOUNTER — THERAPY VISIT (OUTPATIENT)
Dept: PHYSICAL THERAPY | Facility: CLINIC | Age: 78
End: 2018-04-23
Payer: COMMERCIAL

## 2018-04-23 DIAGNOSIS — N39.46 MIXED INCONTINENCE: ICD-10-CM

## 2018-04-23 DIAGNOSIS — M79.18 MYALGIA OF PELVIC FLOOR: ICD-10-CM

## 2018-04-23 PROCEDURE — 97112 NEUROMUSCULAR REEDUCATION: CPT | Mod: GP | Performed by: PHYSICAL THERAPIST

## 2018-04-23 PROCEDURE — 97140 MANUAL THERAPY 1/> REGIONS: CPT | Mod: GP | Performed by: PHYSICAL THERAPIST

## 2018-04-30 ENCOUNTER — OFFICE VISIT (OUTPATIENT)
Dept: FAMILY MEDICINE | Facility: CLINIC | Age: 78
End: 2018-04-30
Payer: COMMERCIAL

## 2018-04-30 ENCOUNTER — THERAPY VISIT (OUTPATIENT)
Dept: PHYSICAL THERAPY | Facility: CLINIC | Age: 78
End: 2018-04-30
Payer: COMMERCIAL

## 2018-04-30 VITALS
RESPIRATION RATE: 17 BRPM | BODY MASS INDEX: 38.23 KG/M2 | WEIGHT: 222.7 LBS | TEMPERATURE: 97.9 F | DIASTOLIC BLOOD PRESSURE: 72 MMHG | SYSTOLIC BLOOD PRESSURE: 128 MMHG | OXYGEN SATURATION: 100 % | HEART RATE: 64 BPM

## 2018-04-30 DIAGNOSIS — I10 ESSENTIAL HYPERTENSION, BENIGN: ICD-10-CM

## 2018-04-30 DIAGNOSIS — N39.46 MIXED INCONTINENCE: ICD-10-CM

## 2018-04-30 DIAGNOSIS — M19.90 ARTHRITIS: ICD-10-CM

## 2018-04-30 DIAGNOSIS — H40.051 RAISED INTRAOCULAR PRESSURE OF RIGHT EYE: ICD-10-CM

## 2018-04-30 DIAGNOSIS — K92.2 GASTROINTESTINAL HEMORRHAGE, UNSPECIFIED GASTROINTESTINAL HEMORRHAGE TYPE: ICD-10-CM

## 2018-04-30 DIAGNOSIS — Z01.818 PREOP GENERAL PHYSICAL EXAM: Primary | ICD-10-CM

## 2018-04-30 DIAGNOSIS — M79.18 MYALGIA OF PELVIC FLOOR: ICD-10-CM

## 2018-04-30 LAB — HGB BLD-MCNC: 11.2 G/DL (ref 11.7–15.7)

## 2018-04-30 PROCEDURE — 97140 MANUAL THERAPY 1/> REGIONS: CPT | Mod: GP | Performed by: PHYSICAL THERAPIST

## 2018-04-30 PROCEDURE — 97110 THERAPEUTIC EXERCISES: CPT | Mod: GP | Performed by: PHYSICAL THERAPIST

## 2018-04-30 PROCEDURE — 99214 OFFICE O/P EST MOD 30 MIN: CPT | Performed by: INTERNAL MEDICINE

## 2018-04-30 PROCEDURE — 36415 COLL VENOUS BLD VENIPUNCTURE: CPT | Performed by: INTERNAL MEDICINE

## 2018-04-30 PROCEDURE — 85018 HEMOGLOBIN: CPT | Performed by: INTERNAL MEDICINE

## 2018-04-30 PROCEDURE — 82728 ASSAY OF FERRITIN: CPT | Performed by: INTERNAL MEDICINE

## 2018-04-30 NOTE — PATIENT INSTRUCTIONS
Before Your Surgery      Call your surgeon if there is any change in your health. This includes signs of a cold or flu (such as a sore throat, runny nose, cough, rash or fever).    Do not smoke, drink alcohol or take over the counter medicine (unless your surgeon or primary care doctor tells you to) for the 24 hours before and after surgery.    If you take prescribed drugs: Follow your doctor s orders about which medicines to take and which to stop until after surgery.    Eating and drinking prior to surgery: follow the instructions from your surgeon    Take a shower or bath the night before surgery. Use the soap your surgeon gave you to gently clean your skin. If you do not have soap from your surgeon, use your regular soap. Do not shave or scrub the surgery site.  Wear clean pajamas and have clean sheets on your bed.     On Am of surgery,OK to take Levothyroxine and Lisinopril with a small sip of water.

## 2018-04-30 NOTE — MR AVS SNAPSHOT
After Visit Summary   4/30/2018    Lexii Stratton    MRN: [id number]           Patient Information     Date Of Birth          1940        Visit Information        Provider Department      4/30/2018 1:00 PM Jesenia Madrigal MD [facility]        Today's Diagnoses     Preop general physical exam    -  1    Raised intraocular pressure of right eye        Essential hypertension, benign          Care Instructions      Before Your Surgery      Call your surgeon if there is any change in your health. This includes signs of a cold or flu (such as a sore throat, runny nose, cough, rash or fever).    Do not smoke, drink alcohol or take over the counter medicine (unless your surgeon or primary care doctor tells you to) for the 24 hours before and after surgery.    If you take prescribed drugs: Follow your doctor s orders about which medicines to take and which to stop until after surgery.    Eating and drinking prior to surgery: follow the instructions from your surgeon    Take a shower or bath the night before surgery. Use the soap your surgeon gave you to gently clean your skin. If you do not have soap from your surgeon, use your regular soap. Do not shave or scrub the surgery site.  Wear clean pajamas and have clean sheets on your bed.     On Am of surgery,OK to take Levothyroxine and Lisinopril with a small sip of water.           Follow-ups after your visit        Your next 10 appointments already scheduled     May 01, 2018  9:15 AM CDT   LAB with  LAB   [facility] ([facility])    [address] [facility] [address] [phone]           Please do not eat 10-12 hours before your appointment if you are coming in fasting for labs on lipids, cholesterol, or glucose (sugar). This does not apply to pregnant women. Water, hot tea and black coffee (with nothing added) are okay. Do not drink other fluids, diet soda or chew gum.             May 07, 2018 11:05 AM CDT   [facility] with [facility] [place], PT   [facility] ([facility]  )    [id number] [place] [address]  [address] [place] [address]   [phone]            May 14, 2018 10:25 AM CDT   [facility] with Bren [place], PT   [facility] ([facility]  )    [address] [place] [address]   [phone]            May 21, 2018 10:25 AM CDT   [facility] Only with Bren [place], PT   [facility] ([facility]  )    [id number] [place] [address]  [address] [place] [address]   [phone]            May 29, 2018  1:10 PM CDT   [facility] with Bren [place], PT   [facility] ([facility]  )    [address] [place] [address]   [phone]            Jun 04, 2018 10:25 AM CDT   [facility] with Bren [place], PT   [facility] ([facility]  )    [address] [place] [address]  [address] [place] [address]   [phone]            Jun 11, 2018  2:15 PM CDT   [facility] with [facility] [place], PT   [facility] ([facility]  )    [address] [place] [address]   [phone]            Jul 18, 2018 11:45 AM CDT   Return Visit with Jenny Ayon MD   [facility] ([facility])    [address]  [address]  [facility] [address] [phone]              Who to contact     If you have questions or need follow up information about today's clinic visit or your schedule please contact [facility] directly at [phone].  Normal or non-critical lab and imaging results will be communicated to you by MyChart, letter or phone within 4 business days after the clinic has received the results. If you do not hear from us within 7 days, please contact the clinic through MyChart or phone. If you have a critical or abnormal lab result, we will notify  you by phone as soon as possible.  Submit refill requests through [employer] or call your pharmacy and they will forward the refill request to us. Please allow 3 business days for your refill to be completed.          Additional Information About Your Visit        Business [employer] Information     [employer] gives you secure access to your electronic health record. If you see a primary care provider, you can also send messages to your care team and make appointments. If you have questions, please call your primary care clinic.  If you do not have a primary care provider, please call [phone] and they will assist you.        Care EveryWhere ID     This is your Care EveryWhere ID. This could be used by other organizations to access your [place] medical records  [id number]        Your Vitals Were     Pulse Temperature Respirations Last Period Pulse Oximetry BMI (Body Mass Index)    64 97.9  F (36.6  C) (Oral) 17 (LMP Unknown) 100% 38.23 kg/m2       Blood Pressure from Last 3 Encounters:   04/30/18 128/72   04/04/18 136/68   03/31/18 154/63    Weight from Last 3 Encounters:   04/30/18 222 lb 11.2 oz (101 kg)   04/04/18 226 lb 4.8 oz (102.6 kg)   03/31/18 227 lb 6.4 oz (103.1 kg)              Today, you had the following     No orders found for display       Primary Care Provider Office Phone # Fax #    Jesenia Priscilla Madrigal [phone]       [address] [facility] [address]        Equal Access to Services     [facility]: Hadii aad ku hadasho Soomaali, waaxda luqadaha, qaybta kaalmada adeegyada, waxay justin roman . So [facility] [phone].    ATENCIÓN: Si habla español, tiene a irene disposición servicios gratuitos de asistencia lingüística. Llame al [phone].    We comply with applicable federal civil rights laws and [place] laws. We do not discriminate on the basis of race, color, national origin, age, disability, sex, sexual orientation, or gender identity.            Thank you!      Thank you for choosing [facility]  for your care. Our goal is always to provide you with excellent care. Hearing back from our patients is one way we can continue to improve our services. Please take a few minutes to complete the written survey that you may receive in the mail after your visit with us. Thank you!             Your Updated Medication List - Protect others around you: Learn how to safely use, store and throw away your medicines at www.disposemymeds.org.          This list is accurate as of 4/30/18  1:36 PM.  Always use your most recent med list.                   Brand Name Dispense Instructions for use Diagnosis    acetaminophen 500 MG tablet    TYLENOL    100 tablet    Take 2 tablets (1,000 mg) by mouth 2 times daily as needed for mild pain    Arthritis       ACIDOPHILUS PROBIOTIC BLEND Caps     30 capsule    Take 1 capsule by mouth daily (with breakfast)        amLODIPine 5 MG tablet    NORVASC    90 tablet    TAKE ONE TABLET BY MOUTH EVERY NIGHT AT BEDTIME    Essential hypertension, benign       CENTRUM SILVER per tablet     30    1 TABLET DAILY    Need for prophylactic vaccination and inoculation against influenza, Routine general medical examination at a health care facility, Generalized osteoarthrosis, unspecified site, Other and unspecified hyperlipidemia, Esophageal reflux, Obesity, unspecified, Urinary sys symptom NEC, Other malaise and fatigue, Sleep disturbance, unspecified       * docusate sodium 100 MG capsule    COLACE     Take 100 mg by mouth every other day Every other day at Breakfast        * COLACE 100 MG capsule   Generic drug:  docusate sodium      Take 100 mg by mouth At Bedtime        fenofibrate 160 MG tablet     90 tablet    Take 1 tablet (160 mg) by mouth daily    Hyperlipidemia LDL goal <130       ferrous gluconate 324 (38 Fe) MG tablet    FERGON    100 tablet    Take 1 tablet (324 mg) by mouth 2 times daily    Gastrointestinal hemorrhage, unspecified  gastrointestinal hemorrhage type       levothyroxine 50 MCG tablet    SYNTHROID/LEVOTHROID    90 tablet    TAKE ONE TABLET BY MOUTH EVERY DAY    Acquired hypothyroidism       lisinopril 30 MG tablet    PRINIVIL,ZESTRIL    90 tablet    TAKE ONE TABLET BY MOUTH EVERY DAY FOR HYPERTENSION    Essential hypertension, benign       MAGNESIUM PO      Take 1 tablet by mouth daily (with breakfast)        omega-3 fatty acids 1200 MG capsule      1 TABLET DAILY    Preop general physical exam, Stress incontinence - female       pantoprazole 20 MG EC tablet    PROTONIX    30 tablet    TAKE ONE TABLET BY MOUTH EVERY DAY    Gastroesophageal reflux disease without esophagitis       potassium 99 MG Tabs      Take 1 tablet by mouth daily (with dinner)        psyllium 0.52 g capsule    METAMUCIL    540 capsule    Take 2 capsules (1.04 g) by mouth 3 times daily To take with fluid    Gastrointestinal hemorrhage, unspecified gastrointestinal hemorrhage type       RA CALCIUM PLUS MINERALS/VIT D PO      Take 1 tablet by mouth every other day Every other day at Lunch        simvastatin 20 MG tablet    ZOCOR    90 tablet    TAKE ONE TABLET BY MOUTH EVERY NIGHT AT BEDTIME    Hyperlipidemia LDL goal <130       travoprost (TIMI Free) 0.004 % ophthalmic solution    TRAVATAN Z    2.5 mL    Place 1 drop into both eyes At Bedtime        UNABLE TO FIND      daily (with dinner) MEDICATION NAME Cranberry.  2 capsules daily        VITAMIN D (CHOLECALCIFEROL) PO      Take 2,000 Units by mouth every other day Every other day at Supper        * Notice:  This list has 2 medication(s) that are the same as other medications prescribed for you. Read the directions carefully, and ask your doctor or other care provider to review them with you.

## 2018-04-30 NOTE — PROGRESS NOTES
[facility]  [address] [facility] [address] [phone]  Dept: [phone]    PRE-OP EVALUATION:  Today's date: 2018    Lexii Stratton (: 1940) presents for pre-operative evaluation assessment as requested by Dr. Cesar Owen.  She requires evaluation and anesthesia risk assessment prior to undergoing surgery/procedure for treatment of laser treatment of right eye .    Fax number for surgical facility: [phone]  Primary Physician: Jesenia Madrigal  Type of Anesthesia Anticipated: Local with MAC    Patient has a Health Care Directive or Living Will:  YES     Preop Questions 2018   Who is doing your surgery? Dr Cesar Owen   What are you having done? Yag P I aser treatment on right eye   Date of Surgery/Procedure: May 9 2018    12:30 pm   Facility or Hospital where procedure/surgery will be performed: [facility] [place]    1.  Do you have a history of Heart attack, stroke, stent, coronary bypass surgery, or other heart surgery? No   2.  Do you ever have any pain or discomfort in your chest? No   3.  Do you have a history of  Heart Failure? No   4.   Are you troubled by shortness of breath when:  walking on a level surface, or up a slight hill, or at night? No   5.  Do you currently have a cold, bronchitis or other respiratory infection? No   6.  Do you have a cough, shortness of breath, or wheezing? No   7.  Do you sometimes get pains in the calves of your legs when you walk? No   8. Do you or anyone in your family have previous history of blood clots? No   9.  Do you or does anyone in your family have a serious bleeding problem such as prolonged bleeding following surgeries or cuts? No   10. Have you ever had problems with anemia or been told to take iron pills? YES - currently taking iron tablets   11. Have you had any abnormal blood loss such as black, tarry or bloody stools, or abnormal vaginal bleeding? No   12. Have you  ever had a blood transfusion? YES - no complications; received 3 units earlier this year   13. Have you or any of your relatives ever had problems with anesthesia? YES - mother confusion and disorientation; pt has had no issues with anesthesia   14. Do you have sleep apnea, excessive snoring or daytime drowsiness? No   15. Do you have any prosthetic heart valves? No   16. Do you have prosthetic joints? YES - both knee and right hip   17. Is there any chance that you may be pregnant? No         HPI:     HPI related to upcoming procedure:   No previous cataract surgery;  Increase in left ocular pressure treated with Yag laser procedure. Now with  Continued increased pressure;     HYPERTENSION - Patient has longstanding history of HTN , currently denies any symptoms referable to elevated blood pressure. Specifically denies chest pain, palpitations, dyspnea, orthopnea, PND or peripheral edema. Blood pressure readings have been in normal range. Current medication regimen is as listed below. Patient denies any side effects of medication.                                                                                                                                                                                          .  HYPOTHYROIDISM - Patient has a longstanding history of chronic Hypothyroidism. Patient has been doing well, noting no tremor, insomnia, hair loss or changes in skin texture. Continues to take medications as directed, without adverse reactions or side effects. Last TSH   Lab Results   Component Value Date    TSH 0.98 03/28/2018   .                                                                                                                                                                                                                        .    MEDICAL HISTORY:     Patient Active Problem List    Diagnosis Date Noted     Hyperlipidemia LDL goal <130 10/31/2010     Priority: High     Impaired fasting  glucose 07/16/2007     Priority: High     Essential hypertension, benign 06/25/2006     Priority: High     Mixed incontinence 04/16/2018     Priority: Medium     Myalgia of pelvic floor 04/16/2018     Priority: Medium     GI bleed 03/29/2018     Priority: Medium     Morbid obesity due to excess calories (H) 08/21/2017     Priority: Medium     OAB (overactive bladder) 02/01/2017     Priority: Medium     [facility] Dr. Bai; has tried 7 medications without benefit, Interstim x 2, Botox in bladder, x 5; no infection now. Ongoing symptoms; defer to Urology.       S/P total hip arthroplasty 11/09/2016     Priority: Medium     Anemia, unspecified 09/19/2016     Priority: Medium     Diagnosis updated by automated process. Provider to review and confirm.       Poor iron absorption 09/19/2016     Priority: Medium     Iron and its compounds causing adverse effect in therapeutic use(E934.0) 09/19/2016     Priority: Medium     Partial small bowel obstruction 01/05/2015     Priority: Medium     12/23/2014; was hospitalized through 12/26/2014; NG to decompress stomach; no surgery,       Chest pain syndrome 07/02/2013     Priority: Medium     Hypersomnia with sleep apnea 11/23/2004     Priority: Medium     Problem list name updated by automated process. Provider to review       Acquired hypothyroidism 11/11/2004     Priority: Medium     Other symptoms involving urinary system 10/05/2002     Priority: Medium     Problem list name updated by automated process. Provider to review and confirm       Generalized osteoarthrosis, unspecified site 10/03/2002     Priority: Medium     Esophageal reflux 10/03/2002     Priority: Medium     Obesity 10/03/2002     Priority: Medium     Problem list name updated by automated process. Provider to review       Advance Care Planning 06/07/2011     Priority: Low     Advance Care Planning 1/9/2017: Receipt of ACP document:  Received: Health Care Directive which was witnessed or notarized on  5/27/09.  Document previously scanned on 11/14/16.  Validation form completed and sent to be scanned.  Code Status reflects choices in most recent ACP document.  Confirmed/documented designated decision maker(s).  Added by Beatriz Esteban RN, Advance Care Planning Liaison.          Past Medical History:   Diagnosis Date     Arthritis      Chronic infection     Recent UTI cleared now     Esophageal reflux      Hernia, abdominal      Hypertension     No cardiologist     Incontinence of urine      Mumps     6 years old     Obese      Osteoarthritis      Other and unspecified hyperlipidemia      Other chronic pain     back     Peptic ulcer, unspecified site, unspecified as acute or chronic, without mention of hemorrhage, perforation, or obstruction      Small bowel obstruction      Thyroid disease hypothyroidism     Urinary incontinence      Walking troubles      Past Surgical History:   Procedure Laterality Date     ARTHROPLASTY HIP Right 11/9/2016    Procedure: ARTHROPLASTY HIP;  Surgeon: Angel Lund MD;  Location:  OR     AS REPAIR INCISIONAL HERNIA,REDUCIBLE  1998    inc hernia ( [facility]ile[facility] surgery)     BLADDER SURGERY      hyperdistention surgery and sling     C NONSPECIFIC PROCEDURE  1946    T&A     C NONSPECIFIC PROCEDURE  1984    Vaginal Hysterectomy (has her ovaries)     C NONSPECIFIC PROCEDURE  1973    PPTL     C NONSPECIFIC PROCEDURE  1994    L shoulder to remove bone spurs     C NONSPECIFIC PROCEDURE  2004    cysto and durasphere Dr Demarco     C NONSPECIFIC PROCEDURE  2005    cysto and durasphere     C NONSPECIFIC PROCEDURE  2007    cysto and durasphere     C NONSPECIFIC PROCEDURE  2009    retropubic TVT sling     CHOLECYSTECTOMY  1987     COLONOSCOPY  12/3/2011    Procedure:COLONOSCOPY; COLONOSCOPY; Surgeon:SEMAJ GRAHAM; Location: GI     COLONOSCOPY N/A 3/29/2018    Procedure: COLONOSCOPY;  COLONOSCOPY Rm 544;  Surgeon: Marco Gusman MD;  Location:  GI     CYSTOSCOPY N/A 9/6/2017     Procedure: CYSTOSCOPY;  CYSTOSCOPY AND HYDRODISTENTION ;  Surgeon: Eyal Bai MD;  Location: [facility] OR     ENT SURGERY      Tonsillectomy     ESOPHAGOSCOPY, GASTROSCOPY, DUODENOSCOPY (EGD), COMBINED N/A 9/26/2016    Procedure: COMBINED ESOPHAGOSCOPY, GASTROSCOPY, DUODENOSCOPY (EGD), BIOPSY SINGLE OR MULTIPLE;  Surgeon: Marco Monaco MD;  Location:  GI     GENITOURINARY SURGERY       GYN SURGERY      Hysterectomy     HERNIA REPAIR, UMBILICAL  7/8/2010    Dr. Kirt Franco times 3     ORTHOPEDIC SURGERY  2009    [facility] - Dr. Lund- bilateral knee replacement     Current Outpatient Prescriptions   Medication Sig Dispense Refill     acetaminophen (TYLENOL) 500 MG tablet Take 2 tablets (1,000 mg) by mouth 2 times daily as needed for mild pain 100 tablet 0     amLODIPine (NORVASC) 5 MG tablet TAKE ONE TABLET BY MOUTH EVERY NIGHT AT BEDTIME 90 tablet 2     Calcium Carbonate-Vit D-Min (RA CALCIUM PLUS MINERALS/VIT D PO) Take 1 tablet by mouth every other day Every other day at Lunch       CENTRUM SILVER OR TABS 1 TABLET DAILY 30 0     docusate sodium (COLACE) 100 MG capsule Take 100 mg by mouth every other day Every other day at Breakfast       docusate sodium (COLACE) 100 MG capsule Take 100 mg by mouth At Bedtime       fenofibrate 160 MG tablet Take 1 tablet (160 mg) by mouth daily 90 tablet 3     ferrous gluconate (FERGON) 324 (38 FE) MG tablet Take 1 tablet (324 mg) by mouth 2 times daily 100 tablet 0     levothyroxine (SYNTHROID/LEVOTHROID) 50 MCG tablet TAKE ONE TABLET BY MOUTH EVERY DAY 90 tablet 3     lisinopril (PRINIVIL,ZESTRIL) 30 MG tablet TAKE ONE TABLET BY MOUTH EVERY DAY FOR HYPERTENSION 90 tablet 3     MAGNESIUM PO Take 1 tablet by mouth daily (with breakfast)       OMEGA-3 FATTY ACIDS 1200 MG OR CAPS 1 TABLET DAILY  0     pantoprazole (PROTONIX) 20 MG EC tablet TAKE ONE TABLET BY MOUTH EVERY DAY 30 tablet 11     potassium 99 MG TABS Take 1 tablet by mouth daily (with dinner)        Probiotic  Product (ACIDOPHILUS PROBIOTIC BLEND) CAPS Take 1 capsule by mouth daily (with breakfast)  30 capsule 0     psyllium (METAMUCIL) 0.52 G capsule Take 2 capsules (1.04 g) by mouth 3 times daily To take with fluid 540 capsule      simvastatin (ZOCOR) 20 MG tablet TAKE ONE TABLET BY MOUTH EVERY NIGHT AT BEDTIME 90 tablet 2     travoprost, TIMI Free, (TRAVATAN Z) 0.004 % ophthalmic solution Place 1 drop into both eyes At Bedtime  2.5 mL 1     UNABLE TO FIND daily (with dinner) MEDICATION NAME Cranberry.  2 capsules daily        VITAMIN D, CHOLECALCIFEROL, PO Take 2,000 Units by mouth every other day Every other day at Supper       OTC products: None, except as noted above    Allergies   Allergen Reactions     Augmentin Diarrhea     Codeine Nausea and Vomiting     Hydrocodone      Keeps patient awake     Naproxen Itching and Rash     Sulfa Drugs Nausea      Latex Allergy: NO    Social History   Substance Use Topics     Smoking status: Never Smoker     Smokeless tobacco: Never Used     Alcohol use No     History   Drug Use No       REVIEW OF SYSTEMS:   CONSTITUTIONAL: NEGATIVE for fever, chills, change in weight  INTEGUMENTARY/SKIN: NEGATIVE for worrisome rashes, moles or lesions  EYES: NEGATIVE for vision changes or irritation  RESP: NEGATIVE for significant cough or SOB  CV: NEGATIVE for chest pain, palpitations or peripheral edema  GI: hx of GI hemorrhage; no clear source of bleeding.   female : working with Urology- Dr. Ayon - pelvic floor physical therapy; no meds for incontinence.  MUSCULOSKELETAL: joint pain/arthritis; unable to use Meloxicam or NSAIDS due to GI bleed.  NEURO: NEGATIVE for weakness, dizziness or paresthesias  HEME: GI bleed; no clear source; on Iron therapy;;   Lab Results   Component Value Date    HGB 10.3 04/18/2018    HGB 10.2 04/04/2018   continues on iron therapy.  PSYCHIATRIC: NEGATIVE for changes in mood or affect    EXAM:   /72  Pulse 64  Temp 97.9  F (36.6  C) (Oral)  Resp 17  Wt  222 lb 11.2 oz (101 kg)  LMP  (LMP Unknown)  SpO2 100%  BMI 38.23 kg/m2    GENERAL APPEARANCE: healthy, alert and no distress     EYES: Eyes grossly normal to inspection     NECK: no adenopathy, no asymmetry, masses, or scars and thyroid normal to palpation     RESP: lungs clear to auscultation - no rales, rhonchi or wheezes     CV: regular rates and rhythm     ABDOMEN:  soft, nontender, no HSM or masses and bowel sounds normal     MS: no edema     NEURO: Normal strength and tone, sensory exam grossly normal, mentation intact and speech normal     PSYCH: mentation appears normal. and affect normal/bright    DIAGNOSTICS:   EKG: Not indicated due to non-vascular surgery and low risk of event (age <65 and without cardiac risk factors)    Recent Labs   Lab Test  04/18/18   0909  04/04/18   1131   03/29/18   0606  03/28/18   2057   11/01/17   1519   09/08/16   1138   HGB  10.3*  10.2*   < >  7.2*  5.7*   < >   --    < >  10.5*   PLT   --    --    --   246  275   < >   --    < >  244   INR   --    --    --    --   1.15*   --    --    --    --    NA   --    --    --   143  142   --   142   < >  138   POTASSIUM   --    --    --   4.0  4.0   --   4.4   < >  3.9   CR   --    --    --   0.88  0.99   --   0.85   < >  1.17*   A1C   --    --    --    --    --    --   5.9   --   6.0    < > = values in this interval not displayed.        IMPRESSION:   Reason for surgery/procedure: increased intraocular pressure  Diagnosis/reason for consult: elevated intraocular pressure    The proposed surgical procedure is considered LOW risk.    REVISED CARDIAC RISK INDEX  The patient has the following serious cardiovascular risks for perioperative complications such as (MI, PE, VFib and 3  AV Block):  No serious cardiac risks  INTERPRETATION: 0 risks: Class I (very low risk - 0.4% complication rate)    The patient has the following additional risks for perioperative complications:  No identified additional risks      ICD-10-CM    1. Preop  general physical exam Z01.818    2. Raised intraocular pressure of right eye H40.051    3. Essential hypertension, benign I10        RECOMMENDATIONS:     --Approval given to proceed with proposed procedure, without further diagnostic evaluation  --Pt advised to avoid NSAIDS (Motrin, Ibuprofen, Aleve or Naprosyn);  If needed, Tylenol or Acetaminophen are fine to use.  --meds reviewed; On Am of surgery,OK to take Levothyroxine and Lisinopril with a small sip of water.   --Pain medications, time off from work and FMLA following surgery deferred to surgeon.          Signed Electronically by: MD Jesenia Reeves MD  Internal Medicine  electronically signed      Copy of this evaluation report is provided to requesting physician.    [facility] Preop Guidelines    Revised Cardiac Risk Index

## 2018-05-01 LAB — FERRITIN SERPL-MCNC: 58 NG/ML (ref 8–252)

## 2018-05-07 ENCOUNTER — THERAPY VISIT (OUTPATIENT)
Dept: PHYSICAL THERAPY | Facility: CLINIC | Age: 78
End: 2018-05-07
Payer: COMMERCIAL

## 2018-05-07 DIAGNOSIS — M79.18 MYALGIA OF PELVIC FLOOR: ICD-10-CM

## 2018-05-07 DIAGNOSIS — N39.46 MIXED INCONTINENCE: ICD-10-CM

## 2018-05-07 PROCEDURE — 97140 MANUAL THERAPY 1/> REGIONS: CPT | Mod: GP | Performed by: PHYSICAL THERAPIST

## 2018-05-07 PROCEDURE — 97110 THERAPEUTIC EXERCISES: CPT | Mod: GP | Performed by: PHYSICAL THERAPIST

## 2018-05-13 DIAGNOSIS — I10 ESSENTIAL HYPERTENSION, BENIGN: ICD-10-CM

## 2018-05-14 ENCOUNTER — THERAPY VISIT (OUTPATIENT)
Dept: PHYSICAL THERAPY | Facility: CLINIC | Age: 78
End: 2018-05-14
Payer: COMMERCIAL

## 2018-05-14 DIAGNOSIS — M79.18 MYALGIA OF PELVIC FLOOR: ICD-10-CM

## 2018-05-14 DIAGNOSIS — N39.46 MIXED INCONTINENCE: ICD-10-CM

## 2018-05-14 PROCEDURE — 97112 NEUROMUSCULAR REEDUCATION: CPT | Mod: GP | Performed by: PHYSICAL THERAPIST

## 2018-05-14 PROCEDURE — 97140 MANUAL THERAPY 1/> REGIONS: CPT | Mod: GP | Performed by: PHYSICAL THERAPIST

## 2018-05-14 PROCEDURE — 97110 THERAPEUTIC EXERCISES: CPT | Mod: GP | Performed by: PHYSICAL THERAPIST

## 2018-05-14 NOTE — TELEPHONE ENCOUNTER
"Requested Prescriptions   Pending Prescriptions Disp Refills     amLODIPine (NORVASC) 5 MG tablet [Pharmacy Med Name: AMLODIPINE BESYLATE 5MG TABS]  Last Written Prescription Date:  08/07/2017  Last Fill Quantity: 90 tablet,  # refills: 2   Last office visit: 4/30/2018 with prescribing provider:  Jesenia Madrigal MD    Future Office Visit:     90 tablet 2     Sig: TAKE ONE TABLET BY MOUTH EVERY NIGHT AT BEDTIME    Calcium Channel Blockers Protocol  Passed    5/13/2018  8:12 PM       Passed - Blood pressure under 140/90 in past 12 months    BP Readings from Last 3 Encounters:   04/30/18 128/72   04/04/18 136/68   03/31/18 154/63                Passed - Recent (12 mo) or future (30 days) visit within the authorizing provider's specialty    Patient had office visit in the last 12 months or has a visit in the next 30 days with authorizing provider or within the authorizing provider's specialty.  See \"Patient Info\" tab in inbasket, or \"Choose Columns\" in Meds & Orders section of the refill encounter.           Passed - Patient is age 18 or older       Passed - No active pregnancy on record       Passed - Normal serum creatinine on file in past 12 months    Recent Labs   Lab Test  03/29/18   0606   CR  0.88            Passed - No positive pregnancy test in past 12 months        "

## 2018-05-15 DIAGNOSIS — M19.90 ARTHRITIS: ICD-10-CM

## 2018-05-15 DIAGNOSIS — K92.2 GASTROINTESTINAL HEMORRHAGE, UNSPECIFIED GASTROINTESTINAL HEMORRHAGE TYPE: ICD-10-CM

## 2018-05-15 DIAGNOSIS — E78.5 HYPERLIPIDEMIA LDL GOAL <130: ICD-10-CM

## 2018-05-15 LAB — HGB BLD-MCNC: 12.5 G/DL (ref 11.7–15.7)

## 2018-05-15 PROCEDURE — 80061 LIPID PANEL: CPT | Performed by: INTERNAL MEDICINE

## 2018-05-15 PROCEDURE — 36415 COLL VENOUS BLD VENIPUNCTURE: CPT | Performed by: INTERNAL MEDICINE

## 2018-05-15 PROCEDURE — 82728 ASSAY OF FERRITIN: CPT | Performed by: INTERNAL MEDICINE

## 2018-05-15 PROCEDURE — 85018 HEMOGLOBIN: CPT | Performed by: INTERNAL MEDICINE

## 2018-05-15 PROCEDURE — 80053 COMPREHEN METABOLIC PANEL: CPT | Performed by: INTERNAL MEDICINE

## 2018-05-15 RX ORDER — AMLODIPINE BESYLATE 5 MG/1
TABLET ORAL
Qty: 90 TABLET | Refills: 0 | Status: SHIPPED | OUTPATIENT
Start: 2018-05-15 | End: 2018-05-31

## 2018-05-15 NOTE — TELEPHONE ENCOUNTER
Medication is being filled for 1 time refill only due to:  Patient needs to be seen because due for BP recheck.     Prescription approved per [facility] Refill Protocol.    Radha ROMO RN, BSN, PHN  [place] [facility] RN

## 2018-05-16 LAB
ALBUMIN SERPL-MCNC: 3.7 G/DL (ref 3.4–5)
ALP SERPL-CCNC: 66 U/L (ref 40–150)
ALT SERPL W P-5'-P-CCNC: 28 U/L (ref 0–50)
ANION GAP SERPL CALCULATED.3IONS-SCNC: 9 MMOL/L (ref 3–14)
AST SERPL W P-5'-P-CCNC: 46 U/L (ref 0–45)
BILIRUB SERPL-MCNC: 0.4 MG/DL (ref 0.2–1.3)
BUN SERPL-MCNC: 21 MG/DL (ref 7–30)
CALCIUM SERPL-MCNC: 10.2 MG/DL (ref 8.5–10.1)
CHLORIDE SERPL-SCNC: 107 MMOL/L (ref 94–109)
CHOLEST SERPL-MCNC: 154 MG/DL
CO2 SERPL-SCNC: 25 MMOL/L (ref 20–32)
CREAT SERPL-MCNC: 0.85 MG/DL (ref 0.52–1.04)
GFR SERPL CREATININE-BSD FRML MDRD: 65 ML/MIN/1.7M2
GLUCOSE SERPL-MCNC: 137 MG/DL (ref 70–99)
HDLC SERPL-MCNC: 41 MG/DL
LDLC SERPL CALC-MCNC: 84 MG/DL
NONHDLC SERPL-MCNC: 113 MG/DL
POTASSIUM SERPL-SCNC: 4.4 MMOL/L (ref 3.4–5.3)
PROT SERPL-MCNC: 7.6 G/DL (ref 6.8–8.8)
SODIUM SERPL-SCNC: 141 MMOL/L (ref 133–144)
TRIGL SERPL-MCNC: 144 MG/DL

## 2018-05-17 LAB — FERRITIN SERPL-MCNC: 69 NG/ML (ref 8–252)

## 2018-05-21 ENCOUNTER — THERAPY VISIT (OUTPATIENT)
Dept: PHYSICAL THERAPY | Facility: CLINIC | Age: 78
End: 2018-05-21
Payer: COMMERCIAL

## 2018-05-21 DIAGNOSIS — M79.18 MYALGIA OF PELVIC FLOOR: ICD-10-CM

## 2018-05-21 DIAGNOSIS — N39.46 MIXED INCONTINENCE: ICD-10-CM

## 2018-05-21 PROCEDURE — 97110 THERAPEUTIC EXERCISES: CPT | Mod: GP | Performed by: PHYSICAL THERAPIST

## 2018-05-21 PROCEDURE — 97140 MANUAL THERAPY 1/> REGIONS: CPT | Mod: GP | Performed by: PHYSICAL THERAPIST

## 2018-05-28 DIAGNOSIS — E78.5 HYPERLIPIDEMIA LDL GOAL <130: ICD-10-CM

## 2018-05-28 NOTE — TELEPHONE ENCOUNTER
"Requested Prescriptions   Pending Prescriptions Disp Refills     simvastatin (ZOCOR) 20 MG tablet [Pharmacy Med Name: SIMVASTATIN 20MG TABS]  Last Written Prescription Date:  08/23/2017  Last Fill Quantity: 90 tablet,  # refills: 2   Last office visit: 4/30/2018 with prescribing provider:  Jesenia Madrigal MD    Future Office Visit:   Next 5 appointments (look out 90 days)     May 31, 2018  8:30 AM CDT   Office Visit with Jesenia Madrigal MD   [address] [address] [phone]                  90 tablet 2     Sig: TAKE ONE TABLET BY MOUTH EVERY NIGHT AT BEDTIME    Statins Protocol Passed    5/28/2018  2:17 PM       Passed - LDL on file in past 12 months    Recent Labs   Lab Test  05/15/18   0905   LDL  84            Passed - No abnormal creatine kinase in past 12 months    No lab results found.            Passed - Recent (12 mo) or future (30 days) visit within the authorizing provider's specialty    Patient had office visit in the last 12 months or has a visit in the next 30 days with authorizing provider or within the authorizing provider's specialty.  See \"Patient Info\" tab in inbasket, or \"Choose Columns\" in Meds & Orders section of the refill encounter.           Passed - Patient is age 18 or older       Passed - No active pregnancy on record       Passed - No positive pregnancy test in past 12 months          "

## 2018-05-29 ENCOUNTER — THERAPY VISIT (OUTPATIENT)
Dept: PHYSICAL THERAPY | Facility: CLINIC | Age: 78
End: 2018-05-29
Payer: COMMERCIAL

## 2018-05-29 DIAGNOSIS — M79.18 MYALGIA OF PELVIC FLOOR: ICD-10-CM

## 2018-05-29 DIAGNOSIS — N39.46 MIXED INCONTINENCE: ICD-10-CM

## 2018-05-29 PROCEDURE — 97110 THERAPEUTIC EXERCISES: CPT | Mod: GP | Performed by: PHYSICAL THERAPIST

## 2018-05-29 PROCEDURE — 97112 NEUROMUSCULAR REEDUCATION: CPT | Mod: GP | Performed by: PHYSICAL THERAPIST

## 2018-05-30 RX ORDER — SIMVASTATIN 20 MG
TABLET ORAL
Qty: 90 TABLET | Refills: 2 | Status: SHIPPED | OUTPATIENT
Start: 2018-05-30 | End: 2019-02-25

## 2018-05-30 NOTE — TELEPHONE ENCOUNTER
Prescription approved per [facility] Refill Protocol.  Beverly Clark RN

## 2018-05-31 ENCOUNTER — OFFICE VISIT (OUTPATIENT)
Dept: FAMILY MEDICINE | Facility: CLINIC | Age: 78
End: 2018-05-31
Payer: COMMERCIAL

## 2018-05-31 VITALS
DIASTOLIC BLOOD PRESSURE: 70 MMHG | OXYGEN SATURATION: 100 % | HEART RATE: 50 BPM | TEMPERATURE: 97.9 F | SYSTOLIC BLOOD PRESSURE: 130 MMHG | RESPIRATION RATE: 17 BRPM | WEIGHT: 223 LBS | BODY MASS INDEX: 38.28 KG/M2

## 2018-05-31 DIAGNOSIS — N32.81 OAB (OVERACTIVE BLADDER): ICD-10-CM

## 2018-05-31 DIAGNOSIS — E03.9 ACQUIRED HYPOTHYROIDISM: ICD-10-CM

## 2018-05-31 DIAGNOSIS — I10 ESSENTIAL HYPERTENSION, BENIGN: ICD-10-CM

## 2018-05-31 DIAGNOSIS — E66.01 MORBID OBESITY DUE TO EXCESS CALORIES (H): ICD-10-CM

## 2018-05-31 DIAGNOSIS — M54.50 RIGHT-SIDED LOW BACK PAIN WITHOUT SCIATICA, UNSPECIFIED CHRONICITY: Primary | ICD-10-CM

## 2018-05-31 DIAGNOSIS — D50.8 OTHER IRON DEFICIENCY ANEMIA: ICD-10-CM

## 2018-05-31 LAB — HGB BLD-MCNC: 12 G/DL (ref 11.7–15.7)

## 2018-05-31 PROCEDURE — 85018 HEMOGLOBIN: CPT | Performed by: INTERNAL MEDICINE

## 2018-05-31 PROCEDURE — 83550 IRON BINDING TEST: CPT | Performed by: INTERNAL MEDICINE

## 2018-05-31 PROCEDURE — 99214 OFFICE O/P EST MOD 30 MIN: CPT | Performed by: INTERNAL MEDICINE

## 2018-05-31 PROCEDURE — 82728 ASSAY OF FERRITIN: CPT | Performed by: INTERNAL MEDICINE

## 2018-05-31 PROCEDURE — 36415 COLL VENOUS BLD VENIPUNCTURE: CPT | Performed by: INTERNAL MEDICINE

## 2018-05-31 PROCEDURE — 83540 ASSAY OF IRON: CPT | Performed by: INTERNAL MEDICINE

## 2018-05-31 RX ORDER — LISINOPRIL 30 MG/1
TABLET ORAL
Qty: 90 TABLET | Refills: 3 | Status: SHIPPED | OUTPATIENT
Start: 2018-05-31 | End: 2018-12-31

## 2018-05-31 RX ORDER — AMLODIPINE BESYLATE 5 MG/1
TABLET ORAL
Qty: 90 TABLET | Refills: 2 | Status: SHIPPED | OUTPATIENT
Start: 2018-05-31 | End: 2019-02-25

## 2018-05-31 RX ORDER — MELOXICAM 15 MG/1
7.5 TABLET ORAL DAILY
Qty: 30 TABLET | Refills: 1
Start: 2018-05-31 | End: 2018-10-30

## 2018-05-31 NOTE — PROGRESS NOTES
SUBJECTIVE:   Lexii Stratton is a 77 year old female who presents to clinic today for the following health issues:    Hyperlipidemia Follow-Up  No current medication side effects     Rate your low fat/cholesterol diet?: good    Taking statin?  Yes, no muscle aches from statin    Other lipid medications/supplements?:  Fenofibrate, without side effects    Patient is currently taking Fenofibrate 160 mg, Simvastatin 20 mg  LDL Cholesterol Calculated   Date Value Ref Range Status   05/15/2018 84 <100 mg/dL Final     Comment:     Desirable:       <100 mg/dl     ]      Hypertension Follow-up  No current mediation side effects    Outpatient blood pressures are not being checked.    Low Salt Diet: no added salt    Patient is currently taking Amlodipine 5 mg, Lisinopril 30 mg   BP Readings from Last 3 Encounters:   05/31/18 130/70   04/30/18 128/72   04/04/18 136/68           Back Pain  (pt would like a referral to [facility] in [place] to Светлана for low back pain)   Experiencing right side lower back pain that radiates over to her lower left side. Nothing has helped with the pain since she was taken off meloxicam during her hospital stay in March. Pain has gotten worse since her discharge. Has a prescription of meloxicam at home and is contemplating taking half a pill daily to ease her arthritic pain. Knows about a lower back pain PT specialist in [place] and would like a referral.       Duration: worse over the past 4-6 weeks  (since having to stop Melaxicam)        Specific cause: none    Description:   Location of pain: low back bilateral  Character of pain: sharp, dull ache and constant  Pain radiation:radiates into the right buttocks  New numbness or weakness in legs, not attributed to pain:  no     Intensity: Currently 8/10    History:   Pain interferes with job: Not applicable  History of back problems: recurrent self limited episodes of low back pain in the past  Any previous MRI or X-rays: None  Sees a  specialist for back pain:  No  Therapies tried without relief: acetaminophen (Tylenol)    Alleviating factors:   Improved by: NSAIDs      Precipitating factors:  Worsened by: Standing    Functional and Psychosocial Screen (Eugenio STarT Back):      Not performed today      Amount of exercise or physical activity: None    Problems taking medications regularly: No    Medication side effects: none    Diet: regular (no restrictions)        Problem list and histories reviewed & adjusted, as indicated.  Additional history: as documented    Patient Active Problem List   Diagnosis     Generalized osteoarthrosis, unspecified site     Esophageal reflux     Obesity     Other symptoms involving urinary system     Acquired hypothyroidism     Hypersomnia with sleep apnea     Essential hypertension, benign     Impaired fasting glucose     Hyperlipidemia LDL goal <130     Advance Care Planning     Chest pain syndrome     Partial small bowel obstruction     Anemia, unspecified     Poor iron absorption     Iron and its compounds causing adverse effect in therapeutic use(E934.0)     S/P total hip arthroplasty     OAB (overactive bladder)     Morbid obesity due to excess calories (H)     GI bleed     Mixed incontinence     Myalgia of pelvic floor     Past Surgical History:   Procedure Laterality Date     ARTHROPLASTY HIP Right 11/9/2016    Procedure: ARTHROPLASTY HIP;  Surgeon: Angel Lund MD;  Location: [facility] OR     AS REPAIR INCISIONAL HERNIA,REDUCIBLE  1998    inc hernia ( [facility] surgery)     BLADDER SURGERY      hyperdistention surgery and sling     C NONSPECIFIC PROCEDURE  1946    T&A     C NONSPECIFIC PROCEDURE  1984    Vaginal Hysterectomy (has her ovaries)     C NONSPECIFIC PROCEDURE  1973    PPTL     C NONSPECIFIC PROCEDURE  1994    L shoulder to remove bone spurs     C NONSPECIFIC PROCEDURE  2004    cysto and durasphere Dr Demarco     C NONSPECIFIC PROCEDURE  2005    cysto and durasphere     C NONSPECIFIC PROCEDURE   2007    cysto and durasphere     C NONSPECIFIC PROCEDURE  2009    retropubic TVT sling     CHOLECYSTECTOMY  1987     COLONOSCOPY  12/3/2011    Procedure:COLONOSCOPY; COLONOSCOPY; Surgeon:SEMAJ GRAHAM; Location: GI     COLONOSCOPY N/A 3/29/2018    Procedure: COLONOSCOPY;  COLONOSCOPY Rm 544;  Surgeon: Marco Gusman MD;  Location:  GI     CYSTOSCOPY N/A 2017    Procedure: CYSTOSCOPY;  CYSTOSCOPY AND HYDRODISTENTION ;  Surgeon: Eyal Bai MD;  Location:  OR     ENT SURGERY      Tonsillectomy     ESOPHAGOSCOPY, GASTROSCOPY, DUODENOSCOPY (EGD), COMBINED N/A 2016    Procedure: COMBINED ESOPHAGOSCOPY, GASTROSCOPY, DUODENOSCOPY (EGD), BIOPSY SINGLE OR MULTIPLE;  Surgeon: Marco Monaco MD;  Location:  GI     GENITOURINARY SURGERY       GYN SURGERY      Hysterectomy     HERNIA REPAIR, UMBILICAL  2010    Dr. Kirt Franco times 3     ORTHOPEDIC SURGERY      [facility] - Dr. Lund- bilateral knee replacement       Social History   Substance Use Topics     Smoking status: Never Smoker     Smokeless tobacco: Never Used     Alcohol use No     Family History   Problem Relation Age of Onset     HEART DISEASE Mother      heart surgery , new valve     Gallbladder Disease Mother      Coronary Artery Disease Mother      Hyperlipidemia Mother      Asthma Mother      HEART DISEASE Maternal Grandmother      Coronary Artery Disease Maternal Grandmother      HEART DISEASE Maternal Grandfather      Coronary Artery Disease Maternal Grandfather      CANCER Father      throat ca,  76     Coronary Artery Disease Father      DIABETES Brother      Half Brother     Cardiovascular Brother      lung ca, Half brother     CANCER Brother      half brother  lung          Current Outpatient Prescriptions   Medication Sig Dispense Refill     acetaminophen (TYLENOL) 500 MG tablet Take 2 tablets (1,000 mg) by mouth 2 times daily as needed for mild pain 100 tablet 0     amLODIPine (NORVASC) 5 MG tablet TAKE ONE  TABLET BY MOUTH EVERY NIGHT AT BEDTIME 90 tablet 0     Calcium Carbonate-Vit D-Min (RA CALCIUM PLUS MINERALS/VIT D PO) Take 1 tablet by mouth every other day Every other day at Lunch       CENTRUM SILVER OR TABS 1 TABLET DAILY 30 0     docusate sodium (COLACE) 100 MG capsule Take 100 mg by mouth every other day Every other day at Breakfast       docusate sodium (COLACE) 100 MG capsule Take 100 mg by mouth At Bedtime       fenofibrate 160 MG tablet Take 1 tablet (160 mg) by mouth daily 90 tablet 3     ferrous gluconate (FERGON) 324 (38 FE) MG tablet Take 1 tablet (324 mg) by mouth 2 times daily 100 tablet 0     levothyroxine (SYNTHROID/LEVOTHROID) 50 MCG tablet TAKE ONE TABLET BY MOUTH EVERY DAY 90 tablet 3     lisinopril (PRINIVIL,ZESTRIL) 30 MG tablet TAKE ONE TABLET BY MOUTH EVERY DAY FOR HYPERTENSION 90 tablet 3     MAGNESIUM PO Take 1 tablet by mouth daily (with breakfast)       OMEGA-3 FATTY ACIDS 1200 MG OR CAPS 1 TABLET DAILY  0     pantoprazole (PROTONIX) 20 MG EC tablet TAKE ONE TABLET BY MOUTH EVERY DAY 30 tablet 11     potassium 99 MG TABS Take 1 tablet by mouth daily (with dinner)        Probiotic Product (ACIDOPHILUS PROBIOTIC BLEND) CAPS Take 1 capsule by mouth daily (with breakfast)  30 capsule 0     psyllium (METAMUCIL) 0.52 G capsule Take 2 capsules (1.04 g) by mouth 3 times daily To take with fluid 540 capsule      simvastatin (ZOCOR) 20 MG tablet TAKE ONE TABLET BY MOUTH EVERY NIGHT AT BEDTIME 90 tablet 2     travoprost, TIMI Free, (TRAVATAN Z) 0.004 % ophthalmic solution Place 1 drop into both eyes At Bedtime  2.5 mL 1     UNABLE TO FIND daily (with dinner) MEDICATION NAME Cranberry.  2 capsules daily        VITAMIN D, CHOLECALCIFEROL, PO Take 2,000 Units by mouth every other day Every other day at Supper       Allergies   Allergen Reactions     Augmentin Diarrhea     Codeine Nausea and Vomiting     Hydrocodone      Keeps patient awake     Naproxen Itching and Rash     Sulfa Drugs Nausea     BP  Readings from Last 3 Encounters:   05/31/18 130/70   04/30/18 128/72   04/04/18 136/68    Wt Readings from Last 3 Encounters:   05/31/18 223 lb (101.2 kg)   04/30/18 222 lb 11.2 oz (101 kg)   04/04/18 226 lb 4.8 oz (102.6 kg)            Labs reviewed in EPIC    Reviewed and updated as needed this visit by clinical staff  Tobacco  Allergies  Meds  Problems  Med Hx  Surg Hx  Fam Hx  Soc Hx      MHx right hip replacement   Reviewed and updated as needed this visit by Provider  Allergies  Meds  Problems         ROS:  CONSTITUTIONAL:NEGATIVE for fever, chills, change in weight  RESP:NEGATIVE for significant cough or SOB  CV: HTN - use of amlodipine, lisinopril, NEGATIVE for chest pain, palpitations or peripheral edema  GI:GERD - use of pantoprazole; NEGATIVE for nausea, abdominal pain, heartburn, POSITIVE for black stool due to iron supplement, takes with food.  : Bladder issues, following up with Dr. Ayon  MUSCULOSKELETAL: Arthritic joint pain in lower back and left thumb   ENDOCRINE: HLD - use of fenofibrate, simvastatin, Hypothyroidism - use of levothyroxine, NEGATIVE for temperature intolerance, skin/hair changes  PSYCHIATRIC: NEGATIVE for changes in mood or affect      This document serves as a record of the services and decisions personally performed and made by Jesenia Madrigal MD. It was created on her behalf by Ilir Rosado, a trained medical scribe. The creation of this document is based on the provider's statements to the medical scribe.   Ilir Rosado, 8:52 AM, May 31, 2018    OBJECTIVE:     /70  Pulse 50  Temp 97.9  F (36.6  C) (Oral)  Resp 17  Wt 223 lb (101.2 kg)  LMP  (LMP Unknown)  SpO2 100%  BMI 38.28 kg/m2  Body mass index is 38.28 kg/(m^2).  GENERAL: healthy, alert  NECK: no adenopathy, no asymmetry, masses, or scars and thyroid normal to palpation, no carotid bruits   RESP: lungs clear to auscultation - no rales, rhonchi or wheezes  CV: regular rate and rhythm, normal S1 S2,, no  peripheral edema and peripheral pulses strong  ABDOMEN: soft, nontender, no hepatosplenomegaly, no masses and boel sounds normal  MS: Low back pain while laying flat, Straight leg raise negative, bilaterally; Hips non tender with internal and external rotation  FROM, SI joint tenderness to palpation, less tenderness in bursitis  PSYCH: mentation appears normal, affect normal/bright      Labs today   ASSESSMENT/PLAN:   (M54.5) Right-sided low back pain without sciatica, unspecified chronicity  (primary encounter diagnosis)  Comment: Has been experiencing intermittent lower back pain for some time, that has worsened sincestopping Meloxicam on 3/28/2018. Interested in seeing [facility] for back exercises. Resume Meloxicam 7.5 mg and monitor HGB in 2 weeks, then again 2 weeks after that.   Plan: [facility] PT, HAND, AND CHIROPRACTIC REFERRAL            (E66.01) Morbid obesity due to excess calories (H)  Comment: Patient has found it difficult to exercise due to her back pain.  Plan: Healthy diet and PT to strengthen back.      (I10) Essential hypertension, benign  Comment: Controlled with current medication  Plan: lisinopril (PRINIVIL,ZESTRIL) 30 MG tablet,         amLODIPine (NORVASC) 5 MG tablet            (E03.9) Acquired hypothyroidism  Comment:   TSH   Date Value Ref Range Status   03/28/2018 0.98 0.40 - 4.00 mU/L Final   ]  Plan: continue to monitor     (D50.8) Other iron deficiency anemia  Comment: Most previous colonoscopy was on 3/29/2018- no source of bleeding identified. At D/C HGB was 8, recheck today. Patient has a Hx of poor iron absorption noted on a past EGD. Colon and disc capsule study has not been performed.  Plan: Hemoglobin, Ferritin, Iron and iron binding         capacity            (N32.81) OAB (overactive bladder)  Comment: Still experiencing symptoms; overall improving with current therapy- PT  Plan: Following up with Dr. Ayon      MEDICATIONS:   Orders Placed This Encounter   Medications     lisinopril  (PRINIVIL,ZESTRIL) 30 MG tablet     Sig: TAKE ONE TABLET BY MOUTH EVERY DAY FOR HYPERTENSION     Dispense:  90 tablet     Refill:  3     amLODIPine (NORVASC) 5 MG tablet     Sig: TAKE ONE TABLET BY MOUTH EVERY NIGHT AT BEDTIME     Dispense:  90 tablet     Refill:  2     meloxicam (MOBIC) 15 MG tablet     Sig: Take 0.5 tablets (7.5 mg) by mouth daily Will try low dose and closely monitor Hgb; she has supply at home and will monitor     Dispense:  30 tablet     Refill:  1     Medications Discontinued During This Encounter   Medication Reason     lisinopril (PRINIVIL,ZESTRIL) 30 MG tablet Reorder     amLODIPine (NORVASC) 5 MG tablet Reorder     FUTURE LABS:       - Schedule a fasting blood draw in 2 weeks.  FUTURE APPOINTMENTS:       - Follow-up visit in Follow up with Светлана Curry for PT    Jesenia Madrigal MD  Internal Medicine  [facility] [place]    The information in this document, created by a medical scribe for me, accurately reflects the services I personally performed and the decisions made by me. I have reviewed and approved this document for accuracy.  Dr. Jesenia Madrigal, 9:18 AM, May 31, 2018

## 2018-05-31 NOTE — MR AVS SNAPSHOT
After Visit Summary   5/31/2018    Lexii Stratton    MRN: [id number]           Patient Information     Date Of Birth          1940        Visit Information        Provider Department      5/31/2018 8:30 AM Jesenia Madrigal MD [facility]        Today's Diagnoses     Right-sided low back pain without sciatica, unspecified chronicity    -  1    Essential hypertension, benign        Acquired hypothyroidism        Other iron deficiency anemia        OAB (overactive bladder)          Care Instructions    - Preform exercises to help strengthen lower back.          Follow-ups after your visit        Additional Services     [facility] PT, HAND, AND CHIROPRACTIC REFERRAL       **This order will print in the [facility] Scheduling Office**    Physical Therapy, Hand Therapy and Chiropractic Care are available through:    *[facility]  *[facility]  *[facility]    Call one number to schedule at any of the above locations: [phone].    Your provider has referred you to: Physical Therapy at [facility] or [facility]    Indication/Reason for Referral: Low Back Pain- would like to see Светлана  Onset of Illness: 2 months; more since stopping Meloxicam ( consider resuming at low dose; and monitor HGB)  Therapy Orders: Evaluate and Treat  Special Programs: None  Special Request: None    Eugenio Becker      Additional Comments for the Therapist or Chiropractor:     Please be aware that coverage of these services is subject to the terms and limitations of your health insurance plan.  Call member services at your health plan with any benefit or coverage questions.      Please bring the following to your appointment:    *Your personal calendar for scheduling future appointments  *Comfortable clothing                  Your next 10 appointments already scheduled     Jun 04, 2018 10:25 AM CDT   [facility] Only with Bren Camp, PT   [facility] ([facility]  [place]  )    [address]  [address]  [place] [address]   [phone]            Jun 11, 2018  2:15 PM CDT   [facility] with Bren Giovanni [place], PT   [facility] ([facility]  )    [address]  [address]  [place] [address]   [phone]            Jul 11, 2018 11:45 AM CDT   Return Visit with Jenny Ayon MD   [facility] ([facility])    [address]  [address]  [facility] [address] [phone]              Who to contact     If you have questions or need follow up information about today's clinic visit or your schedule please contact [facility] directly at [phone].  Normal or non-critical lab and imaging results will be communicated to you by MyChart, letter or phone within 4 business days after the clinic has received the results. If you do not hear from us within 7 days, please contact the clinic through [employer] or phone. If you have a critical or abnormal lab result, we will notify you by phone as soon as possible.  Submit refill requests through [employer] or call your pharmacy and they will forward the refill request to us. Please allow 3 business days for your refill to be completed.          Additional Information About Your Visit        MyChart Information     [employer] gives you secure access to your electronic health record. If you see a primary care provider, you can also send messages to your care team and make appointments. If you have questions, please call your primary care clinic.  If you do not have a primary care provider, please call [phone] and they will assist you.        Care EveryWhere ID     This is your Care EveryWhere ID. This could be used by other organizations to access your [place] medical records  [id number]        Your Vitals Were     Pulse Temperature Respirations Last Period Pulse Oximetry BMI (Body Mass Index)    50 97.9  F (36.6  C)  (Oral) 17 (LMP Unknown) 100% 38.28 kg/m2       Blood Pressure from Last 3 Encounters:   05/31/18 130/70   04/30/18 128/72   04/04/18 136/68    Weight from Last 3 Encounters:   05/31/18 223 lb (101.2 kg)   04/30/18 222 lb 11.2 oz (101 kg)   04/04/18 226 lb 4.8 oz (102.6 kg)              We Performed the Following     Ferritin     Hemoglobin     [facility] PT, HAND, AND CHIR[facility]RACTIC REFERRAL     Iron and iron binding capacity          Where to get your medicines      These medications were sent to [facility] [place] - [address]  [address] [facility] [address]     Phone:  [phone]     amLODIPine 5 MG tablet    lisinopril 30 MG tablet          Primary Care Provider Office Phone # Fax #    Jesenia Priscilla Madrigal [phone] [address]  [facility] [address]        Equal Access to Services     [facility]: Hadii aad ku hadasho Soomaali, waaxda luqadaha, qaybta kaalmada adeegyada, waxay idiin hayaan dave roman . So [facility] [phone].    ATENCIÓN: Si habla español, tiene a irene disposición servicios gratuitos de asistencia lingüística. Llame al [phone].    We comply with applicable federal civil rights laws and [place] laws. We do not discriminate on the basis of race, color, national origin, age, disability, sex, sexual orientation, or gender identity.            Thank you!     Thank you for choosing [facility]  for your care. Our goal is always to provide you with excellent care. Hearing back from our patients is one way we can continue to improve our services. Please take a few minutes to complete the written survey that you may receive in the mail after your visit with us. Thank you!             Your Updated Medication List - Protect others around you: Learn how to safely use, store and throw away your medicines at www.disposemymeds.org.          This list is accurate as of 5/31/18  9:14 AM.  Always use your most recent  med list.                   Brand Name Dispense Instructions for use Diagnosis    acetaminophen 500 MG tablet    TYLENOL    100 tablet    Take 2 tablets (1,000 mg) by mouth 2 times daily as needed for mild pain    Arthritis       ACIDOPHILUS PROBIOTIC BLEND Caps     30 capsule    Take 1 capsule by mouth daily (with breakfast)        amLODIPine 5 MG tablet    NORVASC    90 tablet    TAKE ONE TABLET BY MOUTH EVERY NIGHT AT BEDTIME    Essential hypertension, benign       CENTRUM SILVER per tablet     30    1 TABLET DAILY    Need for prophylactic vaccination and inoculation against influenza, Routine general medical examination at a health care facility, Generalized osteoarthrosis, unspecified site, Other and unspecified hyperlipidemia, Esophageal reflux, Obesity, unspecified, Urinary sys symptom NEC, Other malaise and fatigue, Sleep disturbance, unspecified       * docusate sodium 100 MG capsule    COLACE     Take 100 mg by mouth every other day Every other day at Breakfast        * COLACE 100 MG capsule   Generic drug:  docusate sodium      Take 100 mg by mouth At Bedtime        fenofibrate 160 MG tablet     90 tablet    Take 1 tablet (160 mg) by mouth daily    Hyperlipidemia LDL goal <130       ferrous gluconate 324 (38 Fe) MG tablet    FERGON    100 tablet    Take 1 tablet (324 mg) by mouth 2 times daily    Gastrointestinal hemorrhage, unspecified gastrointestinal hemorrhage type       levothyroxine 50 MCG tablet    SYNTHROID/LEVOTHROID    90 tablet    TAKE ONE TABLET BY MOUTH EVERY DAY    Acquired hypothyroidism       lisinopril 30 MG tablet    PRINIVIL,ZESTRIL    90 tablet    TAKE ONE TABLET BY MOUTH EVERY DAY FOR HYPERTENSION    Essential hypertension, benign       MAGNESIUM PO      Take 1 tablet by mouth daily (with breakfast)        omega-3 fatty acids 1200 MG capsule      1 TABLET DAILY    Preop general physical exam, Stress incontinence - female       pantoprazole 20 MG EC tablet    PROTONIX    30 tablet     TAKE ONE TABLET BY MOUTH EVERY DAY    Gastroesophageal reflux disease without esophagitis       potassium 99 MG Tabs      Take 1 tablet by mouth daily (with dinner)        psyllium 0.52 g capsule    METAMUCIL    540 capsule    Take 2 capsules (1.04 g) by mouth 3 times daily To take with fluid    Gastrointestinal hemorrhage, unspecified gastrointestinal hemorrhage type       RA CALCIUM PLUS MINERALS/VIT D PO      Take 1 tablet by mouth every other day Every other day at Lunch        simvastatin 20 MG tablet    ZOCOR    90 tablet    TAKE ONE TABLET BY MOUTH EVERY NIGHT AT BEDTIME    Hyperlipidemia LDL goal <130       travoprost (TIMI Free) 0.004 % ophthalmic solution    TRAVATAN Z    2.5 mL    Place 1 drop into both eyes At Bedtime        UNABLE TO FIND      daily (with dinner) MEDICATION NAME Cranberry.  2 capsules daily        VITAMIN D (CHOLECALCIFEROL) PO      Take 2,000 Units by mouth every other day Every other day at Supper        * Notice:  This list has 2 medication(s) that are the same as other medications prescribed for you. Read the directions carefully, and ask your doctor or other care provider to review them with you.

## 2018-06-01 ENCOUNTER — MYC MEDICAL ADVICE (OUTPATIENT)
Dept: FAMILY MEDICINE | Facility: CLINIC | Age: 78
End: 2018-06-01

## 2018-06-01 LAB
FERRITIN SERPL-MCNC: 29 NG/ML (ref 8–252)
IRON SATN MFR SERPL: 10 % (ref 15–46)
IRON SERPL-MCNC: 38 UG/DL (ref 35–180)
TIBC SERPL-MCNC: 374 UG/DL (ref 240–430)

## 2018-06-01 NOTE — TELEPHONE ENCOUNTER
Please see patient message.    It looks like the prescription from 5/31/2018 were sent to [facility] pharmacy. Were they called into [facility] mail order?    Laure Reid RN

## 2018-06-03 NOTE — PROGRESS NOTES
Lab Results       Component                Value               Date                       HGB                      12.0                05/31/2018                 HGB                      12.5                05/15/2018             Overall, HGB is stable; continue iron therapy  Pt advised via My Chart.

## 2018-06-04 ENCOUNTER — THERAPY VISIT (OUTPATIENT)
Dept: PHYSICAL THERAPY | Facility: CLINIC | Age: 78
End: 2018-06-04
Payer: COMMERCIAL

## 2018-06-04 DIAGNOSIS — N39.46 MIXED INCONTINENCE: ICD-10-CM

## 2018-06-04 DIAGNOSIS — M79.18 MYALGIA OF PELVIC FLOOR: ICD-10-CM

## 2018-06-04 PROCEDURE — 97110 THERAPEUTIC EXERCISES: CPT | Mod: GP | Performed by: PHYSICAL THERAPIST

## 2018-06-04 PROCEDURE — 97140 MANUAL THERAPY 1/> REGIONS: CPT | Mod: GP | Performed by: PHYSICAL THERAPIST

## 2018-06-08 ENCOUNTER — THERAPY VISIT (OUTPATIENT)
Dept: PHYSICAL THERAPY | Facility: CLINIC | Age: 78
End: 2018-06-08
Attending: INTERNAL MEDICINE
Payer: COMMERCIAL

## 2018-06-08 DIAGNOSIS — M54.59 MECHANICAL LOW BACK PAIN: Primary | ICD-10-CM

## 2018-06-08 PROCEDURE — 97162 PT EVAL MOD COMPLEX 30 MIN: CPT | Mod: GP | Performed by: PHYSICAL THERAPIST

## 2018-06-08 PROCEDURE — 97110 THERAPEUTIC EXERCISES: CPT | Mod: GP | Performed by: PHYSICAL THERAPIST

## 2018-06-08 NOTE — PROGRESS NOTES
[facility] [facility] Initial Evaluation -- Lumbar    Date: June 8, 2018  Lexii Stratton is a 77 year old female with a *** condition.   Referral: ***  Work mechanical stresses:  ***  Employment status:  ***  Leisure mechanical stresses: ***  Functional disability score (RITIKA/STarT Back):  ***  VAS score (0-10): ***  Patient goals/expectations:  ***    HISTORY:    Present symptoms: ***  Pain quality (sharp/shooting/stabbing/aching/burning/cramping):  ***   Paresthesia (yes/no):  ***    Present since (onset date): ***.     Symptoms (improving/unchanging/worsening):  ***.     Symptoms commenced as a result of: ***   Condition occurred in the following environment:   ***     Symptoms at onset (back/thigh/leg): ***  Constant symptoms (back/thigh/leg): ***  Intermittent symptoms (back/thigh/leg): ***    Symptoms are made worse with the following: {[facility]A Lumbar Better/Worse:683204}   Symptoms are made better with the following: {AUDRA Lumbar Better/Worse:142826}    Disturbed sleep (yes/no):  *** Sleeping postures (prone/sup/side R/L): ***    Previous episodes (0/1-5/6-10/11+): *** Year of first episode: ***    Previous history: ***  Previous treatments: ***      Specific Questions:  Cough/Sneeze/Strain (pos/neg): ***  Bowel/Bladder (normal/abnormal): ***  Gait (normal/abnormal): ***  Medications (nil/NSAIDS/analg/steroids/anticoag/other):  {[facility]DRA Medications:026285}  Medical allergies:  ***  General health (excellent/good/fair/poor):  ***  Pertinent medical history:  {AUDRA Past Medical History:351250}  Imaging (None/Xray/MRI/Other):  ***  Recent or major surgery (yes/no):  ***  Night pain (yes/no): ***  Accidents (yes/no): ***  Unexplained weight loss (yes/no): ***  Barriers at home: ***  Other red flags: ***    EXAMINATION    Posture:   Sitting (good/fair/poor): ***  Standing (good/fair/poor):***  Lordosis (red/acc/normal): ***  Correction of posture (better/worse/no effect): ***    Lateral Shift (right/left/nil):  "***  Relevant (yes/no):  ***  Other Observations: ***    Neurological:    Motor deficit:  ***  Reflexes:  ***  Sensory deficit:  ***  Dural signs:  ***    Movement Loss:   Wiliam Mod Min Nil Pain   Flexion     ***   Extension     ***   Side Gliding R     ***   Side Gliding L     ***     Test Movements:   During: produces, abolishes, increases, decreases, no effect, centralizing, peripheralizing   After: better, worse, no better, no worse, no effect, centralized, peripheralized    Pretest symptoms standing: ***   Symptoms During Symptoms After ROM increased ROM decreased No Effect   FIS {AUDRA MDT During Testin::\" \"} {AUDRA MDT After Testin::\" \"}      Rep FIS {AUDRA MDT During Testin::\" \"} {AUDRA MDT After Testin::\" \"}      EIS {AUDRA MDT During Testin::\" \"} {AUDRA MDT After Testin::\" \"}      Rep EIS {AUDRA MDT During Testin::\" \"} {AUDRA MDT After Testin::\" \"}      Pretest symptoms lying: ***    Symptoms During Symptoms After ROM increased ROM decreased No Effect   MOSHE {AUDRA MDT During Testin::\" \"} {AUDRA MDT After Testin::\" \"}      Rep MOSHE {AUDRA MDT During Testin::\" \"} {AUDRA MDT After Testin::\" \"}      EIL {AUDRA MDT During Testin::\" \"} {AUDRA MDT After Testin::\" \"}      Rep EIL {AUDRA MDT During Testin::\" \"} {AUDRA MDT After Testin::\" \"}      If required, pretest symptoms: ***   Symptoms During Symptoms After ROM increased ROM decreased No Effect   SGIS - R {AUDRA MDT During Testin::\" \"} {AUDRA MDT After Testin::\" \"}      Rep SGIS - R {AUDRA MDT During Testin::\" \"} {AUDRA MDT After Testin::\" \"}      SGIS - L {AUDRA MDT During Testin::\" \"} {AUDRA MDT After Testin::\" \"}      Rep SGIS - L {AUDRA MDT During Testin::\" \"} {AUDRA MDT After Testin::\" \"}        Static Tests:  Sitting slouched:  ***  Sitting erect:  ***  Standing slouched ***  Standing erect:  ***  Lying prone in " extension:  *** Long sitting:  ***    Other Tests: ***    Provisional Classification:  {david mdt lumbar classification:309236}    Principle of Management:  Education:  ***   Equipment provided:  ***  Mechanical therapy (Y/N):  ***   Extension principle:  ***  Lateral Principle:  ***  Flexion principle:  ***  Other:  ***    ASSESSMENT/PLAN:    {REHAB NOTES:040110}

## 2018-06-08 NOTE — PROGRESS NOTES
"[facility] Initial Evaluation -- Lumbar    Date: June 8, 2018  Lexii Stratton is a 77 year old female with a lumbar condition.   Referral: Dr. Madrigal  Work mechanical stresses:  retired  Employment status:  n/a  Leisure mechanical stresses: not able  Functional disability score (RITIKA/STarT Back):  See flow sheet  VAS score (0-10): 8/10  Patient goals/expectations:  \"I would like to be able to walk and stand better\"    HISTORY:    Present symptoms: R/mid low back with radiation up R side of upper back  Pain quality (sharp/shooting/stabbing/aching/burning/cramping):  Aching, sharp, burning, cramping   Paresthesia (yes/no):  no    Present since (onset date): March 2018 (in the hospital for an issue, taken off arthritis meds).     Symptoms (improving/unchanging/worsening):  uchanging.     Symptoms commenced as a result of: taken off athritis meds   Condition occurred in the following environment:   home     Symptoms at onset (back/thigh/leg): back  Constant symptoms (back/thigh/leg): back  Intermittent symptoms (back/thigh/leg):     Symptoms are made worse with the following: Always Standing and Always Walking   Symptoms are made better with the following: Always Sitting    Disturbed sleep (yes/no):  no Sleeping postures (prone/sup/side R/L): sides    Previous episodes (0/1-5/6-10/11+): 11+ Year of first episode: n/a    Previous history: chronic low back pain over last several years  Previous treatments: PT      Specific Questions:  Cough/Sneeze/Strain (pos/neg): neg  Bowel/Bladder (normal/abnormal): normal  Gait (normal/abnormal): abnormal (limps)  Medications (nil/NSAIDS/analg/steroids/anticoag/other):  Other - Thyroid  Medical allergies:  none  General health (excellent/good/fair/poor):  fair  Pertinent medical history:  High blood pressure, Implanted device, Osteoarthritis, Overweight and Thyroid problems  Imaging (None/Xray/MRI/Other):    Recent or major surgery (yes/no):  no  Night pain " (yes/no): no  Accidents (yes/no): no  Unexplained weight loss (yes/no): no  Barriers at home: no  Other red flags: none    EXAMINATION    Posture:   Sitting (good/fair/poor): fair  Standing (good/fair/poor):fair  Lordosis (red/acc/normal): acc  Correction of posture (better/worse/no effect): worse    Lateral Shift (right/left/nil): nil  Relevant (yes/no):  no  Other Observations: none    Neurological:    Motor deficit:  none  Reflexes:  intact  Sensory deficit:  none  Dural signs:  negx    Movement Loss:   Wiliam Mod Min Nil Pain   Flexion   x x    Extension  x   erp   Side Gliding R   x  erp   Side Gliding L  x   erp     Test Movements:   During: produces, abolishes, increases, decreases, no effect, centralizing, peripheralizing   After: better, worse, no better, no worse, no effect, centralized, peripheralized    Pretest symptoms standing:    Symptoms During Symptoms After ROM increased ROM decreased No Effect   FIS        Rep FIS        EIS Increases    No Worse         Rep EIS Increases    Worse      x   Pretest symptoms lying: central LBP, R LBP    Symptoms During Symptoms After ROM increased ROM decreased No Effect   MOSHE Increases    No Worse         Rep MOSHE Increases    No Worse      x   EIL        Rep EIL        If required, pretest symptoms: central LBP, R LBP   Symptoms During Symptoms After ROM increased ROM decreased No Effect   SGIS - R Increases    No Worse         Rep SGIS - R Increases    Worse      x   SGIS - L        Rep SGIS - L          Static Tests:  Sitting slouched:    Sitting erect:    Standing slouched   Standing erect:    Lying prone in extension:   Long sitting:      Other Tests: sustained flex/rotation: decr/better/incr sideglide    Provisional Classification:  Derangement - Asymmetrical, unilateral, symptoms above knee    Principle of Management:  Education:  DP, centralization   Equipment provided:  none  Mechanical therapy (Y/N):  Y   Extension principle:    Lateral Principle:   Flex/rotation sustained x 5-10' every 2-3 hours  Flexion principle:    Other:      ASSESSMENT/PLAN:    Patient is a 77 year old female with lumbar complaints.    Patient has the following significant findings with corresponding treatment plan.                Diagnosis 1:  Mechanical low back pain  Pain -  manual therapy, self management, education, directional preference exercise and home program  Decreased ROM/flexibility - manual therapy and therapeutic exercise  Decreased function - therapeutic activities    Therapy Evaluation Codes:   1) History comprised of:   Personal factors that impact the plan of care:      None.    Comorbidity factors that impact the plan of care are:      High blood pressure, Osteoarthritis and Overweight.     Medications impacting care: None.  2) Examination of Body Systems comprised of:   Body structures and functions that impact the plan of care:      Lumbar spine.   Activity limitations that impact the plan of care are:      Standing and Walking.  3) Clinical presentation characteristics are:   Evolving/Changing.  4) Decision-Making    Moderate complexity using standardized patient assessment instrument and/or measureable assessment of functional outcome.  Cumulative Therapy Evaluation is: Moderate complexity.    Previous and current functional limitations:  (See Goal Flow Sheet for this information)    Short term and Long term goals: (See Goal Flow Sheet for this information)     Communication ability:  Patient appears to be able to clearly communicate and understand verbal and written communication and follow directions correctly.  Treatment Explanation - The following has been discussed with the patient:   RX ordered/plan of care  Anticipated outcomes  Possible risks and side effects  This patient would benefit from PT intervention to resume normal activities.   Rehab potential is good.    Frequency:  1 X week, once daily  Duration:  for 6 weeks  Discharge Plan:  Achieve all  LTG.  Independent in home treatment program.  Reach maximal therapeutic benefit.    Please refer to the daily flowsheet for treatment today, total treatment time and time spent performing 1:1 timed codes.

## 2018-06-08 NOTE — MR AVS SNAPSHOT
After Visit Summary   6/8/2018    Lexii Stratton    MRN: [id number]           Patient Information     Date Of Birth          1940        Visit Information        Provider Department      6/8/2018 2:50 PM Светлана Curry, PT [facility]        Today's Diagnoses     Mechanical low back pain    -  1       Follow-ups after your visit        Your next 10 appointments already scheduled     Jun 12, 2018  1:30 PM CDT   [facility] with Светлана Curry, PT   [facility] ([facility] [place]  )    [address] [address] [address]   [phone]            Jun 14, 2018  8:15 AM CDT   [facility]    [address] [facility] [address] [phone]           Please do not eat 10-12 hours before your appointment if you are coming in fasting for labs on lipids, cholesterol, or glucose (sugar). This does not apply to pregnant women. Water, hot tea and black coffee (with nothing added) are okay. Do not drink other fluids, diet soda or chew gum.            Jun 19, 2018  1:30 PM CDT   [facility] with Светлана Curry PT   [facility] ([facility] [place]  )    [address] [place] [address]   [phone]            Jul 05, 2018 11:20 AM CDT   [facility] with Bren Camp, [facility] ([facility] [place]  )    [address] [place] [address]   [phone]            Jul 11, 2018 11:45 AM CDT   Return Visit with Jenny Ayon MD   [facility] ([facility])    [address]  [address]  [facility] [address] [phone]              Who to contact     If you have questions or need follow up information about today's clinic visit or your schedule please contact [facility] directly at [phone].  Normal or non-critical lab and imaging results will be communicated to you by  MyChart, letter or phone within 4 business days after the clinic has received the results. If you do not hear from us within 7 days, please contact the clinic through [facility] or phone. If you have a critical or abnormal lab result, we will notify you by phone as soon as possible.  Submit refill requests through [facility] or call your pharmacy and they will forward the refill request to us. Please allow 3 business days for your refill to be completed.          Additional Information About Your Visit        [employer] Information     [facility] gives you secure access to your electronic health record. If you see a primary care provider, you can also send messages to your care team and make appointments. If you have questions, please call your primary care clinic.  If you do not have a primary care provider, please call [phone] and they will assist you.        Care EveryWhere ID     This is your Care EveryWhere ID. This could be used by other organizations to access your [facility] medical records  [id number]        Your Vitals Were     Last Period                   (LMP Unknown)            Blood Pressure from Last 3 Encounters:   05/31/18 130/70   04/30/18 128/72   04/04/18 136/68    Weight from Last 3 Encounters:   05/31/18 101.2 kg (223 lb)   04/30/18 101 kg (222 lb 11.2 oz)   04/04/18 102.6 kg (226 lb 4.8 oz)              We Performed the Following     HC PT EVAL, MODERATE COMPLEXITY     [facility] INITIAL EVAL REPORT     THERAPEUTIC EXERCISES        Primary Care Provider Office Phone # Fax #    Jesenia Priscilla Madrigal [phone]       [address] [facility] [address]        Equal Access to Services     [facility]: Hadii aad ku hadasho Soomaali, waaxda luqadaha, qaybta kaalmada adeegyaamy, navin roman . So [facility] [phone].    ATENCIÓN: Si habla español, tiene a irene disposición servicios gratuitos de asistencia lingüística. Llame al [phone].    We comply with  applicable federal civil rights laws and [place] laws. We do not discriminate on the basis of race, color, national origin, age, disability, sex, sexual orientation, or gender identity.            Thank you!     Thank you for choosing [facility]  for your care. Our goal is always to provide you with excellent care. Hearing back from our patients is one way we can continue to improve our services. Please take a few minutes to complete the written survey that you may receive in the mail after your visit with us. Thank you!             Your Updated Medication List - Protect others around you: Learn how to safely use, store and throw away your medicines at www.disposemymeds.org.          This list is accurate as of 6/8/18 11:59 PM.  Always use your most recent med list.                   Brand Name Dispense Instructions for use Diagnosis    acetaminophen 500 MG tablet    TYLENOL    100 tablet    Take 2 tablets (1,000 mg) by mouth 2 times daily as needed for mild pain    Arthritis       ACIDOPHILUS PROBIOTIC BLEND Caps     30 capsule    Take 1 capsule by mouth daily (with breakfast)        amLODIPine 5 MG tablet    NORVASC    90 tablet    TAKE ONE TABLET BY MOUTH EVERY NIGHT AT BEDTIME    Essential hypertension, benign       CENTRUM SILVER per tablet     30    1 TABLET DAILY    Need for prophylactic vaccination and inoculation against influenza, Routine general medical examination at a health care facility, Generalized osteoarthrosis, unspecified site, Other and unspecified hyperlipidemia, Esophageal reflux, Obesity, unspecified, Urinary sys symptom NEC, Other malaise and fatigue, Sleep disturbance, unspecified       * docusate sodium 100 MG capsule    COLACE     Take 100 mg by mouth every other day Every other day at Breakfast        * COLACE 100 MG capsule   Generic drug:  docusate sodium      Take 100 mg by mouth At Bedtime        fenofibrate 160 MG tablet     90 tablet    Take 1 tablet (160 mg) by mouth  daily    Hyperlipidemia LDL goal <130       ferrous gluconate 324 (38 Fe) MG tablet    FERGON    100 tablet    Take 1 tablet (324 mg) by mouth 2 times daily    Gastrointestinal hemorrhage, unspecified gastrointestinal hemorrhage type       levothyroxine 50 MCG tablet    SYNTHROID/LEVOTHROID    90 tablet    TAKE ONE TABLET BY MOUTH EVERY DAY    Acquired hypothyroidism       lisinopril 30 MG tablet    PRINIVIL,ZESTRIL    90 tablet    TAKE ONE TABLET BY MOUTH EVERY DAY FOR HYPERTENSION    Essential hypertension, benign       MAGNESIUM PO      Take 1 tablet by mouth daily (with breakfast)        meloxicam 15 MG tablet    MOBIC    30 tablet    Take 0.5 tablets (7.5 mg) by mouth daily Will try low dose and closely monitor Hgb; she has supply at home and will monitor    Right-sided low back pain without sciatica, unspecified chronicity       omega-3 fatty acids 1200 MG capsule      1 TABLET DAILY    Preop general physical exam, Stress incontinence - female       pantoprazole 20 MG EC tablet    PROTONIX    30 tablet    TAKE ONE TABLET BY MOUTH EVERY DAY    Gastroesophageal reflux disease without esophagitis       potassium 99 MG Tabs      Take 1 tablet by mouth daily (with dinner)        psyllium 0.52 g capsule    METAMUCIL    540 capsule    Take 2 capsules (1.04 g) by mouth 3 times daily To take with fluid    Gastrointestinal hemorrhage, unspecified gastrointestinal hemorrhage type       RA CALCIUM PLUS MINERALS/VIT D PO      Take 1 tablet by mouth every other day Every other day at Lunch        simvastatin 20 MG tablet    ZOCOR    90 tablet    TAKE ONE TABLET BY MOUTH EVERY NIGHT AT BEDTIME    Hyperlipidemia LDL goal <130       travoprost (TIMI Free) 0.004 % ophthalmic solution    TRAVATAN Z    2.5 mL    Place 1 drop into both eyes At Bedtime        UNABLE TO FIND      daily (with dinner) MEDICATION NAME Cranberry.  2 capsules daily        VITAMIN D (CHOLECALCIFEROL) PO      Take 2,000 Units by mouth every other day Every  other day at Supper        * Notice:  This list has 2 medication(s) that are the same as other medications prescribed for you. Read the directions carefully, and ask your doctor or other care provider to review them with you.

## 2018-06-12 ENCOUNTER — THERAPY VISIT (OUTPATIENT)
Dept: PHYSICAL THERAPY | Facility: CLINIC | Age: 78
End: 2018-06-12
Payer: COMMERCIAL

## 2018-06-12 DIAGNOSIS — M54.59 MECHANICAL LOW BACK PAIN: Primary | ICD-10-CM

## 2018-06-12 PROCEDURE — 97112 NEUROMUSCULAR REEDUCATION: CPT | Mod: GP | Performed by: PHYSICAL THERAPIST

## 2018-06-12 PROCEDURE — 97110 THERAPEUTIC EXERCISES: CPT | Mod: GP | Performed by: PHYSICAL THERAPIST

## 2018-06-15 DIAGNOSIS — K92.2 GASTROINTESTINAL HEMORRHAGE, UNSPECIFIED GASTROINTESTINAL HEMORRHAGE TYPE: ICD-10-CM

## 2018-06-15 DIAGNOSIS — M19.90 ARTHRITIS: ICD-10-CM

## 2018-06-15 LAB
FERRITIN SERPL-MCNC: 29 NG/ML (ref 8–252)
HGB BLD-MCNC: 11.4 G/DL (ref 11.7–15.7)

## 2018-06-15 PROCEDURE — 36415 COLL VENOUS BLD VENIPUNCTURE: CPT | Performed by: INTERNAL MEDICINE

## 2018-06-15 PROCEDURE — 82728 ASSAY OF FERRITIN: CPT | Performed by: INTERNAL MEDICINE

## 2018-06-15 PROCEDURE — 85018 HEMOGLOBIN: CPT | Performed by: INTERNAL MEDICINE

## 2018-06-19 ENCOUNTER — THERAPY VISIT (OUTPATIENT)
Dept: PHYSICAL THERAPY | Facility: CLINIC | Age: 78
End: 2018-06-19
Payer: COMMERCIAL

## 2018-06-19 DIAGNOSIS — M54.59 MECHANICAL LOW BACK PAIN: ICD-10-CM

## 2018-06-19 PROCEDURE — 97112 NEUROMUSCULAR REEDUCATION: CPT | Mod: GP | Performed by: PHYSICAL THERAPIST

## 2018-06-19 PROCEDURE — 97110 THERAPEUTIC EXERCISES: CPT | Mod: GP | Performed by: PHYSICAL THERAPIST

## 2018-06-29 ENCOUNTER — MYC MEDICAL ADVICE (OUTPATIENT)
Dept: FAMILY MEDICINE | Facility: CLINIC | Age: 78
End: 2018-06-29

## 2018-06-29 DIAGNOSIS — K92.2 GASTROINTESTINAL HEMORRHAGE, UNSPECIFIED GASTROINTESTINAL HEMORRHAGE TYPE: ICD-10-CM

## 2018-06-29 DIAGNOSIS — M19.90 ARTHRITIS: ICD-10-CM

## 2018-06-29 LAB
FERRITIN SERPL-MCNC: 31 NG/ML (ref 8–252)
HGB BLD-MCNC: 12 G/DL (ref 11.7–15.7)

## 2018-06-29 PROCEDURE — 82728 ASSAY OF FERRITIN: CPT | Performed by: INTERNAL MEDICINE

## 2018-06-29 PROCEDURE — 85018 HEMOGLOBIN: CPT | Performed by: INTERNAL MEDICINE

## 2018-06-29 PROCEDURE — 36415 COLL VENOUS BLD VENIPUNCTURE: CPT | Performed by: INTERNAL MEDICINE

## 2018-06-29 NOTE — TELEPHONE ENCOUNTER
"Please see patient message.      Patient instruction note from OV 5/31/2018: \"follow HGB every 2 weeks for one month.\"        Laure Reid RN         "

## 2018-07-02 NOTE — TELEPHONE ENCOUNTER
Liyah with Dr. Madrigal and does not need to continue to have Hg checked regularly but will follow up at future appointments

## 2018-07-05 ENCOUNTER — THERAPY VISIT (OUTPATIENT)
Dept: PHYSICAL THERAPY | Facility: CLINIC | Age: 78
End: 2018-07-05
Payer: COMMERCIAL

## 2018-07-05 DIAGNOSIS — M79.18 MYALGIA OF PELVIC FLOOR: ICD-10-CM

## 2018-07-05 DIAGNOSIS — N39.46 MIXED INCONTINENCE: ICD-10-CM

## 2018-07-05 PROCEDURE — 97535 SELF CARE MNGMENT TRAINING: CPT | Mod: GP | Performed by: PHYSICAL THERAPIST

## 2018-07-05 PROCEDURE — 97110 THERAPEUTIC EXERCISES: CPT | Mod: GP | Performed by: PHYSICAL THERAPIST

## 2018-07-05 PROCEDURE — 97140 MANUAL THERAPY 1/> REGIONS: CPT | Mod: GP | Performed by: PHYSICAL THERAPIST

## 2018-07-05 NOTE — PROGRESS NOTES
Subjective:  HPI                    Objective:  System    Physical Exam    General     ROS    Assessment/Plan:    DISCHARGE REPORT    Progress reporting period is from 07/05/18.       SUBJECTIVE  Subjective changes noted by patient:  .  Subjective: MD (urologist) next week. Overall ~ 50% better since starting PT. Bothered mostly by urgency. Voiding every 2-2.5 hours during day. Almost every time she voids she has strong urge. Going through 2 pads/day. Denies any stress incontinence. Nighttime voiding 2 x night, sleeps better in recliner.     Current pain level is NA  .     Previous pain level was  NA  .   Changes in function:  Yes (See Goal flowsheet attached for changes in current functional level)  Adverse reaction to treatment or activity: None    OBJECTIVE  Changes noted in objective findings:    Objective: Kegel strength 3-/5, pelvic floor tension very minimal to OI/LA 90% WNL.      ASSESSMENT/PLAN  Updated problem list and treatment plan:   STG/LTGs have been met or progress has been made towards goals:  Yes (See Goal flow sheet completed today.)  Assessment of Progress: The patient's condition is improving.  Self Management Plans:  Patient is independent in a home treatment program.  Patient is independent in self management of symptoms.    Lexii continues to require the following intervention to meet STG and LTG's:  PT intervention is no longer required to meet STG/LTG.    Recommendations:  This patient is ready to be discharged from therapy and continue their home treatment program.    Please refer to the daily flowsheet for treatment today, total treatment time and time spent performing 1:1 timed codes.

## 2018-07-05 NOTE — MR AVS SNAPSHOT
After Visit Summary   7/5/2018    Lexii Stratton    MRN: [id number]           Patient Information     Date Of Birth          1940        Visit Information        Provider Department      7/5/2018 11:20 AM Bren Camp PT [facility]        Today's Diagnoses     Mixed incontinence        Myalgia of pelvic floor           Follow-ups after your visit        Your next 10 appointments already scheduled     Jul 11, 2018 11:45 AM CDT   Return Visit with Jenny Ayon MD   [facility] [place] ([facility] [place])    [address]  [address]  [facility] [address] [phone]              Who to contact     If you have questions or need follow up information about today's clinic visit or your schedule please contact [facility] directly at [phone].  Normal or non-critical lab and imaging results will be communicated to you by MyChart, letter or phone within 4 business days after the clinic has received the results. If you do not hear from us within 7 days, please contact the clinic through [employer] or phone. If you have a critical or abnormal lab result, we will notify you by phone as soon as possible.  Submit refill requests through [employer] or call your pharmacy and they will forward the refill request to us. Please allow 3 business days for your refill to be completed.          Additional Information About Your Visit        MyChart Information     [employer] gives you secure access to your electronic health record. If you see a primary care provider, you can also send messages to your care team and make appointments. If you have questions, please call your primary care clinic.  If you do not have a primary care provider, please call [phone] and they will assist you.        Care EveryWhere ID     This is your Care EveryWhere ID. This could be used by other organizations to access your [facility]  records  [id number]        Your Vitals Were     Last Period                   (LMP Unknown)            Blood Pressure from Last 3 Encounters:   05/31/18 130/70   04/30/18 128/72   04/04/18 136/68    Weight from Last 3 Encounters:   05/31/18 101.2 kg (223 lb)   04/30/18 101 kg (222 lb 11.2 oz)   04/04/18 102.6 kg (226 lb 4.8 oz)              We Performed the Following     AUDRA PROGRESS NOTES REPORT     MANUAL THER TECH,1+REGIONS,EA 15 MIN     SELF CARE MNGMENT TRAINING     THERAPEUTIC EXERCISES        Primary Care Provider Office Phone # Fax #    Jesenia Priscilla Madrigal [phone]       [address] [place] [address]        Equal Access to Services     LUCY MILLS : Hadii aad ku hadasho Soomaali, waaxda luqadaha, qaybta kaalmada adeegyada, navin anderson haybay roman . So [facility] [phone].    ATENCIÓN: Si habla español, tiene a irene disposición servicios gratuitos de asistencia lingüística. Llame al [phone].    We comply with applicable federal civil rights laws and [place] laws. We do not discriminate on the basis of race, color, national origin, age, disability, sex, sexual orientation, or gender identity.            Thank you!     Thank you for choosing [facility]  for your care. Our goal is always to provide you with excellent care. Hearing back from our patients is one way we can continue to improve our services. Please take a few minutes to complete the written survey that you may receive in the mail after your visit with us. Thank you!             Your Updated Medication List - Protect others around you: Learn how to safely use, store and throw away your medicines at www.disposemymeds.org.          This list is accurate as of 7/5/18 12:12 PM.  Always use your most recent med list.                   Brand Name Dispense Instructions for use Diagnosis    acetaminophen 500 MG tablet    TYLENOL    100 tablet    Take 2 tablets (1,000 mg) by mouth 2 times daily as  needed for mild pain    Arthritis       ACIDOPHILUS PROBIOTIC BLEND Caps     30 capsule    Take 1 capsule by mouth daily (with breakfast)        amLODIPine 5 MG tablet    NORVASC    90 tablet    TAKE ONE TABLET BY MOUTH EVERY NIGHT AT BEDTIME    Essential hypertension, benign       CENTRUM SILVER per tablet     30    1 TABLET DAILY    Need for prophylactic vaccination and inoculation against influenza, Routine general medical examination at a health care facility, Generalized osteoarthrosis, unspecified site, Other and unspecified hyperlipidemia, Esophageal reflux, Obesity, unspecified, Urinary sys symptom NEC, Other malaise and fatigue, Sleep disturbance, unspecified       * docusate sodium 100 MG capsule    COLACE     Take 100 mg by mouth every other day Every other day at Breakfast        * COLACE 100 MG capsule   Generic drug:  docusate sodium      Take 100 mg by mouth At Bedtime        fenofibrate 160 MG tablet     90 tablet    Take 1 tablet (160 mg) by mouth daily    Hyperlipidemia LDL goal <130       ferrous gluconate 324 (38 Fe) MG tablet    FERGON    100 tablet    Take 1 tablet (324 mg) by mouth 2 times daily    Gastrointestinal hemorrhage, unspecified gastrointestinal hemorrhage type       levothyroxine 50 MCG tablet    SYNTHROID/LEVOTHROID    90 tablet    TAKE ONE TABLET BY MOUTH EVERY DAY    Acquired hypothyroidism       lisinopril 30 MG tablet    PRINIVIL,ZESTRIL    90 tablet    TAKE ONE TABLET BY MOUTH EVERY DAY FOR HYPERTENSION    Essential hypertension, benign       MAGNESIUM PO      Take 1 tablet by mouth daily (with breakfast)        meloxicam 15 MG tablet    MOBIC    30 tablet    Take 0.5 tablets (7.5 mg) by mouth daily Will try low dose and closely monitor Hgb; she has supply at home and will monitor    Right-sided low back pain without sciatica, unspecified chronicity       omega-3 fatty acids 1200 MG capsule      1 TABLET DAILY    Preop general physical exam, Stress incontinence - female        pantoprazole 20 MG EC tablet    PROTONIX    30 tablet    TAKE ONE TABLET BY MOUTH EVERY DAY    Gastroesophageal reflux disease without esophagitis       potassium 99 MG Tabs      Take 1 tablet by mouth daily (with dinner)        psyllium 0.52 g capsule    METAMUCIL    540 capsule    Take 2 capsules (1.04 g) by mouth 3 times daily To take with fluid    Gastrointestinal hemorrhage, unspecified gastrointestinal hemorrhage type       RA CALCIUM PLUS MINERALS/VIT D PO      Take 1 tablet by mouth every other day Every other day at Lunch        simvastatin 20 MG tablet    ZOCOR    90 tablet    TAKE ONE TABLET BY MOUTH EVERY NIGHT AT BEDTIME    Hyperlipidemia LDL goal <130       travoprost (TIMI Free) 0.004 % ophthalmic solution    TRAVATAN Z    2.5 mL    Place 1 drop into both eyes At Bedtime        UNABLE TO FIND      daily (with dinner) MEDICATION NAME Cranberry.  2 capsules daily        VITAMIN D (CHOLECALCIFEROL) PO      Take 2,000 Units by mouth every other day Every other day at Supper        * Notice:  This list has 2 medication(s) that are the same as other medications prescribed for you. Read the directions carefully, and ask your doctor or other care provider to review them with you.

## 2018-07-10 ENCOUNTER — TRANSFERRED RECORDS (OUTPATIENT)
Dept: HEALTH INFORMATION MANAGEMENT | Facility: CLINIC | Age: 78
End: 2018-07-10

## 2018-07-11 ENCOUNTER — OFFICE VISIT (OUTPATIENT)
Dept: UROLOGY | Facility: CLINIC | Age: 78
End: 2018-07-11
Payer: COMMERCIAL

## 2018-07-11 VITALS
HEIGHT: 65 IN | BODY MASS INDEX: 37.32 KG/M2 | SYSTOLIC BLOOD PRESSURE: 150 MMHG | DIASTOLIC BLOOD PRESSURE: 78 MMHG | HEART RATE: 70 BPM | WEIGHT: 224 LBS

## 2018-07-11 DIAGNOSIS — N39.46 MIXED INCONTINENCE: Primary | ICD-10-CM

## 2018-07-11 DIAGNOSIS — N32.81 OAB (OVERACTIVE BLADDER): Primary | ICD-10-CM

## 2018-07-11 LAB
ALBUMIN UR-MCNC: NEGATIVE MG/DL
APPEARANCE UR: CLEAR
BILIRUB UR QL STRIP: NEGATIVE
COLOR UR AUTO: YELLOW
GLUCOSE UR STRIP-MCNC: NEGATIVE MG/DL
HGB UR QL STRIP: NEGATIVE
KETONES UR STRIP-MCNC: NEGATIVE MG/DL
LEUKOCYTE ESTERASE UR QL STRIP: NEGATIVE
NITRATE UR QL: NEGATIVE
PH UR STRIP: 5.5 PH (ref 5–7)
SOURCE: NORMAL
SP GR UR STRIP: >1.03 (ref 1–1.03)
UROBILINOGEN UR STRIP-ACNC: 0.2 EU/DL (ref 0.2–1)

## 2018-07-11 PROCEDURE — 81003 URINALYSIS AUTO W/O SCOPE: CPT | Performed by: UROLOGY

## 2018-07-11 PROCEDURE — 99213 OFFICE O/P EST LOW 20 MIN: CPT | Performed by: UROLOGY

## 2018-07-11 RX ORDER — MIRABEGRON 25 MG/1
25 TABLET, FILM COATED, EXTENDED RELEASE ORAL DAILY
Qty: 30 TABLET | Refills: 3 | Status: SHIPPED | OUTPATIENT
Start: 2018-07-11 | End: 2018-09-06

## 2018-07-11 RX ORDER — TROSPIUM CHLORIDE ER 60 MG/1
60 CAPSULE ORAL EVERY MORNING
Qty: 30 EACH | Refills: 11 | Status: SHIPPED | OUTPATIENT
Start: 2018-07-11 | End: 2018-09-12 | Stop reason: ALTCHOICE

## 2018-07-11 RX ORDER — METHYLPREDNISOLONE 4 MG
8 TABLET, DOSE PACK ORAL SEE ADMIN INSTRUCTIONS
COMMUNITY
End: 2018-09-06

## 2018-07-11 RX ORDER — TROSPIUM CHLORIDE 20 MG/1
20 TABLET, FILM COATED ORAL
Qty: 60 TABLET | Refills: 11 | Status: SHIPPED | OUTPATIENT
Start: 2018-07-11 | End: 2018-09-12

## 2018-07-11 ASSESSMENT — PAIN SCALES - GENERAL: PAINLEVEL: NO PAIN (0)

## 2018-07-11 NOTE — NURSING NOTE
Pharmacy called and Myrbertiq is too expensive for the patient. She is unwilling to pay the cost. New RX sent for Sanctura XR 60 mg. If she fails this can try a PA for Myrbertriq as she has failed all others. Jaimie Peña LPN

## 2018-07-11 NOTE — LETTER
"7/11/2018       RE: Lexii Stratton  [address] [place] [place] [address]     Dear Colleague,    Thank you for referring your patient, Lexii Stratton, to the [facility] [place] at [facility]. Please see a copy of my visit note below.    July 11, 2018    Return visit    Patient returns today for follow up.  Hospitalized for GI bleed, stopped her ASA/Mobic.  Her arthritis pain worse and just saw [facility] yesterday.   She states that the PT helps some but not where she wants to be    /78  Pulse 70  Ht 1.638 m (5' 4.5\")  Wt 101.6 kg (224 lb)  LMP  (LMP Unknown)  Breastfeeding? No  BMI 37.86 kg/m2  She is comfortable, in no distress, non-labored breathing.    A/P: 77 year old F with mixed incontinence despite multiple treatments    Will try mirabegron as this is the one class of medications she has not tried    Will have her return in 6-8 weeks to assess the medication and check a PVR    15  minutes were spent with the patient today, > 50% in counseling and coordination of care    Jenny Ayon MD MPH   of Urology    CC  Patient Care Team:  Jesenia Madrigal MD as PCP - General (Internal Medicine)  Jesenia Madrigal MD as Referring Physician (Internal Medicine)      "

## 2018-07-11 NOTE — PATIENT INSTRUCTIONS
Try this new medications    Please return to see us in about 6-8 weeks, sooner if needed    It was a pleasure meeting with you today.  Thank you for allowing me and my team the privilege of caring for you today.  YOU are the reason we are here, and I truly hope we provided you with the excellent service you deserve.  Please let us know if there is anything else we can do for you so that we can be sure you are leaving completely satisfied with your care experience.

## 2018-07-11 NOTE — PROGRESS NOTES
"July 11, 2018    Return visit    Patient returns today for follow up.  Hospitalized for GI bleed, stopped her ASA/Mobic.  Her arthritis pain worse and just saw [facility] yesterday.   She states that the PT helps some but not where she wants to be    /78  Pulse 70  Ht 1.638 m (5' 4.5\")  Wt 101.6 kg (224 lb)  LMP  (LMP Unknown)  Breastfeeding? No  BMI 37.86 kg/m2  She is comfortable, in no distress, non-labored breathing.    A/P: 77 year old F with mixed incontinence despite multiple treatments    Will try mirabegron as this is the one class of medications she has not tried    Will have her return in 6-8 weeks to assess the medication and check a PVR    15  minutes were spent with the patient today, > 50% in counseling and coordination of care    Jenny Ayon MD MPH   of Urology    CC  Patient Care Team:  Jesenia Madrigal MD as PCP - General (Internal Medicine)  Jesenia Madrigal MD as Referring Physician (Internal Medicine)        "

## 2018-07-11 NOTE — MR AVS SNAPSHOT
After Visit Summary   7/11/2018    Lexii Stratton    MRN: [id number]           Patient Information     Date Of Birth          1940        Visit Information        Provider Department      7/11/2018 11:45 AM Jenny Ayon MD [facility]        Today's Diagnoses     Mixed incontinence    -  1      Care Instructions    Try this new medications    Please return to see us in about 6-8 weeks, sooner if needed    It was a pleasure meeting with you today.  Thank you for allowing me and my team the privilege of caring for you today.  YOU are the reason we are here, and I truly hope we provided you with the excellent service you deserve.  Please let us know if there is anything else we can do for you so that we can be sure you are leaving completely satisfied with your care experience.            Follow-ups after your visit        Your next 10 appointments already scheduled     Sep 12, 2018  1:45 PM CDT   Return Visit with Jenny Ayon MD   [facility] ([facility] [place])    [address]  [address]  [facility] [address] [phone]              Who to contact     If you have questions or need follow up information about today's clinic visit or your schedule please contact [facility] directly at [phone].  Normal or non-critical lab and imaging results will be communicated to you by MyChart, letter or phone within 4 business days after the clinic has received the results. If you do not hear from us within 7 days, please contact the clinic through MyChart or phone. If you have a critical or abnormal lab result, we will notify you by phone as soon as possible.  Submit refill requests through [employer] or call your pharmacy and they will forward the refill request to us. Please allow 3 business days for your refill to be completed.          Additional  "Information About Your Visit        MyChart Information     [employer] gives you secure access to your electronic health record. If you see a primary care provider, you can also send messages to your care team and make appointments. If you have questions, please call your primary care clinic.  If you do not have a primary care provider, please call [phone] and they will assist you.        Care EveryWhere ID     This is your Care EveryWhere ID. This could be used by other organizations to access your [facility] medical records  [id number]        Your Vitals Were     Pulse Height Last Period Breastfeeding? BMI (Body Mass Index)       70 1.638 m (5' 4.5\") (LMP Unknown) No 37.86 kg/m2        Blood Pressure from Last 3 Encounters:   07/11/18 150/78   05/31/18 130/70   04/30/18 128/72    Weight from Last 3 Encounters:   07/11/18 101.6 kg (224 lb)   05/31/18 101.2 kg (223 lb)   04/30/18 101 kg (222 lb 11.2 oz)              We Performed the Following     MEASURE POST-VOID RESIDUAL URINE/BLADDER CAPACITY, US NON-IMAGING     UA without Microscopic          Today's Medication Changes          These changes are accurate as of 7/11/18 12:37 PM.  If you have any questions, ask your nurse or doctor.               Start taking these medicines.        Dose/Directions    mirabegron 25 MG 24 hr tablet   Commonly known as:  MYRBETRIQ   Used for:  Mixed incontinence   Started by:  Jenny Ayon MD        Dose:  25 mg   Take 1 tablet (25 mg) by mouth daily   Quantity:  30 tablet   Refills:  3            Where to get your medicines      These medications were sent to [facility] - [address]  [address] [address]     Phone:  [phone]     mirabegron 25 MG 24 hr tablet                Primary Care Provider Office Phone # Fax #    Jesenia Madrigal [phone] [address] [address]        Equal Access to Services     MIRIAM MILLS " AH: Hadii monster márquezhiltonbertha Veronica, waaxda luqadaha, qaybta kaanuj moreno, navin justin conneraneta calixtojajamahsa boateng. So [facility] [phone].    ATENCIÓN: Si habla español, tiene a irene disposición servicios gratuitos de asistencia lingüística. Llame al [phone].    We comply with applicable federal civil rights laws and [place] laws. We do not discriminate on the basis of race, color, national origin, age, disability, sex, sexual orientation, or gender identity.            Thank you!     Thank you for choosing [facility] [place]  for your care. Our goal is always to provide you with excellent care. Hearing back from our patients is one way we can continue to improve our services. Please take a few minutes to complete the written survey that you may receive in the mail after your visit with us. Thank you!             Your Updated Medication List - Protect others around you: Learn how to safely use, store and throw away your medicines at www.disposemymeds.org.          This list is accurate as of 7/11/18 12:37 PM.  Always use your most recent med list.                   Brand Name Dispense Instructions for use Diagnosis    acetaminophen 500 MG tablet    TYLENOL    100 tablet    Take 2 tablets (1,000 mg) by mouth 2 times daily as needed for mild pain    Arthritis       ACIDOPHILUS PROBIOTIC BLEND Caps     30 capsule    Take 1 capsule by mouth daily (with breakfast)        amLODIPine 5 MG tablet    NORVASC    90 tablet    TAKE ONE TABLET BY MOUTH EVERY NIGHT AT BEDTIME    Essential hypertension, benign       CENTRUM SILVER per tablet     30    1 TABLET DAILY    Need for prophylactic vaccination and inoculation against influenza, Routine general medical examination at a health care facility, Generalized osteoarthrosis, unspecified site, Other and unspecified hyperlipidemia, Esophageal reflux, Obesity, unspecified, Urinary sys symptom NEC, Other malaise and fatigue, Sleep  disturbance, unspecified       CYCLOBENZAPRINE HCL PO      Take 10 mg by mouth        * docusate sodium 100 MG capsule    COLACE     Take 100 mg by mouth every other day Every other day at Breakfast        * COLACE 100 MG capsule   Generic drug:  docusate sodium      Take 100 mg by mouth At Bedtime        fenofibrate 160 MG tablet     90 tablet    Take 1 tablet (160 mg) by mouth daily    Hyperlipidemia LDL goal <130       ferrous gluconate 324 (38 Fe) MG tablet    FERGON    100 tablet    Take 1 tablet (324 mg) by mouth 2 times daily    Gastrointestinal hemorrhage, unspecified gastrointestinal hemorrhage type       levothyroxine 50 MCG tablet    SYNTHROID/LEVOTHROID    90 tablet    TAKE ONE TABLET BY MOUTH EVERY DAY    Acquired hypothyroidism       lisinopril 30 MG tablet    PRINIVIL,ZESTRIL    90 tablet    TAKE ONE TABLET BY MOUTH EVERY DAY FOR HYPERTENSION    Essential hypertension, benign       MAGNESIUM PO      Take 1 tablet by mouth daily (with breakfast)        meloxicam 15 MG tablet    MOBIC    30 tablet    Take 0.5 tablets (7.5 mg) by mouth daily Will try low dose and closely monitor Hgb; she has supply at home and will monitor    Right-sided low back pain without sciatica, unspecified chronicity       methylPREDNISolone 4 MG tablet    MEDROL DOSEPAK     Take 8 mg by mouth See Admin Instructions follow package directions        mirabegron 25 MG 24 hr tablet    MYRBETRIQ    30 tablet    Take 1 tablet (25 mg) by mouth daily    Mixed incontinence       omega-3 fatty acids 1200 MG capsule      1 TABLET DAILY    Preop general physical exam, Stress incontinence - female       pantoprazole 20 MG EC tablet    PROTONIX    30 tablet    TAKE ONE TABLET BY MOUTH EVERY DAY    Gastroesophageal reflux disease without esophagitis       potassium 99 MG Tabs      Take 1 tablet by mouth daily (with dinner)        psyllium 0.52 g capsule    METAMUCIL    540 capsule    Take 2 capsules (1.04 g) by mouth 3 times daily To take with  fluid    Gastrointestinal hemorrhage, unspecified gastrointestinal hemorrhage type       RA CALCIUM PLUS MINERALS/VIT D PO      Take 1 tablet by mouth every other day Every other day at Lunch        simvastatin 20 MG tablet    ZOCOR    90 tablet    TAKE ONE TABLET BY MOUTH EVERY NIGHT AT BEDTIME    Hyperlipidemia LDL goal <130       travoprost (TIMI Free) 0.004 % ophthalmic solution    TRAVATAN Z    2.5 mL    Place 1 drop into both eyes At Bedtime        UNABLE TO FIND      daily (with dinner) MEDICATION NAME Cranberry.  2 capsules daily        VITAMIN D (CHOLECALCIFEROL) PO      Take 2,000 Units by mouth every other day Every other day at Supper        * Notice:  This list has 2 medication(s) that are the same as other medications prescribed for you. Read the directions carefully, and ask your doctor or other care provider to review them with you.

## 2018-07-11 NOTE — NURSING NOTE
"Chief Complaint   Patient presents with     Incontinence     Patient is here for mixed incontinence follow up. Patient has been [facility] PT, she said that \"it has helped some but she is not where she wishes she was\".      Gege Soto LPN 12:01 PM July 11, 2018      "

## 2018-09-06 ENCOUNTER — OFFICE VISIT (OUTPATIENT)
Dept: FAMILY MEDICINE | Facility: CLINIC | Age: 78
End: 2018-09-06
Payer: COMMERCIAL

## 2018-09-06 VITALS
DIASTOLIC BLOOD PRESSURE: 70 MMHG | HEART RATE: 63 BPM | RESPIRATION RATE: 17 BRPM | TEMPERATURE: 97.9 F | BODY MASS INDEX: 37.42 KG/M2 | SYSTOLIC BLOOD PRESSURE: 132 MMHG | OXYGEN SATURATION: 100 % | WEIGHT: 221.4 LBS

## 2018-09-06 DIAGNOSIS — K21.9 GASTROESOPHAGEAL REFLUX DISEASE WITHOUT ESOPHAGITIS: ICD-10-CM

## 2018-09-06 DIAGNOSIS — R01.1 HEART MURMUR: ICD-10-CM

## 2018-09-06 DIAGNOSIS — R73.09 ELEVATED GLUCOSE: ICD-10-CM

## 2018-09-06 DIAGNOSIS — E78.5 HYPERLIPIDEMIA LDL GOAL <130: Primary | ICD-10-CM

## 2018-09-06 DIAGNOSIS — E83.52 SERUM CALCIUM ELEVATED: ICD-10-CM

## 2018-09-06 DIAGNOSIS — Z87.19 HISTORY OF LOWER GI BLEEDING: ICD-10-CM

## 2018-09-06 DIAGNOSIS — D17.30 LIPOMA OF SKIN AND SUBCUTANEOUS TISSUE: ICD-10-CM

## 2018-09-06 DIAGNOSIS — Z23 NEED FOR PROPHYLACTIC VACCINATION AND INOCULATION AGAINST INFLUENZA: ICD-10-CM

## 2018-09-06 LAB
HBA1C MFR BLD: 6.4 % (ref 0–5.6)
HGB BLD-MCNC: 12.6 G/DL (ref 11.7–15.7)
PTH-INTACT SERPL-MCNC: 30 PG/ML (ref 18–80)

## 2018-09-06 PROCEDURE — 83036 HEMOGLOBIN GLYCOSYLATED A1C: CPT | Performed by: INTERNAL MEDICINE

## 2018-09-06 PROCEDURE — 85018 HEMOGLOBIN: CPT | Performed by: INTERNAL MEDICINE

## 2018-09-06 PROCEDURE — 83540 ASSAY OF IRON: CPT | Performed by: INTERNAL MEDICINE

## 2018-09-06 PROCEDURE — 99215 OFFICE O/P EST HI 40 MIN: CPT | Mod: 25 | Performed by: INTERNAL MEDICINE

## 2018-09-06 PROCEDURE — 80053 COMPREHEN METABOLIC PANEL: CPT | Performed by: INTERNAL MEDICINE

## 2018-09-06 PROCEDURE — 83970 ASSAY OF PARATHORMONE: CPT | Performed by: INTERNAL MEDICINE

## 2018-09-06 PROCEDURE — 80061 LIPID PANEL: CPT | Performed by: INTERNAL MEDICINE

## 2018-09-06 PROCEDURE — 83550 IRON BINDING TEST: CPT | Performed by: INTERNAL MEDICINE

## 2018-09-06 PROCEDURE — G0008 ADMIN INFLUENZA VIRUS VAC: HCPCS | Performed by: INTERNAL MEDICINE

## 2018-09-06 PROCEDURE — 90662 IIV NO PRSV INCREASED AG IM: CPT | Performed by: INTERNAL MEDICINE

## 2018-09-06 PROCEDURE — 36415 COLL VENOUS BLD VENIPUNCTURE: CPT | Performed by: INTERNAL MEDICINE

## 2018-09-06 RX ORDER — FENOFIBRATE 160 MG/1
160 TABLET ORAL DAILY
Qty: 90 TABLET | Refills: 3 | Status: SHIPPED | OUTPATIENT
Start: 2018-09-06 | End: 2019-08-27

## 2018-09-06 RX ORDER — PANTOPRAZOLE SODIUM 20 MG/1
20 TABLET, DELAYED RELEASE ORAL DAILY
Qty: 90 TABLET | Refills: 3 | Status: SHIPPED | OUTPATIENT
Start: 2018-09-06 | End: 2019-08-27

## 2018-09-06 NOTE — MR AVS SNAPSHOT
After Visit Summary   9/6/2018    Lexii Stratton    MRN: [id number]           Patient Information     Date Of Birth          1940        Visit Information        Provider Department      9/6/2018 9:00 AM Jesenia Madrigal MD [facility]        Today's Diagnoses     Need for prophylactic vaccination and inoculation against influenza    -  1    Gastroesophageal reflux disease without esophagitis        Hyperlipidemia LDL goal <130        Lipoma of skin and subcutaneous tissue        History of lower GI bleeding        Serum calcium elevated        Elevated glucose        Heart murmur           Follow-ups after your visit        Follow-up notes from your care team     Return in about 6 months (around 3/6/2019) for cholesterol, .      Your next 10 appointments already scheduled     Sep 12, 2018  1:45 PM CDT   Return Visit with Jenny Ayon MD   [facility] [place] [facility] ([facility])    [address]  [address]  [facility] [address]   [phone]            Sep 18, 2018  2:00 PM CDT   MA SCREENING DIGITAL BILATERAL with [id number]   [facility] ([facility])    [address], [address]  [facility] [id number] [phone]           Do not use any powder, lotion or deodorant under your arms or on your breast. If you do, we will ask you to remove it before your exam.  Wear comfortable, two-piece clothing.  If you have any allergies, tell your care team.  Bring any previous mammograms from other facilities or have them mailed to the breast center. Three-dimensional (3D) mammograms are available at [place] locations in [facility], [facility], [place], [place], and [place]. -Health locations include [place] [facility] in [place]. Benefits of 3D mammograms include: - Improved rate of cancer detection - Decreases your chance of having to go  "back for more tests, which means fewer: - \"False-positive\" results (This means that there is an abnormal area but it isn't cancer.) - Invasive testing procedures, such as a biopsy or surgery - Can provide clearer images of the breast if you have dense breast tissue. 3D mammography is an optional exam that anyone can have with a 2D mammogram. It doesn't replace or take the place of a 2D mammogram. 2D mammograms remain an effective screening test for all women.  Not all insurance companies cover the cost of a 3D mammogram. Check with your insurance.              Future tests that were ordered for you today     Open Future Orders        Priority Expected Expires Ordered    Echocardiogram Complete Routine  9/6/2019 9/6/2018            Who to contact     If you have questions or need follow up information about today's clinic visit or your schedule please contact [facility] directly at [phone].  Normal or non-critical lab and imaging results will be communicated to you by PinPay[employer], letter or phone within 4 business days after the clinic has received the results. If you do not hear from us within 7 days, please contact the clinic through Manatront or phone. If you have a critical or abnormal lab result, we will notify you by phone as soon as possible.  Submit refill requests through [email or web address] or call your pharmacy and they will forward the refill request to us. Please allow 3 business days for your refill to be completed.          Additional Information About Your Visit        [email or web address] Information     [email or web address] gives you secure access to your electronic health record. If you see a primary care provider, you can also send messages to your care team and make appointments. If you have questions, please call your primary care clinic.  If you do not have a primary care provider, please call [phone] and they will assist you.        Care EveryWhere ID     This is your Care EveryWhere ID. This could be used by " other organizations to access your [facility] medical records  [id number]        Your Vitals Were     Pulse Temperature Respirations Last Period Pulse Oximetry BMI (Body Mass Index)    63 97.9  F (36.6  C) (Oral) 17 (LMP Unknown) 100% 37.42 kg/m2       Blood Pressure from Last 3 Encounters:   09/06/18 132/70   07/11/18 150/78   05/31/18 130/70    Weight from Last 3 Encounters:   09/06/18 221 lb 6.4 oz (100.4 kg)   07/11/18 224 lb (101.6 kg)   05/31/18 223 lb (101.2 kg)              We Performed the Following     ADMIN INFLUENZA (For MEDICARE Patients ONLY) []     Comprehensive metabolic panel     FLU VACCINE, INCREASED ANTIGEN, PRESV FREE, AGE 65+ [64098]     Hemoglobin A1c     Hemoglobin     Iron and iron binding capacity     Lipid panel reflex to direct LDL Fasting     Parathyroid Hormone Intact          Today's Medication Changes          These changes are accurate as of 9/6/18  9:48 AM.  If you have any questions, ask your nurse or doctor.               These medicines have changed or have updated prescriptions.        Dose/Directions    pantoprazole 20 MG EC tablet   Commonly known as:  PROTONIX   This may have changed:  See the new instructions.   Used for:  Gastroesophageal reflux disease without esophagitis   Changed by:  Jesenia Madrigal MD        Dose:  20 mg   Take 1 tablet (20 mg) by mouth daily   Quantity:  90 tablet   Refills:  3         Stop taking these medicines if you haven't already. Please contact your care team if you have questions.     mirabegron 25 MG 24 hr tablet   Commonly known as:  MYRBETRIQ   Stopped by:  Jesenia Madrigal MD                Where to get your medicines      [facility] [place] [place] - [address] AT Portal to Registered [facility] Sites  [address], [facility] [address]     Phone:  [phone]     fenofibrate 160 MG tablet    pantoprazole 20 MG EC tablet                Primary Care Provider  Office Phone # Fax #    Jesenia Madrigal [phone]       [address] [address] [place] [address]        Equal Access to Services     LUCY MILLS : Kory jaime diogenesbertha Sotyra, waaxda luqadaha, qaybta kaalmada mayelinda, navin mondragon laCrowbay boateng. So [facility] [phone].    ATENCIÓN: Si habla español, tiene a irene disposición servicios gratuitos de asistencia lingüística. Llame al [phone].    We comply with applicable federal civil rights laws and [place] laws. We do not discriminate on the basis of race, color, national origin, age, disability, sex, sexual orientation, or gender identity.            Thank you!     Thank you for choosing [facility]  for your care. Our goal is always to provide you with excellent care. Hearing back from our patients is one way we can continue to improve our services. Please take a few minutes to complete the written survey that you may receive in the mail after your visit with us. Thank you!             Your Updated Medication List - Protect others around you: Learn how to safely use, store and throw away your medicines at www.disposemymeds.org.          This list is accurate as of 9/6/18  9:48 AM.  Always use your most recent med list.                   Brand Name Dispense Instructions for use Diagnosis    acetaminophen 500 MG tablet    TYLENOL    100 tablet    Take 2 tablets (1,000 mg) by mouth 2 times daily as needed for mild pain    Arthritis       ACIDOPHILUS PROBIOTIC BLEND Caps     30 capsule    Take 1 capsule by mouth daily (with breakfast)        amLODIPine 5 MG tablet    NORVASC    90 tablet    TAKE ONE TABLET BY MOUTH EVERY NIGHT AT BEDTIME    Essential hypertension, benign       CENTRUM SILVER per tablet     30    1 TABLET DAILY    Need for prophylactic vaccination and inoculation against influenza, Routine general medical examination at a health care facility, Generalized osteoarthrosis, unspecified site, Other  and unspecified hyperlipidemia, Esophageal reflux, Obesity, unspecified, Urinary sys symptom NEC, Other malaise and fatigue, Sleep disturbance, unspecified       * docusate sodium 100 MG capsule    COLACE     Take 100 mg by mouth every other day Every other day at Breakfast        * COLACE 100 MG capsule   Generic drug:  docusate sodium      Take 100 mg by mouth At Bedtime        fenofibrate 160 MG tablet     90 tablet    Take 1 tablet (160 mg) by mouth daily    Hyperlipidemia LDL goal <130       ferrous gluconate 324 (38 Fe) MG tablet    FERGON    100 tablet    Take 1 tablet (324 mg) by mouth 2 times daily    Gastrointestinal hemorrhage, unspecified gastrointestinal hemorrhage type       levothyroxine 50 MCG tablet    SYNTHROID/LEVOTHROID    90 tablet    TAKE ONE TABLET BY MOUTH EVERY DAY    Acquired hypothyroidism       lisinopril 30 MG tablet    PRINIVIL,ZESTRIL    90 tablet    TAKE ONE TABLET BY MOUTH EVERY DAY FOR HYPERTENSION    Essential hypertension, benign       MAGNESIUM PO      Take 1 tablet by mouth daily (with breakfast)        meloxicam 15 MG tablet    MOBIC    30 tablet    Take 0.5 tablets (7.5 mg) by mouth daily Will try low dose and closely monitor Hgb; she has supply at home and will monitor    Right-sided low back pain without sciatica, unspecified chronicity       omega-3 fatty acids 1200 MG capsule      1 TABLET DAILY    Preop general physical exam, Stress incontinence - female       pantoprazole 20 MG EC tablet    PROTONIX    90 tablet    Take 1 tablet (20 mg) by mouth daily    Gastroesophageal reflux disease without esophagitis       potassium 99 MG Tabs      Take 1 tablet by mouth daily (with dinner)        psyllium 0.52 g capsule    METAMUCIL    540 capsule    Take 2 capsules (1.04 g) by mouth 3 times daily To take with fluid    Gastrointestinal hemorrhage, unspecified gastrointestinal hemorrhage type       RA CALCIUM PLUS MINERALS/VIT D PO      Take 1 tablet by mouth every other day Every  other day at Lunch        simvastatin 20 MG tablet    ZOCOR    90 tablet    TAKE ONE TABLET BY MOUTH EVERY NIGHT AT BEDTIME    Hyperlipidemia LDL goal <130       travoprost (TIMI Free) 0.004 % ophthalmic solution    TRAVATAN Z    2.5 mL    Place 1 drop into both eyes At Bedtime        * trospium 60 MG Cp24 24 hr capsule    SANCTURA XR    30 each    Take 1 capsule (60 mg) by mouth every morning    Mixed incontinence       * trospium 20 MG tablet    SANCTURA    60 tablet    Take 1 tablet (20 mg) by mouth 2 times daily (before meals)    OAB (overactive bladder)       UNABLE TO FIND      daily (with dinner) MEDICATION NAME Cranberry.  2 capsules daily        VITAMIN D (CHOLECALCIFEROL) PO      Take 2,000 Units by mouth every other day Every other day at Supper        * Notice:  This list has 4 medication(s) that are the same as other medications prescribed for you. Read the directions carefully, and ask your doctor or other care provider to review them with you.

## 2018-09-06 NOTE — PROGRESS NOTES
SUBJECTIVE:   Lexii Stratton is a 77 year old female who presents to clinic today for the following health issues:      Patient presents with:  Recheck Medication  Gastrointestinal Problem  Consult: would like her Hg checked.   Flu Shot  Mass: large mass in right axilla      Is requesting a 90 day supply of Protonix    Will need refill on cholesterol medication     Hyperlipidemia Follow-Up      Rate your low fat/cholesterol diet?: fair    Taking statin?  Yes, no muscle aches from statin    Other lipid medications/supplements?:  Fenofibrate, without side effects      Amount of exercise or physical activity: 2 days/week for an average of 45-60 minutes    Problems taking medications regularly: No    Medication side effects: none    Diet: regular (no restrictions)    Anemia  Lab Results   Component Value Date    HGB 12.0 06/29/2018    HGB 11.4 06/15/2018     Reflux  Using PPI daily       Problem list and histories reviewed & adjusted, as indicated.  Additional history: as documented    Patient Active Problem List   Diagnosis     Generalized osteoarthrosis, unspecified site     Esophageal reflux     Obesity     Other symptoms involving urinary system     Acquired hypothyroidism     Hypersomnia with sleep apnea     Essential hypertension, benign     Impaired fasting glucose     Hyperlipidemia LDL goal <130     Advance Care Planning     Chest pain syndrome     Partial small bowel obstruction (H)     Anemia, unspecified     Poor iron absorption     Iron and its compounds causing adverse effect in therapeutic use(E934.0)     S/P total hip arthroplasty     OAB (overactive bladder)     Morbid obesity due to excess calories (H)     GI bleed     Past Surgical History:   Procedure Laterality Date     ARTHROPLASTY HIP Right 11/9/2016    Procedure: ARTHROPLASTY HIP;  Surgeon: Angel Lund MD;  Location: [facility] OR     AS REPAIR INCISIONAL HERNIA,REDUCIBLE  1998    inc hernia ( [facility] surgery)     BLADDER SURGERY       hyperdistention surgery and sling     C NONSPECIFIC PROCEDURE      T&A     C NONSPECIFIC PROCEDURE      Vaginal Hysterectomy (has her ovaries)     C NONSPECIFIC PROCEDURE      PPTL     C NONSPECIFIC PROCEDURE      L shoulder to remove bone spurs     C NONSPECIFIC PROCEDURE      cysto and durasphere Dr Demarco     C NONSPECIFIC PROCEDURE      cysto and durasphere     C NONSPECIFIC PROCEDURE      cysto and durasphere     C NONSPECIFIC PROCEDURE      retropubic TVT sling     CHOLECYSTECTOMY  1987     COLONOSCOPY  12/3/2011    Procedure:COLONOSCOPY; COLONOSCOPY; Surgeon:SEMAJ GRAHAM; Location: GI     COLONOSCOPY N/A 3/29/2018    Procedure: COLONOSCOPY;  COLONOSCOPY Rm 544;  Surgeon: Marco Gusman MD;  Location:  GI     CYSTOSCOPY N/A 2017    Procedure: CYSTOSCOPY;  CYSTOSCOPY AND HYDRODISTENTION ;  Surgeon: Eyal Bai MD;  Location:  OR     ENT SURGERY      Tonsillectomy     ESOPHAGOSCOPY, GASTROSCOPY, DUODENOSCOPY (EGD), COMBINED N/A 2016    Procedure: COMBINED ESOPHAGOSCOPY, GASTROSCOPY, DUODENOSCOPY (EGD), BIOPSY SINGLE OR MULTIPLE;  Surgeon: Marco Monaco MD;  Location:  GI     GENITOURINARY SURGERY       GYN SURGERY      Hysterectomy     HERNIA REPAIR, UMBILICAL  2010    Dr. Kirt Franco times 3     ORTHOPEDIC SURGERY      [facility] - Dr. Lund- bilateral knee replacement       Social History   Substance Use Topics     Smoking status: Never Smoker     Smokeless tobacco: Never Used     Alcohol use No     Family History   Problem Relation Age of Onset     HEART DISEASE Mother      heart surgery , new valve     Gallbladder Disease Mother      Coronary Artery Disease Mother      Hyperlipidemia Mother      Asthma Mother      HEART DISEASE Maternal Grandmother      Coronary Artery Disease Maternal Grandmother      HEART DISEASE Maternal Grandfather      Coronary Artery Disease Maternal Grandfather      Cancer Father      throat ca,  76      Coronary Artery Disease Father      Diabetes Brother      Half Brother     Cardiovascular Brother      lung ca, Half brother     Cancer Brother      half brother  lung          Current Outpatient Prescriptions   Medication Sig Dispense Refill     acetaminophen (TYLENOL) 500 MG tablet Take 2 tablets (1,000 mg) by mouth 2 times daily as needed for mild pain 100 tablet 0     amLODIPine (NORVASC) 5 MG tablet TAKE ONE TABLET BY MOUTH EVERY NIGHT AT BEDTIME 90 tablet 2     Calcium Carbonate-Vit D-Min (RA CALCIUM PLUS MINERALS/VIT D PO) Take 1 tablet by mouth every other day Every other day at Lunch       CENTRUM SILVER OR TABS 1 TABLET DAILY 30 0     docusate sodium (COLACE) 100 MG capsule Take 100 mg by mouth every other day Every other day at Breakfast       docusate sodium (COLACE) 100 MG capsule Take 100 mg by mouth At Bedtime       fenofibrate 160 MG tablet Take 1 tablet (160 mg) by mouth daily 90 tablet 3     ferrous gluconate (FERGON) 324 (38 FE) MG tablet Take 1 tablet (324 mg) by mouth 2 times daily 100 tablet 0     levothyroxine (SYNTHROID/LEVOTHROID) 50 MCG tablet TAKE ONE TABLET BY MOUTH EVERY DAY 90 tablet 3     lisinopril (PRINIVIL,ZESTRIL) 30 MG tablet TAKE ONE TABLET BY MOUTH EVERY DAY FOR HYPERTENSION 90 tablet 3     MAGNESIUM PO Take 1 tablet by mouth daily (with breakfast)       meloxicam (MOBIC) 15 MG tablet Take 0.5 tablets (7.5 mg) by mouth daily Will try low dose and closely monitor Hgb; she has supply at home and will monitor 30 tablet 1     OMEGA-3 FATTY ACIDS 1200 MG OR CAPS 1 TABLET DAILY  0     pantoprazole (PROTONIX) 20 MG EC tablet Take 1 tablet (20 mg) by mouth daily 90 tablet 3     potassium 99 MG TABS Take 1 tablet by mouth daily (with dinner)        Probiotic Product (ACIDOPHILUS PROBIOTIC BLEND) CAPS Take 1 capsule by mouth daily (with breakfast)  30 capsule 0     psyllium (METAMUCIL) 0.52 G capsule Take 2 capsules (1.04 g) by mouth 3 times daily To take with fluid 540 capsule       simvastatin (ZOCOR) 20 MG tablet TAKE ONE TABLET BY MOUTH EVERY NIGHT AT BEDTIME 90 tablet 2     travoprost, BAK Free, (TRAVATAN Z) 0.004 % ophthalmic solution Place 1 drop into both eyes At Bedtime  2.5 mL 1     UNABLE TO FIND daily (with dinner) MEDICATION NAME Cranberry.  2 capsules daily        VITAMIN D, CHOLECALCIFEROL, PO Take 2,000 Units by mouth every other day Every other day at Supper       trospium (SANCTURA) 20 MG tablet Take 1 tablet (20 mg) by mouth 2 times daily (before meals) 180 tablet 3     Allergies   Allergen Reactions     Augmentin Diarrhea     Codeine Nausea and Vomiting     Hydrocodone      Keeps patient awake     Naproxen Itching and Rash     Sulfa Drugs Nausea     BP Readings from Last 3 Encounters:   09/12/18 142/90   09/06/18 132/70   07/11/18 150/78    Wt Readings from Last 3 Encounters:   09/12/18 220 lb (99.8 kg)   09/06/18 221 lb 6.4 oz (100.4 kg)   07/11/18 224 lb (101.6 kg)                  Labs reviewed in EPIC    Reviewed and updated as needed this visit by clinical staff  Tobacco  Allergies  Meds  Problems  Med Hx  Surg Hx  Fam Hx  Soc Hx        Reviewed and updated as needed this visit by Provider  Allergies  Meds  Problems         ROS:  CONSTITUTIONAL: NEGATIVE for fever, chills, change in weight  ENT/MOUTH: NEGATIVE for ear, mouth and throat problems  RESP: NEGATIVE for significant cough or SOB  CV: NEGATIVE for chest pain, palpitations or peripheral edema  GI: reflux- PPI daily, no constipation/diarrhea  : working with Urology; chronic incontinence  NEURO: NEGATIVE for weakness, dizziness or paresthesias  ENDOCRINE: elevated calcium reviewed  PSYCHIATRIC: NEGATIVE for changes in mood or affect    OBJECTIVE:     /70  Pulse 63  Temp 97.9  F (36.6  C) (Oral)  Resp 17  Wt 221 lb 6.4 oz (100.4 kg)  LMP  (LMP Unknown)  SpO2 100%  BMI 37.42 kg/m2  Body mass index is 37.42 kg/(m^2).  GENERAL: healthy, alert and no distress  Lymph: right axillary mass: 3cm x 4  cm soft; mass; nontender; no redness or warmth  NECK: no adenopathy, no asymmetry, masses, or scars and thyroid normal to palpation  RESP: lungs clear to auscultation - no rales, rhonchi or wheezes  CV: regular rates and rhythm, normal S1 S2,  grade 3/6 systolic murmur , peripheral pulses strong and no peripheral edema  ABDOMEN: soft, nontender, no hepatosplenomegaly, no masses and bowel sounds normal  MS: no gross musculoskeletal defects noted, no edema  SKIN: no suspicious lesions or rashes  NEURO: Normal strength and tone, sensory exam grossly normal and mentation intact  PSYCH: mentation appears normal, affect normal/bright      ASSESSMENT/PLAN:     (E78.5) Hyperlipidemia LDL goal <130  (primary encounter diagnosis)  Comment: Goals of therapy reviewed; Risk assessment; continue meds  Lab Results   Component Value Date    LDL 71 09/06/2018    LDL 84 05/15/2018   meds effective  Plan: fenofibrate 160 MG tablet, Lipid panel reflex         to direct LDL Fasting          (K21.9) Gastroesophageal reflux disease without esophagitis  Comment: triggers reviewed; PPI use reviewed; benefits outweigh the risk  Plan: pantoprazole (PROTONIX) 20 MG EC tablet        Pt reminded of benefits of maximizing calcium and vit D to help with bone health (due to being on PPI)     (D17.30) Lipoma of skin and subcutaneous tissue  Comment: right axilla- 3cm x 4 cm soft; mass; nontender; no redness or warmth  Plan: reviewed benign nature of Lipoma    (Z87.19) History of lower GI bleeding  Comment: GI bleed late March felt to be related to ASA and Mobic; HGB monitored periodically; no change in bowels; no clinical evidence of bleeding; pt desires HGB evaluation  Plan: Iron and iron binding capacity, Hemoglobin          (E83.52) Serum calcium elevated  Comment: elevated Calcium; further assessment warranted  Plan: Comprehensive metabolic panel, Parathyroid         Hormone Intact          (R73.09) Elevated glucose  Comment:   Lab Results    Component Value Date     09/06/2018   Increased risk for diabetes; reviewed;   Plan: Comprehensive metabolic panel, Hemoglobin A1c        Increased risk for diabetes; better assessment with a normal hgb    (R01.1) Heart murmur  Comment: louder than previous; more prominent when upright.   previous ECHO open 2012  Plan: Echocardiogram Complete          (Z23) Need for prophylactic vaccination and inoculation against influenza  Comment: Pt request. CDC guidelines met   Plan: FLU VACCINE, INCREASED ANTIGEN, PRESV FREE, AGE        65+ [15512], ADMIN INFLUENZA (For MEDICARE         Patients ONLY) []            Jesenia Madrigal MD  Internal Medicine   [facility] [place]  40 minutes is spent with patient, over 50% of that time spent providing counselling, discussing and reviewing meds and potential side effects.      Injectable Influenza Immunization Documentation    1.  Is the person to be vaccinated sick today?   No    2. Does the person to be vaccinated have an allergy to a component   of the vaccine?   No  Egg Allergy Algorithm Link    3. Has the person to be vaccinated ever had a serious reaction   to influenza vaccine in the past?   No    4. Has the person to be vaccinated ever had Guillain-Barré syndrome?   No    Form completed by patient

## 2018-09-07 LAB
ALBUMIN SERPL-MCNC: 3.5 G/DL (ref 3.4–5)
ALP SERPL-CCNC: 61 U/L (ref 40–150)
ALT SERPL W P-5'-P-CCNC: 21 U/L (ref 0–50)
ANION GAP SERPL CALCULATED.3IONS-SCNC: 9 MMOL/L (ref 3–14)
AST SERPL W P-5'-P-CCNC: 43 U/L (ref 0–45)
BILIRUB SERPL-MCNC: 0.5 MG/DL (ref 0.2–1.3)
BUN SERPL-MCNC: 24 MG/DL (ref 7–30)
CALCIUM SERPL-MCNC: 9.4 MG/DL (ref 8.5–10.1)
CHLORIDE SERPL-SCNC: 109 MMOL/L (ref 94–109)
CHOLEST SERPL-MCNC: 142 MG/DL
CO2 SERPL-SCNC: 24 MMOL/L (ref 20–32)
CREAT SERPL-MCNC: 0.93 MG/DL (ref 0.52–1.04)
GFR SERPL CREATININE-BSD FRML MDRD: 58 ML/MIN/1.7M2
GLUCOSE SERPL-MCNC: 125 MG/DL (ref 70–99)
HDLC SERPL-MCNC: 38 MG/DL
IRON SATN MFR SERPL: 16 % (ref 15–46)
IRON SERPL-MCNC: 60 UG/DL (ref 35–180)
LDLC SERPL CALC-MCNC: 71 MG/DL
NONHDLC SERPL-MCNC: 104 MG/DL
POTASSIUM SERPL-SCNC: 4.8 MMOL/L (ref 3.4–5.3)
PROT SERPL-MCNC: 7.3 G/DL (ref 6.8–8.8)
SODIUM SERPL-SCNC: 142 MMOL/L (ref 133–144)
TIBC SERPL-MCNC: 375 UG/DL (ref 240–430)
TRIGL SERPL-MCNC: 166 MG/DL

## 2018-09-10 ENCOUNTER — HOSPITAL ENCOUNTER (OUTPATIENT)
Dept: CARDIOLOGY | Facility: CLINIC | Age: 78
Discharge: HOME | End: 2018-09-10
Attending: INTERNAL MEDICINE | Admitting: INTERNAL MEDICINE
Payer: MEDICARE

## 2018-09-10 DIAGNOSIS — R01.1 HEART MURMUR: ICD-10-CM

## 2018-09-10 PROCEDURE — 93306 TTE W/DOPPLER COMPLETE: CPT | Mod: 26 | Performed by: INTERNAL MEDICINE

## 2018-09-10 PROCEDURE — 93306 TTE W/DOPPLER COMPLETE: CPT

## 2018-09-11 NOTE — PROGRESS NOTES
Moderate aortic stenosis; ROBYN 1.2 cm2; continue same medications/dosages  Pt advised via My Chart.

## 2018-09-12 ENCOUNTER — OFFICE VISIT (OUTPATIENT)
Dept: UROLOGY | Facility: CLINIC | Age: 78
End: 2018-09-12
Payer: COMMERCIAL

## 2018-09-12 VITALS
SYSTOLIC BLOOD PRESSURE: 142 MMHG | HEIGHT: 65 IN | BODY MASS INDEX: 36.65 KG/M2 | DIASTOLIC BLOOD PRESSURE: 90 MMHG | WEIGHT: 220 LBS

## 2018-09-12 DIAGNOSIS — N32.81 OAB (OVERACTIVE BLADDER): Primary | ICD-10-CM

## 2018-09-12 LAB
ALBUMIN UR-MCNC: NEGATIVE MG/DL
APPEARANCE UR: CLEAR
BILIRUB UR QL STRIP: NEGATIVE
COLOR UR AUTO: YELLOW
GLUCOSE UR STRIP-MCNC: NEGATIVE MG/DL
HGB UR QL STRIP: NEGATIVE
KETONES UR STRIP-MCNC: NEGATIVE MG/DL
LEUKOCYTE ESTERASE UR QL STRIP: ABNORMAL
NITRATE UR QL: NEGATIVE
PH UR STRIP: 7 PH (ref 5–7)
RESIDUAL VOLUME (RV) (EXTERNAL): 24
SOURCE: ABNORMAL
SP GR UR STRIP: 1.02 (ref 1–1.03)
UROBILINOGEN UR STRIP-ACNC: 0.2 EU/DL (ref 0.2–1)

## 2018-09-12 PROCEDURE — 99213 OFFICE O/P EST LOW 20 MIN: CPT | Mod: 25 | Performed by: UROLOGY

## 2018-09-12 PROCEDURE — 51798 US URINE CAPACITY MEASURE: CPT | Performed by: UROLOGY

## 2018-09-12 PROCEDURE — 81003 URINALYSIS AUTO W/O SCOPE: CPT | Performed by: UROLOGY

## 2018-09-12 RX ORDER — TROSPIUM CHLORIDE 20 MG/1
20 TABLET, FILM COATED ORAL
Qty: 180 TABLET | Refills: 3 | Status: SHIPPED | OUTPATIENT
Start: 2018-09-12 | End: 2019-08-29

## 2018-09-12 ASSESSMENT — PAIN SCALES - GENERAL: PAINLEVEL: NO PAIN (0)

## 2018-09-12 NOTE — PROGRESS NOTES
"September 12, 2018    Return visit    Patient returns today for follow up.  Reports 60-70% better on the trospium BID as the daily tropsium and mirabegron were too expensive.  She has been continuing her pelvic floor exercises. She is overall satisfied with the symptom improvement as her leakge has decreased enough that she can wear a thinner pad.  She denies any changes in her health since last visit.    Ht 1.638 m (5' 4.5\")  Wt 99.8 kg (220 lb)  LMP  (LMP Unknown)  BMI 37.18 kg/m2  She is comfortable, in no distress, non-labored breathing.      PVR 24 mL by bladder scan    A/P: 77 year old F with mixed incontinence despite multiple treatments    At this time patient is satisfied and not interested in further treatments    Continue trospium, 90 day supply sent to her mail order pharmacy    RTC in the Spring so she can avoid driving in the snow, sooner if needed    15 minutes were spent with the patient today, > 50% in counseling and coordination of care    Jenny Ayon MD MPH   of Urology    CC  Patient Care Team:  Jesenia Madrigal MD as PCP - General (Internal Medicine)  Jesenia Madrigal MD as Referring Physician (Internal Medicine)                "

## 2018-09-12 NOTE — PATIENT INSTRUCTIONS
Continue the trospium    Continue your pelvic floor and back exercise    RTC 9 months, sooner if needed    It was a pleasure meeting with you today.  Thank you for allowing me and my team the privilege of caring for you today.  YOU are the reason we are here, and I truly hope we provided you with the excellent service you deserve.  Please let us know if there is anything else we can do for you so that we can be sure you are leaving completely satisfied with your care experience.

## 2018-09-12 NOTE — LETTER
"9/12/2018       RE: Lexii Stratton  [address] [place] [address]     Dear Colleague,    Thank you for referring your patient, Lexii Stratton, to the [facility] [place] at [facility]. Please see a copy of my visit note below.    September 12, 2018    Return visit    Patient returns today for follow up.  Reports 60-70% better on the trospium BID as the daily tropsium and mirabegron were too expensive.  She has been continuing her pelvic floor exercises. She is overall satisfied with the symptom improvement as her leakge has decreased enough that she can wear a thinner pad.  She denies any changes in her health since last visit.    Ht 1.638 m (5' 4.5\")  Wt 99.8 kg (220 lb)  LMP  (LMP Unknown)  BMI 37.18 kg/m2  She is comfortable, in no distress, non-labored breathing.      PVR 24 mL by bladder scan    A/P: 77 year old F with mixed incontinence despite multiple treatments    At this time patient is satisfied and not interested in further treatments    Continue trospium, 90 day supply sent to her mail order pharmacy    RTC in the Spring so she can avoid driving in the snow, sooner if needed    15 minutes were spent with the patient today, > 50% in counseling and coordination of care    Jenny Ayon MD MPH   of Urology    CC  Patient Care Team:  Jesenia Madrigal MD as PCP - General (Internal Medicine)  Jesenia Madrigal MD as Referring Physician (Internal Medicine)                  "

## 2018-09-12 NOTE — MR AVS SNAPSHOT
After Visit Summary   9/12/2018    Lexii Stratton    MRN: [id number]           Patient Information     Date Of Birth          1940        Visit Information        Provider Department      9/12/2018 1:45 PM Jenny Ayon MD [facility] [place]        Today's Diagnoses     OAB (overactive bladder)    -  1      Care Instructions    Continue the trospium    Continue your pelvic floor and back exercise    RTC 9 months, sooner if needed    It was a pleasure meeting with you today.  Thank you for allowing me and my team the privilege of caring for you today.  YOU are the reason we are here, and I truly hope we provided you with the excellent service you deserve.  Please let us know if there is anything else we can do for you so that we can be sure you are leaving completely satisfied with your care experience.            Follow-ups after your visit        Your next 10 appointments already scheduled     Sep 12, 2018  1:45 PM CDT   Return Visit with Jenny Ayon MD   [facility] ([facility] [place])    [address]  [address]  [facility] [address]   [phone]            Sep 18, 2018  2:00 PM CDT   MA SCREENING DIGITAL BILATERAL with [id number]   [facility] ([facility])    [address] [facility], [address]  [facility] [address] [phone]           Do not use any powder, lotion or deodorant under your arms or on your breast. If you do, we will ask you to remove it before your exam.  Wear comfortable, two-piece clothing.  If you have any allergies, tell your care team.  Bring any previous mammograms from other facilities or have them mailed to the breast center. Three-dimensional (3D) mammograms are available at [facility] locations in [facility], [place], [place], [facility], [place], [place], and [place]. [facility] locations include [place] and [facility]  "Surgery Center in [place]. Benefits of 3D mammograms include: - Improved rate of cancer detection - Decreases your chance of having to go back for more tests, which means fewer: - \"False-positive\" results (This means that there is an abnormal area but it isn't cancer.) - Invasive testing procedures, such as a biopsy or surgery - Can provide clearer images of the breast if you have dense breast tissue. 3D mammography is an optional exam that anyone can have with a 2D mammogram. It doesn't replace or take the place of a 2D mammogram. 2D mammograms remain an effective screening test for all women.  Not all insurance companies cover the cost of a 3D mammogram. Check with your insurance.            May 08, 2019 12:00 PM CDT   Return Visit with Jenny Ayon MD   [facility] ([facility])    [address]  [address]  [facility] [address] [phone]              Who to contact     If you have questions or need follow up information about today's clinic visit or your schedule please contact [facility] directly at [phone].  Normal or non-critical lab and imaging results will be communicated to you by Taxizuhart, letter or phone within 4 business days after the clinic has received the results. If you do not hear from us within 7 days, please contact the clinic through Nanot or phone. If you have a critical or abnormal lab result, we will notify you by phone as soon as possible.  Submit refill requests through xaitment or call your pharmacy and they will forward the refill request to us. Please allow 3 business days for your refill to be completed.          Additional Information About Your Visit        xaitment Information     xaitment gives you secure access to your electronic health record. If you see a primary care provider, you can also send messages to your care team and make appointments. If you have " "questions, please call your primary care clinic.  If you do not have a primary care provider, please call [phone] and they will assist you.        Care EveryWhere ID     This is your Care EveryWhere ID. This could be used by other organizations to access your [place] medical records  [id number]        Your Vitals Were     Height Last Period BMI (Body Mass Index)             1.638 m (5' 4.5\") (LMP Unknown) 37.18 kg/m2          Blood Pressure from Last 3 Encounters:   09/12/18 142/90   09/06/18 132/70   07/11/18 150/78    Weight from Last 3 Encounters:   09/12/18 99.8 kg (220 lb)   09/06/18 100.4 kg (221 lb 6.4 oz)   07/11/18 101.6 kg (224 lb)              We Performed the Following     MEASURE POST-VOID RESIDUAL URINE/BLADDER CAPACITY, US NON-IMAGING     UA without Microscopic          Today's Medication Changes          These changes are accurate as of 9/12/18  1:44 PM.  If you have any questions, ask your nurse or doctor.               These medicines have changed or have updated prescriptions.        Dose/Directions    trospium 20 MG tablet   Commonly known as:  SANCTURA   This may have changed:  Another medication with the same name was removed. Continue taking this medication, and follow the directions you see here.   Used for:  OAB (overactive bladder)   Changed by:  Jenny Ayon MD        Dose:  20 mg   Take 1 tablet (20 mg) by mouth 2 times daily (before meals)   Quantity:  180 tablet   Refills:  3            Where to get your medicines      [facility] [place] [place] - [address] AT Portal to Registered [facility] Sites  [address], [facility] [address]     Phone:  [phone]     trospium 20 MG tablet                Primary Care Provider Office Phone # Fax #    Jesenia Madirgal [phone] [address]  [facility] [address]        Equal Access to Services     [facility] [facility]: Kroy nunez " Sotyra, wageraldineda luqadaha, qaybta kaalmada josh, navin amanda joselitotre marilyalon laCrowbay tasneem. So [facility] [phone].    ATENCIÓN: Si nathalie michael, tiene a irene disposición servicios gratuitos de asistencia lingüística. Keegan al [phone].    We comply with applicable federal civil rights laws and [place] laws. We do not discriminate on the basis of race, color, national origin, age, disability, sex, sexual orientation, or gender identity.            Thank you!     Thank you for choosing [facility] [place]  for your care. Our goal is always to provide you with excellent care. Hearing back from our patients is one way we can continue to improve our services. Please take a few minutes to complete the written survey that you may receive in the mail after your visit with us. Thank you!             Your Updated Medication List - Protect others around you: Learn how to safely use, store and throw away your medicines at www.disposemymeds.org.          This list is accurate as of 9/12/18  1:44 PM.  Always use your most recent med list.                   Brand Name Dispense Instructions for use Diagnosis    acetaminophen 500 MG tablet    TYLENOL    100 tablet    Take 2 tablets (1,000 mg) by mouth 2 times daily as needed for mild pain    Arthritis       ACIDOPHILUS PROBIOTIC BLEND Caps     30 capsule    Take 1 capsule by mouth daily (with breakfast)        amLODIPine 5 MG tablet    NORVASC    90 tablet    TAKE ONE TABLET BY MOUTH EVERY NIGHT AT BEDTIME    Essential hypertension, benign       CENTRUM SILVER per tablet     30    1 TABLET DAILY    Need for prophylactic vaccination and inoculation against influenza, Routine general medical examination at a health care facility, Generalized osteoarthrosis, unspecified site, Other and unspecified hyperlipidemia, Esophageal reflux, Obesity, unspecified, Urinary sys symptom NEC, Other malaise and fatigue, Sleep disturbance, unspecified       *  docusate sodium 100 MG capsule    COLACE     Take 100 mg by mouth every other day Every other day at Breakfast        * COLACE 100 MG capsule   Generic drug:  docusate sodium      Take 100 mg by mouth At Bedtime        fenofibrate 160 MG tablet     90 tablet    Take 1 tablet (160 mg) by mouth daily    Hyperlipidemia LDL goal <130       ferrous gluconate 324 (38 Fe) MG tablet    FERGON    100 tablet    Take 1 tablet (324 mg) by mouth 2 times daily    Gastrointestinal hemorrhage, unspecified gastrointestinal hemorrhage type       levothyroxine 50 MCG tablet    SYNTHROID/LEVOTHROID    90 tablet    TAKE ONE TABLET BY MOUTH EVERY DAY    Acquired hypothyroidism       lisinopril 30 MG tablet    PRINIVIL,ZESTRIL    90 tablet    TAKE ONE TABLET BY MOUTH EVERY DAY FOR HYPERTENSION    Essential hypertension, benign       MAGNESIUM PO      Take 1 tablet by mouth daily (with breakfast)        meloxicam 15 MG tablet    MOBIC    30 tablet    Take 0.5 tablets (7.5 mg) by mouth daily Will try low dose and closely monitor Hgb; she has supply at home and will monitor    Right-sided low back pain without sciatica, unspecified chronicity       omega-3 fatty acids 1200 MG capsule      1 TABLET DAILY    Preop general physical exam, Stress incontinence - female       pantoprazole 20 MG EC tablet    PROTONIX    90 tablet    Take 1 tablet (20 mg) by mouth daily    Gastroesophageal reflux disease without esophagitis       potassium 99 MG Tabs      Take 1 tablet by mouth daily (with dinner)        psyllium 0.52 g capsule    METAMUCIL    540 capsule    Take 2 capsules (1.04 g) by mouth 3 times daily To take with fluid    Gastrointestinal hemorrhage, unspecified gastrointestinal hemorrhage type       RA CALCIUM PLUS MINERALS/VIT D PO      Take 1 tablet by mouth every other day Every other day at Lunch        simvastatin 20 MG tablet    ZOCOR    90 tablet    TAKE ONE TABLET BY MOUTH EVERY NIGHT AT BEDTIME    Hyperlipidemia LDL goal <130        travoprost (TIMI Free) 0.004 % ophthalmic solution    TRAVATAN Z    2.5 mL    Place 1 drop into both eyes At Bedtime        trospium 20 MG tablet    SANCTURA    180 tablet    Take 1 tablet (20 mg) by mouth 2 times daily (before meals)    OAB (overactive bladder)       UNABLE TO FIND      daily (with dinner) MEDICATION NAME Cranberry.  2 capsules daily        VITAMIN D (CHOLECALCIFEROL) PO      Take 2,000 Units by mouth every other day Every other day at Supper        * Notice:  This list has 2 medication(s) that are the same as other medications prescribed for you. Read the directions carefully, and ask your doctor or other care provider to review them with you.

## 2018-09-18 ENCOUNTER — HOSPITAL ENCOUNTER (OUTPATIENT)
Dept: MAMMOGRAPHY | Facility: CLINIC | Age: 78
Discharge: HOME | End: 2018-09-18
Attending: INTERNAL MEDICINE | Admitting: INTERNAL MEDICINE
Payer: MEDICARE

## 2018-09-18 DIAGNOSIS — Z12.31 VISIT FOR SCREENING MAMMOGRAM: ICD-10-CM

## 2018-09-18 PROCEDURE — 77067 SCR MAMMO BI INCL CAD: CPT

## 2018-10-30 ENCOUNTER — MYC MEDICAL ADVICE (OUTPATIENT)
Dept: FAMILY MEDICINE | Facility: CLINIC | Age: 78
End: 2018-10-30

## 2018-10-30 DIAGNOSIS — M54.50 RIGHT-SIDED LOW BACK PAIN WITHOUT SCIATICA, UNSPECIFIED CHRONICITY: ICD-10-CM

## 2018-10-30 NOTE — TELEPHONE ENCOUNTER
"meloxicam  LRF 3/23/18  LOV 9/6/18    Requested Prescriptions   Pending Prescriptions Disp Refills     meloxicam (MOBIC) 15 MG tablet 30 tablet 1     Sig: Take 0.5 tablets (7.5 mg) by mouth daily Will try low dose and closely monitor Hgb; she has supply at home and will monitor    NSAID Medications Failed    10/30/2018  2:51 PM       Failed - Blood pressure under 140/90 in past 12 months    BP Readings from Last 3 Encounters:   09/12/18 142/90   09/06/18 132/70   07/11/18 150/78                Failed - Patient is age 6-64 years       Passed - Normal ALT on file in past 12 months    Recent Labs   Lab Test  09/06/18 0952   ALT  21            Passed - Normal AST on file in past 12 months    Recent Labs   Lab Test  09/06/18 0952   AST  43            Passed - Recent (12 mo) or future (30 days) visit within the authorizing provider's specialty    Patient had office visit in the last 12 months or has a visit in the next 30 days with authorizing provider or within the authorizing provider's specialty.  See \"Patient Info\" tab in inbasket, or \"Choose Columns\" in Meds & Orders section of the refill encounter.             Passed - Normal CBC on file in past 12 months    Recent Labs   Lab Test  09/06/18   0952   03/29/18   0606   WBC   --    --   8.1   RBC   --    --   2.87*   HGB  12.6   < >  7.2*   HCT   --    --   24.3*   PLT   --    --   246    < > = values in this interval not displayed.                Passed - No active pregnancy on record       Passed - Normal serum creatinine on file in past 12 months    Recent Labs   Lab Test  09/06/18   0952   02/18/16   1805   CR  0.93   < >   --    CREAT   --    --   0.9    < > = values in this interval not displayed.            Passed - No positive pregnancy test in past 12 months        Routing refill request to provider for review/approval because:  bp above parameters and due to age        "

## 2018-10-31 RX ORDER — MELOXICAM 15 MG/1
7.5 TABLET ORAL DAILY
Qty: 30 TABLET | Refills: 0 | Status: SHIPPED | OUTPATIENT
Start: 2018-10-31 | End: 2019-01-21

## 2018-11-01 NOTE — TELEPHONE ENCOUNTER
bp running high  Will provide limited Rx.  Please have pt come in for nurse only bp check.   If still running high, may need to adjust bp meds.  Lisinopril 30 mg  Amlodipine 5 mg     Jesenia Madrigal MD  Internal Medicine  electronically signed

## 2018-11-01 NOTE — TELEPHONE ENCOUNTER
Called patient. She stated understanding. Nurse-only bp check scheduled.    Bren CASTILLO, Triage RN

## 2018-11-06 ENCOUNTER — ALLIED HEALTH/NURSE VISIT (OUTPATIENT)
Dept: NURSING | Facility: CLINIC | Age: 78
End: 2018-11-06
Payer: COMMERCIAL

## 2018-11-06 VITALS — SYSTOLIC BLOOD PRESSURE: 136 MMHG | DIASTOLIC BLOOD PRESSURE: 80 MMHG

## 2018-11-06 DIAGNOSIS — I10 ESSENTIAL HYPERTENSION, BENIGN: Primary | ICD-10-CM

## 2018-11-06 PROCEDURE — 99207 ZZC NO CHARGE NURSE ONLY: CPT

## 2018-11-06 NOTE — MR AVS SNAPSHOT
After Visit Summary   11/6/2018    Lexii Stratton    MRN: [id number]           Patient Information     Date Of Birth          1940        Visit Information        Provider Department      11/6/2018 2:30 PM  NURSE [facility]        Today's Diagnoses     Essential hypertension, benign    -  1       Follow-ups after your visit        Your next 10 appointments already scheduled     May 08, 2019 12:00 PM CDT   Return Visit with Jenny Ayon MD   [facility] [place] ([facility])    [address]  [address]  [facility] [address] [phone]              Who to contact     If you have questions or need follow up information about today's clinic visit or your schedule please contact [facility] directly at [phone].  Normal or non-critical lab and imaging results will be communicated to you by Teradicihart, letter or phone within 4 business days after the clinic has received the results. If you do not hear from us within 7 days, please contact the clinic through MyChart or phone. If you have a critical or abnormal lab result, we will notify you by phone as soon as possible.  Submit refill requests through viavoo or call your pharmacy and they will forward the refill request to us. Please allow 3 business days for your refill to be completed.          Additional Information About Your Visit        MyChart Information     viavoo gives you secure access to your electronic health record. If you see a primary care provider, you can also send messages to your care team and make appointments. If you have questions, please call your primary care clinic.  If you do not have a primary care provider, please call [phone] and they will assist you.        Care EveryWhere ID     This is your Care EveryWhere ID. This could be used by other organizations to access your [place] medical records  [id number]        Your  Vitals Were     Last Period                   (LMP Unknown)            Blood Pressure from Last 3 Encounters:   11/06/18 136/80   09/12/18 142/90   09/06/18 132/70    Weight from Last 3 Encounters:   09/12/18 220 lb (99.8 kg)   09/06/18 221 lb 6.4 oz (100.4 kg)   07/11/18 224 lb (101.6 kg)              Today, you had the following     No orders found for display       Primary Care Provider Office Phone # Fax #    Jesenia Priscilla Madrigal [phone] [address] [facility] [address]        Equal Access to Services     [facility]: Hadii monster Martin, waaxda lushon, qaybta kaalmada josh, navin roman . So [facility] [place] [phone].    ATENCIÓN: Si habla español, tiene a irene disposición servicios gratuitos de asistencia lingüística. LlRegency [facility] [phone].    We comply with applicable federal civil rights laws and [place] laws. We do not discriminate on the basis of race, color, national origin, age, disability, sex, sexual orientation, or gender identity.            Thank you!     Thank you for choosing [facility]  for your care. Our goal is always to provide you with excellent care. Hearing back from our patients is one way we can continue to improve our services. Please take a few minutes to complete the written survey that you may receive in the mail after your visit with us. Thank you!             Your Updated Medication List - Protect others around you: Learn how to safely use, store and throw away your medicines at www.disposemymeds.org.          This list is accurate as of 11/6/18  2:47 PM.  Always use your most recent med list.                   Brand Name Dispense Instructions for use Diagnosis    acetaminophen 500 MG tablet    TYLENOL    100 tablet    Take 2 tablets (1,000 mg) by mouth 2 times daily as needed for mild pain    Arthritis       ACIDOPHILUS PROBIOTIC BLEND Caps     30 capsule    Take 1 capsule by mouth daily (with  breakfast)        amLODIPine 5 MG tablet    NORVASC    90 tablet    TAKE ONE TABLET BY MOUTH EVERY NIGHT AT BEDTIME    Essential hypertension, benign       CENTRUM SILVER per tablet     30    1 TABLET DAILY    Need for prophylactic vaccination and inoculation against influenza, Routine general medical examination at a health care facility, Generalized osteoarthrosis, unspecified site, Other and unspecified hyperlipidemia, Esophageal reflux, Obesity, unspecified, Urinary sys symptom NEC, Other malaise and fatigue, Sleep disturbance, unspecified       * docusate sodium 100 MG capsule    COLACE     Take 100 mg by mouth every other day Every other day at Breakfast        * COLACE 100 MG capsule   Generic drug:  docusate sodium      Take 100 mg by mouth At Bedtime        fenofibrate 160 MG tablet     90 tablet    Take 1 tablet (160 mg) by mouth daily    Hyperlipidemia LDL goal <130       ferrous gluconate 324 (38 Fe) MG tablet    FERGON    100 tablet    Take 1 tablet (324 mg) by mouth 2 times daily    Gastrointestinal hemorrhage, unspecified gastrointestinal hemorrhage type       levothyroxine 50 MCG tablet    SYNTHROID/LEVOTHROID    90 tablet    TAKE ONE TABLET BY MOUTH EVERY DAY    Acquired hypothyroidism       lisinopril 30 MG tablet    PRINIVIL,ZESTRIL    90 tablet    TAKE ONE TABLET BY MOUTH EVERY DAY FOR HYPERTENSION    Essential hypertension, benign       MAGNESIUM PO      Take 1 tablet by mouth daily (with breakfast)        meloxicam 15 MG tablet    MOBIC    30 tablet    Take 0.5 tablets (7.5 mg) by mouth daily Will try low dose and closely monitor Hgb; she has supply at home and will monitor    Right-sided low back pain without sciatica, unspecified chronicity       omega-3 fatty acids 1200 MG capsule      1 TABLET DAILY    Preop general physical exam, Stress incontinence - female       pantoprazole 20 MG EC tablet    PROTONIX    90 tablet    Take 1 tablet (20 mg) by mouth daily    Gastroesophageal reflux  disease without esophagitis       potassium 99 MG Tabs      Take 1 tablet by mouth daily (with dinner)        psyllium 0.52 g capsule    METAMUCIL    540 capsule    Take 2 capsules (1.04 g) by mouth 3 times daily To take with fluid    Gastrointestinal hemorrhage, unspecified gastrointestinal hemorrhage type       RA CALCIUM PLUS MINERALS/VIT D PO      Take 1 tablet by mouth every other day Every other day at Lunch        simvastatin 20 MG tablet    ZOCOR    90 tablet    TAKE ONE TABLET BY MOUTH EVERY NIGHT AT BEDTIME    Hyperlipidemia LDL goal <130       travoprost (TIMI Free) 0.004 % ophthalmic solution    TRAVATAN Z    2.5 mL    Place 1 drop into both eyes At Bedtime        trospium 20 MG tablet    SANCTURA    180 tablet    Take 1 tablet (20 mg) by mouth 2 times daily (before meals)    OAB (overactive bladder)       UNABLE TO FIND      daily (with dinner) MEDICATION NAME Cranberry.  2 capsules daily        VITAMIN D (CHOLECALCIFEROL) PO      Take 2,000 Units by mouth every other day Every other day at Supper        * Notice:  This list has 2 medication(s) that are the same as other medications prescribed for you. Read the directions carefully, and ask your doctor or other care provider to review them with you.

## 2018-11-06 NOTE — PROGRESS NOTES
Lexii Stratton is a 77 year old patient who comes in today for a Blood Pressure check.  Initial BP:  /80  LMP  (LMP Unknown)     Data Unavailable  Disposition: results routed to provider  [place] PIERRE M.A.

## 2018-11-26 DIAGNOSIS — I10 ESSENTIAL HYPERTENSION, BENIGN: ICD-10-CM

## 2018-11-27 NOTE — TELEPHONE ENCOUNTER
"Requested Prescriptions   Pending Prescriptions Disp Refills     lisinopril (PRINIVIL/ZESTRIL) 30 MG tablet [Pharmacy Med Name: LISINOPRIL 30MG TABS]  Last Written Prescription Date:  5/31/18  Last Fill Quantity: 90,  # refills: 3   Last office visit: 9/6/2018 with prescribing provider:  Jesenia Madrigal MD    Future Office Visit:     90 tablet 3     Sig: TAKE ONE TABLET BY MOUTH EVERY DAY FOR HYPERTENSION    ACE Inhibitors (Including Combos) Protocol Passed    11/26/2018  8:37 PM       Passed - Blood pressure under 140/90 in past 12 months    BP Readings from Last 3 Encounters:   11/06/18 136/80   09/12/18 142/90   09/06/18 132/70                Passed - Recent (12 mo) or future (30 days) visit within the authorizing provider's specialty    Patient had office visit in the last 12 months or has a visit in the next 30 days with authorizing provider or within the authorizing provider's specialty.  See \"Patient Info\" tab in inbasket, or \"Choose Columns\" in Meds & Orders section of the refill encounter.             Passed - Patient is age 18 or older       Passed - No active pregnancy on record       Passed - Normal serum creatinine on file in past 12 months    Recent Labs   Lab Test  09/06/18   0952   02/18/16   1805   CR  0.93   < >   --    CREAT   --    --   0.9    < > = values in this interval not displayed.            Passed - Normal serum potassium on file in past 12 months    Recent Labs   Lab Test  09/06/18   0952   POTASSIUM  4.8            Passed - No positive pregnancy test in past 12 months          "

## 2018-11-28 RX ORDER — LISINOPRIL 30 MG/1
TABLET ORAL
Qty: 90 TABLET | Refills: 2 | Status: SHIPPED | OUTPATIENT
Start: 2018-11-28 | End: 2019-08-27

## 2018-11-28 NOTE — TELEPHONE ENCOUNTER
Prescription approved per [facility] Refill Protocol.  Wendi Goncalves RN

## 2018-12-31 ENCOUNTER — TELEPHONE (OUTPATIENT)
Dept: FAMILY MEDICINE | Facility: CLINIC | Age: 78
End: 2018-12-31

## 2018-12-31 ENCOUNTER — OFFICE VISIT (OUTPATIENT)
Dept: FAMILY MEDICINE | Facility: CLINIC | Age: 78
End: 2018-12-31
Payer: COMMERCIAL

## 2018-12-31 VITALS
TEMPERATURE: 98.5 F | RESPIRATION RATE: 20 BRPM | HEART RATE: 87 BPM | WEIGHT: 222 LBS | DIASTOLIC BLOOD PRESSURE: 72 MMHG | HEIGHT: 64 IN | SYSTOLIC BLOOD PRESSURE: 118 MMHG | BODY MASS INDEX: 37.9 KG/M2 | OXYGEN SATURATION: 96 %

## 2018-12-31 DIAGNOSIS — J06.9 VIRAL UPPER RESPIRATORY TRACT INFECTION: Primary | ICD-10-CM

## 2018-12-31 PROCEDURE — 99213 OFFICE O/P EST LOW 20 MIN: CPT | Performed by: FAMILY MEDICINE

## 2018-12-31 ASSESSMENT — MIFFLIN-ST. JEOR: SCORE: 1471.99

## 2018-12-31 NOTE — TELEPHONE ENCOUNTER
Called the pt back.  She said she was already seen in [place].  Advised I am sorry I am just seeing her message and hope she feels better.

## 2018-12-31 NOTE — PROGRESS NOTES
SUBJECTIVE:   Lexii Stratton is a 78 year old female who presents to clinic today for the following health issues:      Acute Illness   Acute illness concerns: cough  Onset: 3 days    Fever: no    Chills/Sweats: no    Headache (location?): no    Sinus Pressure:YES- post-nasal drainage and facial pain    Conjunctivitis:  YES: bilateral    Ear Pain: no    Rhinorrhea: YES    Congestion: no    Sore Throat: YES     Cough: YES-non-productive    Wheeze: YES    Decreased Appetite: YES    Nausea: no    Vomiting: no    Diarrhea:  no    Dysuria/Freq.: no    Fatigue/Achiness: YES    Sick/Strep Exposure: no     Therapies Tried and outcome: OTC medicine.          Problem list and histories reviewed & adjusted, as indicated.  Additional history: as documented    Patient Active Problem List   Diagnosis     Generalized osteoarthrosis, unspecified site     Esophageal reflux     Obesity     Other symptoms involving urinary system     Acquired hypothyroidism     Hypersomnia with sleep apnea     Essential hypertension, benign     Impaired fasting glucose     Hyperlipidemia LDL goal <130     Advance Care Planning     Chest pain syndrome     Partial small bowel obstruction (H)     Anemia, unspecified     Poor iron absorption     Iron and its compounds causing adverse effect in therapeutic use(E934.0)     S/P total hip arthroplasty     OAB (overactive bladder)     Morbid obesity due to excess calories (H)     GI bleed     Past Surgical History:   Procedure Laterality Date     ARTHROPLASTY HIP Right 11/9/2016    Procedure: ARTHROPLASTY HIP;  Surgeon: Angel Lund MD;  Location: [facility]     AS REPAIR INCISIONAL HERNIA,REDUCIBLE  1998    inc hernia ( Yamileth surgery)     BLADDER SURGERY      hyperdistention surgery and sling     C NONSPECIFIC PROCEDURE  1946    T&A     C NONSPECIFIC PROCEDURE  1984    Vaginal Hysterectomy (has her ovaries)     C NONSPECIFIC PROCEDURE  1973    PPTL     C NONSPECIFIC PROCEDURE  1994    L shoulder to  remove bone spurs     C NONSPECIFIC PROCEDURE      cysto and durasphere Dr Nilam HURLEY NONSPECIFIC PROCEDURE      cysto and durasphere     C NONSPECIFIC PROCEDURE      cysto and durasphere     C NONSPECIFIC PROCEDURE      retropubic TVT sling     CHOLECYSTECTOMY       COLONOSCOPY  12/3/2011    Procedure:COLONOSCOPY; COLONOSCOPY; Surgeon:SEMAJ GRAHAM; Location: GI     COLONOSCOPY N/A 3/29/2018    Procedure: COLONOSCOPY;  COLONOSCOPY Rm 544;  Surgeon: Marco Gusman MD;  Location:  GI     CYSTOSCOPY N/A 2017    Procedure: CYSTOSCOPY;  CYSTOSCOPY AND HYDRODISTENTION ;  Surgeon: Eyal Bai MD;  Location:  OR     ENT SURGERY      Tonsillectomy     ESOPHAGOSCOPY, GASTROSCOPY, DUODENOSCOPY (EGD), COMBINED N/A 2016    Procedure: COMBINED ESOPHAGOSCOPY, GASTROSCOPY, DUODENOSCOPY (EGD), BIOPSY SINGLE OR MULTIPLE;  Surgeon: Marco Monaco MD;  Location:  GI     GENITOURINARY SURGERY       GYN SURGERY      Hysterectomy     HERNIA REPAIR, UMBILICAL  2010    Dr. Kirt Franco times 3     ORTHOPEDIC SURGERY  2009    [facility] - Dr. Lund- bilateral knee replacement       Social History     Tobacco Use     Smoking status: Never Smoker     Smokeless tobacco: Never Used   Substance Use Topics     Alcohol use: No     Alcohol/week: 0.0 oz     Family History   Problem Relation Age of Onset     Heart Disease Mother         heart surgery , new valve     Gallbladder Disease Mother      Coronary Artery Disease Mother      Hyperlipidemia Mother      Asthma Mother      Heart Disease Maternal Grandmother      Coronary Artery Disease Maternal Grandmother      Heart Disease Maternal Grandfather      Coronary Artery Disease Maternal Grandfather      Cancer Father         throat ca,  76     Coronary Artery Disease Father      Diabetes Brother         Half Brother     Cardiovascular Brother         lung ca, Half brother     Cancer Brother         half brother  lung            Reviewed  "and updated as needed this visit by clinical staff  Tobacco  Allergies  Meds  Med Hx  Surg Hx  Fam Hx  Soc Hx      Reviewed and updated as needed this visit by Provider         ROS:      OBJECTIVE:     /72 (BP Location: Right arm, Patient Position: Chair, Cuff Size: Adult Large)   Pulse 87   Temp 98.5  F (36.9  C) (Oral)   Resp 20   Ht 1.626 m (5' 4\")   Wt 100.7 kg (222 lb)   LMP  (LMP Unknown)   SpO2 96%   BMI 38.11 kg/m    Body mass index is 38.11 kg/m .  Head: Normocephalic, atraumatic.  Eyes: Conjunctiva clear, non icteric. PERRLA.  Ears: External ears nl, TM is nl   Nose: Septum midline, nasal mucosa congested. No discharge.  There is no tenderness over the maxillary sinuses, there is no tenderness over the frontal sinuses  Mouth / Throat: Normal dentition.  No oral lesions. Pharynx no erythematous, tonsils no exudate/hypertrophy.  Neck: Supple, no enlarged LN, trachea midline.  LUNGS:  CTA B/L, no wheezing or crackles.  CVS : RRR, no murmur, no rub.            ASSESSMENT/PLAN:             1. Viral upper respiratory tract infection  Advised about rest, OTC medications for symptoms, drink warm liquids, and may use humidifier at night time to improve the cough.        Follow up in 5 days if symptoms persist, sooner if symptoms worsen or new ones develops, pt may contact us over the phone for any questions or concerns.      Deon Posada MD  [facility]"

## 2018-12-31 NOTE — TELEPHONE ENCOUNTER
Reason for call:  Same Day Appointment   Requested Provider: Any provider    PCP: Dr. Madrigal    Reason for visit: cough, sore throat    Duration of symptoms: Since Friday    Have you been treated for this in the past? No    Additional comments: Patient requesting a callback would like to be fit in today with any provider in [place].      Phone number to reach patient:  Home number on file [phone] (home)    Best Time:  any    Can we leave a detailed message on this number?  NO

## 2019-01-21 DIAGNOSIS — M54.50 RIGHT-SIDED LOW BACK PAIN WITHOUT SCIATICA, UNSPECIFIED CHRONICITY: ICD-10-CM

## 2019-01-22 NOTE — TELEPHONE ENCOUNTER
"Requested Prescriptions   Pending Prescriptions Disp Refills     meloxicam (MOBIC) 15 MG tablet [Pharmacy Med Name: MELOXICAM 15MG TABS]  Last Written Prescription Date:  10/31/18  Last Fill Quantity: 30,  # refills: 0   Last office visit: 12/31/2018 with prescribing provider:  Deon Posada MD    Future Office Visit:     30 tablet 0     Sig: TAKE ONE-HALF TABLET BY MOUTH EVERY DAY    NSAID Medications Failed - 1/21/2019  5:51 PM       Failed - Patient is age 6-64 years       Passed - Blood pressure under 140/90 in past 12 months    BP Readings from Last 3 Encounters:   12/31/18 118/72   11/06/18 136/80   09/12/18 142/90                Passed - Normal ALT on file in past 12 months    Recent Labs   Lab Test 09/06/18  0952   ALT 21            Passed - Normal AST on file in past 12 months    Recent Labs   Lab Test 09/06/18 0952   AST 43            Passed - Recent (12 mo) or future (30 days) visit within the authorizing provider's specialty    Patient had office visit in the last 12 months or has a visit in the next 30 days with authorizing provider or within the authorizing provider's specialty.  See \"Patient Info\" tab in inbasket, or \"Choose Columns\" in Meds & Orders section of the refill encounter.             Passed - Normal CBC on file in past 12 months    Recent Labs   Lab Test 09/06/18  0952  03/29/18  0606   WBC  --   --  8.1   RBC  --   --  2.87*   HGB 12.6   < > 7.2*   HCT  --   --  24.3*   PLT  --   --  246    < > = values in this interval not displayed.                Passed - Medication is active on med list       Passed - No active pregnancy on record       Passed - Normal serum creatinine on file in past 12 months    Recent Labs   Lab Test 09/06/18  0952  02/18/16  1805   CR 0.93   < >  --    CREAT  --   --  0.9    < > = values in this interval not displayed.            Passed - No positive pregnancy test in past 12 months          "

## 2019-01-23 NOTE — TELEPHONE ENCOUNTER
Routing refill request to provider for review/approval because:  Protocol failed due to pt's age.   Per DN's LOV wants to see pt back in 3-2019.   Monitor labs Hgb??  Kecia DAN RN

## 2019-01-29 RX ORDER — MELOXICAM 15 MG/1
TABLET ORAL
Qty: 30 TABLET | Refills: 0 | Status: SHIPPED | OUTPATIENT
Start: 2019-01-29 | End: 2019-02-25

## 2019-01-29 NOTE — TELEPHONE ENCOUNTER
Liyah schreiber/ Dr. Madrigal ok to refill wants to see in March. Called pt and scheduled appt and refilled med.   Kecia DAN RN

## 2019-02-25 ENCOUNTER — OFFICE VISIT (OUTPATIENT)
Dept: FAMILY MEDICINE | Facility: CLINIC | Age: 79
End: 2019-02-25
Payer: COMMERCIAL

## 2019-02-25 VITALS
TEMPERATURE: 97.5 F | HEIGHT: 64 IN | SYSTOLIC BLOOD PRESSURE: 130 MMHG | BODY MASS INDEX: 38.76 KG/M2 | DIASTOLIC BLOOD PRESSURE: 78 MMHG | OXYGEN SATURATION: 99 % | WEIGHT: 227 LBS | RESPIRATION RATE: 17 BRPM | HEART RATE: 89 BPM

## 2019-02-25 DIAGNOSIS — M54.50 RIGHT-SIDED LOW BACK PAIN WITHOUT SCIATICA, UNSPECIFIED CHRONICITY: ICD-10-CM

## 2019-02-25 DIAGNOSIS — D50.8 OTHER IRON DEFICIENCY ANEMIA: ICD-10-CM

## 2019-02-25 DIAGNOSIS — R73.09 ELEVATED HEMOGLOBIN A1C: ICD-10-CM

## 2019-02-25 DIAGNOSIS — E03.9 ACQUIRED HYPOTHYROIDISM: ICD-10-CM

## 2019-02-25 DIAGNOSIS — E78.5 HYPERLIPIDEMIA LDL GOAL <130: Primary | ICD-10-CM

## 2019-02-25 DIAGNOSIS — I10 ESSENTIAL HYPERTENSION, BENIGN: ICD-10-CM

## 2019-02-25 DIAGNOSIS — K59.09 OTHER CONSTIPATION: ICD-10-CM

## 2019-02-25 LAB
HBA1C MFR BLD: 6.6 % (ref 0–5.6)
HGB BLD-MCNC: 12.1 G/DL (ref 11.7–15.7)

## 2019-02-25 PROCEDURE — 83036 HEMOGLOBIN GLYCOSYLATED A1C: CPT | Performed by: INTERNAL MEDICINE

## 2019-02-25 PROCEDURE — 85018 HEMOGLOBIN: CPT | Performed by: INTERNAL MEDICINE

## 2019-02-25 PROCEDURE — 84439 ASSAY OF FREE THYROXINE: CPT | Performed by: INTERNAL MEDICINE

## 2019-02-25 PROCEDURE — 99214 OFFICE O/P EST MOD 30 MIN: CPT | Performed by: INTERNAL MEDICINE

## 2019-02-25 PROCEDURE — 36415 COLL VENOUS BLD VENIPUNCTURE: CPT | Performed by: INTERNAL MEDICINE

## 2019-02-25 PROCEDURE — 80053 COMPREHEN METABOLIC PANEL: CPT | Performed by: INTERNAL MEDICINE

## 2019-02-25 PROCEDURE — 84443 ASSAY THYROID STIM HORMONE: CPT | Performed by: INTERNAL MEDICINE

## 2019-02-25 PROCEDURE — 80061 LIPID PANEL: CPT | Performed by: INTERNAL MEDICINE

## 2019-02-25 RX ORDER — SIMVASTATIN 20 MG
TABLET ORAL
Qty: 90 TABLET | Refills: 2 | Status: SHIPPED | OUTPATIENT
Start: 2019-02-25 | End: 2019-08-27

## 2019-02-25 RX ORDER — AMLODIPINE BESYLATE 5 MG/1
TABLET ORAL
Qty: 90 TABLET | Refills: 2 | Status: SHIPPED | OUTPATIENT
Start: 2019-02-25 | End: 2019-08-27

## 2019-02-25 RX ORDER — LEVOTHYROXINE SODIUM 50 UG/1
50 TABLET ORAL DAILY
Qty: 90 TABLET | Refills: 3 | Status: SHIPPED | OUTPATIENT
Start: 2019-02-25 | End: 2019-08-27

## 2019-02-25 RX ORDER — MELOXICAM 15 MG/1
7.5 TABLET ORAL DAILY
Qty: 30 TABLET | Refills: 3 | Status: SHIPPED | OUTPATIENT
Start: 2019-02-25 | End: 2019-08-27

## 2019-02-25 ASSESSMENT — MIFFLIN-ST. JEOR: SCORE: 1494.67

## 2019-02-25 NOTE — PROGRESS NOTES
Chief Complaint   Patient presents with     Hypertension     Lipids     Thyroid Problem       SUBJECTIVE:   Lexii Stratton is a 78 year old female who presents to clinic today for the following health issues:      Hyperlipidemia Follow-Up      Rate your low fat/cholesterol diet?: good    Taking statin?  Yes, no muscle aches from statin- Simvastatin    Other lipid medications/supplements?:  Fenofibrate, without side effects  Lab Results   Component Value Date    LDL 71 09/06/2018    LDL 84 05/15/2018            Hypertension Follow-up      Outpatient blood pressures are not being checked.  Lisinopril     Low Salt Diet: no added salt  BP Readings from Last 3 Encounters:   02/25/19 130/78   12/31/18 118/72   11/06/18 136/80          Hypothyroidism Follow-up      Since last visit, patient describes the following symptoms: Weight stable, no hair loss, no skin changes, no constipation, no loose stools  Lab Results   Component Value Date    TSH 0.98 03/28/2018    TSH 1.68 08/21/2017              Amount of exercise or physical activity: 2-3 days/week for an average of 45-60 minutes    Problems taking medications regularly: No    Medication side effects: none    Diet: regular (no restrictions)        Problem list and histories reviewed & adjusted, as indicated.  Additional history: as documented    Patient Active Problem List   Diagnosis     Generalized osteoarthrosis, unspecified site     Esophageal reflux     Obesity     Other symptoms involving urinary system     Acquired hypothyroidism     Hypersomnia with sleep apnea     Essential hypertension, benign     Impaired fasting glucose     Hyperlipidemia LDL goal <130     Advance Care Planning     Chest pain syndrome     Partial small bowel obstruction (H)     Anemia, unspecified     Poor iron absorption     Iron and its compounds causing adverse effect in therapeutic use(E934.0)     S/P total hip arthroplasty     OAB (overactive bladder)     Morbid obesity due to excess  calories (H)     GI bleed     Elevated hemoglobin A1c     Past Surgical History:   Procedure Laterality Date     ARTHROPLASTY HIP Right 11/9/2016    Procedure: ARTHROPLASTY HIP;  Surgeon: Angel Lund MD;  Location:  OR     AS REPAIR INCISIONAL HERNIA,REDUCIBLE  1998    inc hernia ( Yamileth surgery)     BLADDER SURGERY      hyperdistention surgery and sling     C NONSPECIFIC PROCEDURE  1946    T&A     C NONSPECIFIC PROCEDURE  1984    Vaginal Hysterectomy (has her ovaries)     C NONSPECIFIC PROCEDURE  1973    PPTL     C NONSPECIFIC PROCEDURE  1994    L shoulder to remove bone spurs     C NONSPECIFIC PROCEDURE  2004    cysto and durasphere Dr Demarco     C NONSPECIFIC PROCEDURE  2005    cysto and durasphere     C NONSPECIFIC PROCEDURE  2007    cysto and durasphere     C NONSPECIFIC PROCEDURE  2009    retropubic TVT sling     CHOLECYSTECTOMY  1987     COLONOSCOPY  12/3/2011    Procedure:COLONOSCOPY; COLONOSCOPY; Surgeon:SEMAJ GRAHAM; Location: GI     COLONOSCOPY N/A 3/29/2018    Procedure: COLONOSCOPY;  COLONOSCOPY Rm 544;  Surgeon: Marco Gusman MD;  Location:  GI     CYSTOSCOPY N/A 9/6/2017    Procedure: CYSTOSCOPY;  CYSTOSCOPY AND HYDRODISTENTION ;  Surgeon: Eyal Bai MD;  Location:  OR     ENT SURGERY      Tonsillectomy     ESOPHAGOSCOPY, GASTROSCOPY, DUODENOSCOPY (EGD), COMBINED N/A 9/26/2016    Procedure: COMBINED ESOPHAGOSCOPY, GASTROSCOPY, DUODENOSCOPY (EGD), BIOPSY SINGLE OR MULTIPLE;  Surgeon: Marco Monaco MD;  Location:  GI     GENITOURINARY SURGERY       GYN SURGERY      Hysterectomy     HERNIA REPAIR, UMBILICAL  7/8/2010    Dr. Kirt Franco times 3     ORTHOPEDIC SURGERY  2009    [facility] - Dr. Lund- bilateral knee replacement       Social History     Tobacco Use     Smoking status: Never Smoker     Smokeless tobacco: Never Used   Substance Use Topics     Alcohol use: No     Alcohol/week: 0.0 oz     Family History   Problem Relation Age of Onset     Heart Disease  Mother         heart surgery , new valve     Gallbladder Disease Mother      Coronary Artery Disease Mother      Hyperlipidemia Mother      Asthma Mother      Heart Disease Maternal Grandmother      Coronary Artery Disease Maternal Grandmother      Heart Disease Maternal Grandfather      Coronary Artery Disease Maternal Grandfather      Cancer Father         throat ca,  76     Coronary Artery Disease Father      Diabetes Brother         Half Brother     Cardiovascular Brother         lung ca, Half brother     Cancer Brother         half brother  lung          Current Outpatient Medications   Medication Sig Dispense Refill     acetaminophen (TYLENOL) 500 MG tablet Take 2 tablets (1,000 mg) by mouth 2 times daily as needed for mild pain 100 tablet 0     amLODIPine (NORVASC) 5 MG tablet TAKE ONE TABLET BY MOUTH EVERY NIGHT AT BEDTIME 90 tablet 2     Calcium Carbonate-Vit D-Min (RA CALCIUM PLUS MINERALS/VIT D PO) Take 1 tablet by mouth every other day Every other day at Lunch       CENTRUM SILVER OR TABS 1 TABLET DAILY 30 0     docusate sodium (COLACE) 100 MG capsule Take 100 mg by mouth every other day Every other day at Breakfast       docusate sodium (COLACE) 100 MG capsule Take 100 mg by mouth At Bedtime       fenofibrate 160 MG tablet Take 1 tablet (160 mg) by mouth daily 90 tablet 3     ferrous gluconate (FERGON) 324 (38 FE) MG tablet Take 1 tablet (324 mg) by mouth 2 times daily 100 tablet 0     levothyroxine (SYNTHROID/LEVOTHROID) 50 MCG tablet Take 1 tablet (50 mcg) by mouth daily 90 tablet 3     lisinopril (PRINIVIL/ZESTRIL) 30 MG tablet TAKE ONE TABLET BY MOUTH EVERY DAY FOR HYPERTENSION 90 tablet 2     MAGNESIUM PO Take 1 tablet by mouth daily (with breakfast)       meloxicam (MOBIC) 15 MG tablet Take 0.5 tablets (7.5 mg) by mouth daily 30 tablet 3     OMEGA-3 FATTY ACIDS 1200 MG OR CAPS 1 TABLET DAILY  0     pantoprazole (PROTONIX) 20 MG EC tablet Take 1 tablet (20 mg) by mouth daily 90 tablet 3      potassium 99 MG TABS Take 1 tablet by mouth daily (with dinner)        Probiotic Product (ACIDOPHILUS PROBIOTIC BLEND) CAPS Take 1 capsule by mouth daily (with breakfast)  30 capsule 0     psyllium (METAMUCIL) 0.52 G capsule Take 2 capsules (1.04 g) by mouth 3 times daily To take with fluid 540 capsule      simvastatin (ZOCOR) 20 MG tablet TAKE ONE TABLET BY MOUTH EVERY NIGHT AT BEDTIME 90 tablet 2     travoprost, BAK Free, (TRAVATAN Z) 0.004 % ophthalmic solution Place 1 drop into both eyes At Bedtime  2.5 mL 1     trospium (SANCTURA) 20 MG tablet Take 1 tablet (20 mg) by mouth 2 times daily (before meals) 180 tablet 3     UNABLE TO FIND daily (with dinner) MEDICATION NAME Cranberry.  2 capsules daily        VITAMIN D, CHOLECALCIFEROL, PO Take 2,000 Units by mouth every other day Every other day at Supper       Allergies   Allergen Reactions     Augmentin Diarrhea     Codeine Nausea and Vomiting     Hydrocodone      Keeps patient awake     Naproxen Itching and Rash     Sulfa Drugs Nausea     BP Readings from Last 3 Encounters:   02/25/19 130/78   12/31/18 118/72   11/06/18 136/80    Wt Readings from Last 3 Encounters:   02/25/19 103 kg (227 lb)   12/31/18 100.7 kg (222 lb)   09/12/18 99.8 kg (220 lb)                  Labs reviewed in EPIC    Reviewed and updated as needed this visit by clinical staff  Tobacco  Allergies  Meds  Med Hx  Surg Hx  Fam Hx  Soc Hx      Reviewed and updated as needed this visit by Provider  Tobacco  Allergies  Meds  Problems  Med Hx  Surg Hx  Fam Hx         ROS:  CONSTITUTIONAL: NEGATIVE for fever, chills, change in weight  ENT/MOUTH: NEGATIVE for ear, mouth and throat problems  RESP: NEGATIVE for significant cough or SOB  CV: NEGATIVE for chest pain, palpitations or peripheral edema  GI: constipation,   : menopausal female  MUSCULOSKELETAL: arthritis; keeping active;   NEURO: NEGATIVE for weakness, dizziness or paresthesias  ENDOCRINE: reviewed lipids,  "thyroid  HEME/ALLERGY/IMMUNE: hx of anemia  PSYCHIATRIC: NEGATIVE for changes in mood or affect    OBJECTIVE:     /78   Pulse 89   Temp 97.5  F (36.4  C) (Oral)   Resp 17   Ht 1.626 m (5' 4\")   Wt 103 kg (227 lb)   LMP  (LMP Unknown)   SpO2 99%   BMI 38.96 kg/m    Body mass index is 38.96 kg/m .  GENERAL: healthy, alert and no distress  NECK: no adenopathy, no asymmetry, masses, or scars and thyroid normal to palpation  RESP: lungs clear to auscultation - no rales, rhonchi or wheezes  CV: regular rates and rhythm, normal S1 S2, no S3 or S4, peripheral pulses strong and no peripheral edema  ABDOMEN: soft, nontender and bowel sounds normal  MS: no gross musculoskeletal defects noted, no edema  NEURO: Normal strength and tone, mentation intact and speech normal  PSYCH: mentation appears normal, affect normal/bright        ASSESSMENT/PLAN:     (E78.5) Hyperlipidemia LDL goal <130  (primary encounter diagnosis)  Comment: lipids reviewed; labs today ; adjust dose if LDL not at goal;   Plan: simvastatin (ZOCOR) 20 MG tablet, Lipid panel         reflex to direct LDL Fasting, Comprehensive         metabolic panel          (M4.5) Right-sided low back pain without sciatica, unspecified chronicity  Comment: off and on for ; better with Meloxicam  Plan: meloxicam (MOBIC) 15 MG tablet          (E03.9) Acquired hypothyroidism  Comment: Clinically euthyroid; labs today, consider dose adjustment if labs warrant   Plan: levothyroxine (SYNTHROID/LEVOTHROID) 50 MCG         tablet, TSH, T4 free          (I10) Essential hypertension, benign  Comment: Goals of therapy reviewed; Risk assessment; continue meds  Plan: amLODIPine (NORVASC) 5 MG tablet          (K59.09) Other constipation  Comment: multifactorial;  Plan: discussed continuing keeping well hydrated, adequate fiber intake and keep active    (D50.8) Other iron deficiency anemia  Comment: past anemia following surgery; she is concerned about HGB  Plan: Hemoglobin      "     (R73.09) Elevated hemoglobin A1c  Comment: risk for diabetes reviewed; will check labs and continue regular exercise and healthy eating.  Plan: Comprehensive metabolic panel, Hemoglobin A1c            Jesenia Madrigal MD  Internal Medicine    [facility]

## 2019-02-26 LAB
ALBUMIN SERPL-MCNC: 3.8 G/DL (ref 3.4–5)
ALP SERPL-CCNC: 65 U/L (ref 40–150)
ALT SERPL W P-5'-P-CCNC: 20 U/L (ref 0–50)
ANION GAP SERPL CALCULATED.3IONS-SCNC: 7 MMOL/L (ref 3–14)
AST SERPL W P-5'-P-CCNC: 31 U/L (ref 0–45)
BILIRUB SERPL-MCNC: 0.4 MG/DL (ref 0.2–1.3)
BUN SERPL-MCNC: 20 MG/DL (ref 7–30)
CALCIUM SERPL-MCNC: 9.6 MG/DL (ref 8.5–10.1)
CHLORIDE SERPL-SCNC: 105 MMOL/L (ref 94–109)
CHOLEST SERPL-MCNC: 165 MG/DL
CO2 SERPL-SCNC: 28 MMOL/L (ref 20–32)
CREAT SERPL-MCNC: 0.87 MG/DL (ref 0.52–1.04)
GFR SERPL CREATININE-BSD FRML MDRD: 64 ML/MIN/{1.73_M2}
GLUCOSE SERPL-MCNC: 128 MG/DL (ref 70–99)
HDLC SERPL-MCNC: 50 MG/DL
LDLC SERPL CALC-MCNC: 89 MG/DL
NONHDLC SERPL-MCNC: 115 MG/DL
POTASSIUM SERPL-SCNC: 4.1 MMOL/L (ref 3.4–5.3)
PROT SERPL-MCNC: 7.2 G/DL (ref 6.8–8.8)
SODIUM SERPL-SCNC: 140 MMOL/L (ref 133–144)
T4 FREE SERPL-MCNC: 1.32 NG/DL (ref 0.76–1.46)
TRIGL SERPL-MCNC: 128 MG/DL
TSH SERPL DL<=0.005 MIU/L-ACNC: 1.89 MU/L (ref 0.4–4)

## 2019-04-24 ENCOUNTER — TRANSFERRED RECORDS (OUTPATIENT)
Dept: MULTI SPECIALTY CLINIC | Facility: CLINIC | Age: 79
End: 2019-04-24

## 2019-04-24 LAB — RETINOPATHY: NORMAL

## 2019-05-08 ENCOUNTER — OFFICE VISIT (OUTPATIENT)
Dept: UROLOGY | Facility: CLINIC | Age: 79
End: 2019-05-08
Payer: COMMERCIAL

## 2019-05-08 VITALS
HEIGHT: 64 IN | DIASTOLIC BLOOD PRESSURE: 66 MMHG | SYSTOLIC BLOOD PRESSURE: 132 MMHG | HEART RATE: 80 BPM | WEIGHT: 227 LBS | BODY MASS INDEX: 38.76 KG/M2

## 2019-05-08 DIAGNOSIS — M62.89 PELVIC FLOOR DYSFUNCTION: ICD-10-CM

## 2019-05-08 DIAGNOSIS — N39.46 MIXED INCONTINENCE: ICD-10-CM

## 2019-05-08 DIAGNOSIS — N32.81 OVERACTIVE BLADDER: Primary | ICD-10-CM

## 2019-05-08 LAB
ALBUMIN UR-MCNC: NEGATIVE MG/DL
APPEARANCE UR: CLEAR
BILIRUB UR QL STRIP: NEGATIVE
COLOR UR AUTO: YELLOW
GLUCOSE UR STRIP-MCNC: NEGATIVE MG/DL
HGB UR QL STRIP: NEGATIVE
KETONES UR STRIP-MCNC: NEGATIVE MG/DL
LEUKOCYTE ESTERASE UR QL STRIP: NEGATIVE
NITRATE UR QL: NEGATIVE
PH UR STRIP: 6.5 PH (ref 5–7)
PR INTERVAL - MUSE: 13
SOURCE: NORMAL
SP GR UR STRIP: 1.02 (ref 1–1.03)
UROBILINOGEN UR STRIP-ACNC: 0.2 EU/DL (ref 0.2–1)

## 2019-05-08 PROCEDURE — 99213 OFFICE O/P EST LOW 20 MIN: CPT | Mod: 25 | Performed by: UROLOGY

## 2019-05-08 PROCEDURE — 51798 US URINE CAPACITY MEASURE: CPT | Performed by: UROLOGY

## 2019-05-08 PROCEDURE — 81003 URINALYSIS AUTO W/O SCOPE: CPT | Performed by: UROLOGY

## 2019-05-08 ASSESSMENT — MIFFLIN-ST. JEOR: SCORE: 1494.67

## 2019-05-08 ASSESSMENT — PAIN SCALES - GENERAL: PAINLEVEL: NO PAIN (0)

## 2019-05-08 NOTE — PROGRESS NOTES
"May 8, 2019    Return visit    Patient returns today for follow up.  Medicine does not seem to be working as well but what has changed is that she stopped doing her pelvic floor exercises.  Her kids also got her life alert because she is no longer taking care of her grandkids (daughter in law now able to work from home) so her kids wanted a way to make sure she is okay as she isn't checking in every day. She denies any changes in her health since last visit.  She is preparing for complex cataract surgery at the end of the month    /66   Pulse 80   Ht 1.626 m (5' 4\")   Wt 103 kg (227 lb)   LMP  (LMP Unknown)   BMI 38.96 kg/m    She is comfortable, in no distress, non-labored breathing.     Urine dip negative    PVR 13 mL by bladder scan    A/P: 78 year old F with mixed incontinence, OAB despite multiple treatments, improved with trospium and pelvic floor therapy but now with returning symptoms    Discussed that at this time we would have her resume pelvic floor therapy exercises, return to pelvic floor therapy or consider urodynamics in preparation of 3rd line options.  She is most interested in resuming her exercises    RTC 3 months, sooner if needed    15 minutes were spent with the patient today, > 50% in counseling and coordination of care    Jenny Ayon MD MPH   of Urology    CC  Patient Care Team:  Jesenia Madrigal MD as PCP - General (Internal Medicine)  Jesenia Madrigal MD as Assigned PCP  Jesenia Madrigal MD as Referring Physician (Internal Medicine)                "

## 2019-05-08 NOTE — NURSING NOTE
Chief Complaint   Patient presents with     Clinic Care Coordination - Follow-up     Overactive Bladder      Dominique Salas LPN

## 2019-05-08 NOTE — PATIENT INSTRUCTIONS
Restart your pelvic floor therapy exercises    Continue your medication    Websites with free information:    [employer] patient website: www.voicesforpfd.org    Total Control Program: www.totalcontrolprogram.com    Return to see me in 3 months, sooner if needed    It was a pleasure meeting with you today.  Thank you for allowing me and my team the privilege of caring for you today.  YOU are the reason we are here, and I truly hope we provided you with the excellent service you deserve.  Please let us know if there is anything else we can do for you so that we can be sure you are leaving completely satisfied with your care experience.

## 2019-05-08 NOTE — LETTER
"  RE: Lexii Stratton  [address] [place] [address]     Dear Colleague,    Thank you for referring your patient, Lexii Stratton, to the [facility] [place] [facility]. Please see a copy of my visit note below.    May 8, 2019    Return visit    Patient returns today for follow up.  Medicine does not seem to be working as well but what has changed is that she stopped doing her pelvic floor exercises.  Her kids also got her life alert because she is no longer taking care of her grandkids (daughter in law now able to work from home) so her kids wanted a way to make sure she is okay as she isn't checking in every day. She denies any changes in her health since last visit.  She is preparing for complex cataract surgery at the end of the month    /66   Pulse 80   Ht 1.626 m (5' 4\")   Wt 103 kg (227 lb)   LMP  (LMP Unknown)   BMI 38.96 kg/m     She is comfortable, in no distress, non-labored breathing.     Urine dip negative    PVR 13 mL by bladder scan    A/P: 78 year old F with mixed incontinence, OAB despite multiple treatments, improved with trospium and pelvic floor therapy but now with returning symptoms    Discussed that at this time we would have her resume pelvic floor therapy exercises, return to pelvic floor therapy or consider urodynamics in preparation of 3rd line options.  She is most interested in resuming her exercises    RTC 3 months, sooner if needed    15 minutes were spent with the patient today, > 50% in counseling and coordination of care  Jenny Ayon MD MPH   of Urology    CC  Patient Care Team:  Jesenia Madrigal MD as PCP - General (Internal Medicine)  Jesenia Madrigal MD as Assigned PCP  Jesenia Madrigal MD as Referring Physician (Internal Medicine)    "

## 2019-05-10 ENCOUNTER — OFFICE VISIT (OUTPATIENT)
Dept: FAMILY MEDICINE | Facility: CLINIC | Age: 79
End: 2019-05-10
Payer: COMMERCIAL

## 2019-05-10 VITALS
WEIGHT: 213 LBS | SYSTOLIC BLOOD PRESSURE: 126 MMHG | HEIGHT: 63 IN | TEMPERATURE: 98.5 F | DIASTOLIC BLOOD PRESSURE: 82 MMHG | BODY MASS INDEX: 37.74 KG/M2 | RESPIRATION RATE: 16 BRPM | HEART RATE: 76 BPM | OXYGEN SATURATION: 99 %

## 2019-05-10 DIAGNOSIS — E03.9 ACQUIRED HYPOTHYROIDISM: ICD-10-CM

## 2019-05-10 DIAGNOSIS — I10 ESSENTIAL HYPERTENSION, BENIGN: ICD-10-CM

## 2019-05-10 DIAGNOSIS — Z01.818 PREOP GENERAL PHYSICAL EXAM: Primary | ICD-10-CM

## 2019-05-10 DIAGNOSIS — E78.5 HYPERLIPIDEMIA LDL GOAL <130: ICD-10-CM

## 2019-05-10 DIAGNOSIS — E66.01 MORBID OBESITY DUE TO EXCESS CALORIES (H): ICD-10-CM

## 2019-05-10 DIAGNOSIS — H26.9 CATARACT OF LEFT EYE, UNSPECIFIED CATARACT TYPE: ICD-10-CM

## 2019-05-10 PROCEDURE — 99214 OFFICE O/P EST MOD 30 MIN: CPT | Performed by: PHYSICIAN ASSISTANT

## 2019-05-10 ASSESSMENT — MIFFLIN-ST. JEOR: SCORE: 1415.29

## 2019-05-10 NOTE — PROGRESS NOTES
[facility]  [address] [facility] [address] [phone]  Dept: [phone]    PRE-OP EVALUATION:  Today's date: 5/10/2019    Lexii Stratton (: 1940) presents for pre-operative evaluation assessment as requested by Dr. Cesar Owen.  She requires evaluation and anesthesia risk assessment prior to undergoing surgery/procedure for treatment of :  Left eye cataracts.    Fax number for surgical facility: [phone]  Primary Physician: Jesenia Madrigal  Type of Anesthesia Anticipated: topical anesthesia and sedation    Patient has a Health Care Directive or Living Will:  YES    Preop Questions 5/10/2019   Who is doing your surgery? Cesar Owen   What are you having done? complex cataract   Date of Surgery/Procedure: may 22 2019   Facility or Hospital where procedure/surgery will be performed: [facility] [place]   1.  Do you have a history of Heart attack, stroke, stent, coronary bypass surgery, or other heart surgery? No   2.  Do you ever have any pain or discomfort in your chest? No   3.  Do you have a history of  Heart Failure? No   4.   Are you troubled by shortness of breath when:  walking on a level surface, or up a slight hill, or at night? No   5.  Do you currently have a cold, bronchitis or other respiratory infection? No   6.  Do you have a cough, shortness of breath, or wheezing? No   7.  Do you sometimes get pains in the calves of your legs when you walk? No   8. Do you or anyone in your family have previous history of blood clots? No   9.  Do you or does anyone in your family have a serious bleeding problem such as prolonged bleeding following surgeries or cuts? No   10. Have you ever had problems with anemia or been told to take iron pills? YES - iron pills- always have trouble with her iron levels   11. Have you had any abnormal blood loss such as black, tarry or bloody stools, or abnormal vaginal bleeding? No   12. Have you ever had a  blood transfusion? YES - 2018 GI hemorrhage   13. Have you or any of your relatives ever had problems with anesthesia? YES - mother had issues with anesthesia   14. Do you have sleep apnea, excessive snoring or daytime drowsiness? No   15. Do you have any prosthetic heart valves? No   16. Do you have prosthetic joints? YES - bilateral knee, hip replacement   17. Is there any chance that you may be pregnant? No         HPI:     HPI related to upcoming procedure: hx of complex cataract to remove left cataract from eye      HYPERTENSION - Patient has longstanding history of HTN , currently denies any symptoms referable to elevated blood pressure. Specifically denies chest pain, palpitations, dyspnea, orthopnea, PND or peripheral edema. Blood pressure readings have been in normal range. Current medication regimen is as listed below. Patient denies any side effects of medication.                                                                                                                                                                                          .  HYPOTHYROIDISM - Patient has a longstanding history of chronic Hypothyroidism. Patient has been doing well, noting no tremor, insomnia, hair loss or changes in skin texture. Continues to take medications as directed, without adverse reactions or side effects. Last TSH   Lab Results   Component Value Date    TSH 1.89 02/25/2019   .                                                                                                                                                                                                                        .  ANEMIA: history of GI bleed in 2018, on iron BID, feeling well now    MEDICAL HISTORY:     Patient Active Problem List    Diagnosis Date Noted     Hyperlipidemia LDL goal <130 10/31/2010     Priority: High     Impaired fasting glucose 07/16/2007     Priority: High     Essential hypertension, benign 06/25/2006      Priority: High     Pelvic floor dysfunction 05/08/2019     Priority: Medium     Elevated hemoglobin A1c 02/25/2019     Priority: Medium     9/2018  A1C 6.4  Diet and exercise       GI bleed 03/29/2018     Priority: Medium     Morbid obesity due to excess calories (H) 08/21/2017     Priority: Medium     Overactive bladder 02/01/2017     Priority: Medium     [facility] Dr. Bai; has tried 7 medications without benefit, Interstim x 2, Botox in bladder, x 5; no infection now. Ongoing symptoms; defer to Urology.       S/P total hip arthroplasty 11/09/2016     Priority: Medium     Anemia, unspecified 09/19/2016     Priority: Medium     Diagnosis updated by automated process. Provider to review and confirm.       Poor iron absorption 09/19/2016     Priority: Medium     Iron and its compounds causing adverse effect in therapeutic use(E934.0) 09/19/2016     Priority: Medium     Partial small bowel obstruction (H) 01/05/2015     Priority: Medium     12/23/2014; was hospitalized through 12/26/2014; NG to decompress stomach; no surgery,       Chest pain syndrome 07/02/2013     Priority: Medium     Hypersomnia with sleep apnea 11/23/2004     Priority: Medium     Problem list name updated by automated process. Provider to review       Acquired hypothyroidism 11/11/2004     Priority: Medium     Other symptoms involving urinary system 10/05/2002     Priority: Medium     Problem list name updated by automated process. Provider to review and confirm       Generalized osteoarthrosis, unspecified site 10/03/2002     Priority: Medium     Esophageal reflux 10/03/2002     Priority: Medium     Obesity 10/03/2002     Priority: Medium     Problem list name updated by automated process. Provider to review       Advance Care Planning 06/07/2011     Priority: Low     Advance Care Planning 1/9/2017: Receipt of ACP document:  Received: Health Care Directive which was witnessed or notarized on 5/27/09.  Document previously scanned on  11/14/16.  Validation form completed and sent to be scanned.  Code Status reflects choices in most recent ACP document.  Confirmed/documented designated decision maker(s).  Added by Beatriz Esteban RN, Advance Care Planning Liaison.          Past Medical History:   Diagnosis Date     Arthritis      Chronic infection     Recent UTI cleared now     Esophageal reflux      Hernia, abdominal      Hypertension     No cardiologist     Incontinence of urine      Mumps     6 years old     Obese      Osteoarthritis      Other and unspecified hyperlipidemia      Other chronic pain     back     Peptic ulcer, unspecified site, unspecified as acute or chronic, without mention of hemorrhage, perforation, or obstruction      Small bowel obstruction (H)      Thyroid disease hypothyroidism     Urinary incontinence      Walking troubles      Past Surgical History:   Procedure Laterality Date     ARTHROPLASTY HIP Right 11/9/2016    Procedure: ARTHROPLASTY HIP;  Surgeon: Angel Lund MD;  Location:  OR     AS REPAIR INCISIONAL HERNIA,REDUCIBLE  1998    inc hernia ( [facility] surgery)     BLADDER SURGERY      hyperdistention surgery and sling     C NONSPECIFIC PROCEDURE  1946    T&A     C NONSPECIFIC PROCEDURE  1984    Vaginal Hysterectomy (has her ovaries)     C NONSPECIFIC PROCEDURE  1973    PPTL     C NONSPECIFIC PROCEDURE  1994    L shoulder to remove bone spurs     C NONSPECIFIC PROCEDURE  2004    cysto and durasphere Dr Demarco     C NONSPECIFIC PROCEDURE  2005    cysto and durasphere     C NONSPECIFIC PROCEDURE  2007    cysto and durasphere     C NONSPECIFIC PROCEDURE  2009    retropubic TVT sling     CHOLECYSTECTOMY  1987     COLONOSCOPY  12/3/2011    Procedure:COLONOSCOPY; COLONOSCOPY; Surgeon:SEMAJ GRAHAM; Location: GI     COLONOSCOPY N/A 3/29/2018    Procedure: COLONOSCOPY;  COLONOSCOPY Rm 544;  Surgeon: Marco Gusman MD;  Location:  GI     CYSTOSCOPY N/A 9/6/2017    Procedure: CYSTOSCOPY;  CYSTOSCOPY  AND HYDRODISTENTION ;  Surgeon: Eyal Bai MD;  Location: [facility] OR     ENT SURGERY      Tonsillectomy     ESOPHAGOSCOPY, GASTROSCOPY, DUODENOSCOPY (EGD), COMBINED N/A 9/26/2016    Procedure: COMBINED ESOPHAGOSCOPY, GASTROSCOPY, DUODENOSCOPY (EGD), BIOPSY SINGLE OR MULTIPLE;  Surgeon: Marco Monaco MD;  Location:  GI     GENITOURINARY SURGERY       GYN SURGERY      Hysterectomy     HERNIA REPAIR, UMBILICAL  7/8/2010    Dr. Kirt Franco times 3     ORTHOPEDIC SURGERY  2009    [facility] - Dr. Lund- bilateral knee replacement     Current Outpatient Medications   Medication Sig Dispense Refill     acetaminophen (TYLENOL) 500 MG tablet Take 2 tablets (1,000 mg) by mouth 2 times daily as needed for mild pain 100 tablet 0     amLODIPine (NORVASC) 5 MG tablet TAKE ONE TABLET BY MOUTH EVERY NIGHT AT BEDTIME 90 tablet 2     Calcium Carbonate-Vit D-Min (RA CALCIUM PLUS MINERALS/VIT D PO) Take 1 tablet by mouth every other day Every other day at Lunch       CENTRUM SILVER OR TABS 1 TABLET DAILY 30 0     docusate sodium (COLACE) 100 MG capsule Take 100 mg by mouth every other day Every other day at Breakfast       docusate sodium (COLACE) 100 MG capsule Take 100 mg by mouth At Bedtime       fenofibrate 160 MG tablet Take 1 tablet (160 mg) by mouth daily 90 tablet 3     ferrous gluconate (FERGON) 324 (38 FE) MG tablet Take 1 tablet (324 mg) by mouth 2 times daily 100 tablet 0     levothyroxine (SYNTHROID/LEVOTHROID) 50 MCG tablet Take 1 tablet (50 mcg) by mouth daily 90 tablet 3     lisinopril (PRINIVIL/ZESTRIL) 30 MG tablet TAKE ONE TABLET BY MOUTH EVERY DAY FOR HYPERTENSION 90 tablet 2     MAGNESIUM PO Take 1 tablet by mouth daily (with breakfast)       meloxicam (MOBIC) 15 MG tablet Take 0.5 tablets (7.5 mg) by mouth daily 30 tablet 3     OMEGA-3 FATTY ACIDS 1200 MG OR CAPS 1 TABLET DAILY  0     pantoprazole (PROTONIX) 20 MG EC tablet Take 1 tablet (20 mg) by mouth daily 90 tablet 3     potassium 99 MG TABS Take 1  "tablet by mouth daily (with dinner)        Probiotic Product (ACIDOPHILUS PROBIOTIC BLEND) CAPS Take 1 capsule by mouth daily (with breakfast)  30 capsule 0     psyllium (METAMUCIL) 0.52 G capsule Take 2 capsules (1.04 g) by mouth 3 times daily To take with fluid 540 capsule      simvastatin (ZOCOR) 20 MG tablet TAKE ONE TABLET BY MOUTH EVERY NIGHT AT BEDTIME 90 tablet 2     travoprost, TIMI Free, (TRAVATAN Z) 0.004 % ophthalmic solution Place 1 drop into both eyes At Bedtime  2.5 mL 1     trospium (SANCTURA) 20 MG tablet Take 1 tablet (20 mg) by mouth 2 times daily (before meals) 180 tablet 3     UNABLE TO FIND daily (with dinner) MEDICATION NAME Cranberry.  2 capsules daily        VITAMIN D, CHOLECALCIFEROL, PO Take 2,000 Units by mouth every other day Every other day at Supper       OTC products: None, except as noted above    Allergies   Allergen Reactions     Augmentin Diarrhea     Codeine Nausea and Vomiting     Hydrocodone      Keeps patient awake     Naproxen Itching and Rash     Seasonal Allergies      Hay fever in fall, ragweed and russian thistles     Sulfa Drugs Nausea      Latex Allergy: NO    Social History     Tobacco Use     Smoking status: Never Smoker     Smokeless tobacco: Never Used   Substance Use Topics     Alcohol use: No     Alcohol/week: 0.0 oz     History   Drug Use No       REVIEW OF SYSTEMS:   CONSTITUTIONAL: NEGATIVE for fever, chills, change in weight  ENT/MOUTH: NEGATIVE for ear, mouth and throat problems  RESP: NEGATIVE for significant cough or SOB  CV: NEGATIVE for chest pain, palpitations or peripheral edema    EXAM:   /82 (BP Location: Right arm, Patient Position: Chair, Cuff Size: Adult Regular)   Pulse 76   Temp 98.5  F (36.9  C) (Oral)   Resp 16   Ht 1.6 m (5' 3\")   Wt 96.6 kg (213 lb)   LMP  (LMP Unknown)   SpO2 99%   Breastfeeding? No   BMI 37.73 kg/m    GENERAL APPEARANCE: healthy, alert and no distress  HENT: ear canals and TM's normal and nose and mouth " without ulcers or lesions  RESP: lungs clear to auscultation - no rales, rhonchi or wheezes  CV: regular rate and rhythm, normal S1 S2, no S3 or S4 and no murmur, click or rub   ABDOMEN: soft, nontender, no HSM or masses and bowel sounds normal  NEURO: Normal strength and tone, sensory exam grossly normal, mentation intact and speech normal    DIAGNOSTICS:   No labs or EKG required for low risk surgery (cataract, skin procedure, breast biopsy, etc)    Recent Labs   Lab Test 02/25/19  1118 09/06/18  0952  03/29/18  0606 03/28/18  2057   HGB 12.1 12.6   < > 7.2* 5.7*   PLT  --   --   --  246 275   INR  --   --   --   --  1.15*    142   < > 143 142   POTASSIUM 4.1 4.8   < > 4.0 4.0   CR 0.87 0.93   < > 0.88 0.99   A1C 6.6* 6.4*  --   --   --     < > = values in this interval not displayed.        IMPRESSION:   Reason for surgery/procedure: left eye cataract repair  Diagnosis/reason for consult: left eye cataract    The proposed surgical procedure is considered LOW risk.    REVISED CARDIAC RISK INDEX  The patient has the following serious cardiovascular risks for perioperative complications such as (MI, PE, VFib and 3  AV Block):  No serious cardiac risks  INTERPRETATION: 0 risks: Class I (very low risk - 0.4% complication rate)    The patient has the following additional risks for perioperative complications:  No identified additional risks      ICD-10-CM    1. Preop general physical exam Z01.818    2. Cataract of left eye, unspecified cataract type H26.9    3. Essential hypertension, benign I10    4. Hyperlipidemia LDL goal <130 E78.5    5. Morbid obesity due to excess calories (H) E66.01    6. Acquired hypothyroidism E03.9        RECOMMENDATIONS:     --Consult hospital rounder / IM to assist post-op medical management  --Patient instructed to take Levothyroxine and Lisinopril with small sip of water morning of surgery, hold remainder of medications and resume post surgery.  --Pt advised to avoid NSAIDS (Motrin,  Ibuprofen, Aleve or Naprosyn);  If needed, Tylenol or Acetaminophen are fine to use.  --FMLA deferred to surgeon.      APPROVAL GIVEN to proceed with proposed procedure, without further diagnostic evaluation       Signed Electronically by: Sunshine Dos Santos PA-C    Copy of this evaluation report is provided to requesting physician.    [facility] Preop Guidelines    Revised Cardiac Risk Index

## 2019-05-10 NOTE — PATIENT INSTRUCTIONS
No Aspirin, NSAIDs or Steroids 7 days prior, Tylenol okay.  Take Synthroid and Lisinopril morning of surgery, hold the rest of your medication until after surgery.  Do not eat or drink after midnight before your surgery.    Before Your Surgery      Call your surgeon if there is any change in your health. This includes signs of a cold or flu (such as a sore throat, runny nose, cough, rash or fever).    Do not smoke, drink alcohol or take over the counter medicine (unless your surgeon or primary care doctor tells you to) for the 24 hours before and after surgery.    If you take prescribed drugs: Follow your doctor s orders about which medicines to take and which to stop until after surgery.    Eating and drinking prior to surgery: follow the instructions from your surgeon    Take a shower or bath the night before surgery. Use the soap your surgeon gave you to gently clean your skin. If you do not have soap from your surgeon, use your regular soap. Do not shave or scrub the surgery site.  Wear clean pajamas and have clean sheets on your bed.

## 2019-07-29 ENCOUNTER — TRANSFERRED RECORDS (OUTPATIENT)
Dept: HEALTH INFORMATION MANAGEMENT | Facility: CLINIC | Age: 79
End: 2019-07-29

## 2019-08-13 ENCOUNTER — OFFICE VISIT (OUTPATIENT)
Dept: UROLOGY | Facility: CLINIC | Age: 79
End: 2019-08-13
Payer: COMMERCIAL

## 2019-08-13 VITALS — BODY MASS INDEX: 37.74 KG/M2 | HEIGHT: 63 IN | WEIGHT: 213 LBS | HEART RATE: 68 BPM | OXYGEN SATURATION: 98 %

## 2019-08-13 DIAGNOSIS — M62.89 PELVIC FLOOR DYSFUNCTION: ICD-10-CM

## 2019-08-13 DIAGNOSIS — N32.81 OAB (OVERACTIVE BLADDER): ICD-10-CM

## 2019-08-13 DIAGNOSIS — N39.46 MIXED STRESS AND URGE URINARY INCONTINENCE: Primary | ICD-10-CM

## 2019-08-13 DIAGNOSIS — K59.00 CONSTIPATION, UNSPECIFIED CONSTIPATION TYPE: ICD-10-CM

## 2019-08-13 PROCEDURE — 99213 OFFICE O/P EST LOW 20 MIN: CPT | Performed by: UROLOGY

## 2019-08-13 RX ORDER — POLYETHYLENE GLYCOL 3350 17 G/17G
1 POWDER, FOR SOLUTION ORAL DAILY
Qty: 578 G | Refills: 3 | Status: SHIPPED | OUTPATIENT
Start: 2019-08-13 | End: 2021-02-24

## 2019-08-13 ASSESSMENT — MIFFLIN-ST. JEOR: SCORE: 1415.29

## 2019-08-13 ASSESSMENT — PAIN SCALES - GENERAL: PAINLEVEL: MILD PAIN (3)

## 2019-08-13 NOTE — NURSING NOTE
Pt has been doing PT... But doesn't think it is helping.  Pt has arthritis in her back.  Pt states trospium makes her constipated.  RENITA Gonzales CMA

## 2019-08-13 NOTE — PROGRESS NOTES
"August 13, 2019    Return visit    Patient returns today for follow up.  Bad constipation on the tropsium and pelvic floor exercises is only helping some.  She denies any changes in her health since last visit.    Pulse 68   Ht 1.6 m (5' 3\")   Wt 96.6 kg (213 lb)   LMP  (LMP Unknown)   SpO2 98%   BMI 37.73 kg/m    She is comfortable, in no distress, non-labored breathing.      A/P: 78 year old F with mixed incontinence, OAB despite multiple treatments, improved with trospium and pelvic floor therapy but now with returning symptoms and bad constipation    Miralax daily    Continue trospium    Discussed trial of PA for mirabegron or urodynamics, she is not interested in this quite yet    RTC 3 months, sooner if needed    15 minutes were spent with the patient today, > 50% in counseling and coordination of care    Jenny Ayon MD MPH   of Urology    CC  Patient Care Team:  Jesenia Madrigal MD as PCP - General (Internal Medicine)  Jesenia Madrigal MD as Assigned PCP  Jesenia Madrigal MD as Referring Physician (Internal Medicine)                  "

## 2019-08-13 NOTE — LETTER
"8/13/2019       RE: Lexii Stratton  [address] [place] [address]     Dear Colleague,    Thank you for referring your patient, Lexii Stratton, to the [facility] [place] at [facility]. Please see a copy of my visit note below.    August 13, 2019    Return visit    Patient returns today for follow up.  Bad constipation on the tropsium and pelvic floor exercises is only helping some.  She denies any changes in her health since last visit.    Pulse 68   Ht 1.6 m (5' 3\")   Wt 96.6 kg (213 lb)   LMP  (LMP Unknown)   SpO2 98%   BMI 37.73 kg/m     She is comfortable, in no distress, non-labored breathing.      A/P: 78 year old F with mixed incontinence, OAB despite multiple treatments, improved with trospium and pelvic floor therapy but now with returning symptoms and bad constipation    Miralax daily    Continue trospium    Discussed trial of PA for mirabegron or urodynamics, she is not interested in this quite yet    RTC 3 months, sooner if needed    15 minutes were spent with the patient today, > 50% in counseling and coordination of care    Jenny Ayon MD MPH   of Urology    CC  Patient Care Team:  Jesenia Madrigal MD as PCP - General (Internal Medicine)                  "

## 2019-08-13 NOTE — PATIENT INSTRUCTIONS
Take half to one full capful of miralax daily for the constipation    Consider trying the mirabegron again    Consider urodynamics testing     Return to see us 3 months, sooner if needed    It was a pleasure meeting with you today.  Thank you for allowing me and my team the privilege of caring for you today.  YOU are the reason we are here, and I truly hope we provided you with the excellent service you deserve.  Please let us know if there is anything else we can do for you so that we can be sure you are leaving completely satisfied with your care experience.

## 2019-08-25 DIAGNOSIS — I10 ESSENTIAL HYPERTENSION, BENIGN: ICD-10-CM

## 2019-08-26 NOTE — TELEPHONE ENCOUNTER
"Requested Prescriptions   Pending Prescriptions Disp Refills     lisinopril (PRINIVIL/ZESTRIL) 30 MG tablet [Pharmacy Med Name: LISINOPRIL  Last Written Prescription Date:  11/28/18  Last Fill Quantity: 90,  # refills: 2   Last Office Visit: 5/10/2019   Future Office Visit:    Next 5 appointments (look out 90 days)    Aug 27, 2019  8:30 AM CDT  Office Visit with Jesenia Madrigal MD  [facility] [address] [facility] [address] [phone]          30MG TABS] 90 tablet 2     Sig: TAKE ONE TABLET BY MOUTH EVERY DAY FOR HYPERTENSION       ACE Inhibitors (Including Combos) Protocol Passed - 8/25/2019  1:26 PM        Passed - Blood pressure under 140/90 in past 12 months     BP Readings from Last 3 Encounters:   05/10/19 126/82   05/08/19 132/66   02/25/19 130/78           Passed - Recent (12 mo) or future (30 days) visit within the authorizing provider's specialty     Patient had office visit in the last 12 months or has a visit in the next 30 days with authorizing provider or within the authorizing provider's specialty.  See \"Patient Info\" tab in inbasket, or \"Choose Columns\" in Meds & Orders section of the refill encounter.            Passed - Medication is active on med list        Passed - Patient is age 18 or older        Passed - No active pregnancy on record        Passed - Normal serum creatinine on file in past 12 months     Recent Labs   Lab Test 02/25/19  1118  02/18/16  1805   CR 0.87   < >  --    CREAT  --   --  0.9    < > = values in this interval not displayed.             Passed - Normal serum potassium on file in past 12 months     Recent Labs   Lab Test 02/25/19  1118   POTASSIUM 4.1             Passed - No positive pregnancy test within past 12 months          "

## 2019-08-27 ENCOUNTER — OFFICE VISIT (OUTPATIENT)
Dept: FAMILY MEDICINE | Facility: CLINIC | Age: 79
End: 2019-08-27
Payer: COMMERCIAL

## 2019-08-27 VITALS
OXYGEN SATURATION: 100 % | BODY MASS INDEX: 38.21 KG/M2 | RESPIRATION RATE: 17 BRPM | TEMPERATURE: 98 F | DIASTOLIC BLOOD PRESSURE: 64 MMHG | HEART RATE: 56 BPM | WEIGHT: 215.7 LBS | SYSTOLIC BLOOD PRESSURE: 128 MMHG

## 2019-08-27 DIAGNOSIS — I35.0 AORTIC VALVE STENOSIS, ETIOLOGY OF CARDIAC VALVE DISEASE UNSPECIFIED: ICD-10-CM

## 2019-08-27 DIAGNOSIS — M54.50 RIGHT-SIDED LOW BACK PAIN WITHOUT SCIATICA, UNSPECIFIED CHRONICITY: ICD-10-CM

## 2019-08-27 DIAGNOSIS — I10 ESSENTIAL HYPERTENSION, BENIGN: Primary | ICD-10-CM

## 2019-08-27 DIAGNOSIS — N32.81 OAB (OVERACTIVE BLADDER): ICD-10-CM

## 2019-08-27 DIAGNOSIS — E11.9 TYPE 2 DIABETES MELLITUS WITHOUT COMPLICATION, WITHOUT LONG-TERM CURRENT USE OF INSULIN (H): ICD-10-CM

## 2019-08-27 DIAGNOSIS — E03.9 ACQUIRED HYPOTHYROIDISM: ICD-10-CM

## 2019-08-27 DIAGNOSIS — K21.9 GASTROESOPHAGEAL REFLUX DISEASE WITHOUT ESOPHAGITIS: ICD-10-CM

## 2019-08-27 DIAGNOSIS — J30.1 SEASONAL ALLERGIC RHINITIS DUE TO POLLEN: ICD-10-CM

## 2019-08-27 DIAGNOSIS — E78.5 HYPERLIPIDEMIA LDL GOAL <130: ICD-10-CM

## 2019-08-27 LAB
ALBUMIN SERPL-MCNC: 3.5 G/DL (ref 3.4–5)
ALP SERPL-CCNC: 65 U/L (ref 40–150)
ALT SERPL W P-5'-P-CCNC: 18 U/L (ref 0–50)
ANION GAP SERPL CALCULATED.3IONS-SCNC: 8 MMOL/L (ref 3–14)
AST SERPL W P-5'-P-CCNC: 24 U/L (ref 0–45)
BILIRUB SERPL-MCNC: 0.3 MG/DL (ref 0.2–1.3)
BUN SERPL-MCNC: 24 MG/DL (ref 7–30)
CALCIUM SERPL-MCNC: 9.4 MG/DL (ref 8.5–10.1)
CHLORIDE SERPL-SCNC: 108 MMOL/L (ref 94–109)
CHOLEST SERPL-MCNC: 149 MG/DL
CO2 SERPL-SCNC: 23 MMOL/L (ref 20–32)
CREAT SERPL-MCNC: 0.9 MG/DL (ref 0.52–1.04)
CREAT UR-MCNC: 39 MG/DL
GFR SERPL CREATININE-BSD FRML MDRD: 61 ML/MIN/{1.73_M2}
GLUCOSE SERPL-MCNC: 128 MG/DL (ref 70–99)
HBA1C MFR BLD: 5.9 % (ref 0–5.6)
HDLC SERPL-MCNC: 40 MG/DL
LDLC SERPL CALC-MCNC: 77 MG/DL
MICROALBUMIN UR-MCNC: 8 MG/L
MICROALBUMIN/CREAT UR: 20.23 MG/G CR (ref 0–25)
NONHDLC SERPL-MCNC: 109 MG/DL
POTASSIUM SERPL-SCNC: 4.3 MMOL/L (ref 3.4–5.3)
PROT SERPL-MCNC: 7.2 G/DL (ref 6.8–8.8)
SODIUM SERPL-SCNC: 139 MMOL/L (ref 133–144)
TRIGL SERPL-MCNC: 159 MG/DL
TSH SERPL DL<=0.005 MIU/L-ACNC: 2.1 MU/L (ref 0.4–4)

## 2019-08-27 PROCEDURE — 99207 C PAF COMPLETED  NO CHARGE: CPT | Performed by: INTERNAL MEDICINE

## 2019-08-27 PROCEDURE — 83036 HEMOGLOBIN GLYCOSYLATED A1C: CPT | Performed by: INTERNAL MEDICINE

## 2019-08-27 PROCEDURE — 80053 COMPREHEN METABOLIC PANEL: CPT | Performed by: INTERNAL MEDICINE

## 2019-08-27 PROCEDURE — 36415 COLL VENOUS BLD VENIPUNCTURE: CPT | Performed by: INTERNAL MEDICINE

## 2019-08-27 PROCEDURE — 82043 UR ALBUMIN QUANTITATIVE: CPT | Performed by: INTERNAL MEDICINE

## 2019-08-27 PROCEDURE — 84443 ASSAY THYROID STIM HORMONE: CPT | Performed by: INTERNAL MEDICINE

## 2019-08-27 PROCEDURE — 99214 OFFICE O/P EST MOD 30 MIN: CPT | Performed by: INTERNAL MEDICINE

## 2019-08-27 PROCEDURE — 80061 LIPID PANEL: CPT | Performed by: INTERNAL MEDICINE

## 2019-08-27 RX ORDER — MELOXICAM 15 MG/1
7.5 TABLET ORAL DAILY
Qty: 30 TABLET | Refills: 3 | Status: SHIPPED | OUTPATIENT
Start: 2019-08-27 | End: 2020-02-20 | Stop reason: DRUGHIGH

## 2019-08-27 RX ORDER — FENOFIBRATE 160 MG/1
160 TABLET ORAL DAILY
Qty: 90 TABLET | Refills: 3 | Status: SHIPPED | OUTPATIENT
Start: 2019-08-27 | End: 2019-08-29

## 2019-08-27 RX ORDER — LEVOTHYROXINE SODIUM 50 UG/1
50 TABLET ORAL DAILY
Qty: 90 TABLET | Refills: 3 | Status: SHIPPED | OUTPATIENT
Start: 2019-08-27 | End: 2020-02-20

## 2019-08-27 RX ORDER — FEXOFENADINE HCL 60 MG/1
60 TABLET, FILM COATED ORAL 2 TIMES DAILY
Qty: 90 TABLET | Refills: 0 | COMMUNITY
Start: 2019-08-27 | End: 2021-08-26

## 2019-08-27 RX ORDER — LISINOPRIL 30 MG/1
TABLET ORAL
Qty: 90 TABLET | Refills: 2 | OUTPATIENT
Start: 2019-08-27

## 2019-08-27 RX ORDER — LISINOPRIL 30 MG/1
TABLET ORAL
Qty: 90 TABLET | Refills: 3 | Status: SHIPPED | OUTPATIENT
Start: 2019-08-27 | End: 2020-02-20

## 2019-08-27 RX ORDER — PANTOPRAZOLE SODIUM 20 MG/1
20 TABLET, DELAYED RELEASE ORAL DAILY
Qty: 90 TABLET | Refills: 3 | Status: SHIPPED | OUTPATIENT
Start: 2019-08-27 | End: 2020-02-20

## 2019-08-27 RX ORDER — SIMVASTATIN 20 MG
TABLET ORAL
Qty: 90 TABLET | Refills: 3 | Status: SHIPPED | OUTPATIENT
Start: 2019-08-27 | End: 2020-02-20

## 2019-08-27 RX ORDER — AMLODIPINE BESYLATE 5 MG/1
TABLET ORAL
Qty: 90 TABLET | Refills: 3 | Status: SHIPPED | OUTPATIENT
Start: 2019-08-27 | End: 2020-02-20

## 2019-08-27 RX ORDER — TROSPIUM CHLORIDE 20 MG/1
20 TABLET, FILM COATED ORAL
Qty: 180 TABLET | Refills: 3 | Status: CANCELLED | OUTPATIENT
Start: 2019-08-27

## 2019-08-27 NOTE — PROGRESS NOTES
Subjective     Lexii Stratton is a 78 year old female who presents to clinic today for the following health issues:    HPI   Hyperlipidemia Follow-Up      Are you having any of the following symptoms? (Select all that apply)  No complaints of shortness of breath, chest pain or pressure.  No increased sweating or nausea with activity.  No left-sided neck or arm pain.  No complaints of pain in calves when walking 1-2 blocks.    Are you regularly taking any medication or supplement to lower your cholesterol?   Yes- Fenofibrate and Simvastatin    Are you having muscle aches or other side effects that you think could be caused by your cholesterol lowering medication?  No      Hypertension Follow-up      Do you check your blood pressure regularly outside of the clinic? No     Are you following a low salt diet? Yes    Are your blood pressures ever more than 140 on the top number (systolic) OR more   than 90 on the bottom number (diastolic), for example 140/90? No  Hypothyroidism Follow-up      Since last visit, patient describes the following symptoms: Weight stable, no hair loss, no skin changes, no constipation, no loose stools    Gerd: Follow up on Gerd     Symptoms controlled with medication      How many servings of fruits and vegetables do you eat daily?  0-1    On average, how many sweetened beverages do you drink each day (soda, juice, sweet tea, etc)?   0    How many days per week do you miss taking your medication? 0    Recent Labs   Lab Test 02/25/19  1118 09/06/18  0952  06/29/15  0858 02/13/14  0958   CHOL 165 142   < > 160 140   HDL 50 38*   < > 48* 38*   LDL 89 71   < > 79 78   TRIG 128 166*   < > 163* 118   CHOLHDLRATIO  --   --   --  3.3 3.7    < > = values in this interval not displayed.         Patient Active Problem List   Diagnosis     Generalized osteoarthrosis, unspecified site     Esophageal reflux     Obesity     Other symptoms involving urinary system     Acquired hypothyroidism     Hypersomnia  with sleep apnea     Essential hypertension, benign     Impaired fasting glucose     Hyperlipidemia LDL goal <130     Advance Care Planning     Chest pain syndrome     Partial small bowel obstruction (H)     Anemia, unspecified     Poor iron absorption     Iron and its compounds causing adverse effect in therapeutic use(E934.0)     S/P total hip arthroplasty     Overactive bladder     Morbid obesity due to excess calories (H)     GI bleed     Elevated hemoglobin A1c     Pelvic floor dysfunction     Diabetes mellitus, type 2 (H)     Past Surgical History:   Procedure Laterality Date     ARTHROPLASTY HIP Right 11/9/2016    Procedure: ARTHROPLASTY HIP;  Surgeon: Angel Lund MD;  Location:  OR     AS REPAIR INCISIONAL HERNIA,REDUCIBLE  1998    inc hernia ( [facility] surgery)     BLADDER SURGERY      hyperdistention surgery and sling     C NONSPECIFIC PROCEDURE  1946    T&A     C NONSPECIFIC PROCEDURE  1984    Vaginal Hysterectomy (has her ovaries)     C NONSPECIFIC PROCEDURE  1973    PPTL     C NONSPECIFIC PROCEDURE  1994    L shoulder to remove bone spurs     C NONSPECIFIC PROCEDURE  2004    cysto and durasphere Dr Demarco     C NONSPECIFIC PROCEDURE  2005    cysto and durasphere     C NONSPECIFIC PROCEDURE  2007    cysto and durasphere     C NONSPECIFIC PROCEDURE  2009    retropubic TVT sling     CHOLECYSTECTOMY  1987     COLONOSCOPY  12/3/2011    Procedure:COLONOSCOPY; COLONOSCOPY; Surgeon:SEMAJ GRAHAM; Location: GI     COLONOSCOPY N/A 3/29/2018    Procedure: COLONOSCOPY;  COLONOSCOPY Rm 544;  Surgeon: Marco Gusman MD;  Location:  GI     CYSTOSCOPY N/A 9/6/2017    Procedure: CYSTOSCOPY;  CYSTOSCOPY AND HYDRODISTENTION ;  Surgeon: Eyal Bai MD;  Location:  OR     ENT SURGERY      Tonsillectomy     ESOPHAGOSCOPY, GASTROSCOPY, DUODENOSCOPY (EGD), COMBINED N/A 9/26/2016    Procedure: COMBINED ESOPHAGOSCOPY, GASTROSCOPY, DUODENOSCOPY (EGD), BIOPSY SINGLE OR MULTIPLE;  Surgeon: Carlos Enrique  Marco Stanton MD;  Location:  GI     GENITOURINARY SURGERY       GYN SURGERY      Hysterectomy     HERNIA REPAIR, UMBILICAL  2010    Dr. Kirt Franco times 3     ORTHOPEDIC SURGERY      [facility] - Dr. Lund- bilateral knee replacement       Social History     Tobacco Use     Smoking status: Never Smoker     Smokeless tobacco: Never Used   Substance Use Topics     Alcohol use: No     Alcohol/week: 0.0 standard drinks     Family History   Problem Relation Age of Onset     Heart Disease Mother         heart surgery , new valve     Gallbladder Disease Mother      Coronary Artery Disease Mother      Hyperlipidemia Mother      Asthma Mother      Heart Disease Maternal Grandmother      Coronary Artery Disease Maternal Grandmother      Heart Disease Maternal Grandfather      Coronary Artery Disease Maternal Grandfather      Cancer Father         throat ca,  76     Coronary Artery Disease Father      Diabetes Brother         Half Brother     Cardiovascular Brother         lung ca, Half brother     Cancer Brother         half brother  lung          Current Outpatient Medications   Medication Sig Dispense Refill     acetaminophen (TYLENOL) 500 MG tablet Take 2 tablets (1,000 mg) by mouth 2 times daily as needed for mild pain 100 tablet 0     amLODIPine (NORVASC) 5 MG tablet TAKE ONE TABLET BY MOUTH EVERY NIGHT AT BEDTIME 90 tablet 3     Calcium Carbonate-Vit D-Min (RA CALCIUM PLUS MINERALS/VIT D PO) Take 1 tablet by mouth every other day Every other day at Lunch       CENTRUM SILVER OR TABS 1 TABLET DAILY 30 0     FENOFIBRATE PO Take 160 mg by mouth daily       ferrous gluconate (FERGON) 324 (38 FE) MG tablet Take 1 tablet (324 mg) by mouth 2 times daily 100 tablet 0     fexofenadine (ALLEGRA) 60 MG tablet Take 1 tablet (60 mg) by mouth 2 times daily 90 tablet 0     levothyroxine (SYNTHROID/LEVOTHROID) 50 MCG tablet Take 1 tablet (50 mcg) by mouth daily 90 tablet 3     lisinopril (PRINIVIL/ZESTRIL) 30 MG tablet TAKE ONE  TABLET BY MOUTH EVERY DAY FOR HYPERTENSION 90 tablet 3     MAGNESIUM PO Take 1 tablet by mouth daily (with breakfast)       meloxicam (MOBIC) 15 MG tablet Take 0.5 tablets (7.5 mg) by mouth daily 30 tablet 3     OMEGA-3 FATTY ACIDS 1200 MG OR CAPS 1 TABLET DAILY  0     pantoprazole (PROTONIX) 20 MG EC tablet Take 1 tablet (20 mg) by mouth daily 90 tablet 3     polyethylene glycol (MIRALAX/GLYCOLAX) powder Take 17 g (1 capful) by mouth daily 578 g 3     potassium 99 MG TABS Take 1 tablet by mouth daily (with dinner)        Probiotic Product (ACIDOPHILUS PROBIOTIC BLEND) CAPS Take 1 capsule by mouth daily (with breakfast)  30 capsule 0     psyllium (METAMUCIL) 0.52 G capsule Take 2 capsules (1.04 g) by mouth 3 times daily To take with fluid 540 capsule      simvastatin (ZOCOR) 20 MG tablet TAKE ONE TABLET BY MOUTH EVERY NIGHT AT BEDTIME 90 tablet 3     travoprost, TIMI Free, (TRAVATAN Z) 0.004 % ophthalmic solution Place 1 drop into both eyes At Bedtime  2.5 mL 1     UNABLE TO FIND daily (with dinner) MEDICATION NAME Cranberry.  2 capsules daily        VITAMIN D, CHOLECALCIFEROL, PO Take 2,000 Units by mouth every other day Every other day at Supper       trospium (SANCTURA) 20 MG tablet Take 1 tablet (20 mg) by mouth 2 times daily (before meals) 180 tablet 3     Allergies   Allergen Reactions     Augmentin Diarrhea     Codeine Nausea and Vomiting     Hydrocodone      Keeps patient awake     Naproxen Itching and Rash     Seasonal Allergies      Hay fever in fall, ragweed and russian thistles     Sulfa Drugs Nausea     Recent Labs   Lab Test 08/27/19  0948 02/25/19  1118 09/06/18  0952   A1C 5.9* 6.6* 6.4*   LDL 77 89 71   HDL 40* 50 38*   TRIG 159* 128 166*   ALT 18 20 21   CR 0.90 0.87 0.93   GFRESTIMATED 61 64 58*   GFRESTBLACK 71 74 70   POTASSIUM 4.3 4.1 4.8   TSH 2.10 1.89  --       BP Readings from Last 3 Encounters:   08/27/19 128/64   05/10/19 126/82   05/08/19 132/66    Wt Readings from Last 3 Encounters:    08/27/19 97.8 kg (215 lb 11.2 oz)   08/13/19 96.6 kg (213 lb)   05/10/19 96.6 kg (213 lb)              Reviewed and updated as needed this visit by Provider  Tobacco  Allergies  Meds  Problems  Med Hx  Surg Hx  Fam Hx         Review of Systems   ROS COMP: CONSTITUTIONAL: NEGATIVE for fever, chills, change in weight  ENT/MOUTH: NEGATIVE for ear, mouth and throat problems  RESP: NEGATIVE for significant cough or SOB  CV: NEGATIVE for chest pain, palpitations or peripheral edema and known Aortic Stenosis; reviewed ECHO from 2018 no new symptoms   GI: reflux and triggers reviewed; meds helpful; bowels same  MUSCULOSKELETAL: keeping active; intermittent back issues.   NEURO: NEGATIVE for weakness, dizziness or paresthesias  ENDOCRINE: reviewed lipids, thryoid, diabetes parameters and treatments  HEME/ALLERGY/IMMUNE: NEGATIVE for bleeding problems  PSYCHIATRIC: NEGATIVE for changes in mood or affect      Objective    /64   Pulse 56   Temp 98  F (36.7  C) (Oral)   Resp 17   Wt 97.8 kg (215 lb 11.2 oz)   LMP  (LMP Unknown)   SpO2 100%   BMI 38.21 kg/m    Body mass index is 38.21 kg/m .  Physical Exam   GENERAL: healthy, alert and no distress  NECK: no adenopathy, no asymmetry, masses, or scars and thyroid normal to palpation  RESP: lungs clear to auscultation - no rales, rhonchi or wheezes  CV: regular rates and rhythm, normal S1 S2, no S3 or S4, grade 3/6 systolic murmur, peripheral pulses strong and trace peripheral edema  ABDOMEN: soft, nontender, no hepatosplenomegaly, no masses and bowel sounds normal  MS: no gross musculoskeletal defects noted, no edema  SKIN: no suspicious lesions or rashes  NEURO: Normal strength and tone, mentation intact and speech normal  PSYCH: mentation appears normal, affect normal/bright    Diagnostic Test Results:  Labs reviewed in Epic        Assessment & Plan     (I10) Essential hypertension, benign  (primary encounter diagnosis)  Comment: well controlled; reviewed  labs and update kidney monitoring  Plan: amLODIPine (NORVASC) 5 MG tablet, lisinopril         (PRINIVIL/ZESTRIL) 30 MG tablet, Comprehensive         metabolic panel, Albumin Random Urine         Quantitative with Creat Ratio          (E78.5) Hyperlipidemia LDL goal <130  Comment: Goals of therapy reviewed; Risk assessment; continue meds - adjust meds or dosages if labs warrant.  Plan: simvastatin (ZOCOR) 20 MG tablet, Comprehensive        metabolic panel, Lipid panel reflex to direct         LDL Fasting,  fenofibrate (TRIGLIDE/LOFIBRA) 160 MG tablet          (E03.9) Acquired hypothyroidism  Comment: Clinically euthyroid; labs today, consider dose adjustment if labs warrant  Plan: levothyroxine (SYNTHROID/LEVOTHROID) 50 MCG         tablet, TSH with free T4 reflex          (K21.9) Gastroesophageal reflux disease without esophagitis  Comment: triggers reviewed; med helpful with lessening symptoms;   Plan: pantoprazole (PROTONIX) 20 MG EC tablet  Pt reminded of benefits of maximizing calcium and vit D to help with bone health (due to being on PPI)           (M54.5) Right-sided low back pain without sciatica, unspecified chronicity  Comment: off and on for ; better with Meloxicam  Plan: meloxicam (MOBIC) 15 MG tablet        Core strengthening encouraged; keep active    (N32.81) OAB (overactive bladder)  Comment: followed closely by urology  Plan:     (J30.1) Seasonal allergic rhinitis due to pollen  Comment: ok to add Fexofenadine; limit exposure of triggers.  Plan: fexofenadine (ALLEGRA) 60 MG tablet          (E11.9) Type 2 diabetes mellitus without complication, without long-term current use of insulin (H)  Comment: review treatment goals; due for labs;  Plan: Comprehensive metabolic panel, Hemoglobin A1c,         Albumin Random Urine Quantitative with Creat         Ratio          (I35.0) Aortic valve stenosis, etiology of cardiac valve disease unspecified  Comment: ECHO 9/10/2018 ROBYN 1.2 cm2; NO symptoms including  "Syncope, Angina or Dyspnea; reassess ECHO 2020 unless symptoms develop.   Plan: ECHO 2020     BMI:   Estimated body mass index is 38.21 kg/m  as calculated from the following:    Height as of 8/13/19: 1.6 m (5' 3\").    Weight as of this encounter: 97.8 kg (215 lb 11.2 oz).   Weight management plan: Discussed healthy diet and exercise guidelines        Work on weight loss  Regular exercise    Return in about 5 months (around 1/27/2020) for wellness visit encouraged..    Jesenia Madrigal MD  Internal Medicine  [facility] [place]  30 minutes is spent with patient, over 50% of that time spent providing counselling, discussing and reviewing meds and potential side effects.        "

## 2019-08-29 ENCOUNTER — TELEPHONE (OUTPATIENT)
Dept: UROLOGY | Facility: CLINIC | Age: 79
End: 2019-08-29

## 2019-08-29 DIAGNOSIS — N32.81 OAB (OVERACTIVE BLADDER): ICD-10-CM

## 2019-08-29 RX ORDER — TROSPIUM CHLORIDE 20 MG/1
20 TABLET, FILM COATED ORAL
Qty: 180 TABLET | Refills: 3 | Status: SHIPPED | OUTPATIENT
Start: 2019-08-29 | End: 2019-08-30

## 2019-08-29 NOTE — TELEPHONE ENCOUNTER
[facility] Call Center    Phone Message    May a detailed message be left on voicemail: yes    Reason for Call: Medication Refill Request    Has the patient contacted the pharmacy for the refill? Yes   Name of medication being requested: trospium (SANCTURA) 20 MG tablet  Provider who prescribed the medication: Jenny Ayon  Pharmacy: [facility] - [place], [place] - [address] AT PORTAL TO REGISTERED [facility] SITES  Date medication is needed: as soon as possible THEY CAN ALSO TAKE VERBAL ORDERS          Action Taken: Message routed to:  [facility] ([facility]

## 2019-08-30 RX ORDER — TROSPIUM CHLORIDE 20 MG/1
20 TABLET, FILM COATED ORAL
Qty: 180 TABLET | Refills: 3 | Status: ON HOLD | OUTPATIENT
Start: 2019-08-30 | End: 2020-09-17

## 2019-08-30 NOTE — TELEPHONE ENCOUNTER
[facility] Call Center    Phone Message    May a detailed message be left on voicemail: yes    Reason for Call: Medication Question or concern regarding medication   Prescription Clarification  Name of Medication: trospium (SANCTURA) 20 MG tablet  Prescribing Provider: Dr. Jenny Ayon   Pharmacy: [facility] - [place], [place] - [address] AT PORTAL TO REGISTERED [facility] SITES   What on the order needs clarification? Angel with [facility] caling to f/u on request for med. He states they received a blank script from the clinic and had this med on file in pt's history. He wanted to know if he could take verbal orders for the med. Please call and use order [id number].      Action Taken: Message routed to:  Other: [facility]

## 2019-09-23 ENCOUNTER — HOSPITAL ENCOUNTER (OUTPATIENT)
Dept: MAMMOGRAPHY | Facility: CLINIC | Age: 79
Discharge: HOME | End: 2019-09-23
Attending: INTERNAL MEDICINE | Admitting: INTERNAL MEDICINE
Payer: COMMERCIAL

## 2019-09-23 DIAGNOSIS — Z12.31 VISIT FOR SCREENING MAMMOGRAM: ICD-10-CM

## 2019-09-23 PROCEDURE — 77063 BREAST TOMOSYNTHESIS BI: CPT

## 2019-10-31 ENCOUNTER — TRANSFERRED RECORDS (OUTPATIENT)
Dept: HEALTH INFORMATION MANAGEMENT | Facility: CLINIC | Age: 79
End: 2019-10-31

## 2019-11-12 ENCOUNTER — OFFICE VISIT (OUTPATIENT)
Dept: UROLOGY | Facility: CLINIC | Age: 79
End: 2019-11-12
Payer: COMMERCIAL

## 2019-11-12 VITALS
WEIGHT: 210 LBS | BODY MASS INDEX: 35.85 KG/M2 | DIASTOLIC BLOOD PRESSURE: 80 MMHG | HEART RATE: 82 BPM | SYSTOLIC BLOOD PRESSURE: 126 MMHG | HEIGHT: 64 IN

## 2019-11-12 DIAGNOSIS — N39.46 MIXED INCONTINENCE: Primary | ICD-10-CM

## 2019-11-12 DIAGNOSIS — N32.81 OAB (OVERACTIVE BLADDER): ICD-10-CM

## 2019-11-12 PROCEDURE — 99213 OFFICE O/P EST LOW 20 MIN: CPT | Performed by: UROLOGY

## 2019-11-12 ASSESSMENT — MIFFLIN-ST. JEOR: SCORE: 1417.55

## 2019-11-12 NOTE — LETTER
"11/12/2019       RE: Lexii Stratton  [address] [place] [address]     Dear Colleague,    Thank you for referring your patient, Lexii Stratton, to the [facility] [place] at [facility]. Please see a copy of my visit note below.    November 12, 2019    Return visit    Patient returns today for follow up.  Constipation much better.  Not much change in urinary incontinence but on the medications but expensive and currently in the donut hole. Tries to do the pelvic floor exercises.  She denies any changes in her health since last visit.    /80   Pulse 82   Ht 1.626 m (5' 4\")   Wt 95.3 kg (210 lb)   LMP  (LMP Unknown)   BMI 36.05 kg/m     She is comfortable, in no distress, non-labored breathing.     A/P: 78 year old F with mixed incontinence, OAB despite multiple treatments, not adequately controlled with trospium and pelvic floor therapy    Discussed could try another course of botulinum toxin injections, she will consider this for next year given her insurance at this time    15 minutes were spent with the patient today, > 50% in counseling and coordination of care    Jenny Ayon MD MPH   of Urology    CC  Patient Care Team:  Jesenia Madrigal MD as PCP - General (Internal Medicine)    "

## 2019-11-12 NOTE — PATIENT INSTRUCTIONS
We will contact you to schedule botulinum toxin injection in the office.  If you do not hear next week then please contact [phone]    It was a pleasure meeting with you today.  Thank you for allowing me and my team the privilege of caring for you today.  YOU are the reason we are here, and I truly hope we provided you with the excellent service you deserve.  Please let us know if there is anything else we can do for you so that we can be sure you are leaving completely satisfied with your care experience.

## 2019-11-12 NOTE — PROGRESS NOTES
"November 12, 2019    Return visit    Patient returns today for follow up.  Constipation much better.  Not much change in urinary incontinence but on the medications but expensive and currently in the donut hole. Tries to do the pelvic floor exercises.  She denies any changes in her health since last visit.    /80   Pulse 82   Ht 1.626 m (5' 4\")   Wt 95.3 kg (210 lb)   LMP  (LMP Unknown)   BMI 36.05 kg/m    She is comfortable, in no distress, non-labored breathing.     A/P: 78 year old F with mixed incontinence, OAB despite multiple treatments, not adequately controlled with trospium and pelvic floor therapy    Discussed could try another course of botulinum toxin injections, she will consider this for next year given her insurance at this time    15 minutes were spent with the patient today, > 50% in counseling and coordination of care    Jenny Ayon MD MPH   of Urology    CC  Patient Care Team:  Jesenia Madrigal MD as PCP - General (Internal Medicine)  Jesenia Madrigal MD as Assigned PCP  Jesenia Madrigal MD as Referring Physician (Internal Medicine)                "

## 2020-02-20 ENCOUNTER — OFFICE VISIT (OUTPATIENT)
Dept: FAMILY MEDICINE | Facility: CLINIC | Age: 80
End: 2020-02-20
Payer: COMMERCIAL

## 2020-02-20 ENCOUNTER — TRANSFERRED RECORDS (OUTPATIENT)
Dept: HEALTH INFORMATION MANAGEMENT | Facility: CLINIC | Age: 80
End: 2020-02-20

## 2020-02-20 VITALS
RESPIRATION RATE: 16 BRPM | OXYGEN SATURATION: 100 % | WEIGHT: 210.2 LBS | HEIGHT: 64 IN | SYSTOLIC BLOOD PRESSURE: 124 MMHG | BODY MASS INDEX: 35.89 KG/M2 | DIASTOLIC BLOOD PRESSURE: 62 MMHG | TEMPERATURE: 97.7 F | HEART RATE: 51 BPM

## 2020-02-20 DIAGNOSIS — E11.9 TYPE 2 DIABETES MELLITUS WITHOUT COMPLICATION, WITHOUT LONG-TERM CURRENT USE OF INSULIN (H): Primary | ICD-10-CM

## 2020-02-20 DIAGNOSIS — M15.0 PRIMARY OSTEOARTHRITIS INVOLVING MULTIPLE JOINTS: ICD-10-CM

## 2020-02-20 DIAGNOSIS — E03.9 ACQUIRED HYPOTHYROIDISM: ICD-10-CM

## 2020-02-20 DIAGNOSIS — E66.01 MORBID OBESITY DUE TO EXCESS CALORIES (H): ICD-10-CM

## 2020-02-20 DIAGNOSIS — E78.5 HYPERLIPIDEMIA LDL GOAL <130: ICD-10-CM

## 2020-02-20 DIAGNOSIS — I10 ESSENTIAL HYPERTENSION, BENIGN: ICD-10-CM

## 2020-02-20 DIAGNOSIS — K21.9 GASTROESOPHAGEAL REFLUX DISEASE WITHOUT ESOPHAGITIS: ICD-10-CM

## 2020-02-20 DIAGNOSIS — I35.0 AORTIC VALVE STENOSIS, ETIOLOGY OF CARDIAC VALVE DISEASE UNSPECIFIED: ICD-10-CM

## 2020-02-20 LAB
ALBUMIN SERPL-MCNC: 3.2 G/DL (ref 3.4–5)
ALP SERPL-CCNC: 60 U/L (ref 40–150)
ALT SERPL W P-5'-P-CCNC: 17 U/L (ref 0–50)
ANION GAP SERPL CALCULATED.3IONS-SCNC: 6 MMOL/L (ref 3–14)
AST SERPL W P-5'-P-CCNC: 19 U/L (ref 0–45)
BILIRUB SERPL-MCNC: 0.4 MG/DL (ref 0.2–1.3)
BUN SERPL-MCNC: 23 MG/DL (ref 7–30)
CALCIUM SERPL-MCNC: 9.2 MG/DL (ref 8.5–10.1)
CHLORIDE SERPL-SCNC: 108 MMOL/L (ref 94–109)
CHOLEST SERPL-MCNC: 150 MG/DL
CO2 SERPL-SCNC: 25 MMOL/L (ref 20–32)
CREAT SERPL-MCNC: 0.79 MG/DL (ref 0.52–1.04)
GFR SERPL CREATININE-BSD FRML MDRD: 71 ML/MIN/{1.73_M2}
GLUCOSE SERPL-MCNC: 128 MG/DL (ref 70–99)
HBA1C MFR BLD: 5.7 % (ref 0–5.6)
HDLC SERPL-MCNC: 42 MG/DL
LDLC SERPL CALC-MCNC: 86 MG/DL
NONHDLC SERPL-MCNC: 108 MG/DL
POTASSIUM SERPL-SCNC: 4.2 MMOL/L (ref 3.4–5.3)
PROT SERPL-MCNC: 6.8 G/DL (ref 6.8–8.8)
SODIUM SERPL-SCNC: 139 MMOL/L (ref 133–144)
TRIGL SERPL-MCNC: 110 MG/DL
TSH SERPL DL<=0.005 MIU/L-ACNC: 2.2 MU/L (ref 0.4–4)

## 2020-02-20 PROCEDURE — 36415 COLL VENOUS BLD VENIPUNCTURE: CPT | Performed by: INTERNAL MEDICINE

## 2020-02-20 PROCEDURE — 83036 HEMOGLOBIN GLYCOSYLATED A1C: CPT | Performed by: INTERNAL MEDICINE

## 2020-02-20 PROCEDURE — 84443 ASSAY THYROID STIM HORMONE: CPT | Performed by: INTERNAL MEDICINE

## 2020-02-20 PROCEDURE — 99214 OFFICE O/P EST MOD 30 MIN: CPT | Performed by: INTERNAL MEDICINE

## 2020-02-20 PROCEDURE — 80053 COMPREHEN METABOLIC PANEL: CPT | Performed by: INTERNAL MEDICINE

## 2020-02-20 PROCEDURE — 99207 C FOOT EXAM  NO CHARGE: CPT | Performed by: INTERNAL MEDICINE

## 2020-02-20 PROCEDURE — 80061 LIPID PANEL: CPT | Performed by: INTERNAL MEDICINE

## 2020-02-20 RX ORDER — LEVOTHYROXINE SODIUM 50 UG/1
50 TABLET ORAL DAILY
Qty: 90 TABLET | Refills: 3 | Status: SHIPPED | OUTPATIENT
Start: 2020-02-20 | End: 2021-02-24

## 2020-02-20 RX ORDER — ANTIARTHRITIC COMBINATION NO.2 900 MG
1 TABLET ORAL DAILY
COMMUNITY

## 2020-02-20 RX ORDER — AMLODIPINE BESYLATE 5 MG/1
TABLET ORAL
Qty: 90 TABLET | Refills: 3 | Status: SHIPPED | OUTPATIENT
Start: 2020-02-20 | End: 2021-02-24

## 2020-02-20 RX ORDER — LISINOPRIL 30 MG/1
TABLET ORAL
Qty: 90 TABLET | Refills: 3 | Status: SHIPPED | OUTPATIENT
Start: 2020-02-20 | End: 2021-02-24

## 2020-02-20 RX ORDER — MELOXICAM 7.5 MG/1
7.5 TABLET ORAL DAILY
Qty: 90 TABLET | Refills: 3 | Status: SHIPPED | OUTPATIENT
Start: 2020-02-20 | End: 2021-02-24

## 2020-02-20 RX ORDER — PANTOPRAZOLE SODIUM 20 MG/1
20 TABLET, DELAYED RELEASE ORAL DAILY
Qty: 90 TABLET | Refills: 3 | Status: SHIPPED | OUTPATIENT
Start: 2020-02-20 | End: 2021-02-24

## 2020-02-20 RX ORDER — SIMVASTATIN 20 MG
TABLET ORAL
Qty: 90 TABLET | Refills: 3 | Status: SHIPPED | OUTPATIENT
Start: 2020-02-20 | End: 2021-02-24

## 2020-02-20 ASSESSMENT — MIFFLIN-ST. JEOR: SCORE: 1413.46

## 2020-02-20 NOTE — PROGRESS NOTES
Subjective     Lexii Stratton is a 79 year old female who presents to clinic today for the following health issues:    HPI   Hyperlipidemia Follow-Up      Are you regularly taking any medication or supplement to lower your cholesterol?   Yes- simvastatin    Are you having muscle aches or other side effects that you think could be caused by your cholesterol lowering medication?  No    Hypertension Follow-up      Do you check your blood pressure regularly outside of the clinic? Yes     Are you following a low salt diet? Yes    Are your blood pressures ever more than 140 on the top number (systolic) OR more   than 90 on the bottom number (diastolic), for example 140/90? No    Hypothyroidism Follow-up      Since last visit, patient describes the following symptoms: Weight stable, no hair loss, no skin changes, no constipation, no loose stools      How many servings of fruits and vegetables do you eat daily?  0-1    On average, how many sweetened beverages do you drink each day (Examples: soda, juice, sweet tea, etc.  Do NOT count diet or artificially sweetened beverages)?   0    How many days per week do you exercise enough to make your heart beat faster? 3 or less    How many minutes a day do you exercise enough to make your heart beat faster? 30 - 60    How many days per week do you miss taking your medication? 0    Diabetes Follow-up      How often are you checking your blood sugar? Not at all    What concerns do you have today about your diabetes? None     Do you have any of these symptoms? (Select all that apply)  No numbness or tingling in feet.  No redness, sores or blisters on feet.  No complaints of excessive thirst.  No reports of blurry vision.  No significant changes to weight.    Have you had a diabetic eye exam in the last 12 months? Yes-  Location: [facility]        BP Readings from Last 2 Encounters:   02/20/20 124/62   11/12/19 126/80     Hemoglobin A1C (%)   Date Value   08/27/2019 5.9 (H)    02/25/2019 6.6 (H)     LDL Cholesterol Calculated (mg/dL)   Date Value   08/27/2019 77   02/25/2019 89         Patient Active Problem List   Diagnosis     Generalized osteoarthrosis, unspecified site     Esophageal reflux     Obesity     Other symptoms involving urinary system     Acquired hypothyroidism     Hypersomnia with sleep apnea     Essential hypertension, benign     Impaired fasting glucose     Hyperlipidemia LDL goal <130     Advance Care Planning     Chest pain syndrome     Partial small bowel obstruction (H)     Anemia, unspecified     Poor iron absorption     Iron and its compounds causing adverse effect in therapeutic use(E934.0)     S/P total hip arthroplasty     Overactive bladder     Morbid obesity due to excess calories (H)     GI bleed     Elevated hemoglobin A1c     Pelvic floor dysfunction     Diabetes mellitus, type 2 (H)     Past Surgical History:   Procedure Laterality Date     ARTHROPLASTY HIP Right 11/9/2016    Procedure: ARTHROPLASTY HIP;  Surgeon: Angel Lund MD;  Location:  OR     AS REPAIR INCISIONAL HERNIA,REDUCIBLE  1998    inc hernia ( [facility]ile[facility] surgery)     BLADDER SURGERY      hyperdistention surgery and sling     C NONSPECIFIC PROCEDURE  1946    T&A     C NONSPECIFIC PROCEDURE  1984    Vaginal Hysterectomy (has her ovaries)     C NONSPECIFIC PROCEDURE  1973    PPTL     C NONSPECIFIC PROCEDURE  1994    L shoulder to remove bone spurs     C NONSPECIFIC PROCEDURE  2004    cysto and durasphere Dr Demarco     C NONSPECIFIC PROCEDURE  2005    cysto and durasphere     C NONSPECIFIC PROCEDURE  2007    cysto and durasphere     C NONSPECIFIC PROCEDURE  2009    retropubic TVT sling     CHOLECYSTECTOMY  1987     COLONOSCOPY  12/3/2011    Procedure:COLONOSCOPY; COLONOSCOPY; Surgeon:SEMAJ GRAHAM; Location: GI     COLONOSCOPY N/A 3/29/2018    Procedure: COLONOSCOPY;  COLONOSCOPY Rm 544;  Surgeon: Marco Gusman MD;  Location:  GI     CYSTOSCOPY N/A 9/6/2017    Procedure:  CYSTOSCOPY;  CYSTOSCOPY AND HYDRODISTENTION ;  Surgeon: Eyal Bai MD;  Location: [facility]     ENT SURGERY      Tonsillectomy     ESOPHAGOSCOPY, GASTROSCOPY, DUODENOSCOPY (EGD), COMBINED N/A 2016    Procedure: COMBINED ESOPHAGOSCOPY, GASTROSCOPY, DUODENOSCOPY (EGD), BIOPSY SINGLE OR MULTIPLE;  Surgeon: Marco Monaco MD;  Location:  GI     GENITOURINARY SURGERY       GYN SURGERY      Hysterectomy     HERNIA REPAIR, UMBILICAL  2010    Dr. Kirt Franco times 3     ORTHOPEDIC SURGERY      [facility] - Dr. Lund- bilateral knee replacement       Social History     Tobacco Use     Smoking status: Never Smoker     Smokeless tobacco: Never Used   Substance Use Topics     Alcohol use: No     Alcohol/week: 0.0 standard drinks     Family History   Problem Relation Age of Onset     Heart Disease Mother         heart surgery , new valve     Gallbladder Disease Mother      Coronary Artery Disease Mother      Hyperlipidemia Mother      Asthma Mother      Heart Disease Maternal Grandmother      Coronary Artery Disease Maternal Grandmother      Heart Disease Maternal Grandfather      Coronary Artery Disease Maternal Grandfather      Cancer Father         throat ca,  76     Coronary Artery Disease Father      Diabetes Brother         Half Brother     Cardiovascular Brother         lung ca, Half brother     Cancer Brother         half brother  lung          Current Outpatient Medications   Medication Sig Dispense Refill     acetaminophen (TYLENOL) 500 MG tablet Take 2 tablets (1,000 mg) by mouth 2 times daily as needed for mild pain 100 tablet 0     amLODIPine 5 MG PO tablet TAKE ONE TABLET BY MOUTH EVERY NIGHT AT BEDTIME 90 tablet 3     Biotin 5000 MCG PO TABS Take 1 tablet by mouth daily       Calcium Carbonate-Vit D-Min (RA CALCIUM PLUS MINERALS/VIT D PO) Take 1 tablet by mouth every other day Every other day at Lunch       CENTRUM SILVER OR TABS 1 TABLET DAILY 30 0     FENOFIBRATE PO Take 160 mg by mouth  daily       ferrous gluconate (FERGON) 324 (38 FE) MG tablet Take 1 tablet (324 mg) by mouth 2 times daily 100 tablet 0     fexofenadine (ALLEGRA) 60 MG tablet Take 1 tablet (60 mg) by mouth 2 times daily 90 tablet 0     levothyroxine 50 MCG PO tablet Take 1 tablet (50 mcg) by mouth daily 90 tablet 3     lisinopril 30 MG PO tablet TAKE ONE TABLET BY MOUTH EVERY DAY FOR HYPERTENSION 90 tablet 3     MAGNESIUM PO Take 1 tablet by mouth daily (with breakfast)       meloxicam 7.5 MG PO tablet Take 1 tablet (7.5 mg) by mouth daily 90 tablet 3     OMEGA-3 FATTY ACIDS 1200 MG OR CAPS 1 TABLET DAILY  0     pantoprazole 20 MG PO EC tablet Take 1 tablet (20 mg) by mouth daily 90 tablet 3     polyethylene glycol (MIRALAX/GLYCOLAX) powder Take 17 g (1 capful) by mouth daily 578 g 3     potassium 99 MG TABS Take 1 tablet by mouth daily (with dinner)        Probiotic Product (ACIDOPHILUS PROBIOTIC BLEND) CAPS Take 1 capsule by mouth daily (with breakfast)  30 capsule 0     psyllium (METAMUCIL) 0.52 G capsule Take 2 capsules (1.04 g) by mouth 3 times daily To take with fluid 540 capsule      simvastatin 20 MG PO tablet TAKE ONE TABLET BY MOUTH EVERY NIGHT AT BEDTIME 90 tablet 3     travoprost, TIMI Free, (TRAVATAN Z) 0.004 % ophthalmic solution Place 1 drop into both eyes At Bedtime  2.5 mL 1     trospium (SANCTURA) 20 MG tablet Take 1 tablet (20 mg) by mouth 2 times daily (before meals) 180 tablet 3     UNABLE TO FIND daily (with dinner) MEDICATION NAME Cranberry.  2 capsules daily        VITAMIN D, CHOLECALCIFEROL, PO Take 2,000 Units by mouth every other day Every other day at Supper       Allergies   Allergen Reactions     Augmentin Diarrhea     Codeine Nausea and Vomiting     Hydrocodone      Keeps patient awake     Naproxen Itching and Rash     Seasonal Allergies      Hay fever in fall, ragweed and russian thistles     Sulfa Drugs Nausea     Recent Labs   Lab Test 08/27/19  0948 02/25/19  1118 09/06/18  0952   A1C 5.9* 6.6*  "6.4*   LDL 77 89 71   HDL 40* 50 38*   TRIG 159* 128 166*   ALT 18 20 21   CR 0.90 0.87 0.93   GFRESTIMATED 61 64 58*   GFRESTBLACK 71 74 70   POTASSIUM 4.3 4.1 4.8   TSH 2.10 1.89  --       BP Readings from Last 3 Encounters:   02/20/20 124/62   11/12/19 126/80   08/27/19 128/64    Wt Readings from Last 3 Encounters:   02/20/20 95.3 kg (210 lb 3.2 oz)   11/12/19 95.3 kg (210 lb)   08/27/19 97.8 kg (215 lb 11.2 oz)           Reviewed and updated as needed this visit by Provider  Tobacco  Allergies  Meds  Problems  Med Hx  Surg Hx  Fam Hx         Review of Systems   ROS COMP: CONSTITUTIONAL: NEGATIVE for fever, chills, change in weight  INTEGUMENTARY/SKIN: NEGATIVE for worrisome rashes, moles or lesions  ENT/MOUTH: NEGATIVE for ear, mouth and throat problems  RESP: NEGATIVE for significant cough or SOB  CV: NEGATIVE for chest pain, palpitations or peripheral edema  GI: reflux; pt taking PPI on a regular basis.   : mixed incontinence; she has followed with [facility]; pt prefers not to travel to [facility] for procedures; seeking a second opinion at [facility] with Dr. Shahnaz Alba in [place] at [address]   She has had botox in the past and found it to be helpful; second opinion today in    MUSCULOSKELETAL: arthritis, especially in her back is better with Meloxicam; she has been cutting 15 mg dose; offered to refill at lower dose and no cutting pills needed.   NEURO: NEGATIVE for weakness, dizziness or paresthesias  ENDOCRINE: diabetes; has been better controlled with diet and exercise; no DM medications.   HEME/ALLERGY/IMMUNE: NEGATIVE for bleeding problems  PSYCHIATRIC: NEGATIVE for changes in mood or affect      Objective    /62   Pulse 51   Temp 97.7  F (36.5  C) (Oral)   Resp 16   Ht 1.626 m (5' 4\")   Wt 95.3 kg (210 lb 3.2 oz)   LMP  (LMP Unknown)   SpO2 100%   BMI 36.08 kg/m    Body mass index is 36.08 kg/m .  Physical Exam   GENERAL: healthy, alert and no " distress  HENT: ear canals and TM's normal, nose and mouth without ulcers or lesions  NECK: no adenopathy, no asymmetry, masses, or scars and thyroid normal to palpation  RESP: lungs clear to auscultation - no rales, rhonchi or wheezes  CV: regular rates and rhythm, normal S1 S2, no S3 or S4, grade 3/6 systolic murmur, peripheral pulses strong and 1+ edema  ABDOMEN: soft, nontender, no hepatosplenomegaly, no masses and bowel sounds normal  MS: osteoarthritis changes noted.   SKIN: mild erythema in lower legs at the ankle ( venous stasis changes)  NEURO: Normal strength and tone, mentation intact and speech normal  PSYCH: mentation appears normal, affect normal/bright    Diagnostic Test Results:  Labs reviewed in Epic        Assessment & Plan     (E11.9) Type 2 diabetes mellitus without complication, without long-term current use of insulin (H)  (primary encounter diagnosis)  Comment: no DM medications; doing well with diet and exercise; no monitoring of blood sugars. OK to continue as long as A1C<6.5; otherwise consider adding DM education or glucose monitoring.   Plan: FOOT EXAM, Comprehensive metabolic panel,         Hemoglobin A1c          (E78.5) Hyperlipidemia LDL goal <130  Comment: statin well controlled  Plan: simvastatin 20 MG PO tablet, Comprehensive         metabolic panel, Lipid panel reflex to direct         LDL Fasting          (K21.9) Gastroesophageal reflux disease without esophagitis  Comment: triggers reviewed; using PPI daily   Plan: pantoprazole 20 MG PO EC tablet        Pt reminded of benefits of maximizing calcium and vit D and to help with bone health (due to being on PPI); also B12 due to potential B12 absorption concerns.      (I10) Essential hypertension, benign  Comment: BLOOD PRESSURE well controlled; no hypotension;   Consider cutting Amlodipine to 2.5 mg per day and monitor; if renal function OK, could consider change to ACE dose.   Plan: lisinopril 30 MG PO tablet, amLODIPine 5 MG PO  "        tablet, Comprehensive metabolic panel          (E03.9) Acquired hypothyroidism  Comment:   TSH   Date Value Ref Range Status   08/27/2019 2.10 0.40 - 4.00 mU/L Final    has done well on Levothyroxine.   Plan: levothyroxine 50 MCG PO tablet, TSH with free         T4 reflex          (I35.0) Aortic valve stenosis, etiology of cardiac valve disease unspecified  Comment: 3/6 murmur; no angina, syncope or dyspnea  Plan:     (M15.0) Primary osteoarthritis involving multiple joints  Comment: back primarily; warm water exercise pool ( [employer])  Plan: meloxicam 7.5 MG PO tablet             BMI:   Estimated body mass index is 36.08 kg/m  as calculated from the following:    Height as of this encounter: 1.626 m (5' 4\").    Weight as of this encounter: 95.3 kg (210 lb 3.2 oz).   Weight management plan: Discussed healthy diet and exercise guidelines      Return in about 6 months (around 8/20/2020) for Wellness visit, Hypertension, Diabetes, cholesterol, Thyroid.    Jesenia Madrigal MD  Internal Medicine   [facility]      "

## 2020-03-15 ENCOUNTER — HEALTH MAINTENANCE LETTER (OUTPATIENT)
Age: 80
End: 2020-03-15

## 2020-08-06 ENCOUNTER — TRANSFERRED RECORDS (OUTPATIENT)
Dept: HEALTH INFORMATION MANAGEMENT | Facility: CLINIC | Age: 80
End: 2020-08-06

## 2020-08-11 DIAGNOSIS — Z11.59 ENCOUNTER FOR SCREENING FOR OTHER VIRAL DISEASES: Primary | ICD-10-CM

## 2020-08-26 ENCOUNTER — OFFICE VISIT (OUTPATIENT)
Dept: FAMILY MEDICINE | Facility: CLINIC | Age: 80
End: 2020-08-26
Payer: COMMERCIAL

## 2020-08-26 VITALS
HEIGHT: 64 IN | OXYGEN SATURATION: 99 % | DIASTOLIC BLOOD PRESSURE: 72 MMHG | WEIGHT: 205 LBS | TEMPERATURE: 97.9 F | BODY MASS INDEX: 35 KG/M2 | RESPIRATION RATE: 16 BRPM | HEART RATE: 69 BPM | SYSTOLIC BLOOD PRESSURE: 128 MMHG

## 2020-08-26 DIAGNOSIS — E66.01 MORBID OBESITY DUE TO EXCESS CALORIES (H): ICD-10-CM

## 2020-08-26 DIAGNOSIS — I35.0 AORTIC VALVE STENOSIS, ETIOLOGY OF CARDIAC VALVE DISEASE UNSPECIFIED: ICD-10-CM

## 2020-08-26 DIAGNOSIS — E78.5 HYPERLIPIDEMIA LDL GOAL <130: ICD-10-CM

## 2020-08-26 DIAGNOSIS — I10 ESSENTIAL HYPERTENSION, BENIGN: ICD-10-CM

## 2020-08-26 DIAGNOSIS — R73.09 ELEVATED HEMOGLOBIN A1C: ICD-10-CM

## 2020-08-26 DIAGNOSIS — Z00.00 ENCOUNTER FOR MEDICARE ANNUAL WELLNESS EXAM: Primary | ICD-10-CM

## 2020-08-26 LAB
ALBUMIN SERPL-MCNC: 3.5 G/DL (ref 3.4–5)
ALP SERPL-CCNC: 68 U/L (ref 40–150)
ALT SERPL W P-5'-P-CCNC: 18 U/L (ref 0–50)
ANION GAP SERPL CALCULATED.3IONS-SCNC: 7 MMOL/L (ref 3–14)
AST SERPL W P-5'-P-CCNC: 26 U/L (ref 0–45)
BILIRUB SERPL-MCNC: 0.5 MG/DL (ref 0.2–1.3)
BUN SERPL-MCNC: 29 MG/DL (ref 7–30)
CALCIUM SERPL-MCNC: 9.6 MG/DL (ref 8.5–10.1)
CHLORIDE SERPL-SCNC: 106 MMOL/L (ref 94–109)
CHOLEST SERPL-MCNC: 153 MG/DL
CO2 SERPL-SCNC: 25 MMOL/L (ref 20–32)
CREAT SERPL-MCNC: 1.06 MG/DL (ref 0.52–1.04)
CREAT UR-MCNC: 106 MG/DL
GFR SERPL CREATININE-BSD FRML MDRD: 50 ML/MIN/{1.73_M2}
GLUCOSE SERPL-MCNC: 119 MG/DL (ref 70–99)
HBA1C MFR BLD: 5.9 % (ref 0–5.6)
HDLC SERPL-MCNC: 43 MG/DL
LDLC SERPL CALC-MCNC: 83 MG/DL
MICROALBUMIN UR-MCNC: 6 MG/L
MICROALBUMIN/CREAT UR: 5.21 MG/G CR (ref 0–25)
NONHDLC SERPL-MCNC: 110 MG/DL
POTASSIUM SERPL-SCNC: 4.5 MMOL/L (ref 3.4–5.3)
PROT SERPL-MCNC: 7.1 G/DL (ref 6.8–8.8)
SODIUM SERPL-SCNC: 138 MMOL/L (ref 133–144)
TRIGL SERPL-MCNC: 133 MG/DL

## 2020-08-26 PROCEDURE — 83036 HEMOGLOBIN GLYCOSYLATED A1C: CPT | Performed by: INTERNAL MEDICINE

## 2020-08-26 PROCEDURE — 80053 COMPREHEN METABOLIC PANEL: CPT | Performed by: INTERNAL MEDICINE

## 2020-08-26 PROCEDURE — 82043 UR ALBUMIN QUANTITATIVE: CPT | Performed by: INTERNAL MEDICINE

## 2020-08-26 PROCEDURE — 80061 LIPID PANEL: CPT | Performed by: INTERNAL MEDICINE

## 2020-08-26 PROCEDURE — 36415 COLL VENOUS BLD VENIPUNCTURE: CPT | Performed by: INTERNAL MEDICINE

## 2020-08-26 PROCEDURE — 99397 PER PM REEVAL EST PAT 65+ YR: CPT | Performed by: INTERNAL MEDICINE

## 2020-08-26 RX ORDER — FENOFIBRATE 160 MG/1
160 TABLET ORAL DAILY
Qty: 90 TABLET | Refills: 2 | Status: SHIPPED | OUTPATIENT
Start: 2020-08-26 | End: 2021-06-10

## 2020-08-26 ASSESSMENT — ENCOUNTER SYMPTOMS
DIARRHEA: 0
FREQUENCY: 1
MYALGIAS: 1
HEADACHES: 0
PALPITATIONS: 0
JOINT SWELLING: 0
NERVOUS/ANXIOUS: 0
CHILLS: 0
DYSURIA: 0
FEVER: 0
HEARTBURN: 0
HEMATURIA: 0
BREAST MASS: 0
SORE THROAT: 0
PARESTHESIAS: 0
EYE PAIN: 0
DIZZINESS: 0
ABDOMINAL PAIN: 0
SHORTNESS OF BREATH: 0
HEMATOCHEZIA: 0
NEUROLOGICAL NEGATIVE: 1
ARTHRALGIAS: 1
WEAKNESS: 0
PSYCHIATRIC NEGATIVE: 1
COUGH: 0
CONSTIPATION: 1
NAUSEA: 0

## 2020-08-26 ASSESSMENT — MIFFLIN-ST. JEOR: SCORE: 1381.93

## 2020-08-26 ASSESSMENT — ACTIVITIES OF DAILY LIVING (ADL): CURRENT_FUNCTION: NO ASSISTANCE NEEDED

## 2020-08-26 NOTE — PROGRESS NOTES
"Chief Complaint   Patient presents with     Wellness Visit     pt fasting        SUBJECTIVE:   Lexii Stratton is a 79 year old female who presents for Preventive Visit.    Discussed additional charges that may incur if addressing non physical related concerns.   Pt agreeable.  GISELA Pryor LPN      Are you in the first 12 months of your Medicare coverage?  No    Healthy Habits:     In general, how would you rate your overall health?  Good    Frequency of exercise:  2-3 days/week    Duration of exercise:  15-30 minutes    Do you usually eat at least 4 servings of fruit and vegetables a day, include whole grains    & fiber and avoid regularly eating high fat or \"junk\" foods?  No    Taking medications regularly:  Yes    Medication side effects:  None    Ability to successfully perform activities of daily living:  No assistance needed    Home Safety:  No safety concerns identified    Hearing Impairment:  Difficulty following a conversation in a noisy restaurant or crowded room and need to ask people to speak up or repeat themselves    In the past 6 months, have you been bothered by leaking of urine? Yes    In general, how would you rate your overall mental or emotional health?  Good      PHQ-2 Total Score: 0    Additional concerns today:  No    Do you feel safe in your environment? Yes    Have you ever done Advance Care Planning? (For example, a Health Directive, POLST, or a discussion with a medical provider or your loved ones about your wishes): Yes, advance care planning is on file.      Fall risk  Fallen 2 or more times in the past year?: No  Any fall with injury in the past year?: No    Cognitive Screening   1) Repeat 3 items (Leader, Season, Table)    2) Clock draw: NORMAL  3) 3 item recall: Recalls 3 objects  Results: 3 items recalled: COGNITIVE IMPAIRMENT LESS LIKELY    Mini-CogTM Copyright LUCIE Velasquez. Licensed by the author for use in [facility]; reprinted with permission (minerva@.[facility]). All rights " reserved.      Do you have sleep apnea, excessive snoring or daytime drowsiness?: no    Reviewed and updated as needed this visit by clinical staff  Tobacco  Allergies  Meds  Med Hx  Surg Hx  Fam Hx  Soc Hx        Reviewed and updated as needed this visit by Provider  Tobacco  Allergies  Meds  Problems  Med Hx  Surg Hx  Fam Hx        Social History     Tobacco Use     Smoking status: Never Smoker     Smokeless tobacco: Never Used   Substance Use Topics     Alcohol use: No     Alcohol/week: 0.0 standard drinks         Alcohol Use 8/26/2020   Prescreen: >3 drinks/day or >7 drinks/week? Not Applicable   Prescreen: >3 drinks/day or >7 drinks/week? -           Diabetes Follow-up      How often are you checking your blood sugar? Not at all    What concerns do you have today about your diabetes? None     Do you have any of these symptoms? (Select all that apply)  No numbness or tingling in feet.  No redness, sores or blisters on feet.  No complaints of excessive thirst.  No reports of blurry vision.  No significant changes to weight.    Have you had a diabetic eye exam in the last 12 months? No        BP Readings from Last 2 Encounters:   08/26/20 128/72   02/20/20 124/62     Hemoglobin A1C (%)   Date Value   02/20/2020 5.7 (H)   08/27/2019 5.9 (H)     LDL Cholesterol Calculated (mg/dL)   Date Value   02/20/2020 86   08/27/2019 77       Hyperlipidemia Follow-Up      Are you regularly taking any medication or supplement to lower your cholesterol?   Yes- simvastatin  And fenofibrate.     Are you having muscle aches or other side effects that you think could be caused by your cholesterol lowering medication?  No    Hypertension Follow-up      Do you check your blood pressure regularly outside of the clinic? No     Are you following a low salt diet? Yes    Are your blood pressures ever more than 140 on the top number (systolic) OR more   than 90 on the bottom number (diastolic), for example 140/90?  N/A      Current providers sharing in care for this patient include:   Patient Care Team:  Jesenia Madrigal MD as PCP - General (Internal Medicine)  Jesenia Madrigal MD as Assigned PCP  Jesenia Madrigal MD as Referring Physician (Internal Medicine)    The following health maintenance items are reviewed in Epic and correct as of today:  Health Maintenance   Topic Date Due     HEPATITIS B IMMUNIZATION (1 of 3 - Risk 3-dose series) [birth year]17/1959     ZOSTER IMMUNIZATION (2 of 3) 09/11/2012     MEDICARE ANNUAL WELLNESS VISIT  12/03/2013     EYE EXAM  04/24/2020     A1C  05/20/2020     MICROALBUMIN  08/27/2020     FALL RISK ASSESSMENT  08/27/2020     INFLUENZA VACCINE (1) 09/01/2020     MAMMO SCREENING  09/23/2020     BMP  02/20/2021     LIPID  02/20/2021     TSH W/FREE T4 REFLEX  02/20/2021     DIABETIC FOOT EXAM  02/20/2021     ADVANCE CARE PLANNING  01/09/2022     DTAP/TDAP/TD IMMUNIZATION (3 - Td) 06/11/2023     COLORECTAL CANCER SCREENING  03/29/2028     DEXA  Completed     PHQ-2  Completed     PNEUMOCOCCAL IMMUNIZATION 65+ LOW/MEDIUM RISK  Completed     IPV IMMUNIZATION  Aged Out     MENINGITIS IMMUNIZATION  Aged Out     Labs reviewed in EPIC  BP Readings from Last 3 Encounters:   08/26/20 128/72   02/20/20 124/62   11/12/19 126/80    Wt Readings from Last 3 Encounters:   08/26/20 93 kg (205 lb)   02/20/20 95.3 kg (210 lb 3.2 oz)   11/12/19 95.3 kg (210 lb)                  Patient Active Problem List   Diagnosis     Generalized osteoarthrosis, unspecified site     Esophageal reflux     Obesity     Other symptoms involving urinary system     Acquired hypothyroidism     Hypersomnia with sleep apnea     Essential hypertension, benign     Impaired fasting glucose     Hyperlipidemia LDL goal <130     Advance Care Planning     Chest pain syndrome     Partial small bowel obstruction (H)     Anemia, unspecified     Poor iron absorption     Iron and its compounds causing adverse effect in therapeutic use(E934.0)      S/P total hip arthroplasty     Overactive bladder     Morbid obesity due to excess calories (H)     GI bleed     Elevated hemoglobin A1c     Pelvic floor dysfunction     Diabetes mellitus, type 2 (H)     Past Surgical History:   Procedure Laterality Date     ARTHROPLASTY HIP Right 11/9/2016    Procedure: ARTHROPLASTY HIP;  Surgeon: Angel Lund MD;  Location:  OR     AS REPAIR INCISIONAL HERNIA,REDUCIBLE  1998    inc hernia ( [facility]ileth surgery)     BLADDER SURGERY      hyperdistention surgery and sling     C NONSPECIFIC PROCEDURE  1946    T&A     C NONSPECIFIC PROCEDURE  1984    Vaginal Hysterectomy (has her ovaries)     C NONSPECIFIC PROCEDURE  1973    PPTL     C NONSPECIFIC PROCEDURE  1994    L shoulder to remove bone spurs     C NONSPECIFIC PROCEDURE  2004    cysto and durasphere Dr Nilam     C NONSPECIFIC PROCEDURE  2005    cysto and durasphere     C NONSPECIFIC PROCEDURE  2007    cysto and durasphere     C NONSPECIFIC PROCEDURE  2009    retropubic TVT sling     CHOLECYSTECTOMY  1987     COLONOSCOPY  12/3/2011    Procedure:COLONOSCOPY; COLONOSCOPY; Surgeon:SEMAJ GRAHAM; Location: GI     COLONOSCOPY N/A 3/29/2018    Procedure: COLONOSCOPY;  COLONOSCOPY Rm 544;  Surgeon: Marco Gusman MD;  Location:  GI     CYSTOSCOPY N/A 9/6/2017    Procedure: CYSTOSCOPY;  CYSTOSCOPY AND HYDRODISTENTION ;  Surgeon: Eyal Bai MD;  Location:  OR     ENT SURGERY      Tonsillectomy     ESOPHAGOSCOPY, GASTROSCOPY, DUODENOSCOPY (EGD), COMBINED N/A 9/26/2016    Procedure: COMBINED ESOPHAGOSCOPY, GASTROSCOPY, DUODENOSCOPY (EGD), BIOPSY SINGLE OR MULTIPLE;  Surgeon: Marco Monaco MD;  Location:  GI     GENITOURINARY SURGERY       GYN SURGERY      Hysterectomy     HERNIA REPAIR, UMBILICAL  7/8/2010    Dr. Kirt Franco times 3     ORTHOPEDIC SURGERY  2009    [facility] - Dr. Lund- bilateral knee replacement       Social History     Tobacco Use     Smoking status: Never Smoker     Smokeless  tobacco: Never Used   Substance Use Topics     Alcohol use: No     Alcohol/week: 0.0 standard drinks     Family History   Problem Relation Age of Onset     Heart Disease Mother         heart surgery , new valve     Gallbladder Disease Mother      Coronary Artery Disease Mother      Hyperlipidemia Mother      Asthma Mother      Heart Disease Maternal Grandmother      Coronary Artery Disease Maternal Grandmother      Heart Disease Maternal Grandfather      Coronary Artery Disease Maternal Grandfather      Cancer Father         throat ca,  76     Coronary Artery Disease Father      Diabetes Brother         Half Brother     Cardiovascular Brother         lung ca, Half brother     Cancer Brother         half brother  lung          Current Outpatient Medications   Medication Sig Dispense Refill     acetaminophen (TYLENOL) 500 MG tablet Take 2 tablets (1,000 mg) by mouth 2 times daily as needed for mild pain 100 tablet 0     amLODIPine 5 MG PO tablet TAKE ONE TABLET BY MOUTH EVERY NIGHT AT BEDTIME 90 tablet 3     Biotin 5000 MCG PO TABS Take 1 tablet by mouth daily       Calcium Carbonate-Vit D-Min (RA CALCIUM PLUS MINERALS/VIT D PO) Take 1 tablet by mouth every other day Every other day at Lunch       CENTRUM SILVER OR TABS 1 TABLET DAILY 30 0     FENOFIBRATE PO Take 160 mg by mouth daily       ferrous gluconate (FERGON) 324 (38 FE) MG tablet Take 1 tablet (324 mg) by mouth 2 times daily 100 tablet 0     fexofenadine (ALLEGRA) 60 MG tablet Take 1 tablet (60 mg) by mouth 2 times daily 90 tablet 0     levothyroxine 50 MCG PO tablet Take 1 tablet (50 mcg) by mouth daily 90 tablet 3     lisinopril 30 MG PO tablet TAKE ONE TABLET BY MOUTH EVERY DAY FOR HYPERTENSION 90 tablet 3     MAGNESIUM PO Take 1 tablet by mouth daily (with breakfast)       meloxicam 7.5 MG PO tablet Take 1 tablet (7.5 mg) by mouth daily 90 tablet 3     OMEGA-3 FATTY ACIDS 1200 MG OR CAPS 1 TABLET DAILY  0     pantoprazole 20 MG PO EC tablet Take 1  tablet (20 mg) by mouth daily 90 tablet 3     polyethylene glycol (MIRALAX/GLYCOLAX) powder Take 17 g (1 capful) by mouth daily 578 g 3     potassium 99 MG TABS Take 1 tablet by mouth daily (with dinner)        Probiotic Product (ACIDOPHILUS PROBIOTIC BLEND) CAPS Take 1 capsule by mouth daily (with breakfast)  30 capsule 0     psyllium (METAMUCIL) 0.52 G capsule Take 2 capsules (1.04 g) by mouth 3 times daily To take with fluid 540 capsule      simvastatin 20 MG PO tablet TAKE ONE TABLET BY MOUTH EVERY NIGHT AT BEDTIME 90 tablet 3     travoprost, TIMI Free, (TRAVATAN Z) 0.004 % ophthalmic solution Place 1 drop into both eyes At Bedtime  2.5 mL 1     trospium (SANCTURA) 20 MG tablet Take 1 tablet (20 mg) by mouth 2 times daily (before meals) 180 tablet 3     UNABLE TO FIND daily (with dinner) MEDICATION NAME Cranberry.  2 capsules daily        VITAMIN D, CHOLECALCIFEROL, PO Take 2,000 Units by mouth every other day Every other day at Supper       Allergies   Allergen Reactions     Augmentin Diarrhea     Codeine Nausea and Vomiting     Hydrocodone      Keeps patient awake     Naproxen Itching and Rash     Seasonal Allergies      Hay fever in fall, ragweed and russian thistles     Sulfa Drugs Nausea     Recent Labs   Lab Test 02/20/20  0848 08/27/19  0948 02/25/19  1118   A1C 5.7* 5.9* 6.6*   LDL 86 77 89   HDL 42* 40* 50   TRIG 110 159* 128   ALT 17 18 20   CR 0.79 0.90 0.87   GFRESTIMATED 71 61 64   GFRESTBLACK 82 71 74   POTASSIUM 4.2 4.3 4.1   TSH 2.20 2.10 1.89      Mammogram Screening: Patient over age 75, has elected to continue with mammography screening.    Review of Systems   Constitutional: Negative for chills and fever.   HENT: Positive for hearing loss (currently no hearing aids;  follows with raz Nuñez, suspect need soon). Negative for ear pain and sore throat.    Eyes: Positive for visual disturbance (wears glasses). Negative for pain.   Respiratory: Negative for cough and shortness of breath.   "  Cardiovascular: Negative for chest pain, palpitations and peripheral edema.        ROBYN 1.2 cm 2 in 2018  No Syncope, Dyspnea, Angina.    Gastrointestinal: Positive for constipation (due to Trospium; Dr. Carbone bladder intersti). Negative for abdominal pain, diarrhea, heartburn, hematochezia and nausea.   Breasts:  Negative for tenderness, breast mass and discharge.   Genitourinary: Positive for frequency (follows with Urology) and urgency. Negative for dysuria, genital sores, hematuria, pelvic pain, vaginal bleeding and vaginal discharge.   Musculoskeletal: Positive for arthralgias and myalgias. Negative for joint swelling.   Skin: Negative for rash.   Neurological: Negative.  Negative for dizziness, weakness, headaches and paresthesias.   Psychiatric/Behavioral: Negative.  Negative for mood changes. The patient is not nervous/anxious.        OBJECTIVE:   /72   Pulse 69   Temp 97.9  F (36.6  C) (Oral)   Resp 16   Ht 1.613 m (5' 3.5\")   Wt 93 kg (205 lb)   LMP  (LMP Unknown)   SpO2 99%   BMI 35.74 kg/m   Estimated body mass index is 35.74 kg/m  as calculated from the following:    Height as of this encounter: 1.613 m (5' 3.5\").    Weight as of this encounter: 93 kg (205 lb).  Physical Exam  GENERAL: healthy, alert and no distress  EYES: Eyes grossly normal to inspection  HENT: normal cephalic/atraumatic, ear canals and TM's normal, nose and mouth without ulcers or lesions, oropharynx clear and oral mucous membranes moist  NECK: no adenopathy, no asymmetry, masses, or scars and thyroid normal to palpation  RESP: lungs clear to auscultation - no rales, rhonchi or wheezes  BREAST: normal without masses, tenderness or nipple discharge and no palpable axillary masses or adenopathy  CV: regular rate and rhythm, normal S1 S2, no S3 or S4, no murmur, click or rub, no peripheral edema and peripheral pulses strong  ABDOMEN: soft, nontender, no hepatosplenomegaly, no masses and bowel sounds normal  MS: no gross " "musculoskeletal defects noted, no edema  NEURO: Normal strength and tone, sensory exam grossly normal and mentation intact  PSYCH: mentation appears normal and affect normal/bright    Diagnostic Test Results:  Labs reviewed in Epic    ASSESSMENT / PLAN:   (Z00.00) Encounter for Medicare annual wellness exam  (primary encounter diagnosis)  Comment: HEALTH CARE MAINTENANCE reviewed  Immunizations reviewed; recommend influenza and consider ShingRX  Plan:     (I10) Essential hypertension, benign  Comment: BLOOD PRESSURE well controlled  Plan: Albumin Random Urine Quantitative with Creat         Ratio, Comprehensive metabolic panel        No chnage in  Medications     (E78.5) Hyperlipidemia LDL goal <130  Comment: she is also on Simvastatin and had no difficulties with medications.   Plan: fenofibrate (TRIGLIDE/LOFIBRA) 160 MG tablet,         Comprehensive metabolic panel, Lipid panel         reflex to direct LDL Fasting          (E66.01) Morbid obesity due to excess calories (H)  Comment: encourage keeping active and exercising regularly  Plan:     (I35.0) Aortic valve stenosis, etiology of cardiac valve disease unspecified  Comment: reviewed ECHOs from 9/10/20/18-  Aortic stenosis and ROBYN 1.2 cm 2    Compared with ECHO 12/14/2012  No Aortic stenosis noted.   No new symptoms including syncope, dyspnea or angina  Plan: reassess in 1-2 yes    (R73.09) Elevated hemoglobin A1c  Comment:   Lab Results   Component Value Date    A1C 5.7 02/20/2020    A1C 5.9 08/27/2019     Plan: Hemoglobin A1c        encourage to continue to eat healthy      COUNSELING:  Reviewed preventive health counseling, as reflected in patient instructions       Regular exercise       Healthy diet/nutrition       reviewed immunizations    Estimated body mass index is 35.74 kg/m  as calculated from the following:    Height as of this encounter: 1.613 m (5' 3.5\").    Weight as of this encounter: 93 kg (205 lb).    Weight management plan: Discussed healthy " diet and exercise guidelines    She reports that she has never smoked. She has never used smokeless tobacco.      Appropriate preventive services were discussed with this patient, including applicable screening as appropriate for cardiovascular disease, diabetes, osteopenia/osteoporosis, and glaucoma.  As appropriate for age/gender, discussed screening for colorectal cancer, prostate cancer, breast cancer, and cervical cancer. Checklist reviewing preventive services available has been given to the patient.    Reviewed patients plan of care and provided an AVS. The Basic Care Plan (routine screening as documented in Health Maintenance) for Lexii meets the Care Plan requirement. This Care Plan has been established and reviewed with the Patient.    Counseling Resources:  ATP IV Guidelines  Pooled Cohorts Equation Calculator  Breast Cancer Risk Calculator  Breast Cancer: Medication to Reduce Risk  FRAX Risk Assessment  ICSI Preventive Guidelines  Dietary Guidelines for Americans, 2010  USDA's MyPlate  ASA Prophylaxis  Lung CA Screening    Jesenia Madrigal MD  Internal Medicine   [facility]    Identified Health Risks:

## 2020-08-26 NOTE — PATIENT INSTRUCTIONS
Patient Education   Personalized Prevention Plan  You are due for the preventive services outlined below.  Your care team is available to assist you in scheduling these services.  If you have already completed any of these items, please share that information with your care team to update in your medical record.  Health Maintenance Due   Topic Date Due     Hepatitis B Vaccine (1 of 3 - Risk 3-dose series) 12/17/1959     Zoster (Shingles) Vaccine (2 of 3) 09/11/2012     Annual Wellness Visit  12/03/2013     Eye Exam  04/24/2020     A1C Lab  05/20/2020     Kidney Microalbumin Urine Test  08/27/2020     FALL RISK ASSESSMENT  08/27/2020     Flu Vaccine (1) 09/01/2020     Mammogram  09/23/2020

## 2020-09-14 ENCOUNTER — OFFICE VISIT (OUTPATIENT)
Dept: FAMILY MEDICINE | Facility: CLINIC | Age: 80
End: 2020-09-14
Payer: COMMERCIAL

## 2020-09-14 VITALS
HEART RATE: 77 BPM | DIASTOLIC BLOOD PRESSURE: 70 MMHG | BODY MASS INDEX: 34.72 KG/M2 | TEMPERATURE: 98 F | HEIGHT: 64 IN | WEIGHT: 203.4 LBS | RESPIRATION RATE: 16 BRPM | SYSTOLIC BLOOD PRESSURE: 130 MMHG | OXYGEN SATURATION: 98 %

## 2020-09-14 DIAGNOSIS — Z01.818 PREOP GENERAL PHYSICAL EXAM: Primary | ICD-10-CM

## 2020-09-14 DIAGNOSIS — N32.81 OAB (OVERACTIVE BLADDER): ICD-10-CM

## 2020-09-14 DIAGNOSIS — E11.9 TYPE 2 DIABETES MELLITUS WITHOUT COMPLICATION, WITHOUT LONG-TERM CURRENT USE OF INSULIN (H): ICD-10-CM

## 2020-09-14 DIAGNOSIS — E66.01 MORBID OBESITY DUE TO EXCESS CALORIES (H): ICD-10-CM

## 2020-09-14 DIAGNOSIS — I35.0 AORTIC VALVE STENOSIS, ETIOLOGY OF CARDIAC VALVE DISEASE UNSPECIFIED: ICD-10-CM

## 2020-09-14 DIAGNOSIS — I10 ESSENTIAL HYPERTENSION, BENIGN: ICD-10-CM

## 2020-09-14 DIAGNOSIS — Z11.59 ENCOUNTER FOR SCREENING FOR OTHER VIRAL DISEASES: ICD-10-CM

## 2020-09-14 LAB
SARS-COV-2 RNA SPEC QL NAA+PROBE: NOT DETECTED
SPECIMEN SOURCE: NORMAL

## 2020-09-14 PROCEDURE — 93000 ELECTROCARDIOGRAM COMPLETE: CPT | Performed by: INTERNAL MEDICINE

## 2020-09-14 PROCEDURE — 99214 OFFICE O/P EST MOD 30 MIN: CPT | Performed by: INTERNAL MEDICINE

## 2020-09-14 PROCEDURE — U0003 INFECTIOUS AGENT DETECTION BY NUCLEIC ACID (DNA OR RNA); SEVERE ACUTE RESPIRATORY SYNDROME CORONAVIRUS 2 (SARS-COV-2) (CORONAVIRUS DISEASE [COVID-19]), AMPLIFIED PROBE TECHNIQUE, MAKING USE OF HIGH THROUGHPUT TECHNOLOGIES AS DESCRIBED BY CMS-2020-01-R: HCPCS | Performed by: OBSTETRICS & GYNECOLOGY

## 2020-09-14 ASSESSMENT — MIFFLIN-ST. JEOR: SCORE: 1374.68

## 2020-09-14 NOTE — H&P (VIEW-ONLY)
[facility]  [address] [facility] [id number] [phone]  Dept: [phone]    PRE-OP EVALUATION:  Today's date: 2020    Lexii Stratton (: 1940) presents for pre-operative evaluation assessment as requested by Dr. Shahnaz Carbone .  She requires evaluation and anesthesia risk assessment prior to undergoing surgery/procedure for treatment of bladder control issues  .    Fax number for surgical facility: Does not need to be faxed   Primary Physician: Jesenia Madrigal  Type of Anesthesia Anticipated: to be determined    Preop Questionnnaire:  Pre-op Questionnaire 2020   Surgery Location: [facility]   Surgeon: Dr Shahnaz Carbone   Surgery/Procedure: sacral neurostimulation stage one implant of neurostimulator lead       Then stage two implant of neurostimulator    Surgery Date:  and     Time of Surgery: 9 am for both procedures   Where patient plans to recover: At home with family   1. Have you ever had a heart attack or stroke? No   2. Have you ever had surgery on your heart or blood vessels, such as a stent placement, a coronary artery bypass, or surgery on an artery in your head, neck, heart, or legs? No   3. Do you have chest pain with activity? No   4. Do you have a history of  heart failure? No   5. Do you currently have a cold, bronchitis or symptoms of other infection? No   6. Do you have a cough, shortness of breath, or wheezing? No   7. Do you or anyone in your family have previous history of blood clots? No   8. Do you or does anyone in your family have a serious bleeding problem such as prolonged bleeding following surgeries or cuts? No   9. Have you ever had problems with anemia or been told to take iron pills? YES - current treatment   10. Have you had any abnormal blood loss such as black, tarry or bloody stools, or abnormal vaginal bleeding? No   11. Have you ever had a blood transfusion? YES - no complications    11a. Have you ever  had a transfusion reaction? No   Are you willing to have a blood transfusion if it is medically needed before, during, or after your surgery? Yes   13. Have you or any of your relatives ever had problems with anesthesia? YES - family hx mother had confusion    14. Do you have sleep apnea, excessive snoring or daytime drowsiness? No   15. Do you have any artifical heart valves or other implanted medical devices like a pacemaker, defibrillator, or continuous glucose monitor? No   16. Do you have artificial joints? YES - bilateral knees and right hip   17. Are you allergic to latex? No   18. Is there any chance that you may be pregnant? No         HPI:     HPI related to upcoming procedure: Lexii Stratton is a 79 year old female who presents for preoperative evaluation prior to bladder stimulator placement for treatment of overactive bladder and incontinence.  She has seen several urologists and have tried 7 medications without benefit ( many resulted in constipation), Bladder Interstim x 2, Botox in bladder x 5; she is now hopeful for relief of her chronic bladder incontinence.         See problem list for active medical problems.  Problems all longstanding and stable, except as noted/documented.  See ROS for pertinent symptoms related to these conditions.      MEDICAL HISTORY:     Patient Active Problem List    Diagnosis Date Noted     Hyperlipidemia LDL goal <130 10/31/2010     Priority: High     Impaired fasting glucose 07/16/2007     Priority: High     Essential hypertension, benign 06/25/2006     Priority: High     Diabetes mellitus, type 2 (H) 08/27/2019     Priority: Medium     Pelvic floor dysfunction 05/08/2019     Priority: Medium     Elevated hemoglobin A1c 02/25/2019     Priority: Medium     9/2018  A1C 6.4  Diet and exercise       GI bleed 03/29/2018     Priority: Medium     Morbid obesity due to excess calories (H) 08/21/2017     Priority: Medium     Overactive bladder 02/01/2017     Priority: Medium      [facility] Dr. Bai; has tried 7 medications without benefit, Interstim x 2, Botox in bladder, x 5; no infection now. Ongoing symptoms; defer to Urology.       S/P total hip arthroplasty 11/09/2016     Priority: Medium     Anemia, unspecified 09/19/2016     Priority: Medium     Diagnosis updated by automated process. Provider to review and confirm.       Poor iron absorption 09/19/2016     Priority: Medium     Iron and its compounds causing adverse effect in therapeutic use(E934.0) 09/19/2016     Priority: Medium     Partial small bowel obstruction (H) 01/05/2015     Priority: Medium     12/23/2014; was hospitalized through 12/26/2014; NG to decompress stomach; no surgery,       Chest pain syndrome 07/02/2013     Priority: Medium     Hypersomnia with sleep apnea 11/23/2004     Priority: Medium     Problem list name updated by automated process. Provider to review       Acquired hypothyroidism 11/11/2004     Priority: Medium     Other symptoms involving urinary system 10/05/2002     Priority: Medium     Problem list name updated by automated process. Provider to review and confirm       Generalized osteoarthrosis, unspecified site 10/03/2002     Priority: Medium     Esophageal reflux 10/03/2002     Priority: Medium     Obesity 10/03/2002     Priority: Medium     Problem list name updated by automated process. Provider to review       Advance Care Planning 06/07/2011     Priority: Low     Advance Care Planning 1/9/2017: Receipt of ACP document:  Received: Health Care Directive which was witnessed or notarized on 5/27/09.  Document previously scanned on 11/14/16.  Validation form completed and sent to be scanned.  Code Status reflects choices in most recent ACP document.  Confirmed/documented designated decision maker(s).  Added by Beatriz Esteban RN, Advance Care Planning Liaison.          Past Medical History:   Diagnosis Date     Arthritis      Chronic infection     Recent UTI cleared now     Esophageal  reflux      Hernia, abdominal      Hypertension     No cardiologist     Incontinence of urine      Mumps     6 years old     Obese      Osteoarthritis      Other and unspecified hyperlipidemia      Other chronic pain     back     Peptic ulcer, unspecified site, unspecified as acute or chronic, without mention of hemorrhage, perforation, or obstruction      Small bowel obstruction (H)      Thyroid disease hypothyroidism     Urinary incontinence      Walking troubles      Past Surgical History:   Procedure Laterality Date     ARTHROPLASTY HIP Right 11/9/2016    Procedure: ARTHROPLASTY HIP;  Surgeon: Angel Lund MD;  Location:  OR     AS REPAIR INCISIONAL HERNIA,REDUCIBLE  1998    inc hernia ( [facility] surgery)     BLADDER SURGERY      hyperdistention surgery and sling     C NONSPECIFIC PROCEDURE  1946    T&A     C NONSPECIFIC PROCEDURE  1984    Vaginal Hysterectomy (has her ovaries)     C NONSPECIFIC PROCEDURE  1973    PPTL     C NONSPECIFIC PROCEDURE  1994    L shoulder to remove bone spurs     C NONSPECIFIC PROCEDURE  2004    cysto and durasphere Dr Demarco     C NONSPECIFIC PROCEDURE  2005    cysto and durasphere     C NONSPECIFIC PROCEDURE  2007    cysto and durasphere     C NONSPECIFIC PROCEDURE  2009    retropubic TVT sling     CHOLECYSTECTOMY  1987     COLONOSCOPY  12/3/2011    Procedure:COLONOSCOPY; COLONOSCOPY; Surgeon:SEMAJ GRAHAM; Location: GI     COLONOSCOPY N/A 3/29/2018    Procedure: COLONOSCOPY;  COLONOSCOPY Rm 544;  Surgeon: Marco Gusman MD;  Location:  GI     CYSTOSCOPY N/A 9/6/2017    Procedure: CYSTOSCOPY;  CYSTOSCOPY AND HYDRODISTENTION ;  Surgeon: Eyal Bai MD;  Location: [facility]     ENT SURGERY      Tonsillectomy     ESOPHAGOSCOPY, GASTROSCOPY, DUODENOSCOPY (EGD), COMBINED N/A 9/26/2016    Procedure: COMBINED ESOPHAGOSCOPY, GASTROSCOPY, DUODENOSCOPY (EGD), BIOPSY SINGLE OR MULTIPLE;  Surgeon: Marco Monaco MD;  Location:  GI     GENITOURINARY SURGERY        GYN SURGERY      Hysterectomy     HERNIA REPAIR, UMBILICAL  7/8/2010    Dr. Kirt Franco times 3     ORTHOPEDIC SURGERY  2009    [facility] - Dr. Lund- bilateral knee replacement     Current Outpatient Medications   Medication Sig Dispense Refill     acetaminophen (TYLENOL) 500 MG tablet Take 2 tablets (1,000 mg) by mouth 2 times daily as needed for mild pain 100 tablet 0     amLODIPine 5 MG PO tablet TAKE ONE TABLET BY MOUTH EVERY NIGHT AT BEDTIME 90 tablet 3     Biotin 5000 MCG PO TABS Take 1 tablet by mouth daily       Calcium Carbonate-Vit D-Min (RA CALCIUM PLUS MINERALS/VIT D PO) Take 1 tablet by mouth every other day Every other day at Lunch       CENTRUM SILVER OR TABS 1 TABLET DAILY 30 0     fenofibrate (TRIGLIDE/LOFIBRA) 160 MG tablet Take 1 tablet (160 mg) by mouth daily 90 tablet 2     ferrous gluconate (FERGON) 324 (38 FE) MG tablet Take 1 tablet (324 mg) by mouth 2 times daily 100 tablet 0     fexofenadine (ALLEGRA) 60 MG tablet Take 1 tablet (60 mg) by mouth 2 times daily 90 tablet 0     levothyroxine 50 MCG PO tablet Take 1 tablet (50 mcg) by mouth daily 90 tablet 3     lisinopril 30 MG PO tablet TAKE ONE TABLET BY MOUTH EVERY DAY FOR HYPERTENSION 90 tablet 3     MAGNESIUM PO Take 1 tablet by mouth daily (with breakfast)       meloxicam 7.5 MG PO tablet Take 1 tablet (7.5 mg) by mouth daily 90 tablet 3     OMEGA-3 FATTY ACIDS 1200 MG OR CAPS 1 TABLET DAILY  0     pantoprazole 20 MG PO EC tablet Take 1 tablet (20 mg) by mouth daily 90 tablet 3     polyethylene glycol (MIRALAX/GLYCOLAX) powder Take 17 g (1 capful) by mouth daily 578 g 3     potassium 99 MG TABS Take 1 tablet by mouth daily (with dinner)        Probiotic Product (ACIDOPHILUS PROBIOTIC BLEND) CAPS Take 1 capsule by mouth daily (with breakfast)  30 capsule 0     psyllium (METAMUCIL) 0.52 G capsule Take 2 capsules (1.04 g) by mouth 3 times daily To take with fluid 540 capsule      simvastatin 20 MG PO tablet TAKE ONE TABLET BY MOUTH EVERY  "NIGHT AT BEDTIME 90 tablet 3     travoprost, TIMI Free, (TRAVATAN Z) 0.004 % ophthalmic solution Place 1 drop into both eyes At Bedtime  2.5 mL 1     trospium (SANCTURA) 20 MG tablet Take 1 tablet (20 mg) by mouth 2 times daily (before meals) 180 tablet 3     UNABLE TO FIND daily (with dinner) MEDICATION NAME Cranberry.  2 capsules daily        VITAMIN D, CHOLECALCIFEROL, PO Take 2,000 Units by mouth every other day Every other day at Supper       OTC products: None, except as noted above    Allergies   Allergen Reactions     Augmentin Diarrhea     Codeine Nausea and Vomiting     Hydrocodone      Keeps patient awake     Naproxen Itching and Rash     Seasonal Allergies      Hay fever in fall, ragweed and russian thistles     Sulfa Drugs Nausea      Latex Allergy: NO    Social History     Tobacco Use     Smoking status: Never Smoker     Smokeless tobacco: Never Used   Substance Use Topics     Alcohol use: No     Alcohol/week: 0.0 standard drinks     History   Drug Use No       REVIEW OF SYSTEMS:   CONSTITUTIONAL: NEGATIVE for fever, chills, change in weight  INTEGUMENTARY/SKIN: NEGATIVE for worrisome rashes, moles or lesions  EYES: NEGATIVE for vision changes or irritation  ENT/MOUTH: NEGATIVE for ear, mouth and throat problems  RESP: NEGATIVE for significant cough or SOB  CV: NEGATIVE for chest pain, palpitations or peripheral edema  GI: NEGATIVE for nausea, abdominal pain, heartburn, or change in bowel habits   female: incontinence; overactive bladder; no dysuria  MUSCULOSKELETAL: NEGATIVE for significant arthralgias or myalgia  NEURO: NEGATIVE for weakness, dizziness or paresthesias  HEME: NEGATIVE for bleeding problems  PSYCHIATRIC: NEGATIVE for changes in mood or affect    EXAM:   /70   Pulse 77   Temp 98  F (36.7  C) (Oral)   Resp 16   Ht 1.613 m (5' 3.5\")   Wt 92.3 kg (203 lb 6.4 oz)   LMP  (LMP Unknown)   SpO2 98%   BMI 35.47 kg/m      GENERAL APPEARANCE: healthy, alert and no distress     EYES: " EOMI, PERRL     HENT: ear canals and TM's normal and nose and mouth without ulcers or lesions     NECK: no adenopathy, no asymmetry, masses, or scars and thyroid normal to palpation     RESP: lungs clear to auscultation - no rales, rhonchi or wheezes     CV: regular rates and rhythm, S1. S2, 2-3/6 systolic murmur  Est heard at RUSB     ABDOMEN:  soft, nontender, no HSM or masses and bowel sounds normal     MS: extremities normal- no gross deformities noted, no evidence of inflammation in joints, FROM in all extremities.     SKIN: no suspicious lesions or rashes     NEURO: Normal strength and tone, sensory exam grossly normal, mentation intact and speech normal     PSYCH: mentation appears normal. and affect normal/bright    DIAGNOSTICS:   EKG: appears normal, NSR, rate 81, left axis, normal intervals, no acute ST/T changes c/w ischemia, unchanged from previous tracings    Recent Labs   Lab Test 08/26/20  0933 02/20/20  0848  02/25/19  1118 09/06/18  0952  03/29/18  0606 03/28/18  2057   HGB  --   --   --  12.1 12.6   < > 7.2* 5.7*   PLT  --   --   --   --   --   --  246 275   INR  --   --   --   --   --   --   --  1.15*    139   < > 140 142   < > 143 142   POTASSIUM 4.5 4.2   < > 4.1 4.8   < > 4.0 4.0   CR 1.06* 0.79   < > 0.87 0.93   < > 0.88 0.99   A1C 5.9* 5.7*   < > 6.6* 6.4*  --   --   --     < > = values in this interval not displayed.        IMPRESSION:   Reason for surgery/procedure: urinary urgency and frequency  Diagnosis/reason for consult: overactive bladder, incontinence    The proposed surgical procedure is considered LOW risk.    REVISED CARDIAC RISK INDEX  The patient has the following serious cardiovascular risks for perioperative complications such as (MI, PE, VFib and 3  AV Block):  No serious cardiac risks  INTERPRETATION: 0 risks: Class I (very low risk - 0.4% complication rate)    The patient has the following additional risks for perioperative complications:  Morbid obesity       ICD-10-CM    1. Preop general physical exam  Z01.818 EKG 12-lead complete w/read - Clinics   2. OAB (overactive bladder)  N32.81    3. Essential hypertension, benign  I10     well controlled BP   4. Aortic valve stenosis, etiology of cardiac valve disease unspecified  I35.0     9/10/2018 ECHO  ROBYN 1.2 cm 2;   pt denies dyspnea, angina or Syncope;    5. Morbid obesity due to excess calories (H)  E66.01     BMI>35   6. Type 2 diabetes mellitus without complication, without long-term current use of insulin (H)  E11.9     A1C<6; much improved; currently on diet and exercise       RECOMMENDATIONS:     --Approval given to proceed with proposed procedure, without further diagnostic evaluation  --Pt advised to avoid NSAIDS (Motrin, Ibuprofen, Aleve or Naprosyn);  If needed, Tylenol or Acetaminophen are fine to use.      --Approval given to proceed with proposed procedure, without further diagnostic evaluation  --Pt advised to avoid NSAIDS and vitamin supplements (Motrin, Ibuprofen, Aleve or Naprosyn);  If needed, Tylenol or Acetaminophen are fine to use.  Medications reviewed: hold AM meds except for Levothyroxine 50 mcg and Lisinopril 30 mg per day; take those early AM with a tiny sip of water.   Evening medications can be continued without interruption and with a small sip of water.   --Pain medications, time off from work and FMLA following surgery deferred to surgeon.      APPROVAL GIVEN to proceed with proposed procedure, without further diagnostic evaluation       Signed Electronically by: MD Jesenia Reeves MD  Internal Medicine  electronically signed   Copy of this evaluation report is provided to requesting physician.    [place] Preop Guidelines    Revised Cardiac Risk Index

## 2020-09-14 NOTE — PATIENT INSTRUCTIONS

## 2020-09-14 NOTE — H&P (VIEW-ONLY)
[facility]  [id number] [facility] [id number] [phone]  Dept: [phone]    PRE-OP EVALUATION:  Today's date: 2020    Lexii Stratton (: 1940) presents for pre-operative evaluation assessment as requested by Dr. Shahnaz Carbone .  She requires evaluation and anesthesia risk assessment prior to undergoing surgery/procedure for treatment of bladder control issues  .    Fax number for surgical facility: Does not need to be faxed   Primary Physician: Jesenia Madrigal  Type of Anesthesia Anticipated: to be determined    Preop Questionnnaire:  Pre-op Questionnaire 2020   Surgery Location: [facility]   Surgeon: Dr Shahnaz Carbone   Surgery/Procedure: sacral neurostimulation stage one implant of neurostimulator lead       Then stage two implant of neurostimulator    Surgery Date:  and     Time of Surgery: 9 am for both procedures   Where patient plans to recover: At home with family   1. Have you ever had a heart attack or stroke? No   2. Have you ever had surgery on your heart or blood vessels, such as a stent placement, a coronary artery bypass, or surgery on an artery in your head, neck, heart, or legs? No   3. Do you have chest pain with activity? No   4. Do you have a history of  heart failure? No   5. Do you currently have a cold, bronchitis or symptoms of other infection? No   6. Do you have a cough, shortness of breath, or wheezing? No   7. Do you or anyone in your family have previous history of blood clots? No   8. Do you or does anyone in your family have a serious bleeding problem such as prolonged bleeding following surgeries or cuts? No   9. Have you ever had problems with anemia or been told to take iron pills? YES - current treatment   10. Have you had any abnormal blood loss such as black, tarry or bloody stools, or abnormal vaginal bleeding? No   11. Have you ever had a blood transfusion? YES - no complications    11a. Have you ever  had a transfusion reaction? No   Are you willing to have a blood transfusion if it is medically needed before, during, or after your surgery? Yes   13. Have you or any of your relatives ever had problems with anesthesia? YES - family hx mother had confusion    14. Do you have sleep apnea, excessive snoring or daytime drowsiness? No   15. Do you have any artifical heart valves or other implanted medical devices like a pacemaker, defibrillator, or continuous glucose monitor? No   16. Do you have artificial joints? YES - bilateral knees and right hip   17. Are you allergic to latex? No   18. Is there any chance that you may be pregnant? No         HPI:     HPI related to upcoming procedure: Lexii Stratton is a 79 year old female who presents for preoperative evaluation prior to bladder stimulator placement for treatment of overactive bladder and incontinence.  She has seen several urologists and have tried 7 medications without benefit ( many resulted in constipation), Bladder Interstim x 2, Botox in bladder x 5; she is now hopeful for relief of her chronic bladder incontinence.         See problem list for active medical problems.  Problems all longstanding and stable, except as noted/documented.  See ROS for pertinent symptoms related to these conditions.      MEDICAL HISTORY:     Patient Active Problem List    Diagnosis Date Noted     Hyperlipidemia LDL goal <130 10/31/2010     Priority: High     Impaired fasting glucose 07/16/2007     Priority: High     Essential hypertension, benign 06/25/2006     Priority: High     Diabetes mellitus, type 2 (H) 08/27/2019     Priority: Medium     Pelvic floor dysfunction 05/08/2019     Priority: Medium     Elevated hemoglobin A1c 02/25/2019     Priority: Medium     9/2018  A1C 6.4  Diet and exercise       GI bleed 03/29/2018     Priority: Medium     Morbid obesity due to excess calories (H) 08/21/2017     Priority: Medium     Overactive bladder 02/01/2017     Priority: Medium      [facility] Dr. Bai; has tried 7 medications without benefit, Interstim x 2, Botox in bladder, x 5; no infection now. Ongoing symptoms; defer to Urology.       S/P total hip arthroplasty 11/09/2016     Priority: Medium     Anemia, unspecified 09/19/2016     Priority: Medium     Diagnosis updated by automated process. Provider to review and confirm.       Poor iron absorption 09/19/2016     Priority: Medium     Iron and its compounds causing adverse effect in therapeutic use(E934.0) 09/19/2016     Priority: Medium     Partial small bowel obstruction (H) 01/05/2015     Priority: Medium     12/23/2014; was hospitalized through 12/26/2014; NG to decompress stomach; no surgery,       Chest pain syndrome 07/02/2013     Priority: Medium     Hypersomnia with sleep apnea 11/23/2004     Priority: Medium     Problem list name updated by automated process. Provider to review       Acquired hypothyroidism 11/11/2004     Priority: Medium     Other symptoms involving urinary system 10/05/2002     Priority: Medium     Problem list name updated by automated process. Provider to review and confirm       Generalized osteoarthrosis, unspecified site 10/03/2002     Priority: Medium     Esophageal reflux 10/03/2002     Priority: Medium     Obesity 10/03/2002     Priority: Medium     Problem list name updated by automated process. Provider to review       Advance Care Planning 06/07/2011     Priority: Low     Advance Care Planning 1/9/2017: Receipt of ACP document:  Received: Health Care Directive which was witnessed or notarized on 5/27/09.  Document previously scanned on 11/14/16.  Validation form completed and sent to be scanned.  Code Status reflects choices in most recent ACP document.  Confirmed/documented designated decision maker(s).  Added by Beatriz Esteban RN, Advance Care Planning Liaison.          Past Medical History:   Diagnosis Date     Arthritis      Chronic infection     Recent UTI cleared now     Esophageal  reflux      Hernia, abdominal      Hypertension     No cardiologist     Incontinence of urine      Mumps     6 years old     Obese      Osteoarthritis      Other and unspecified hyperlipidemia      Other chronic pain     back     Peptic ulcer, unspecified site, unspecified as acute or chronic, without mention of hemorrhage, perforation, or obstruction      Small bowel obstruction (H)      Thyroid disease hypothyroidism     Urinary incontinence      Walking troubles      Past Surgical History:   Procedure Laterality Date     ARTHROPLASTY HIP Right 11/9/2016    Procedure: ARTHROPLASTY HIP;  Surgeon: Angel Lund MD;  Location:  OR     AS REPAIR INCISIONAL HERNIA,REDUCIBLE  1998    inc hernia ( [facility] surgery)     BLADDER SURGERY      hyperdistention surgery and sling     C NONSPECIFIC PROCEDURE  1946    T&A     C NONSPECIFIC PROCEDURE  1984    Vaginal Hysterectomy (has her ovaries)     C NONSPECIFIC PROCEDURE  1973    PPTL     C NONSPECIFIC PROCEDURE  1994    L shoulder to remove bone spurs     C NONSPECIFIC PROCEDURE  2004    cysto and durasphere Dr Demarco     C NONSPECIFIC PROCEDURE  2005    cysto and durasphere     C NONSPECIFIC PROCEDURE  2007    cysto and durasphere     C NONSPECIFIC PROCEDURE  2009    retropubic TVT sling     CHOLECYSTECTOMY  1987     COLONOSCOPY  12/3/2011    Procedure:COLONOSCOPY; COLONOSCOPY; Surgeon:SEMAJ GRAHAM; Location: GI     COLONOSCOPY N/A 3/29/2018    Procedure: COLONOSCOPY;  COLONOSCOPY Rm 544;  Surgeon: Marco Gusman MD;  Location:  GI     CYSTOSCOPY N/A 9/6/2017    Procedure: CYSTOSCOPY;  CYSTOSCOPY AND HYDRODISTENTION ;  Surgeon: Eyal Bai MD;  Location: [facility]     ENT SURGERY      Tonsillectomy     ESOPHAGOSCOPY, GASTROSCOPY, DUODENOSCOPY (EGD), COMBINED N/A 9/26/2016    Procedure: COMBINED ESOPHAGOSCOPY, GASTROSCOPY, DUODENOSCOPY (EGD), BIOPSY SINGLE OR MULTIPLE;  Surgeon: Marco Monaco MD;  Location:  GI     GENITOURINARY SURGERY        GYN SURGERY      Hysterectomy     HERNIA REPAIR, UMBILICAL  7/8/2010    Dr. Kirt Franco times 3     ORTHOPEDIC SURGERY  2009    [facility] - Dr. Lund- bilateral knee replacement     Current Outpatient Medications   Medication Sig Dispense Refill     acetaminophen (TYLENOL) 500 MG tablet Take 2 tablets (1,000 mg) by mouth 2 times daily as needed for mild pain 100 tablet 0     amLODIPine 5 MG PO tablet TAKE ONE TABLET BY MOUTH EVERY NIGHT AT BEDTIME 90 tablet 3     Biotin 5000 MCG PO TABS Take 1 tablet by mouth daily       Calcium Carbonate-Vit D-Min (RA CALCIUM PLUS MINERALS/VIT D PO) Take 1 tablet by mouth every other day Every other day at Lunch       CENTRUM SILVER OR TABS 1 TABLET DAILY 30 0     fenofibrate (TRIGLIDE/LOFIBRA) 160 MG tablet Take 1 tablet (160 mg) by mouth daily 90 tablet 2     ferrous gluconate (FERGON) 324 (38 FE) MG tablet Take 1 tablet (324 mg) by mouth 2 times daily 100 tablet 0     fexofenadine (ALLEGRA) 60 MG tablet Take 1 tablet (60 mg) by mouth 2 times daily 90 tablet 0     levothyroxine 50 MCG PO tablet Take 1 tablet (50 mcg) by mouth daily 90 tablet 3     lisinopril 30 MG PO tablet TAKE ONE TABLET BY MOUTH EVERY DAY FOR HYPERTENSION 90 tablet 3     MAGNESIUM PO Take 1 tablet by mouth daily (with breakfast)       meloxicam 7.5 MG PO tablet Take 1 tablet (7.5 mg) by mouth daily 90 tablet 3     OMEGA-3 FATTY ACIDS 1200 MG OR CAPS 1 TABLET DAILY  0     pantoprazole 20 MG PO EC tablet Take 1 tablet (20 mg) by mouth daily 90 tablet 3     polyethylene glycol (MIRALAX/GLYCOLAX) powder Take 17 g (1 capful) by mouth daily 578 g 3     potassium 99 MG TABS Take 1 tablet by mouth daily (with dinner)        Probiotic Product (ACIDOPHILUS PROBIOTIC BLEND) CAPS Take 1 capsule by mouth daily (with breakfast)  30 capsule 0     psyllium (METAMUCIL) 0.52 G capsule Take 2 capsules (1.04 g) by mouth 3 times daily To take with fluid 540 capsule      simvastatin 20 MG PO tablet TAKE ONE TABLET BY MOUTH EVERY  "NIGHT AT BEDTIME 90 tablet 3     travoprost, TIMI Free, (TRAVATAN Z) 0.004 % ophthalmic solution Place 1 drop into both eyes At Bedtime  2.5 mL 1     trospium (SANCTURA) 20 MG tablet Take 1 tablet (20 mg) by mouth 2 times daily (before meals) 180 tablet 3     UNABLE TO FIND daily (with dinner) MEDICATION NAME Cranberry.  2 capsules daily        VITAMIN D, CHOLECALCIFEROL, PO Take 2,000 Units by mouth every other day Every other day at Supper       OTC products: None, except as noted above    Allergies   Allergen Reactions     Augmentin Diarrhea     Codeine Nausea and Vomiting     Hydrocodone      Keeps patient awake     Naproxen Itching and Rash     Seasonal Allergies      Hay fever in fall, ragweed and russian thistles     Sulfa Drugs Nausea      Latex Allergy: NO    Social History     Tobacco Use     Smoking status: Never Smoker     Smokeless tobacco: Never Used   Substance Use Topics     Alcohol use: No     Alcohol/week: 0.0 standard drinks     History   Drug Use No       REVIEW OF SYSTEMS:   CONSTITUTIONAL: NEGATIVE for fever, chills, change in weight  INTEGUMENTARY/SKIN: NEGATIVE for worrisome rashes, moles or lesions  EYES: NEGATIVE for vision changes or irritation  ENT/MOUTH: NEGATIVE for ear, mouth and throat problems  RESP: NEGATIVE for significant cough or SOB  CV: NEGATIVE for chest pain, palpitations or peripheral edema  GI: NEGATIVE for nausea, abdominal pain, heartburn, or change in bowel habits   female: incontinence; overactive bladder; no dysuria  MUSCULOSKELETAL: NEGATIVE for significant arthralgias or myalgia  NEURO: NEGATIVE for weakness, dizziness or paresthesias  HEME: NEGATIVE for bleeding problems  PSYCHIATRIC: NEGATIVE for changes in mood or affect    EXAM:   /70   Pulse 77   Temp 98  F (36.7  C) (Oral)   Resp 16   Ht 1.613 m (5' 3.5\")   Wt 92.3 kg (203 lb 6.4 oz)   LMP  (LMP Unknown)   SpO2 98%   BMI 35.47 kg/m      GENERAL APPEARANCE: healthy, alert and no distress     EYES: " EOMI, PERRL     HENT: ear canals and TM's normal and nose and mouth without ulcers or lesions     NECK: no adenopathy, no asymmetry, masses, or scars and thyroid normal to palpation     RESP: lungs clear to auscultation - no rales, rhonchi or wheezes     CV: regular rates and rhythm, S1. S2, 2-3/6 systolic murmur  Est heard at RUSB     ABDOMEN:  soft, nontender, no HSM or masses and bowel sounds normal     MS: extremities normal- no gross deformities noted, no evidence of inflammation in joints, FROM in all extremities.     SKIN: no suspicious lesions or rashes     NEURO: Normal strength and tone, sensory exam grossly normal, mentation intact and speech normal     PSYCH: mentation appears normal. and affect normal/bright    DIAGNOSTICS:   EKG: appears normal, NSR, rate 81, left axis, normal intervals, no acute ST/T changes c/w ischemia, unchanged from previous tracings    Recent Labs   Lab Test 08/26/20  0933 02/20/20  0848  02/25/19  1118 09/06/18  0952  03/29/18  0606 03/28/18  2057   HGB  --   --   --  12.1 12.6   < > 7.2* 5.7*   PLT  --   --   --   --   --   --  246 275   INR  --   --   --   --   --   --   --  1.15*    139   < > 140 142   < > 143 142   POTASSIUM 4.5 4.2   < > 4.1 4.8   < > 4.0 4.0   CR 1.06* 0.79   < > 0.87 0.93   < > 0.88 0.99   A1C 5.9* 5.7*   < > 6.6* 6.4*  --   --   --     < > = values in this interval not displayed.        IMPRESSION:   Reason for surgery/procedure: urinary urgency and frequency  Diagnosis/reason for consult: overactive bladder, incontinence    The proposed surgical procedure is considered LOW risk.    REVISED CARDIAC RISK INDEX  The patient has the following serious cardiovascular risks for perioperative complications such as (MI, PE, VFib and 3  AV Block):  No serious cardiac risks  INTERPRETATION: 0 risks: Class I (very low risk - 0.4% complication rate)    The patient has the following additional risks for perioperative complications:  Morbid obesity       ICD-10-CM    1. Preop general physical exam  Z01.818 EKG 12-lead complete w/read - Clinics   2. OAB (overactive bladder)  N32.81    3. Essential hypertension, benign  I10     well controlled BP   4. Aortic valve stenosis, etiology of cardiac valve disease unspecified  I35.0     9/10/2018 ECHO  ROBYN 1.2 cm 2;   pt denies dyspnea, angina or Syncope;    5. Morbid obesity due to excess calories (H)  E66.01     BMI>35   6. Type 2 diabetes mellitus without complication, without long-term current use of insulin (H)  E11.9     A1C<6; much improved; currently on diet and exercise       RECOMMENDATIONS:     --Approval given to proceed with proposed procedure, without further diagnostic evaluation  --Pt advised to avoid NSAIDS (Motrin, Ibuprofen, Aleve or Naprosyn);  If needed, Tylenol or Acetaminophen are fine to use.      --Approval given to proceed with proposed procedure, without further diagnostic evaluation  --Pt advised to avoid NSAIDS and vitamin supplements (Motrin, Ibuprofen, Aleve or Naprosyn);  If needed, Tylenol or Acetaminophen are fine to use.  Medications reviewed: hold AM meds except for Levothyroxine 50 mcg and Lisinopril 30 mg per day; take those early AM with a tiny sip of water.   Evening medications can be continued without interruption and with a small sip of water.   --Pain medications, time off from work and FMLA following surgery deferred to surgeon.      APPROVAL GIVEN to proceed with proposed procedure, without further diagnostic evaluation       Signed Electronically by: MD Jesenia Reeves MD  Internal Medicine  electronically signed   Copy of this evaluation report is provided to requesting physician.    [place] Preop Guidelines    Revised Cardiac Risk Index

## 2020-09-14 NOTE — PROGRESS NOTES
[facility]  [address] [facility] [id number] [phone]  Dept: [phone]    PRE-OP EVALUATION:  Today's date: 2020    Lexii Stratton (: 1940) presents for pre-operative evaluation assessment as requested by Dr. Shahnaz Carbone .  She requires evaluation and anesthesia risk assessment prior to undergoing surgery/procedure for treatment of bladder control issues  .    Fax number for surgical facility: Does not need to be faxed   Primary Physician: Jesenia Madrigal  Type of Anesthesia Anticipated: to be determined    Preop Questionnnaire:  Pre-op Questionnaire 2020   Surgery Location: [facility]   Surgeon: Dr Shahnaz Carbone   Surgery/Procedure: sacral neurostimulation stage one implant of neurostimulator lead       Then stage two implant of neurostimulator    Surgery Date:  and     Time of Surgery: 9 am for both procedures   Where patient plans to recover: At home with family   1. Have you ever had a heart attack or stroke? No   2. Have you ever had surgery on your heart or blood vessels, such as a stent placement, a coronary artery bypass, or surgery on an artery in your head, neck, heart, or legs? No   3. Do you have chest pain with activity? No   4. Do you have a history of  heart failure? No   5. Do you currently have a cold, bronchitis or symptoms of other infection? No   6. Do you have a cough, shortness of breath, or wheezing? No   7. Do you or anyone in your family have previous history of blood clots? No   8. Do you or does anyone in your family have a serious bleeding problem such as prolonged bleeding following surgeries or cuts? No   9. Have you ever had problems with anemia or been told to take iron pills? YES - current treatment   10. Have you had any abnormal blood loss such as black, tarry or bloody stools, or abnormal vaginal bleeding? No   11. Have you ever had a blood transfusion? YES - no complications    11a. Have you ever  had a transfusion reaction? No   Are you willing to have a blood transfusion if it is medically needed before, during, or after your surgery? Yes   13. Have you or any of your relatives ever had problems with anesthesia? YES - family hx mother had confusion    14. Do you have sleep apnea, excessive snoring or daytime drowsiness? No   15. Do you have any artifical heart valves or other implanted medical devices like a pacemaker, defibrillator, or continuous glucose monitor? No   16. Do you have artificial joints? YES - bilateral knees and right hip   17. Are you allergic to latex? No   18. Is there any chance that you may be pregnant? No         HPI:     HPI related to upcoming procedure: Lexii Stratton is a 79 year old female who presents for preoperative evaluation prior to bladder stimulator placement for treatment of overactive bladder and incontinence.  She has seen several urologists and have tried 7 medications without benefit ( many resulted in constipation), Bladder Interstim x 2, Botox in bladder x 5; she is now hopeful for relief of her chronic bladder incontinence.         See problem list for active medical problems.  Problems all longstanding and stable, except as noted/documented.  See ROS for pertinent symptoms related to these conditions.      MEDICAL HISTORY:     Patient Active Problem List    Diagnosis Date Noted     Hyperlipidemia LDL goal <130 10/31/2010     Priority: High     Impaired fasting glucose 07/16/2007     Priority: High     Essential hypertension, benign 06/25/2006     Priority: High     Diabetes mellitus, type 2 (H) 08/27/2019     Priority: Medium     Pelvic floor dysfunction 05/08/2019     Priority: Medium     Elevated hemoglobin A1c 02/25/2019     Priority: Medium     9/2018  A1C 6.4  Diet and exercise       GI bleed 03/29/2018     Priority: Medium     Morbid obesity due to excess calories (H) 08/21/2017     Priority: Medium     Overactive bladder 02/01/2017     Priority: Medium      [facility] Dr. Bai; has tried 7 medications without benefit, Interstim x 2, Botox in bladder, x 5; no infection now. Ongoing symptoms; defer to Urology.       S/P total hip arthroplasty 11/09/2016     Priority: Medium     Anemia, unspecified 09/19/2016     Priority: Medium     Diagnosis updated by automated process. Provider to review and confirm.       Poor iron absorption 09/19/2016     Priority: Medium     Iron and its compounds causing adverse effect in therapeutic use(E934.0) 09/19/2016     Priority: Medium     Partial small bowel obstruction (H) 01/05/2015     Priority: Medium     12/23/2014; was hospitalized through 12/26/2014; NG to decompress stomach; no surgery,       Chest pain syndrome 07/02/2013     Priority: Medium     Hypersomnia with sleep apnea 11/23/2004     Priority: Medium     Problem list name updated by automated process. Provider to review       Acquired hypothyroidism 11/11/2004     Priority: Medium     Other symptoms involving urinary system 10/05/2002     Priority: Medium     Problem list name updated by automated process. Provider to review and confirm       Generalized osteoarthrosis, unspecified site 10/03/2002     Priority: Medium     Esophageal reflux 10/03/2002     Priority: Medium     Obesity 10/03/2002     Priority: Medium     Problem list name updated by automated process. Provider to review       Advance Care Planning 06/07/2011     Priority: Low     Advance Care Planning 1/9/2017: Receipt of ACP document:  Received: Health Care Directive which was witnessed or notarized on 5/27/09.  Document previously scanned on 11/14/16.  Validation form completed and sent to be scanned.  Code Status reflects choices in most recent ACP document.  Confirmed/documented designated decision maker(s).  Added by Beatriz Esteban RN, Advance Care Planning Liaison.          Past Medical History:   Diagnosis Date     Arthritis      Chronic infection     Recent UTI cleared now     Esophageal  reflux      Hernia, abdominal      Hypertension     No cardiologist     Incontinence of urine      Mumps     6 years old     Obese      Osteoarthritis      Other and unspecified hyperlipidemia      Other chronic pain     back     Peptic ulcer, unspecified site, unspecified as acute or chronic, without mention of hemorrhage, perforation, or obstruction      Small bowel obstruction (H)      Thyroid disease hypothyroidism     Urinary incontinence      Walking troubles      Past Surgical History:   Procedure Laterality Date     ARTHROPLASTY HIP Right 11/9/2016    Procedure: ARTHROPLASTY HIP;  Surgeon: Angel Lund MD;  Location:  OR     AS REPAIR INCISIONAL HERNIA,REDUCIBLE  1998    inc hernia ( [facility] surgery)     BLADDER SURGERY      hyperdistention surgery and sling     C NONSPECIFIC PROCEDURE  1946    T&A     C NONSPECIFIC PROCEDURE  1984    Vaginal Hysterectomy (has her ovaries)     C NONSPECIFIC PROCEDURE  1973    PPTL     C NONSPECIFIC PROCEDURE  1994    L shoulder to remove bone spurs     C NONSPECIFIC PROCEDURE  2004    cysto and durasphere Dr Demarco     C NONSPECIFIC PROCEDURE  2005    cysto and durasphere     C NONSPECIFIC PROCEDURE  2007    cysto and durasphere     C NONSPECIFIC PROCEDURE  2009    retropubic TVT sling     CHOLECYSTECTOMY  1987     COLONOSCOPY  12/3/2011    Procedure:COLONOSCOPY; COLONOSCOPY; Surgeon:SEMAJ GRAHAM; Location: GI     COLONOSCOPY N/A 3/29/2018    Procedure: COLONOSCOPY;  COLONOSCOPY Rm 544;  Surgeon: Marco Gusman MD;  Location:  GI     CYSTOSCOPY N/A 9/6/2017    Procedure: CYSTOSCOPY;  CYSTOSCOPY AND HYDRODISTENTION ;  Surgeon: Eyal Bai MD;  Location: [facility]     ENT SURGERY      Tonsillectomy     ESOPHAGOSCOPY, GASTROSCOPY, DUODENOSCOPY (EGD), COMBINED N/A 9/26/2016    Procedure: COMBINED ESOPHAGOSCOPY, GASTROSCOPY, DUODENOSCOPY (EGD), BIOPSY SINGLE OR MULTIPLE;  Surgeon: Marco Monaco MD;  Location:  GI     GENITOURINARY SURGERY        GYN SURGERY      Hysterectomy     HERNIA REPAIR, UMBILICAL  7/8/2010    Dr. Kirt Franco times 3     ORTHOPEDIC SURGERY  2009    [facility] - Dr. Lund- bilateral knee replacement     Current Outpatient Medications   Medication Sig Dispense Refill     acetaminophen (TYLENOL) 500 MG tablet Take 2 tablets (1,000 mg) by mouth 2 times daily as needed for mild pain 100 tablet 0     amLODIPine 5 MG PO tablet TAKE ONE TABLET BY MOUTH EVERY NIGHT AT BEDTIME 90 tablet 3     Biotin 5000 MCG PO TABS Take 1 tablet by mouth daily       Calcium Carbonate-Vit D-Min (RA CALCIUM PLUS MINERALS/VIT D PO) Take 1 tablet by mouth every other day Every other day at Lunch       CENTRUM SILVER OR TABS 1 TABLET DAILY 30 0     fenofibrate (TRIGLIDE/LOFIBRA) 160 MG tablet Take 1 tablet (160 mg) by mouth daily 90 tablet 2     ferrous gluconate (FERGON) 324 (38 FE) MG tablet Take 1 tablet (324 mg) by mouth 2 times daily 100 tablet 0     fexofenadine (ALLEGRA) 60 MG tablet Take 1 tablet (60 mg) by mouth 2 times daily 90 tablet 0     levothyroxine 50 MCG PO tablet Take 1 tablet (50 mcg) by mouth daily 90 tablet 3     lisinopril 30 MG PO tablet TAKE ONE TABLET BY MOUTH EVERY DAY FOR HYPERTENSION 90 tablet 3     MAGNESIUM PO Take 1 tablet by mouth daily (with breakfast)       meloxicam 7.5 MG PO tablet Take 1 tablet (7.5 mg) by mouth daily 90 tablet 3     OMEGA-3 FATTY ACIDS 1200 MG OR CAPS 1 TABLET DAILY  0     pantoprazole 20 MG PO EC tablet Take 1 tablet (20 mg) by mouth daily 90 tablet 3     polyethylene glycol (MIRALAX/GLYCOLAX) powder Take 17 g (1 capful) by mouth daily 578 g 3     potassium 99 MG TABS Take 1 tablet by mouth daily (with dinner)        Probiotic Product (ACIDOPHILUS PROBIOTIC BLEND) CAPS Take 1 capsule by mouth daily (with breakfast)  30 capsule 0     psyllium (METAMUCIL) 0.52 G capsule Take 2 capsules (1.04 g) by mouth 3 times daily To take with fluid 540 capsule      simvastatin 20 MG PO tablet TAKE ONE TABLET BY MOUTH EVERY  "NIGHT AT BEDTIME 90 tablet 3     travoprost, TIMI Free, (TRAVATAN Z) 0.004 % ophthalmic solution Place 1 drop into both eyes At Bedtime  2.5 mL 1     trospium (SANCTURA) 20 MG tablet Take 1 tablet (20 mg) by mouth 2 times daily (before meals) 180 tablet 3     UNABLE TO FIND daily (with dinner) MEDICATION NAME Cranberry.  2 capsules daily        VITAMIN D, CHOLECALCIFEROL, PO Take 2,000 Units by mouth every other day Every other day at Supper       OTC products: None, except as noted above    Allergies   Allergen Reactions     Augmentin Diarrhea     Codeine Nausea and Vomiting     Hydrocodone      Keeps patient awake     Naproxen Itching and Rash     Seasonal Allergies      Hay fever in fall, ragweed and russian thistles     Sulfa Drugs Nausea      Latex Allergy: NO    Social History     Tobacco Use     Smoking status: Never Smoker     Smokeless tobacco: Never Used   Substance Use Topics     Alcohol use: No     Alcohol/week: 0.0 standard drinks     History   Drug Use No       REVIEW OF SYSTEMS:   CONSTITUTIONAL: NEGATIVE for fever, chills, change in weight  INTEGUMENTARY/SKIN: NEGATIVE for worrisome rashes, moles or lesions  EYES: NEGATIVE for vision changes or irritation  ENT/MOUTH: NEGATIVE for ear, mouth and throat problems  RESP: NEGATIVE for significant cough or SOB  CV: NEGATIVE for chest pain, palpitations or peripheral edema  GI: NEGATIVE for nausea, abdominal pain, heartburn, or change in bowel habits   female: incontinence; overactive bladder; no dysuria  MUSCULOSKELETAL: NEGATIVE for significant arthralgias or myalgia  NEURO: NEGATIVE for weakness, dizziness or paresthesias  HEME: NEGATIVE for bleeding problems  PSYCHIATRIC: NEGATIVE for changes in mood or affect    EXAM:   /70   Pulse 77   Temp 98  F (36.7  C) (Oral)   Resp 16   Ht 1.613 m (5' 3.5\")   Wt 92.3 kg (203 lb 6.4 oz)   LMP  (LMP Unknown)   SpO2 98%   BMI 35.47 kg/m      GENERAL APPEARANCE: healthy, alert and no distress     EYES: " EOMI, PERRL     HENT: ear canals and TM's normal and nose and mouth without ulcers or lesions     NECK: no adenopathy, no asymmetry, masses, or scars and thyroid normal to palpation     RESP: lungs clear to auscultation - no rales, rhonchi or wheezes     CV: regular rates and rhythm, S1. S2, 2-3/6 systolic murmur  Est heard at RUSB     ABDOMEN:  soft, nontender, no HSM or masses and bowel sounds normal     MS: extremities normal- no gross deformities noted, no evidence of inflammation in joints, FROM in all extremities.     SKIN: no suspicious lesions or rashes     NEURO: Normal strength and tone, sensory exam grossly normal, mentation intact and speech normal     PSYCH: mentation appears normal. and affect normal/bright    DIAGNOSTICS:   EKG: appears normal, NSR, rate 81, left axis, normal intervals, no acute ST/T changes c/w ischemia, unchanged from previous tracings    Recent Labs   Lab Test 08/26/20  0933 02/20/20  0848  02/25/19  1118 09/06/18  0952  03/29/18  0606 03/28/18  2057   HGB  --   --   --  12.1 12.6   < > 7.2* 5.7*   PLT  --   --   --   --   --   --  246 275   INR  --   --   --   --   --   --   --  1.15*    139   < > 140 142   < > 143 142   POTASSIUM 4.5 4.2   < > 4.1 4.8   < > 4.0 4.0   CR 1.06* 0.79   < > 0.87 0.93   < > 0.88 0.99   A1C 5.9* 5.7*   < > 6.6* 6.4*  --   --   --     < > = values in this interval not displayed.        IMPRESSION:   Reason for surgery/procedure: urinary urgency and frequency  Diagnosis/reason for consult: overactive bladder, incontinence    The proposed surgical procedure is considered LOW risk.    REVISED CARDIAC RISK INDEX  The patient has the following serious cardiovascular risks for perioperative complications such as (MI, PE, VFib and 3  AV Block):  No serious cardiac risks  INTERPRETATION: 0 risks: Class I (very low risk - 0.4% complication rate)    The patient has the following additional risks for perioperative complications:  Morbid obesity       ICD-10-CM    1. Preop general physical exam  Z01.818 EKG 12-lead complete w/read - Clinics   2. OAB (overactive bladder)  N32.81    3. Essential hypertension, benign  I10     well controlled BP   4. Aortic valve stenosis, etiology of cardiac valve disease unspecified  I35.0     9/10/2018 ECHO  ROBYN 1.2 cm 2;   pt denies dyspnea, angina or Syncope;    5. Morbid obesity due to excess calories (H)  E66.01     BMI>35   6. Type 2 diabetes mellitus without complication, without long-term current use of insulin (H)  E11.9     A1C<6; much improved; currently on diet and exercise       RECOMMENDATIONS:     --Approval given to proceed with proposed procedure, without further diagnostic evaluation  --Pt advised to avoid NSAIDS (Motrin, Ibuprofen, Aleve or Naprosyn);  If needed, Tylenol or Acetaminophen are fine to use.      --Approval given to proceed with proposed procedure, without further diagnostic evaluation  --Pt advised to avoid NSAIDS and vitamin supplements (Motrin, Ibuprofen, Aleve or Naprosyn);  If needed, Tylenol or Acetaminophen are fine to use.  Medications reviewed: hold AM meds except for Levothyroxine 50 mcg and Lisinopril 30 mg per day; take those early AM with a tiny sip of water.   Evening medications can be continued without interruption and with a small sip of water.   --Pain medications, time off from work and FMLA following surgery deferred to surgeon.      APPROVAL GIVEN to proceed with proposed procedure, without further diagnostic evaluation       Signed Electronically by: MD Jesenia Reeves MD  Internal Medicine  electronically signed   Copy of this evaluation report is provided to requesting physician.    [facility] Preop Guidelines    Revised Cardiac Risk Index

## 2020-09-17 ENCOUNTER — ANESTHESIA (OUTPATIENT)
Dept: SURGERY | Facility: CLINIC | Age: 80
End: 2020-09-17
Payer: COMMERCIAL

## 2020-09-17 ENCOUNTER — APPOINTMENT (OUTPATIENT)
Dept: GENERAL RADIOLOGY | Facility: CLINIC | Age: 80
End: 2020-09-17
Attending: OBSTETRICS & GYNECOLOGY
Payer: COMMERCIAL

## 2020-09-17 ENCOUNTER — HOSPITAL ENCOUNTER (OUTPATIENT)
Facility: CLINIC | Age: 80
Discharge: HOME | End: 2020-09-17
Attending: OBSTETRICS & GYNECOLOGY | Admitting: OBSTETRICS & GYNECOLOGY
Payer: COMMERCIAL

## 2020-09-17 ENCOUNTER — ANESTHESIA EVENT (OUTPATIENT)
Dept: SURGERY | Facility: CLINIC | Age: 80
End: 2020-09-17
Payer: COMMERCIAL

## 2020-09-17 VITALS
SYSTOLIC BLOOD PRESSURE: 158 MMHG | HEIGHT: 64 IN | WEIGHT: 203 LBS | BODY MASS INDEX: 34.66 KG/M2 | DIASTOLIC BLOOD PRESSURE: 72 MMHG | TEMPERATURE: 98.5 F | RESPIRATION RATE: 18 BRPM | OXYGEN SATURATION: 99 %

## 2020-09-17 DIAGNOSIS — N32.81 OVERACTIVE BLADDER: Primary | ICD-10-CM

## 2020-09-17 LAB
GLUCOSE BLDC GLUCOMTR-MCNC: 108 MG/DL (ref 70–99)
GLUCOSE BLDC GLUCOMTR-MCNC: 127 MG/DL (ref 70–99)

## 2020-09-17 PROCEDURE — C1816 RECEIVER/TRANSMITTER, NEURO: HCPCS | Performed by: OBSTETRICS & GYNECOLOGY

## 2020-09-17 PROCEDURE — 82962 GLUCOSE BLOOD TEST: CPT

## 2020-09-17 PROCEDURE — C1778 LEAD, NEUROSTIMULATOR: HCPCS | Performed by: OBSTETRICS & GYNECOLOGY

## 2020-09-17 PROCEDURE — 25000125 ZZHC RX 250: Performed by: NURSE ANESTHETIST, CERTIFIED REGISTERED

## 2020-09-17 PROCEDURE — 27210794 ZZH OR GENERAL SUPPLY STERILE: Performed by: OBSTETRICS & GYNECOLOGY

## 2020-09-17 PROCEDURE — 37000009 ZZH ANESTHESIA TECHNICAL FEE, EACH ADDTL 15 MIN: Performed by: OBSTETRICS & GYNECOLOGY

## 2020-09-17 PROCEDURE — 71000027 ZZH RECOVERY PHASE 2 EACH 15 MINS: Performed by: OBSTETRICS & GYNECOLOGY

## 2020-09-17 PROCEDURE — 40000277 XR SURGERY CARM FLUORO LESS THAN 5 MIN W STILLS: Mod: TC

## 2020-09-17 PROCEDURE — 36000065 ZZH SURGERY LEVEL 4 W FLUORO 1ST 30 MIN: Performed by: OBSTETRICS & GYNECOLOGY

## 2020-09-17 PROCEDURE — C1767 GENERATOR, NEURO NON-RECHARG: HCPCS | Performed by: OBSTETRICS & GYNECOLOGY

## 2020-09-17 PROCEDURE — 25000128 H RX IP 250 OP 636: Performed by: OBSTETRICS & GYNECOLOGY

## 2020-09-17 PROCEDURE — 36000063 ZZH SURGERY LEVEL 4 EA 15 ADDTL MIN: Performed by: OBSTETRICS & GYNECOLOGY

## 2020-09-17 PROCEDURE — 27810325 ZZHC OR IMPLANT OTHER OPNP: Performed by: OBSTETRICS & GYNECOLOGY

## 2020-09-17 PROCEDURE — 25000128 H RX IP 250 OP 636: Performed by: NURSE ANESTHETIST, CERTIFIED REGISTERED

## 2020-09-17 PROCEDURE — 25800030 ZZH RX IP 258 OP 636: Performed by: ANESTHESIOLOGY

## 2020-09-17 PROCEDURE — 37000008 ZZH ANESTHESIA TECHNICAL FEE, 1ST 30 MIN: Performed by: OBSTETRICS & GYNECOLOGY

## 2020-09-17 PROCEDURE — C1883 ADAPT/EXT, PACING/NEURO LEAD: HCPCS | Performed by: OBSTETRICS & GYNECOLOGY

## 2020-09-17 PROCEDURE — 25000125 ZZHC RX 250: Performed by: OBSTETRICS & GYNECOLOGY

## 2020-09-17 PROCEDURE — 27211024 ZZHC OR SUPPLY OTHER OPNP: Performed by: OBSTETRICS & GYNECOLOGY

## 2020-09-17 PROCEDURE — 40000306 ZZH STATISTIC PRE PROC ASSESS II: Performed by: OBSTETRICS & GYNECOLOGY

## 2020-09-17 DEVICE — IMPLANTABLE DEVICE
Type: IMPLANTABLE DEVICE | Site: SACRUM | Status: NON-FUNCTIONAL
Removed: 2020-09-24

## 2020-09-17 RX ORDER — GLYCOPYRROLATE 0.2 MG/ML
INJECTION, SOLUTION INTRAMUSCULAR; INTRAVENOUS PRN
Status: DISCONTINUED | OUTPATIENT
Start: 2020-09-17 | End: 2020-09-17

## 2020-09-17 RX ORDER — CEFAZOLIN SODIUM 2 G/100ML
2 INJECTION, SOLUTION INTRAVENOUS
Status: COMPLETED | OUTPATIENT
Start: 2020-09-17 | End: 2020-09-17

## 2020-09-17 RX ORDER — LABETALOL HYDROCHLORIDE 5 MG/ML
10 INJECTION, SOLUTION INTRAVENOUS
Status: DISCONTINUED | OUTPATIENT
Start: 2020-09-17 | End: 2020-09-17 | Stop reason: HOSPADM

## 2020-09-17 RX ORDER — ONDANSETRON 2 MG/ML
4 INJECTION INTRAMUSCULAR; INTRAVENOUS EVERY 30 MIN PRN
Status: DISCONTINUED | OUTPATIENT
Start: 2020-09-17 | End: 2020-09-17 | Stop reason: HOSPADM

## 2020-09-17 RX ORDER — CEFAZOLIN SODIUM 1 G/3ML
1 INJECTION, POWDER, FOR SOLUTION INTRAMUSCULAR; INTRAVENOUS SEE ADMIN INSTRUCTIONS
Status: DISCONTINUED | OUTPATIENT
Start: 2020-09-17 | End: 2020-09-17 | Stop reason: HOSPADM

## 2020-09-17 RX ORDER — FENTANYL CITRATE 50 UG/ML
25-50 INJECTION, SOLUTION INTRAMUSCULAR; INTRAVENOUS
Status: DISCONTINUED | OUTPATIENT
Start: 2020-09-17 | End: 2020-09-17 | Stop reason: HOSPADM

## 2020-09-17 RX ORDER — MEPERIDINE HYDROCHLORIDE 25 MG/ML
12.5 INJECTION INTRAMUSCULAR; INTRAVENOUS; SUBCUTANEOUS EVERY 5 MIN PRN
Status: DISCONTINUED | OUTPATIENT
Start: 2020-09-17 | End: 2020-09-17 | Stop reason: HOSPADM

## 2020-09-17 RX ORDER — ONDANSETRON 4 MG/1
4 TABLET, ORALLY DISINTEGRATING ORAL EVERY 30 MIN PRN
Status: DISCONTINUED | OUTPATIENT
Start: 2020-09-17 | End: 2020-09-17 | Stop reason: HOSPADM

## 2020-09-17 RX ORDER — HYDROMORPHONE HYDROCHLORIDE 1 MG/ML
.3-.5 INJECTION, SOLUTION INTRAMUSCULAR; INTRAVENOUS; SUBCUTANEOUS EVERY 5 MIN PRN
Status: DISCONTINUED | OUTPATIENT
Start: 2020-09-17 | End: 2020-09-17 | Stop reason: HOSPADM

## 2020-09-17 RX ORDER — MAGNESIUM HYDROXIDE 1200 MG/15ML
LIQUID ORAL PRN
Status: DISCONTINUED | OUTPATIENT
Start: 2020-09-17 | End: 2020-09-17 | Stop reason: HOSPADM

## 2020-09-17 RX ORDER — ALBUTEROL SULFATE 0.83 MG/ML
2.5 SOLUTION RESPIRATORY (INHALATION) EVERY 4 HOURS PRN
Status: DISCONTINUED | OUTPATIENT
Start: 2020-09-17 | End: 2020-09-17 | Stop reason: HOSPADM

## 2020-09-17 RX ORDER — DIMENHYDRINATE 50 MG/ML
25 INJECTION, SOLUTION INTRAMUSCULAR; INTRAVENOUS
Status: DISCONTINUED | OUTPATIENT
Start: 2020-09-17 | End: 2020-09-17 | Stop reason: HOSPADM

## 2020-09-17 RX ORDER — SENNOSIDES 8.6 MG
650 CAPSULE ORAL EVERY 6 HOURS PRN
COMMUNITY
Start: 2020-09-17 | End: 2022-07-03

## 2020-09-17 RX ORDER — HYDRALAZINE HYDROCHLORIDE 20 MG/ML
2.5-5 INJECTION INTRAMUSCULAR; INTRAVENOUS EVERY 10 MIN PRN
Status: DISCONTINUED | OUTPATIENT
Start: 2020-09-17 | End: 2020-09-17 | Stop reason: HOSPADM

## 2020-09-17 RX ORDER — LIDOCAINE HYDROCHLORIDE 10 MG/ML
INJECTION, SOLUTION INFILTRATION; PERINEURAL PRN
Status: DISCONTINUED | OUTPATIENT
Start: 2020-09-17 | End: 2020-09-17

## 2020-09-17 RX ORDER — NALOXONE HYDROCHLORIDE 0.4 MG/ML
.1-.4 INJECTION, SOLUTION INTRAMUSCULAR; INTRAVENOUS; SUBCUTANEOUS
Status: DISCONTINUED | OUTPATIENT
Start: 2020-09-17 | End: 2020-09-17 | Stop reason: HOSPADM

## 2020-09-17 RX ORDER — PROPOFOL 10 MG/ML
INJECTION, EMULSION INTRAVENOUS PRN
Status: DISCONTINUED | OUTPATIENT
Start: 2020-09-17 | End: 2020-09-17

## 2020-09-17 RX ORDER — PROPOFOL 10 MG/ML
INJECTION, EMULSION INTRAVENOUS CONTINUOUS PRN
Status: DISCONTINUED | OUTPATIENT
Start: 2020-09-17 | End: 2020-09-17

## 2020-09-17 RX ORDER — SODIUM CHLORIDE, SODIUM LACTATE, POTASSIUM CHLORIDE, CALCIUM CHLORIDE 600; 310; 30; 20 MG/100ML; MG/100ML; MG/100ML; MG/100ML
INJECTION, SOLUTION INTRAVENOUS CONTINUOUS
Status: DISCONTINUED | OUTPATIENT
Start: 2020-09-17 | End: 2020-09-17 | Stop reason: HOSPADM

## 2020-09-17 RX ORDER — LIDOCAINE 40 MG/G
CREAM TOPICAL
Status: DISCONTINUED | OUTPATIENT
Start: 2020-09-17 | End: 2020-09-17 | Stop reason: HOSPADM

## 2020-09-17 RX ORDER — DIAZEPAM 10 MG/2ML
2.5 INJECTION, SOLUTION INTRAMUSCULAR; INTRAVENOUS
Status: DISCONTINUED | OUTPATIENT
Start: 2020-09-17 | End: 2020-09-17 | Stop reason: HOSPADM

## 2020-09-17 RX ADMIN — LIDOCAINE HYDROCHLORIDE 10 MG: 10 INJECTION, SOLUTION INFILTRATION; PERINEURAL at 08:32

## 2020-09-17 RX ADMIN — CEFAZOLIN SODIUM 2 G: 2 INJECTION, SOLUTION INTRAVENOUS at 08:06

## 2020-09-17 RX ADMIN — SODIUM CHLORIDE, POTASSIUM CHLORIDE, SODIUM LACTATE AND CALCIUM CHLORIDE: 600; 310; 30; 20 INJECTION, SOLUTION INTRAVENOUS at 08:06

## 2020-09-17 RX ADMIN — PROPOFOL 75 MCG/KG/MIN: 10 INJECTION, EMULSION INTRAVENOUS at 08:13

## 2020-09-17 RX ADMIN — PROPOFOL 10 MG: 10 INJECTION, EMULSION INTRAVENOUS at 08:32

## 2020-09-17 RX ADMIN — PROPOFOL 20 MG: 10 INJECTION, EMULSION INTRAVENOUS at 08:13

## 2020-09-17 RX ADMIN — GLYCOPYRROLATE 0.1 MG: 0.2 INJECTION, SOLUTION INTRAMUSCULAR; INTRAVENOUS at 08:26

## 2020-09-17 RX ADMIN — GLYCOPYRROLATE 0.1 MG: 0.2 INJECTION, SOLUTION INTRAMUSCULAR; INTRAVENOUS at 08:29

## 2020-09-17 RX ADMIN — PROPOFOL 20 MG: 10 INJECTION, EMULSION INTRAVENOUS at 08:47

## 2020-09-17 RX ADMIN — LIDOCAINE HYDROCHLORIDE 20 MG: 10 INJECTION, SOLUTION INFILTRATION; PERINEURAL at 08:13

## 2020-09-17 ASSESSMENT — MIFFLIN-ST. JEOR: SCORE: 1372.86

## 2020-09-17 NOTE — ANESTHESIA CARE TRANSFER NOTE
Patient: Lexii Stratton    Procedure(s):  sacral neurostimulation stage one implant of neurostimulator lead    Diagnosis: Urge incontinence [N39.41]  Urinary frequency [R35.0]  Diagnosis Additional Information: No value filed.    Anesthesia Type:   MAC     Note:  Airway :Room Air  Patient transferred to:Phase II  Handoff Report: Identifed the Patient, Identified the Reponsible Provider, Reviewed the pertinent medical history, Discussed the surgical course, Reviewed Intra-OP anesthesia mangement and issues during anesthesia, Set expectations for post-procedure period and Allowed opportunity for questions and acknowledgement of understanding      Vitals: (Last set prior to Anesthesia Care Transfer)    CRNA VITALS  9/17/2020 0846 - 9/17/2020 0921      9/17/2020             Pulse:  53    SpO2:  98 %                Electronically Signed By: ANDREW Clifton CRNA  September 17, 2020  9:21 AM

## 2020-09-17 NOTE — OR NURSING
[employer] rep in room to talk with pt regarding stimulator use.  Pt able to verbalize understanding and repeat instructions.  Instructions reviewed with friend Laure guy.  Ok to discharge.

## 2020-09-17 NOTE — ANESTHESIA POSTPROCEDURE EVALUATION
Patient: Lexii Stratton    Procedure(s):  sacral neurostimulation stage one implant of neurostimulator lead    Diagnosis:Urge incontinence [N39.41]  Urinary frequency [R35.0]  Diagnosis Additional Information: No value filed.    Anesthesia Type:  MAC    Note:  Anesthesia Post Evaluation    Patient location during evaluation: Phase 2  Patient participation: Able to fully participate in evaluation  Level of consciousness: awake  Pain management: adequate  Airway patency: patent  Cardiovascular status: acceptable  Hydration status: acceptable  PONV: none             Last vitals:  Vitals:    09/17/20 0647 09/17/20 0920 09/17/20 0930   BP: 127/70 93/50 99/56   Resp: 20 18 18   Temp: 97.1  F (36.2  C) 98.5  F (36.9  C)    SpO2:  96% 98%         Electronically Signed By: Arpit Keating MD  September 17, 2020  9:41 AM

## 2020-09-17 NOTE — DISCHARGE INSTRUCTIONS
Dr. Carbone: [phone]     [facility] ADULT DISCHARGE INSTRUCTIONS   SPECIAL PRECAUTIONS FOR 24 HOURS AFTER SURGERY    IT IS NOT UNUSUAL TO FEEL LIGHT-HEADED OR FAINT, UP TO 24 HOURS AFTER SURGERY OR WHILE TAKING PAIN MEDICATION.  IF YOU HAVE THESE SYMPTOMS; SIT FOR A FEW MINUTES BEFORE STANDING AND HAVE SOMEONE ASSIST YOU WHEN YOU GET UP TO WALK OR USE THE BATHROOM.    YOU SHOULD REST AND RELAX FOR THE NEXT 24 HOURS AND YOU MUST MAKE ARRANGEMENTS TO HAVE SOMEONE STAY WITH YOU FOR AT LEAST 24 HOURS AFTER YOUR DISCHARGE.  AVOID HAZARDOUS AND STRENUOUS ACTIVITIES.  DO NOT MAKE IMPORTANT DECISIONS FOR 24 HOURS.    DO NOT DRIVE ANY VEHICLE OR OPERATE MECHANICAL EQUIPMENT FOR 24 HOURS FOLLOWING THE END OF YOUR SURGERY.  EVEN THOUGH YOU MAY FEEL NORMAL, YOUR REACTIONS MAY BE AFFECTED BY THE MEDICATION YOU HAVE RECEIVED.    DO NOT DRINK ALCOHOLIC BEVERAGES FOR 24 HOURS FOLLOWING YOUR SURGERY.    DRINK CLEAR LIQUIDS (APPLE JUICE, GINGER ALE, 7-UP, BROTH, ETC.).  PROGRESS TO YOUR REGULAR DIET AS YOU FEEL ABLE.    YOU MAY HAVE A DRY MOUTH, A SORE THROAT, MUSCLES ACHES OR TROUBLE SLEEPING.  THESE SHOULD GO AWAY AFTER 24 HOURS.    CALL YOUR DOCTOR FOR ANY OF THE FOLLOWING:  SIGNS OF INFECTION (FEVER, GROWING TENDERNESS AT THE SURGERY SITE, A LARGE AMOUNT OF DRAINAGE OR BLEEDING, SEVERE PAIN, FOUL-SMELLING DRAINAGE, REDNESS OR SWELLING.    IT HAS BEEN OVER 8 TO 10 HOURS SINCE SURGERY AND YOU ARE STILL NOT ABLE TO URINATE (PASS WATER).

## 2020-09-17 NOTE — INTERVAL H&P NOTE
H&P reviewed, no changes  Reviewed procedure with patient    Dr. Shahnaz Carbone  [facility]   Female Pelvic Medicine and Reconstructive Surgery/Urogynecology

## 2020-09-17 NOTE — OP NOTE
Operative Note    Name:  Lexii Stratton  Location: [facility]  Procedure Date:  09/17/20  PCP:  Jesenia Madrigal      /Procedure(s):  sacral neurostimulation stage one implant of neurostimulator lead      Pre-Procedure Diagnosis:  Overactive bladder  Nocturia  Urinary urgency  Urge urinary incontinence    Post-Procedure Diagnosis:    Same    Surgeon(s):  Shahnaz Carbone MD        Anesthesia Type:  MAC    Findings:  Fluoroscopy confirmed implant of the lead in the S3 foramen with medial to lateral curvature.  Responses were present brandon and toe all at less than 1      Complications:    None    Specimens:    * No specimens in log *       Lines, Drains, Airways:  None       Implants:  Implant Name Type Inv. Item Serial No.  Lot No. LRB No. Used Action   InterStim SureScan MRI lead kit    [employer] [id number] N/A 1 Implanted   InterStim Percutaneous extension    [employer] [id number] N/A 1 Implanted       Estimated Blood Loss:   5cc    Indications:  Lexii is a 79-year-old female with symptomatic overactive bladder urgency, urge urinary incontinence and nocturia.  Baseline symptoms include 5-7 leaks per day, urgency severity is 4-5 on greater than 50% of the voids, urinary frequency is up to 11 voids per day.  On her baseline voiding diary she was taking trospium up until the last 2 days with a voiding diary, after stopping the medication her frequency did increase as to the urgency severity with urges 4-5 on greater than 85% of the voids.  She is previously failed multiple medications, Botox injections, PTNS as well as basic evaluation in the office with InterStim.  After discussion of risks, benefits, alternatives the decision was made to proceed with surgical management with a staged trial of InterStim.    Operative Report:    Patient was seen in the preoperative area where consent was verified.  She was taken operating room placed in a prone position, she was placed under  MAC anesthesia.    The surgical site was prepped and draped in usual sterile fashion.  The C-arm was brought in to confirm bony landmarks.  The introducer needle was placed in the right S3 hubbard which was confirmed with fluoroscopy, the needle was replaced until it was in the most medial superior aspect of the S3 foramen as possible.  Responses were tested using the needle and confirmed brandon and toe responses all at amp less than 1.  The guidewire was placed through the needle and the needle was removed.  The dilating sheath was then placed over the guidewire and under continuous fluoroscopy was placed with the marker group home point in the sacrum to ensure it was not going to be.  The lead was then placed through the dilating sheath with the curved stylette going in medial to lateral fashion.  The lead was tested again good responses with bolus followed by till all of an amp less than 1.  At this point the dilating sheath was removed under continuous fluoroscopy to ensure that the lead was with the third lead covering over the inferior aspect of the bony sacrum.  A 2 cm incision was made after injecting with 1% lidocaine in the right buttock with care to ensure it was inferior and lateral to the iliac crest.  The incision was carried down to about 2 cm and a pocket was created.  Bovie was used to obtain hemostasis as needed.  At this point the tunneling sheath was placed through the pocket to the contralateral side above the location of the needle placement.  The temporary lead guiding device with the temporary lead attached was placed on the end of the sheath and brought back through the tunnel.  The tunneling device was then used to go through the incision at the location of the previously placed permanent SureScan lead on the lower back, the tunneling device was then used to tunnel towards the pocket and the lead was placed through the plastic sheath and brought to the pocket.  The lead was then placed into  the extension lead and secured with the screwdriver.  At this point the pocket incision was closed with a interrupted suture of 2-0 vicryl in the subcutaneous and 4-0 monocryl running subcuticular. A padded tegaderm was used to cover over the incision  The lead introduction sites were closed with an interrupted suture of 4-0 monocryl.  The extension wire was coiled exit site was padded with gauze and covered with tegaderm    The patient tolerated the procedure without complication. Sponge, needle and insturment counts were correct at the end of the procedure. I was present and scrubbed for the procedure in entirety. She was taken to the PACU in an awake and stable condition.     She was programmed to program 1 with setting of 0.5    Shahnaz Carbone

## 2020-09-17 NOTE — ANESTHESIA PREPROCEDURE EVALUATION
Anesthesia Pre-Procedure Evaluation    Patient: Lexii Stratton   MRN: [id number] : 1940          Preoperative Diagnosis: Urge incontinence [N39.41]  Urinary frequency [R35.0]    Procedure(s):  sacral neurostimulation stage one implant of neurostimulator lead    Past Medical History:   Diagnosis Date     Arthritis      Diabetes (H)     Type 2-no meds... Dieet and exercise only as of 9/15/20     Esophageal reflux      Hernia, abdominal      History of blood transfusion      Hypertension     No cardiologist     Incontinence of urine      Mumps     6 years old     Obese      Osteoarthritis      Other and unspecified hyperlipidemia      Other chronic pain     back     Peptic ulcer, unspecified site, unspecified as acute or chronic, without mention of hemorrhage, perforation, or obstruction      Small bowel obstruction (H)      Thyroid disease hypothyroidism     Urinary incontinence      Walking troubles      Past Surgical History:   Procedure Laterality Date     ARTHROPLASTY HIP Right 2016    Procedure: ARTHROPLASTY HIP;  Surgeon: Angel Lund MD;  Location: [facility] OR     AS REPAIR INCISIONAL HERNIA,REDUCIBLE  1998    inc hernia ( Yamileth surgery)     BLADDER SURGERY      hyperdistention surgery and sling     C NONSPECIFIC PROCEDURE      T&A     C NONSPECIFIC PROCEDURE      Vaginal Hysterectomy (has her ovaries)     C NONSPECIFIC PROCEDURE      PPTL     C NONSPECIFIC PROCEDURE      L shoulder to remove bone spurs     C NONSPECIFIC PROCEDURE      cysto and durasphere Dr Demarco     C NONSPECIFIC PROCEDURE  2005    cysto and durasphere     C NONSPECIFIC PROCEDURE  2007    cysto and durasphere     C NONSPECIFIC PROCEDURE  2009    retropubic TVT sling     CHOLECYSTECTOMY  1987     COLONOSCOPY  12/3/2011    Procedure:COLONOSCOPY; COLONOSCOPY; Surgeon:SEMAJ GRAHAM; Location: GI     COLONOSCOPY N/A 3/29/2018    Procedure: COLONOSCOPY;  COLONOSCOPY Rm 544;  Surgeon: Uzma  Marco HENDERSON MD;  Location:  GI     CYSTOSCOPY N/A 9/6/2017    Procedure: CYSTOSCOPY;  CYSTOSCOPY AND HYDRODISTENTION ;  Surgeon: Eyal Bai MD;  Location:  OR     ENT SURGERY      Tonsillectomy     ESOPHAGOSCOPY, GASTROSCOPY, DUODENOSCOPY (EGD), COMBINED N/A 9/26/2016    Procedure: COMBINED ESOPHAGOSCOPY, GASTROSCOPY, DUODENOSCOPY (EGD), BIOPSY SINGLE OR MULTIPLE;  Surgeon: Marco Monaco MD;  Location:  GI     EYE SURGERY Left 05/2019     GENITOURINARY SURGERY       GYN SURGERY      Hysterectomy     HERNIA REPAIR, UMBILICAL  7/8/2010    Dr. Kirt Franco times 3     ORTHOPEDIC SURGERY  2009    [facility] - Dr. Lund- bilateral knee replacement     Anesthesia Evaluation     . Pt has had prior anesthetic. Type: General    No history of anesthetic complications          ROS/MED HX    ENT/Pulmonary:     (+)RAMIRO risk factors hypertension, obese, , . .    Neurologic:  - neg neurologic ROS     Cardiovascular:     (+) Dyslipidemia, hypertension----. : . . . :. . Previous cardiac testing Echodate:[email or web address]:Interpretation Summary     The left ventricle is normal in size. There is mild concentric left  ventricular hypertrophy. Left ventricular systolic function is normal. The  visual ejection fraction is estimated at 60-65%. Grade I or early diastolic  dysfunction. No regional wall motion abnormalities noted.  The right ventricle is mildly dilated. The right ventricular systolic function  is normal.  Trace to mild mitral and tricuspid regurgitation.  Moderate valvular aortic stenosis. The peak AoV pressure gradient is 35.7  mmHg. The mean AoV pressure gradient is 22.9 mmHg. The calculated aortic valve  are is 1.2 cm^2.  No pericardial effusion.  In comparison to the previous report dated 12/14/2012, there has been an  interval progression in transaortic gradients and now moderate aortic stenosis  is present.date: results: date: results: date: results:          METS/Exercise Tolerance:     Hematologic:  - neg  hematologic  ROS       Musculoskeletal:   (+) arthritis,  -       GI/Hepatic:     (+) GERD Asymptomatic on medication,       Renal/Genitourinary:     (+) Other Renal/ Genitourinary, problematic overactive bladder with incontinence      Endo: Comment: Class 2 obesity    (+) type II DM Not using insulin - not using insulin pump thyroid problem hypothyroidism, Obesity, .      Psychiatric:  - neg psychiatric ROS       Infectious Disease:  - neg infectious disease ROS       Malignancy:      - no malignancy   Other:    (+) H/O Chronic Pain,                        Physical Exam  Normal systems: cardiovascular and pulmonary    Airway   Mallampati: II  TM distance: >3 FB  Neck ROM: full    Dental   (+) missing    Cardiovascular   Rhythm and rate: regular and normal      Pulmonary    breath sounds clear to auscultation    Other findings: Lab Test        02/25/19 09/06/18 06/29/18      --          03/29/18 03/28/18 02/16/18                       1118          0952          0801           --           0606          2057          1030          WBC           --           --           --           --          8.1          9.1          7.8           HGB          12.1         12.6         12.0           < >        7.2*         5.7*         8.4*          MCV           --           --           --           --          85           86           89            PLT           --           --           --           --          246          275          284           INR           --           --           --           --           --          1.15*         --            < > = values in this interval not displayed.                  Lab Test        08/26/20 02/20/20 08/27/19                       0933          0848          0948          NA           138          139          139           POTASSIUM    4.5          4.2          4.3           CHLORIDE     106          108          108           CO2          25           " 25 23            BUN          29           23           24            CR           1.06*        0.79         0.90          ANIONGAP     7            6            8             CINDY          9.6          9.2          9.4           GLC          119*         128*         128*            ECG  Sinus  Rhythm   -Left axis.    -consider old anterior infarct.     ABNORMAL         Lab Results   Component Value Date    WBC 8.1 03/29/2018    HGB 12.1 02/25/2019    HCT 24.3 (L) 03/29/2018     03/29/2018    CRP 12.7 (H) 07/30/2013    SED 68 (H) 09/08/2016     08/26/2020    POTASSIUM 4.5 08/26/2020    CHLORIDE 106 08/26/2020    CO2 25 08/26/2020    BUN 29 08/26/2020    CR 1.06 (H) 08/26/2020     (H) 08/26/2020    CINDY 9.6 08/26/2020    MAG 2.0 03/30/2018    ALBUMIN 3.5 08/26/2020    PROTTOTAL 7.1 08/26/2020    ALT 18 08/26/2020    AST 26 08/26/2020    ALKPHOS 68 08/26/2020    BILITOTAL 0.5 08/26/2020    PTT 28 03/28/2018    INR 1.15 (H) 03/28/2018    TSH 2.20 02/20/2020    T4 1.32 02/25/2019       Preop Vitals  BP Readings from Last 3 Encounters:   09/14/20 130/70   08/26/20 128/72   02/20/20 124/62    Pulse Readings from Last 3 Encounters:   09/14/20 77   08/26/20 69   02/20/20 51      Resp Readings from Last 3 Encounters:   09/14/20 16   08/26/20 16   02/20/20 16    SpO2 Readings from Last 3 Encounters:   09/14/20 98%   08/26/20 99%   02/20/20 100%      Temp Readings from Last 1 Encounters:   09/14/20 98  F (36.7  C) (Oral)    Ht Readings from Last 1 Encounters:   09/17/20 1.613 m (5' 3.5\")      Wt Readings from Last 1 Encounters:   09/17/20 92.1 kg (203 lb)    Estimated body mass index is 35.4 kg/m  as calculated from the following:    Height as of this encounter: 1.613 m (5' 3.5\").    Weight as of this encounter: 92.1 kg (203 lb).       Anesthesia Plan      History & Physical Review      ASA Status:  3 .    NPO Status:  > 8 hours    Plan for MAC with Intravenous induction. Maintenance will be TIVA. "  Reason for MAC:  Deep or markedly invasive procedure (G8) and Other - see comments  PONV prophylaxis:  Ondansetron (or other 5HT-3) and Dexamethasone or Solumedrol         Postoperative Care  Postoperative pain management:  IV analgesics, Oral pain medications and Multi-modal analgesia.      Consents  Anesthetic plan, risks, benefits and alternatives discussed with:  Patient.  Use of blood products discussed: No .   .                 Arpit Keating MD                    .

## 2020-09-20 DIAGNOSIS — Z11.59 ENCOUNTER FOR SCREENING FOR OTHER VIRAL DISEASES: ICD-10-CM

## 2020-09-20 PROCEDURE — U0003 INFECTIOUS AGENT DETECTION BY NUCLEIC ACID (DNA OR RNA); SEVERE ACUTE RESPIRATORY SYNDROME CORONAVIRUS 2 (SARS-COV-2) (CORONAVIRUS DISEASE [COVID-19]), AMPLIFIED PROBE TECHNIQUE, MAKING USE OF HIGH THROUGHPUT TECHNOLOGIES AS DESCRIBED BY CMS-2020-01-R: HCPCS | Performed by: OBSTETRICS & GYNECOLOGY

## 2020-09-21 LAB
SARS-COV-2 RNA SPEC QL NAA+PROBE: NOT DETECTED
SPECIMEN SOURCE: NORMAL

## 2020-09-24 ENCOUNTER — HOSPITAL ENCOUNTER (OUTPATIENT)
Facility: CLINIC | Age: 80
Discharge: HOME | End: 2020-09-24
Attending: OBSTETRICS & GYNECOLOGY | Admitting: OBSTETRICS & GYNECOLOGY
Payer: COMMERCIAL

## 2020-09-24 ENCOUNTER — ANESTHESIA EVENT (OUTPATIENT)
Dept: SURGERY | Facility: CLINIC | Age: 80
End: 2020-09-24
Payer: COMMERCIAL

## 2020-09-24 ENCOUNTER — ANESTHESIA (OUTPATIENT)
Dept: SURGERY | Facility: CLINIC | Age: 80
End: 2020-09-24
Payer: COMMERCIAL

## 2020-09-24 VITALS
RESPIRATION RATE: 16 BRPM | TEMPERATURE: 98.4 F | OXYGEN SATURATION: 100 % | SYSTOLIC BLOOD PRESSURE: 133 MMHG | DIASTOLIC BLOOD PRESSURE: 67 MMHG

## 2020-09-24 LAB — GLUCOSE BLDC GLUCOMTR-MCNC: 119 MG/DL (ref 70–99)

## 2020-09-24 PROCEDURE — 25000128 H RX IP 250 OP 636: Performed by: NURSE ANESTHETIST, CERTIFIED REGISTERED

## 2020-09-24 PROCEDURE — 71000027 ZZH RECOVERY PHASE 2 EACH 15 MINS: Performed by: OBSTETRICS & GYNECOLOGY

## 2020-09-24 PROCEDURE — 40000306 ZZH STATISTIC PRE PROC ASSESS II: Performed by: OBSTETRICS & GYNECOLOGY

## 2020-09-24 PROCEDURE — 82962 GLUCOSE BLOOD TEST: CPT

## 2020-09-24 PROCEDURE — 36000065 ZZH SURGERY LEVEL 4 W FLUORO 1ST 30 MIN: Performed by: OBSTETRICS & GYNECOLOGY

## 2020-09-24 PROCEDURE — 25000125 ZZHC RX 250: Performed by: OBSTETRICS & GYNECOLOGY

## 2020-09-24 PROCEDURE — 37000009 ZZH ANESTHESIA TECHNICAL FEE, EACH ADDTL 15 MIN: Performed by: OBSTETRICS & GYNECOLOGY

## 2020-09-24 PROCEDURE — 25000125 ZZHC RX 250: Performed by: NURSE ANESTHETIST, CERTIFIED REGISTERED

## 2020-09-24 PROCEDURE — 25800030 ZZH RX IP 258 OP 636: Performed by: ANESTHESIOLOGY

## 2020-09-24 PROCEDURE — 25000128 H RX IP 250 OP 636: Performed by: OBSTETRICS & GYNECOLOGY

## 2020-09-24 PROCEDURE — 36000063 ZZH SURGERY LEVEL 4 EA 15 ADDTL MIN: Performed by: OBSTETRICS & GYNECOLOGY

## 2020-09-24 PROCEDURE — 27210794 ZZH OR GENERAL SUPPLY STERILE: Performed by: OBSTETRICS & GYNECOLOGY

## 2020-09-24 PROCEDURE — 25000132 ZZH RX MED GY IP 250 OP 250 PS 637: Performed by: ANESTHESIOLOGY

## 2020-09-24 PROCEDURE — 37000008 ZZH ANESTHESIA TECHNICAL FEE, 1ST 30 MIN: Performed by: OBSTETRICS & GYNECOLOGY

## 2020-09-24 RX ORDER — SODIUM CHLORIDE, SODIUM LACTATE, POTASSIUM CHLORIDE, CALCIUM CHLORIDE 600; 310; 30; 20 MG/100ML; MG/100ML; MG/100ML; MG/100ML
INJECTION, SOLUTION INTRAVENOUS CONTINUOUS
Status: DISCONTINUED | OUTPATIENT
Start: 2020-09-24 | End: 2020-09-24 | Stop reason: HOSPADM

## 2020-09-24 RX ORDER — ONDANSETRON 4 MG/1
4 TABLET, ORALLY DISINTEGRATING ORAL EVERY 30 MIN PRN
Status: DISCONTINUED | OUTPATIENT
Start: 2020-09-24 | End: 2020-09-24 | Stop reason: HOSPADM

## 2020-09-24 RX ORDER — CEFAZOLIN SODIUM 1 G/3ML
1 INJECTION, POWDER, FOR SOLUTION INTRAMUSCULAR; INTRAVENOUS SEE ADMIN INSTRUCTIONS
Status: DISCONTINUED | OUTPATIENT
Start: 2020-09-24 | End: 2020-09-24 | Stop reason: HOSPADM

## 2020-09-24 RX ORDER — ONDANSETRON 2 MG/ML
INJECTION INTRAMUSCULAR; INTRAVENOUS PRN
Status: DISCONTINUED | OUTPATIENT
Start: 2020-09-24 | End: 2020-09-24

## 2020-09-24 RX ORDER — CEFAZOLIN SODIUM 2 G/100ML
2 INJECTION, SOLUTION INTRAVENOUS
Status: COMPLETED | OUTPATIENT
Start: 2020-09-24 | End: 2020-09-24

## 2020-09-24 RX ORDER — FENTANYL CITRATE 50 UG/ML
25-50 INJECTION, SOLUTION INTRAMUSCULAR; INTRAVENOUS EVERY 5 MIN PRN
Status: DISCONTINUED | OUTPATIENT
Start: 2020-09-24 | End: 2020-09-24 | Stop reason: HOSPADM

## 2020-09-24 RX ORDER — NALOXONE HYDROCHLORIDE 0.4 MG/ML
.1-.4 INJECTION, SOLUTION INTRAMUSCULAR; INTRAVENOUS; SUBCUTANEOUS
Status: DISCONTINUED | OUTPATIENT
Start: 2020-09-24 | End: 2020-09-24 | Stop reason: HOSPADM

## 2020-09-24 RX ORDER — KETAMINE HYDROCHLORIDE 10 MG/ML
INJECTION INTRAMUSCULAR; INTRAVENOUS PRN
Status: DISCONTINUED | OUTPATIENT
Start: 2020-09-24 | End: 2020-09-24

## 2020-09-24 RX ORDER — FENTANYL CITRATE 50 UG/ML
25-50 INJECTION, SOLUTION INTRAMUSCULAR; INTRAVENOUS
Status: DISCONTINUED | OUTPATIENT
Start: 2020-09-24 | End: 2020-09-24 | Stop reason: HOSPADM

## 2020-09-24 RX ORDER — DEXAMETHASONE SODIUM PHOSPHATE 4 MG/ML
INJECTION, SOLUTION INTRA-ARTICULAR; INTRALESIONAL; INTRAMUSCULAR; INTRAVENOUS; SOFT TISSUE PRN
Status: DISCONTINUED | OUTPATIENT
Start: 2020-09-24 | End: 2020-09-24

## 2020-09-24 RX ORDER — ALBUTEROL SULFATE 0.83 MG/ML
2.5 SOLUTION RESPIRATORY (INHALATION) EVERY 4 HOURS PRN
Status: DISCONTINUED | OUTPATIENT
Start: 2020-09-24 | End: 2020-09-24 | Stop reason: HOSPADM

## 2020-09-24 RX ORDER — ACETAMINOPHEN 325 MG/1
975 TABLET ORAL ONCE
Status: COMPLETED | OUTPATIENT
Start: 2020-09-24 | End: 2020-09-24

## 2020-09-24 RX ORDER — PROPOFOL 10 MG/ML
INJECTION, EMULSION INTRAVENOUS CONTINUOUS PRN
Status: DISCONTINUED | OUTPATIENT
Start: 2020-09-24 | End: 2020-09-24

## 2020-09-24 RX ORDER — LIDOCAINE 40 MG/G
CREAM TOPICAL
Status: DISCONTINUED | OUTPATIENT
Start: 2020-09-24 | End: 2020-09-24 | Stop reason: HOSPADM

## 2020-09-24 RX ORDER — MEPERIDINE HYDROCHLORIDE 25 MG/ML
12.5 INJECTION INTRAMUSCULAR; INTRAVENOUS; SUBCUTANEOUS
Status: DISCONTINUED | OUTPATIENT
Start: 2020-09-24 | End: 2020-09-24 | Stop reason: HOSPADM

## 2020-09-24 RX ORDER — PROPOFOL 10 MG/ML
INJECTION, EMULSION INTRAVENOUS PRN
Status: DISCONTINUED | OUTPATIENT
Start: 2020-09-24 | End: 2020-09-24

## 2020-09-24 RX ORDER — HYDROMORPHONE HYDROCHLORIDE 1 MG/ML
.3-.5 INJECTION, SOLUTION INTRAMUSCULAR; INTRAVENOUS; SUBCUTANEOUS EVERY 10 MIN PRN
Status: DISCONTINUED | OUTPATIENT
Start: 2020-09-24 | End: 2020-09-24 | Stop reason: HOSPADM

## 2020-09-24 RX ORDER — ONDANSETRON 2 MG/ML
4 INJECTION INTRAMUSCULAR; INTRAVENOUS EVERY 30 MIN PRN
Status: DISCONTINUED | OUTPATIENT
Start: 2020-09-24 | End: 2020-09-24 | Stop reason: HOSPADM

## 2020-09-24 RX ORDER — GLYCOPYRROLATE 0.2 MG/ML
INJECTION, SOLUTION INTRAMUSCULAR; INTRAVENOUS PRN
Status: DISCONTINUED | OUTPATIENT
Start: 2020-09-24 | End: 2020-09-24

## 2020-09-24 RX ADMIN — ACETAMINOPHEN 975 MG: 325 TABLET, FILM COATED ORAL at 08:37

## 2020-09-24 RX ADMIN — CEFAZOLIN SODIUM 2 G: 2 INJECTION, SOLUTION INTRAVENOUS at 09:12

## 2020-09-24 RX ADMIN — Medication 20 MG: at 09:12

## 2020-09-24 RX ADMIN — PROPOFOL 10 MG: 10 INJECTION, EMULSION INTRAVENOUS at 09:34

## 2020-09-24 RX ADMIN — ONDANSETRON HYDROCHLORIDE 4 MG: 2 INJECTION, SOLUTION INTRAVENOUS at 09:33

## 2020-09-24 RX ADMIN — GLYCOPYRROLATE 0.15 MG: 0.2 INJECTION, SOLUTION INTRAMUSCULAR; INTRAVENOUS at 09:08

## 2020-09-24 RX ADMIN — MIDAZOLAM 1 MG: 1 INJECTION INTRAMUSCULAR; INTRAVENOUS at 09:07

## 2020-09-24 RX ADMIN — DEXAMETHASONE SODIUM PHOSPHATE 4 MG: 4 INJECTION, SOLUTION INTRA-ARTICULAR; INTRALESIONAL; INTRAMUSCULAR; INTRAVENOUS; SOFT TISSUE at 09:33

## 2020-09-24 RX ADMIN — MIDAZOLAM 1 MG: 1 INJECTION INTRAMUSCULAR; INTRAVENOUS at 09:11

## 2020-09-24 RX ADMIN — PROPOFOL 75 MCG/KG/MIN: 10 INJECTION, EMULSION INTRAVENOUS at 09:11

## 2020-09-24 RX ADMIN — PROPOFOL 10 MG: 10 INJECTION, EMULSION INTRAVENOUS at 09:36

## 2020-09-24 RX ADMIN — PROPOFOL 10 MG: 10 INJECTION, EMULSION INTRAVENOUS at 09:35

## 2020-09-24 RX ADMIN — Medication 10 MG: at 09:26

## 2020-09-24 RX ADMIN — SODIUM CHLORIDE, POTASSIUM CHLORIDE, SODIUM LACTATE AND CALCIUM CHLORIDE: 600; 310; 30; 20 INJECTION, SOLUTION INTRAVENOUS at 08:00

## 2020-09-24 ASSESSMENT — ENCOUNTER SYMPTOMS: DYSRHYTHMIAS: 0

## 2020-09-24 NOTE — ANESTHESIA POSTPROCEDURE EVALUATION
Patient: Lexii Stratton    Procedure(s):  Removal of interstem lead, NOT implanting neurostimulator    Diagnosis:Urge incontinence [N39.41]  Urinary frequency [R35.0]  Diagnosis Additional Information: No value filed.    Anesthesia Type:  MAC    Note:  Anesthesia Post Evaluation    Patient location during evaluation: Phase 2  Patient participation: Able to fully participate in evaluation  Level of consciousness: awake and alert  Pain management: adequate  Airway patency: patent  Cardiovascular status: acceptable  Respiratory status: acceptable  Hydration status: acceptable  PONV: controlled             Last vitals:  Vitals:    09/24/20 0800 09/24/20 0952 09/24/20 1022   BP: (!) 146/71 117/88 133/67   Resp: 18 22 16   Temp: 98.4  F (36.9  C)     SpO2: 98% 95% 100%         Electronically Signed By: Junior Salas MD  September 24, 2020  10:26 AM

## 2020-09-24 NOTE — INTERVAL H&P NOTE
H&P reviewed, no interval changes    Dr. Shahnaz Carbone  [facility]   Female Pelvic Medicine and Reconstructive Surgery/Urogynecology

## 2020-09-24 NOTE — ANESTHESIA PREPROCEDURE EVALUATION
Anesthesia Pre-Procedure Evaluation    Patient: Lexii Stratton   MRN: [id number] : 1940          Preoperative Diagnosis: Urge incontinence [N39.41]  Urinary frequency [R35.0]    Procedure(s):  sacral neurostimulation stage one implant of neurostimulator lead    Past Medical History:   Diagnosis Date     Arthritis      Diabetes (H)     Type 2-no meds... Dieet and exercise only as of 9/15/20     Esophageal reflux      Hernia, abdominal      History of blood transfusion      Hypertension     No cardiologist     Incontinence of urine      Mumps     6 years old     Obese      Osteoarthritis      Other and unspecified hyperlipidemia      Other chronic pain     back     Peptic ulcer, unspecified site, unspecified as acute or chronic, without mention of hemorrhage, perforation, or obstruction      Small bowel obstruction (H)      Thyroid disease hypothyroidism     Urinary incontinence      Walking troubles      Past Surgical History:   Procedure Laterality Date     ARTHROPLASTY HIP Right 2016    Procedure: ARTHROPLASTY HIP;  Surgeon: Angel Lund MD;  Location: [facility]     AS REPAIR INCISIONAL HERNIA,REDUCIBLE  1998    inc hernia ( Yamileth surgery)     BLADDER SURGERY      hyperdistention surgery and sling     C NONSPECIFIC PROCEDURE      T&A     C NONSPECIFIC PROCEDURE      Vaginal Hysterectomy (has her ovaries)     C NONSPECIFIC PROCEDURE      PPTL     C NONSPECIFIC PROCEDURE      L shoulder to remove bone spurs     C NONSPECIFIC PROCEDURE      cysto and durasphere Dr Demarco     C NONSPECIFIC PROCEDURE  2005    cysto and durasphere     C NONSPECIFIC PROCEDURE  2007    cysto and durasphere     C NONSPECIFIC PROCEDURE  2009    retropubic TVT sling     CHOLECYSTECTOMY  1987     COLONOSCOPY  12/3/2011    Procedure:COLONOSCOPY; COLONOSCOPY; Surgeon:SEMAJ GRAHAM; Location: GI     COLONOSCOPY N/A 3/29/2018    Procedure: COLONOSCOPY;  COLONOSCOPY Rm 544;  Surgeon: Uzma  Marco HENDERSON MD;  Location:  GI     CYSTOSCOPY N/A 9/6/2017    Procedure: CYSTOSCOPY;  CYSTOSCOPY AND HYDRODISTENTION ;  Surgeon: Eyal Bai MD;  Location:  OR     ENT SURGERY      Tonsillectomy     ESOPHAGOSCOPY, GASTROSCOPY, DUODENOSCOPY (EGD), COMBINED N/A 9/26/2016    Procedure: COMBINED ESOPHAGOSCOPY, GASTROSCOPY, DUODENOSCOPY (EGD), BIOPSY SINGLE OR MULTIPLE;  Surgeon: Marco Monaco MD;  Location:  GI     EYE SURGERY Left 05/2019     GENITOURINARY SURGERY       GYN SURGERY      Hysterectomy     HERNIA REPAIR, UMBILICAL  7/8/2010    Dr. Kirt Franco times 3     IMPLANT STIMULATOR SACRAL NERVE STAGE ONE N/A 9/17/2020    Procedure: sacral neurostimulation stage one implant of neurostimulator lead;  Surgeon: Shahnaz Carbone MD;  Location:  OR     ORTHOPEDIC SURGERY  2009    [facility] - Dr. Lund- bilateral knee replacement     Anesthesia Evaluation     . Pt has had prior anesthetic. Type: General and MAC    No history of anesthetic complications          ROS/MED HX    ENT/Pulmonary:     (+)RAMIRO risk factors hypertension, obese, , . .    Neurologic:  - neg neurologic ROS     Cardiovascular:     (+) Dyslipidemia, hypertension----. : . . . :. . Previous cardiac testing Echodate:[email or web address]:Interpretation Summary     The left ventricle is normal in size. There is mild concentric left  ventricular hypertrophy. Left ventricular systolic function is normal. The  visual ejection fraction is estimated at 60-65%. Grade I or early diastolic  dysfunction. No regional wall motion abnormalities noted.  The right ventricle is mildly dilated. The right ventricular systolic function  is normal.  Trace to mild mitral and tricuspid regurgitation.  Moderate valvular aortic stenosis. The peak AoV pressure gradient is 35.7  mmHg. The mean AoV pressure gradient is 22.9 mmHg. The calculated aortic valve  are is 1.2 cm^2.  No pericardial effusion.  In comparison to the previous report dated 12/14/2012, there has  been an  interval progression in transaortic gradients and now moderate aortic stenosis  is present.date: results: date: results: date: results:         (-) syncope and arrhythmias   METS/Exercise Tolerance:     Hematologic:  - neg hematologic  ROS       Musculoskeletal:   (+) arthritis,  -       GI/Hepatic:     (+) GERD Asymptomatic on medication,       Renal/Genitourinary:     (+) Other Renal/ Genitourinary, problematic overactive bladder with incontinence      Endo: Comment: Class 2 obesity    (+) type II DM Not using insulin - not using insulin pump thyroid problem hypothyroidism, Obesity, .      Psychiatric:  - neg psychiatric ROS       Infectious Disease:  - neg infectious disease ROS       Malignancy:      - no malignancy   Other:    (+) H/O Chronic Pain,                          Physical Exam  Normal systems: cardiovascular and pulmonary    Airway   Mallampati: II  TM distance: >3 FB  Neck ROM: full    Dental   (+) missing    Cardiovascular   Rhythm and rate: regular and normal      Pulmonary             Lab Results   Component Value Date    WBC 8.1 03/29/2018    HGB 12.1 02/25/2019    HCT 24.3 (L) 03/29/2018     03/29/2018    CRP 12.7 (H) 07/30/2013    SED 68 (H) 09/08/2016     08/26/2020    POTASSIUM 4.5 08/26/2020    CHLORIDE 106 08/26/2020    CO2 25 08/26/2020    BUN 29 08/26/2020    CR 1.06 (H) 08/26/2020     (H) 08/26/2020    CINDY 9.6 08/26/2020    MAG 2.0 03/30/2018    ALBUMIN 3.5 08/26/2020    PROTTOTAL 7.1 08/26/2020    ALT 18 08/26/2020    AST 26 08/26/2020    ALKPHOS 68 08/26/2020    BILITOTAL 0.5 08/26/2020    PTT 28 03/28/2018    INR 1.15 (H) 03/28/2018    TSH 2.20 02/20/2020    T4 1.32 02/25/2019       Preop Vitals  BP Readings from Last 3 Encounters:   09/17/20 (!) 158/72   09/14/20 130/70   08/26/20 128/72    Pulse Readings from Last 3 Encounters:   09/14/20 77   08/26/20 69   02/20/20 51      Resp Readings from Last 3 Encounters:   09/17/20 18   09/14/20 16   08/26/20 16  "   SpO2 Readings from Last 3 Encounters:   09/17/20 99%   09/14/20 98%   08/26/20 99%      Temp Readings from Last 1 Encounters:   09/17/20 98.5  F (36.9  C) (Temporal)    Ht Readings from Last 1 Encounters:   09/17/20 1.613 m (5' 3.5\")      Wt Readings from Last 1 Encounters:   09/17/20 92.1 kg (203 lb)    Estimated body mass index is 35.4 kg/m  as calculated from the following:    Height as of 9/17/20: 1.613 m (5' 3.5\").    Weight as of 9/17/20: 92.1 kg (203 lb).       Anesthesia Plan      History & Physical Review  History and physical reviewed and following examination; no interval change.    ASA Status:  3 .    NPO Status:  > 8 hours    Plan for MAC with Intravenous induction. Maintenance will be TIVA.  Reason for MAC:  Deep or markedly invasive procedure (G8)  PONV prophylaxis:  Ondansetron (or other 5HT-3) and Dexamethasone or Solumedrol         Postoperative Care  Postoperative pain management:  IV analgesics, Oral pain medications and Multi-modal analgesia.      Consents  Anesthetic plan, risks, benefits and alternatives discussed with:  Patient.  Use of blood products discussed: No .   .                   Junior Salas MD                    .  "

## 2020-09-24 NOTE — OR NURSING
Discharge instructions reviewed with patient. Reported she did not need me to review instructions with her friend, Yessica who is picking her up. Discharged to home with Yessica and her daughter is staying with her tonight.

## 2020-09-24 NOTE — ANESTHESIA CARE TRANSFER NOTE
Patient: Lexii Stratton    Procedure(s):  Removal of interstem lead, NOT implanting neurostimulator    Diagnosis: Urge incontinence [N39.41]  Urinary frequency [R35.0]  Diagnosis Additional Information: No value filed.    Anesthesia Type:   MAC     Note:  Airway :Room Air  Patient transferred to:Phase II  Handoff Report: Identifed the Patient, Identified the Reponsible Provider, Reviewed the pertinent medical history, Discussed the surgical course, Reviewed Intra-OP anesthesia mangement and issues during anesthesia, Set expectations for post-procedure period and Allowed opportunity for questions and acknowledgement of understanding      Vitals: (Last set prior to Anesthesia Care Transfer)    CRNA VITALS  9/24/2020 0916 - 9/24/2020 0953      9/24/2020             Pulse:  57    SpO2:  99 %    Resp Rate (observed):  16    EKG:  NSR                Electronically Signed By: ANDREW Ramírez CRNA  September 24, 2020  9:53 AM

## 2020-09-24 NOTE — DISCHARGE INSTRUCTIONS
GENERAL ANESTHESIA OR SEDATION ADULT DISCHARGE INSTRUCTIONS   SPECIAL PRECAUTIONS FOR 24 HOURS AFTER SURGERY    IT IS NOT UNUSUAL TO FEEL LIGHT-HEADED OR FAINT, UP TO 24 HOURS AFTER SURGERY OR WHILE TAKING PAIN MEDICATION.  IF YOU HAVE THESE SYMPTOMS; SIT FOR A FEW MINUTES BEFORE STANDING AND HAVE SOMEONE ASSIST YOU WHEN YOU GET UP TO WALK OR USE THE BATHROOM.    YOU SHOULD REST AND RELAX FOR THE NEXT 24 HOURS AND YOU MUST MAKE ARRANGEMENTS TO HAVE SOMEONE STAY WITH YOU FOR AT LEAST 24 HOURS AFTER YOUR DISCHARGE.  AVOID HAZARDOUS AND STRENUOUS ACTIVITIES.  DO NOT MAKE IMPORTANT DECISIONS FOR 24 HOURS.    DO NOT DRIVE ANY VEHICLE OR OPERATE MECHANICAL EQUIPMENT FOR 24 HOURS FOLLOWING THE END OF YOUR SURGERY.  EVEN THOUGH YOU MAY FEEL NORMAL, YOUR REACTIONS MAY BE AFFECTED BY THE MEDICATION YOU HAVE RECEIVED.    DO NOT DRINK ALCOHOLIC BEVERAGES FOR 24 HOURS FOLLOWING YOUR SURGERY.    DRINK CLEAR LIQUIDS (APPLE JUICE, GINGER ALE, 7-UP, BROTH, ETC.).  PROGRESS TO YOUR REGULAR DIET AS YOU FEEL ABLE.    YOU MAY HAVE A DRY MOUTH, A SORE THROAT, MUSCLES ACHES OR TROUBLE SLEEPING.  THESE SHOULD GO AWAY AFTER 24 HOURS.    CALL YOUR DOCTOR FOR ANY OF THE FOLLOWING:  SIGNS OF INFECTION (FEVER, GROWING TENDERNESS AT THE SURGERY SITE, A LARGE AMOUNT OF DRAINAGE OR BLEEDING, SEVERE PAIN, FOUL-SMELLING DRAINAGE, REDNESS OR SWELLING.    IT HAS BEEN OVER 8 TO 10 HOURS SINCE SURGERY AND YOU ARE STILL NOT ABLE TO URINATE (PASS WATER).     HOME CARE FOLLOWING MINOR SURGERY    DRESSING  Keep dressing dry and in place until your doctor instructs you to remove the dressing.    DRAINAGE  There should be minimal drainage. If bleeding should occur and soaks through the dressing apply a sterile, dry dressing over it and tape in place. If bleeding persists, apply gentle, steady pressure with your hand over the dressing for 5 minutes. If the bleeding does not stop, call your doctor.    SKIN CLOSURE  You may have stitches or special skin  closures. You will be given an appointment for the removal of any external stitches. You may have stitches under the skin which will absorb. You may have steri-strips over the incision. These look like thin tapes and will peel off in 5-7 days. If they don t after 7 days, you may carefully remove them. You may shower with the steri-strips but do not soak them, as with swimming or taking a bath. A protective covering of plastic may be placed over external stitches for showering.    NOTIFY YOUR PHYSICIAN IF YOU HAVE ANY OF THE FOLLOWING SYMPTOMS:    1. Fever greater than 101 degrees  2. Excessive bleeding or drainage  3. Disruption of the skin closure  4. Swelling, redness or excessive tenderness at the site  5. Severe pain  6. Drainage that is green, yellow, thick white or has a bad odor

## 2020-09-24 NOTE — OP NOTE
Operative Note    Name:  Lexii Stratton  Location: [facility]  Procedure Date:  @today@  PCP:  Jesenia Madrigal      Procedure(s):  Removal of interstem lead, NOT implanting neurostimulator      Pre-Procedure Diagnosis:  Failed stage I trial of Interstim sacral neuromodulation  Overactive bladder    Post-Procedure Diagnosis:    Same    Surgeon(s):  Shahnaz Carbone MD        Anesthesia Type: MAC    Findings:  Pocket and lead without evidence of infection  The lead was removed in entirety with all tines and 4 leads removed    Complications:    None    Specimens:    ID Type Source Tests Collected by Time Destination   1 : Interstem lead discarded in OR  Other (specify in comments) Other OR DOCUMENTATION ONLY Shahnaz Carbone MD 9/24/2020  9:25 AM           Lines, Drains, Airways: None       Implants:  None    Estimated Blood Loss:  5cc    Indications:  Lexii is a 78yo F with refractory overactive bladder and urgency incontinence. She has failed multiple medications, physical therapy, botox and PTNS as well as a peripheral nerve evaluation for interstim. After discussion of risks benefits and alterantives the decision was made to proceed with interstim staged implant. After first stage 1 week trial she had no improvement and actually worsening of her baseline symptoms. She does not qualify for permanent implant and she wants the device removed.    Operative Report:    The patient was seen in preoperative area and patient and procedure were verified. She was taken to the operating room and IV antibiotics were administered, SCDs were applied. She was placed in prone position. She was placed under sedation anesthesia, time out was performed confirming patient and procedure. The surgical site was prepped and draped after removal of the prior bandages with the external lead hanging off of the bed.    The pocket was opened and the leads were elevated from the pocket . The  external wire was trimmed at the level of the lead sheath/boot and was pulled from the exits site on the contralateral side. The permanent lead was elevated out of the pocket and the insertion site was opened with a stab incision through the prior incision. A caleb clamp was used to elevate the lead from the incision and the lead was trimmed in the pocket from the sheath/boot which was then removed. The lead was then pulled gently and elevated and removed in entirety with visual inspection confirming removal of all 4 leads.   The pocket was irrigated and closed with deep interrupted sutures of 2-0 vicryl and the skin was cloased with a running suture of 4-0 monocryl and sealed with skin glue.  The lead exit site was closed with an interrupted suture of 3-0 vicryl but had some oozing after this and an interrupted suture of 4-0 monocryl was placed. Hemostasis was noted and skin glue was applied.     The patient tolerated this procedure well without any complications. Sponge, lap, and needle counts were correct x2. I was present and scrubbed for the procedure in entirety.         Shahnaz Carbone

## 2020-10-05 ENCOUNTER — HOSPITAL ENCOUNTER (OUTPATIENT)
Dept: MAMMOGRAPHY | Facility: CLINIC | Age: 80
Discharge: HOME | End: 2020-10-05
Attending: INTERNAL MEDICINE | Admitting: INTERNAL MEDICINE
Payer: COMMERCIAL

## 2020-10-05 DIAGNOSIS — Z12.31 ENCOUNTER FOR SCREENING MAMMOGRAM FOR BREAST CANCER: ICD-10-CM

## 2020-10-05 PROCEDURE — 77063 BREAST TOMOSYNTHESIS BI: CPT

## 2020-10-14 ENCOUNTER — TELEPHONE (OUTPATIENT)
Dept: FAMILY MEDICINE | Facility: CLINIC | Age: 80
End: 2020-10-14

## 2020-10-14 NOTE — TELEPHONE ENCOUNTER
Defer to pcp who completed the preop.  HANK  
Please call patient and see when she is scheduled for cataract surgery.    Melba Maria PA-C   
Spoke to patient she was in to have a preop for surgery on 9/14/2020    She is now going to be having Cadiarc Surgery which is not the same as the surgery she previously had the preop for.     The cataract surgery center stated that if a provider goes back to the 9/14/2020 visit and advises in that note she is cleared for   cataract surgery with Dr. Cesar Owen they can use the previous preop without her having to come in again for another preop appointment.       Fax the updated notes from the OV on 9/14/2020 to   [facility]: [phone]     Give patient a call when the paperwork has been faxed.   Phone number: [phone]       Mehreen De Anda RN Flex    
Surgery scheduled 10/21 6:45 am.    Laure Reid RN    
[Negative] : Musculoskeletal

## 2020-10-19 ENCOUNTER — OFFICE VISIT (OUTPATIENT)
Dept: FAMILY MEDICINE | Facility: CLINIC | Age: 80
End: 2020-10-19
Payer: COMMERCIAL

## 2020-10-19 VITALS
HEIGHT: 64 IN | OXYGEN SATURATION: 96 % | TEMPERATURE: 98.3 F | HEART RATE: 77 BPM | RESPIRATION RATE: 18 BRPM | SYSTOLIC BLOOD PRESSURE: 130 MMHG | WEIGHT: 207.9 LBS | DIASTOLIC BLOOD PRESSURE: 78 MMHG | BODY MASS INDEX: 35.49 KG/M2

## 2020-10-19 DIAGNOSIS — I10 ESSENTIAL HYPERTENSION, BENIGN: ICD-10-CM

## 2020-10-19 DIAGNOSIS — R73.01 IMPAIRED FASTING GLUCOSE: ICD-10-CM

## 2020-10-19 DIAGNOSIS — H25.011 CORTICAL AGE-RELATED CATARACT OF RIGHT EYE: ICD-10-CM

## 2020-10-19 DIAGNOSIS — N32.81 OVERACTIVE BLADDER: ICD-10-CM

## 2020-10-19 DIAGNOSIS — Z01.818 PREOPERATIVE EXAMINATION: Primary | ICD-10-CM

## 2020-10-19 PROCEDURE — 99214 OFFICE O/P EST MOD 30 MIN: CPT | Performed by: INTERNAL MEDICINE

## 2020-10-19 ASSESSMENT — MIFFLIN-ST. JEOR: SCORE: 1395.09

## 2020-10-19 NOTE — PROGRESS NOTES
[facility]  [address] [address]    Phone: [phone]  Primary Provider: Hamlet Elizondo  Pre-op Performing Provider: HAMLET ELIZONDO    PREOPERATIVE EVALUATION:  Today's date: 10/19/2020    Lexii Stratton is a 79 year old female who presents for a preoperative evaluation.    Surgical Information:  Surgery/Procedure: Cataract Surgery  Surgery Location: [facility]  Surgeon: Dr. Cesar Owen  Surgery Date: 10/21/2020  Time of Surgery: 6:45am  Where patient plans to recover: At home with family  Fax number for surgical facility: [phone]     Type of Anesthesia Anticipated: General    Subjective     HPI related to upcoming procedure:   Lexii Stratton is a 79 year old female who presents for preoperative evaluation prior to cataract surgery. She has previously had left eye cataract surgery May 2019. She is now having vision changes involving Right  Eye.  Lexii Stratton has checked with [facility] and was informed that no Covid Test will be needed for this procedure.         Preop Questions 9/14/2020   1. Have you ever had a heart attack or stroke? No   2. Have you ever had surgery on your heart or blood vessels, such as a stent placement, a coronary artery bypass, or surgery on an artery in your head, neck, heart, or legs? No   3. Do you have chest pain with activity? No   4. Do you have a history of  heart failure? No   5. Do you currently have a cold, bronchitis or symptoms of other infection? No   6. Do you have a cough, shortness of breath, or wheezing? No   7. Do you or anyone in your family have previous history of blood clots? No   8. Do you or does anyone in your family have a serious bleeding problem such as prolonged bleeding following surgeries or cuts? No   9. Have you ever had problems with anemia or been told to take iron pills? YES -   Lab Results   Component Value Date    HGB 12.1 02/25/2019         10. Have you had any  abnormal blood loss such as black, tarry or bloody stools, or abnormal vaginal bleeding? No   11. Have you ever had a blood transfusion? YES - 3/2018 related to GI hemorrhage   11a. Have you ever had a transfusion reaction? No   12. Are you willing to have a blood transfusion if it is medically needed before, during, or after your surgery? Yes   13. Have you or any of your relatives ever had problems with anesthesia? YES - mother; patient has had no issue with anesthesia   14. Do you have sleep apnea, excessive snoring or daytime drowsiness? No   15. Do you have any artifical heart valves or other implanted medical devices like a pacemaker, defibrillator, or continuous glucose monitor? No   16. Do you have artificial joints? YES - bilateral knees and right hip   17. Are you allergic to latex? No   18. Is there any chance that you may be pregnant? No     Health Care Directive:  Patient has a Health Care Directive on file      Preoperative Review of :   reviewed - no record of controlled substances prescribed.      Status of Chronic Conditions:  HYPERLIPIDEMIA - Patient has a long history of significant Hyperlipidemia requiring medication for treatment with recent good control. Patient reports no problems or side effects with the medication.     HYPERTENSION - Patient has longstanding history of HTN , currently denies any symptoms referable to elevated blood pressure. Specifically denies chest pain, palpitations, dyspnea, orthopnea, PND or peripheral edema. Blood pressure readings have been in normal range. Current medication regimen is as listed below. Patient denies any side effects of medication.     HYPOTHYROIDISM - Patient has a longstanding history of chronic Hypothyroidism. Patient has been doing well, noting no tremor, insomnia, hair loss or changes in skin texture. Continues to take medications as directed, without adverse reactions or side effects. Last TSH   Lab Results   Component Value Date    TSH  2.20 02/20/2020   .        Review of Systems  CONSTITUTIONAL: NEGATIVE for fever, chills, change in weight  INTEGUMENTARY/SKIN: NEGATIVE for worrisome rashes, moles or lesions  EYES: vision changes related to Right cataract;  Past cataract surgery on left in 5/2019  ENT/MOUTH: NEGATIVE for ear, mouth and throat problems  RESP: NEGATIVE for significant cough or SOB  CV: NEGATIVE for chest pain, palpitations or peripheral edema  GI: NEGATIVE for nausea, abdominal pain, heartburn, or change in bowel habits  : NEGATIVE for frequency, dysuria, or hematuria  MUSCULOSKELETAL: NEGATIVE for significant arthralgias or myalgia  NEURO: NEGATIVE for weakness, dizziness or paresthesias  ENDOCRINE: NEGATIVE for temperature intolerance, skin/hair changes  HEME: NEGATIVE for bleeding problems  PSYCHIATRIC: NEGATIVE for changes in mood or affect    Patient Active Problem List    Diagnosis Date Noted     Hyperlipidemia LDL goal <130 10/31/2010     Priority: High     Impaired fasting glucose 07/16/2007     Priority: High     Essential hypertension, benign 06/25/2006     Priority: High     Diabetes mellitus, type 2 (H) 08/27/2019     Priority: Medium     Pelvic floor dysfunction 05/08/2019     Priority: Medium     Elevated hemoglobin A1c 02/25/2019     Priority: Medium     9/2018  A1C 6.4  Diet and exercise       GI bleed 03/29/2018     Priority: Medium     Morbid obesity due to excess calories (H) 08/21/2017     Priority: Medium     Overactive bladder 02/01/2017     Priority: Medium     [facility] Dr. Bai; has tried 7 medications without benefit, Interstim x 2, Botox in bladder, x 5; no infection now. Ongoing symptoms; defer to Urology.       S/P total hip arthroplasty 11/09/2016     Priority: Medium     Anemia, unspecified 09/19/2016     Priority: Medium     Diagnosis updated by automated process. Provider to review and confirm.       Poor iron absorption 09/19/2016     Priority: Medium     Iron and its compounds causing  adverse effect in therapeutic use(E934.0) 09/19/2016     Priority: Medium     Partial small bowel obstruction (H) 01/05/2015     Priority: Medium     12/23/2014; was hospitalized through 12/26/2014; NG to decompress stomach; no surgery,       Chest pain syndrome 07/02/2013     Priority: Medium     Hypersomnia with sleep apnea 11/23/2004     Priority: Medium     Problem list name updated by automated process. Provider to review       Acquired hypothyroidism 11/11/2004     Priority: Medium     Other symptoms involving urinary system 10/05/2002     Priority: Medium     Problem list name updated by automated process. Provider to review and confirm       Generalized osteoarthrosis, unspecified site 10/03/2002     Priority: Medium     Esophageal reflux 10/03/2002     Priority: Medium     Obesity 10/03/2002     Priority: Medium     Problem list name updated by automated process. Provider to review       Advance Care Planning 06/07/2011     Priority: Low     Advance Care Planning 1/9/2017: Receipt of ACP document:  Received: Health Care Directive which was witnessed or notarized on 5/27/09.  Document previously scanned on 11/14/16.  Validation form completed and sent to be scanned.  Code Status reflects choices in most recent ACP document.  Confirmed/documented designated decision maker(s).  Added by Beatriz Esteban RN, Advance Care Planning Liaison.          Past Medical History:   Diagnosis Date     Arthritis      Diabetes (H)     Type 2-no meds... Dieet and exercise only as of 9/15/20     Esophageal reflux      Hernia, abdominal      History of blood transfusion 1984     Hypertension     No cardiologist     Incontinence of urine      Mumps     6 years old     Obese      Osteoarthritis      Other and unspecified hyperlipidemia      Other chronic pain     back     Peptic ulcer, unspecified site, unspecified as acute or chronic, without mention of hemorrhage, perforation, or obstruction      Small bowel obstruction (H)       Thyroid disease hypothyroidism     Urinary incontinence      Walking troubles      Past Surgical History:   Procedure Laterality Date     ARTHROPLASTY HIP Right 11/9/2016    Procedure: ARTHROPLASTY HIP;  Surgeon: Angel Lund MD;  Location:  OR     AS REPAIR INCISIONAL HERNIA,REDUCIBLE  1998    inc hernia ( [facility] surgery)     BLADDER SURGERY      hyperdistention surgery and sling     CHOLECYSTECTOMY  1987     COLONOSCOPY  12/3/2011    Procedure:COLONOSCOPY; COLONOSCOPY; Surgeon:SMEAJ GRAHAM; Location: GI     COLONOSCOPY N/A 3/29/2018    Procedure: COLONOSCOPY;  COLONOSCOPY Rm 544;  Surgeon: Marco Gusman MD;  Location:  GI     CYSTOSCOPY N/A 9/6/2017    Procedure: CYSTOSCOPY;  CYSTOSCOPY AND HYDRODISTENTION ;  Surgeon: Eyal Bai MD;  Location:  OR     ENT SURGERY      Tonsillectomy     ESOPHAGOSCOPY, GASTROSCOPY, DUODENOSCOPY (EGD), COMBINED N/A 9/26/2016    Procedure: COMBINED ESOPHAGOSCOPY, GASTROSCOPY, DUODENOSCOPY (EGD), BIOPSY SINGLE OR MULTIPLE;  Surgeon: Marco Monaco MD;  Location:  GI     EYE SURGERY Left 05/2019     GENITOURINARY SURGERY       GYN SURGERY      Hysterectomy     HERNIA REPAIR, UMBILICAL  7/8/2010    Dr. Kirt Franco times 3     IMPLANT STIMULATOR SACRAL NERVE STAGE ONE N/A 9/17/2020    Procedure: sacral neurostimulation stage one implant of neurostimulator lead;  Surgeon: Shahnaz Carbone MD;  Location:  OR     IMPLANT STIMULATOR SACRAL NERVE STAGE TWO Right 9/24/2020    Procedure: Removal of interstem lead, NOT implanting neurostimulator;  Surgeon: Shahnaz Carbone MD;  Location:  OR     ORTHOPEDIC SURGERY  2009    [facility] - Dr. Lund- bilateral knee replacement     ZZC NONSPECIFIC PROCEDURE  1946    T&A     ZZC NONSPECIFIC PROCEDURE  1984    Vaginal Hysterectomy (has her ovaries)     ZZC NONSPECIFIC PROCEDURE  1973    PPTL     ZZC NONSPECIFIC PROCEDURE  1994    L shoulder to remove bone spurs     ZZC NONSPECIFIC  PROCEDURE  2004    cysto and durasphere Dr Demarco     [facility] NONSPECIFIC PROCEDURE  2005    cysto and durasphere     [facility] NONSPECIFIC PROCEDURE  2007    cysto and durasphere     [facility] NONSPECIFIC PROCEDURE  2009    retropubic TVT sling     Current Outpatient Medications   Medication Sig Dispense Refill     acetaminophen (TYLENOL) 500 MG tablet Take 2 tablets (1,000 mg) by mouth 2 times daily as needed for mild pain 100 tablet 0     acetaminophen (TYLENOL) 650 MG CR tablet Take 1 tablet (650 mg) by mouth every 6 hours as needed for mild pain or fever       amLODIPine 5 MG PO tablet TAKE ONE TABLET BY MOUTH EVERY NIGHT AT BEDTIME 90 tablet 3     Biotin 5000 MCG PO TABS Take 1 tablet by mouth daily       Calcium Carbonate-Vit D-Min (RA CALCIUM PLUS MINERALS/VIT D PO) Take 1 tablet by mouth every other day Every other day at Lunch       CENTRUM SILVER OR TABS 1 TABLET DAILY 30 0     fenofibrate (TRIGLIDE/LOFIBRA) 160 MG tablet Take 1 tablet (160 mg) by mouth daily 90 tablet 2     ferrous gluconate (FERGON) 324 (38 FE) MG tablet Take 1 tablet (324 mg) by mouth 2 times daily 100 tablet 0     fexofenadine (ALLEGRA) 60 MG tablet Take 1 tablet (60 mg) by mouth 2 times daily 90 tablet 0     levothyroxine 50 MCG PO tablet Take 1 tablet (50 mcg) by mouth daily 90 tablet 3     lisinopril 30 MG PO tablet TAKE ONE TABLET BY MOUTH EVERY DAY FOR HYPERTENSION 90 tablet 3     MAGNESIUM PO Take 1 tablet by mouth daily (with breakfast)       meloxicam 7.5 MG PO tablet Take 1 tablet (7.5 mg) by mouth daily 90 tablet 3     OMEGA-3 FATTY ACIDS 1200 MG OR CAPS 1 TABLET DAILY  0     pantoprazole 20 MG PO EC tablet Take 1 tablet (20 mg) by mouth daily 90 tablet 3     polyethylene glycol (MIRALAX/GLYCOLAX) powder Take 17 g (1 capful) by mouth daily 578 g 3     potassium 99 MG TABS Take 1 tablet by mouth daily (with dinner)        Probiotic Product (ACIDOPHILUS PROBIOTIC BLEND) CAPS Take 1 capsule by mouth daily (with breakfast)  30 capsule 0      "psyllium (METAMUCIL) 0.52 G capsule Take 2 capsules (1.04 g) by mouth 3 times daily To take with fluid 540 capsule      simvastatin 20 MG PO tablet TAKE ONE TABLET BY MOUTH EVERY NIGHT AT BEDTIME 90 tablet 3     travoprost, TIMI Free, (TRAVATAN Z) 0.004 % ophthalmic solution Place 1 drop into both eyes At Bedtime  2.5 mL 1     UNABLE TO FIND daily (with dinner) MEDICATION NAME Cranberry.  2 capsules daily        VITAMIN D, CHOLECALCIFEROL, PO Take 2,000 Units by mouth every other day Every other day at Supper         Allergies   Allergen Reactions     Augmentin Diarrhea     Codeine Nausea and Vomiting     Hydrocodone      Keeps patient awake     Naproxen Itching and Rash     Seasonal Allergies      Hay fever in fall, ragweed and russian thistles     Sulfa Drugs Nausea        Social History     Tobacco Use     Smoking status: Never Smoker     Smokeless tobacco: Never Used   Substance Use Topics     Alcohol use: No     Alcohol/week: 0.0 standard drinks     Family History   Problem Relation Age of Onset     Heart Disease Mother         heart surgery , new valve     Gallbladder Disease Mother      Coronary Artery Disease Mother      Hyperlipidemia Mother      Asthma Mother      Heart Disease Maternal Grandmother      Coronary Artery Disease Maternal Grandmother      Heart Disease Maternal Grandfather      Coronary Artery Disease Maternal Grandfather      Cancer Father         throat ca,  76     Coronary Artery Disease Father      Diabetes Brother         Half Brother     Cardiovascular Brother         lung ca, Half brother     Cancer Brother         half brother  lung      History   Drug Use No         Objective     /78 (BP Location: Other (Comment), Patient Position: Chair, Cuff Size: Adult Large)   Pulse 77   Temp 98.3  F (36.8  C) (Oral)   Resp 18   Ht 1.613 m (5' 3.5\")   Wt 94.3 kg (207 lb 14.4 oz)   LMP  (LMP Unknown)   SpO2 96%   BMI 36.25 kg/m      Physical Exam    GENERAL APPEARANCE: healthy, " alert and no distress     EYES: EOMI, PERRL     HENT: ear canals and TM's normal and nose and mouth without ulcers or lesions     NECK: no adenopathy, no asymmetry, masses, or scars and thyroid normal to palpation     RESP: lungs clear to auscultation - no rales, rhonchi or wheezes     CV: regular rates and rhythm and 3/6 systolic murmur best heard at RUSB; no Jugular venous congestion     ABDOMEN:  soft, nontender, no HSM or masses and bowel sounds normal     MS: extremities normal- no gross deformities noted, no evidence of inflammation in joints, FROM in all extremities.     NEURO: Normal strength and tone, sensory exam grossly normal, mentation intact and speech normal     PSYCH: mentation appears normal. and affect normal/bright    Recent Labs   Lab Test 08/26/20  0933 02/20/20  0848 02/25/19  1118 02/25/19  1118   HGB  --   --   --  12.1    139   < > 140   POTASSIUM 4.5 4.2   < > 4.1   CR 1.06* 0.79   < > 0.87   A1C 5.9* 5.7*   < > 6.6*    < > = values in this interval not displayed.        Diagnostics:  Recent Results (from the past 720 hour(s))   Asymptomatic COVID-19 Virus (Coronavirus) by PCR    Collection Time: 09/20/20 10:42 AM    Specimen: Nasopharyngeal   Result Value Ref Range    COVID-19 Virus PCR to [facility] - Source Nasopharyngeal     COVID-19 Virus PCR to [facility] - Result Not Detected    Glucose by meter    Collection Time: 09/24/20  7:36 AM   Result Value Ref Range    Glucose 119 (H) 70 - 99 mg/dL      No EKG required for low risk surgery (cataract, skin procedure, breast biopsy, etc).   Reviewed from 9/14/020 normal sinus rhythm     Revised Cardiac Risk Index (RCRI):  The patient has the following serious cardiovascular risks for perioperative complications:   - No serious cardiac risks = 0 points     RCRI Interpretation: 0 points: Class I (very low risk - 0.4% complication rate)         Assessment & Plan   The proposed surgical procedure is considered LOW risk.    (Z01.818) Preoperative  examination  (primary encounter diagnosis)  Comment: 10/21/20 right eye cataract surgery as scheduled  Plan:     (H25.011) Cortical age-related cataract of right eye  Comment: right cataract- surgery scheduled.   Plan: eye drops as recommended by [facility]    (N32.81) Overactive bladder  Comment: followed by Urology  Plan:     (I10) Essential hypertension, benign  Comment: BLOOD PRESSURE well controlled  Plan:     (R73.01) Impaired fasting glucose  Comment:   Lab Results   Component Value Date     08/26/2020     02/20/2020     Lab Results   Component Value Date    A1C 5.9 08/26/2020    A1C 5.7 02/20/2020        Plan: continue regular exercise and healthy eating,   ]  Possible Sleep Apnea: no   STOP-Bang Total Score 10/19/2020   Total Score 3   Risk Stratification 3 - 4: Moderate Risk for RAMIRO      Implanted Device:  none    Risks and Recommendations:  The patient has the following additional risks and recommendations for perioperative complications:      Medication Instructions:  Patient is to take all scheduled medications on the day of surgery EXCEPT she may wait unti after early morning procedure    RECOMMENDATION:  APPROVAL GIVEN to proceed with proposed procedure, without further diagnostic evaluation.    Signed Electronically by: MD Jesenia Reeves MD  Internal Medicine  electronically signed     Copy of this evaluation report is provided to requesting physician.    Preop [facility] Preop Guidelines    Revised Cardiac Risk Index

## 2020-10-19 NOTE — PROGRESS NOTES
[facility]  [address] [address]  Phone: [phone]  Primary Provider: Jesenia Madrigal  { AMB Performing Provider (Optional):033889}    PREOPERATIVE EVALUATION:  Today's date: 10/19/2020    Lexii Stratton is a 79 year old female who presents for a preoperative evaluation.    Surgical Information:  Surgery/Procedure: ***  Surgery Location: ***  Surgeon: ***  Surgery Date: ***  Time of Surgery: ***  Where patient plans to recover: {Preop post recovery plans :487934}  Fax number for surgical facility: {SURGERY FAX NUMBER:[id number]}    Type of Anesthesia Anticipated: {ANESTHESIA:052342}    Subjective     HPI related to upcoming procedure: ***    {Click here to pull in Questionnaire Data after Qnr completed :466424}  Health Care Directive:  Patient has a Health Care Directive on file      Preoperative Review of :  {Mnpmpreport:702104}  {Review MNPMP for all patients per [facility]: [email or web address]    {Chronic problem details (Optional) :433123}    Review of Systems  {ROS Preop Choices:350437}    Patient Active Problem List    Diagnosis Date Noted     Hyperlipidemia LDL goal <130 10/31/2010     Priority: High     Impaired fasting glucose 07/16/2007     Priority: High     Essential hypertension, benign 06/25/2006     Priority: High     Diabetes mellitus, type 2 (H) 08/27/2019     Priority: Medium     Pelvic floor dysfunction 05/08/2019     Priority: Medium     Elevated hemoglobin A1c 02/25/2019     Priority: Medium     9/2018  A1C 6.4  Diet and exercise       GI bleed 03/29/2018     Priority: Medium     Morbid obesity due to excess calories (H) 08/21/2017     Priority: Medium     Overactive bladder 02/01/2017     Priority: Medium     [facility] Dr. Bai; has tried 7 medications without benefit, Interstim x 2, Botox in bladder, x 5; no infection now. Ongoing symptoms; defer to Urology.       S/P total hip arthroplasty 11/09/2016      Priority: Medium     Anemia, unspecified 09/19/2016     Priority: Medium     Diagnosis updated by automated process. Provider to review and confirm.       Poor iron absorption 09/19/2016     Priority: Medium     Iron and its compounds causing adverse effect in therapeutic use(E934.0) 09/19/2016     Priority: Medium     Partial small bowel obstruction (H) 01/05/2015     Priority: Medium     12/23/2014; was hospitalized through 12/26/2014; NG to decompress stomach; no surgery,       Chest pain syndrome 07/02/2013     Priority: Medium     Hypersomnia with sleep apnea 11/23/2004     Priority: Medium     Problem list name updated by automated process. Provider to review       Acquired hypothyroidism 11/11/2004     Priority: Medium     Other symptoms involving urinary system 10/05/2002     Priority: Medium     Problem list name updated by automated process. Provider to review and confirm       Generalized osteoarthrosis, unspecified site 10/03/2002     Priority: Medium     Esophageal reflux 10/03/2002     Priority: Medium     Obesity 10/03/2002     Priority: Medium     Problem list name updated by automated process. Provider to review       Advance Care Planning 06/07/2011     Priority: Low     Advance Care Planning 1/9/2017: Receipt of ACP document:  Received: Health Care Directive which was witnessed or notarized on 5/27/09.  Document previously scanned on 11/14/16.  Validation form completed and sent to be scanned.  Code Status reflects choices in most recent ACP document.  Confirmed/documented designated decision maker(s).  Added by Beatriz Esteban RN, Advance Care Planning Liaison.          Past Medical History:   Diagnosis Date     Arthritis      Diabetes (H)     Type 2-no meds... Dieet and exercise only as of 9/15/20     Esophageal reflux      Hernia, abdominal      History of blood transfusion 1984     Hypertension     No cardiologist     Incontinence of urine      Mumps     6 years old     Obese       Osteoarthritis      Other and unspecified hyperlipidemia      Other chronic pain     back     Peptic ulcer, unspecified site, unspecified as acute or chronic, without mention of hemorrhage, perforation, or obstruction      Small bowel obstruction (H)      Thyroid disease hypothyroidism     Urinary incontinence      Walking troubles      Past Surgical History:   Procedure Laterality Date     ARTHROPLASTY HIP Right 11/9/2016    Procedure: ARTHROPLASTY HIP;  Surgeon: Angel Lund MD;  Location:  OR     AS REPAIR INCISIONAL HERNIA,REDUCIBLE  1998    inc hernia ( [facility] surgery)     BLADDER SURGERY      hyperdistention surgery and sling     CHOLECYSTECTOMY  1987     COLONOSCOPY  12/3/2011    Procedure:COLONOSCOPY; COLONOSCOPY; Surgeon:SEMAJ GRAHAM; Location: GI     COLONOSCOPY N/A 3/29/2018    Procedure: COLONOSCOPY;  COLONOSCOPY Rm 544;  Surgeon: Marco Gusman MD;  Location:  GI     CYSTOSCOPY N/A 9/6/2017    Procedure: CYSTOSCOPY;  CYSTOSCOPY AND HYDRODISTENTION ;  Surgeon: Eyal Bai MD;  Location:  OR     ENT SURGERY      Tonsillectomy     ESOPHAGOSCOPY, GASTROSCOPY, DUODENOSCOPY (EGD), COMBINED N/A 9/26/2016    Procedure: COMBINED ESOPHAGOSCOPY, GASTROSCOPY, DUODENOSCOPY (EGD), BIOPSY SINGLE OR MULTIPLE;  Surgeon: Marco Monaco MD;  Location:  GI     EYE SURGERY Left 05/2019     GENITOURINARY SURGERY       GYN SURGERY      Hysterectomy     HERNIA REPAIR, UMBILICAL  7/8/2010    Dr. Kirt Franco times 3     IMPLANT STIMULATOR SACRAL NERVE STAGE ONE N/A 9/17/2020    Procedure: sacral neurostimulation stage one implant of neurostimulator lead;  Surgeon: Shahnaz Carbone MD;  Location:  OR     IMPLANT STIMULATOR SACRAL NERVE STAGE TWO Right 9/24/2020    Procedure: Removal of interstem lead, NOT implanting neurostimulator;  Surgeon: Shahnaz Carbone MD;  Location:  OR     ORTHOPEDIC SURGERY  2009    O - Dr. Lund- bilateral knee replacement     [facility]  NONSPECIFIC PROCEDURE  1946    T&A     [facility] NONSPECIFIC PROCEDURE  1984    Vaginal Hysterectomy (has her ovaries)     [facility] NONSPECIFIC PROCEDURE  1973    PPTL     [facility] NONSPECIFIC PROCEDURE  1994    L shoulder to remove bone spurs     [facility] NONSPECIFIC PROCEDURE  2004    cysto and durasphere Dr Demarco     [facility] NONSPECIFIC PROCEDURE  2005    cysto and durasphere     [facility] NONSPECIFIC PROCEDURE  2007    cysto and durasphere     [facility] NONSPECIFIC PROCEDURE  2009    retropubic TVT sling     Current Outpatient Medications   Medication Sig Dispense Refill     acetaminophen (TYLENOL) 500 MG tablet Take 2 tablets (1,000 mg) by mouth 2 times daily as needed for mild pain 100 tablet 0     acetaminophen (TYLENOL) 650 MG CR tablet Take 1 tablet (650 mg) by mouth every 6 hours as needed for mild pain or fever       amLODIPine 5 MG PO tablet TAKE ONE TABLET BY MOUTH EVERY NIGHT AT BEDTIME 90 tablet 3     Biotin 5000 MCG PO TABS Take 1 tablet by mouth daily       Calcium Carbonate-Vit D-Min (RA CALCIUM PLUS MINERALS/VIT D PO) Take 1 tablet by mouth every other day Every other day at Lunch       CENTRUM SILVER OR TABS 1 TABLET DAILY 30 0     fenofibrate (TRIGLIDE/LOFIBRA) 160 MG tablet Take 1 tablet (160 mg) by mouth daily 90 tablet 2     ferrous gluconate (FERGON) 324 (38 FE) MG tablet Take 1 tablet (324 mg) by mouth 2 times daily 100 tablet 0     fexofenadine (ALLEGRA) 60 MG tablet Take 1 tablet (60 mg) by mouth 2 times daily 90 tablet 0     levothyroxine 50 MCG PO tablet Take 1 tablet (50 mcg) by mouth daily 90 tablet 3     lisinopril 30 MG PO tablet TAKE ONE TABLET BY MOUTH EVERY DAY FOR HYPERTENSION 90 tablet 3     MAGNESIUM PO Take 1 tablet by mouth daily (with breakfast)       meloxicam 7.5 MG PO tablet Take 1 tablet (7.5 mg) by mouth daily 90 tablet 3     OMEGA-3 FATTY ACIDS 1200 MG OR CAPS 1 TABLET DAILY  0     pantoprazole 20 MG PO EC tablet Take 1 tablet (20 mg) by mouth daily 90 tablet 3     polyethylene glycol  "(MIRALAX/GLYCOLAX) powder Take 17 g (1 capful) by mouth daily 578 g 3     potassium 99 MG TABS Take 1 tablet by mouth daily (with dinner)        Probiotic Product (ACIDOPHILUS PROBIOTIC BLEND) CAPS Take 1 capsule by mouth daily (with breakfast)  30 capsule 0     psyllium (METAMUCIL) 0.52 G capsule Take 2 capsules (1.04 g) by mouth 3 times daily To take with fluid 540 capsule      simvastatin 20 MG PO tablet TAKE ONE TABLET BY MOUTH EVERY NIGHT AT BEDTIME 90 tablet 3     travoprost, TIMI Free, (TRAVATAN Z) 0.004 % ophthalmic solution Place 1 drop into both eyes At Bedtime  2.5 mL 1     UNABLE TO FIND daily (with dinner) MEDICATION NAME Cranberry.  2 capsules daily        VITAMIN D, CHOLECALCIFEROL, PO Take 2,000 Units by mouth every other day Every other day at Supper         Allergies   Allergen Reactions     Augmentin Diarrhea     Codeine Nausea and Vomiting     Hydrocodone      Keeps patient awake     Naproxen Itching and Rash     Seasonal Allergies      Hay fever in fall, ragweed and russian thistles     Sulfa Drugs Nausea        Social History     Tobacco Use     Smoking status: Never Smoker     Smokeless tobacco: Never Used   Substance Use Topics     Alcohol use: No     Alcohol/week: 0.0 standard drinks     {FAMILY HISTORY (Optional):715527807}  History   Drug Use No         Objective     LMP  (LMP Unknown)     Physical Exam  {EXAM Preop Choices:587420}    Recent Labs   Lab Test 20  0933 20  0848 19  1118 19  1118   HGB  --   --   --  12.1    139   < > 140   POTASSIUM 4.5 4.2   < > 4.1   CR 1.06* 0.79   < > 0.87   A1C 5.9* 5.7*   < > 6.6*    < > = values in this interval not displayed.        Diagnostics:  {LABS:551161}   {EK}    Revised Cardiac Risk Index (RCRI):  The patient has the following serious cardiovascular risks for perioperative complications:  {PREOP REVISED CARDIAC RISK INDEX (RCRI) :132447::\" - No serious cardiac risks = 0 points\"}     RCRI Interpretation: " "{REVISED CARDIAC RISK INTERPRETATION :894997}    {Provider  Link to PREOP SmartSet  Includes common orders; guidelines for anemia, warfarin, additional testing; patient instructions  :480673}     Assessment & Plan   The proposed surgical procedure is considered {HIGH=major cardiovascular or procedures requiring prolonged anesthesia >4 hours or large fluid shifts;    INTERMEDIATE=abdominal, most orthopedic and intrathoracic surgery; LOW= endoscopy, cataract and breast surgery:320132} risk.    {Diag Picklist :214548}  {Click here to pull in possible risk data after PreOp Qnr has been answered for this visit :342289}    {Risks and Recommendations (Optional):930595}    {Medication HOLD Times for PREOP (Optional):646918}    RECOMMENDATION:  {IMPORTANT - Approval:535730::\"APPROVAL GIVEN to proceed with proposed procedure, without further diagnostic evaluation.\"}    Signed Electronically by: Jesenia Madrigal MD    Copy of this evaluation report is provided to requesting physician.    Preop SmartSet    [facility] Preop Guidelines    Revised Cardiac Risk Index  "

## 2020-10-19 NOTE — PATIENT INSTRUCTIONS
--Approval given to proceed with proposed procedure, without further diagnostic evaluation  --meds reviewed; may hold meds on AM of surgery. Resume all medications after surgery ( scheduled to be early in AM)  --Pain medications, time off from work and FMLA following surgery deferred to surgeon.    Per patient, she was informed by [facility] that a COVID 19 test was not needed.

## 2021-01-14 ENCOUNTER — HEALTH MAINTENANCE LETTER (OUTPATIENT)
Age: 81
End: 2021-01-14

## 2021-01-30 ENCOUNTER — IMMUNIZATION (OUTPATIENT)
Dept: NURSING | Facility: CLINIC | Age: 81
End: 2021-01-30
Payer: COMMERCIAL

## 2021-01-30 PROCEDURE — 0001A PR COVID VAC PFIZER DIL RECON 30 MCG/0.3 ML IM: CPT

## 2021-01-30 PROCEDURE — 91300 PR COVID VAC PFIZER DIL RECON 30 MCG/0.3 ML IM: CPT

## 2021-02-20 ENCOUNTER — IMMUNIZATION (OUTPATIENT)
Dept: NURSING | Facility: CLINIC | Age: 81
End: 2021-02-20
Attending: INTERNAL MEDICINE
Payer: COMMERCIAL

## 2021-02-20 PROCEDURE — 0002A PR COVID VAC PFIZER DIL RECON 30 MCG/0.3 ML IM: CPT

## 2021-02-20 PROCEDURE — 91300 PR COVID VAC PFIZER DIL RECON 30 MCG/0.3 ML IM: CPT

## 2021-02-24 ENCOUNTER — OFFICE VISIT (OUTPATIENT)
Dept: FAMILY MEDICINE | Facility: CLINIC | Age: 81
End: 2021-02-24
Payer: COMMERCIAL

## 2021-02-24 VITALS
SYSTOLIC BLOOD PRESSURE: 120 MMHG | BODY MASS INDEX: 35.92 KG/M2 | OXYGEN SATURATION: 98 % | WEIGHT: 210.4 LBS | HEART RATE: 57 BPM | RESPIRATION RATE: 18 BRPM | DIASTOLIC BLOOD PRESSURE: 80 MMHG | HEIGHT: 64 IN | TEMPERATURE: 97.8 F

## 2021-02-24 DIAGNOSIS — E03.9 ACQUIRED HYPOTHYROIDISM: ICD-10-CM

## 2021-02-24 DIAGNOSIS — R73.09 ELEVATED HEMOGLOBIN A1C: Primary | ICD-10-CM

## 2021-02-24 DIAGNOSIS — M15.0 PRIMARY OSTEOARTHRITIS INVOLVING MULTIPLE JOINTS: ICD-10-CM

## 2021-02-24 DIAGNOSIS — I10 ESSENTIAL HYPERTENSION, BENIGN: ICD-10-CM

## 2021-02-24 DIAGNOSIS — K21.9 GASTROESOPHAGEAL REFLUX DISEASE WITHOUT ESOPHAGITIS: ICD-10-CM

## 2021-02-24 DIAGNOSIS — E78.5 HYPERLIPIDEMIA LDL GOAL <130: ICD-10-CM

## 2021-02-24 LAB
ALBUMIN SERPL-MCNC: 3.4 G/DL (ref 3.4–5)
ALP SERPL-CCNC: 70 U/L (ref 40–150)
ALT SERPL W P-5'-P-CCNC: 18 U/L (ref 0–50)
ANION GAP SERPL CALCULATED.3IONS-SCNC: 5 MMOL/L (ref 3–14)
AST SERPL W P-5'-P-CCNC: 21 U/L (ref 0–45)
BILIRUB SERPL-MCNC: 0.5 MG/DL (ref 0.2–1.3)
BUN SERPL-MCNC: 18 MG/DL (ref 7–30)
CALCIUM SERPL-MCNC: 9.9 MG/DL (ref 8.5–10.1)
CHLORIDE SERPL-SCNC: 107 MMOL/L (ref 94–109)
CHOLEST SERPL-MCNC: 157 MG/DL
CO2 SERPL-SCNC: 28 MMOL/L (ref 20–32)
CREAT SERPL-MCNC: 0.9 MG/DL (ref 0.52–1.04)
CREAT UR-MCNC: 37 MG/DL
GFR SERPL CREATININE-BSD FRML MDRD: 60 ML/MIN/{1.73_M2}
GLUCOSE SERPL-MCNC: 125 MG/DL (ref 70–99)
HBA1C MFR BLD: 6.1 % (ref 0–5.6)
HDLC SERPL-MCNC: 50 MG/DL
LDLC SERPL CALC-MCNC: 85 MG/DL
MICROALBUMIN UR-MCNC: <5 MG/L
MICROALBUMIN/CREAT UR: NORMAL MG/G CR (ref 0–25)
NONHDLC SERPL-MCNC: 107 MG/DL
POTASSIUM SERPL-SCNC: 3.9 MMOL/L (ref 3.4–5.3)
PROT SERPL-MCNC: 7.2 G/DL (ref 6.8–8.8)
SODIUM SERPL-SCNC: 140 MMOL/L (ref 133–144)
TRIGL SERPL-MCNC: 110 MG/DL
TSH SERPL DL<=0.005 MIU/L-ACNC: 2.21 MU/L (ref 0.4–4)

## 2021-02-24 PROCEDURE — 82043 UR ALBUMIN QUANTITATIVE: CPT | Performed by: INTERNAL MEDICINE

## 2021-02-24 PROCEDURE — 80061 LIPID PANEL: CPT | Performed by: INTERNAL MEDICINE

## 2021-02-24 PROCEDURE — 84443 ASSAY THYROID STIM HORMONE: CPT | Performed by: INTERNAL MEDICINE

## 2021-02-24 PROCEDURE — 83036 HEMOGLOBIN GLYCOSYLATED A1C: CPT | Performed by: INTERNAL MEDICINE

## 2021-02-24 PROCEDURE — 36415 COLL VENOUS BLD VENIPUNCTURE: CPT | Performed by: INTERNAL MEDICINE

## 2021-02-24 PROCEDURE — 80053 COMPREHEN METABOLIC PANEL: CPT | Performed by: INTERNAL MEDICINE

## 2021-02-24 PROCEDURE — 99214 OFFICE O/P EST MOD 30 MIN: CPT | Performed by: INTERNAL MEDICINE

## 2021-02-24 RX ORDER — LATANOPROST 50 UG/ML
1 SOLUTION/ DROPS OPHTHALMIC AT BEDTIME
COMMUNITY
Start: 2021-02-05

## 2021-02-24 RX ORDER — PANTOPRAZOLE SODIUM 20 MG/1
20 TABLET, DELAYED RELEASE ORAL DAILY
Qty: 90 TABLET | Refills: 3 | Status: SHIPPED | OUTPATIENT
Start: 2021-02-24 | End: 2022-02-07

## 2021-02-24 RX ORDER — LISINOPRIL 30 MG/1
TABLET ORAL
Qty: 90 TABLET | Refills: 3 | Status: SHIPPED | OUTPATIENT
Start: 2021-02-24 | End: 2022-02-07

## 2021-02-24 RX ORDER — AMLODIPINE BESYLATE 5 MG/1
TABLET ORAL
Qty: 90 TABLET | Refills: 3 | Status: SHIPPED | OUTPATIENT
Start: 2021-02-24 | End: 2022-02-07

## 2021-02-24 RX ORDER — SIMVASTATIN 20 MG
TABLET ORAL
Qty: 90 TABLET | Refills: 3 | Status: SHIPPED | OUTPATIENT
Start: 2021-02-24 | End: 2022-02-07

## 2021-02-24 RX ORDER — LEVOTHYROXINE SODIUM 50 UG/1
50 TABLET ORAL DAILY
Qty: 90 TABLET | Refills: 3 | Status: SHIPPED | OUTPATIENT
Start: 2021-02-24 | End: 2022-02-07

## 2021-02-24 RX ORDER — MELOXICAM 7.5 MG/1
7.5 TABLET ORAL DAILY
Qty: 90 TABLET | Refills: 3 | Status: SHIPPED | OUTPATIENT
Start: 2021-02-24 | End: 2022-02-07

## 2021-02-24 ASSESSMENT — MIFFLIN-ST. JEOR: SCORE: 1405.4

## 2021-02-24 NOTE — PROGRESS NOTES
"    Assessment & Plan     (R73.09) Elevated hemoglobin A1c  (primary encounter diagnosis)  Comment:   keep active; healthy eating encouraged; fasting labs ordered   Plan: Hemoglobin A1c, Comprehensive metabolic panel          (I10) Essential hypertension, benign  Comment: BLOOD PRESSURE reviewed; Goals of therapy reviewed; Risk assessment; continue meds   Plan: amLODIPine (NORVASC) 5 MG tablet, lisinopril         (ZESTRIL) 30 MG tablet, Comprehensive metabolic        panel, Albumin Random Urine Quantitative with         Creat Ratio          (E03.9) Acquired hypothyroidism  Comment: Clinically euthyroid; labs today, consider dose adjustment if labs warrant   Plan: levothyroxine (SYNTHROID/LEVOTHROID) 50 MCG         tablet, TSH with free T4 reflex          (M89.49) Primary osteoarthritis involving multiple joints  Comment: back primarily the issue; warm water exercise pool ( [employer]) has been helpful in the past but not available due to COVID Pandemic. Unclear when activity can be resumed; Mobic has been helpful  Lab assessment due- renal function and electrolytes.    Plan: meloxicam (MOBIC) 7.5 MG tablet          (K21.9) Gastroesophageal reflux disease without esophagitis  Comment: triggers reviewed; PPI helps to lessen triggers.   Plan: pantoprazole (PROTONIX) 20 MG EC tablet        Pt reminded of benefits of maximizing calcium and vit D to help with bone health (due to being on PPI)     (E78.5) Hyperlipidemia LDL goal <130  Comment: statin well tolerated and been effective;  Plan: simvastatin (ZOCOR) 20 MG tablet, Comprehensive        metabolic panel, Lipid panel reflex to direct         LDL Fasting                30 minutes spent on the date of the encounter doing chart review, review of test results, interpretation of tests, patient visit and documentation        BMI:   Estimated body mass index is 36.4 kg/m  as calculated from the following:    Height as of this encounter: 1.619 m (5' 3.75\").    " Weight as of this encounter: 95.4 kg (210 lb 6.4 oz).   Weight management plan: Discussed healthy diet and exercise guidelines    MEDICATIONS:   Orders Placed This Encounter   Medications     latanoprost (XALATAN) 0.005 % ophthalmic solution     amLODIPine (NORVASC) 5 MG tablet     Sig: TAKE ONE TABLET BY MOUTH EVERY NIGHT AT BEDTIME     Dispense:  90 tablet     Refill:  3     levothyroxine (SYNTHROID/LEVOTHROID) 50 MCG tablet     Sig: Take 1 tablet (50 mcg) by mouth daily     Dispense:  90 tablet     Refill:  3     lisinopril (ZESTRIL) 30 MG tablet     Sig: TAKE ONE TABLET BY MOUTH EVERY DAY FOR HYPERTENSION     Dispense:  90 tablet     Refill:  3     meloxicam (MOBIC) 7.5 MG tablet     Sig: Take 1 tablet (7.5 mg) by mouth daily     Dispense:  90 tablet     Refill:  3     pantoprazole (PROTONIX) 20 MG EC tablet     Sig: Take 1 tablet (20 mg) by mouth daily     Dispense:  90 tablet     Refill:  3     simvastatin (ZOCOR) 20 MG tablet     Sig: TAKE ONE TABLET BY MOUTH EVERY NIGHT AT BEDTIME     Dispense:  90 tablet     Refill:  3          - Continue other medications without change    Work on weight loss; keep active      Return in about 6 months (around 8/26/2021) for Wellness visit.    Jesenia Madrigal MD  Internal Medicine   [facility] MONIK Shultz is a 80 year old who presents for the following health issues:    HPI       Hyperlipidemia Follow-Up      Are you regularly taking any medication or supplement to lower your cholesterol?   Yes- Simvastatin    Are you having muscle aches or other side effects that you think could be caused by your cholesterol lowering medication?  No   Recent Labs   Lab Test 08/26/20  0933 02/20/20  0848 06/29/15  0858 06/29/15  0858 02/13/14  0958   CHOL 153 150   < > 160 140   HDL 43* 42*   < > 48* 38*   LDL 83 86   < > 79 78   TRIG 133 110   < > 163* 118   CHOLHDLRATIO  --   --   --  3.3 3.7    < > = values in this interval not  displayed.             Hypertension Follow-up      Do you check your blood pressure regularly outside of the clinic? Yes     Are you following a low salt diet? Yes    Are your blood pressures ever more than 140 on the top number (systolic) OR more   than 90 on the bottom number (diastolic), for example 140/90? Yes   BP Readings from Last 3 Encounters:   02/24/21 120/80   10/19/20 130/78   09/24/20 133/67        Hypothyroidism Follow-up      Since last visit, patient describes the following symptoms: hair loss      How many servings of fruits and vegetables do you eat daily?  0-1    On average, how many sweetened beverages do you drink each day (Examples: soda, juice, sweet tea, etc.  Do NOT count diet or artificially sweetened beverages)?   0    How many days per week do you exercise enough to make your heart beat faster? none    How many minutes a day do you exercise enough to make your heart beat faster? None    How many days per week do you miss taking your medication? 0  Lab Results   Component Value Date    TSH 2.20 02/20/2020    TSH 2.10 08/27/2019            Diabetes Follow-up      How often are you checking your blood sugar? Not at all    What concerns do you have today about your diabetes? None     Do you have any of these symptoms? (Select all that apply)  No numbness or tingling in feet.  No redness, sores or blisters on feet.  No complaints of excessive thirst.  No reports of blurry vision.  No significant changes to weight.    Have you had a diabetic eye exam in the last 12 months? Yes- Date of last eye exam: [facility] ,  Location: ?    Lab Results   Component Value Date    A1C 5.9 08/26/2020    A1C 5.7 02/20/2020                BP Readings from Last 2 Encounters:   02/24/21 120/80   10/19/20 130/78     Hemoglobin A1C (%)   Date Value   08/26/2020 5.9 (H)   02/20/2020 5.7 (H)     LDL Cholesterol Calculated (mg/dL)   Date Value   08/26/2020 83   02/20/2020 86         Review of Systems  "  CONSTITUTIONAL: NEGATIVE for fever, chills, change in weight  ENT/MOUTH: NEGATIVE for ear, mouth and throat problems  RESP: NEGATIVE for significant cough or SOB  CV: NEGATIVE for chest pain, palpitations or peripheral edema  GI: using PPI; triggers reviewed  : working with Urology, Dr. Jenny Ayon  MUSCULOSKELETAL: back bothersome; better with activity but that has been limited due to COVID Pandemic.   NEURO: denies radicular symptoms   ENDOCRINE: lipids, thyroid and risk for diabetes reviewed;   HEME/ALLERGY/IMMUNE: NEGATIVE for bleeding problems  PSYCHIATRIC: NEGATIVE for changes in mood or affect      Objective    /80 (BP Location: Right arm, Patient Position: Chair, Cuff Size: Adult Regular)   Pulse 57   Temp 97.8  F (36.6  C) (Oral)   Resp 18   Ht 1.619 m (5' 3.75\")   Wt 95.4 kg (210 lb 6.4 oz)   LMP  (LMP Unknown)   SpO2 98%   BMI 36.40 kg/m    Body mass index is 36.4 kg/m .  Physical Exam   GENERAL: healthy, alert and no distress  NECK: no adenopathy, no asymmetry, masses, or scars and thyroid normal to palpation  RESP: lungs clear to auscultation - no rales, rhonchi or wheezes  CV: regular rates and rhythm, normal S1 S2, no S3 or S4, peripheral pulses strong and no peripheral edema  ABDOMEN: soft, nontender and bowel sounds normal  MS: extremities normal- no gross deformities noted; walks cautiously  NEURO: Normal strength and tone, mentation intact and speech normal              "

## 2021-03-12 ENCOUNTER — OFFICE VISIT (OUTPATIENT)
Dept: URGENT CARE | Facility: URGENT CARE | Age: 81
End: 2021-03-12
Payer: COMMERCIAL

## 2021-03-12 VITALS
SYSTOLIC BLOOD PRESSURE: 129 MMHG | DIASTOLIC BLOOD PRESSURE: 75 MMHG | HEART RATE: 81 BPM | TEMPERATURE: 98.8 F | OXYGEN SATURATION: 96 % | BODY MASS INDEX: 36.33 KG/M2 | WEIGHT: 210 LBS

## 2021-03-12 DIAGNOSIS — N39.0 COMPLICATED UTI (URINARY TRACT INFECTION): Primary | ICD-10-CM

## 2021-03-12 LAB
ALBUMIN UR-MCNC: NEGATIVE MG/DL
AMORPH CRY #/AREA URNS HPF: ABNORMAL /HPF
APPEARANCE UR: CLEAR
BACTERIA #/AREA URNS HPF: ABNORMAL /HPF
BASOPHILS # BLD AUTO: 0 10E9/L (ref 0–0.2)
BASOPHILS NFR BLD AUTO: 0.3 %
BILIRUB UR QL STRIP: NEGATIVE
COLOR UR AUTO: YELLOW
DIFFERENTIAL METHOD BLD: ABNORMAL
EOSINOPHIL # BLD AUTO: 0.2 10E9/L (ref 0–0.7)
EOSINOPHIL NFR BLD AUTO: 1.8 %
ERYTHROCYTE [DISTWIDTH] IN BLOOD BY AUTOMATED COUNT: 13.1 % (ref 10–15)
GLUCOSE UR STRIP-MCNC: NEGATIVE MG/DL
HCT VFR BLD AUTO: 43.7 % (ref 35–47)
HGB BLD-MCNC: 13.8 G/DL (ref 11.7–15.7)
HGB UR QL STRIP: NEGATIVE
KETONES UR STRIP-MCNC: NEGATIVE MG/DL
LEUKOCYTE ESTERASE UR QL STRIP: ABNORMAL
LYMPHOCYTES # BLD AUTO: 0.8 10E9/L (ref 0.8–5.3)
LYMPHOCYTES NFR BLD AUTO: 7.1 %
MCH RBC QN AUTO: 30.3 PG (ref 26.5–33)
MCHC RBC AUTO-ENTMCNC: 31.6 G/DL (ref 31.5–36.5)
MCV RBC AUTO: 96 FL (ref 78–100)
MONOCYTES # BLD AUTO: 0.6 10E9/L (ref 0–1.3)
MONOCYTES NFR BLD AUTO: 4.9 %
NEUTROPHILS # BLD AUTO: 9.8 10E9/L (ref 1.6–8.3)
NEUTROPHILS NFR BLD AUTO: 85.9 %
NITRATE UR QL: NEGATIVE
NON-SQ EPI CELLS #/AREA URNS LPF: ABNORMAL /LPF
PH UR STRIP: 8 PH (ref 5–7)
PLATELET # BLD AUTO: 172 10E9/L (ref 150–450)
RBC # BLD AUTO: 4.56 10E12/L (ref 3.8–5.2)
RBC #/AREA URNS AUTO: ABNORMAL /HPF
SOURCE: ABNORMAL
SP GR UR STRIP: 1.02 (ref 1–1.03)
TRANS CELLS #/AREA URNS HPF: ABNORMAL /HPF
UROBILINOGEN UR STRIP-ACNC: 0.2 EU/DL (ref 0.2–1)
WBC # BLD AUTO: 11.3 10E9/L (ref 4–11)
WBC #/AREA URNS AUTO: ABNORMAL /HPF

## 2021-03-12 PROCEDURE — 85025 COMPLETE CBC W/AUTO DIFF WBC: CPT | Performed by: PHYSICIAN ASSISTANT

## 2021-03-12 PROCEDURE — 81001 URINALYSIS AUTO W/SCOPE: CPT | Performed by: FAMILY MEDICINE

## 2021-03-12 PROCEDURE — 99214 OFFICE O/P EST MOD 30 MIN: CPT | Mod: 25 | Performed by: PHYSICIAN ASSISTANT

## 2021-03-12 PROCEDURE — 96372 THER/PROPH/DIAG INJ SC/IM: CPT | Performed by: PHYSICIAN ASSISTANT

## 2021-03-12 PROCEDURE — 36415 COLL VENOUS BLD VENIPUNCTURE: CPT | Performed by: PHYSICIAN ASSISTANT

## 2021-03-12 RX ORDER — CEFTRIAXONE SODIUM 1 G
1 VIAL (EA) INJECTION ONCE
Status: COMPLETED | OUTPATIENT
Start: 2021-03-12 | End: 2021-03-12

## 2021-03-12 RX ORDER — CEFDINIR 300 MG/1
300 CAPSULE ORAL 2 TIMES DAILY
Qty: 20 CAPSULE | Refills: 0 | Status: SHIPPED | OUTPATIENT
Start: 2021-03-12 | End: 2021-03-22

## 2021-03-12 RX ORDER — NITROFURANTOIN MACROCRYSTAL 100 MG
100 CAPSULE ORAL 4 TIMES DAILY
COMMUNITY
End: 2021-08-16

## 2021-03-12 RX ORDER — CEFDINIR 300 MG/1
300 CAPSULE ORAL 2 TIMES DAILY
Qty: 20 CAPSULE | Refills: 0 | Status: SHIPPED | OUTPATIENT
Start: 2021-03-12 | End: 2021-03-12

## 2021-03-12 RX ADMIN — Medication 1 G: at 11:01

## 2021-03-12 NOTE — PATIENT INSTRUCTIONS

## 2021-03-12 NOTE — PROGRESS NOTES
CHIEF COMPLAINT:   Chief Complaint   Patient presents with     Bladder Problems     got botox inj yesterday was fine yesterday and woke up cold and trembling with fever        HPI: Lexii Stratton is a 80 year old female who presents to clinic today for evaluation.  Yesterday, she went and saw her urologist and received a Botox injection in her bladder.  She felt fine after that.  She has previously had Botox injections in her bladder to help with overactive bladder symptoms.  She was prescribed Macrobid 100 mg and has taken 2 doses.  Since leaving the office, she endorses urinary frequency.  She has not had dysuria, burning, vaginal itching, hematuria or flank pain.  This morning she woke up with chills. She took her temperature which was 100.8.  She took Tylenol which helped with her symptoms.  She has received 2 doses of her COVID-19 vaccine, greater than 2 weeks ago.  She denies having vomiting, nausea, confusion, flank pain or weakness.    Past Medical History:   Diagnosis Date     Arthritis      Diabetes (H)     Type 2-no meds... Dieet and exercise only as of 9/15/20     Esophageal reflux      Hernia, abdominal      History of blood transfusion 1984     Hypertension     No cardiologist     Incontinence of urine      Mumps     6 years old     Obese      Osteoarthritis      Other and unspecified hyperlipidemia      Other chronic pain     back     Peptic ulcer, unspecified site, unspecified as acute or chronic, without mention of hemorrhage, perforation, or obstruction      Small bowel obstruction (H)      Thyroid disease hypothyroidism     Urinary incontinence      Walking troubles      Past Surgical History:   Procedure Laterality Date     ARTHROPLASTY HIP Right 11/9/2016    Procedure: ARTHROPLASTY HIP;  Surgeon: Angel Lund MD;  Location: [facility] OR     AS REPAIR INCISIONAL HERNIA,REDUCIBLE  1998    inc hernia ( [facility]ile[facility] surgery)     BLADDER SURGERY      hyperdistention surgery and sling      CHOLECYSTECTOMY  1987     COLONOSCOPY  12/3/2011    Procedure:COLONOSCOPY; COLONOSCOPY; Surgeon:SEMAJ GRAHAM; Location: GI     COLONOSCOPY N/A 3/29/2018    Procedure: COLONOSCOPY;  COLONOSCOPY Rm 544;  Surgeon: Marco Gusman MD;  Location:  GI     CYSTOSCOPY N/A 9/6/2017    Procedure: CYSTOSCOPY;  CYSTOSCOPY AND HYDRODISTENTION ;  Surgeon: Eyal Bai MD;  Location:  OR     ENT SURGERY      Tonsillectomy     ESOPHAGOSCOPY, GASTROSCOPY, DUODENOSCOPY (EGD), COMBINED N/A 9/26/2016    Procedure: COMBINED ESOPHAGOSCOPY, GASTROSCOPY, DUODENOSCOPY (EGD), BIOPSY SINGLE OR MULTIPLE;  Surgeon: Marco Monaco MD;  Location:  GI     EYE SURGERY Left 05/2019     GENITOURINARY SURGERY       GYN SURGERY      Hysterectomy     HERNIA REPAIR, UMBILICAL  7/8/2010    Dr. Kirt Franco times 3     IMPLANT STIMULATOR SACRAL NERVE STAGE ONE N/A 9/17/2020    Procedure: sacral neurostimulation stage one implant of neurostimulator lead;  Surgeon: Shahnaz Carbone MD;  Location:  OR     IMPLANT STIMULATOR SACRAL NERVE STAGE TWO Right 9/24/2020    Procedure: Removal of interstem lead, NOT implanting neurostimulator;  Surgeon: Shahnaz Carbone MD;  Location:  OR     ORTHOPEDIC SURGERY  2009    [facility] - Dr. Lund- bilateral knee replacement     ZZC NONSPECIFIC PROCEDURE  1946    T&A     ZZC NONSPECIFIC PROCEDURE  1984    Vaginal Hysterectomy (has her ovaries)     ZZC NONSPECIFIC PROCEDURE  1973    PPTL     ZZC NONSPECIFIC PROCEDURE  1994    L shoulder to remove bone spurs     ZZC NONSPECIFIC PROCEDURE  2004    cysto and durasphere Dr Demarco     ZZC NONSPECIFIC PROCEDURE  2005    cysto and durasphere     ZZC NONSPECIFIC PROCEDURE  2007    cysto and durasphere     ZZC NONSPECIFIC PROCEDURE  2009    retropubic TVT sling     Social History     Tobacco Use     Smoking status: Never Smoker     Smokeless tobacco: Never Used   Substance Use Topics     Alcohol use: No     Alcohol/week: 0.0 standard  drinks     Current Outpatient Medications   Medication     acetaminophen (TYLENOL) 650 MG CR tablet     amLODIPine (NORVASC) 5 MG tablet     Biotin 5000 MCG PO TABS     Calcium Carbonate-Vit D-Min (RA CALCIUM PLUS MINERALS/VIT D PO)     CENTRUM SILVER OR TABS     fenofibrate (TRIGLIDE/LOFIBRA) 160 MG tablet     ferrous gluconate (FERGON) 324 (38 FE) MG tablet     levothyroxine (SYNTHROID/LEVOTHROID) 50 MCG tablet     lisinopril (ZESTRIL) 30 MG tablet     meloxicam (MOBIC) 7.5 MG tablet     nitroFURantoin macrocrystal (MACRODANTIN) 100 MG capsule     pantoprazole (PROTONIX) 20 MG EC tablet     potassium 99 MG TABS     Probiotic Product (ACIDOPHILUS PROBIOTIC BLEND) CAPS     psyllium (METAMUCIL) 0.52 G capsule     simvastatin (ZOCOR) 20 MG tablet     VITAMIN D, CHOLECALCIFEROL, PO     acetaminophen (TYLENOL) 500 MG tablet     fexofenadine (ALLEGRA) 60 MG tablet     latanoprost (XALATAN) 0.005 % ophthalmic solution     MAGNESIUM PO     OMEGA-3 FATTY ACIDS 1200 MG OR CAPS     UNABLE TO FIND     No current facility-administered medications for this visit.      Allergies   Allergen Reactions     Augmentin Diarrhea     Codeine Nausea and Vomiting     Hydrocodone      Keeps patient awake     Naproxen Itching and Rash     Seasonal Allergies      Hay fever in fall, ragweed and russian thistles     Sulfa Drugs Nausea       10 point ROS of systems including Constitutional, Eyes, Respiratory, Cardiovascular, Gastroenterology, Genitourinary, Integumentary, Muscularskeletal, Psychiatric were all negative except for pertinent positives noted in my HPI.        Exam:  /75   Pulse 81   Temp 98.8  F (37.1  C)   Wt 95.3 kg (210 lb)   LMP  (LMP Unknown)   SpO2 96%   BMI 36.33 kg/m    Constitutional: healthy, alert and no distress  Head: Normocephalic, atraumatic.  Eyes: conjunctiva clear, no drainage  ENT: MMM  Neck: neck is supple, no cervical lymphadenopathy or nuchal rigidity  Cardiovascular: RRR  Respiratory: CTA  bilaterally, no rhonchi or rales  Gastrointestinal: soft and nontender. Mild discomfort in suprapubic area.   BACK: NO CVA tenderness  Skin: no rashes  Neurologic: Speech clear, gait normal. Moves all extremities.    Results for orders placed or performed in visit on 03/12/21   UA reflex to Microscopic and Culture     Status: Abnormal    Specimen: Midstream Urine   Result Value Ref Range    Color Urine Yellow     Appearance Urine Clear     Glucose Urine Negative NEG^Negative mg/dL    Bilirubin Urine Negative NEG^Negative    Ketones Urine Negative NEG^Negative mg/dL    Specific Gravity Urine 1.020 1.003 - 1.035    Blood Urine Negative NEG^Negative    pH Urine 8.0 (H) 5.0 - 7.0 pH    Protein Albumin Urine Negative NEG^Negative mg/dL    Urobilinogen Urine 0.2 0.2 - 1.0 EU/dL    Nitrite Urine Negative NEG^Negative    Leukocyte Esterase Urine Trace (A) NEG^Negative    Source Midstream Urine    Urine Microscopic     Status: Abnormal   Result Value Ref Range    WBC Urine 0 - 5 OTO5^0 - 5 /HPF    RBC Urine O - 2 OTO2^O - 2 /HPF    Squamous Epithelial /LPF Urine Few FEW^Few /LPF    Transitional Epi Few FEW^Few /HPF    Bacteria Urine Few (A) NEG^Negative /HPF    Amorphous Crystals Many (A) NEG^Negative /HPF   CBC with platelets and differential     Status: Abnormal   Result Value Ref Range    WBC 11.3 (H) 4.0 - 11.0 10e9/L    RBC Count 4.56 3.8 - 5.2 10e12/L    Hemoglobin 13.8 11.7 - 15.7 g/dL    Hematocrit 43.7 35.0 - 47.0 %    MCV 96 78 - 100 fl    MCH 30.3 26.5 - 33.0 pg    MCHC 31.6 31.5 - 36.5 g/dL    RDW 13.1 10.0 - 15.0 %    Platelet Count 172 150 - 450 10e9/L    % Neutrophils 85.9 %    % Lymphocytes 7.1 %    % Monocytes 4.9 %    % Eosinophils 1.8 %    % Basophils 0.3 %    Absolute Neutrophil 9.8 (H) 1.6 - 8.3 10e9/L    Absolute Lymphocytes 0.8 0.8 - 5.3 10e9/L    Absolute Monocytes 0.6 0.0 - 1.3 10e9/L    Absolute Eosinophils 0.2 0.0 - 0.7 10e9/L    Absolute Basophils 0.0 0.0 - 0.2 10e9/L    Diff Method Automated  Method          ASSESSMENT/PLAN:  1. Complicated UTI (urinary tract infection)  80-year-old female presents the clinic for evaluation of urinary frequency, and chills.  Yesterday received Botox injections into her bladder.  Currently taking Macrobid.  On exam, she looks well.  Vital signs are stable.  She does not have CVA tenderness.  Mild discomfort in suprapubic area.  No rebound, guarding or rigidity of her abdomen.  UA is nonspecific, although she has taken antibiotics, which is likely skewing her urine.  White count is slightly elevated.  I suspect that she has a complicated urinary tract infection, or an early pyelonephritis, as she has had urinary frequency and rigors this morning.  We will treat with 1 g of ceftriaxone in clinic, she tolerated this well.  Outpatient p.o. medication for cefdinir x10 days.  We discussed in detail worsening symptoms including, increased fever, chills, weakness, unable to hold down fluids, flank pain or severe abdominal pain to be seen in the emergency department right away.  - UA reflex to Microscopic and Culture  - Urine Microscopic  - CBC with platelets and differential  - cefTRIAXone (ROCEPHIN) injection 1 g  - cefdinir (OMNICEF) 300 MG capsule; Take 1 capsule (300 mg) by mouth 2 times daily for 10 days  Dispense: 20 capsule; Refill: 0      Diagnosis and treatment plan was reviewed with patient and/or family.   We went over any labs or imaging. Discussed worsening symptoms or little to no relief despite treatment plan to follow-up in ER.  Patient verbalizes understanding. All questions were addressed and answered.   Shahnaz Vasquez PA-C

## 2021-06-10 DIAGNOSIS — E78.5 HYPERLIPIDEMIA LDL GOAL <130: ICD-10-CM

## 2021-06-10 RX ORDER — FENOFIBRATE 160 MG/1
TABLET ORAL
Qty: 90 TABLET | Refills: 0 | Status: SHIPPED | OUTPATIENT
Start: 2021-06-10 | End: 2021-08-26

## 2021-07-03 ENCOUNTER — HEALTH MAINTENANCE LETTER (OUTPATIENT)
Age: 81
End: 2021-07-03

## 2021-08-02 ENCOUNTER — TRANSFERRED RECORDS (OUTPATIENT)
Dept: HEALTH INFORMATION MANAGEMENT | Facility: CLINIC | Age: 81
End: 2021-08-02

## 2021-08-16 ENCOUNTER — APPOINTMENT (OUTPATIENT)
Dept: GENERAL RADIOLOGY | Facility: CLINIC | Age: 81
DRG: 389 | End: 2021-08-16
Attending: SURGERY
Payer: COMMERCIAL

## 2021-08-16 ENCOUNTER — APPOINTMENT (OUTPATIENT)
Dept: CT IMAGING | Facility: CLINIC | Age: 81
DRG: 389 | End: 2021-08-16
Attending: EMERGENCY MEDICINE
Payer: COMMERCIAL

## 2021-08-16 ENCOUNTER — HOSPITAL ENCOUNTER (INPATIENT)
Facility: CLINIC | Age: 81
LOS: 3 days | Discharge: HOME | DRG: 389 | End: 2021-08-19
Attending: EMERGENCY MEDICINE | Admitting: INTERNAL MEDICINE
Payer: COMMERCIAL

## 2021-08-16 ENCOUNTER — APPOINTMENT (OUTPATIENT)
Dept: GENERAL RADIOLOGY | Facility: CLINIC | Age: 81
DRG: 389 | End: 2021-08-16
Attending: INTERNAL MEDICINE
Payer: COMMERCIAL

## 2021-08-16 ENCOUNTER — NURSE TRIAGE (OUTPATIENT)
Dept: FAMILY MEDICINE | Facility: CLINIC | Age: 81
End: 2021-08-16

## 2021-08-16 DIAGNOSIS — K56.609 SBO (SMALL BOWEL OBSTRUCTION) (H): ICD-10-CM

## 2021-08-16 DIAGNOSIS — K56.600 PARTIAL SMALL BOWEL OBSTRUCTION (H): Primary | ICD-10-CM

## 2021-08-16 LAB
ALBUMIN SERPL-MCNC: 3.2 G/DL (ref 3.4–5)
ALP SERPL-CCNC: 69 U/L (ref 40–150)
ALT SERPL W P-5'-P-CCNC: 16 U/L (ref 0–50)
ANION GAP SERPL CALCULATED.3IONS-SCNC: 7 MMOL/L (ref 3–14)
AST SERPL W P-5'-P-CCNC: 21 U/L (ref 0–45)
BASOPHILS # BLD AUTO: 0 10E3/UL (ref 0–0.2)
BASOPHILS NFR BLD AUTO: 0 %
BILIRUB SERPL-MCNC: 0.9 MG/DL (ref 0.2–1.3)
BUN SERPL-MCNC: 20 MG/DL (ref 7–30)
CALCIUM SERPL-MCNC: 9.8 MG/DL (ref 8.5–10.1)
CHLORIDE BLD-SCNC: 107 MMOL/L (ref 94–109)
CO2 SERPL-SCNC: 25 MMOL/L (ref 20–32)
CREAT BLD-MCNC: 1.1 MG/DL (ref 0.5–1)
CREAT SERPL-MCNC: 0.87 MG/DL (ref 0.52–1.04)
CREAT SERPL-MCNC: 1.12 MG/DL (ref 0.52–1.04)
EOSINOPHIL # BLD AUTO: 0 10E3/UL (ref 0–0.7)
EOSINOPHIL NFR BLD AUTO: 0 %
ERYTHROCYTE [DISTWIDTH] IN BLOOD BY AUTOMATED COUNT: 13 % (ref 10–15)
GFR SERPL CREATININE-BSD FRML MDRD: 47 ML/MIN/1.73M2
GFR SERPL CREATININE-BSD FRML MDRD: 48 ML/MIN/1.73M2
GFR SERPL CREATININE-BSD FRML MDRD: 63 ML/MIN/1.73M2
GLUCOSE BLD-MCNC: 135 MG/DL (ref 70–99)
GLUCOSE BLDC GLUCOMTR-MCNC: 135 MG/DL (ref 70–99)
GLUCOSE BLDC GLUCOMTR-MCNC: 152 MG/DL (ref 70–99)
HCT VFR BLD AUTO: 46.2 % (ref 35–47)
HGB BLD-MCNC: 14.7 G/DL (ref 11.7–15.7)
IMM GRANULOCYTES # BLD: 0 10E3/UL
IMM GRANULOCYTES NFR BLD: 0 %
LIPASE SERPL-CCNC: 100 U/L (ref 73–393)
LYMPHOCYTES # BLD AUTO: 1.4 10E3/UL (ref 0.8–5.3)
LYMPHOCYTES NFR BLD AUTO: 18 %
MCH RBC QN AUTO: 30.9 PG (ref 26.5–33)
MCHC RBC AUTO-ENTMCNC: 31.8 G/DL (ref 31.5–36.5)
MCV RBC AUTO: 97 FL (ref 78–100)
MONOCYTES # BLD AUTO: 0.5 10E3/UL (ref 0–1.3)
MONOCYTES NFR BLD AUTO: 7 %
NEUTROPHILS # BLD AUTO: 6 10E3/UL (ref 1.6–8.3)
NEUTROPHILS NFR BLD AUTO: 75 %
NRBC # BLD AUTO: 0 10E3/UL
NRBC BLD AUTO-RTO: 0 /100
PLATELET # BLD AUTO: 214 10E3/UL (ref 150–450)
POTASSIUM BLD-SCNC: 4 MMOL/L (ref 3.4–5.3)
PROT SERPL-MCNC: 7 G/DL (ref 6.8–8.8)
RBC # BLD AUTO: 4.75 10E6/UL (ref 3.8–5.2)
SARS-COV-2 RNA RESP QL NAA+PROBE: NEGATIVE
SODIUM SERPL-SCNC: 139 MMOL/L (ref 133–144)
WBC # BLD AUTO: 8 10E3/UL (ref 4–11)

## 2021-08-16 PROCEDURE — 120N000001 HC R&B MED SURG/OB

## 2021-08-16 PROCEDURE — 99285 EMERGENCY DEPT VISIT HI MDM: CPT | Mod: 25

## 2021-08-16 PROCEDURE — 250N000011 HC RX IP 250 OP 636: Performed by: EMERGENCY MEDICINE

## 2021-08-16 PROCEDURE — 99222 1ST HOSP IP/OBS MODERATE 55: CPT | Performed by: SURGERY

## 2021-08-16 PROCEDURE — 82565 ASSAY OF CREATININE: CPT | Performed by: PHYSICIAN ASSISTANT

## 2021-08-16 PROCEDURE — 96361 HYDRATE IV INFUSION ADD-ON: CPT

## 2021-08-16 PROCEDURE — 85025 COMPLETE CBC W/AUTO DIFF WBC: CPT | Performed by: EMERGENCY MEDICINE

## 2021-08-16 PROCEDURE — 82565 ASSAY OF CREATININE: CPT

## 2021-08-16 PROCEDURE — C9803 HOPD COVID-19 SPEC COLLECT: HCPCS

## 2021-08-16 PROCEDURE — 99223 1ST HOSP IP/OBS HIGH 75: CPT | Mod: AI | Performed by: PHYSICIAN ASSISTANT

## 2021-08-16 PROCEDURE — 250N000009 HC RX 250: Performed by: EMERGENCY MEDICINE

## 2021-08-16 PROCEDURE — 250N000013 HC RX MED GY IP 250 OP 250 PS 637: Performed by: INTERNAL MEDICINE

## 2021-08-16 PROCEDURE — 74177 CT ABD & PELVIS W/CONTRAST: CPT

## 2021-08-16 PROCEDURE — 258N000003 HC RX IP 258 OP 636: Performed by: PHYSICIAN ASSISTANT

## 2021-08-16 PROCEDURE — 36415 COLL VENOUS BLD VENIPUNCTURE: CPT | Performed by: PHYSICIAN ASSISTANT

## 2021-08-16 PROCEDURE — 250N000011 HC RX IP 250 OP 636: Performed by: PHYSICIAN ASSISTANT

## 2021-08-16 PROCEDURE — 87635 SARS-COV-2 COVID-19 AMP PRB: CPT | Performed by: EMERGENCY MEDICINE

## 2021-08-16 PROCEDURE — 258N000003 HC RX IP 258 OP 636: Performed by: EMERGENCY MEDICINE

## 2021-08-16 PROCEDURE — 80053 COMPREHEN METABOLIC PANEL: CPT | Performed by: EMERGENCY MEDICINE

## 2021-08-16 PROCEDURE — 74018 RADEX ABDOMEN 1 VIEW: CPT

## 2021-08-16 PROCEDURE — 96374 THER/PROPH/DIAG INJ IV PUSH: CPT | Mod: 59

## 2021-08-16 PROCEDURE — 83690 ASSAY OF LIPASE: CPT | Performed by: EMERGENCY MEDICINE

## 2021-08-16 PROCEDURE — 71045 X-RAY EXAM CHEST 1 VIEW: CPT

## 2021-08-16 PROCEDURE — 250N000013 HC RX MED GY IP 250 OP 250 PS 637: Performed by: PHYSICIAN ASSISTANT

## 2021-08-16 PROCEDURE — 36415 COLL VENOUS BLD VENIPUNCTURE: CPT | Performed by: EMERGENCY MEDICINE

## 2021-08-16 RX ORDER — LATANOPROST 50 UG/ML
1 SOLUTION/ DROPS OPHTHALMIC AT BEDTIME
Status: DISCONTINUED | OUTPATIENT
Start: 2021-08-16 | End: 2021-08-19 | Stop reason: HOSPADM

## 2021-08-16 RX ORDER — DEXTROSE MONOHYDRATE 25 G/50ML
25-50 INJECTION, SOLUTION INTRAVENOUS
Status: DISCONTINUED | OUTPATIENT
Start: 2021-08-16 | End: 2021-08-19 | Stop reason: HOSPADM

## 2021-08-16 RX ORDER — HYDROMORPHONE HCL IN WATER/PF 6 MG/30 ML
0.2 PATIENT CONTROLLED ANALGESIA SYRINGE INTRAVENOUS
Status: DISCONTINUED | OUTPATIENT
Start: 2021-08-16 | End: 2021-08-16

## 2021-08-16 RX ORDER — LIDOCAINE 40 MG/G
CREAM TOPICAL
Status: DISCONTINUED | OUTPATIENT
Start: 2021-08-16 | End: 2021-08-19 | Stop reason: HOSPADM

## 2021-08-16 RX ORDER — ONDANSETRON 2 MG/ML
4 INJECTION INTRAMUSCULAR; INTRAVENOUS EVERY 6 HOURS PRN
Status: DISCONTINUED | OUTPATIENT
Start: 2021-08-16 | End: 2021-08-19 | Stop reason: HOSPADM

## 2021-08-16 RX ORDER — DOCUSATE SODIUM 100 MG/1
100 CAPSULE, LIQUID FILLED ORAL AT BEDTIME
COMMUNITY
End: 2023-07-25

## 2021-08-16 RX ORDER — IOPAMIDOL 755 MG/ML
500 INJECTION, SOLUTION INTRAVASCULAR ONCE
Status: COMPLETED | OUTPATIENT
Start: 2021-08-16 | End: 2021-08-16

## 2021-08-16 RX ORDER — HYDROMORPHONE HYDROCHLORIDE 1 MG/ML
.3-.5 INJECTION, SOLUTION INTRAMUSCULAR; INTRAVENOUS; SUBCUTANEOUS EVERY 4 HOURS PRN
Status: DISCONTINUED | OUTPATIENT
Start: 2021-08-16 | End: 2021-08-19 | Stop reason: HOSPADM

## 2021-08-16 RX ORDER — DEXTROSE MONOHYDRATE, SODIUM CHLORIDE, AND POTASSIUM CHLORIDE 50; 1.49; 4.5 G/1000ML; G/1000ML; G/1000ML
INJECTION, SOLUTION INTRAVENOUS CONTINUOUS
Status: DISCONTINUED | OUTPATIENT
Start: 2021-08-16 | End: 2021-08-17

## 2021-08-16 RX ORDER — MULTIVITAMIN WITH IRON
1 TABLET ORAL DAILY
COMMUNITY

## 2021-08-16 RX ORDER — PROCHLORPERAZINE MALEATE 5 MG
5 TABLET ORAL EVERY 6 HOURS PRN
Status: DISCONTINUED | OUTPATIENT
Start: 2021-08-16 | End: 2021-08-19 | Stop reason: HOSPADM

## 2021-08-16 RX ORDER — HYDRALAZINE HYDROCHLORIDE 20 MG/ML
10 INJECTION INTRAMUSCULAR; INTRAVENOUS EVERY 4 HOURS PRN
Status: DISCONTINUED | OUTPATIENT
Start: 2021-08-16 | End: 2021-08-19 | Stop reason: HOSPADM

## 2021-08-16 RX ORDER — ONDANSETRON 4 MG/1
4 TABLET, ORALLY DISINTEGRATING ORAL EVERY 6 HOURS PRN
Status: DISCONTINUED | OUTPATIENT
Start: 2021-08-16 | End: 2021-08-19 | Stop reason: HOSPADM

## 2021-08-16 RX ORDER — NICOTINE POLACRILEX 4 MG
15-30 LOZENGE BUCCAL
Status: DISCONTINUED | OUTPATIENT
Start: 2021-08-16 | End: 2021-08-19 | Stop reason: HOSPADM

## 2021-08-16 RX ORDER — AMPICILLIN TRIHYDRATE 500 MG
1 CAPSULE ORAL
COMMUNITY

## 2021-08-16 RX ORDER — LORAZEPAM 2 MG/ML
0.5 INJECTION INTRAMUSCULAR
Status: COMPLETED | OUTPATIENT
Start: 2021-08-16 | End: 2021-08-16

## 2021-08-16 RX ORDER — LEVOFLOXACIN 500 MG/1
500 TABLET, FILM COATED ORAL DAILY
Status: ON HOLD | COMMUNITY
End: 2021-08-19

## 2021-08-16 RX ORDER — ONDANSETRON 2 MG/ML
4 INJECTION INTRAMUSCULAR; INTRAVENOUS
Status: DISCONTINUED | OUTPATIENT
Start: 2021-08-16 | End: 2021-08-16

## 2021-08-16 RX ORDER — PROCHLORPERAZINE 25 MG
12.5 SUPPOSITORY, RECTAL RECTAL EVERY 12 HOURS PRN
Status: DISCONTINUED | OUTPATIENT
Start: 2021-08-16 | End: 2021-08-19 | Stop reason: HOSPADM

## 2021-08-16 RX ORDER — CALCIUM CARBONATE 500(1250)
1 TABLET ORAL EVERY OTHER DAY
COMMUNITY

## 2021-08-16 RX ADMIN — PHENOL 1 ML: 1.5 LIQUID ORAL at 19:28

## 2021-08-16 RX ADMIN — ONDANSETRON 4 MG: 2 INJECTION INTRAMUSCULAR; INTRAVENOUS at 11:17

## 2021-08-16 RX ADMIN — HYDROMORPHONE HYDROCHLORIDE 0.3 MG: 1 INJECTION, SOLUTION INTRAMUSCULAR; INTRAVENOUS; SUBCUTANEOUS at 20:37

## 2021-08-16 RX ADMIN — LORAZEPAM 0.5 MG: 2 INJECTION INTRAMUSCULAR; INTRAVENOUS at 15:47

## 2021-08-16 RX ADMIN — SODIUM CHLORIDE 65 ML: 9 INJECTION, SOLUTION INTRAVENOUS at 11:32

## 2021-08-16 RX ADMIN — PHENOL 1 ML: 1.5 LIQUID ORAL at 21:44

## 2021-08-16 RX ADMIN — SODIUM CHLORIDE 1000 ML: 9 INJECTION, SOLUTION INTRAVENOUS at 11:17

## 2021-08-16 RX ADMIN — LATANOPROST 1 DROP: 50 SOLUTION OPHTHALMIC at 20:47

## 2021-08-16 RX ADMIN — IOPAMIDOL 100 ML: 755 INJECTION, SOLUTION INTRAVENOUS at 11:32

## 2021-08-16 RX ADMIN — DIATRIZOATE MEGLUMINE AND DIATRIZOATE SODIUM 75 ML: 660; 100 SOLUTION ORAL; RECTAL at 20:14

## 2021-08-16 RX ADMIN — ONDANSETRON 4 MG: 2 INJECTION INTRAMUSCULAR; INTRAVENOUS at 20:36

## 2021-08-16 RX ADMIN — ENOXAPARIN SODIUM 40 MG: 40 INJECTION SUBCUTANEOUS at 20:36

## 2021-08-16 RX ADMIN — POTASSIUM CHLORIDE, DEXTROSE MONOHYDRATE AND SODIUM CHLORIDE: 150; 5; 450 INJECTION, SOLUTION INTRAVENOUS at 15:00

## 2021-08-16 ASSESSMENT — ACTIVITIES OF DAILY LIVING (ADL)
ADLS_ACUITY_SCORE: 14
ADLS_ACUITY_SCORE: 17

## 2021-08-16 ASSESSMENT — ENCOUNTER SYMPTOMS
APPETITE CHANGE: 1
ABDOMINAL PAIN: 1
ABDOMINAL DISTENTION: 1
VOMITING: 1

## 2021-08-16 NOTE — CONSULTS
Consult Date: 08/16/2021    REASON FOR ADMISSION:  Small-bowel obstruction.    HISTORY OF PRESENT ILLNESS:  Ms. Stratton is an 80-year-old female known to our service because of a history of multiple prior abdominal wall hernias, who came to the ER today with several days of nausea, vomiting, bloating, and a central to the left lower quadrant abdominal pain.  Her most recent intervention was about a decade ago, at that time, Dr. Franco repaired a recurrent incisional hernia laparoscopically, this involved extensive adhesiolysis.  Prior to that, she had multiple hernia repairs and evidently had a small bowel injury at one point requiring a resection and anastomosis.  Since then, she states that she has had a number of events at home where she had some bloating, nausea and restricted herself to a liquid diet for several days with resolution.  She has had an admission in the past for mechanical obstruction requiring NG tube decompression, but no intervention surgically.  Workup in the ER showed her to have small-bowel obstruction with a transition point just past a small bowel anastomosis along the midline.  She has not passed flatus since yesterday.  The last time she was able to eat anything solid was Saturday.    PAST MEDICAL AND SURGICAL HISTORY:  As outlined above.  The patient had previous abdominal procedures including laparoscopic cholecystectomy in the mid 90s as well as several umbilical hernia repairs, most recently this was in 2010.  Prior to this, the patient had a small bowel injury related to one of her hernia repairs, which required resection and anastomosis.  She has had a previous hip arthroplasty (right) as well as EGDs, eye surgery, hysterectomy and sacral nerve stimulator placement.  She has a history of arthritis, diabetes, reflux, hypertension, osteoarthritis, small bowel obstructions as discussed above as well as thyroid disease, urinary incontinence.    MEDICATIONS:  At the time of admission, the  patient was on Tylenol, amlodipine, biotin, Os-Jonathan, Centrum, cranberry capsules, Colace, fenofibrate, iron, Xalatan, Levaquin, Synthroid, Zestril, magnesium, Mobic, Protonix, potassium.  Probiotics, Metamucil, Zocor, vitamin D, and Allegra.    ALLERGIES:  THE PATIENT HAS MULTIPLE, AUGMENTIN, WHICH CAUSES DIARRHEA.  CODEINE, WHICH CAUSES NAUSEA AND VOMITING, HYDROCODONE, which kept her awake, NAPROSYN, WHICH CAUSE A RASH, SEASONAL ALLERGIES, SULFA DRUGS, WHICH CAUSE NAUSEA.    SOCIAL HISTORY:  The patient is not a smoker.  She denies any significant alcohol use.  She is here alone.    PHYSICAL EXAMINATION:    VITAL SIGNS:  Ms. Stratton is afebrile. Temperature is 97.9, pulse 68 with blood pressure 160/64, respiratory rate is 18 with 99% saturations on room air.  GENERAL:  She is generally alert and appropriate, nontoxic.  HEART:  Sounds are regular with a 3/6 systolic murmur.  LUNGS:  Breath sounds are clear, without wheezes.  ABDOMEN:  Soft with mild distention.  She has a well-healed upper midline scar as well as an infraumbilical transverse scar and have palpable hernias within them.  She has some mild tenderness in the left lower quadrant.  Her bowel tones are hypoactive but present.    LABORATORY:  Notable for white blood cell count of 8.0 thousand, hemoglobin of 14.7.  Electrolytes are unremarkable.  Asymptomatic COVID swab was negative.    IMAGING:  I personally reviewed the patient's CT scan done late this morning, it shows distention of the proximal small bowel down to the level of a stapled anastomosis, which was in the right reddy-abdomen.  There is some decompressed bowel beyond the anastomosis, but the anastomosis appears patent.  There is no free fluid or other signs of ischemia.    IMPRESSION AND RECOMMENDATIONS:  An 80-year-old female with multiple intra-abdominal surgeries and signs and symptoms consistent with a mechanical obstruction.  She has had multiple of these managed at home in the past and one  event managed in the hospital in the last decade.  At present, she is nontoxic with a normal white count and no peritonitis.  I have recommended that she have an NG tube placed, which her floor nurse will perform shortly.  We discussed proceeding with a Gastrografin challenge as to hopefully accelerate her recovery and better clarify whether she might need intervention in the near future.    Thank you very much for this consultation.    Esau Steward MD        D: 2021   T: 2021   MT: [id number]    Name:     AKSHAT ONEIL  MRN:      [id number]        Account:      [id number]   :      1940           Consult Date: 2021     Document: [id number]    cc:  Jesenia Madrigal MD

## 2021-08-16 NOTE — ED PROVIDER NOTES
History   Chief Complaint:  Abdominal Pain     The history is provided by the patient.      Lexii Stratton is a 80 year old female with history of diabetes mellitus type 2, esophageal reflux, hypertension, hyperlipidemia, and bowel obstructions who presents with abdominal pain. She has history of intermittent partial bowel obstructions that she can usually manage at home. She presents today because she has not been able to manage this episode. Three days ago the patient started feeling abdominal bloating. She took juice and miralax. She has not been eating or drinking much since then. She had two episodes of vomiting yesterday and this morning. She states the vomit yesterday looked similar to the juice she had three days ago. This morning her vomit was bright yellow and was followed with dry heaves. She states she feels like the bowel obstruction is still there. She is having centralized abdominal pain which she describes as pressure.     Review of Systems   Constitutional: Positive for appetite change.   Gastrointestinal: Positive for abdominal distention, abdominal pain and vomiting.   All other systems reviewed and are negative.    Allergies:  Augmentin  Codeine  Hydrocodone  Naproxen  Seasonal allergies  Sulfa drugs    Medications:  Norvasc  Biotin  Triglide  Fergon  Allegra  Synthroid  Zestril  Mobic  Macrodantin  Protonix  Metamucil  Zocor    Past Medical History:    Arthritis  Diabetes mellitus type 2  Esophageal reflux  Abdominal hernia  History of blood transfusion  Hypertension  Incontinence of urine  Mumps  Obese  Osteoarthritis  Hyperlipidemia  Peptic ulcer  Small bowel obstruction  Hypothyroidism  Walking troubles  Pelvic floor dysfunction  GI bleed  Anemia  Hypersomnia with sleep apnea  Generalized osteoarthrosis     Past Surgical History:    Arthroplasty hip  Repair incisional hernia  Hyperdistention surgery and sling bladder  Cholecystectomy  Colonoscopy x2  Cystoscopy  Tonsillectomy  EGD,  combined  Eye surgery  Hysterectomy  Umbilical hernia repair x3  Implant stimulator sacral nerve stage one  Implant stimulator sacral nerve stage two   Bilateral knee replacement  L shoulder remove bone spur  Cysto and durasphere x3  Retropubic tvt sling    Family History:    Mother: heart disease, gallbladder disease, CAD, hyperlipidemia, asthma  Father: throat cancer, CAD  Brother: diabetes, lung cancer    Social History:  Presents to ED alone    Physical Exam     Patient Vitals for the past 24 hrs:   BP Temp Pulse Resp SpO2   08/16/21 1215 136/77 -- 79 16 98 %   08/16/21 1130 (!) 142/68 -- 89 16 97 %   08/16/21 1038 (!) 144/89 97.9  F (36.6  C) 70 18 96 %       Physical Exam  Constitutional: Alert, attentive  HENT:    Nose: Nose normal.    Mouth/Throat: Oropharynx is clear, mucous membranes are moist   Eyes: EOM are normal.   CV: regular rate and rhythm; no murmurs, rubs or gallups  Chest: Effort normal and breath sounds normal.   GI:  There is mild diffuse tenderness and distension. Normal bowel sounds  MSK: Normal range of motion.   Neurological: Alert, attentive  Skin: Skin is warm and dry.      Emergency Department Course   Imaging:  CT Abdomen Pelvis w Contrast  1.  Mid small bowel obstruction with transition point at the level of   a small bowel anastomosis in the right lower quadrant.  As per radiology.     Laboratory:  Creatinine POCT: Creatinine 1.1(H), GFR 48(L)  CMP: Creatinine 1.12(H), Glucose 135(H), Albumin 3.2(L), GFR 47(L) o/w WNL   Lipase: 100  CBC: WBC 8.0, HGB 14.7,    Asymptomatic COVID-19 Virus (Coronavirus) by PCR Nasopharyngeal: Negative    Emergency Department Course:    Reviewed:  I reviewed nursing notes, vitals, past medical history and care everywhere    Assessments:  1059 I obtained history and examined the patient as noted above.   1223 I rechecked the patient and explained findings.     Consults:   1220 I spoke with Ellyn Lira for Dr. Lopes from the Hospitalist service  regarding patient's presentation, findings, and plan of care.     Interventions:  1117 Zofran, 4 mg, IV  1117 NS, 1L, IV    Disposition:  The patient was admitted to the hospital under the care of Dr. Lopes.       Impression & Plan     Medical Decision Making:  This is a very pleasant 80 year old female with history of recurrent SBO/PSBO presumably related to multiple hernia repair surgeries who presented with abdominal pain and distension consistent with SBO.  CT confirms a small bowel obstruction without evidence of related intestinal ischemia, and there is no acidosis or systemic toxicity to suggest this either.  Pain is mild at this time. There is no vomiting and the patient wishes to hold off on NG tube at this time. The patient will be admitted to the hospitalist service with likely surgery consult if there is persistent symptoms given his multiple surgeries in the past.  The patient is stable on final re-evaluation and I believe is safe for admission at this time.      Covid-19  Lexii Stratton was evaluated during a global COVID-19 pandemic, which necessitated consideration that the patient might be at risk for infection with the SARS-CoV-2 virus that causes COVID-19.   Applicable protocols for evaluation were followed during the patient's care.   COVID-19 was considered as part of the patient's evaluation. The plan for testing is:  a test was obtained during this visit.    Diagnosis:    ICD-10-CM    1. SBO (small bowel obstruction) (H)  K56.609        Scribe Disclosure:  Nikita TEMPLETON, am serving as a scribe at 10:51 AM on 8/16/2021 to document services personally performed by John Arevalo MD based on my observations and the provider's statements to me.            John Arevalo MD  08/16/21 [id number]

## 2021-08-16 NOTE — ED TRIAGE NOTES
Patient presents to the ED reporting abdominal pain and vomiting. Reports a history of partial bowel obstructions that is usually able to manage at home but states symptoms seem to be getting worse and more frequent. States since Friday has had pain and distention with vomiting. Poor PO intake.

## 2021-08-16 NOTE — TELEPHONE ENCOUNTER
"Patient with h/o bowel obstruction stated she has not been able to eat/drink for two days and is having abdominal pain. Adv to be seen in ED now. Patient verbalized understanding and is agreeable to plan.   Reason for Disposition    Constant abdominal pain lasting > 2 hours    Answer Assessment - Initial Assessment Questions  1. LOCATION: \"Where does it hurt?\"       The whole abdomen and it feels bloated   2. RADIATION: \"Does the pain shoot anywhere else?\" (e.g., chest, back)  Yes into my back yesterday    3. ONSET: \"When did the pain begin?\" (e.g., minutes, hours or days ago)       Friday had not eaten since saturday and can't seem to keep fluids down   4. SUDDEN: \"Gradual or sudden onset?\"      Felt it coming on and is painful   5. PATTERN \"Does the pain come and go, or is it constant?\"     - If constant: \"Is it getting better, staying the same, or worsening?\"       (Note: Constant means the pain never goes away completely; most serious pain is constant and it progresses)      - If intermittent: \"How long does it last?\" \"Do you have pain now?\"      (Note: Intermittent means the pain goes away completely between bouts)        6. SEVERITY: \"How bad is the pain?\"  (e.g., Scale 1-10; mild, moderate, or severe)    - MILD (1-3): doesn't interfere with normal activities, abdomen soft and not tender to touch     - MODERATE (4-7): interferes with normal activities or awakens from sleep, tender to touch     - SEVERE (8-10): excruciating pain, doubled over, unable to do any normal activities       4-5 every once in a while 9/10  7. RECURRENT SYMPTOM: \"Have you ever had this type of abdominal pain before?\" If so, ask: \"When was the last time?\" and \"What happened that time?\"       Yes, when I was in the hospital has a partial bowl obstruction has gone on longer have been getting this every 3 weeks and it is pretty mild   8. CAUSE: \"What do you think is causing the abdominal pain?\"      Not sure   9. RELIEVING/AGGRAVATING " "FACTORS: \"What makes it better or worse?\" (e.g., movement, antacids, bowel movement)      Mialx helped at first now throwing up bright yellow and drive heavs  10. OTHER SYMPTOMS: \"Has there been any vomiting, diarrhea, constipation, or urine problems?\"        This morning was last bowel movement and was not a lot did not strain can not eat/drink for two day   11. PREGNANCY: \"Is there any chance you are pregnant?\" \"When was your last menstrual period?\"    Protocols used: ABDOMINAL PAIN - FEMALE-A-[place]    Lexie Maddox RN on 8/16/2021 at 9:44 AM    "

## 2021-08-16 NOTE — ED NOTES
[facility]  ED Nurse Handoff Report    Lexii Stratton is a 80 year old female   ED Chief complaint: Abdominal Pain  . ED Diagnosis:   Final diagnoses:   SBO (small bowel obstruction) (H)     Allergies:   Allergies   Allergen Reactions     Augmentin Diarrhea     Codeine Nausea and Vomiting     Hydrocodone      Keeps patient awake     Naproxen Itching and Rash     Seasonal Allergies      Hay fever in fall, ragweed and russian thistles     Sulfa Drugs Nausea       Code Status: Full Code  Activity level - Baseline/Home:  Independent. Activity Level - Current:   Independent. Lift room needed: No. Bariatric: No   Needed: No   Isolation: No. Infection: Not Applicable.     Vital Signs:   Vitals:    08/16/21 1330 08/16/21 1345 08/16/21 1400 08/16/21 1415   BP: (!) 156/84 (!) 161/89 (!) 157/87 (!) 132/119   Pulse: 73 82 71 70   Resp: 16 16 16 16   Temp:       SpO2: 98% 97% 97% 96%       Cardiac Rhythm:  ,      Pain level:    Patient confused: No. Patient Falls Risk: No.   Elimination Status: Has voided   Patient Report - Initial Complaint: Abdominal pain. Focused Assessment: Gastrointestinal   Tests Performed:   CT Abdomen Pelvis w Contrast   Final Result   IMPRESSION:    1.  Mid small bowel obstruction with transition point at the level of   a small bowel anastomosis in the right lower quadrant.      MELODY SILVA MD            SYSTEM ID:  [id number]      . Abnormal Results:   Labs Ordered and Resulted from Time of ED Arrival Up to the Time of Departure from the ED   COMPREHENSIVE METABOLIC PANEL - Abnormal; Notable for the following components:       Result Value    Creatinine 1.12 (*)     Glucose 135 (*)     Albumin 3.2 (*)     GFR Estimate 47 (*)     All other components within normal limits   ISTAT CREATININE POCT - Abnormal; Notable for the following components:    Creatinine POCT 1.1 (*)     GFR, ESTIMATED POCT 48 (*)     All other components within normal limits   LIPASE - Normal   COVID-19  VIRUS (CORONAVIRUS) BY PCR - Normal    Narrative:     Testing was performed using the marilyn  SARS-CoV-2 & Influenza A/B Assay on the marilyn  Bridgette  System.  This test should be ordered for the detection of SARS-COV-2 in individuals who meet SARS-CoV-2 clinical and/or epidemiological criteria. Test performance is unknown in asymptomatic patients.  This test is for in vitro diagnostic use under the FDA EUA for laboratories certified under CLIA to perform moderate and/or high complexity testing. This test has not been FDA cleared or approved.  A negative test does not rule out the presence of PCR inhibitors in the specimen or target RNA in concentration below the limit of detection for the assay. The possibility of a false negative should be considered if the patient's recent exposure or clinical presentation suggests COVID-19.  [facility] are certified under the Clinical Laboratory Improvement Amendments of 1988 (CLIA-88) as qualified to perform moderate and/or high complexity laboratory testing.   CBC WITH PLATELETS & DIFFERENTIAL    Narrative:     The following orders were created for panel order CBC + differential.  Procedure                               Abnormality         Status                     ---------                               -----------         ------                     CBC with platelets and d...[[id number]]                      Final result                 Please view results for these tests on the individual orders.   CBC WITH PLATELETS AND DIFFERENTIAL   ISTAT CG4 GASES LACTATE ROCÍO NURSING POCT   .   Treatments provided: fluids, zofran  Family Comments: none at bedside; patient drove herself  OBS brochure/video discussed/provided to patient:  N/A  ED Medications:   Medications   ondansetron (ZOFRAN) injection 4 mg (4 mg Intravenous Given 8/16/21 1117)   HYDROmorphone (DILAUDID) injection 0.2 mg (has no administration in time range)   0.9% sodium chloride BOLUS (0 mLs  Intravenous Stopped 8/16/21 1425)   CT Scan Flush (65 mLs Intravenous Given 8/16/21 1132)   iopamidol (ISOVUE-370) solution 500 mL (100 mLs Intravenous Given 8/16/21 1132)     Drips infusing:  No  For the majority of the shift, the patient's behavior Green. Interventions performed were none.    Sepsis treatment initiated: No     Patient tested for COVID 19 prior to admission: YES    ED Nurse Name/Phone Number: Claire Melton RN,   12:06 PM    RECEIVING UNIT ED HANDOFF REVIEW    Above ED Nurse Handoff Report was reviewed: Yes  Reviewed by: Gregoria Roman RN on August 16, 2021 at 2:29 PM

## 2021-08-16 NOTE — H&P
[facility] Hospitalist Admission Note  Name: Lexii Stratton    MRN: [id number]  YOB: 1940    Age: 80 year old  Date of admission: 8/16/2021  Primary care provider: Jesenia Madrigal        Assessment & Plan   Lexii Stratton is a 80 year old female with past medical history significant for multiple abdominal surgeries including intraperitoneal mesh placement for hernia repair, prior small bowel obstruction, GERD, OAB, Type II DM, HTN, who was admitted on 8/16/2021 after presentation with abdominal pain, nausea, nonbloody emesis found to have recurrent small bowel obstruction.     #Small Bowel Obstruction -   Presented with 3 days of progressive central abdominal pain, distention, nausea and nonbloody emesis. Afebrile, distended abdomen with hypoactive BS. Had passed small bm x2-3 prior to presentation without symptom relief.  CT abdomen pelvis with contrast noted small bowel obstruction with a transition point at the level of the small bowel anastomosis in the right lower quadrant, distal to the anastomosis compressed bowel.  Patient has multiple abdominal surgeries including 3 hermia repairs, cholecystectomy, hysterectomy.  Previously hospitalized in 2014 for small bowel obstruction managed conservatively. Suspicion is for mechanical SBO.  -Anticipated for NG placement from in the ED  -N.p.o., for now  - IV maintenance fluids with D5 half-normal saline plus potassium  -Analgesia, antiemetics as needed  -Consider general surgery consult if failing to resolve supportively     #Acute Kidney Injury -  On admission lab work creatinine is 1.12 from a baseline of 0.9-1.1.  Suspect this is prerenal JEFRY with poor p.o. tolerance over the past 3 days and GI losses.  No documented history of CKD.  -avoid nephrotoxic medications as able  -IV fluids as noted above   -monitor renal function    #GERD-  Hx of Peptic ulcer disease.  -PPI IV while NPO    #Type 2 diabetes Mellitus -  A1c 6.1 in February  "2021.  -NPO glucose checks  -Blood sugars elevated discontinue D5 from fluids    #OAB -  3 of overactive bladder symptoms followed by woman's health provider outpatient.  Treated for suspected clinical UTI 1 week ago with antibiotics of which she has 2 doses left.  Symptoms are resolved and afebrile, no leukocytosis.  -Discussed with patient no further antibiotics continued at this time  -Monitor fever, CBC  -If dysuria develops or febrile, repeat urinalysis    #Iron deficiency anemia-  Hemoglobin stable.  -Monitor CBC  -Resume iron supplement once p.o. resumed     #HTN-  -hold PTA lisinopril for now  -monitor BP  -IV hydralazine prn    Awaiting formal pharmacy reconciliation to resume home medications.     DVT Prophylaxis: Enoxaparin (Lovenox) SQ  Code Status: Full Code  Expected discharge: ~2-3 days pending return of bowel function and tolerating PO    COVID PCR STATUS: Pending, asymptomatic. No precautions. S/p full covid vaccination     Ellyn Lira PA-C    Primary Care Physician   Jesenia Madrigal    Chief Complaint   \"abdominal pain\"    History is obtained from the patient   Services Used: No    History of Present Illness   Lexii Stratton is a 80 year old female with past medical history significant for multiple abdominal surgeries including intraperitoneal mesh placement for hernia repair, prior small bowel obstruction, GERD, OAB, Type II DM, HTN, who presents with abdominal pain.  Ms. Stratton developed mid abdominal pain 3 days prior to presentation.  Pain was described as a \"fullness \".  Since onset pain became increasingly constant and more severe.  She developed episodes of nonbloody emesis.  She has had prior similar episodes of pain and symptoms, the most recent being 3 weeks ago.  She typically manages the symptoms at home with bowel rest and stool softeners.  She tried to take MiraLAX over the weekend however ultimately threw this up along with liquids.  She has not been able to " tolerate meals.  She had a small container of yogurt to the evening prior to presentation which she threw up.  She has not been passing gas.  She had 2 small bowel movements the day prior to presentation as well as one small bowel movement this morning prior to presenting to the ED.  She initially contacted her primary care triage regarding her symptoms and was recommended to seek evaluation in the emergency department.  She denies any fever.  No hematochezia.  No chest pain or cough.  She notes that she was treated for a affected urinary tract infection with a course of Macrobid 1 week ago as she was having irritative voiding symptoms, fever and chills.  The antibiotic was changed 2 days in the course of treatment given persistent symptoms.  No urine culture was left.  She states that she has 2 more doses of antibiotic left to treat her suspected urinary tract infection, has had resolution in her symptoms.    In the ED afebrile, blood pressure 144/89, pulse 70.  Oxygen saturation 96% on room air, respiratory rate 18.  Laboratory studies notable for a creatinine of 1.12 with a GFR of 48.  Electrolytes preserved.  Lipase 100.  CBC within normal limits.  Imaging pursued with CT abdomen pelvis with contrast noted small bowel obstruction with a transition point at the level of the small bowel anastomosis in the right lower quadrant, distal to the anastomosis compressed bowel.     Discussed with Dr. Arevalo in the ED, full chart review including lab work, imaging, and vital signs were reviewed.  NG tube was anticipated to be placed.  Patient received 1 L NS in the ED. admission was requested from hospitalist service for further cares and monitoring.    Past Medical History    I have reviewed this patient's medical history and updated it with pertinent information if needed.   Past Medical History:   Diagnosis Date     Arthritis      Diabetes (H)     Type 2-no meds... Dieet and exercise only as of 9/15/20     Esophageal  reflux      Hernia, abdominal      History of blood transfusion 1984     Hypertension     No cardiologist     Incontinence of urine      Mumps     6 years old     Obese      Osteoarthritis      Other and unspecified hyperlipidemia      Other chronic pain     back     Peptic ulcer, unspecified site, unspecified as acute or chronic, without mention of hemorrhage, perforation, or obstruction      Small bowel obstruction (H)      Thyroid disease hypothyroidism     Urinary incontinence      Walking troubles        Past Surgical History   I have reviewed this patient's surgical history and updated it with pertinent information if needed.  Past Surgical History:   Procedure Laterality Date     ARTHROPLASTY HIP Right 11/9/2016    Procedure: ARTHROPLASTY HIP;  Surgeon: Angel Lund MD;  Location:  OR     AS REPAIR INCISIONAL HERNIA,REDUCIBLE  1998    inc hernia ( Yamileth surgery)     BLADDER SURGERY      hyperdistention surgery and sling     CHOLECYSTECTOMY  1987     COLONOSCOPY  12/3/2011    Procedure:COLONOSCOPY; COLONOSCOPY; Surgeon:SEMAJ GRAHAM; Location: GI     COLONOSCOPY N/A 3/29/2018    Procedure: COLONOSCOPY;  COLONOSCOPY Rm 544;  Surgeon: Marco Gusman MD;  Location:  GI     CYSTOSCOPY N/A 9/6/2017    Procedure: CYSTOSCOPY;  CYSTOSCOPY AND HYDRODISTENTION ;  Surgeon: Eyal Bai MD;  Location:  OR     ENT SURGERY      Tonsillectomy     ESOPHAGOSCOPY, GASTROSCOPY, DUODENOSCOPY (EGD), COMBINED N/A 9/26/2016    Procedure: COMBINED ESOPHAGOSCOPY, GASTROSCOPY, DUODENOSCOPY (EGD), BIOPSY SINGLE OR MULTIPLE;  Surgeon: Marco Monaco MD;  Location:  GI     EYE SURGERY Left 05/2019     GENITOURINARY SURGERY       GYN SURGERY      Hysterectomy     HERNIA REPAIR, UMBILICAL  7/8/2010    Dr. Kirt Franco times 3     IMPLANT STIMULATOR SACRAL NERVE STAGE ONE N/A 9/17/2020    Procedure: sacral neurostimulation stage one implant of neurostimulator lead;  Surgeon: Shahnaz Carbone,  MD;  Location: [facility] OR     IMPLANT STIMULATOR SACRAL NERVE STAGE TWO Right 2020    Procedure: Removal of interstem lead, NOT implanting neurostimulator;  Surgeon: Shahnaz Carbone MD;  Location:  OR     ORTHOPEDIC SURGERY      [facility] - Dr. Lund- bilateral knee replacement     [facility] NONSPECIFIC PROCEDURE      T&A     [facility] NONSPECIFIC PROCEDURE      Vaginal Hysterectomy (has her ovaries)     [facility] NONSPECIFIC PROCEDURE      PPTL     [facility] NONSPECIFIC PROCEDURE      L shoulder to remove bone spurs     [facility] NONSPECIFIC PROCEDURE      cysto and durasphere Dr Demarco     [facility] NONSPECIFIC PROCEDURE      cysto and durasphere     [facility] NONSPECIFIC PROCEDURE  2007    cysto and durasphere     [facility] NONSPECIFIC PROCEDURE  2009    retropubic TVT sling       Prior to Admission Medications   Prior to Admission Medications   Prescriptions Last Dose Informant Patient Reported? Taking?   Biotin 5000 MCG PO TABS   Yes No   Sig: Take 1 tablet by mouth daily   CENTRUM SILVER OR TABS  Self No No   Si TABLET DAILY   Calcium Carbonate-Vit D-Min (RA CALCIUM PLUS MINERALS/VIT D PO)   Yes No   Sig: Take 1 tablet by mouth every other day Every other day at Lunch   MAGNESIUM PO   Yes No   Sig: Take 1 tablet by mouth daily (with breakfast)   OMEGA-3 FATTY ACIDS 1200 MG OR CAPS  Self Yes No   Si TABLET DAILY   Probiotic Product (ACIDOPHILUS PROBIOTIC BLEND) CAPS   Yes No   Sig: Take 1 capsule by mouth daily (with breakfast)    UNABLE TO FIND   Yes No   Sig: daily (with dinner) MEDICATION NAME Cranberry.  2 capsules daily    VITAMIN D, CHOLECALCIFEROL, PO   Yes No   Sig: Take 2,000 Units by mouth every other day Every other day at Supper   acetaminophen (TYLENOL) 500 MG tablet   No No   Sig: Take 2 tablets (1,000 mg) by mouth 2 times daily as needed for mild pain   acetaminophen (TYLENOL) 650 MG CR tablet   No No   Sig: Take 1 tablet (650 mg) by mouth every 6 hours as needed for mild pain or fever    amLODIPine (NORVASC) 5 MG tablet   No No   Sig: TAKE ONE TABLET BY MOUTH EVERY NIGHT AT BEDTIME   fenofibrate (TRIGLIDE/LOFIBRA) 160 MG tablet   No No   Sig: TAKE 1 TABLET DAILY   ferrous gluconate (FERGON) 324 (38 FE) MG tablet   No No   Sig: Take 1 tablet (324 mg) by mouth 2 times daily   fexofenadine (ALLEGRA) 60 MG tablet   No No   Sig: Take 1 tablet (60 mg) by mouth 2 times daily   latanoprost (XALATAN) 0.005 % ophthalmic solution   Yes No   levothyroxine (SYNTHROID/LEVOTHROID) 50 MCG tablet   No No   Sig: Take 1 tablet (50 mcg) by mouth daily   lisinopril (ZESTRIL) 30 MG tablet   No No   Sig: TAKE ONE TABLET BY MOUTH EVERY DAY FOR HYPERTENSION   meloxicam (MOBIC) 7.5 MG tablet   No No   Sig: Take 1 tablet (7.5 mg) by mouth daily   nitroFURantoin macrocrystal (MACRODANTIN) 100 MG capsule   Yes No   Sig: Take 100 mg by mouth 4 times daily   pantoprazole (PROTONIX) 20 MG EC tablet   No No   Sig: Take 1 tablet (20 mg) by mouth daily   potassium 99 MG TABS  Self Yes No   Sig: Take 1 tablet by mouth daily (with dinner)    psyllium (METAMUCIL) 0.52 G capsule   No No   Sig: Take 2 capsules (1.04 g) by mouth 3 times daily To take with fluid   simvastatin (ZOCOR) 20 MG tablet   No No   Sig: TAKE ONE TABLET BY MOUTH EVERY NIGHT AT BEDTIME      Facility-Administered Medications: None     Allergies   Allergies   Allergen Reactions     Augmentin Diarrhea     Codeine Nausea and Vomiting     Hydrocodone      Keeps patient awake     Naproxen Itching and Rash     Seasonal Allergies      Hay fever in fall, ragweed and russian thistles     Sulfa Drugs Nausea       Social History   I have reviewed this patient's social history and updated it with pertinent information if needed. Lexii MERCADO Viral  reports that she has never smoked. She has never used smokeless tobacco. She reports that she does not drink alcohol and does not use drugs.    Family History   I have reviewed this patient's family history and updated it with  pertinent information if needed.   Family History   Problem Relation Age of Onset     Heart Disease Mother         heart surgery , new valve     Gallbladder Disease Mother      Coronary Artery Disease Mother      Hyperlipidemia Mother      Asthma Mother      Heart Disease Maternal Grandmother      Coronary Artery Disease Maternal Grandmother      Heart Disease Maternal Grandfather      Coronary Artery Disease Maternal Grandfather      Cancer Father         throat ca,  76     Coronary Artery Disease Father      Diabetes Brother         Half Brother     Cardiovascular Brother         lung ca, Half brother     Cancer Brother         half brother  lung        Review of Systems   The 10 point Review of Systems is negative other than noted in the HPI or here.     Physical Exam   Temp: 97.9  F (36.6  C)   BP: (!) 144/89 Pulse: 70   Resp: 18 SpO2: 96 %      Vital Signs with Ranges  Temp:  [97.9  F (36.6  C)] 97.9  F (36.6  C)  Pulse:  [70] 70  Resp:  [18] 18  BP: (144)/(89) 144/89  SpO2:  [96 %] 96 %  0 lbs 0 oz    Constitutional: Awake, alert,  no apparent distress.  Eyes: Conjunctiva and pupils examined and normal.  HEENT: Non traumatic. Moist mucous membranes, normal dentition.  Respiratory: Clear to auscultation bilaterally, no crackles or wheezing.  Cardiovascular: Regular rate and rhythm, normal S1 and S2, and systolic murmur noted.  GI: Soft, distended, hypoactive bowel sounds.  Diffusely tender to palpation chiefly in the central region.  No rebound tenderness or guarding.  Lymph/Hematologic: No anterior cervical or supraclavicular adenopathy.  Skin: Warm, dry. No edema.  Musculoskeletal: No gross deformities noted.  No erythema or tenderness. Moving all extremities.  Neurologic: No tremor. Speech is clear. Moving all extremities with symmetrical strength. CN 2-12 grossly intact.  Coordination and sensation intact.   Psychiatric: Appropriate affect.    Data   Data reviewed today:        Imaging:   Recent Results  (from the past 24 hour(s))   CT Abdomen Pelvis w Contrast    Narrative    CT ABDOMEN AND PELVIS WITH CONTRAST 8/16/2021 11:39 AM    CLINICAL HISTORY: Distension, pain, history of recurrent small bowel  obstruction.    TECHNIQUE: CT scan of the abdomen and pelvis was performed following  injection of IV contrast. Multiplanar reformats were obtained. Dose  reduction techniques were used.    CONTRAST: 100mL Isovue-370    COMPARISON: 2/18/2016    FINDINGS:   LOWER CHEST: Normal.    HEPATOBILIARY: Cholecystectomy.    PANCREAS: Normal.    SPLEEN: Normal.    ADRENAL GLANDS: Normal.    KIDNEYS/BLADDER: Normal.    BOWEL: Small bowel anastomosis in the right midabdomen. The bowel  proximal to the anastomosis is dilated, measuring 4.5 cm maximum  diameter. Distal to the anastomosis, the bowel is decompressed.    PELVIC ORGANS: Hysterectomy.    ADDITIONAL FINDINGS: None.    MUSCULOSKELETAL: Normal.      Impression    IMPRESSION:   1.  Mid small bowel obstruction with transition point at the level of  a small bowel anastomosis in the right lower quadrant.       Recent Labs   Lab 08/16/21  1117 08/16/21  1114   WBC  --  8.0   HGB  --  14.7   MCV  --  97   PLT  --  214   NA  --  139   POTASSIUM  --  4.0   CHLORIDE  --  107   CO2  --  25   BUN  --  20   CR 1.1* 1.12*   ANIONGAP  --  7   CINDY  --  9.8   GLC  --  135*   ALBUMIN  --  3.2*   PROTTOTAL  --  7.0   BILITOTAL  --  0.9   ALKPHOS  --  69   ALT  --  16   AST  --  21   LIPASE  --  100       Ellyn Lira PA-C on 8/16/2021 at 12:18 PM
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
## 2021-08-16 NOTE — PHARMACY-ADMISSION MEDICATION HISTORY
Admission medication history interview status for this patient is complete. See [facility] admission navigator for allergy information, prior to admission medications and immunization status.     Medication history interview done, indicate source(s): Patient  Medication history resources (including written lists, pill bottles, clinic record):Written List  Pharmacy: [facility]    Changes made to PTA medication list:  Added:    - Levofloxacin 500 mg   - Docusate 100 mg  Changed:    - Added calcium dose   - Added latanoprost directions   - Added magnesium dose   - Added cranberry dose   Reported as Not Taking:    - acetaminophen 650 mg tabs - does not need more than the 1,000 mg BID usually   - Fexofenadine 60 mg tabs - only for seasonal allergies not active currently  Removed:    - Nitrofurantoin macrocrystal 100 mg capsule - patient switched to Levofloxacin 500 mg    Actions taken by pharmacist (provider contacted, etc):None     Additional medication history information:  - Patient reported that the only medication used or taken yesterday was the Latanoprost. Patient reported that most prescription meds were taken on Sat. 8/14/21 but not the OTC meds, which were last taken the day prior, on 8/13/21.  - Patient took Nitrofurantoin macrocrystal 100 mg capsules for 3 doses before contacting prescriber. Patient reported a fever and chills and was switched to Levofloxacin 500 mg tablets. Patient started Levofloxacin on 8/11/21 and reports having 2 doses left.  - Patient reported taking calcium and the morning colace dose on Sun, Tue, Thur, and Sat.     Medication reconciliation/reorder completed by provider prior to medication history?  N        Prior to Admission medications    Medication Sig Last Dose Taking? Auth Provider   acetaminophen (TYLENOL) 500 MG tablet Take 2 tablets (1,000 mg) by mouth 2 times daily as needed for mild pain 8/14/2021 at pm Yes Jesenia Madrigal MD   amLODIPine (NORVASC) 5 MG tablet TAKE ONE  TABLET BY MOUTH EVERY NIGHT AT BEDTIME 8/14/2021 at pm Yes Jesenia Madrigal MD   Biotin 5000 MCG PO TABS Take 1 tablet by mouth daily 8/13/2021 at 1200 Yes Reported, Patient   calcium carbonate (OS-CINDY) 500 MG tablet Take 1 tablet by mouth Every other day with at lunch. 8/12/2021 at 1200 Yes Unknown, Entered By History   CENTRUM SILVER OR TABS 1 TABLET DAILY 8/13/2021 at am Yes John Daniels MD   Cranberry 500 MG CAPS Take 500 mg by mouth daily 8/13/2021 at 1800 Yes Unknown, Entered By History   docusate sodium (COLACE) 100 MG capsule Take 100 mg by mouth Every other day in the morning and every night in the evening. 8/13/2021 at pm Yes Unknown, Entered By History   fenofibrate (TRIGLIDE/LOFIBRA) 160 MG tablet TAKE 1 TABLET DAILY 8/14/2021 at am Yes Jesenia Madrigal MD   ferrous gluconate (FERGON) 324 (38 FE) MG tablet Take 1 tablet (324 mg) by mouth 2 times daily 8/13/2021 at 1800 Yes Maximino Shabazz MD   latanoprost (XALATAN) 0.005 % ophthalmic solution Place 1 drop into both eyes At Bedtime  8/15/2021 at pm Yes Reported, Patient   levofloxacin (LEVAQUIN) 500 MG tablet Take 500 mg by mouth daily 8/15/2021 at Unknown time Yes Unknown, Entered By History   levothyroxine (SYNTHROID/LEVOTHROID) 50 MCG tablet Take 1 tablet (50 mcg) by mouth daily 8/14/2021 at am Yes Jesenia Madrigal MD   lisinopril (ZESTRIL) 30 MG tablet TAKE ONE TABLET BY MOUTH EVERY DAY FOR HYPERTENSION 8/14/2021 at am Yes Jesenia Madrigal MD   magnesium 250 MG tablet Take 1 tablet by mouth daily 8/13/2021 at am Yes Unknown, Entered By History   meloxicam (MOBIC) 7.5 MG tablet Take 1 tablet (7.5 mg) by mouth daily 8/14/2021 at am Yes Jesenia Madrigal MD   OMEGA-3 FATTY ACIDS 1200 MG OR CAPS 1 TABLET DAILY 8/13/2021 at am Yes John Daniels MD   pantoprazole (PROTONIX) 20 MG EC tablet Take 1 tablet (20 mg) by mouth daily 8/14/2021 at am Yes Jesenia Madrigal MD   potassium 99 MG TABS Take 1  tablet by mouth daily (with dinner)  8/14/2021 at pm Yes Reported, Patient   Probiotic Product (ACIDOPHILUS PROBIOTIC BLEND) CAPS Take 1 capsule by mouth daily (with breakfast)  8/13/2021 at am Yes Jesenia Madrigal MD   psyllium (METAMUCIL) 0.52 G capsule Take 2 capsules (1.04 g) by mouth 3 times daily To take with fluid 8/13/2021 at pm Yes Maximino Shabazz MD   simvastatin (ZOCOR) 20 MG tablet TAKE ONE TABLET BY MOUTH EVERY NIGHT AT BEDTIME 8/14/2021 at pm Yes Jesenia Madrigal MD   VITAMIN D, CHOLECALCIFEROL, PO Take 2,000 Units by mouth every other day Every other day at Supper 8/13/2021 at 1800 Yes Reported, Patient   acetaminophen (TYLENOL) 650 MG CR tablet Take 1 tablet (650 mg) by mouth every 6 hours as needed for mild pain or fever   Shahnaz Carbone MD   fexofenadine (ALLEGRA) 60 MG tablet Take 1 tablet (60 mg) by mouth 2 times daily   Jesenia Madrigal MD

## 2021-08-16 NOTE — CONSULTS
Care Management Discharge Note    Discharge Date: 08/19/2021    Additional Information:  Pt identified as a SB#3. No needs or assessment needed at this time. Please consult CM/SW  if discharge needs should arise.      Bailey Almonte RN BSN   Inpatient Care Coordination  [facility]  [phone]    Freida Almonte, RN

## 2021-08-17 LAB
ANION GAP SERPL CALCULATED.3IONS-SCNC: 4 MMOL/L (ref 3–14)
BUN SERPL-MCNC: 13 MG/DL (ref 7–30)
CALCIUM SERPL-MCNC: 8.5 MG/DL (ref 8.5–10.1)
CHLORIDE BLD-SCNC: 111 MMOL/L (ref 94–109)
CO2 SERPL-SCNC: 27 MMOL/L (ref 20–32)
CREAT SERPL-MCNC: 0.79 MG/DL (ref 0.52–1.04)
ERYTHROCYTE [DISTWIDTH] IN BLOOD BY AUTOMATED COUNT: 12.9 % (ref 10–15)
GFR SERPL CREATININE-BSD FRML MDRD: 71 ML/MIN/1.73M2
GLUCOSE BLD-MCNC: 145 MG/DL (ref 70–99)
GLUCOSE BLDC GLUCOMTR-MCNC: 128 MG/DL (ref 70–99)
GLUCOSE BLDC GLUCOMTR-MCNC: 129 MG/DL (ref 70–99)
GLUCOSE BLDC GLUCOMTR-MCNC: 136 MG/DL (ref 70–99)
GLUCOSE BLDC GLUCOMTR-MCNC: 171 MG/DL (ref 70–99)
HCT VFR BLD AUTO: 38.7 % (ref 35–47)
HGB BLD-MCNC: 12.4 G/DL (ref 11.7–15.7)
MCH RBC QN AUTO: 31.2 PG (ref 26.5–33)
MCHC RBC AUTO-ENTMCNC: 32 G/DL (ref 31.5–36.5)
MCV RBC AUTO: 97 FL (ref 78–100)
PLATELET # BLD AUTO: 168 10E3/UL (ref 150–450)
POTASSIUM BLD-SCNC: 3.9 MMOL/L (ref 3.4–5.3)
RBC # BLD AUTO: 3.98 10E6/UL (ref 3.8–5.2)
SODIUM SERPL-SCNC: 142 MMOL/L (ref 133–144)
WBC # BLD AUTO: 4.9 10E3/UL (ref 4–11)

## 2021-08-17 PROCEDURE — 99231 SBSQ HOSP IP/OBS SF/LOW 25: CPT | Performed by: SURGERY

## 2021-08-17 PROCEDURE — 250N000011 HC RX IP 250 OP 636: Performed by: PHYSICIAN ASSISTANT

## 2021-08-17 PROCEDURE — 258N000003 HC RX IP 258 OP 636: Performed by: PHYSICIAN ASSISTANT

## 2021-08-17 PROCEDURE — 99231 SBSQ HOSP IP/OBS SF/LOW 25: CPT | Performed by: PHYSICIAN ASSISTANT

## 2021-08-17 PROCEDURE — 85027 COMPLETE CBC AUTOMATED: CPT | Performed by: PHYSICIAN ASSISTANT

## 2021-08-17 PROCEDURE — 80048 BASIC METABOLIC PNL TOTAL CA: CPT | Performed by: PHYSICIAN ASSISTANT

## 2021-08-17 PROCEDURE — 250N000013 HC RX MED GY IP 250 OP 250 PS 637: Performed by: PHYSICIAN ASSISTANT

## 2021-08-17 PROCEDURE — C9113 INJ PANTOPRAZOLE SODIUM, VIA: HCPCS | Performed by: PHYSICIAN ASSISTANT

## 2021-08-17 PROCEDURE — 120N000001 HC R&B MED SURG/OB

## 2021-08-17 PROCEDURE — 250N000013 HC RX MED GY IP 250 OP 250 PS 637: Performed by: INTERNAL MEDICINE

## 2021-08-17 PROCEDURE — 36415 COLL VENOUS BLD VENIPUNCTURE: CPT | Performed by: PHYSICIAN ASSISTANT

## 2021-08-17 PROCEDURE — 99233 SBSQ HOSP IP/OBS HIGH 50: CPT | Performed by: INTERNAL MEDICINE

## 2021-08-17 RX ORDER — LEVOTHYROXINE SODIUM 50 UG/1
50 TABLET ORAL DAILY
Status: DISCONTINUED | OUTPATIENT
Start: 2021-08-17 | End: 2021-08-19 | Stop reason: HOSPADM

## 2021-08-17 RX ORDER — SIMVASTATIN 20 MG
20 TABLET ORAL AT BEDTIME
Status: DISCONTINUED | OUTPATIENT
Start: 2021-08-17 | End: 2021-08-19 | Stop reason: HOSPADM

## 2021-08-17 RX ORDER — AMLODIPINE BESYLATE 2.5 MG/1
2.5 TABLET ORAL DAILY
Status: DISCONTINUED | OUTPATIENT
Start: 2021-08-17 | End: 2021-08-19 | Stop reason: HOSPADM

## 2021-08-17 RX ORDER — PANTOPRAZOLE SODIUM 20 MG/1
20 TABLET, DELAYED RELEASE ORAL DAILY
Status: DISCONTINUED | OUTPATIENT
Start: 2021-08-18 | End: 2021-08-19 | Stop reason: HOSPADM

## 2021-08-17 RX ADMIN — LEVOTHYROXINE SODIUM 50 MCG: 50 TABLET ORAL at 09:59

## 2021-08-17 RX ADMIN — AMLODIPINE BESYLATE 2.5 MG: 2.5 TABLET ORAL at 09:59

## 2021-08-17 RX ADMIN — POTASSIUM CHLORIDE, DEXTROSE MONOHYDRATE AND SODIUM CHLORIDE: 150; 5; 450 INJECTION, SOLUTION INTRAVENOUS at 00:47

## 2021-08-17 RX ADMIN — Medication 1 MG: at 21:49

## 2021-08-17 RX ADMIN — ENOXAPARIN SODIUM 40 MG: 40 INJECTION SUBCUTANEOUS at 19:54

## 2021-08-17 RX ADMIN — SIMVASTATIN 20 MG: 20 TABLET, FILM COATED ORAL at 21:49

## 2021-08-17 RX ADMIN — POTASSIUM CHLORIDE, DEXTROSE MONOHYDRATE AND SODIUM CHLORIDE: 150; 5; 450 INJECTION, SOLUTION INTRAVENOUS at 10:51

## 2021-08-17 RX ADMIN — LATANOPROST 1 DROP: 50 SOLUTION OPHTHALMIC at 21:49

## 2021-08-17 RX ADMIN — PANTOPRAZOLE SODIUM 40 MG: 40 INJECTION, POWDER, FOR SOLUTION INTRAVENOUS at 07:56

## 2021-08-17 ASSESSMENT — ACTIVITIES OF DAILY LIVING (ADL)
ADLS_ACUITY_SCORE: 22
ADLS_ACUITY_SCORE: 16
ADLS_ACUITY_SCORE: 22

## 2021-08-17 ASSESSMENT — MIFFLIN-ST. JEOR: SCORE: 1411.64

## 2021-08-17 NOTE — PROGRESS NOTES
[facility]    Medicine Progress Note - Hospitalist Service       Date of Admission:  8/16/2021    Assessment & Plan           Lexii Stratton is a 80 year old female with past medical history significant for multiple abdominal surgeries including intraperitoneal mesh placement for hernia repair, prior small bowel obstruction, GERD, OAB, Type II DM, HTN, who was admitted on 8/16/2021 after presentation with abdominal pain, nausea, nonbloody emesis found to have recurrent small bowel obstruction.      #Small Bowel Obstruction -   Presented with 3 days of progressive central abdominal pain, distention, nausea and nonbloody emesis. Afebrile, distended abdomen with hypoactive BS. Had passed small bm x2-3 prior to presentation without symptom relief.  CT abdomen pelvis with contrast noted small bowel obstruction with a transition point at the level of the small bowel anastomosis in the right lower quadrant, distal to the anastomosis compressed bowel.  Patient has multiple abdominal surgeries including 3 hermia repairs, cholecystectomy, hysterectomy.  Previously hospitalized in 2014 for small bowel obstruction managed conservatively. Suspicion is for mechanical SBO.  -s/p NG to LIS, will remove NG this am.  -CLD.  - IV maintenance fluids with D5 half-normal saline plus potassium  -Analgesia, antiemetics as needed  -appreciate surgery input.      #Acute Kidney Injury -  Resolved.     #GERD-  Hx of Peptic ulcer disease.  -PPI IV while NPO     #Type 2 diabetes Mellitus -  A1c 6.1 in February 2021.  - sliding scale insulin.    #OAB -  3 of overactive bladder symptoms followed by woman's health provider outpatient.  Treated for suspected clinical UTI 1 week ago with antibiotics of which she has 2 doses left.  Symptoms are resolved and afebrile, no leukocytosis.  -Discussed with patient no further antibiotics continued at this time  -Monitor fever, CBC  -If dysuria develops or febrile, repeat  urinalysis     #Iron deficiency anemia-  Hemoglobin stable.  -Monitor CBC  -Resume iron supplement once p.o. resumed      #HTN-  -hold PTA lisinopril for now  -monitor BP  -IV hydralazine prn       Diet: Clear Liquid Diet    DVT Prophylaxis: Enoxaparin (Lovenox) SQ  Hammond Catheter: Not present  Central Lines: None  Code Status: Full Code      Disposition Plan   Expected discharge: 08/18/2021 recommended to prior living arrangement once adequate pain management/ tolerating PO medications.     The patient's care was discussed with the Patient.    Treva Lopes MD  Hospitalist Service  [facility]  Securely message with the Vocera Web Console (learn more here)  Text page via [employer] Paging/Directory      Clinically Significant Risk Factors Present on Admission                   ______________________________________________________________________    Interval History     + BM's. Abdominal pain improved. No N/V.    Data reviewed today: I reviewed all medications, new labs and imaging results over the last 24 hours. I personally reviewed no images or EKG's today.    Physical Exam   Vital Signs: Temp: 98.2  F (36.8  C) Temp src: Oral BP: (!) 147/53 Pulse: 57   Resp: 18 SpO2: 94 % O2 Device: None (Room air)    Weight: 210 lbs 14.4 oz    Gen - AAO x 3 in NAD.  Lungs - CTA B.  Heart - RR,S1+S2 nml, no m/g/r.  Abd - soft, + ttp LLQ, ND, + BS.  Ext - no edema.    Data   Recent Labs   Lab 08/17/21  0645 08/17/21  0602 08/17/21  0129 08/16/21  1812 08/16/21  1117 08/16/21  1114   WBC 4.9  --   --   --   --  8.0   HGB 12.4  --   --   --   --  14.7   MCV 97  --   --   --   --  97     --   --   --   --  214     --   --   --   --  139   POTASSIUM 3.9  --   --   --   --  4.0   CHLORIDE 111*  --   --   --   --  107   CO2 27  --   --   --   --  25   BUN 13  --   --   --   --  20   CR 0.79  --   --  0.87 1.1* 1.12*   ANIONGAP 4  --   --   --   --  7   CINDY 8.5  --   --   --   --  9.8   * 136*  129*  --   --  135*   ALBUMIN  --   --   --   --   --  3.2*   PROTTOTAL  --   --   --   --   --  7.0   BILITOTAL  --   --   --   --   --  0.9   ALKPHOS  --   --   --   --   --  69   ALT  --   --   --   --   --  16   AST  --   --   --   --   --  21   LIPASE  --   --   --   --   --  100     Recent Results (from the past 24 hour(s))   CT Abdomen Pelvis w Contrast    Narrative    CT ABDOMEN AND PELVIS WITH CONTRAST 8/16/2021 11:39 AM    CLINICAL HISTORY: Distension, pain, history of recurrent small bowel  obstruction.    TECHNIQUE: CT scan of the abdomen and pelvis was performed following  injection of IV contrast. Multiplanar reformats were obtained. Dose  reduction techniques were used.    CONTRAST: 100mL Isovue-370    COMPARISON: 2/18/2016    FINDINGS:   LOWER CHEST: Normal.    HEPATOBILIARY: Cholecystectomy.    PANCREAS: Normal.    SPLEEN: Normal.    ADRENAL GLANDS: Normal.    KIDNEYS/BLADDER: Normal.    BOWEL: Small bowel anastomosis in the right midabdomen. The bowel  proximal to the anastomosis is dilated, measuring 4.5 cm maximum  diameter. Distal to the anastomosis, the bowel is decompressed.    PELVIC ORGANS: Hysterectomy.    ADDITIONAL FINDINGS: None.    MUSCULOSKELETAL: Normal.      Impression    IMPRESSION:   1.  Mid small bowel obstruction with transition point at the level of  a small bowel anastomosis in the right lower quadrant.    MELODY SILVA MD         SYSTEM ID:  [id number]   XR Chest Port 1 View    Narrative    EXAM: XR CHEST PORT 1 VIEW  LOCATION: [facility]  DATE/TIME: 8/16/2021 5:00 PM    INDICATION: NG tube placement  COMPARISON: Chest x-ray 03/20/2018      Impression    IMPRESSION: Enteric tube with the tip in the gastric fundus. The side port is not well visualized. Low lung volumes. Minimal right basilar atelectasis. The lungs are otherwise clear. No definite pleural effusion. Stable heart size.   XR Gastrografin  Challenge    Narrative    EXAM: XR GASTROGRAFIN  CHALLENGE  LOCATION: [facility]  DATE/TIME: 8/16/2021 8:09 PM    INDICATION: SBO  COMPARISON: None.      Impression    IMPRESSION: Orogastric tube with tip seen projecting over stomach. Gastrografin contrast seen throughout the colon. Nonobstructive bowel gas pattern. No free air. Right total hip arthroplasty.      Medications     dextrose 5% and 0.45% NaCl + KCl 20 mEq/L 100 mL/hr at 08/17/21 0047       enoxaparin ANTICOAGULANT  40 mg Subcutaneous Q24H     latanoprost  1 drop Both Eyes At Bedtime     pantoprazole (PROTONIX) IV  40 mg Intravenous Daily with breakfast     sodium chloride (PF)  3 mL Intracatheter Q8H      No

## 2021-08-17 NOTE — PLAN OF CARE
To Do:  End of Shift Summary  For vital signs and complete assessments, please see documentation flowsheets.     Pertinent assessments: A&O x4. BP elevated. C/O intermittent abdominal pain. Given PRN dilaudid and zofran. NPO w/ice chips. Throat soreness, given chloraseptic spray. BS hypoactive. Not passing flatus. Denies nausea. Last BM 8/15. Up SBA.    Major Shift Events: NG inserted. Clear pink tinged output. Gastrografin given at 2015- x-ray at 0415.    Treatment Plan: IVF hydration, bowel rest, and pain management. Surgery following.    Bedside Nurse: Gregoria Roman RN

## 2021-08-17 NOTE — PROGRESS NOTES
End of Shift Summary  For vital signs and complete assessments, please see documentation flowsheets.     Pertinent assessments: A&Ox4. NG removed, diet upgraded to clears. Pt tolerated well. Incontinent of B&B. Loose stool x3. Abdomen soft/non-tender. BS active. Denies any nausea. IVF and blood sugars discontinued. Ambulated in the hallway with SBA.    Major Shift Events: NG removed, clear liquid diet.    Treatment Plan: Advance diet as tolerated.     Bedside Nurse: Melba NARANJO RN

## 2021-08-17 NOTE — PLAN OF CARE
For vital signs and complete assessments, please see documentation flowsheets.     Pertinent assessments: A&Ox4. NG clamped for gastrografin challenge. BS active, passing gas, and having loose stools. Denies nausea or pain. IVF infusing.   Major Shift Events: gastrografin x-ray completed.  Treatment Plan: IVF, NPO, follow labs. Surgery following.  Bedside Nurse: Vicki Jameson RN

## 2021-08-17 NOTE — PROGRESS NOTES
"[facility]   General Surgery Progress Note           Assessment and Plan:   Assessment:   SBO, h/o multiple intra-abdominal surgeries   GC Challenge yesterday, AXR showing contrast throughout colon      Plan:   -remove NGT, start clear liquid diet  -pain control: IV Dilaudid PRN  -dispo: pending tolerance of diet advancement         Interval History:   Comfortable in bed.  Feels much improved today with minimal bloating or abd discomfort.  + hungry.  Several loose BMs.           Physical Exam:   Blood pressure (!) 147/53, pulse 57, temperature 98.2  F (36.8  C), temperature source Oral, resp. rate 18, height 1.626 m (5' 4\"), weight 95.7 kg (210 lb 14.4 oz), SpO2 94 %, not currently breastfeeding.    I/O last 3 completed shifts:  In: -   Out: 1100 [Urine:925; Emesis/NG output:175]    Abdomen:   soft, non-distended, tenderness noted in the left lower quadrant and normal bowel sounds               Data:     Recent Labs   Lab 08/17/21  0645 08/16/21  1114   WBC 4.9 8.0   HGB 12.4 14.7   HCT 38.7 46.2   MCV 97 97    214       Hiwot Lara PA-C     As above, SBO resolved clinically and radiographically so NG out and start PO  Monitor for tolerance  Jose Taveras MD  8/17/2021 8:54 AM     "

## 2021-08-17 NOTE — PROGRESS NOTES
"SPIRITUAL HEALTH SERVICES Progress Note  [facility] Med. Surg. 5    Saw pt Lexii Stratton per an admission request.  Offered reflective conversation to facilitate the processing of thoughts and feelings.      Illness Narrative - Lexii reported that she is being treated for a SBO.      Distress - She denied having any concerns today and expressed gratitude for the care she has received here.      Coping -   o Lexii named her three children and her neighbors as being part of her support network.  She also has four grandchildren.  o Lexii is [place] and affiliated with [employer], where she was involved in a couple of volunteer programs before the pandemic.  She welcomed prayer.      Meaning-Making -   o Lexii enjoyed talking about the education and careers of her two older grandchildren and narrating the births of her twin younger grandchildren, her \"miracle babies.\"  o She shared memories of summer trips with her grandchildren, a seventeen-year old family tradition.    Provided emotional support through reflective listening, validation of feelings, and affirmation of her support system.      Plan - Informed pt how she can request further  support.  This author and other chaplains remain available per pt/family request.    John Coy M.Div., [facility]  Staff   Phone [phone]  "

## 2021-08-18 PROCEDURE — 120N000001 HC R&B MED SURG/OB

## 2021-08-18 PROCEDURE — 250N000011 HC RX IP 250 OP 636: Performed by: PHYSICIAN ASSISTANT

## 2021-08-18 PROCEDURE — 250N000013 HC RX MED GY IP 250 OP 250 PS 637: Performed by: INTERNAL MEDICINE

## 2021-08-18 PROCEDURE — 99231 SBSQ HOSP IP/OBS SF/LOW 25: CPT | Performed by: SURGERY

## 2021-08-18 PROCEDURE — 99231 SBSQ HOSP IP/OBS SF/LOW 25: CPT | Performed by: PHYSICIAN ASSISTANT

## 2021-08-18 PROCEDURE — 99232 SBSQ HOSP IP/OBS MODERATE 35: CPT | Performed by: INTERNAL MEDICINE

## 2021-08-18 RX ORDER — FERROUS GLUCONATE 324(38)MG
324 TABLET ORAL 2 TIMES DAILY
Status: DISCONTINUED | OUTPATIENT
Start: 2021-08-18 | End: 2021-08-19 | Stop reason: HOSPADM

## 2021-08-18 RX ORDER — FEXOFENADINE HCL 60 MG/1
60 TABLET, FILM COATED ORAL 2 TIMES DAILY
Status: DISCONTINUED | OUTPATIENT
Start: 2021-08-18 | End: 2021-08-19 | Stop reason: HOSPADM

## 2021-08-18 RX ADMIN — FEXOFENADINE HCL 60 MG: 60 TABLET, FILM COATED ORAL at 22:06

## 2021-08-18 RX ADMIN — LATANOPROST 1 DROP: 50 SOLUTION OPHTHALMIC at 22:11

## 2021-08-18 RX ADMIN — SIMVASTATIN 20 MG: 20 TABLET, FILM COATED ORAL at 22:05

## 2021-08-18 RX ADMIN — PANTOPRAZOLE SODIUM 20 MG: 20 TABLET, DELAYED RELEASE ORAL at 08:07

## 2021-08-18 RX ADMIN — FERROUS GLUCONATE 324 MG: 324 TABLET ORAL at 22:06

## 2021-08-18 RX ADMIN — ENOXAPARIN SODIUM 40 MG: 40 INJECTION SUBCUTANEOUS at 22:06

## 2021-08-18 RX ADMIN — LEVOTHYROXINE SODIUM 50 MCG: 50 TABLET ORAL at 08:07

## 2021-08-18 RX ADMIN — AMLODIPINE BESYLATE 2.5 MG: 2.5 TABLET ORAL at 08:07

## 2021-08-18 ASSESSMENT — ACTIVITIES OF DAILY LIVING (ADL)
ADLS_ACUITY_SCORE: 22
ADLS_ACUITY_SCORE: 18
ADLS_ACUITY_SCORE: 22
ADLS_ACUITY_SCORE: 22

## 2021-08-18 ASSESSMENT — MIFFLIN-ST. JEOR: SCORE: 1383.97

## 2021-08-18 NOTE — PROGRESS NOTES
[facility]  Hospitalist Progress Note  Miguel Jarquin MD, MD 08/18/2021  (Text Page)  Reason for Stay (Diagnosis): Small bowel obstruction         Assessment and Plan:      Summary of Stay: Lexii Stratton is a 80 year old female with past medical history significant for multiple abdominal surgeries including intraperitoneal mesh placement for hernia repair, prior small bowel obstruction, GERD, OAB, Type II DM, HTN, who was admitted on 8/16/2021 after presentation with abdominal pain, nausea, nonbloody emesis found to have recurrent small bowel obstruction.      #Small Bowel Obstruction -   Presented with 3 days of progressive central abdominal pain, distention, nausea and nonbloody emesis. Afebrile, distended abdomen with hypoactive BS. Had passed small bm x2-3 prior to presentation without symptom relief.  CT abdomen pelvis with contrast noted small bowel obstruction with a transition point at the level of the small bowel anastomosis in the right lower quadrant, distal to the anastomosis compressed bowel.  Patient has multiple abdominal surgeries including 3 hermia repairs, cholecystectomy, hysterectomy.  Previously hospitalized in 2014 for small bowel obstruction managed conservatively. Suspicion is for mechanical SBO.  -Off from NG tube  -Appreciate continuous input from general surgery service  -Diet being advanced to full liquid with multiple small meals  -Gastrografin challenge earlier showing contrast throughout the colon  -Ambulate as much as she can  -Stop IV fluids      #Acute Kidney Injury -  Resolved.     #GERD-  Hx of Peptic ulcer disease.  -Now on oral PPI     #Type 2 diabetes Mellitus -  A1c 6.1 in February 2021.      #OAB -  3 of overactive bladder symptoms followed by woman's health provider outpatient.  Treated for suspected clinical UTI 1 week ago with antibiotics of which she has 2 doses left.  Symptoms are resolved and afebrile, no leukocytosis.  -Earlier my colleague  "discussed with patient no further antibiotics continued at this time  -Monitor fever, CBC  -If dysuria develops or febrile, repeat urinalysis     #Iron deficiency anemia-  Hemoglobin stable.  -Monitor CBC  -Resume iron supplement once p.o. resumed      #HTN-  -hold PTA lisinopril for now  -monitor BP  -IV hydralazine prn  Home Norvasc was resumed     Diet:  Full liquid Diet    DVT Prophylaxis: Enoxaparin (Lovenox) SQ  Hammond Catheter: Not present  Central Lines: None  Code Status: Full Code       Disposition Plan  anticipating discharge in the next 24 to 36 hours if she continues to demonstrate tolerance to oral diet with no recurrence of SBO symptoms          Interval History (Subjective):      I assume service care today.  Seen and examined.  Chart reviewed.  I found the Lexii sitting comfortably on the hospital chair.  She appears to be in good spirits that she denies any recurrence of nausea, vomiting.  Able to tolerate some of her full liquids earlier.  Currently afebrile.  Stable hemodynamics.  No episodes of mental status changes  Passing flatus, last BM was last night     # Pain Assessment:  Current Pain Score 8/18/2021   Patient currently in pain? denies   Pain score (0-10) -   Pain location -   Pain descriptors -   Lexii s pain level was assessed and she currently denies pain.                  Physical Exam:      Last Vital Signs:  /55 (BP Location: Right arm)   Pulse 50   Temp 97.9  F (36.6  C) (Oral)   Resp 16   Ht 1.626 m (5' 4\")   Wt 92.9 kg (204 lb 12.8 oz)   LMP  (LMP Unknown)   SpO2 97%   BMI 35.15 kg/m      I/O last 3 completed shifts:  In: -   Out: 1500 [Urine:1500]  Wt Readings from Last 1 Encounters:   08/18/21 92.9 kg (204 lb 12.8 oz)     Vitals:    08/16/21 1450 08/17/21 0422 08/18/21 0651   Weight: 95.3 kg (210 lb 3.2 oz) 95.7 kg (210 lb 14.4 oz) 92.9 kg (204 lb 12.8 oz)       Constitutional: Awake, alert, cooperative, no apparent distress   Respiratory: Clear to " auscultation bilaterally, no crackles or wheezing   Cardiovascular: Regular rate and rhythm, normal S1 and S2,    Abdomen: Normal bowel sounds, soft, non-distended, non-tender   Skin: No rashes, no cyanosis, dry to touch   Neuro: Alert and oriented x3, no weakness, spontaneous and coherent speech   Extremities: No edema, normal range of motion   Other(s): Euthymic mood, not agitated       All other systems: Negative          Medications:      All current medications were reviewed with changes reflected in problem list.         Data:      All new lab and imaging data was reviewed.   Labs:  No results for input(s): CULT in the last 168 hours.  Recent Labs   Lab 08/17/21  1349 08/17/21  1002 08/17/21  0645 08/16/21  1812 08/16/21  1117 08/16/21  1114   NA  --   --  142  --   --  139   POTASSIUM  --   --  3.9  --   --  4.0   CHLORIDE  --   --  111*  --   --  107   CO2  --   --  27  --   --  25   ANIONGAP  --   --  4  --   --  7   * 128* 145*  --   --  135*   BUN  --   --  13  --   --  20   CR  --   --  0.79 0.87 1.1* 1.12*   GFRESTIMATED  --   --  71 63 48* 47*   CINDY  --   --  8.5  --   --  9.8     Recent Labs   Lab 08/17/21  0645 08/16/21  1114   WBC 4.9 8.0   HGB 12.4 14.7   HCT 38.7 46.2   MCV 97 97    214     No results for input(s): SED, CRP in the last 168 hours.  Recent Labs   Lab 08/17/21  1349 08/17/21  1002 08/17/21  0645 08/17/21  0602 08/17/21  0129   * 128* 145* 136* 129*     No results for input(s): INR in the last 168 hours.  No results for input(s): TROPONIN, TROPI, TROPR in the last 168 hours.    Invalid input(s): TROP, TROPONINIES  No results for input(s): COLOR, APPEARANCE, URINEGLC, URINEBILI, URINEKETONE, SG, UBLD, URINEPH, PROTEIN, UROBILINOGEN, NITRITE, LEUKEST, RBCU, WBCU in the last 168 hours.   Imaging:   Results for orders placed or performed during the hospital encounter of 08/16/21   CT Abdomen Pelvis w Contrast    Narrative    CT ABDOMEN AND PELVIS WITH CONTRAST  8/16/2021 11:39 AM    CLINICAL HISTORY: Distension, pain, history of recurrent small bowel  obstruction.    TECHNIQUE: CT scan of the abdomen and pelvis was performed following  injection of IV contrast. Multiplanar reformats were obtained. Dose  reduction techniques were used.    CONTRAST: 100mL Isovue-370    COMPARISON: 2/18/2016    FINDINGS:   LOWER CHEST: Normal.    HEPATOBILIARY: Cholecystectomy.    PANCREAS: Normal.    SPLEEN: Normal.    ADRENAL GLANDS: Normal.    KIDNEYS/BLADDER: Normal.    BOWEL: Small bowel anastomosis in the right midabdomen. The bowel  proximal to the anastomosis is dilated, measuring 4.5 cm maximum  diameter. Distal to the anastomosis, the bowel is decompressed.    PELVIC ORGANS: Hysterectomy.    ADDITIONAL FINDINGS: None.    MUSCULOSKELETAL: Normal.      Impression    IMPRESSION:   1.  Mid small bowel obstruction with transition point at the level of  a small bowel anastomosis in the right lower quadrant.    MELODY SILVA MD         SYSTEM ID:  [id number]   XR Gastrografin  Challenge    Narrative    EXAM: XR GASTROGRAFIN CHALLENGE  LOCATION: [facility]  DATE/TIME: 8/16/2021 8:09 PM    INDICATION: SBO  COMPARISON: None.      Impression    IMPRESSION: Orogastric tube with tip seen projecting over stomach. Gastrografin contrast seen throughout the colon. Nonobstructive bowel gas pattern. No free air. Right total hip arthroplasty.    XR Chest Port 1 View    Narrative    EXAM: XR CHEST PORT 1 VIEW  LOCATION: [facility]  DATE/TIME: 8/16/2021 5:00 PM    INDICATION: NG tube placement  COMPARISON: Chest x-ray 03/20/2018      Impression    IMPRESSION: Enteric tube with the tip in the gastric fundus. The side port is not well visualized. Low lung volumes. Minimal right basilar atelectasis. The lungs are otherwise clear. No definite pleural effusion. Stable heart size.

## 2021-08-18 NOTE — PROGRESS NOTES
"[facility]   General Surgery Progress Note           Assessment and Plan:   Assessment:   SBO, h/o multiple intra-abdominal surgeries   GC Challenge 8/16, AXR showing contrast throughout colon      Plan:   -advance to full liquid diet, multiple small meals  -pain control: IV Dilaudid available  -dispo: tomorrow if tolerating fulls and improved bloating         Interval History:   Comfortable in bed now, but c/o significant bloating and discomfort after clear liquid diet.  Bloating improves after walking or sitting on commode.  Reports some abdominal soreness/irritation.Not hungry today but interested in expanding her food options.  + passing flatus, last BM was yesterday evening.         Physical Exam:   Blood pressure 129/55, pulse 50, temperature 97.9  F (36.6  C), temperature source Oral, resp. rate 16, height 1.626 m (5' 4\"), weight 92.9 kg (204 lb 12.8 oz), SpO2 97 %, not currently breastfeeding.    I/O last 3 completed shifts:  In: -   Out: 1500 [Urine:1500]    Abdomen:   soft, non-distended, tenderness noted in the left abdomen and normal bowel sounds               Data:     Recent Labs   Lab 08/17/21  0645 08/16/21  1114   WBC 4.9 8.0   HGB 12.4 14.7   HCT 38.7 46.2   MCV 97 97    214       Hiwot Lara PA-C     Seen and examined, x rays reviewed; SBO resolving, agree with full liquids.     "

## 2021-08-18 NOTE — PLAN OF CARE
End of Shift Summary  For vital signs and complete assessments, please see documentation flowsheets.     Pertinent assessments: A&Ox4. On clear liquid diet. Incontinent of B&B. Loose stool x1. Abdomen soft/non-tender. BS active. Denies any nausea.    Major Shift Events: N/A    Treatment Plan: IVF, advance diet as tolerated. Discharge today if tolerating a regular diet.      Bedside Nurse: Laura MERCADO RN

## 2021-08-18 NOTE — PLAN OF CARE
End of Shift Summary  For vital signs and complete assessments, please see documentation flowsheets.     Pertinent assessments: A&Ox4. Advanced to full liquid diet. Incontinent of B&B. Loose stool x1. Abdomen soft/non-tender. BS hypoactive. Denies any nausea, reports bloating. Up ambulating in gutierrez SBA, counseled on importance of walking and small frequent meals to alleviate gas.     Major Shift Events: none    Treatment Plan: IVF, advance diet as tolerated. Likely discharge in am

## 2021-08-19 VITALS
TEMPERATURE: 98.7 F | BODY MASS INDEX: 35.29 KG/M2 | RESPIRATION RATE: 16 BRPM | OXYGEN SATURATION: 95 % | HEART RATE: 74 BPM | HEIGHT: 64 IN | DIASTOLIC BLOOD PRESSURE: 53 MMHG | SYSTOLIC BLOOD PRESSURE: 133 MMHG | WEIGHT: 206.7 LBS

## 2021-08-19 LAB
CREAT SERPL-MCNC: 0.77 MG/DL (ref 0.52–1.04)
GFR SERPL CREATININE-BSD FRML MDRD: 73 ML/MIN/1.73M2
PLATELET # BLD AUTO: 173 10E3/UL (ref 150–450)

## 2021-08-19 PROCEDURE — 82565 ASSAY OF CREATININE: CPT | Performed by: PHYSICIAN ASSISTANT

## 2021-08-19 PROCEDURE — 250N000013 HC RX MED GY IP 250 OP 250 PS 637: Performed by: INTERNAL MEDICINE

## 2021-08-19 PROCEDURE — 85049 AUTOMATED PLATELET COUNT: CPT | Performed by: PHYSICIAN ASSISTANT

## 2021-08-19 PROCEDURE — 36415 COLL VENOUS BLD VENIPUNCTURE: CPT | Performed by: PHYSICIAN ASSISTANT

## 2021-08-19 PROCEDURE — 99239 HOSP IP/OBS DSCHRG MGMT >30: CPT | Performed by: INTERNAL MEDICINE

## 2021-08-19 RX ADMIN — LEVOTHYROXINE SODIUM 50 MCG: 50 TABLET ORAL at 09:23

## 2021-08-19 RX ADMIN — PANTOPRAZOLE SODIUM 20 MG: 20 TABLET, DELAYED RELEASE ORAL at 09:23

## 2021-08-19 RX ADMIN — AMLODIPINE BESYLATE 2.5 MG: 2.5 TABLET ORAL at 09:23

## 2021-08-19 RX ADMIN — FEXOFENADINE HCL 60 MG: 60 TABLET, FILM COATED ORAL at 09:23

## 2021-08-19 RX ADMIN — FERROUS GLUCONATE 324 MG: 324 TABLET ORAL at 09:23

## 2021-08-19 ASSESSMENT — MIFFLIN-ST. JEOR: SCORE: 1392.59

## 2021-08-19 ASSESSMENT — ACTIVITIES OF DAILY LIVING (ADL)
ADLS_ACUITY_SCORE: 18

## 2021-08-19 NOTE — DISCHARGE SUMMARY
[facility]  Discharge Summary  Name: Lexii Stratton    MRN: [id number]  YOB: 1940    Age: 80 year old  Date of Discharge:  8/19/2021  Date of Admission: 8/16/2021  Primary Care Provider: Jesenia Madrigal  Discharge Physician:  Miguel Jarquin MD  Discharging Service:  Hospitalist      Discharge Diagnosis:  Recurrent SBO-resolved  JEFRY-resolved  GERD  Overactive bladder  Longstanding chronic iron deficiency anemia  Benign essential hypertension     Other Diagnosis:  Past Medical History:   Diagnosis Date     Arthritis      Diabetes (H)     Type 2-no meds... Dieet and exercise only as of 9/15/20     Esophageal reflux      Hernia, abdominal      History of blood transfusion 1984     Hypertension     No cardiologist     Incontinence of urine      Mumps     6 years old     Obese      Osteoarthritis      Other and unspecified hyperlipidemia      Other chronic pain     back     Peptic ulcer, unspecified site, unspecified as acute or chronic, without mention of hemorrhage, perforation, or obstruction      Small bowel obstruction (H)      Thyroid disease hypothyroidism     Urinary incontinence      Walking troubles           Discharge Disposition:  Discharged to home     Allergies:  Allergies   Allergen Reactions     Augmentin Diarrhea     Codeine Nausea and Vomiting     Hydrocodone      Keeps patient awake     Naproxen Itching and Rash     Seasonal Allergies      Hay fever in fall, ragweed and russian thistles     Sulfa Drugs Nausea        Discharge Medications:   Current Discharge Medication List      CONTINUE these medications which have NOT CHANGED    Details   acetaminophen (TYLENOL) 500 MG tablet Take 2 tablets (1,000 mg) by mouth 2 times daily as needed for mild pain  Qty: 100 tablet, Refills: 0    Associated Diagnoses: Arthritis      amLODIPine (NORVASC) 5 MG tablet TAKE ONE TABLET BY MOUTH EVERY NIGHT AT BEDTIME  Qty: 90 tablet, Refills: 3    Associated Diagnoses: Essential  hypertension, benign      Biotin 5000 MCG PO TABS Take 1 tablet by mouth daily      calcium carbonate (OS-CINDY) 500 MG tablet Take 1 tablet by mouth Every other day with at lunch.      CENTRUM SILVER OR TABS 1 TABLET DAILY  Qty: 30, Refills: 0    Associated Diagnoses: Need for prophylactic vaccination and inoculation against influenza; Routine general medical examination at a health care facility; Generalized osteoarthrosis, unspecified site; Other and unspecified hyperlipidemia; Esophageal reflux; Obesity, unspecified; Urinary sys symptom NEC; Other malaise and fatigue; Sleep disturbance, unspecified      Cranberry 500 MG CAPS Take 500 mg by mouth daily      docusate sodium (COLACE) 100 MG capsule Take 100 mg by mouth Every other day in the morning and every night in the evening.      fenofibrate (TRIGLIDE/LOFIBRA) 160 MG tablet TAKE 1 TABLET DAILY  Qty: 90 tablet, Refills: 0    Associated Diagnoses: Hyperlipidemia LDL goal <130      ferrous gluconate (FERGON) 324 (38 FE) MG tablet Take 1 tablet (324 mg) by mouth 2 times daily  Qty: 100 tablet, Refills: 0    Associated Diagnoses: Gastrointestinal hemorrhage, unspecified gastrointestinal hemorrhage type      latanoprost (XALATAN) 0.005 % ophthalmic solution Place 1 drop into both eyes At Bedtime       levothyroxine (SYNTHROID/LEVOTHROID) 50 MCG tablet Take 1 tablet (50 mcg) by mouth daily  Qty: 90 tablet, Refills: 3    Associated Diagnoses: Acquired hypothyroidism      lisinopril (ZESTRIL) 30 MG tablet TAKE ONE TABLET BY MOUTH EVERY DAY FOR HYPERTENSION  Qty: 90 tablet, Refills: 3    Associated Diagnoses: Essential hypertension, benign      magnesium 250 MG tablet Take 1 tablet by mouth daily      meloxicam (MOBIC) 7.5 MG tablet Take 1 tablet (7.5 mg) by mouth daily  Qty: 90 tablet, Refills: 3    Associated Diagnoses: Primary osteoarthritis involving multiple joints      OMEGA-3 FATTY ACIDS 1200 MG OR CAPS 1 TABLET DAILY  Refills: 0    Associated Diagnoses: Preop  "general physical exam; Stress incontinence - female      pantoprazole (PROTONIX) 20 MG EC tablet Take 1 tablet (20 mg) by mouth daily  Qty: 90 tablet, Refills: 3    Associated Diagnoses: Gastroesophageal reflux disease without esophagitis      potassium 99 MG TABS Take 1 tablet by mouth daily (with dinner)       Probiotic Product (ACIDOPHILUS PROBIOTIC BLEND) CAPS Take 1 capsule by mouth daily (with breakfast)   Qty: 30 capsule, Refills: 0      simvastatin (ZOCOR) 20 MG tablet TAKE ONE TABLET BY MOUTH EVERY NIGHT AT BEDTIME  Qty: 90 tablet, Refills: 3    Associated Diagnoses: Hyperlipidemia LDL goal <130      VITAMIN D, CHOLECALCIFEROL, PO Take 2,000 Units by mouth every other day Every other day at Supper      acetaminophen (TYLENOL) 650 MG CR tablet Take 1 tablet (650 mg) by mouth every 6 hours as needed for mild pain or fever    Associated Diagnoses: Overactive bladder      fexofenadine (ALLEGRA) 60 MG tablet Take 1 tablet (60 mg) by mouth 2 times daily  Qty: 90 tablet, Refills: 0    Associated Diagnoses: Seasonal allergic rhinitis due to pollen         STOP taking these medications       levofloxacin (LEVAQUIN) 500 MG tablet Comments:   Reason for Stopping:         psyllium (METAMUCIL) 0.52 G capsule Comments:   Reason for Stopping:                Condition on Discharge:  Discharge condition: Stable   Discharge vitals: Blood pressure 133/53, pulse 74, temperature 98.7  F (37.1  C), temperature source Oral, resp. rate 16, height 1.626 m (5' 4\"), weight 93.8 kg (206 lb 11.2 oz), SpO2 95 %, not currently breastfeeding.   Code status on discharge: Full Code     History of Present Illness:  See detailed admission note for full details.        Significant Physical Exam Findings Day of Discharge:  HEENT; Atraumatic, normocephalic, pinkish conjuctiva, pupils bilateral reactive   Skin: warm and moist, no rashes  Lungs: equal chest expansion, clear to auscultation, no wheezes, no stridor, no crackles,   Heart: normal " rate, normal rhythm, no rubs or gallops.   Abdomen: normal bowel sounds, no tenderness, no peritoneal signs, no guarding  Extremities: no deformities, no edema   Neuro; follow commands, alert and oriented x3, spontaneous speech, coherent, moves all extremities spontaneously  Psych; no hallucination, euthymic mood, not agitated        Procedures other than Imaging:  None  Results for orders placed or performed during the hospital encounter of 08/16/21   CT Abdomen Pelvis w Contrast    Narrative    CT ABDOMEN AND PELVIS WITH CONTRAST 8/16/2021 11:39 AM    CLINICAL HISTORY: Distension, pain, history of recurrent small bowel  obstruction.    TECHNIQUE: CT scan of the abdomen and pelvis was performed following  injection of IV contrast. Multiplanar reformats were obtained. Dose  reduction techniques were used.    CONTRAST: 100mL Isovue-370    COMPARISON: 2/18/2016    FINDINGS:   LOWER CHEST: Normal.    HEPATOBILIARY: Cholecystectomy.    PANCREAS: Normal.    SPLEEN: Normal.    ADRENAL GLANDS: Normal.    KIDNEYS/BLADDER: Normal.    BOWEL: Small bowel anastomosis in the right midabdomen. The bowel  proximal to the anastomosis is dilated, measuring 4.5 cm maximum  diameter. Distal to the anastomosis, the bowel is decompressed.    PELVIC ORGANS: Hysterectomy.    ADDITIONAL FINDINGS: None.    MUSCULOSKELETAL: Normal.      Impression    IMPRESSION:   1.  Mid small bowel obstruction with transition point at the level of  a small bowel anastomosis in the right lower quadrant.    MELODY SILVA MD         SYSTEM ID:  [id number]   XR Gastrografin  Challenge    Narrative    EXAM: XR GASTROGRAFIN CHALLENGE  LOCATION: [facility]  DATE/TIME: 8/16/2021 8:09 PM    INDICATION: SBO  COMPARISON: None.      Impression    IMPRESSION: Orogastric tube with tip seen projecting over stomach. Gastrografin contrast seen throughout the colon. Nonobstructive bowel gas pattern. No free air. Right total hip arthroplasty.    XR  Chest Port 1 View    Narrative    EXAM: XR CHEST PORT 1 VIEW  LOCATION: [facility]  DATE/TIME: 8/16/2021 5:00 PM    INDICATION: NG tube placement  COMPARISON: Chest x-ray 03/20/2018      Impression    IMPRESSION: Enteric tube with the tip in the gastric fundus. The side port is not well visualized. Low lung volumes. Minimal right basilar atelectasis. The lungs are otherwise clear. No definite pleural effusion. Stable heart size.        Imaging:  Results for orders placed or performed during the hospital encounter of 08/16/21   CT Abdomen Pelvis w Contrast    Narrative    CT ABDOMEN AND PELVIS WITH CONTRAST 8/16/2021 11:39 AM    CLINICAL HISTORY: Distension, pain, history of recurrent small bowel  obstruction.    TECHNIQUE: CT scan of the abdomen and pelvis was performed following  injection of IV contrast. Multiplanar reformats were obtained. Dose  reduction techniques were used.    CONTRAST: 100mL Isovue-370    COMPARISON: 2/18/2016    FINDINGS:   LOWER CHEST: Normal.    HEPATOBILIARY: Cholecystectomy.    PANCREAS: Normal.    SPLEEN: Normal.    ADRENAL GLANDS: Normal.    KIDNEYS/BLADDER: Normal.    BOWEL: Small bowel anastomosis in the right midabdomen. The bowel  proximal to the anastomosis is dilated, measuring 4.5 cm maximum  diameter. Distal to the anastomosis, the bowel is decompressed.    PELVIC ORGANS: Hysterectomy.    ADDITIONAL FINDINGS: None.    MUSCULOSKELETAL: Normal.      Impression    IMPRESSION:   1.  Mid small bowel obstruction with transition point at the level of  a small bowel anastomosis in the right lower quadrant.    MELODY SILVA MD         SYSTEM ID:  [id number]   XR Gastrografin  Challenge    Narrative    EXAM: XR GASTROGRAFIN CHALLENGE  LOCATION: [facility]  DATE/TIME: 8/16/2021 8:09 PM    INDICATION: SBO  COMPARISON: None.      Impression    IMPRESSION: Orogastric tube with tip seen projecting over stomach. Gastrografin contrast seen  throughout the colon. Nonobstructive bowel gas pattern. No free air. Right total hip arthroplasty.    XR Chest Port 1 View    Narrative    EXAM: XR CHEST PORT 1 VIEW  LOCATION: [facility]  DATE/TIME: 8/16/2021 5:00 PM    INDICATION: NG tube placement  COMPARISON: Chest x-ray 03/20/2018      Impression    IMPRESSION: Enteric tube with the tip in the gastric fundus. The side port is not well visualized. Low lung volumes. Minimal right basilar atelectasis. The lungs are otherwise clear. No definite pleural effusion. Stable heart size.        Consultations:  Consultation during this admission received from surgery.     Recent Lab Results:  Recent Labs   Lab 08/19/21  0622 08/17/21  0645 08/16/21  1114   WBC  --  4.9 8.0   HGB  --  12.4 14.7   HCT  --  38.7 46.2   MCV  --  97 97    168 214     No results for input(s): CULT in the last 168 hours.  Recent Labs   Lab 08/19/21  0622 08/17/21  1349 08/17/21  1002 08/17/21  0645 08/16/21  1812 08/16/21  1117 08/16/21  1114   NA  --   --   --  142  --   --  139   POTASSIUM  --   --   --  3.9  --   --  4.0   CHLORIDE  --   --   --  111*  --   --  107   CO2  --   --   --  27  --   --  25   ANIONGAP  --   --   --  4  --   --  7   GLC  --  171* 128* 145*  --    < > 135*   BUN  --   --   --  13  --   --  20   CR 0.77  --   --  0.79 0.87  --  1.12*   GFRESTIMATED 73  --   --  71 63  --  47*   CINDY  --   --   --  8.5  --   --  9.8   PROTTOTAL  --   --   --   --   --   --  7.0   ALBUMIN  --   --   --   --   --   --  3.2*   BILITOTAL  --   --   --   --   --   --  0.9   ALKPHOS  --   --   --   --   --   --  69   AST  --   --   --   --   --   --  21   ALT  --   --   --   --   --   --  16    < > = values in this interval not displayed.     Recent Labs   Lab 08/17/21  1349 08/17/21  1002 08/17/21  0645 08/17/21  0602 08/17/21  0129   * 128* 145* 136* 129*     No results for input(s): LACT in the last 168 hours.  No results for input(s): TROPONIN, TROPI,  TROPR in the last 168 hours.    Invalid input(s): TROP, TROPONINIES  No results for input(s): COLOR, APPEARANCE, URINEGLC, URINEBILI, URINEKETONE, SG, UBLD, URINEPH, PROTEIN, UROBILINOGEN, NITRITE, LEUKEST, RBCU, WBCU in the last 168 hours.       Pending Results:    Unresulted Labs Ordered in the Past 30 Days of this Admission     No orders found from 7/17/2021 to 8/17/2021.           Discharge Instructions and Follow-Up:   Discharge diet:  As per surgery instructions   Discharge activity: Activity as tolerated   Discharge follow-up: 1-2 weeks with PCP   Outpatient therapy: None    Other instructions:  I discontinued her Metamucil for now given she is still on full liquids but may continue her fiber supplements as instructed by surgery once off liquid diet     Hospital Course:  No significant reported events overnight.  Continues to demonstrate tolerance to small frequent feedings, on full liquids.  No reported recurrence of any abdominal symptomatology.  Stable hemodynamics.  Afebrile.  Not requiring any oxygen support.  No mental status changes.  Earlier underwent Gastrografin challenge and showed contrast all throughout the colon.  She remained ambulatory.  Plans for hospital discharge today.  No new prescriptions provided.  Resumed earlier held lisinopril as kidney function improved back to normal levels.         Total time spent in face to face contact with the patient and coordinating discharge was:  > 30 Minutes.

## 2021-08-19 NOTE — PROGRESS NOTES
"[facility]   General Surgery Progress Note           Assessment and Plan:   Assessment:   SBO, h/o multiple intra-abdominal surgeries   GC Challenge 8/16, AXR showing contrast throughout colon      Plan:   -Diet: Full liquids, small meals; likely continue for a few days, then soft diet with long-term diet restrictions (baseline)  -Pain control: none needed  -Prophylaxis: lovenox, protonix  -Dispo: OK to discharge home, no medication rx needed         Interval History:   Comfortable in bed.  Abdominal pain resolved, no bloated feeling, no nausea.  Eating well, key was switching to eating small amounts at a time.  Discussed her baseline eating, diet restrictions, protein supplementation, red meat avoidance.         Physical Exam:   Blood pressure 133/53, pulse 74, temperature 98.7  F (37.1  C), temperature source Oral, resp. rate 16, height 1.626 m (5' 4\"), weight 93.8 kg (206 lb 11.2 oz), SpO2 95 %, not currently breastfeeding.    I/O last 3 completed shifts:  In: 1260 [P.O.:1260]  Out: 850 [Urine:850]    Abdomen:    soft, non-distended, non-tender and normal bowel sounds           Data:     Recent Labs   Lab 08/19/21  0622 08/17/21  0645 08/16/21  1114   WBC  --  4.9 8.0   HGB  --  12.4 14.7   HCT  --  38.7 46.2   MCV  --  97 97    168 214     Recent Labs   Lab 08/19/21  0622 08/17/21  1349 08/17/21  1002 08/17/21  0645 08/16/21  1812 08/16/21  1117 08/16/21  1114   NA  --   --   --  142  --   --  139   POTASSIUM  --   --   --  3.9  --   --  4.0   CHLORIDE  --   --   --  111*  --   --  107   CO2  --   --   --  27  --   --  25   ANIONGAP  --   --   --  4  --   --  7   GLC  --  171* 128* 145*  --    < > 135*   BUN  --   --   --  13  --   --  20   CR 0.77  --   --  0.79 0.87  --  1.12*   GFRESTIMATED 73  --   --  71 63  --  47*   CINDY  --   --   --  8.5  --   --  9.8   PROTTOTAL  --   --   --   --   --   --  7.0   ALBUMIN  --   --   --   --   --   --  3.2*   BILITOTAL  --   --   --   --   --   --  " 0.9   ALKPHOS  --   --   --   --   --   --  69   AST  --   --   --   --   --   --  21   ALT  --   --   --   --   --   --  16    < > = values in this interval not displayed.     8/16 XR GASTROGRAFIN CHALLENGE  IMPRESSION: Orogastric tube with tip seen projecting over stomach. Gastrografin contrast seen throughout the colon. Nonobstructive bowel gas pattern. No free air. Right total hip arthroplasty.     No Jackson PA-C

## 2021-08-19 NOTE — PLAN OF CARE
To Do:  End of Shift Summary  For vital signs and complete assessments, please see documentation flowsheets.     Pertinent assessments: Pt. A&O, VSS Denied pain. Up ind. Tolerating full liquids. Passing flatus, BS audible. Purewick placed.  Major Shift Events: Uneventful    Treatment Plan: Advance diet, encourage activity, manage incontinence, and discharge today.

## 2021-08-19 NOTE — PLAN OF CARE
Pertinent assessments: VSS on room air. Afebrile. Up independently. Tolerating full liquids. Has occasional gas discomfort that is alleviated by ambulating. Denies pain. BS are normoactive. Loose stools today. Incontinent of bowel and bladder at times. A&Ox4.     Major Shift Events: none.    Treatment Plan: Advance diet, encourage activity, manage incontinence, and probable discharge 8/19.

## 2021-08-19 NOTE — PROGRESS NOTES
VSS. PIV removed. Belongings returned to patient. Discharge instructions and medications reviewed with patient. Patient left hospital in wheelchair with nursing staff, drove herself home as her car was parked at ED.

## 2021-08-20 ENCOUNTER — PATIENT OUTREACH (OUTPATIENT)
Dept: NURSING | Facility: CLINIC | Age: 81
End: 2021-08-20
Payer: COMMERCIAL

## 2021-08-20 NOTE — PROGRESS NOTES
Clinic Care Coordination Contact  [facility]: Post-Discharge Note  SITUATION                                                      Admission:    Admission Date: 08/16/21   Reason for Admission: recurrent small bowel obstruction  Discharge:   Discharge Date: 08/19/21  Discharge Diagnosis: recurrent SBO    BACKGROUND                                                      Lexii Stratton is a 80 year old female with past medical history significant for multiple abdominal surgeries including intraperitoneal mesh placement for hernia repair, prior small bowel obstruction, GERD, OAB, Type II DM, HTN, who was admitted on 8/16/2021 after presentation with abdominal pain, nausea, nonbloody emesis found to have recurrent small bowel obstruction.     ASSESSMENT      Enrollment  Primary Care Care Coordination Status: Declined    Discharge Assessment  How are you doing now that you are home?: Mild fever last night, took some tylenol and is feeling a lot better.  How are your symptoms? (Red Flag symptoms escalate to triage hotline per guidelines): Improved  Do you feel your condition is stable enough to be safe at home until your provider visit?: Yes  Does the patient have their discharge instructions? : Yes  Does the patient have questions regarding their discharge instructions? : No  Were you started on any new medications or were there changes to any of your previous medications? : No  Does the patient have all of their medications?: Yes  Do you have questions regarding any of your medications? : No  Do you have all of your needed medical supplies or equipment (DME)?  (i.e. oxygen tank, CPAP, cane, etc.): Yes  Discharge follow-up appointment scheduled within 14 calendar days? : Yes  Discharge Follow Up Appointment Date: 08/26/21  Discharge Follow Up Appointment Scheduled with?: Primary Care Provider              Care Management   Community Health Worker Initial Outreach    CHW Initial Information Gathering:  Referral  Source: IP Handoff  Preferred Hospital: [facility], [place]  [phone]  Current living arrangement:: Not Assessed  No PCP office visit in Past Year: No  CHW Additional Questions  If ED/Hospital discharge, follow-up appointment scheduled as recommended?: Yes  Medication changes made following ED/Hospital discharge?: Yes, patient to be scheduled with CC RN/SW within approx 1 business day  MyChart active?: Yes    Patient accepts CC: No    Spoke to Lexii Shultz is doing well, she had a fever overnight but took tylenol and slept it off.  Patient is doing a lot better today and has had no other symptoms.  CHW inquired if patient was interested in following up with CC RN to assess symptoms.  However, patient is doing much better and didn't think it was necessary.  Patient understands that she can contact CC if anything else is needed.       PLAN                                                      Outpatient Plan:  n/a    Future Appointments   Date Time Provider Department Center   8/26/2021  8:15 AM Jesenia Madrigal MD [facility]          For any urgent concerns, please contact our 24 hour nurse triage line: [phone] ([phone])         MISSY Lazaro  Clinic Care Coordination  [facility] : [place], [place], and [place]  Phone: [phone]

## 2021-08-23 ENCOUNTER — TELEPHONE (OUTPATIENT)
Dept: FAMILY MEDICINE | Facility: CLINIC | Age: 81
End: 2021-08-23
Payer: COMMERCIAL

## 2021-08-23 ASSESSMENT — ENCOUNTER SYMPTOMS
MYALGIAS: 1
HEMATOCHEZIA: 0
FREQUENCY: 1
ABDOMINAL PAIN: 0
ARTHRALGIAS: 1
BREAST MASS: 0
SHORTNESS OF BREATH: 0
HEMATURIA: 0
NERVOUS/ANXIOUS: 0
FEVER: 0
PARESTHESIAS: 0
NAUSEA: 0
CHILLS: 0
DYSURIA: 0
DIARRHEA: 0
SORE THROAT: 0
EYE PAIN: 0
WEAKNESS: 1
HEADACHES: 0
PALPITATIONS: 0
CONSTIPATION: 1
HEARTBURN: 0
DIZZINESS: 0
COUGH: 0
JOINT SWELLING: 0

## 2021-08-23 ASSESSMENT — ACTIVITIES OF DAILY LIVING (ADL): CURRENT_FUNCTION: NO ASSISTANCE NEEDED

## 2021-08-23 NOTE — TELEPHONE ENCOUNTER
Patient reporting that she was recently discharged from the hospital, we scheduled her for a hospital F/U (changed her upcoming physical to a hospital F/U) but she would like to discuss her current symptoms further of diarrhea once daily.  -Winnie Sawyer

## 2021-08-26 ENCOUNTER — OFFICE VISIT (OUTPATIENT)
Dept: FAMILY MEDICINE | Facility: CLINIC | Age: 81
End: 2021-08-26
Payer: COMMERCIAL

## 2021-08-26 VITALS
OXYGEN SATURATION: 99 % | WEIGHT: 205.7 LBS | BODY MASS INDEX: 35.31 KG/M2 | RESPIRATION RATE: 16 BRPM | SYSTOLIC BLOOD PRESSURE: 122 MMHG | DIASTOLIC BLOOD PRESSURE: 58 MMHG | HEART RATE: 51 BPM | TEMPERATURE: 97.9 F

## 2021-08-26 DIAGNOSIS — N32.81 OVERACTIVE BLADDER: ICD-10-CM

## 2021-08-26 DIAGNOSIS — N39.41 URGE INCONTINENCE: ICD-10-CM

## 2021-08-26 DIAGNOSIS — K56.609 SBO (SMALL BOWEL OBSTRUCTION) (H): ICD-10-CM

## 2021-08-26 DIAGNOSIS — Z09 ENCOUNTER FOR EXAMINATION FOLLOWING TREATMENT AT HOSPITAL: Primary | ICD-10-CM

## 2021-08-26 DIAGNOSIS — Z87.440 PERSONAL HISTORY OF URINARY TRACT INFECTION: ICD-10-CM

## 2021-08-26 DIAGNOSIS — E78.5 HYPERLIPIDEMIA LDL GOAL <130: ICD-10-CM

## 2021-08-26 LAB
ALBUMIN UR-MCNC: ABNORMAL MG/DL
APPEARANCE UR: CLEAR
BILIRUB UR QL STRIP: NEGATIVE
COLOR UR AUTO: YELLOW
GLUCOSE UR STRIP-MCNC: NEGATIVE MG/DL
HGB UR QL STRIP: NEGATIVE
KETONES UR STRIP-MCNC: NEGATIVE MG/DL
LEUKOCYTE ESTERASE UR QL STRIP: ABNORMAL
NITRATE UR QL: POSITIVE
PH UR STRIP: 5.5 [PH] (ref 5–7)
SP GR UR STRIP: >=1.03 (ref 1–1.03)
UROBILINOGEN UR STRIP-ACNC: 0.2 E.U./DL

## 2021-08-26 PROCEDURE — 87086 URINE CULTURE/COLONY COUNT: CPT | Performed by: INTERNAL MEDICINE

## 2021-08-26 PROCEDURE — 99495 TRANSJ CARE MGMT MOD F2F 14D: CPT | Performed by: INTERNAL MEDICINE

## 2021-08-26 PROCEDURE — 81003 URINALYSIS AUTO W/O SCOPE: CPT | Performed by: INTERNAL MEDICINE

## 2021-08-26 PROCEDURE — 87186 SC STD MICRODIL/AGAR DIL: CPT | Performed by: INTERNAL MEDICINE

## 2021-08-26 RX ORDER — FENOFIBRATE 160 MG/1
160 TABLET ORAL DAILY
Qty: 90 TABLET | Refills: 1 | Status: CANCELLED | OUTPATIENT
Start: 2021-08-26

## 2021-08-26 RX ORDER — FENOFIBRATE 160 MG/1
160 TABLET ORAL DAILY
Qty: 90 TABLET | Refills: 0 | Status: SHIPPED | OUTPATIENT
Start: 2021-08-26 | End: 2021-10-21

## 2021-08-26 ASSESSMENT — ENCOUNTER SYMPTOMS
MYALGIAS: 1
COUGH: 0
NERVOUS/ANXIOUS: 0
HEMATURIA: 0
PALPITATIONS: 0
CHILLS: 0
ARTHRALGIAS: 1
FREQUENCY: 1
HEARTBURN: 0
PARESTHESIAS: 0
DYSURIA: 0
DIARRHEA: 0
HEMATOCHEZIA: 0
BREAST MASS: 0
NAUSEA: 0
HEADACHES: 0
ABDOMINAL PAIN: 0
DIZZINESS: 0
FEVER: 0
WEAKNESS: 1
CONSTIPATION: 1
SHORTNESS OF BREATH: 0
SORE THROAT: 0
JOINT SWELLING: 0
EYE PAIN: 0

## 2021-08-26 ASSESSMENT — ACTIVITIES OF DAILY LIVING (ADL): CURRENT_FUNCTION: NO ASSISTANCE NEEDED

## 2021-08-26 ASSESSMENT — PAIN SCALES - GENERAL: PAINLEVEL: NO PAIN (0)

## 2021-08-26 NOTE — PROGRESS NOTES
"SUBJECTIVE:   Lexii Stratton is a 80 year old female who presents for Hospital Follow up      Patient has been advised of split billing requirements and indicates understanding: Yes   Are you in the first 12 months of your Medicare coverage?  No    Healthy Habits:     In general, how would you rate your overall health?  Good    Frequency of exercise:  None    Do you usually eat at least 4 servings of fruit and vegetables a day, include whole grains    & fiber and avoid regularly eating high fat or \"junk\" foods?  No    Taking medications regularly:  Yes    Barriers to taking medications:  None    Medication side effects:  None    Ability to successfully perform activities of daily living:  No assistance needed    Home Safety:  No safety concerns identified    Hearing Impairment:  Difficulty following a conversation in a noisy restaurant or crowded room, need to ask people to speak up or repeat themselves and difficulty understanding soft or whispered speech    In the past 6 months, have you been bothered by leaking of urine? Yes    In general, how would you rate your overall mental or emotional health?  Good      PHQ-2 Total Score: 2    Additional concerns today:  Yes       Fall risk  Fallen 2 or more times in the past year?: No  Any fall with injury in the past year?: No    Mini-CogTM Copyright LUCIE Velasquez. Licensed by the author for use in [facility]; reprinted with permission (minerva@[facility]). All rights reserved.      Do you have sleep apnea, excessive snoring or daytime drowsiness?: no    Reviewed and updated as needed this visit by clinical staff  Tobacco  Allergies  Meds   Med Hx  Surg Hx  Fam Hx  Soc Hx        Reviewed and updated as needed this visit by Provider  Tobacco  Allergies  Meds  Problems  Med Hx  Surg Hx  Fam Hx         Social History     Tobacco Use     Smoking status: Never Smoker     Smokeless tobacco: Never Used     Tobacco comment: have never smoked anything   Substance " Use Topics     Alcohol use: No     Alcohol/week: 0.0 standard drinks         Alcohol Use 8/20/2021   Prescreen: >3 drinks/day or >7 drinks/week? No   Prescreen: >3 drinks/day or >7 drinks/week? -           Hospital Follow-up Visit:    Hospital/Nursing Home/IP Rehab Facility: [facility]  Date of Admission: 8/16/2021  Date of Discharge: 8/19/2021  Reason(s) for Admission: small bowel obstruction      Was your hospitalization related to COVID-19? No   Problems taking medications regularly:  None  Medication changes since discharge: Stopped taking fiber  Problems adhering to non-medication therapy:  None    Summary of hospitalization:  [facility] discharge summary reviewed- reviewed records including CT scans.  no surgery needed. ( last SBO was 10 years ago. )  Diagnostic Tests/Treatments reviewed.  Follow up needed: none  Other Healthcare Providers Involved in Patient s Care:         None  Update since discharge: improved.  She is eating smaller meals, 5 times per day ( most days 4) with low residue components.     She is trying to stay active.   Urinary concerns- no symptoms but had been on antibiotics prior to hospitalization.  Post Discharge Medication Reconciliation: discharge medications reconciled, continue medications without change.  Plan of care communicated with patient     she used Pure Wick system while in the hospital rather than needing an indwelling Hammond catheter.      Medication Followup of Fenofibrate    Taking Medication as prescribed: yes    Side Effects:  None    Medication Helping Symptoms:  Yes    RX will run out before her next appt.        Current providers sharing in care for this patient include:   Patient Care Team:  Jesenia Madrigal MD as PCP - General (Internal Medicine)  Jesenia Madrigal MD as Assigned PCP  Jesenia Madrigal MD as Referring Physician (Internal Medicine)    The following health maintenance items are reviewed in Epic and  correct as of today:  Health Maintenance Due   Topic Date Due     ANNUAL REVIEW OF HM ORDERS  Never done     EYE EXAM  04/24/2020     DIABETIC FOOT EXAM  02/20/2021     A1C  05/24/2021     FALL RISK ASSESSMENT  08/26/2021     INFLUENZA VACCINE (1) 09/01/2021     Labs reviewed in EPIC  BP Readings from Last 3 Encounters:   08/26/21 122/58   08/19/21 133/53   03/12/21 129/75    Wt Readings from Last 3 Encounters:   08/26/21 93.3 kg (205 lb 11.2 oz)   08/19/21 93.8 kg (206 lb 11.2 oz)   03/12/21 95.3 kg (210 lb)                  Patient Active Problem List   Diagnosis     Generalized osteoarthrosis, unspecified site     Esophageal reflux     Obesity     Other symptoms involving urinary system     Acquired hypothyroidism     Hypersomnia with sleep apnea     Essential hypertension, benign     Impaired fasting glucose     Hyperlipidemia LDL goal <130     Advance Care Planning     Chest pain syndrome     Partial small bowel obstruction (H)     Anemia, unspecified     Poor iron absorption     Iron and its compounds causing adverse effect in therapeutic use(E934.0)     S/P total hip arthroplasty     Overactive bladder     Morbid obesity due to excess calories (H)     GI bleed     Elevated hemoglobin A1c     Pelvic floor dysfunction     Diabetes mellitus, type 2 (H)     SBO (small bowel obstruction) (H)     Past Surgical History:   Procedure Laterality Date     ABDOMEN SURGERY       ARTHROPLASTY HIP Right 11/9/2016    Procedure: ARTHROPLASTY HIP;  Surgeon: Angel Lund MD;  Location:  OR     AS REPAIR INCISIONAL HERNIA,REDUCIBLE  1998    inc hernia ( Yamileth surgery)     BIOPSY       BLADDER SURGERY      hyperdistention surgery and sling     BREAST SURGERY       CHOLECYSTECTOMY  1987     COLONOSCOPY  12/3/2011    Procedure:COLONOSCOPY; COLONOSCOPY; Surgeon:SEMAJ GRAHAM; Location: GI     COLONOSCOPY N/A 3/29/2018    Procedure: COLONOSCOPY;  COLONOSCOPY Rm 544;  Surgeon: Marco Gusman MD;  Location:  GI      CYSTOSCOPY N/A 9/6/2017    Procedure: CYSTOSCOPY;  CYSTOSCOPY AND HYDRODISTENTION ;  Surgeon: Eyal Bai MD;  Location:  OR     ENT SURGERY      Tonsillectomy     ESOPHAGOSCOPY, GASTROSCOPY, DUODENOSCOPY (EGD), COMBINED N/A 9/26/2016    Procedure: COMBINED ESOPHAGOSCOPY, GASTROSCOPY, DUODENOSCOPY (EGD), BIOPSY SINGLE OR MULTIPLE;  Surgeon: Marco Monaco MD;  Location:  GI     EYE SURGERY Left 05/2019     GENITOURINARY SURGERY       GYN SURGERY      Hysterectomy     HERNIA REPAIR, UMBILICAL  7/8/2010    Dr. Kirt Franco times 3     IMPLANT STIMULATOR SACRAL NERVE STAGE ONE N/A 9/17/2020    Procedure: sacral neurostimulation stage one implant of neurostimulator lead;  Surgeon: Shahnaz Carbone MD;  Location:  OR     IMPLANT STIMULATOR SACRAL NERVE STAGE TWO Right 9/24/2020    Procedure: Removal of interstem lead, NOT implanting neurostimulator;  Surgeon: Shahnaz Carbone MD;  Location:  OR     ORTHOPEDIC SURGERY  2009    [facility] - Dr. Lund- bilateral knee replacement     ZZC NONSPECIFIC PROCEDURE  1946    T&A     ZZC NONSPECIFIC PROCEDURE  1984    Vaginal Hysterectomy (has her ovaries)     ZZC NONSPECIFIC PROCEDURE  1973    PPTL     ZZC NONSPECIFIC PROCEDURE  1994    L shoulder to remove bone spurs     ZZC NONSPECIFIC PROCEDURE  2004    cysto and durasphere Dr Demarco     ZZC NONSPECIFIC PROCEDURE  2005    cysto and durasphere     ZZC NONSPECIFIC PROCEDURE  2007    cysto and durasphere     ZZC NONSPECIFIC PROCEDURE  2009    retropubic TVT sling       Social History     Tobacco Use     Smoking status: Never Smoker     Smokeless tobacco: Never Used     Tobacco comment: have never smoked anything   Substance Use Topics     Alcohol use: No     Alcohol/week: 0.0 standard drinks     Family History   Problem Relation Age of Onset     Heart Disease Mother         heart surgery , new valve     Gallbladder Disease Mother      Coronary Artery Disease Mother      Hyperlipidemia  Mother      Asthma Mother      Hypertension Mother      Anesthesia Reaction Mother      Heart Disease Maternal Grandmother      Coronary Artery Disease Maternal Grandmother      Heart Disease Maternal Grandfather      Coronary Artery Disease Maternal Grandfather      Cancer Father         throat ca,  76     Coronary Artery Disease Father      Other Cancer Father      Diabetes Brother         Half Brother     Cardiovascular Brother         lung ca, Half brother     Cancer Brother         half brother  lung          Current Outpatient Medications   Medication Sig Dispense Refill     acetaminophen (TYLENOL) 500 MG tablet Take 2 tablets (1,000 mg) by mouth 2 times daily as needed for mild pain 100 tablet 0     acetaminophen (TYLENOL) 650 MG CR tablet Take 1 tablet (650 mg) by mouth every 6 hours as needed for mild pain or fever       amLODIPine (NORVASC) 5 MG tablet TAKE ONE TABLET BY MOUTH EVERY NIGHT AT BEDTIME 90 tablet 3     Biotin 5000 MCG PO TABS Take 1 tablet by mouth daily       calcium carbonate (OS-CINDY) 500 MG tablet Take 1 tablet by mouth Every other day with at lunch.       CENTRUM SILVER OR TABS 1 TABLET DAILY 30 0     Cranberry 500 MG CAPS Take 500 mg by mouth daily       docusate sodium (COLACE) 100 MG capsule Take 100 mg by mouth Every other day in the morning and every night in the evening.       fenofibrate (TRIGLIDE/LOFIBRA) 160 MG tablet Take 1 tablet (160 mg) by mouth daily 90 tablet 0     ferrous gluconate (FERGON) 324 (38 FE) MG tablet Take 1 tablet (324 mg) by mouth 2 times daily 100 tablet 0     latanoprost (XALATAN) 0.005 % ophthalmic solution Place 1 drop into both eyes At Bedtime        levothyroxine (SYNTHROID/LEVOTHROID) 50 MCG tablet Take 1 tablet (50 mcg) by mouth daily 90 tablet 3     lisinopril (ZESTRIL) 30 MG tablet TAKE ONE TABLET BY MOUTH EVERY DAY FOR HYPERTENSION 90 tablet 3     magnesium 250 MG tablet Take 1 tablet by mouth daily       meloxicam (MOBIC) 7.5 MG tablet Take 1  tablet (7.5 mg) by mouth daily 90 tablet 3     OMEGA-3 FATTY ACIDS 1200 MG OR CAPS 1 TABLET DAILY  0     pantoprazole (PROTONIX) 20 MG EC tablet Take 1 tablet (20 mg) by mouth daily 90 tablet 3     potassium 99 MG TABS Take 1 tablet by mouth daily (with dinner)        Probiotic Product (ACIDOPHILUS PROBIOTIC BLEND) CAPS Take 1 capsule by mouth daily (with breakfast)  30 capsule 0     simvastatin (ZOCOR) 20 MG tablet TAKE ONE TABLET BY MOUTH EVERY NIGHT AT BEDTIME 90 tablet 3     VITAMIN D, CHOLECALCIFEROL, PO Take 2,000 Units by mouth every other day Every other day at Supper       Allergies   Allergen Reactions     Augmentin Diarrhea     Codeine Nausea and Vomiting     Hydrocodone      Keeps patient awake     Naproxen Itching and Rash     Seasonal Allergies      Hay fever in fall, ragweed and russian thistles     Sulfa Drugs Nausea     Recent Labs   Lab Test 08/19/21  0622 08/17/21  0645 08/16/21  1117 08/16/21  1114 02/24/21  0856 08/26/20  0933 08/26/20  0933 02/20/20  0848   A1C  --   --   --   --  6.1*  --  5.9* 5.7*   LDL  --   --   --   --  85  --  83 86   HDL  --   --   --   --  50  --  43* 42*   TRIG  --   --   --   --  110  --  133 110   ALT  --   --   --  16 18  --  18 17   CR 0.77 0.79   < > 1.12* 0.90   < > 1.06* 0.79   GFRESTIMATED 73 71   < > 47* 60*   < > 50* 71   GFRESTBLACK  --   --   --   --  70  --  58* 82   POTASSIUM  --  3.9  --  4.0 3.9  --  4.5 4.2   TSH  --   --   --   --  2.21  --   --  2.20    < > = values in this interval not displayed.     Pertinent mammograms are reviewed under the imaging tab.    Review of Systems   Constitutional: Negative for chills and fever.   HENT: Positive for hearing loss (saw an audiologist earlier this Month). Negative for congestion, ear pain and sore throat.    Eyes: Positive for visual disturbance. Negative for pain.   Respiratory: Negative for cough and shortness of breath.    Cardiovascular: Negative for chest pain, palpitations and peripheral edema.  "  Gastrointestinal: Positive for constipation (was using daily fiber prior to hospitalization ). Negative for abdominal pain, diarrhea, heartburn, hematochezia and nausea.   Breasts:  Negative for tenderness, breast mass and discharge.   Genitourinary: Positive for frequency and urgency. Negative for dysuria, genital sores, hematuria, pelvic pain, vaginal bleeding and vaginal discharge.        Follows tiffany Kellogg at [facility] and was on ANTIBIOTICS at time of hospitalization for urinary infection. 8/9 started Macrobid then changed to Levaquin for 8/12-15   Musculoskeletal: Positive for arthralgias and myalgias. Negative for joint swelling.   Skin: Negative for rash.   Neurological: Positive for weakness (more tired since recent hospitalization; slowly improving.  ). Negative for dizziness, headaches and paresthesias.   Psychiatric/Behavioral: Negative for mood changes. The patient is not nervous/anxious.          OBJECTIVE:   /58   Pulse 51   Temp 97.9  F (36.6  C) (Oral)   Resp 16   Wt 93.3 kg (205 lb 11.2 oz)   LMP  (LMP Unknown)   SpO2 99%   BMI 35.31 kg/m   Estimated body mass index is 35.31 kg/m  as calculated from the following:    Height as of 8/16/21: 1.626 m (5' 4\").    Weight as of this encounter: 93.3 kg (205 lb 11.2 oz).  Physical Exam  GENERAL: healthy, alert and no distress  RESP: lungs clear to auscultation - no rales, rhonchi or wheezes  CV: bradycardia, normal S1 S2, no S3 or S4, peripheral pulses strong and no peripheral edema  ABDOMEN: soft, nontender and bowel sounds normal   (female): no costovertebral angle tenderness   MS: no gross musculoskeletal defects noted, no edema  PSYCH: mentation appears normal, affect normal/bright    Diagnostic Test Results:  Labs reviewed in Epic    ASSESSMENT / PLAN:   (Z09) Encounter for examination following treatment at hospital  (primary encounter diagnosis)  Comment: hospitalization at [facility] 8/16-8/19/2021; records reviewed;  " Feeling weaker but day by day, slowly improving strength and stamina.   Plan:     (K56.609) SBO (small bowel obstruction) (H)  Comment: hospital records reviewd; resolved.   Plan: having stool daily; hospital team stopped her fiber;  Monitor; once advancing diet, could then add back fiber; may not need 6 per day  But 2-3 to start and will reassess at Wellness appt in October.     (E78.5) Hyperlipidemia LDL goal <130  Comment: will need refill on meds prior to next appt.   Plan: fenofibrate (TRIGLIDE/LOFIBRA) 160 MG tablet          (Z87.440) Personal history of urinary tract infection  Comment: pt follows with  [facility]- Dr. Kellogg; was on antibiotics few days for UTI before hospitalized.   Plan: UA without Microscopic - lab collect, Urine         Culture Aerobic Bacterial - lab collect          (N32.81) Overactive bladder  Comment:  pt follows with  [facility]- Dr. Kellogg;   Plan: UA without Microscopic - lab collect, Urine         Culture Aerobic Bacterial - lab collect          (N39.41) Urge incontinence   Comment:  pt follows with  [facility]- Dr. Kellogg;  She is concerned about recurrent infection; no deluna used when hospitalized but did not complete course of antibiotics.   Plan: Urine Culture Aerobic Bacterial - lab collect      Jesenia Madrigal MD  Internal Medicine   [facility]

## 2021-08-26 NOTE — PATIENT INSTRUCTIONS
Patient Education   Personalized Prevention Plan  You are due for the preventive services outlined below.  Your care team is available to assist you in scheduling these services.  If you have already completed any of these items, please share that information with your care team to update in your medical record.  Health Maintenance Due   Topic Date Due     ANNUAL REVIEW OF HM ORDERS  Never done     Eye Exam  04/24/2020     Diabetic Foot Exam  02/20/2021     A1C Lab  05/24/2021     Annual Wellness Visit  08/26/2021     FALL RISK ASSESSMENT  08/26/2021     Flu Vaccine (1) 09/01/2021

## 2021-08-29 LAB — BACTERIA UR CULT: ABNORMAL

## 2021-10-08 ENCOUNTER — HOSPITAL ENCOUNTER (OUTPATIENT)
Dept: MAMMOGRAPHY | Facility: CLINIC | Age: 81
Discharge: HOME | End: 2021-10-08
Attending: INTERNAL MEDICINE | Admitting: INTERNAL MEDICINE
Payer: COMMERCIAL

## 2021-10-08 DIAGNOSIS — Z12.31 VISIT FOR SCREENING MAMMOGRAM: ICD-10-CM

## 2021-10-08 PROCEDURE — 77063 BREAST TOMOSYNTHESIS BI: CPT

## 2021-10-20 NOTE — PATIENT INSTRUCTIONS
Patient Education   Personalized Prevention Plan  You are due for the preventive services outlined below.  Your care team is available to assist you in scheduling these services.  If you have already completed any of these items, please share that information with your care team to update in your medical record.  Health Maintenance Due   Topic Date Due     ANNUAL REVIEW OF HM ORDERS  Never done     Eye Exam  04/24/2020     Diabetic Foot Exam  02/20/2021     A1C Lab  05/24/2021        Patient Education   Personalized Prevention Plan  You are due for the preventive services outlined below.  Your care team is available to assist you in scheduling these services.  If you have already completed any of these items, please share that information with your care team to update in your medical record.  Health Maintenance Due   Topic Date Due     ANNUAL REVIEW OF HM ORDERS  Never done     Eye Exam  04/24/2020     Diabetic Foot Exam  02/20/2021     A1C Lab  05/24/2021

## 2021-10-20 NOTE — PROGRESS NOTES
"SUBJECTIVE:   Lexii Stratton is a 80 year old female who presents for Preventive Visit.    {Split Bill scripting  The purpose of this visit is to discuss your medical history and prevent health problems before you are sick. You may be responsible for a co-pay, coinsurance, or deductible if your visit today includes services such as checking on a sore throat, having an x-ray or lab test, or treating and evaluating a new or existing condition :628323}  Patient has been advised of split billing requirements and indicates understanding: {Yes and No:832248}   Are you in the first 12 months of your Medicare coverage?  { :086234::\"No\"}    HPI  Do you feel safe in your environment? { :751158}    Have you ever done Advance Care Planning? (For example, a Health Directive, POLST, or a discussion with a medical provider or your loved ones about your wishes): { :896947}    {Hearing Test Done (Optional):518695}   Fall risk  { :691349}  {If any of the above assessments are answered yes, consider ordering appropriate referrals (Optional):710866::\"click delete button to remove this line now\"}  Cognitive Screening { :630589}    {Do you have sleep apnea, excessive snoring or daytime drowsiness? (Optional):685286}    Reviewed and updated as needed this visit by clinical staff                 Reviewed and updated as needed this visit by Provider                Social History     Tobacco Use     Smoking status: Never Smoker     Smokeless tobacco: Never Used     Tobacco comment: have never smoked anything   Substance Use Topics     Alcohol use: No     Alcohol/week: 0.0 standard drinks     {Rooming Staff- Complete this question if Prescreen response is not shown below for today's visit. If you drink alcohol do you typically have >3 drinks per day or >7 drinks per week? (Optional):921427}    Alcohol Use 8/20/2021   Prescreen: >3 drinks/day or >7 drinks/week? No   Prescreen: >3 drinks/day or >7 drinks/week? -   {add AUDIT responses " "(Optional) (A score of 7 for adult men is an indication of hazardous drinking; a score of 8 or more is an indication of an alcohol use disorder.  A score of 7 or more for adult women is an indication of hazardous drinking or an alchohol use disorder):686128}    {Outside tests to abstract? :658346}    {additional problems to add (Optional):299779}    Current providers sharing in care for this patient include: {Rooming staff:  Please update Care Team in Rooming Activity, refresh this note and then delete this statement}  Patient Care Team:  Jesenia Madrigal MD as PCP - General (Internal Medicine)  Jesenia Madrigal MD as Assigned PCP  Jesenia Madrigal MD as Referring Physician (Internal Medicine)    The following health maintenance items are reviewed in Epic and correct as of today:  Health Maintenance Due   Topic Date Due     ANNUAL REVIEW OF HM ORDERS  Never done     EYE EXAM  2020     DIABETIC FOOT EXAM  2021     A1C  2021     {Chronicprobdata (optional):242784}  {Decision Support (Optional):310671}    {Mammo DS 75+ :207967}  Pertinent mammograms are reviewed under the imaging tab.    Review of Systems  {ROS COMP (Optional):886925}    OBJECTIVE:   LMP  (LMP Unknown)  Estimated body mass index is 35.31 kg/m  as calculated from the following:    Height as of 21: 1.626 m (5' 4\").    Weight as of 21: 93.3 kg (205 lb 11.2 oz).  Physical Exam  {Exam (Optional) :867212}    {Diagnostic Test Results (Optional):933042::\"Diagnostic Test Results:\",\"Labs reviewed in Epic\"}    ASSESSMENT / PLAN:   {Dia Picklist:932990}    Patient has been advised of split billing requirements and indicates understanding: {YES / NO:859799::\"Yes\"}  COUNSELING:  {Medicare Counselin}    Estimated body mass index is 35.31 kg/m  as calculated from the following:    Height as of 21: 1.626 m (5' 4\").    Weight as of 21: 93.3 kg (205 lb 11.2 oz).    {Weight Management Plan (ACO) Complete if BMI " is abnormal-  Ages 18-64  BMI >24.9.  Age 65+ with BMI <23 or >30 (Optional):441409}    She reports that she has never smoked. She has never used smokeless tobacco.      Appropriate preventive services were discussed with this patient, including applicable screening as appropriate for cardiovascular disease, diabetes, osteopenia/osteoporosis, and glaucoma.  As appropriate for age/gender, discussed screening for colorectal cancer, prostate cancer, breast cancer, and cervical cancer. Checklist reviewing preventive services available has been given to the patient.    Reviewed patients plan of care and provided an AVS. The {CarePlan:702532} for Lexii meets the Care Plan requirement. This Care Plan has been established and reviewed with the {PATIENT, FAMILY MEMBER, CAREGIVER:215823}.    Counseling Resources:  ATP IV Guidelines  Pooled Cohorts Equation Calculator  Breast Cancer Risk Calculator  Breast Cancer: Medication to Reduce Risk  FRAX Risk Assessment  ICSI Preventive Guidelines  Dietary Guidelines for Americans, 2010  USDA's MyPlate  ASA Prophylaxis  Lung CA Screening    Jesenia Madrigal MD  [facility]    Identified Health Risks:

## 2021-10-21 ENCOUNTER — OFFICE VISIT (OUTPATIENT)
Dept: FAMILY MEDICINE | Facility: CLINIC | Age: 81
End: 2021-10-21
Payer: COMMERCIAL

## 2021-10-21 VITALS
HEIGHT: 64 IN | OXYGEN SATURATION: 98 % | RESPIRATION RATE: 16 BRPM | SYSTOLIC BLOOD PRESSURE: 128 MMHG | DIASTOLIC BLOOD PRESSURE: 62 MMHG | HEART RATE: 59 BPM | TEMPERATURE: 98.2 F | WEIGHT: 201 LBS | BODY MASS INDEX: 34.31 KG/M2

## 2021-10-21 DIAGNOSIS — E66.01 MORBID OBESITY DUE TO EXCESS CALORIES (H): ICD-10-CM

## 2021-10-21 DIAGNOSIS — E11.9 TYPE 2 DIABETES MELLITUS WITHOUT COMPLICATION, WITHOUT LONG-TERM CURRENT USE OF INSULIN (H): ICD-10-CM

## 2021-10-21 DIAGNOSIS — Z00.00 ENCOUNTER FOR MEDICARE ANNUAL WELLNESS EXAM: Primary | ICD-10-CM

## 2021-10-21 DIAGNOSIS — I10 ESSENTIAL HYPERTENSION, BENIGN: ICD-10-CM

## 2021-10-21 DIAGNOSIS — E78.5 HYPERLIPIDEMIA LDL GOAL <130: ICD-10-CM

## 2021-10-21 DIAGNOSIS — E03.9 ACQUIRED HYPOTHYROIDISM: ICD-10-CM

## 2021-10-21 DIAGNOSIS — N32.81 OVERACTIVE BLADDER: ICD-10-CM

## 2021-10-21 PROCEDURE — 99397 PER PM REEVAL EST PAT 65+ YR: CPT | Performed by: INTERNAL MEDICINE

## 2021-10-21 PROCEDURE — 99214 OFFICE O/P EST MOD 30 MIN: CPT | Mod: 25 | Performed by: INTERNAL MEDICINE

## 2021-10-21 RX ORDER — DESMOPRESSIN ACETATE 0.1 MG/1
0.1 TABLET ORAL DAILY
COMMUNITY
Start: 2021-10-21 | End: 2022-02-07

## 2021-10-21 RX ORDER — AMLODIPINE BESYLATE 5 MG/1
TABLET ORAL
Qty: 90 TABLET | Refills: 3 | Status: CANCELLED | OUTPATIENT
Start: 2021-10-21

## 2021-10-21 RX ORDER — FENOFIBRATE 160 MG/1
160 TABLET ORAL DAILY
Qty: 90 TABLET | Refills: 1 | Status: SHIPPED | OUTPATIENT
Start: 2021-10-21 | End: 2022-02-07

## 2021-10-21 ASSESSMENT — PAIN SCALES - GENERAL: PAINLEVEL: NO PAIN (0)

## 2021-10-21 ASSESSMENT — MIFFLIN-ST. JEOR: SCORE: 1366.73

## 2021-10-21 NOTE — PROGRESS NOTES
"Chief Complaint   Patient presents with     Medicare Visit       SUBJECTIVE:   Lexii Stratton is a 80 year old female who presents for Preventive Visit.  Immunizations reviewed- UTD    Patient has been advised of split billing requirements and indicates understanding: Yes  Are you in the first 12 months of your Medicare Part B coverage?  No    Physical Health:    In general, how would you rate your overall physical health? good    Outside of work, how many days during the week do you exercise? none    Outside of work, approximately how many minutes a day do you exercise?not applicable    If you drink alcohol do you typically have >3 drinks per day or >7 drinks per week? No    Do you usually eat at least 4 servings of fruit and vegetables a day, include whole grains & fiber and avoid regularly eating high fat or \"junk\" foods? NO    Do you have any problems taking medications regularly?  No    Do you have any side effects from medications? none    Needs assistance for the following daily activities: no assistance needed    Which of the following safety concerns are present in your home?  none identified     Hearing impairment: Yes, Difficulty following a conversation in a noisy restaurant or crowded room.    Need to ask people to speak up or repeat themselves.    In the past 6 months, have you been bothered by leaking of urine? yes    Mental Health:    In general, how would you rate your overall mental or emotional health? good  PHQ-2 Score:      Do you feel safe in your environment? Yes    Have you ever done Advance Care Planning? (For example, a Health Directive, POLST, or a discussion with a medical provider or your loved ones about your wishes): Yes, advance care planning is on file.    Additional concerns to address?  YES - follow up to previous visit(Hospital stay). Small bowl obstruction.    Fall risk:  Fallen 2 or more times in the past year?: No  Any fall with injury in the past year?: No    Cognitive " Screenin) Repeat 3 items (Leader, Season, Table)    2) Clock draw: NORMAL  3) 3 item recall: Recalls 3 objects  Results: 3 items recalled: COGNITIVE IMPAIRMENT LESS LIKELY    Mini-CogTM Copyright LUCIE Velasquez. Licensed by the author for use in [facility]; reprinted with permission (minerva@[facility]). All rights reserved.      Do you have sleep apnea, excessive snoring or daytime drowsiness?: no            Reviewed and updated as needed this visit by clinical staff  Tobacco  Allergies  Meds   Med Hx  Surg Hx  Fam Hx  Soc Hx        Reviewed and updated as needed this visit by Provider  Tobacco  Allergies  Meds  Problems  Med Hx  Surg Hx  Fam Hx         Social History     Tobacco Use     Smoking status: Never Smoker     Smokeless tobacco: Never Used     Tobacco comment: have never smoked anything   Substance Use Topics     Alcohol use: No     Alcohol/week: 0.0 standard drinks                           Current providers sharing in care for this patient include:   Patient Care Team:  Jesenia Madrigal MD as PCP - General (Internal Medicine)  Jesenia Madrigal MD as Assigned PCP  Jesenia Madrigal MD as Referring Physician (Internal Medicine)    The following health maintenance items are reviewed in Epic and correct as of today:  Health Maintenance   Topic Date Due     ANNUAL REVIEW OF HM ORDERS  Never done     EYE EXAM  2020     DIABETIC FOOT EXAM  2021     A1C  2021     LIPID  2022     MICROALBUMIN  2022     TSH W/FREE T4 REFLEX  2022     BMP  2022     FALL RISK ASSESSMENT  2022     MAMMO SCREENING  10/08/2022     MEDICARE ANNUAL WELLNESS VISIT  10/21/2022     DTAP/TDAP/TD IMMUNIZATION (3 - Td or Tdap) 2023     ADVANCE CARE PLANNING  2026     DEXA  2027     COLORECTAL CANCER SCREENING  2028     PHQ-2  Completed     INFLUENZA VACCINE  Completed     Pneumococcal Vaccine: 65+ Years  Completed     ZOSTER IMMUNIZATION   Completed     COVID-19 Vaccine  Completed     IPV IMMUNIZATION  Aged Out     MENINGITIS IMMUNIZATION  Aged Out     Labs reviewed in EPIC  BP Readings from Last 3 Encounters:   10/21/21 128/62   08/26/21 122/58   08/19/21 133/53    Wt Readings from Last 3 Encounters:   10/21/21 91.2 kg (201 lb)   08/26/21 93.3 kg (205 lb 11.2 oz)   08/19/21 93.8 kg (206 lb 11.2 oz)                  Patient Active Problem List   Diagnosis     Generalized osteoarthrosis, unspecified site     Esophageal reflux     Obesity     Other symptoms involving urinary system     Acquired hypothyroidism     Hypersomnia with sleep apnea     Essential hypertension, benign     Impaired fasting glucose     Hyperlipidemia LDL goal <130     Advance Care Planning     Chest pain syndrome     Partial small bowel obstruction (H)     Anemia, unspecified     Poor iron absorption     Iron and its compounds causing adverse effect in therapeutic use(E934.0)     S/P total hip arthroplasty     Overactive bladder     Morbid obesity due to excess calories (H)     GI bleed     Elevated hemoglobin A1c     Pelvic floor dysfunction     Diabetes mellitus, type 2 (H)     SBO (small bowel obstruction) (H)     Past Surgical History:   Procedure Laterality Date     ABDOMEN SURGERY       ARTHROPLASTY HIP Right 11/9/2016    Procedure: ARTHROPLASTY HIP;  Surgeon: Angel Lund MD;  Location:  OR     AS REPAIR INCISIONAL HERNIA,REDUCIBLE  1998    inc hernia ( [place] surgery)     BIOPSY       BLADDER SURGERY      hyperdistention surgery and sling     BREAST SURGERY       CHOLECYSTECTOMY  1987     COLONOSCOPY  12/3/2011    Procedure:COLONOSCOPY; COLONOSCOPY; Surgeon:SEMAJ GRAHAM; Location: GI     COLONOSCOPY N/A 3/29/2018    Procedure: COLONOSCOPY;  COLONOSCOPY Rm 544;  Surgeon: Marco Gusman MD;  Location:  GI     CYSTOSCOPY N/A 9/6/2017    Procedure: CYSTOSCOPY;  CYSTOSCOPY AND HYDRODISTENTION ;  Surgeon: Eyal Bai MD;  Location:  OR     ENT  SURGERY      Tonsillectomy     ESOPHAGOSCOPY, GASTROSCOPY, DUODENOSCOPY (EGD), COMBINED N/A 9/26/2016    Procedure: COMBINED ESOPHAGOSCOPY, GASTROSCOPY, DUODENOSCOPY (EGD), BIOPSY SINGLE OR MULTIPLE;  Surgeon: Marco Monaco MD;  Location:  GI     EYE SURGERY Left 05/2019     GENITOURINARY SURGERY       GYN SURGERY      Hysterectomy     HERNIA REPAIR, UMBILICAL  7/8/2010    Dr. Kirt Franco times 3     IMPLANT STIMULATOR SACRAL NERVE STAGE ONE N/A 9/17/2020    Procedure: sacral neurostimulation stage one implant of neurostimulator lead;  Surgeon: Shahnaz Carbone MD;  Location:  OR     IMPLANT STIMULATOR SACRAL NERVE STAGE TWO Right 9/24/2020    Procedure: Removal of interstem lead, NOT implanting neurostimulator;  Surgeon: Shahnaz Carbone MD;  Location:  OR     ORTHOPEDIC SURGERY  2009    [facility] - Dr. Lund- bilateral knee replacement     ZZC NONSPECIFIC PROCEDURE  1946    T&A     ZZC NONSPECIFIC PROCEDURE  1984    Vaginal Hysterectomy (has her ovaries)     ZZC NONSPECIFIC PROCEDURE  1973    PPTL     ZZC NONSPECIFIC PROCEDURE  1994    L shoulder to remove bone spurs     ZZC NONSPECIFIC PROCEDURE  2004    cysto and durasphere Dr Demarco     ZZC NONSPECIFIC PROCEDURE  2005    cysto and durasphere     ZZC NONSPECIFIC PROCEDURE  2007    cysto and durasphere     ZZC NONSPECIFIC PROCEDURE  2009    retropubic TVT sling       Social History     Tobacco Use     Smoking status: Never Smoker     Smokeless tobacco: Never Used     Tobacco comment: have never smoked anything   Substance Use Topics     Alcohol use: No     Alcohol/week: 0.0 standard drinks     Family History   Problem Relation Age of Onset     Heart Disease Mother         heart surgery , new valve     Gallbladder Disease Mother      Coronary Artery Disease Mother      Hyperlipidemia Mother      Asthma Mother      Hypertension Mother      Anesthesia Reaction Mother      Heart Disease Maternal Grandmother      Coronary Artery Disease  Maternal Grandmother      Heart Disease Maternal Grandfather      Coronary Artery Disease Maternal Grandfather      Cancer Father         throat ca,  76     Coronary Artery Disease Father      Other Cancer Father      Diabetes Brother         Half Brother     Cardiovascular Brother         lung ca, Half brother     Cancer Brother         half brother  lung          Current Outpatient Medications   Medication Sig Dispense Refill     acetaminophen (TYLENOL) 500 MG tablet Take 2 tablets (1,000 mg) by mouth 2 times daily as needed for mild pain 100 tablet 0     acetaminophen (TYLENOL) 650 MG CR tablet Take 1 tablet (650 mg) by mouth every 6 hours as needed for mild pain or fever       amLODIPine (NORVASC) 5 MG tablet TAKE ONE TABLET BY MOUTH EVERY NIGHT AT BEDTIME 90 tablet 3     Biotin 5000 MCG PO TABS Take 1 tablet by mouth daily       calcium carbonate (OS-CINDY) 500 MG tablet Take 1 tablet by mouth Every other day with at lunch.       CENTRUM SILVER OR TABS 1 TABLET DAILY 30 0     Cranberry 500 MG CAPS Take 500 mg by mouth daily       docusate sodium (COLACE) 100 MG capsule Take 100 mg by mouth Every other day in the morning and every night in the evening.       fenofibrate (TRIGLIDE/LOFIBRA) 160 MG tablet Take 1 tablet (160 mg) by mouth daily 90 tablet 0     ferrous gluconate (FERGON) 324 (38 FE) MG tablet Take 1 tablet (324 mg) by mouth 2 times daily 100 tablet 0     latanoprost (XALATAN) 0.005 % ophthalmic solution Place 1 drop into both eyes At Bedtime        levothyroxine (SYNTHROID/LEVOTHROID) 50 MCG tablet Take 1 tablet (50 mcg) by mouth daily 90 tablet 3     lisinopril (ZESTRIL) 30 MG tablet TAKE ONE TABLET BY MOUTH EVERY DAY FOR HYPERTENSION 90 tablet 3     magnesium 250 MG tablet Take 1 tablet by mouth daily       meloxicam (MOBIC) 7.5 MG tablet Take 1 tablet (7.5 mg) by mouth daily 90 tablet 3     OMEGA-3 FATTY ACIDS 1200 MG OR CAPS 1 TABLET DAILY  0     pantoprazole (PROTONIX) 20 MG EC tablet Take 1  tablet (20 mg) by mouth daily 90 tablet 3     potassium 99 MG TABS Take 1 tablet by mouth daily (with dinner)        Probiotic Product (ACIDOPHILUS PROBIOTIC BLEND) CAPS Take 1 capsule by mouth daily (with breakfast)  30 capsule 0     simvastatin (ZOCOR) 20 MG tablet TAKE ONE TABLET BY MOUTH EVERY NIGHT AT BEDTIME 90 tablet 3     VITAMIN D, CHOLECALCIFEROL, PO Take 2,000 Units by mouth every other day Every other day at Supper       Allergies   Allergen Reactions     Augmentin Diarrhea     Codeine Nausea and Vomiting     Hydrocodone      Keeps patient awake     Naproxen Itching and Rash     Seasonal Allergies      Hay fever in fall, ragweed and russian thistles     Sulfa Drugs Nausea     Recent Labs   Lab Test 08/19/21  0622 08/17/21  0645 08/16/21  1117 08/16/21  1114 02/24/21  0856 08/26/20  0933 08/26/20  0933 02/20/20  0848 02/20/20  0848   A1C  --   --   --   --  6.1*  --  5.9*  --  5.7*   LDL  --   --   --   --  85  --  83  --  86   HDL  --   --   --   --  50  --  43*  --  42*   TRIG  --   --   --   --  110  --  133  --  110   ALT  --   --   --  16 18  --  18   < > 17   CR 0.77 0.79   < > 1.12* 0.90   < > 1.06*   < > 0.79   GFRESTIMATED 73 71   < > 47* 60*   < > 50*   < > 71   GFRESTBLACK  --   --   --   --  70  --  58*   < > 82   POTASSIUM  --  3.9  --  4.0 3.9   < > 4.5   < > 4.2   TSH  --   --   --   --  2.21  --   --   --  2.20    < > = values in this interval not displayed.        ROS:  CONSTITUTIONAL: NEGATIVE for fever, chills, change in weight  ENT/MOUTH: 9/20/2021- hearing aids, rechargeable type; [facility]- at [facility].  NEGATIVE for ear, mouth and throat problems  RESP: NEGATIVE for significant cough or SOB  CV: NEGATIVE for chest pain, palpitations or peripheral edema  GI: SBO and hospitalized in August; since last visit ( Hosp follow up) she has  Had 2 episodes that she felt it may be recurring; bowel rest and it resolved after a bit.   MUSCULOSKELETAL: NEGATIVE for  "significant arthralgias or myalgia  NEURO: NEGATIVE for weakness, dizziness or paresthesias  : longstanding bladder; DESMOPRESSIN was recently added by [facility]   ENDOCRINE: needs Fenofibrate refilled;   Lab Results   Component Value Date    LDL 85 02/24/2021    LDL 83 08/26/2020     HEME/ALLERGY/IMMUNE: NEGATIVE for bleeding problems  PSYCHIATRIC: NEGATIVE for changes in mood or affect    Eye exam - up to date- [facility] - Dr. Virgen    OBJECTIVE:   /62 (BP Location: Right arm, Patient Position: Sitting, Cuff Size: Adult Regular)   Pulse 59   Temp 98.2  F (36.8  C) (Oral)   Resp 16   Ht 1.626 m (5' 4\")   Wt 91.2 kg (201 lb)   LMP  (LMP Unknown)   SpO2 98%   BMI 34.50 kg/m   Estimated body mass index is 34.5 kg/m  as calculated from the following:    Height as of this encounter: 1.626 m (5' 4\").    Weight as of this encounter: 91.2 kg (201 lb).  EXAM:   GENERAL: healthy, alert and no distress  EYES: Eyes grossly normal to inspection  HENT: normal cephalic/atraumatic, nose and mouth without ulcers or lesions, oropharynx clear, oral mucous membranes moist and bilateral hearing aids ( new)  NECK: no adenopathy, no asymmetry, masses, or scars and thyroid normal to palpation  RESP: lungs clear to auscultation - no rales, rhonchi or wheezes  CV: regular rates and rhythm, normal S1 S2, no S3 or S4, peripheral pulses strong and no peripheral edema  ABDOMEN: soft, nontender, without hepatosplenomegaly or masses and bowel sounds normal  MS: no gross musculoskeletal defects noted, no edema  NEURO: Normal strength and tone, mentation intact and speech normal  PSYCH: mentation appears normal, affect normal/bright  Diabetic foot exam: normal DP and PT pulses and normal sensory exam    Diagnostic Test Results:  Labs reviewed in Epic        ASSESSMENT / PLAN:   (Z00.00) Encounter for Medicare annual wellness exam  (primary encounter diagnosis)  Comment: HEALTH CARE MAINTENANCE reviewed  Plan: " "immunizations reviewed    (I10) Essential hypertension, benign  Comment: BLOOD PRESSURE well controlled; medications reviewed and up to date  Plan: Albumin Random Urine Quantitative with Creat         Ratio          (E78.5) Hyperlipidemia LDL goal <130  Comment: medications reviewed; continue statin and fibrate meds.  Plan: fenofibrate (TRIGLIDE/LOFIBRA) 160 MG tablet,         Lipid panel reflex to direct LDL Fasting,         Comprehensive metabolic panel          (N32.81) Overactive bladder  Comment: placed on list as historic medication; defer to Urology   Plan: desmopressin (DDAVP) 0.1 MG tablet          (E11.9) Type 2 diabetes mellitus without complication, without long-term current use of insulin (H)  Comment:   Lab Results   Component Value Date    A1C 6.1 02/24/2021    A1C 5.9 08/26/2020   Keep active, exercise regularly   Due for labs and reevaluation in 2/2022  Plan: FOOT EXAM, Hemoglobin A1c, Albumin Random Urine        Quantitative with Creat Ratio          (E66.01) Morbid obesity due to excess calories (H)  Comment: Body mass index is 34.5 kg/m . reviewed; goal is to get BMI <30  Plan: healthy level of exercise and heathy diet.    (E03.9) Acquired hypothyroidism  Comment: clinically euthyroid  Lab Results   Component Value Date    TSH 2.21 02/24/2021    TSH 2.20 02/20/2020     Plan: TSH with free T4 reflex            Patient has been advised of split billing requirements and indicates understanding: Yes    COUNSELING:  Reviewed preventive health counseling, as reflected in patient instructions       Regular exercise       Healthy diet/nutrition       Bladder control    Estimated body mass index is 34.5 kg/m  as calculated from the following:    Height as of this encounter: 1.626 m (5' 4\").    Weight as of this encounter: 91.2 kg (201 lb).    Weight management plan: Discussed healthy diet and exercise guidelines    She reports that she has never smoked. She has never used smokeless tobacco.    Appropriate " preventive services were discussed with this patient, including applicable screening as appropriate for cardiovascular disease, diabetes, osteopenia/osteoporosis, and glaucoma.  As appropriate for age/gender, discussed screening for colorectal cancer, prostate cancer, breast cancer, and cervical cancer. Checklist reviewing preventive services available has been given to the patient.    Reviewed patients plan of care and provided an AVS. The Complex Care Plan (for patients with higher acuity and needing more deliberate coordination of services) for Lexii meets the Care Plan requirement. This Care Plan has been established and reviewed with the Patient.    Counseling Resources:  ATP IV Guidelines  Pooled Cohorts Equation Calculator  Breast Cancer Risk Calculator  BRCA-Related Cancer Risk Assessment: FHS-7 Tool  FRAX Risk Assessment  ICSI Preventive Guidelines  Dietary Guidelines for Americans, 2010  USDA's MyPlate  ASA Prophylaxis  Lung CA Screening    Jesenia Madrigal MD  Internal Medicine   [facility]    20 minutes in addition to HEALTH CARE MAINTENANCE are spent with patient, over 50% of that time spent providing counselling, discussing and reviewing medical conditions/concerns, meds and potential side effects.

## 2021-10-23 ENCOUNTER — HEALTH MAINTENANCE LETTER (OUTPATIENT)
Age: 81
End: 2021-10-23

## 2022-02-01 ENCOUNTER — LAB (OUTPATIENT)
Dept: LAB | Facility: CLINIC | Age: 82
End: 2022-02-01
Payer: COMMERCIAL

## 2022-02-01 ENCOUNTER — TRANSFERRED RECORDS (OUTPATIENT)
Dept: HEALTH INFORMATION MANAGEMENT | Facility: CLINIC | Age: 82
End: 2022-02-01

## 2022-02-01 DIAGNOSIS — I10 ESSENTIAL HYPERTENSION, BENIGN: ICD-10-CM

## 2022-02-01 DIAGNOSIS — E78.5 HYPERLIPIDEMIA LDL GOAL <130: ICD-10-CM

## 2022-02-01 DIAGNOSIS — E11.9 TYPE 2 DIABETES MELLITUS WITHOUT COMPLICATION, WITHOUT LONG-TERM CURRENT USE OF INSULIN (H): ICD-10-CM

## 2022-02-01 DIAGNOSIS — E03.9 ACQUIRED HYPOTHYROIDISM: ICD-10-CM

## 2022-02-01 LAB
ALBUMIN SERPL-MCNC: 3.3 G/DL (ref 3.4–5)
ALP SERPL-CCNC: 65 U/L (ref 40–150)
ALT SERPL W P-5'-P-CCNC: 18 U/L (ref 0–50)
ANION GAP SERPL CALCULATED.3IONS-SCNC: 5 MMOL/L (ref 3–14)
AST SERPL W P-5'-P-CCNC: 21 U/L (ref 0–45)
BILIRUB SERPL-MCNC: 0.6 MG/DL (ref 0.2–1.3)
BUN SERPL-MCNC: 19 MG/DL (ref 7–30)
CALCIUM SERPL-MCNC: 9.7 MG/DL (ref 8.5–10.1)
CHLORIDE BLD-SCNC: 106 MMOL/L (ref 94–109)
CHOLEST SERPL-MCNC: 149 MG/DL
CO2 SERPL-SCNC: 28 MMOL/L (ref 20–32)
CREAT SERPL-MCNC: 1 MG/DL (ref 0.52–1.04)
CREAT UR-MCNC: 104 MG/DL
FASTING STATUS PATIENT QL REPORTED: YES
GFR SERPL CREATININE-BSD FRML MDRD: 56 ML/MIN/1.73M2
GLUCOSE BLD-MCNC: 102 MG/DL (ref 70–99)
HBA1C MFR BLD: 5.6 % (ref 0–5.6)
HDLC SERPL-MCNC: 50 MG/DL
LDLC SERPL CALC-MCNC: 73 MG/DL
MICROALBUMIN UR-MCNC: 23 MG/L
MICROALBUMIN/CREAT UR: 22.12 MG/G CR (ref 0–25)
NONHDLC SERPL-MCNC: 99 MG/DL
POTASSIUM BLD-SCNC: 4.1 MMOL/L (ref 3.4–5.3)
PROT SERPL-MCNC: 6.5 G/DL (ref 6.8–8.8)
RETINOPATHY: NORMAL
SODIUM SERPL-SCNC: 139 MMOL/L (ref 133–144)
TRIGL SERPL-MCNC: 128 MG/DL
TSH SERPL DL<=0.005 MIU/L-ACNC: 2.56 MU/L (ref 0.4–4)

## 2022-02-01 PROCEDURE — 80053 COMPREHEN METABOLIC PANEL: CPT

## 2022-02-01 PROCEDURE — 83036 HEMOGLOBIN GLYCOSYLATED A1C: CPT

## 2022-02-01 PROCEDURE — 82043 UR ALBUMIN QUANTITATIVE: CPT

## 2022-02-01 PROCEDURE — 80061 LIPID PANEL: CPT

## 2022-02-01 PROCEDURE — 84443 ASSAY THYROID STIM HORMONE: CPT

## 2022-02-01 PROCEDURE — 36415 COLL VENOUS BLD VENIPUNCTURE: CPT

## 2022-02-07 ENCOUNTER — OFFICE VISIT (OUTPATIENT)
Dept: FAMILY MEDICINE | Facility: CLINIC | Age: 82
End: 2022-02-07
Payer: COMMERCIAL

## 2022-02-07 VITALS
TEMPERATURE: 97.7 F | DIASTOLIC BLOOD PRESSURE: 64 MMHG | BODY MASS INDEX: 33.05 KG/M2 | HEART RATE: 54 BPM | WEIGHT: 193.6 LBS | SYSTOLIC BLOOD PRESSURE: 120 MMHG | HEIGHT: 64 IN | OXYGEN SATURATION: 100 % | RESPIRATION RATE: 16 BRPM

## 2022-02-07 DIAGNOSIS — E11.9 TYPE 2 DIABETES MELLITUS WITHOUT COMPLICATION, WITHOUT LONG-TERM CURRENT USE OF INSULIN (H): Primary | ICD-10-CM

## 2022-02-07 DIAGNOSIS — N18.31 CHRONIC KIDNEY DISEASE, STAGE 3A (H): ICD-10-CM

## 2022-02-07 DIAGNOSIS — K21.9 GASTROESOPHAGEAL REFLUX DISEASE WITHOUT ESOPHAGITIS: ICD-10-CM

## 2022-02-07 DIAGNOSIS — E66.01 MORBID OBESITY DUE TO EXCESS CALORIES (H): ICD-10-CM
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
## 2022-02-07 DIAGNOSIS — M15.0 PRIMARY OSTEOARTHRITIS INVOLVING MULTIPLE JOINTS: ICD-10-CM

## 2022-02-07 DIAGNOSIS — E78.5 HYPERLIPIDEMIA LDL GOAL <130: ICD-10-CM

## 2022-02-07 DIAGNOSIS — E03.9 ACQUIRED HYPOTHYROIDISM: ICD-10-CM

## 2022-02-07 DIAGNOSIS — I10 ESSENTIAL HYPERTENSION, BENIGN: ICD-10-CM

## 2022-02-07 DIAGNOSIS — K56.609 SBO (SMALL BOWEL OBSTRUCTION) (H): ICD-10-CM

## 2022-02-07 PROCEDURE — 99215 OFFICE O/P EST HI 40 MIN: CPT | Performed by: INTERNAL MEDICINE

## 2022-02-07 RX ORDER — LEVOTHYROXINE SODIUM 50 UG/1
50 TABLET ORAL DAILY
Qty: 90 TABLET | Refills: 3 | Status: SHIPPED | OUTPATIENT
Start: 2022-02-07 | End: 2022-11-21

## 2022-02-07 RX ORDER — MELOXICAM 7.5 MG/1
7.5 TABLET ORAL DAILY
Qty: 90 TABLET | Refills: 3 | Status: SHIPPED | OUTPATIENT
Start: 2022-02-07 | End: 2022-11-21

## 2022-02-07 RX ORDER — FENOFIBRATE 160 MG/1
160 TABLET ORAL DAILY
Qty: 90 TABLET | Refills: 3 | Status: SHIPPED | OUTPATIENT
Start: 2022-02-07 | End: 2022-09-15

## 2022-02-07 RX ORDER — PANTOPRAZOLE SODIUM 20 MG/1
20 TABLET, DELAYED RELEASE ORAL DAILY
Qty: 90 TABLET | Refills: 3 | Status: SHIPPED | OUTPATIENT
Start: 2022-02-07 | End: 2022-11-21

## 2022-02-07 RX ORDER — AMLODIPINE BESYLATE 5 MG/1
TABLET ORAL
Qty: 90 TABLET | Refills: 3 | Status: SHIPPED | OUTPATIENT
Start: 2022-02-07 | End: 2022-11-21

## 2022-02-07 RX ORDER — SIMVASTATIN 20 MG
TABLET ORAL
Qty: 90 TABLET | Refills: 3 | Status: SHIPPED | OUTPATIENT
Start: 2022-02-07 | End: 2022-11-21

## 2022-02-07 RX ORDER — LISINOPRIL 30 MG/1
TABLET ORAL
Qty: 90 TABLET | Refills: 3 | Status: ON HOLD | OUTPATIENT
Start: 2022-02-07 | End: 2022-07-07

## 2022-02-07 ASSESSMENT — MIFFLIN-ST. JEOR: SCORE: 1320.22

## 2022-02-07 NOTE — PROGRESS NOTES
Assessment & Plan     (E11.9) Type 2 diabetes mellitus without complication, without long-term current use of insulin (H)  (primary encounter diagnosis)  Comment: diabetes reviewed ; well controlled;   Lab Results   Component Value Date    A1C 5.6 02/01/2022    A1C 6.1 02/24/2021    A1C 5.9 08/26/2020        Plan: Hemoglobin A1c          (I10) Essential hypertension, benign  Comment: BLOOD PRESSURE well controlled; medications reviewed; well controlled; meds well tolerated.  Plan: amLODIPine (NORVASC) 5 MG tablet, lisinopril         (ZESTRIL) 30 MG tablet, Comprehensive metabolic        panel          (E78.5) Hyperlipidemia LDL goal <130  Comment: reviewed labs; goals of treatment reviewed  Plan: fenofibrate (TRIGLIDE/LOFIBRA) 160 MG tablet,         simvastatin (ZOCOR) 20 MG tablet, Lipid panel         reflex to direct LDL Fasting, Comprehensive         metabolic panel           (E03.9) Acquired hypothyroidism  Comment: low dose thyroid replacement; labs reviewed. no change in therapy  Plan: levothyroxine (SYNTHROID/LEVOTHROID) 50 MCG tablet          (M89.49) Primary osteoarthritis involving multiple joints  Comment: back primarily; warm water exercise pool ( [employer])- on hold due to pandemic.  Plan: meloxicam (MOBIC) 7.5 MG tablet, Comprehensive         metabolic panel          (K21.9) Gastroesophageal reflux disease without esophagitis  Comment: PPI use reviewed.  Pt reminded of benefits of maximizing calcium and vit D to help with bone health (due to being on PPI)   Plan: pantoprazole (PROTONIX) 20 MG EC tablet;           (K56.609) SBO (small bowel obstruction) (H)  Comment: intermittent and recurrent; several abd surgeries; hosp with NG 8/16/21 and 12/24/14; colonoscopy 3/29/2018  Plan: when symptoms develop, best to revert to soft foods;     (N18.31) Chronic kidney disease, stage 3a (H)  Comment: reviewed renal function  Plan: Comprehensive metabolic panel        Adjust meds if  warranted.    (W19.XXXA) Fall, initial encounter  Comment: fall at home 1/24/2022, new shoes catch on floor and lost balance; left hip bruis, no head injury. hx B-TKA and able to get self up. noprodrome prior.  Plan: keep active;     (E66.01) Morbid obesity due to excess calories (H)  Comment: Body mass index is 33.76 kg/m .   Plan: keep active; monitor diet.    Prescription drug management  42 minutes spent on the date of the encounter doing chart review, history and exam, documentation and further activities per the note       MEDICATIONS:   Orders Placed This Encounter   Medications     amLODIPine (NORVASC) 5 MG tablet     Sig: TAKE ONE TABLET BY MOUTH EVERY NIGHT AT BEDTIME     Dispense:  90 tablet     Refill:  3     fenofibrate (TRIGLIDE/LOFIBRA) 160 MG tablet     Sig: Take 1 tablet (160 mg) by mouth daily     Dispense:  90 tablet     Refill:  3     levothyroxine (SYNTHROID/LEVOTHROID) 50 MCG tablet     Sig: Take 1 tablet (50 mcg) by mouth daily     Dispense:  90 tablet     Refill:  3     lisinopril (ZESTRIL) 30 MG tablet     Sig: TAKE ONE TABLET BY MOUTH EVERY DAY FOR HYPERTENSION     Dispense:  90 tablet     Refill:  3     meloxicam (MOBIC) 7.5 MG tablet     Sig: Take 1 tablet (7.5 mg) by mouth daily     Dispense:  90 tablet     Refill:  3     pantoprazole (PROTONIX) 20 MG EC tablet     Sig: Take 1 tablet (20 mg) by mouth daily     Dispense:  90 tablet     Refill:  3     simvastatin (ZOCOR) 20 MG tablet     Sig: TAKE ONE TABLET BY MOUTH EVERY NIGHT AT BEDTIME     Dispense:  90 tablet     Refill:  3          - Continue other medications without change    Return in about 8 months (around 10/21/2022) for Wellness visit, Diabetes, Hypertension, cholesterol.    Jesenia Madrigal MD  Internal Medicine   [facility] DAVISMOJACQUES Shultz is a 81 year old who presents for the following health issues     HPI     Diabetes Follow-up      How often are you checking your blood sugar?  Not at all    What concerns do you have today about your diabetes? None     Do you have any of these symptoms? (Select all that apply)  No numbness or tingling in feet.  No redness, sores or blisters on feet.  No complaints of excessive thirst.  No reports of blurry vision.  No significant changes to weight.  Have you had a diabetic eye exam in the last 12 months? Yes- Date of last eye exam: 2/1/2022,  Location: [facility]    BP Readings from Last 1 Encounters:   02/07/22 120/64       BP     120/64  2/7/2022    Lab Results   Component Value Date     02/01/2022     08/17/2021     08/17/2021     08/17/2021     02/24/2021     08/26/2020     Lab Results   Component Value Date    A1C 5.6 02/01/2022    A1C 6.1 02/24/2021    A1C 5.9 08/26/2020     Lab Results   Component Value Date    LDL 73 02/01/2022    LDL 85 02/24/2021    LDL 83 08/26/2020     Lab Results   Component Value Date    MICROL 23 02/01/2022    MICROL <5 02/24/2021     No results found for: MICROALBUMIN      Hyperlipidemia Follow-Up      Are you regularly taking any medication or supplement to lower your cholesterol?   Yes- simvastatin    Are you having muscle aches or other side effects that you think could be caused by your cholesterol lowering medication?  No    Hypertension Follow-up      Do you check your blood pressure regularly outside of the clinic? Yes     Are you following a low salt diet? Yes    Are your blood pressures ever more than 140 on the top number (systolic) OR more   than 90 on the bottom number (diastolic), for example 140/90? No    BP Readings from Last 2 Encounters:   02/07/22 120/64   10/21/21 128/62     Hemoglobin A1C POCT (%)   Date Value   02/24/2021 6.1 (H)   08/26/2020 5.9 (H)     Hemoglobin A1C (%)   Date Value   02/01/2022 5.6     LDL Cholesterol Calculated (mg/dL)   Date Value   02/01/2022 73   02/24/2021 85   08/26/2020 83       Hypothyroidism Follow-up      Since last  "visit, patient describes the following symptoms: Weight stable, no hair loss, no skin changes, no constipation, no loose stools  Lab Results   Component Value Date    TSH 2.56 02/01/2022    TSH 2.21 02/24/2021    TSH 2.20 02/20/2020              How many servings of fruits and vegetables do you eat daily?  0-1    On average, how many sweetened beverages do you drink each day (Examples: soda, juice, sweet tea, etc.  Do NOT count diet or artificially sweetened beverages)?   0    How many days per week do you exercise enough to make your heart beat faster? 3 or less    How many minutes a day do you exercise enough to make your heart beat faster? 10 - 19    How many days per week do you miss taking your medication? 0        Review of Systems   CONSTITUTIONAL: NEGATIVE for fever, chills, change in weight  ENT/MOUTH: NEGATIVE for ear, mouth and throat problems  RESP: NEGATIVE for significant cough or SOB  CV: NEGATIVE for chest pain, palpitations or peripheral edema  GI: recurrent SBO, hospitalized 8/2021, bouts every few weeks; limits PO intake , diarrhea; resolves within 6-8 hours. Intentionally monitoring intake with weight loss. .   : no longer seeing urology specialty; \"doing just as well\"  MUSCULOSKELETAL: arthritis; getting around well; no assist devise. Unable to resume warm water exercises due to pandemic.  NEURO: NEGATIVE for weakness, dizziness or paresthesias  ENDOCRINE: no DM meds; A1C much improved since weight loss and adjusting diet; thyroid OK  HEME/ALLERGY/IMMUNE: NEGATIVE for bleeding problems  PSYCHIATRIC: NEGATIVE for changes in mood or affect      Objective    /64   Pulse 54   Temp 97.7  F (36.5  C) (Oral)   Resp 16   Ht 1.613 m (5' 3.5\")   Wt 87.8 kg (193 lb 9.6 oz)   LMP  (LMP Unknown)   SpO2 100%   BMI 33.76 kg/m    Body mass index is 33.76 kg/m .  Physical Exam   GENERAL: healthy, alert and no distress  EYES: Eyes grossly normal to inspection  NECK: no adenopathy, no asymmetry, " masses, or scars and thyroid normal to palpation  RESP: lungs clear to auscultation - no rales, rhonchi or wheezes  CV: regular rates and rhythm, normal S1 S2, no S3 or S4, peripheral pulses strong and no peripheral edema  ABDOMEN: soft, nontender, no hepatosplenomegaly, no masses and bowel sounds normal  MS: no gross musculoskeletal defects noted, no edema  NEURO: Normal strength and tone, mentation intact and speech normal  PSYCH: mentation appears normal, affect normal/bright

## 2022-04-01 ENCOUNTER — TRANSFERRED RECORDS (OUTPATIENT)
Dept: HEALTH INFORMATION MANAGEMENT | Facility: CLINIC | Age: 82
End: 2022-04-01
Payer: COMMERCIAL

## 2022-06-04 ENCOUNTER — HEALTH MAINTENANCE LETTER (OUTPATIENT)
Age: 82
End: 2022-06-04

## 2022-07-03 ENCOUNTER — APPOINTMENT (OUTPATIENT)
Dept: CT IMAGING | Facility: CLINIC | Age: 82
DRG: 390 | End: 2022-07-03
Attending: EMERGENCY MEDICINE
Payer: COMMERCIAL

## 2022-07-03 ENCOUNTER — HOSPITAL ENCOUNTER (INPATIENT)
Facility: CLINIC | Age: 82
LOS: 4 days | Discharge: HOME | DRG: 390 | End: 2022-07-07
Attending: EMERGENCY MEDICINE | Admitting: INTERNAL MEDICINE
Payer: COMMERCIAL

## 2022-07-03 DIAGNOSIS — I10 ESSENTIAL HYPERTENSION, BENIGN: ICD-10-CM

## 2022-07-03 DIAGNOSIS — K56.609 SMALL BOWEL OBSTRUCTION (H): ICD-10-CM

## 2022-07-03 LAB
ALBUMIN SERPL-MCNC: 3.4 G/DL (ref 3.4–5)
ALBUMIN UR-MCNC: NEGATIVE MG/DL
ALP SERPL-CCNC: 69 U/L (ref 40–150)
ALT SERPL W P-5'-P-CCNC: 17 U/L (ref 0–50)
ANION GAP SERPL CALCULATED.3IONS-SCNC: 10 MMOL/L (ref 3–14)
APPEARANCE UR: CLEAR
AST SERPL W P-5'-P-CCNC: 21 U/L (ref 0–45)
BACTERIA #/AREA URNS HPF: ABNORMAL /HPF
BASOPHILS # BLD AUTO: 0 10E3/UL (ref 0–0.2)
BASOPHILS NFR BLD AUTO: 1 %
BILIRUB SERPL-MCNC: 0.7 MG/DL (ref 0.2–1.3)
BILIRUB UR QL STRIP: NEGATIVE
BUN SERPL-MCNC: 20 MG/DL (ref 7–30)
CALCIUM SERPL-MCNC: 9.7 MG/DL (ref 8.5–10.1)
CHLORIDE BLD-SCNC: 106 MMOL/L (ref 94–109)
CO2 SERPL-SCNC: 27 MMOL/L (ref 20–32)
COLOR UR AUTO: ABNORMAL
CREAT SERPL-MCNC: 0.94 MG/DL (ref 0.52–1.04)
EOSINOPHIL # BLD AUTO: 0.1 10E3/UL (ref 0–0.7)
EOSINOPHIL NFR BLD AUTO: 1 %
ERYTHROCYTE [DISTWIDTH] IN BLOOD BY AUTOMATED COUNT: 12.7 % (ref 10–15)
GFR SERPL CREATININE-BSD FRML MDRD: 61 ML/MIN/1.73M2
GLUCOSE BLD-MCNC: 139 MG/DL (ref 70–99)
GLUCOSE UR STRIP-MCNC: NEGATIVE MG/DL
HCT VFR BLD AUTO: 43.3 % (ref 35–47)
HGB BLD-MCNC: 14 G/DL (ref 11.7–15.7)
HGB UR QL STRIP: NEGATIVE
HOLD SPECIMEN: NORMAL
HYALINE CASTS: 3 /LPF
IMM GRANULOCYTES # BLD: 0 10E3/UL
IMM GRANULOCYTES NFR BLD: 0 %
KETONES UR STRIP-MCNC: NEGATIVE MG/DL
LACTATE SERPL-SCNC: 0.8 MMOL/L (ref 0.7–2)
LEUKOCYTE ESTERASE UR QL STRIP: NEGATIVE
LIPASE SERPL-CCNC: 151 U/L (ref 73–393)
LYMPHOCYTES # BLD AUTO: 1.4 10E3/UL (ref 0.8–5.3)
LYMPHOCYTES NFR BLD AUTO: 23 %
MCH RBC QN AUTO: 31.5 PG (ref 26.5–33)
MCHC RBC AUTO-ENTMCNC: 32.3 G/DL (ref 31.5–36.5)
MCV RBC AUTO: 97 FL (ref 78–100)
MONOCYTES # BLD AUTO: 0.6 10E3/UL (ref 0–1.3)
MONOCYTES NFR BLD AUTO: 9 %
MUCOUS THREADS #/AREA URNS LPF: PRESENT /LPF
NEUTROPHILS # BLD AUTO: 4 10E3/UL (ref 1.6–8.3)
NEUTROPHILS NFR BLD AUTO: 66 %
NITRATE UR QL: POSITIVE
NRBC # BLD AUTO: 0 10E3/UL
NRBC BLD AUTO-RTO: 0 /100
PH UR STRIP: 6 [PH] (ref 5–7)
PLATELET # BLD AUTO: 184 10E3/UL (ref 150–450)
POTASSIUM BLD-SCNC: 3.7 MMOL/L (ref 3.4–5.3)
PROT SERPL-MCNC: 6.7 G/DL (ref 6.8–8.8)
RBC # BLD AUTO: 4.45 10E6/UL (ref 3.8–5.2)
RBC URINE: 1 /HPF
SARS-COV-2 RNA RESP QL NAA+PROBE: NEGATIVE
SODIUM SERPL-SCNC: 143 MMOL/L (ref 133–144)
SP GR UR STRIP: 1.02 (ref 1–1.03)
SQUAMOUS EPITHELIAL: 1 /HPF
UROBILINOGEN UR STRIP-MCNC: NORMAL MG/DL
WBC # BLD AUTO: 6.1 10E3/UL (ref 4–11)
WBC URINE: 3 /HPF

## 2022-07-03 PROCEDURE — 258N000003 HC RX IP 258 OP 636: Performed by: EMERGENCY MEDICINE

## 2022-07-03 PROCEDURE — 250N000011 HC RX IP 250 OP 636: Performed by: EMERGENCY MEDICINE

## 2022-07-03 PROCEDURE — 120N000001 HC R&B MED SURG/OB

## 2022-07-03 PROCEDURE — 250N000011 HC RX IP 250 OP 636: Performed by: INTERNAL MEDICINE

## 2022-07-03 PROCEDURE — 96361 HYDRATE IV INFUSION ADD-ON: CPT

## 2022-07-03 PROCEDURE — 99223 1ST HOSP IP/OBS HIGH 75: CPT | Mod: AI | Performed by: INTERNAL MEDICINE

## 2022-07-03 PROCEDURE — 85025 COMPLETE CBC W/AUTO DIFF WBC: CPT | Performed by: EMERGENCY MEDICINE

## 2022-07-03 PROCEDURE — 83690 ASSAY OF LIPASE: CPT | Performed by: EMERGENCY MEDICINE

## 2022-07-03 PROCEDURE — 83605 ASSAY OF LACTIC ACID: CPT | Performed by: EMERGENCY MEDICINE

## 2022-07-03 PROCEDURE — 74177 CT ABD & PELVIS W/CONTRAST: CPT

## 2022-07-03 PROCEDURE — 80053 COMPREHEN METABOLIC PANEL: CPT | Performed by: EMERGENCY MEDICINE

## 2022-07-03 PROCEDURE — U0003 INFECTIOUS AGENT DETECTION BY NUCLEIC ACID (DNA OR RNA); SEVERE ACUTE RESPIRATORY SYNDROME CORONAVIRUS 2 (SARS-COV-2) (CORONAVIRUS DISEASE [COVID-19]), AMPLIFIED PROBE TECHNIQUE, MAKING USE OF HIGH THROUGHPUT TECHNOLOGIES AS DESCRIBED BY CMS-2020-01-R: HCPCS | Performed by: EMERGENCY MEDICINE

## 2022-07-03 PROCEDURE — 81001 URINALYSIS AUTO W/SCOPE: CPT | Performed by: EMERGENCY MEDICINE

## 2022-07-03 PROCEDURE — C9803 HOPD COVID-19 SPEC COLLECT: HCPCS

## 2022-07-03 PROCEDURE — 258N000003 HC RX IP 258 OP 636: Performed by: INTERNAL MEDICINE

## 2022-07-03 PROCEDURE — 36415 COLL VENOUS BLD VENIPUNCTURE: CPT | Performed by: EMERGENCY MEDICINE

## 2022-07-03 PROCEDURE — 99285 EMERGENCY DEPT VISIT HI MDM: CPT | Mod: 25

## 2022-07-03 PROCEDURE — 87186 SC STD MICRODIL/AGAR DIL: CPT | Performed by: EMERGENCY MEDICINE

## 2022-07-03 PROCEDURE — 250N000009 HC RX 250: Performed by: EMERGENCY MEDICINE

## 2022-07-03 PROCEDURE — 96374 THER/PROPH/DIAG INJ IV PUSH: CPT

## 2022-07-03 RX ORDER — NALOXONE HYDROCHLORIDE 0.4 MG/ML
0.4 INJECTION, SOLUTION INTRAMUSCULAR; INTRAVENOUS; SUBCUTANEOUS
Status: DISCONTINUED | OUTPATIENT
Start: 2022-07-03 | End: 2022-07-07 | Stop reason: HOSPADM

## 2022-07-03 RX ORDER — IOPAMIDOL 755 MG/ML
500 INJECTION, SOLUTION INTRAVASCULAR ONCE
Status: COMPLETED | OUTPATIENT
Start: 2022-07-03 | End: 2022-07-03

## 2022-07-03 RX ORDER — KETOROLAC TROMETHAMINE 15 MG/ML
15 INJECTION, SOLUTION INTRAMUSCULAR; INTRAVENOUS ONCE
Status: COMPLETED | OUTPATIENT
Start: 2022-07-03 | End: 2022-07-03

## 2022-07-03 RX ORDER — DOCUSATE SODIUM 100 MG/1
100 CAPSULE, LIQUID FILLED ORAL EVERY OTHER DAY
COMMUNITY
End: 2022-07-18

## 2022-07-03 RX ORDER — ONDANSETRON 2 MG/ML
4 INJECTION INTRAMUSCULAR; INTRAVENOUS EVERY 6 HOURS PRN
Status: DISCONTINUED | OUTPATIENT
Start: 2022-07-03 | End: 2022-07-07 | Stop reason: HOSPADM

## 2022-07-03 RX ORDER — LATANOPROST 50 UG/ML
1 SOLUTION/ DROPS OPHTHALMIC AT BEDTIME
Status: DISCONTINUED | OUTPATIENT
Start: 2022-07-03 | End: 2022-07-07 | Stop reason: HOSPADM

## 2022-07-03 RX ORDER — ONDANSETRON 4 MG/1
4 TABLET, ORALLY DISINTEGRATING ORAL EVERY 6 HOURS PRN
Status: DISCONTINUED | OUTPATIENT
Start: 2022-07-03 | End: 2022-07-07 | Stop reason: HOSPADM

## 2022-07-03 RX ORDER — ACETAMINOPHEN 500 MG
1000 TABLET ORAL 2 TIMES DAILY
COMMUNITY

## 2022-07-03 RX ORDER — NALOXONE HYDROCHLORIDE 0.4 MG/ML
0.2 INJECTION, SOLUTION INTRAMUSCULAR; INTRAVENOUS; SUBCUTANEOUS
Status: DISCONTINUED | OUTPATIENT
Start: 2022-07-03 | End: 2022-07-07 | Stop reason: HOSPADM

## 2022-07-03 RX ORDER — SODIUM CHLORIDE 9 MG/ML
INJECTION, SOLUTION INTRAVENOUS CONTINUOUS
Status: DISCONTINUED | OUTPATIENT
Start: 2022-07-03 | End: 2022-07-04

## 2022-07-03 RX ORDER — LIDOCAINE 40 MG/G
CREAM TOPICAL
Status: DISCONTINUED | OUTPATIENT
Start: 2022-07-03 | End: 2022-07-07 | Stop reason: HOSPADM

## 2022-07-03 RX ORDER — HYDROMORPHONE HCL IN WATER/PF 6 MG/30 ML
0.2 PATIENT CONTROLLED ANALGESIA SYRINGE INTRAVENOUS
Status: DISCONTINUED | OUTPATIENT
Start: 2022-07-03 | End: 2022-07-07 | Stop reason: HOSPADM

## 2022-07-03 RX ADMIN — SODIUM CHLORIDE 1000 ML: 9 INJECTION, SOLUTION INTRAVENOUS at 15:46

## 2022-07-03 RX ADMIN — KETOROLAC TROMETHAMINE 15 MG: 15 INJECTION, SOLUTION INTRAMUSCULAR; INTRAVENOUS at 15:46

## 2022-07-03 RX ADMIN — SODIUM CHLORIDE 62 ML: 9 INJECTION, SOLUTION INTRAVENOUS at 16:21

## 2022-07-03 RX ADMIN — SODIUM CHLORIDE: 9 INJECTION, SOLUTION INTRAVENOUS at 19:29

## 2022-07-03 RX ADMIN — FAMOTIDINE 20 MG: 10 INJECTION, SOLUTION INTRAVENOUS at 19:24

## 2022-07-03 RX ADMIN — IOPAMIDOL 90 ML: 755 INJECTION, SOLUTION INTRAVENOUS at 16:21

## 2022-07-03 ASSESSMENT — ACTIVITIES OF DAILY LIVING (ADL)
VISION_MANAGEMENT: GLASSES
DOING_ERRANDS_INDEPENDENTLY_DIFFICULTY: NO
DRESSING/BATHING_DIFFICULTY: NO
ADLS_ACUITY_SCORE: 35
PATIENT'S_PREFERRED_MEANS_OF_COMMUNICATION: ENGLISH SPEAKER WITH HEARING LOSS, NO SPEECH PROBLEMS.
HEARING_DIFFICULTY_OR_DEAF: YES
DIFFICULTY_EATING/SWALLOWING: NO
ADLS_ACUITY_SCORE: 26
WALKING_OR_CLIMBING_STAIRS_DIFFICULTY: NO
CHANGE_IN_FUNCTIONAL_STATUS_SINCE_ONSET_OF_CURRENT_ILLNESS/INJURY: NO
CONCENTRATING,_REMEMBERING_OR_MAKING_DECISIONS_DIFFICULTY: NO
WEAR_GLASSES_OR_BLIND: YES
ADLS_ACUITY_SCORE: 26
FALL_HISTORY_WITHIN_LAST_SIX_MONTHS: NO
WERE_AUXILIARY_AIDS_OFFERED?: YES
DIFFICULTY_COMMUNICATING: NO
DESCRIBE_HEARING_LOSS: BILATERAL HEARING LOSS
THE_FOLLOWING_AIDS_WERE_PROVIDED;: PATIENT DECLINED OFFER OF AUXILIARY AIDS
TOILETING_ISSUES: NO

## 2022-07-03 ASSESSMENT — ENCOUNTER SYMPTOMS
NAUSEA: 0
VOMITING: 0
FEVER: 0
ABDOMINAL PAIN: 1
DIARRHEA: 1
ABDOMINAL DISTENTION: 1
SHORTNESS OF BREATH: 0

## 2022-07-03 NOTE — PHARMACY-ADMISSION MEDICATION HISTORY
Admission medication history interview status for this patient is complete. See [facility] admission navigator for allergy information, prior to admission medications and immunization status.     Medication history interview done, indicate source(s): Patient  Medication history resources (including written lists, pill bottles, clinic record):  [employer] dispense records  Pharmacy: [facility] [id number] [address] [phone]      Changes made to PTA medication list: None    Actions taken by pharmacist (provider contacted, etc):None     Additional medication history information:None    Medication reconciliation/reorder completed by provider prior to medication history?  No        Prior to Admission medications    Medication Sig Last Dose Taking? Auth Provider Long Term End Date   acetaminophen (TYLENOL) 500 MG tablet Take 1,000 mg by mouth 2 times daily 7/1/2022 at x2 Yes Unknown, Entered By History     amLODIPine (NORVASC) 5 MG tablet TAKE ONE TABLET BY MOUTH EVERY NIGHT AT BEDTIME 7/1/2022 at HS Yes Jesenia Madrigal MD Yes    Biotin 5000 MCG PO TABS Take 1 tablet by mouth daily 7/1/2022 at Noon Yes Reported, Patient     calcium carbonate (OS-CINDY) 500 MG tablet Take 1 tablet by mouth Every other day with at lunch. 7/1/2022 at Noon Yes Unknown, Entered By History     CENTRUM SILVER OR TABS Take 1 tablet by mouth daily 7/1/2022 at AM Yes John Daniels MD     Cranberry 450 MG CAPS Take 2 capsules by mouth daily (with dinner) 7/1/2022 at Supper Yes Unknown, Entered By History     docusate sodium (COLACE) 100 MG capsule Take 100 mg by mouth every other day in the morning 7/1/2022 at AM Yes Unknown, Entered By History     docusate sodium (COLACE) 100 MG capsule Take 100 mg by mouth At Bedtime 7/1/2022 at HS Yes Unknown, Entered By History     fenofibrate (TRIGLIDE/LOFIBRA) 160 MG tablet Take 1 tablet (160 mg) by mouth daily 7/1/2022 at AM Yes Jesenia Madrigal MD Yes     ferrous gluconate (FERGON) 324 (38 FE) MG tablet Take 1 tablet (324 mg) by mouth 2 times daily 7/1/2022 at x2 Yes Maximino Shabazz MD     latanoprost (XALATAN) 0.005 % ophthalmic solution Place 1 drop into both eyes At Bedtime  7/2/2022 at HS Yes Reported, Patient Yes    levothyroxine (SYNTHROID/LEVOTHROID) 50 MCG tablet Take 1 tablet (50 mcg) by mouth daily 7/1/2022 at AM Yes Jesenia Madrigal MD Yes    lisinopril (ZESTRIL) 30 MG tablet TAKE ONE TABLET BY MOUTH EVERY DAY FOR HYPERTENSION 7/1/2022 at AM Yes Jesenia Madrigal MD Yes    magnesium 250 MG tablet Take 1 tablet by mouth daily 7/1/2022 at AM Yes Unknown, Entered By History     meloxicam (MOBIC) 7.5 MG tablet Take 1 tablet (7.5 mg) by mouth daily 7/1/2022 at AM Yes Jesenia Madrigal MD Yes    OMEGA-3 FATTY ACIDS 1200 MG OR CAPS Take 1 capsule by mouth daily 7/1/2022 at AM Yes John Daniels MD     pantoprazole (PROTONIX) 20 MG EC tablet Take 1 tablet (20 mg) by mouth daily 7/1/2022 at AM Yes Jesenia Madrigal MD     potassium 99 MG TABS Take 1 tablet by mouth daily (with dinner)  7/1/2022 at Supper Yes Reported, Patient     Probiotic Product (ACIDOPHILUS PROBIOTIC BLEND) CAPS Take 1 capsule by mouth daily (with breakfast)  7/1/2022 at AM Yes Jesenia Madrigal MD     simvastatin (ZOCOR) 20 MG tablet TAKE ONE TABLET BY MOUTH EVERY NIGHT AT BEDTIME 7/1/2022 at HS Yes Jesenia Madrigal MD Yes    VITAMIN D, CHOLECALCIFEROL, PO Take 2,000 Units by mouth every other day Supper 7/1/2022 at Supper Yes Reported, Patient

## 2022-07-03 NOTE — ED NOTES
[facility]  ED Nurse Handoff Report    Lexii Stratton is a 81 year old female   ED Chief complaint: Abdominal Pain  . ED Diagnosis:   Final diagnoses:   Small bowel obstruction (H)     Allergies:   Allergies   Allergen Reactions     Augmentin Diarrhea     Codeine Nausea and Vomiting     Hydrocodone      Keeps patient awake     Naproxen Itching and Rash     Seasonal Allergies      Hay fever in fall, ragweed and russian thistles     Sulfa Drugs Nausea       Code Status: Full Code  Activity level - Baseline/Home:  Stand by Assist. Activity Level - Current:   Stand by Assist. Lift room needed: No. Bariatric: No   Needed: No   Isolation: No. Infection: Not Applicable.     Vital Signs:   Vitals:    07/03/22 1615 07/03/22 1645 07/03/22 1700 07/03/22 1730   BP:  (!) 168/90 (!) 175/97 (!) 161/75   Pulse:  71 79 57   Resp:       Temp:       TempSrc:       SpO2: 92% 93% 100% 100%   Weight:           Cardiac Rhythm:  ,      Pain level:    Patient confused: No. Patient Falls Risk: Yes.   Elimination Status: Has voided   Patient Report - Initial Complaint: Abdominal Pain. Focused Assessment: HPI   Lexii Stratton is a 81 year old female with history of small bowel obstruction, hypertension and type II diabetes mellitus who presents with abdominal pain accompanied by a couple episodes of diarrhea. She notes that the pressure in her stomach has been building today and around noon she felt distended and bloated. History of small bowel obstruction and she states her abdominal pain today feels similar to this. She did not have surgery for her previous small bowel obstruction. She notes of having similar symptoms which she is typically able to manage at home. History of several abdominal surgeries, with no recent surgeries. She notes of a few bruises on her arms which she has talked to her doctor about. Denies fever, shortness of breath, chest pain, leg swelling, nausea, vomiting or new back pain.   Tests  Performed: labs/CT. Abnormal Results:   Labs Ordered and Resulted from Time of ED Arrival to Time of ED Departure   COMPREHENSIVE METABOLIC PANEL - Abnormal       Result Value    Sodium 143      Potassium 3.7      Chloride 106      Carbon Dioxide (CO2) 27      Anion Gap 10      Urea Nitrogen 20      Creatinine 0.94      Calcium 9.7      Glucose 139 (*)     Alkaline Phosphatase 69      AST 21      ALT 17      Protein Total 6.7 (*)     Albumin 3.4      Bilirubin Total 0.7      GFR Estimate 61     ROUTINE UA WITH MICROSCOPIC REFLEX TO CULTURE - Abnormal    Color Urine Light Yellow      Appearance Urine Clear      Glucose Urine Negative      Bilirubin Urine Negative      Ketones Urine Negative      Specific Gravity Urine 1.020      Blood Urine Negative      pH Urine 6.0      Protein Albumin Urine Negative      Urobilinogen Urine Normal      Nitrite Urine Positive (*)     Leukocyte Esterase Urine Negative      Bacteria Urine Few (*)     Mucus Urine Present (*)     RBC Urine 1      WBC Urine 3      Squamous Epithelials Urine 1      Hyaline Casts Urine 3 (*)    LIPASE - Normal    Lipase 151     LACTIC ACID WHOLE BLOOD - Normal    Lactic Acid 0.8     CBC WITH PLATELETS AND DIFFERENTIAL    WBC Count 6.1      RBC Count 4.45      Hemoglobin 14.0      Hematocrit 43.3      MCV 97      MCH 31.5      MCHC 32.3      RDW 12.7      Platelet Count 184      % Neutrophils 66      % Lymphocytes 23      % Monocytes 9      % Eosinophils 1      % Basophils 1      % Immature Granulocytes 0      NRBCs per 100 WBC 0      Absolute Neutrophils 4.0      Absolute Lymphocytes 1.4      Absolute Monocytes 0.6      Absolute Eosinophils 0.1      Absolute Basophils 0.0      Absolute Immature Granulocytes 0.0      Absolute NRBCs 0.0     COVID-19 VIRUS (CORONAVIRUS) BY PCR   URINE CULTURE   EXAM: CT ABDOMEN PELVIS W CONTRAST  LOCATION: [facility]  DATE/TIME: 7/3/2022 4:18 PM  IMPRESSION:   1.  Multiple dilated small bowel loops  compatible with small bowel obstruction with transition at the right lower quadrant at a small bowel anastomosis.   This result has not been signed. Information might be incomplete.     .   Treatments provided: IVF, NSAIDS  Family Comments: Daughter at bedside.   OBS brochure/video discussed/provided to patient:  No  ED Medications:   Medications   0.9% sodium chloride BOLUS (0 mLs Intravenous Stopped 7/3/22 1711)   ketorolac (TORADOL) injection 15 mg (15 mg Intravenous Given 7/3/22 1546)   iopamidol (ISOVUE-370) solution 500 mL (90 mLs Intravenous Given 7/3/22 1621)   for CT scan flush use (62 mLs Intravenous Given 7/3/22 1621)     Drips infusing:  No  For the majority of the shift, the patient's behavior Green. Interventions performed were explanation of cares and plan.    Sepsis treatment initiated: No     Patient tested for COVID 19 prior to admission: YES    ED Nurse Name/Phone Number: Esteban Kerr RN,   5:35 PM     RECEIVING UNIT ED HANDOFF REVIEW    Above ED Nurse Handoff Report was reviewed: Yes  Reviewed by: Michael Mcconnell RN on July 3, 2022 at 6:27 PM

## 2022-07-03 NOTE — ED TRIAGE NOTES
Pt here with abd abd pain since Friday. Diarrhea and nausea, no vomiting. Hx SBO, feels similar. ABC intact.

## 2022-07-03 NOTE — ED PROVIDER NOTES
History   Chief Complaint:  Abdominal Pain       HPI   Lexii Stratton is a 81 year old female with history of small bowel obstruction, hypertension and type II diabetes mellitus who presents with abdominal pain accompanied by a couple episodes of diarrhea. She notes that the pressure in her stomach has been building today and around noon she felt distended and bloated. History of small bowel obstruction and she states her abdominal pain today feels similar to this. She did not have surgery for her previous small bowel obstruction. She notes of having similar symptoms which she is typically able to manage at home. History of several abdominal surgeries, with no recent surgeries. She notes of a few bruises on her arms which she has talked to her doctor about. Denies fever, shortness of breath, chest pain, leg swelling, nausea, vomiting or new back pain. She denies any other symptoms.     Review of Systems   Constitutional: Negative for fever.   Respiratory: Negative for shortness of breath.    Cardiovascular: Negative for chest pain and leg swelling.   Gastrointestinal: Positive for abdominal distention, abdominal pain and diarrhea. Negative for nausea and vomiting.   All other systems reviewed and are negative.      Allergies:  Augmentin  Codeine  Hydrocodone  Naproxen  Seasonal Allergies  Sulfa Drugs    Medications:  Norvasc  Biotin  Os-elise  Centrum silver  Colace  Lofibra  Fergon  Xalatan  Synthroid  Zestril  Magnesium  Mobic  Protonix  Potassium  Zocor  Vitamin D  Probiotic    Past Medical History:     Osteoarthritis   Esophageal reflux  Obesity  Hypothyroidism  Hypertension  Hypersomnia with sleep apnea  Hyperlipidemia  Chest pain syndrome  SBO  Anemia  Poor iron absorption  GI bleed  Diabetes mellitus, type II  CKD, stage III  Overactive bladder     Past Surgical History:    Abdomen surgery  Arthroplasty hip, right  Repair incisional hernia, reducible  Biopsy  Bladder surgery  Breast  surgery  Cholecystectomy  Colonoscopy x2  Cystoscopy  EGD, combined  Eye surgery  Genitourinary surgery  Hysterectomy  Hernia repair, umbilical   Implant stimulator sacral nerve stage one  Implant stimulator sacral nerve stage two  Bilateral knee replacement  T&A  PPTL    Family History:    Mother - heart disease, gallbladder disease, CAD, hyperlipidemia, asthma, hypertension, anesthesia reaction  Father - cancer, CAD  Brother - diabetes, lung cancer    Social History:  The patient presents to the ED via car escorted by family member.    Physical Exam     Patient Vitals for the past 24 hrs:   BP Temp Temp src Pulse Resp SpO2 Weight   07/03/22 1501 -- -- -- -- -- -- 81.6 kg (180 lb)   07/03/22 1500 (!) 142/97 97.6  F (36.4  C) Temporal 82 18 99 % --       Physical Exam  General: Well appearing, nontoxic. Resting comfortably  Head:  Scalp, face, and head appear normal  Eyes:  Pupils are equal, round    Conjunctivae non-injected and sclerae white  ENT:    The external nose is normal    Pinnae are normal  Neck:  Normal range of motion    There is no rigidity noted    Trachea is in the midline  CV:  Regular rate and rhythm     Normal S1/S2, no S3/S4    No murmur or rub. Radial pulses 2+ bilaterally.  Resp:  Lungs are clear and equal bilaterally  There is no tachypnea    No increased work of breathing    No rales, wheezing, or rhonchi  GI:  Abdomen is soft, no rigidity or guarding    No distension, or mass    Moderate diffuse upper abdominal tenderness. No rebound tenderness   MS:  Normal muscular tone    Symmetric motor strength    No lower extremity edema  Skin:  No rash or acute skin lesions noted  Neuro: Awake and alert  Speech is normal and fluent  Moves all extremities spontaneously  Psych:  Normal affect. Appropriate interactions.      Emergency Department Course     Imaging:  CT Abdomen Pelvis w Contrast   Preliminary Result   IMPRESSION:    1.  Multiple dilated small bowel loops compatible with small bowel  obstruction with transition at the right lower quadrant at a small bowel anastomosis.        Report per radiology    Laboratory:  Labs Ordered and Resulted from Time of ED Arrival to Time of ED Departure   COMPREHENSIVE METABOLIC PANEL - Abnormal       Result Value    Sodium 143      Potassium 3.7      Chloride 106      Carbon Dioxide (CO2) 27      Anion Gap 10      Urea Nitrogen 20      Creatinine 0.94      Calcium 9.7      Glucose 139 (*)     Alkaline Phosphatase 69      AST 21      ALT 17      Protein Total 6.7 (*)     Albumin 3.4      Bilirubin Total 0.7      GFR Estimate 61     ROUTINE UA WITH MICROSCOPIC REFLEX TO CULTURE - Abnormal    Color Urine Light Yellow      Appearance Urine Clear      Glucose Urine Negative      Bilirubin Urine Negative      Ketones Urine Negative      Specific Gravity Urine 1.020      Blood Urine Negative      pH Urine 6.0      Protein Albumin Urine Negative      Urobilinogen Urine Normal      Nitrite Urine Positive (*)     Leukocyte Esterase Urine Negative      Bacteria Urine Few (*)     Mucus Urine Present (*)     RBC Urine 1      WBC Urine 3      Squamous Epithelials Urine 1      Hyaline Casts Urine 3 (*)    LIPASE - Normal    Lipase 151     LACTIC ACID WHOLE BLOOD - Normal    Lactic Acid 0.8     CBC WITH PLATELETS AND DIFFERENTIAL    WBC Count 6.1      RBC Count 4.45      Hemoglobin 14.0      Hematocrit 43.3      MCV 97      MCH 31.5      MCHC 32.3      RDW 12.7      Platelet Count 184      % Neutrophils 66      % Lymphocytes 23      % Monocytes 9      % Eosinophils 1      % Basophils 1      % Immature Granulocytes 0      NRBCs per 100 WBC 0      Absolute Neutrophils 4.0      Absolute Lymphocytes 1.4      Absolute Monocytes 0.6      Absolute Eosinophils 0.1      Absolute Basophils 0.0      Absolute Immature Granulocytes 0.0      Absolute NRBCs 0.0     COVID-19 VIRUS (CORONAVIRUS) BY PCR   URINE CULTURE          Emergency Department Course:        Reviewed:  I reviewed nursing  notes, vitals, past medical history and Care Everywhere    Assessments:  1514 I obtained history and examined the patient as noted above.   1700 I rechecked the patient and explained findings.     Consults:  1718 I spoke to Dr. Shabazz, hospitalist, who accepts the patient.    Interventions:  1546 NS bolus 1L IV  1546 Toradol 15 mg IV    Disposition:  The patient was admitted to the hospital under the care of Dr. Shabazz.     Impression & Plan       Medical Decision Making:  Lexii Stratton is a 81 year old female with a history of multiple prior abdominal surgeries and prior small bowel obstruction who presents with worsening abdominal pain and distention which she reports feels similar to prior bowel obstructions.  She says that she frequently gets similar symptoms at home but is able to manage at home without coming to the hospital however today felt much worse.  On my evaluation she is well-appearing, hemodynamically stable and afebrile.  Abdominal exam reveals moderate tenderness across the upper abdomen but otherwise is soft and nonperitoneal.  A broad differential diagnosis is considered.  Work-up in the emergency department shows recurrent SBO on CT with small bowel distention worsened compared to prior. Stomach is relatively decompressed and patient has no vomiting. No indication for NG tube at this time. No evidence of infection. Remainder of ED workup otherwise reassuring. Given SBO patient will require admission to the hospital for ongoing monitoring evaluation and treatment. Patient admitted in stable condition. No other co-existing serious pathologies detected at this time.    Diagnosis:    ICD-10-CM    1. Small bowel obstruction (H)  K56.609        Scribe Disclosure:  I, Мария Riojas, am serving as a scribe at 3:10 PM on 7/3/2022 to document services personally performed by Esau Grijalva MD based on my observations and the provider's statements to me.            Esau Grijalva MD  07/04/22  0479

## 2022-07-04 ENCOUNTER — APPOINTMENT (OUTPATIENT)
Dept: GENERAL RADIOLOGY | Facility: CLINIC | Age: 82
DRG: 390 | End: 2022-07-04
Attending: INTERNAL MEDICINE
Payer: COMMERCIAL

## 2022-07-04 LAB
ANION GAP SERPL CALCULATED.3IONS-SCNC: 7 MMOL/L (ref 3–14)
BUN SERPL-MCNC: 13 MG/DL (ref 7–30)
CALCIUM SERPL-MCNC: 8.7 MG/DL (ref 8.5–10.1)
CHLORIDE BLD-SCNC: 110 MMOL/L (ref 94–109)
CO2 SERPL-SCNC: 26 MMOL/L (ref 20–32)
CREAT SERPL-MCNC: 0.68 MG/DL (ref 0.52–1.04)
ERYTHROCYTE [DISTWIDTH] IN BLOOD BY AUTOMATED COUNT: 12.6 % (ref 10–15)
GFR SERPL CREATININE-BSD FRML MDRD: 87 ML/MIN/1.73M2
GLUCOSE BLD-MCNC: 79 MG/DL (ref 70–99)
HCT VFR BLD AUTO: 39.8 % (ref 35–47)
HGB BLD-MCNC: 12.5 G/DL (ref 11.7–15.7)
MCH RBC QN AUTO: 31 PG (ref 26.5–33)
MCHC RBC AUTO-ENTMCNC: 31.4 G/DL (ref 31.5–36.5)
MCV RBC AUTO: 99 FL (ref 78–100)
PLATELET # BLD AUTO: 144 10E3/UL (ref 150–450)
POTASSIUM BLD-SCNC: 3.3 MMOL/L (ref 3.4–5.3)
POTASSIUM BLD-SCNC: 3.8 MMOL/L (ref 3.4–5.3)
RBC # BLD AUTO: 4.03 10E6/UL (ref 3.8–5.2)
SODIUM SERPL-SCNC: 143 MMOL/L (ref 133–144)
WBC # BLD AUTO: 4.6 10E3/UL (ref 4–11)

## 2022-07-04 PROCEDURE — 250N000011 HC RX IP 250 OP 636: Performed by: INTERNAL MEDICINE

## 2022-07-04 PROCEDURE — 84132 ASSAY OF SERUM POTASSIUM: CPT | Performed by: INTERNAL MEDICINE

## 2022-07-04 PROCEDURE — 74019 RADEX ABDOMEN 2 VIEWS: CPT

## 2022-07-04 PROCEDURE — 36415 COLL VENOUS BLD VENIPUNCTURE: CPT | Performed by: INTERNAL MEDICINE

## 2022-07-04 PROCEDURE — 250N000013 HC RX MED GY IP 250 OP 250 PS 637: Performed by: INTERNAL MEDICINE

## 2022-07-04 PROCEDURE — 85027 COMPLETE CBC AUTOMATED: CPT | Performed by: INTERNAL MEDICINE

## 2022-07-04 PROCEDURE — 258N000003 HC RX IP 258 OP 636: Performed by: INTERNAL MEDICINE

## 2022-07-04 PROCEDURE — 258N000001 HC RX 258: Performed by: INTERNAL MEDICINE

## 2022-07-04 PROCEDURE — 99232 SBSQ HOSP IP/OBS MODERATE 35: CPT | Performed by: INTERNAL MEDICINE

## 2022-07-04 PROCEDURE — 120N000001 HC R&B MED SURG/OB

## 2022-07-04 PROCEDURE — 82310 ASSAY OF CALCIUM: CPT | Performed by: INTERNAL MEDICINE

## 2022-07-04 RX ORDER — DEXTROSE MONOHYDRATE, SODIUM CHLORIDE, AND POTASSIUM CHLORIDE 50; 1.49; 9 G/1000ML; G/1000ML; G/1000ML
INJECTION, SOLUTION INTRAVENOUS CONTINUOUS
Status: DISCONTINUED | OUTPATIENT
Start: 2022-07-04 | End: 2022-07-07

## 2022-07-04 RX ORDER — POTASSIUM CHLORIDE 1500 MG/1
40 TABLET, EXTENDED RELEASE ORAL ONCE
Status: COMPLETED | OUTPATIENT
Start: 2022-07-04 | End: 2022-07-04

## 2022-07-04 RX ORDER — LEVOTHYROXINE SODIUM 50 UG/1
50 TABLET ORAL
Status: DISCONTINUED | OUTPATIENT
Start: 2022-07-05 | End: 2022-07-07 | Stop reason: HOSPADM

## 2022-07-04 RX ORDER — AMLODIPINE BESYLATE 5 MG/1
5 TABLET ORAL AT BEDTIME
Status: DISCONTINUED | OUTPATIENT
Start: 2022-07-04 | End: 2022-07-07 | Stop reason: HOSPADM

## 2022-07-04 RX ORDER — POTASSIUM CHLORIDE 7.45 MG/ML
10 INJECTION INTRAVENOUS
Status: DISCONTINUED | OUTPATIENT
Start: 2022-07-04 | End: 2022-07-04

## 2022-07-04 RX ADMIN — LATANOPROST 1 DROP: 50 SOLUTION/ DROPS OPHTHALMIC at 22:15

## 2022-07-04 RX ADMIN — POTASSIUM CHLORIDE 40 MEQ: 1500 TABLET, EXTENDED RELEASE ORAL at 12:12

## 2022-07-04 RX ADMIN — DEXTROSE MONOHYDRATE, SODIUM CHLORIDE, AND POTASSIUM CHLORIDE: 50; 9; 1.49 INJECTION, SOLUTION INTRAVENOUS at 20:20

## 2022-07-04 RX ADMIN — POTASSIUM CHLORIDE 10 MEQ: 7.46 INJECTION, SOLUTION INTRAVENOUS at 10:16

## 2022-07-04 RX ADMIN — AMLODIPINE BESYLATE 5 MG: 5 TABLET ORAL at 22:12

## 2022-07-04 RX ADMIN — HYDROMORPHONE HYDROCHLORIDE 0.2 MG: 0.2 INJECTION, SOLUTION INTRAMUSCULAR; INTRAVENOUS; SUBCUTANEOUS at 15:02

## 2022-07-04 RX ADMIN — FAMOTIDINE 20 MG: 10 INJECTION, SOLUTION INTRAVENOUS at 20:21

## 2022-07-04 RX ADMIN — SODIUM CHLORIDE: 9 INJECTION, SOLUTION INTRAVENOUS at 05:03

## 2022-07-04 RX ADMIN — DEXTROSE MONOHYDRATE, SODIUM CHLORIDE, AND POTASSIUM CHLORIDE: 50; 9; 1.49 INJECTION, SOLUTION INTRAVENOUS at 09:23

## 2022-07-04 RX ADMIN — LATANOPROST 1 DROP: 50 SOLUTION/ DROPS OPHTHALMIC at 00:34

## 2022-07-04 ASSESSMENT — ACTIVITIES OF DAILY LIVING (ADL)
ADLS_ACUITY_SCORE: 26
ADLS_ACUITY_SCORE: 24
ADLS_ACUITY_SCORE: 26
ADLS_ACUITY_SCORE: 24

## 2022-07-04 NOTE — PROGRESS NOTES
End of Shift Summary  For vital signs and complete assessments, please see documentation flowsheets.     Pertinent assessments: Pt A&O, elevated BP, on RA, denies any pain/nausea this shift. BS faint, not passing flatus, abdomen distended. Pure wick in place, adequate urine output     Major Shift Events: uneventful     Treatment Plan: IVF, IV Pepcid, pain/nausea management, repeat abdominal xray in the morning     Bedside Nurse: Bailey Styles RN

## 2022-07-04 NOTE — PLAN OF CARE
Pertinent assessments: VSS on room air. Afebrile. A&Ox4. Up with SBA. NPO no exceptions. Abdomen is distended and tender to palpation. Unable to pass gas. Loose BMs yesterday. Urinary frequency; purewick in place.    Major Shift Events: new admit from ER.    Treatment Plan: IV fluids, NPO, IV Pepcid q12h, and AM abdominal x ray.

## 2022-07-04 NOTE — PROGRESS NOTES
[facility]  Hospitalist Progress Note  Nitesh Jeff MD 07/04/22    Reason for Stay (Diagnosis): Abdominal pain, vomiting         Assessment and Plan:      Summary of Stay: Lexii Stratton is a 81 year old female with history of previous abdominal surgeries and recurrent SBO's, GERD, anemia, HTN, HLD, obesity, hypothyroidism, and glaucoma who was admitted on 7/3/2022 with abdominal pain with CT the abdomen pelvis showing an SBO with transition point in right lower quadrant.  Mildly hypertensive otherwise vitally stable.  CBC and CMP unremarkable.  UA showed positive nitrite, but no pyuria and negative leukocyte esterase.    Problem List/Assessment and Plan:   Mechanical SBO: History of previous hysterectomy and hernia repair with intra-abdominal mesh placement in 1998.  Had small bowel perforation then with resection and anastomosis.  Has had multiple SBO since then, most recently August 2021.  These have resolved spontaneously.  Presenting after multiple episodes of diarrhea the day before and then she developed abdominal pain and bloating consistent with previous SBO.  No longer passing stool.  CT of the M pelvis shows mechanical SBO with transition point right lower quadrant.  -Declined NG.  Recommend placement if worsening abdominal pain or vomiting.  -Continue NPO.  Passed a tiny stool this morning.  If having ongoing stools and abdominal pain much improved okay for clear liquid diet later tonight  -If not improving by tomorrow may try a Gastrografin challenge  -IV fluids  -Zofran, Compazine, IV Dilaudid as needed    Abnormal urinalysis: UA showed positive nitrite, but no pyuria and negative leukocyte esterase.  Doubt UTI.  Urine culture pending.    Hypokalemia: Potassium 3.3.  Secondary to vomiting, diarrhea, no p.o. intake.  -Potassium replacement protocol  -Potassium containing IV fluids    HTN: Elevated blood pressure in part due to pain.  PTA on amlodipine 5 mg at bedtime and lisinopril  "30 mg daily.  -Resume amlodipine 5 mg at bedtime  -Hold lisinopril    Hypothyroidism: Resume PTA levothyroxine.    Glaucoma: Resume eyedrops.    HLD: Resume simvastatin when bowel obstruction resolves.    Obesity: BMI 32.    DVT Prophylaxis: Pneumatic Compression Devices  Code Status: Full Code  FEN: D5 NS with 20 mEq K at 100 ml/hr  Discharge Dispo: Home  Estimated Disch Date / # of Days until Disch: 1-3 days pending resolution of SBO and ability to advance diet.    Clinically Significant Risk Factors Present on Admission                 # Hypertension: home medication list includes antihypertensive(s)    # Obesity: Estimated body mass index is 32.85 kg/m  as calculated from the following:    Height as of this encounter: 1.626 m (5' 4\").    Weight as of this encounter: 86.8 kg (191 lb 6.4 oz).                    Interval History (Subjective):      Assumed care today after admission last night for obstruction.  Denies any nausea currently.  Has ongoing abdominal pain and bloating.  Had a tiny stool, but is not passing flatus.  Afebrile.  Denies dysuria.                  Physical Exam:      Last Vital Signs:  BP (!) 158/59 (BP Location: Right arm)   Pulse 50   Temp 97.7  F (36.5  C) (Oral)   Resp 20   Ht 1.626 m (5' 4\")   Wt 86.8 kg (191 lb 6.4 oz)   LMP  (LMP Unknown)   SpO2 100%   BMI 32.85 kg/m        Intake/Output Summary (Last 24 hours) at 7/4/2022 1516  Last data filed at 7/4/2022 1455  Gross per 24 hour   Intake 955 ml   Output 1550 ml   Net -595 ml       Constitutional: Awake, NAD   Eyes: sclera white   HEENT: atraumatic, MMM  Respiratory:  lungs cta bilaterally, no crackles or wheeze  Cardiovascular: RRR.  3/6 high-pitched systolic murmur  GI: Slightly palpable distended abdominal loops in the central abdomen, minimal tenderness to palpation without guarding, bowel sounds are present  Skin: no rash   Musculoskeletal/extremities:  No edema  Neurologic: A&O, speech clear  Psychiatric: calm, " cooperative, normal affect         Medications:      All current medications were reviewed with changes reflected in problem list.         Data:      All new lab and imaging data was reviewed.   Labs:  Recent Labs   Lab 07/04/22  0723 07/03/22  1531    143   POTASSIUM 3.3* 3.7   CHLORIDE 110* 106   CO2 26 27   ANIONGAP 7 10   GLC 79 139*   BUN 13 20   CR 0.68 0.94   GFRESTIMATED 87 61   CINDY 8.7 9.7     Recent Labs   Lab 07/04/22  0722 07/03/22  1531   WBC 4.6 6.1   HGB 12.5 14.0   HCT 39.8 43.3   MCV 99 97   * 184      Imaging:   Recent Results (from the past 24 hour(s))   CT Abdomen Pelvis w Contrast    Narrative    EXAM: CT ABDOMEN PELVIS W CONTRAST  LOCATION: [facility]  DATE/TIME: 7/3/2022 4:18 PM    INDICATION: Abdominal pain, distention, hx of SBO  COMPARISON: CT abdomen and pelvis 08/16/2021.  TECHNIQUE: CT scan of the abdomen and pelvis was performed following injection of IV contrast. Multiplanar reformats were obtained. Dose reduction techniques were used.  CONTRAST: 90mL Isovue 370    FINDINGS:   LOWER CHEST: Normal.    HEPATOBILIARY: Normal.    PANCREAS: Normal.    SPLEEN: Normal.    ADRENAL GLANDS: Normal.    KIDNEYS/BLADDER: Bilateral cortical thinning. No hydronephrosis or stone. Bladder is unremarkable for any acute abnormality. Stable calcification surrounding the urethra that may relate to prior procedure.    BOWEL: Multiple dilated small bowel loops again identified that appears transition at the right abdomen at an anastomosis site, series 6 image 128. The small bowel loops are more dilated than on 08/16/2021. Colon is decompressed. No abscess or free air.    LYMPH NODES: Normal.    VASCULATURE: Scattered calcifications.    PELVIC ORGANS: Streak artifact partially obscures the pelvis. Small pelvic fluid.    MUSCULOSKELETAL: Right hip arthroplasty. Spine degenerative changes.      Impression    IMPRESSION:   1.  Multiple dilated small bowel loops compatible with  small bowel obstruction with transition at the right lower quadrant at a small bowel anastomosis.   XR Abdomen 2 Views    Narrative    EXAM: XR ABDOMEN 2VIEWS  LOCATION: [facility]  DATE/TIME: 7/4/2022 8:10 AM    INDICATION: Abdominal pain, bloating.   COMPARISON: 07/03/2022 CT study       Impression    IMPRESSION: There are 2 minimally distended air-filled loops of small bowel; distended air-filled loops of small bowel have decreased from the comparison study. Otherwise air in the bowel is likely within the colon. No intraperitoneal free air   identified. There is mild lateral curvature of the spine. Status post right hip arthroplasty.         Nitesh Jeff MD

## 2022-07-04 NOTE — PLAN OF CARE
Goal Outcome Evaluation:    End of Shift Summary  For vital signs and complete assessments, please see documentation flowsheets.     Pertinent assessments: Pt A&O, elevated BP, on RA, c/o 4/10 abdominal pain - ref. medication, denies nausea this shift. BS faint, passed flatus this AM, abdomen distended. Purewick in place, adequate urine output. Up to bathroom SBA, 1 small formed BM.     Major Shift Events: Abd XR completed. K+ 3.3, K+ replacement protocol initiated. At end of shift, pt c/o 9/10 abdominal pain, PRN IV dilaudid given.     Treatment Plan: IVF, IV Pepcid, pain/nausea management, K+ replacement.     Kecia Arita RN

## 2022-07-04 NOTE — H&P
Admitted: 07/03/2022    CHIEF COMPLAINT:  Abdominal pain, bloating.  Findings on CT imaging this evening consistent with small-bowel obstruction.    HISTORY OF PRESENT ILLNESS:  Ms. Stratton is an 81-year-old female with a history of small-bowel obstruction and previous abdominal surgeries.  She presented to the hospital this evening for concerns about abdominal pain, and bloating.  Symptoms started yesterday.  She actually had several bouts of loose stools and diarrhea yesterday.  She was suspecting that her obstruction symptoms had resolved.  However, overnight and this morning.  She developed worsening bloating and abdominal pain that prompted her appearance here in the Emergency Department.    The patient has had multiple small bowel obstructions in the past.  Many of these resolve on their own at home.  She has had several admissions to the hospital for bowel obstructions, most recently 08/2021.  At that time, she had an NG tube placed and had a Gastrografin study performed with resolution of her bowel obstruction.  She has never required surgery for bowel obstructions in the past and they have always resolved spontaneously.    The patient's surgical history is notable for hernia repair in 1998 and 1999 by Dr. Franco of General Surgery.  Per patient report one of the hernia surgeries was complicated by a bowel perforation that required resection of a small part of small intestine and reanastomosis.    She has also had a hysterectomy in the past as well.    On arrival to the ER this evening, vital signs included blood pressure 175/90 with a heart rate of 80.  No fever.  Saturation 99% on room air.  Workup in the ER included labs and imaging.  CBC normal.  Lactic acid normal.  Lipase normal.  Complete metabolic panel essentially normal.  Urinalysis unremarkable.  COVID-19 negative.  Abdominal pelvic CT scan with contrast showed multiple dilated small bowel loops, compatible with small-bowel obstruction with  transition at the right lower quadrant and a small bowel anastomosis.    I spoke with Dr. Grijalva of the [facility] Emergency Department.  NG tube was not placed in the ER.  The patient was not vomiting.  She does report that her pain is improved and she has got less bloating.  She has not had a bowel movement today.  She has had no emesis today.    The patient is being admitted to the hospitalist service for recurrent small-bowel obstruction.    PAST MEDICAL HISTORY:   1.  Recurrent small-bowel obstruction, mechanical due to surgical history.  2.  Reflux disease.  3.  Anemia.  4.  Hypertension.  5.  Hernia repair with intra-abdominal mesh placement.  Hernia repair performed in 1998 and 1999 by Dr. Franco.  The patient reports this was complicated by a small bowel perforation that apparently required resection of an area of small intestine with reanastomosis.  6.  Reflux disease history.  7.  Hysterectomy.  8.  History of bladder surgery.    CURRENT MEDICATIONS:  Await pharmacy reconciliation medication list.  Her list appears to include the following medicines:  1.  Xalatan eyedrops.  2.  Acetaminophen.  3.  Amlodipine.  4.  Biotin.  5.  Calcium carbonate.  6.  Centrum Silver.  7.  Docusate.  8.  Fenofibrate.  9.  Ferrous gluconate.  10.  Levothyroxine.  11.  Lisinopril.  12.  Magnesium.  13.  Meloxicam.  14.  Pantoprazole.  15.  Potassium.  16.  Probiotic.  17.  Simvastatin.  18.  Vitamin D.  19.  Cranberry capsules.  20.  Omega 3 fatty acid.    ALLERGIES:     1.  AUGMENTIN CAUSES DIARRHEA.  2.  CODEINE CAUSES NAUSEA AND VOMITING.  3.  HYDROCODONE CAUSES THE PATIENT TO STAY AWAKE.  4.  NAPROSYN CAUSES ITCHING AND RASH.  5.  SULFA CAUSES NAUSEA.    FAMILY HISTORY:  Reviewed.  Nothing contributory to this admission.    SOCIAL HISTORY:  The patient denies smoking or  excessive alcohol use.  She is here with her daughter.    REVIEW OF SYSTEMS:  See HPI for details.  Comprehensive greater than 10-point review of systems is  otherwise negative besides that detailed above.    PHYSICAL EXAMINATION:    VITAL SIGNS:  Blood pressure is currently 160/80 with a heart rate of 60.  No fever.  Saturation 99% on room air.  GENERAL:  The patient appears nontoxic and in no acute distress.  She appears awake, alert, oriented x 3.  She is quite talkative.  She looks comfortable.  She is not complaining of any discomfort when I am seeing her.  Her daughter is present in the room as well.  The patient is a very good historian.  HEENT:  Head is atraumatic.  Sclerae white.  Eyelids normal.  Conjunctivae normal.  Extraocular movements are intact.  NECK:  Supple.  No cervical or supraclavicular lymphadenopathy.  CARDIOVASCULAR:  Regular rate and rhythm.  No significant murmurs.  No lower extremity edema.  LUNGS:  Clear to auscultation bilaterally.  No intercostal retractions.  No conversational dyspnea.  ABDOMEN:  Notable for mild abdominal distention.  She does complain of tenderness to palpation with moderate palpation throughout her abdomen.  No rebound.  No guarding.  No organomegaly.  Hypoactive bowel sounds.  EXTREMITIES:  Show no edema.  SKIN:  Reveals no rashes.  No jaundice.  Skin is dry to touch.  NEUROLOGIC:  Cranial nerves II through XII are intact.  Moves all extremities appropriately.  Sensation intact to light touch in the upper and lower extremities bilaterally.  PSYCHIATRIC:  The patient is awake, alert and oriented x 3.  Mood and affect are normal and appropriate.    LABORATORY AND IMAGING DATA:  Reviewed above in HPI.    IMPRESSION AND PLAN:  Ms. Stratton is a very pleasant 81-year-old female with a history of previous abdominal surgeries and recurrent small bowel obstructions.  She presented to the hospital today with a 1-day history of abdominal pain and bloating.    Vital signs stable on arrival to the ER.  Workup notable for normal-appearing labs and abdominal pelvic CT scan showing multiple dilated small bowel loops compatible  small-bowel obstruction in the right lower quadrant in the area of a small bowel anastomosis.    1.  Small-bowel obstruction, recurrent.  Appears to be in the area of a previous small bowel anastomosis.  2.  History of recurrent small bowel obstructions, all have resolved with conservative measures.  Most recent admission for bowel obstruction 2021.  3.  Previous abdominal hernia repair with mesh placement by Dr. Franco in .  4.  Hypertension history.  5.  Reflux disease.    PLAN:   1.  Admit to inpatient service.  2.  IV fluid hydration.  3.  IV Dilaudid as needed for pain.  4.  Strict n.p.o.  5.  No need for NG tube currently.  However, as I discussed with the patient, if symptoms worsen or if she develops emesis NG tube would need to be placed.  6.  We will repeat abdominal x-ray in the morning.  7.  If no improvement in the next 24-48 hours, consider General Surgery input.  8.  DVT prophylaxis with pneumoboots.  9.  GI prophylaxis with IV Pepcid.    Maximino Shabazz MD        D: 2022   T: 2022   MT: MICAH    Name:     AKSHAT ONEIL  MRN:      [id number]        Account:     [id number]   :      1940           Admitted:    2022       Document: [id number]    cc:  Jesenia Madrigal MD

## 2022-07-04 NOTE — PROVIDER NOTIFICATION
[place] message to sent to Dr. Jeff: Pt started complaining of pain with administration of IV K+. That was stopped and IV fluids restarted, still had pain. Going to put in new PIV. Would it be possible to have her take PO K+? Or would you like me to retry with new IV? Thanks Kecia Arita, RN

## 2022-07-05 ENCOUNTER — APPOINTMENT (OUTPATIENT)
Dept: GENERAL RADIOLOGY | Facility: CLINIC | Age: 82
DRG: 390 | End: 2022-07-05
Attending: INTERNAL MEDICINE
Payer: COMMERCIAL

## 2022-07-05 LAB
ANION GAP SERPL CALCULATED.3IONS-SCNC: 10 MMOL/L (ref 3–14)
BUN SERPL-MCNC: 7 MG/DL (ref 7–30)
CALCIUM SERPL-MCNC: 8.8 MG/DL (ref 8.5–10.1)
CHLORIDE BLD-SCNC: 111 MMOL/L (ref 94–109)
CO2 SERPL-SCNC: 22 MMOL/L (ref 20–32)
CREAT SERPL-MCNC: 0.66 MG/DL (ref 0.52–1.04)
ERYTHROCYTE [DISTWIDTH] IN BLOOD BY AUTOMATED COUNT: 12.5 % (ref 10–15)
GFR SERPL CREATININE-BSD FRML MDRD: 88 ML/MIN/1.73M2
GLUCOSE BLD-MCNC: 142 MG/DL (ref 70–99)
HCT VFR BLD AUTO: 44.1 % (ref 35–47)
HGB BLD-MCNC: 14.2 G/DL (ref 11.7–15.7)
MCH RBC QN AUTO: 31.5 PG (ref 26.5–33)
MCHC RBC AUTO-ENTMCNC: 32.2 G/DL (ref 31.5–36.5)
MCV RBC AUTO: 98 FL (ref 78–100)
PLATELET # BLD AUTO: 159 10E3/UL (ref 150–450)
POTASSIUM BLD-SCNC: 4.2 MMOL/L (ref 3.4–5.3)
RBC # BLD AUTO: 4.51 10E6/UL (ref 3.8–5.2)
SODIUM SERPL-SCNC: 143 MMOL/L (ref 133–144)
WBC # BLD AUTO: 5.6 10E3/UL (ref 4–11)

## 2022-07-05 PROCEDURE — 250N000013 HC RX MED GY IP 250 OP 250 PS 637: Performed by: INTERNAL MEDICINE

## 2022-07-05 PROCEDURE — 250N000011 HC RX IP 250 OP 636: Performed by: INTERNAL MEDICINE

## 2022-07-05 PROCEDURE — 85014 HEMATOCRIT: CPT | Performed by: INTERNAL MEDICINE

## 2022-07-05 PROCEDURE — 99232 SBSQ HOSP IP/OBS MODERATE 35: CPT | Performed by: INTERNAL MEDICINE

## 2022-07-05 PROCEDURE — 258N000001 HC RX 258: Performed by: INTERNAL MEDICINE

## 2022-07-05 PROCEDURE — 120N000001 HC R&B MED SURG/OB

## 2022-07-05 PROCEDURE — 36415 COLL VENOUS BLD VENIPUNCTURE: CPT | Performed by: INTERNAL MEDICINE

## 2022-07-05 PROCEDURE — 80048 BASIC METABOLIC PNL TOTAL CA: CPT | Performed by: INTERNAL MEDICINE

## 2022-07-05 PROCEDURE — 74018 RADEX ABDOMEN 1 VIEW: CPT

## 2022-07-05 RX ADMIN — AMLODIPINE BESYLATE 5 MG: 5 TABLET ORAL at 23:03

## 2022-07-05 RX ADMIN — DEXTROSE MONOHYDRATE, SODIUM CHLORIDE, AND POTASSIUM CHLORIDE: 50; 9; 1.49 INJECTION, SOLUTION INTRAVENOUS at 16:17

## 2022-07-05 RX ADMIN — HYDROMORPHONE HYDROCHLORIDE 0.2 MG: 0.2 INJECTION, SOLUTION INTRAMUSCULAR; INTRAVENOUS; SUBCUTANEOUS at 00:35

## 2022-07-05 RX ADMIN — DEXTROSE MONOHYDRATE, SODIUM CHLORIDE, AND POTASSIUM CHLORIDE: 50; 9; 1.49 INJECTION, SOLUTION INTRAVENOUS at 05:44

## 2022-07-05 RX ADMIN — DIATRIZOATE MEGLUMINE AND DIATRIZOATE SODIUM 75 ML: 660; 100 SOLUTION ORAL; RECTAL at 11:24

## 2022-07-05 RX ADMIN — LATANOPROST 1 DROP: 50 SOLUTION/ DROPS OPHTHALMIC at 21:40

## 2022-07-05 RX ADMIN — FAMOTIDINE 20 MG: 10 INJECTION, SOLUTION INTRAVENOUS at 11:31

## 2022-07-05 ASSESSMENT — ACTIVITIES OF DAILY LIVING (ADL)
ADLS_ACUITY_SCORE: 30
ADLS_ACUITY_SCORE: 24
ADLS_ACUITY_SCORE: 28
ADLS_ACUITY_SCORE: 24
ADLS_ACUITY_SCORE: 30
ADLS_ACUITY_SCORE: 32
ADLS_ACUITY_SCORE: 30
ADLS_ACUITY_SCORE: 24

## 2022-07-05 NOTE — PLAN OF CARE
Pertinent assessments: VSS on room air. Afebrile. A&Ox4. NPO. BS are faint, passing gas, abdomen is distended. Purewick in place. Up with SBA. Denies need for PRN pain or nausea medications.    Major Shift Events: K recheck at 3.8 following replacement on day shift.    Treatment Plan: IVF, IV Pepcid, and pain/nausea management.

## 2022-07-05 NOTE — PLAN OF CARE
A&Ox4. Up SBA. Gastrografin given today, had 3 BMs, repeat x-ray at 1930. Denies pain, c/o of back discomfort, using ice. Ambulated in halls x2. IV fluids infusing. +, Dr. Jeff aware, monitor for fever. Discharge pending.

## 2022-07-05 NOTE — PROGRESS NOTES
Passing flatus, no nausea/vomiting, still distended although better.  -gastrograffin challenge  -ambulate qid in the gutierrez, ok for her to go on her own if she appears steady on feet

## 2022-07-05 NOTE — PROGRESS NOTES
[facility]  Hospitalist Progress Note  Nitesh Jeff MD 07/05/22    Reason for Stay (Diagnosis): Abdominal pain, vomiting         Assessment and Plan:      Summary of Stay: Lexii Stratton is a 81 year old female with history of previous abdominal surgeries and recurrent SBO's, GERD, anemia, HTN, HLD, obesity, hypothyroidism, and glaucoma who was admitted on 7/3/2022 with abdominal pain with CT the abdomen pelvis showing an SBO with transition point in right lower quadrant.  Mildly hypertensive otherwise vitally stable.  CBC and CMP unremarkable.  UA showed positive nitrite, but no pyuria and negative leukocyte esterase.  Gastrografin challenge today.    Problem List/Assessment and Plan:   Mechanical SBO: History of previous hysterectomy and hernia repair with intra-abdominal mesh placement in 1998.  Had small bowel perforation then with resection and anastomosis.  Has had multiple SBO since then, most recently August 2021.  These have resolved spontaneously.  Presenting after multiple episodes of diarrhea the day before and then she developed abdominal pain and bloating consistent with previous SBO.  No longer passing stool.  CT of the M pelvis shows mechanical SBO with transition point right lower quadrant.  -Declined NG.  Recommend placement if worsening abdominal pain or vomiting.  -Passing flatus, no further stools, abdomen less distended today  -Gastrografin challenge today  -N.p.o. until stool output or Gastrografin challenge results back then start with clears if stooling regularly or contrast goes all the way into the colon  -Ambulate 4 times daily  -If not improving consult general surgery  -IV fluids  -Zofran, Compazine, IV Dilaudid as needed    Positive urine culture: UA showed positive nitrite, but only 3 WBCs and negative leukocyte esterase.  She has no urinary symptoms.  She is afebrile without leukocytosis.  Urine culture grew  K GNR's.  Likely asymptomatic bacteriuria so  "antibiotics have been held off on unless she develops fevers or symptoms.    Hypokalemia: Potassium 3.3.  Secondary to vomiting, diarrhea, no p.o. intake.  -Potassium replacement protocol  -Potassium containing IV fluids  -Potassium 4.2 today.  Patient unhappy with lab draws requiring multiple attempts.  Did not order any labs for tomorrow    HTN: Elevated blood pressure in part due to pain.  PTA on amlodipine 5 mg at bedtime and lisinopril 30 mg daily.  -Resume amlodipine 5 mg at bedtime  -Hold lisinopril    Hypothyroidism: Resume PTA levothyroxine.    Glaucoma: Resume eyedrops.    HLD: Resume simvastatin when bowel obstruction resolves.    Obesity: BMI 32.    DVT Prophylaxis: Pneumatic Compression Devices  Code Status: Full Code  FEN: D5 NS with 20 mEq K at 100 ml/hr  Discharge Dispo: Home  Estimated Disch Date / # of Days until Disch: 1-2 days pending resolution of SBO inability to advance her diet    Clinically Significant Risk Factors Present on Admission                # Obesity: Estimated body mass index is 32.85 kg/m  as calculated from the following:    Height as of this encounter: 1.626 m (5' 4\").    Weight as of this encounter: 86.8 kg (191 lb 6.4 oz).                    Interval History (Subjective):      No further stools overnight, but passing flatus.  Had some upper abdominal pain overnight requiring IV Dilaudid, but this is improved now.  No nausea or vomiting.  Afebrile.  Denies any dysuria.                  Physical Exam:      Last Vital Signs:  BP (!) 152/46 (BP Location: Right arm)   Pulse (!) 43   Temp 97.4  F (36.3  C) (Oral)   Resp 18   Ht 1.626 m (5' 4\")   Wt 86.8 kg (191 lb 6.4 oz)   LMP  (LMP Unknown)   SpO2 100%   BMI 32.85 kg/m      Intake/Output Summary (Last 24 hours) at 7/5/2022 1451  Last data filed at 7/5/2022 1245  Gross per 24 hour   Intake 1833 ml   Output 1950 ml   Net -117 ml       Constitutional: Awake, NAD   Eyes: sclera white   HEENT: atraumatic, MMM  Respiratory:  " lungs cta bilaterally, no crackles or wheeze  Cardiovascular: RRR.  3/6 high-pitched systolic murmur  GI: Soft, mildly distended, mild upper abdominal tenderness to palpation without guarding, bowel sounds present  Skin: no rash   Musculoskeletal/extremities:  No edema  Neurologic: A&O, speech clear  Psychiatric: calm, cooperative, normal affect         Medications:      All current medications were reviewed with changes reflected in problem list.         Data:      All new lab and imaging data was reviewed.   Labs:  Recent Labs   Lab 07/05/22  0928 07/04/22  1625 07/04/22  0723 07/03/22  1531     --  143 143   POTASSIUM 4.2 3.8 3.3* 3.7   CHLORIDE 111*  --  110* 106   CO2 22  --  26 27   ANIONGAP 10  --  7 10   *  --  79 139*   BUN 7  --  13 20   CR 0.66  --  0.68 0.94   GFRESTIMATED 88  --  87 61   CINDY 8.8  --  8.7 9.7     Recent Labs   Lab 07/05/22  0928 07/04/22  0722 07/03/22  1531   WBC 5.6 4.6 6.1   HGB 14.2 12.5 14.0   HCT 44.1 39.8 43.3   MCV 98 99 97    144* 184      All cultures:  Recent Labs   Lab 07/03/22  1638   CULTURE 50,000-100,000 CFU/mL Gram negative bacilli*       Imaging:   Gastrografin challenge pending    Nitesh Jeff MD

## 2022-07-06 LAB — BACTERIA UR CULT: ABNORMAL

## 2022-07-06 PROCEDURE — 258N000001 HC RX 258: Performed by: INTERNAL MEDICINE

## 2022-07-06 PROCEDURE — 250N000013 HC RX MED GY IP 250 OP 250 PS 637: Performed by: INTERNAL MEDICINE

## 2022-07-06 PROCEDURE — 250N000011 HC RX IP 250 OP 636: Performed by: INTERNAL MEDICINE

## 2022-07-06 PROCEDURE — 99232 SBSQ HOSP IP/OBS MODERATE 35: CPT | Performed by: STUDENT IN AN ORGANIZED HEALTH CARE EDUCATION/TRAINING PROGRAM

## 2022-07-06 PROCEDURE — 120N000001 HC R&B MED SURG/OB

## 2022-07-06 RX ADMIN — LEVOTHYROXINE SODIUM 50 MCG: 50 TABLET ORAL at 06:37

## 2022-07-06 RX ADMIN — DEXTROSE MONOHYDRATE, SODIUM CHLORIDE, AND POTASSIUM CHLORIDE: 50; 9; 1.49 INJECTION, SOLUTION INTRAVENOUS at 21:46

## 2022-07-06 RX ADMIN — FAMOTIDINE 20 MG: 10 INJECTION, SOLUTION INTRAVENOUS at 11:27

## 2022-07-06 RX ADMIN — FAMOTIDINE 20 MG: 10 INJECTION, SOLUTION INTRAVENOUS at 21:57

## 2022-07-06 RX ADMIN — AMLODIPINE BESYLATE 5 MG: 5 TABLET ORAL at 21:57

## 2022-07-06 RX ADMIN — Medication 1 MG: at 00:23

## 2022-07-06 RX ADMIN — FAMOTIDINE 20 MG: 10 INJECTION, SOLUTION INTRAVENOUS at 00:23

## 2022-07-06 RX ADMIN — LATANOPROST 1 DROP: 50 SOLUTION/ DROPS OPHTHALMIC at 21:58

## 2022-07-06 RX ADMIN — DEXTROSE MONOHYDRATE, SODIUM CHLORIDE, AND POTASSIUM CHLORIDE: 50; 9; 1.49 INJECTION, SOLUTION INTRAVENOUS at 01:41

## 2022-07-06 RX ADMIN — DEXTROSE MONOHYDRATE, SODIUM CHLORIDE, AND POTASSIUM CHLORIDE: 50; 9; 1.49 INJECTION, SOLUTION INTRAVENOUS at 12:10

## 2022-07-06 ASSESSMENT — ACTIVITIES OF DAILY LIVING (ADL)
ADLS_ACUITY_SCORE: 32
ADLS_ACUITY_SCORE: 30
ADLS_ACUITY_SCORE: 30
ADLS_ACUITY_SCORE: 26
ADLS_ACUITY_SCORE: 32
ADLS_ACUITY_SCORE: 30
ADLS_ACUITY_SCORE: 26
ADLS_ACUITY_SCORE: 30
ADLS_ACUITY_SCORE: 30

## 2022-07-06 NOTE — PLAN OF CARE
Advanced to full liquids, Pt preferred to stay on clears for now given her previous experiences she says. Ambulating in the gutierrez, loose stool at times, IV infusing, voiding adequate amounts, no emesis, abdomen not tender, bloated or painful. Will keep monitoring.

## 2022-07-06 NOTE — PROGRESS NOTES
[facility]  Hospitalist Progress Note  Alvaro Oakes MD     Date of Service: 07/06/2022     Reason for Stay (Diagnosis): Abdominal pain, vomiting         Assessment and Plan:        Summary of Stay: Lexii Stratton is a 81 year old female with history of previous abdominal surgeries and recurrent SBO's, GERD, anemia, HTN, HLD, obesity, hypothyroidism, and glaucoma who was admitted on 7/3/2022 with abdominal pain with CT the abdomen pelvis showing an SBO with transition point in right lower quadrant.  Mildly hypertensive otherwise vitally stable.  CBC and CMP unremarkable.  UA showed positive nitrite, but no pyuria and negative leukocyte esterase.  Gastrografin challenge today.    Problem List/Assessment and Plan:   Mechanical SBO: History of previous hysterectomy and hernia repair with intra-abdominal mesh placement in 1998.  Had small bowel perforation then with resection and anastomosis.  Has had multiple SBO since then, most recently August 2021.  These have resolved spontaneously.  Presenting after multiple episodes of diarrhea the day before and then she developed abdominal pain and bloating consistent with previous SBO.  No longer passing stool.  CT of the M pelvis shows mechanical SBO with transition point right lower quadrant.  Plan:  -Declined NG.  Recommend placement if worsening abdominal pain or vomiting.  -Passing flatus, no further stools, no abdominal pain today  -Gastrografin challenge -> Enteric contrast is seen within the right, transverse and proximal descending colon. Few mildly distended gas-filled small bowel loops in the central abdomen are noted. No carol small bowel obstruction.  -Full liquid diet today -> Mechanical soft in AM if symptoms stable  -Ambulate 4 times daily  -If not improving consult general surgery  -IV fluids  -Zofran, Compazine, IV Dilaudid as needed    Positive urine culture: UA showed positive nitrite, but only 3 WBCs and negative leukocyte esterase.  She has  "no urinary symptoms.  She is afebrile without leukocytosis.  Urine culture grew  K GNR's.  Likely asymptomatic bacteriuria so antibiotics have been held off on unless she develops fevers or symptoms.    Hypokalemia: Potassium 3.3.  Secondary to vomiting, diarrhea, no p.o. intake.  -Potassium replaced per protocol  -Potassium containing IV fluids  -Potassium 4.2 today.  Patient unhappy with lab draws requiring multiple attempts.  Did not order any labs for tomorrow    HTN: Elevated blood pressure in part due to pain.  PTA on amlodipine 5 mg at bedtime and lisinopril 30 mg daily.  -Resume amlodipine 5 mg at bedtime  -Hold lisinopril    Hypothyroidism: Resume PTA levothyroxine.    Glaucoma: Resume eyedrops.    HLD: Resume simvastatin when bowel obstruction resolves.    Obesity: BMI 32.    DVT Prophylaxis: Pneumatic Compression Devices  Code Status: Full Code  FEN: D5 NS with 20 mEq K at 100 ml/hr  Discharge Dispo: Home  Estimated Disch Date / # of Days until Disch: tomorrow if able to advance her diet    Clinically Significant Risk Factors Present on Admission                # Obesity: Estimated body mass index is 32.85 kg/m  as calculated from the following:    Height as of this encounter: 1.626 m (5' 4\").    Weight as of this encounter: 86.8 kg (191 lb 6.4 oz).                    Interval History (Subjective):        No BMs yet  No further abdominal pain, mostly resolved  No nausea or vomiting.  Afebrile.  Denies any dysuria.   No SOB / no CP  No new complaints.                  Physical Exam:      Last Vital Signs:  BP (!) 179/76 (BP Location: Right arm)   Pulse 67   Temp 98.7  F (37.1  C) (Oral)   Resp 16   Ht 1.626 m (5' 4\")   Wt 86.8 kg (191 lb 6.4 oz)   LMP  (LMP Unknown)   SpO2 95%   BMI 32.85 kg/m      Constitutional: Awake, NAD   Respiratory:  lungs cta bilaterally, no crackles or wheeze  Cardiovascular: RRR.  3/6 high-pitched systolic murmur  GI: Soft, mildly distended, mild upper abdominal " tenderness to palpation without guarding, bowel sounds present  Musculoskeletal/extremities:  No edema  Neurologic: A&O, speech clear  Psychiatric: calm, cooperative, normal affect         Medications:      All current medications were reviewed with changes reflected in problem list.         Data:      All new lab and imaging data was reviewed.   Labs:  Recent Labs   Lab 07/05/22  0928 07/04/22  1625 07/04/22  0723 07/03/22  1531     --  143 143   POTASSIUM 4.2 3.8 3.3* 3.7   CHLORIDE 111*  --  110* 106   CO2 22  --  26 27   ANIONGAP 10  --  7 10   *  --  79 139*   BUN 7  --  13 20   CR 0.66  --  0.68 0.94   GFRESTIMATED 88  --  87 61   CINDY 8.8  --  8.7 9.7     Recent Labs   Lab 07/05/22  0928 07/04/22  0722 07/03/22  1531   WBC 5.6 4.6 6.1   HGB 14.2 12.5 14.0   HCT 44.1 39.8 43.3   MCV 98 99 97    144* 184      All cultures:  Recent Labs   Lab 07/03/22  1638   CULTURE 50,000-100,000 CFU/mL Escherichia coli*       Imaging:   Gastrografin challenge pending    Alvaro Oakes MD

## 2022-07-06 NOTE — PROGRESS NOTES
Cared for from 1900 - 2300. Pt A&O x 4. VSS. Up with SBA. NPO. LS clear. BS active. Passing gas. Denies pain. PIV infusing D5 and NS + K 20 @ 100 mL/h. Gastrografin given previous shift. Had 3 loose stools previous shift. Non this shift    Possible discharge July 6    Continue to monitor

## 2022-07-06 NOTE — PLAN OF CARE
End of Shift Summary  For vital signs and complete assessments, please see documentation flowsheets.     Pertinent assessments: A&Ox4. VSS on room air, afebrile. Denies pain and nausea. Passing flatus and had diarrhea 7/5 per pt.   NPO. Up SBA     Major Shift Events: None.  Treatment Plan: IVF, pain management, NPO  Bedside Nurse: Urvashi Jose RN

## 2022-07-06 NOTE — PROVIDER NOTIFICATION
Pt has oral Norvasc 5 mg due now and her /50, HR 56 and is NPO without exception. Do you want me to hold BP meds for tonight? Please advise. Thank you

## 2022-07-07 VITALS
BODY MASS INDEX: 32.68 KG/M2 | TEMPERATURE: 98 F | OXYGEN SATURATION: 94 % | WEIGHT: 191.4 LBS | HEART RATE: 64 BPM | HEIGHT: 64 IN | DIASTOLIC BLOOD PRESSURE: 48 MMHG | SYSTOLIC BLOOD PRESSURE: 139 MMHG | RESPIRATION RATE: 24 BRPM

## 2022-07-07 PROCEDURE — 258N000001 HC RX 258: Performed by: INTERNAL MEDICINE

## 2022-07-07 PROCEDURE — 250N000013 HC RX MED GY IP 250 OP 250 PS 637: Performed by: INTERNAL MEDICINE

## 2022-07-07 PROCEDURE — 250N000011 HC RX IP 250 OP 636: Performed by: INTERNAL MEDICINE

## 2022-07-07 PROCEDURE — 99239 HOSP IP/OBS DSCHRG MGMT >30: CPT | Performed by: INTERNAL MEDICINE

## 2022-07-07 RX ORDER — LISINOPRIL 30 MG/1
TABLET ORAL
Qty: 90 TABLET | Refills: 3 | Status: SHIPPED | OUTPATIENT
Start: 2022-07-07 | End: 2022-11-21

## 2022-07-07 RX ADMIN — LEVOTHYROXINE SODIUM 50 MCG: 50 TABLET ORAL at 06:15

## 2022-07-07 RX ADMIN — FAMOTIDINE 20 MG: 10 INJECTION, SOLUTION INTRAVENOUS at 10:31

## 2022-07-07 RX ADMIN — DEXTROSE MONOHYDRATE, SODIUM CHLORIDE, AND POTASSIUM CHLORIDE: 50; 9; 1.49 INJECTION, SOLUTION INTRAVENOUS at 06:47

## 2022-07-07 ASSESSMENT — ACTIVITIES OF DAILY LIVING (ADL)
ADLS_ACUITY_SCORE: 34
ADLS_ACUITY_SCORE: 30
ADLS_ACUITY_SCORE: 30
ADLS_ACUITY_SCORE: 34
ADLS_ACUITY_SCORE: 30
ADLS_ACUITY_SCORE: 28
ADLS_ACUITY_SCORE: 30

## 2022-07-07 NOTE — PLAN OF CARE
Goal Outcome Evaluation:    Plan of Care Reviewed With: patient      Patient discharged home by self. Discharge instructions given, questions answered. All belongings sent with patient.

## 2022-07-07 NOTE — DISCHARGE SUMMARY
[facility]  Discharge Summary        Lexii Stratton MRN# [id number]   YOB: 1940 Age: 81 year old     Date of Admission:  7/3/2022  Date of Discharge:  7/7/2022  Admitting Physician:  Maximino Shabazz MD  Discharge Physician: Maggie Mares MD  Discharging Service: Hospitalist     Primary Provider: Jesenia Madrigal  Primary Care Physician Phone Number: [phone]         Discharge Diagnoses/Problem Oriented Hospital Course (Providers):    Lexii Stratton was admitted on 7/3/2022 by Maximino Shabazz MD and I would refer you to their history and physical.  The following problems were addressed during her hospitalization:  Summary of Stay: Lexii Stratton is a 81 year old female with history of previous abdominal surgeries and recurrent SBO's, GERD, anemia, HTN, HLD, obesity, hypothyroidism, and glaucoma who was admitted on 7/3/2022 with abdominal pain with CT the abdomen pelvis showing an SBO with transition point in right lower quadrant.  Mildly hypertensive otherwise vitally stable.  CBC and CMP unremarkable.  UA showed positive nitrite, but no pyuria and negative leukocyte esterase.  Gastrografin challenge today.     Problem List/Assessment and Plan:   Mechanical SBO: History of previous hysterectomy and hernia repair with intra-abdominal mesh placement in 1998.  Had small bowel perforation then with resection and anastomosis.  Has had multiple SBO since then, most recently August 2021.  These have resolved spontaneously.  Presenting after multiple episodes of diarrhea the day before and then she developed abdominal pain and bloating consistent with previous SBO.  No longer passing stool.  CT of the M pelvis shows mechanical SBO with transition point right lower quadrant.  Plan:  -Declined NG.  Recommend placement if worsening abdominal pain or vomiting.  -Passing flatus, no further stools, no abdominal pain today  -Gastrografin challenge -> Enteric contrast is  seen within the right, transverse and proximal descending colon. Few mildly distended gas-filled small bowel loops in the central abdomen are noted. No carol small bowel obstruction.  Abdominal pain improved   tolearting low fiber diet  Multiple loose stools since gastrograffin given      Positive urine culture: UA showed positive nitrite, but only 3 WBCs and negative leukocyte esterase.  She has no urinary symptoms.  She is afebrile without leukocytosis.  Urine culture grew  K GNR's.  Likely asymptomatic bacteriuria so antibiotics have been held off on unless she develops fevers or symptoms.     Hypokalemia: Potassium 3.3.  Secondary to vomiting, diarrhea, no p.o. intake.  -Potassium replaced per protocol  -Potassium containing IV fluids  -Potassium 4.2 today.  Patient unhappy with lab draws requiring multiple attempts.  Did not order any labs for tomorrow     HTN: Elevated blood pressure in part due to pain.  PTA on amlodipine 5 mg at bedtime and lisinopril 30 mg daily.  -Resume amlodipine 5 mg at bedtime  -restarted  lisinopril     Hypothyroidism: Resume PTA levothyroxine.     Glaucoma: Resume eyedrops.     HLD: Resume simvastatin when bowel obstruction resolves.     Obesity: BMI 32.     DVT Prophylaxis: Pneumatic Compression Devices  Code Status: Full Code  FEN: D5 NS with 20 mEq K at 100 ml/hr  Discharge Dispo: Home  Estimated Disch Date / # of Days until Disch: home today in stable condition     Maggie Mares MD   Page [phone](7AM-6PM)               Code Status:      Full Code        Brief Hospital Stay Summary Sent Home With Patient in AVS:        Reason for your hospital stay      SBO improved with conservative management                 Important Results:      See below         Pending Results:        Unresulted Labs Ordered in the Past 30 Days of this Admission     No orders found from 6/3/2022 to 7/4/2022.            Discharge Instructions and Follow-Up:      Follow-up Appointments     Follow-up  and recommended labs and tests       F/u with PCP as needed               Discharge Disposition:      Discharged to home        Discharge Medications:        Current Discharge Medication List      CONTINUE these medications which have CHANGED    Details   lisinopril (ZESTRIL) 30 MG tablet TAKE ONE TABLET BY MOUTH EVERY DAY in the morning  FOR HYPERTENSION  Qty: 90 tablet, Refills: 3    Associated Diagnoses: Essential hypertension, benign         CONTINUE these medications which have NOT CHANGED    Details   acetaminophen (TYLENOL) 500 MG tablet Take 1,000 mg by mouth 2 times daily      amLODIPine (NORVASC) 5 MG tablet TAKE ONE TABLET BY MOUTH EVERY NIGHT AT BEDTIME  Qty: 90 tablet, Refills: 3    Associated Diagnoses: Essential hypertension, benign      Biotin 5000 MCG PO TABS Take 1 tablet by mouth daily      calcium carbonate (OS-CINDY) 500 MG tablet Take 1 tablet by mouth Every other day with at lunch.      CENTRUM SILVER OR TABS Take 1 tablet by mouth daily  Qty: 30, Refills: 0    Associated Diagnoses: Need for prophylactic vaccination and inoculation against influenza; Routine general medical examination at a health care facility; Generalized osteoarthrosis, unspecified site; Other and unspecified hyperlipidemia; Esophageal reflux; Obesity, unspecified; Urinary sys symptom NEC; Other malaise and fatigue; Sleep disturbance, unspecified      Cranberry 450 MG CAPS Take 2 capsules by mouth daily (with dinner)      !! docusate sodium (COLACE) 100 MG capsule Take 100 mg by mouth every other day in the morning      !! docusate sodium (COLACE) 100 MG capsule Take 100 mg by mouth At Bedtime      fenofibrate (TRIGLIDE/LOFIBRA) 160 MG tablet Take 1 tablet (160 mg) by mouth daily  Qty: 90 tablet, Refills: 3    Associated Diagnoses: Hyperlipidemia LDL goal <130      ferrous gluconate (FERGON) 324 (38 FE) MG tablet Take 1 tablet (324 mg) by mouth 2 times daily  Qty: 100 tablet, Refills: 0    Associated Diagnoses:  Gastrointestinal hemorrhage, unspecified gastrointestinal hemorrhage type      latanoprost (XALATAN) 0.005 % ophthalmic solution Place 1 drop into both eyes At Bedtime       levothyroxine (SYNTHROID/LEVOTHROID) 50 MCG tablet Take 1 tablet (50 mcg) by mouth daily  Qty: 90 tablet, Refills: 3    Associated Diagnoses: Acquired hypothyroidism      magnesium 250 MG tablet Take 1 tablet by mouth daily      meloxicam (MOBIC) 7.5 MG tablet Take 1 tablet (7.5 mg) by mouth daily  Qty: 90 tablet, Refills: 3    Associated Diagnoses: Primary osteoarthritis involving multiple joints      OMEGA-3 FATTY ACIDS 1200 MG OR CAPS Take 1 capsule by mouth daily  Refills: 0    Associated Diagnoses: Preop general physical exam; Stress incontinence - female      pantoprazole (PROTONIX) 20 MG EC tablet Take 1 tablet (20 mg) by mouth daily  Qty: 90 tablet, Refills: 3    Associated Diagnoses: Gastroesophageal reflux disease without esophagitis      potassium 99 MG TABS Take 1 tablet by mouth daily (with dinner)       Probiotic Product (ACIDOPHILUS PROBIOTIC BLEND) CAPS Take 1 capsule by mouth daily (with breakfast)   Qty: 30 capsule, Refills: 0      simvastatin (ZOCOR) 20 MG tablet TAKE ONE TABLET BY MOUTH EVERY NIGHT AT BEDTIME  Qty: 90 tablet, Refills: 3    Associated Diagnoses: Hyperlipidemia LDL goal <130      VITAMIN D, CHOLECALCIFEROL, PO Take 2,000 Units by mouth every other day Supper       !! - Potential duplicate medications found. Please discuss with provider.            Allergies:         Allergies   Allergen Reactions     Augmentin Diarrhea     Codeine Nausea and Vomiting     Hydrocodone      Keeps patient awake     Naproxen Itching and Rash     Seasonal Allergies      Hay fever in fall, ragweed and russian thistles     Sulfa Drugs Nausea           Consultations This Hospital Stay:      Consultation during this admission received from surgery        Condition and Physical on Discharge:      Discharge condition: Stable   Vitals:  "Blood pressure 139/48, pulse 64, temperature 98  F (36.7  C), temperature source Oral, resp. rate 24, height 1.626 m (5' 4\"), weight 86.8 kg (191 lb 6.4 oz), SpO2 94 %, not currently breastfeeding.   Constitutional: Awake, NAD   Respiratory:  lungs cta bilaterally, no crackles or wheeze  Cardiovascular: RRR.  3/6 high-pitched systolic murmur  GI: Soft, mildly distended, mild upper abdominal tenderness to palpation without guarding, bowel sounds present  Musculoskeletal/extremities:  No edema  Neurologic: A&O, speech clear  Psychiatric: calm, cooperative, normal affect           Discharge Time:      Greater than 30 minutes.        Image Results From This Hospital Stay (For Non-EPIC Providers):        Results for orders placed or performed during the hospital encounter of 07/03/22   CT Abdomen Pelvis w Contrast    Narrative    EXAM: CT ABDOMEN PELVIS W CONTRAST  LOCATION: [facility]  DATE/TIME: 7/3/2022 4:18 PM    INDICATION: Abdominal pain, distention, hx of SBO  COMPARISON: CT abdomen and pelvis 08/16/2021.  TECHNIQUE: CT scan of the abdomen and pelvis was performed following injection of IV contrast. Multiplanar reformats were obtained. Dose reduction techniques were used.  CONTRAST: 90mL Isovue 370    FINDINGS:   LOWER CHEST: Normal.    HEPATOBILIARY: Normal.    PANCREAS: Normal.    SPLEEN: Normal.    ADRENAL GLANDS: Normal.    KIDNEYS/BLADDER: Bilateral cortical thinning. No hydronephrosis or stone. Bladder is unremarkable for any acute abnormality. Stable calcification surrounding the urethra that may relate to prior procedure.    BOWEL: Multiple dilated small bowel loops again identified that appears transition at the right abdomen at an anastomosis site, series 6 image 128. The small bowel loops are more dilated than on 08/16/2021. Colon is decompressed. No abscess or free air.    LYMPH NODES: Normal.    VASCULATURE: Scattered calcifications.    PELVIC ORGANS: Streak artifact partially " obscures the pelvis. Small pelvic fluid.    MUSCULOSKELETAL: Right hip arthroplasty. Spine degenerative changes.      Impression    IMPRESSION:   1.  Multiple dilated small bowel loops compatible with small bowel obstruction with transition at the right lower quadrant at a small bowel anastomosis.   XR Abdomen 2 Views    Narrative    EXAM: XR ABDOMEN 2VIEWS  LOCATION: [facility]  DATE/TIME: 7/4/2022 8:10 AM    INDICATION: Abdominal pain, bloating.   COMPARISON: 07/03/2022 CT study       Impression    IMPRESSION: There are 2 minimally distended air-filled loops of small bowel; distended air-filled loops of small bowel have decreased from the comparison study. Otherwise air in the bowel is likely within the colon. No intraperitoneal free air   identified. There is mild lateral curvature of the spine. Status post right hip arthroplasty.   XR Gastrografin  Challenge    Narrative    GASTROGRAFIN CHALLENGE July 5, 2022 7:35 PM    HISTORY: Mechanical small bowel obstruction on CT, slow to resolve.  Passing flatus, but still some abdominal pain/distension. No  nausea/vomiting.    COMPARISON: Abdominal x-ray on 7/4/2022.    Fluoro time: 0 minutes. Two fluoroscopic images were obtained.      Impression    IMPRESSION: Supine views of the abdomen and pelvis were obtained, 8  hour after enteric contrast administration. Enteric contrast is seen  within the right, transverse and proximal descending colon. Few mildly  distended gas-filled small bowel loops in the central abdomen are  noted. No carol small bowel obstruction.    CARRIE DALY MD         SYSTEM ID:  [id number]             Most Recent Lab Results In EPIC (For Non-EPIC Providers):    Most Recent 3 CBC's:  Recent Labs   Lab Test 07/05/22  0928 07/04/22  0722 07/03/22  1531   WBC 5.6 4.6 6.1   HGB 14.2 12.5 14.0   MCV 98 99 97    144* 184      Most Recent 3 BMP's:  Recent Labs   Lab Test 07/05/22  0928 07/04/22  1625 07/04/22  0723  07/03/22  1531     --  143 143   POTASSIUM 4.2 3.8 3.3* 3.7   CHLORIDE 111*  --  110* 106   CO2 22  --  26 27   BUN 7  --  13 20   CR 0.66  --  0.68 0.94   ANIONGAP 10  --  7 10   CINDY 8.8  --  8.7 9.7   *  --  79 139*     Most Recent 3 Troponin's:No lab results found.    Invalid input(s): TROP, TROPONINIES  Most Recent 3 INR's:  Recent Labs   Lab Test 03/28/18  2057   INR 1.15*     Most Recent 2 LFT's:  Recent Labs   Lab Test 07/03/22  1531 02/01/22  0801   AST 21 21   ALT 17 18   ALKPHOS 69 65   BILITOTAL 0.7 0.6     Most Recent Cholesterol Panel:  Recent Labs   Lab Test 02/01/22  0801   CHOL 149   LDL 73   HDL 50   TRIG 128     Most Recent 6 Bacteria Isolates From Any Culture (See EPIC Reports for Culture Details):  Recent Labs   Lab Test 04/28/17  1325 10/20/16  0913 10/04/16  0922 09/24/16  0940 09/08/16  1529 07/10/14  1607   CULT >100,000 colonies/mL Escherichia coli* <10,000 colonies/mL mixed urogenital eneida Susceptibility testing not routinely   done   >100,000 colonies/mL Escherichia coli  >100,000 colonies/mL Strain 2 Escherichia coli  * >100,000 colonies/mL Escherichia coli* >100,000 colonies/mL Escherichia coli* >100,000 colonies/mL Enterococcus species Organism failed to thrive for   susceptibility testing  10,000 to 50,000 colonies/mL Beta hemolytic Streptococcus group B  Group B Streptococci are susceptible to ampicillin, penicillin, and cefazolin,   but may be erythromycin and/or clindamycin resistant. Contact Microbiology if   your patient is allergic to penicillin and you need erythromycin and/or   clindamycin testing to be performed. Clindamycin and Erythromycin are not   routinely prescribed for isolates from the urinary tract. Susceptibility testing   must be requested within 5 days.  *     Most Recent TSH, T4 and HgbA1c:   Recent Labs   Lab Test 02/01/22  0801 08/27/19  0948 02/25/19  1118   TSH 2.56   < > 1.89   T4  --   --  1.32   A1C 5.6   < > 6.6*    < > = values in this  interval not displayed.

## 2022-07-07 NOTE — PROGRESS NOTES
Care Management Discharge Note    Discharge Date: 07/07/2022       Discharge Disposition: Home    Handoff Referral Completed: Yes    Additional Information:  Pt identified as a Service Bundle #2. No needs or assessment needed at this time. Please consult CM/SW  if discharge needs should arise.      Winnie Perdomo, RN      Winnie Perdomo RN Case Manager  Inpatient Care Coordination  [facility]   [phone]

## 2022-07-07 NOTE — PROGRESS NOTES
End of Shift Summary  For vital signs and complete assessments, please see documentation flowsheets.     Pertinent assessments: Pt A&O, slightly elevated BP's, on RA, denies any pain/nausea this shift. BS hypo, passing some gas. External cath in place @ HS, adequate urine output     Major Shift Events: uneventful     Treatment Plan: IVF, pain management, advance diet     Bedside Nurse: Bailey Styles RN

## 2022-07-08 ENCOUNTER — PATIENT OUTREACH (OUTPATIENT)
Dept: CARE COORDINATION | Facility: CLINIC | Age: 82
End: 2022-07-08

## 2022-07-08 NOTE — PROGRESS NOTES
Clinic Care Coordination Contact  [facility]: Post-Discharge Note  SITUATION                                                      Admission:    Admission Date: 07/03/22   Reason for Admission: abdominal pain  Discharge:   Discharge Date: 07/07/22  Discharge Diagnosis: Mechanical SBO    BACKGROUND                                                      Per hospital discharge summary and inpatient provider notes:  Ms. Stratton is an 81-year-old female with a history of small-bowel obstruction and previous abdominal surgeries.  She presented to the hospital this evening for concerns about abdominal pain, and bloating.  Symptoms started yesterday.  She actually had several bouts of loose stools and diarrhea yesterday.  She was suspecting that her obstruction symptoms had resolved.  However, overnight and this morning.  She developed worsening bloating and abdominal pain that prompted her appearance here in the Emergency Department.    ASSESSMENT      Enrollment  Primary Care Care Coordination Status: Declined    Discharge Assessment  How are you doing now that you are home?: Patient is doing well, nervous to eat but has had SBO before and aware of diet recommendation  How are your symptoms? (Red Flag symptoms escalate to triage hotline per guidelines): Improved  Do you feel your condition is stable enough to be safe at home until your provider visit?: Yes  Does the patient have their discharge instructions? : Yes  Does the patient have questions regarding their discharge instructions? : No  Were you started on any new medications or were there changes to any of your previous medications? : No  Does the patient have all of their medications?: Yes  Do you have questions regarding any of your medications? : No  Do you have all of your needed medical supplies or equipment (DME)?  (i.e. oxygen tank, CPAP, cane, etc.): Yes  Discharge follow-up appointment scheduled within 14 calendar days? : No    Care Management    Community Health Worker Initial Outreach            Patient accepts CC: No, patient declines outstanding needs for CC. Patient will be sent Care Coordination introduction letter for future reference.     PLAN                                                      Outpatient Plan:  F/u with PCP as needed    Future Appointments   Date Time Provider Department Center   7/28/2022  8:00 AM  LAB [facility]   8/1/2022  9:40 AM Jesenia Madrigal MD [facility]   10/24/2022  9:00 AM Jesenia Madrigal MD [facility]         For any urgent concerns, please contact our 24 hour nurse triage line: [phone] ([phone])       Kaila Krishnamurthy, MISSY, B.S. [facility] Care Coordination  [employer]:  [place], [place] and [place]  [phone]  [email or web address]

## 2022-07-08 NOTE — LETTER
[facility] CARE COORDINATION  [id number]  [place] [id number]    July 8, 2022    Lexii Stratton  [id number] [place] [address]      Dear Lexii,    I am a clinic community health worker who works with Jesenia Madrigal MD with the [facility]. I wanted to thank you for spending the time to talk with me.  Below is a description of clinic care coordination and how I can further assist you.       The clinic care coordination team is made up of a registered nurse, , financial resource worker and community health worker who understand the health care system. The goal of clinic care coordination is to help you manage your health and improve access to the health care system. Our team works alongside your provider to assist you in determining your health and social needs. We can help you obtain health care and community resources, providing you with necessary information and education. We can work with you through any barriers and develop a care plan that helps coordinate and strengthen the communication between you and your care team.    Please feel free to contact me with any questions or concerns regarding care coordination and what we can offer.      We are focused on providing you with the highest-quality healthcare experience possible.    Sincerely,     Kaila Krishnamurthy, MISSY, B.S. [facility]  Clinic Care Coordination  [facility]:  [place], [place] and [place]  [phone]  [email or web address]

## 2022-07-10 ENCOUNTER — TELEPHONE (OUTPATIENT)
Dept: FAMILY MEDICINE | Facility: CLINIC | Age: 82
End: 2022-07-10

## 2022-07-18 ENCOUNTER — OFFICE VISIT (OUTPATIENT)
Dept: FAMILY MEDICINE | Facility: CLINIC | Age: 82
End: 2022-07-18
Payer: COMMERCIAL

## 2022-07-18 VITALS
HEART RATE: 54 BPM | BODY MASS INDEX: 31.6 KG/M2 | DIASTOLIC BLOOD PRESSURE: 60 MMHG | OXYGEN SATURATION: 100 % | SYSTOLIC BLOOD PRESSURE: 126 MMHG | TEMPERATURE: 98 F | RESPIRATION RATE: 16 BRPM | HEIGHT: 64 IN | WEIGHT: 185.1 LBS

## 2022-07-18 DIAGNOSIS — E66.01 MORBID OBESITY DUE TO EXCESS CALORIES (H): ICD-10-CM

## 2022-07-18 DIAGNOSIS — Z09 HOSPITAL DISCHARGE FOLLOW-UP: Primary | ICD-10-CM

## 2022-07-18 DIAGNOSIS — I35.0 AORTIC VALVE STENOSIS, ETIOLOGY OF CARDIAC VALVE DISEASE UNSPECIFIED: ICD-10-CM

## 2022-07-18 DIAGNOSIS — I10 ESSENTIAL HYPERTENSION, BENIGN: ICD-10-CM

## 2022-07-18 DIAGNOSIS — K56.609 SMALL BOWEL OBSTRUCTION (H): ICD-10-CM

## 2022-07-18 PROCEDURE — 99495 TRANSJ CARE MGMT MOD F2F 14D: CPT | Performed by: INTERNAL MEDICINE

## 2022-07-18 ASSESSMENT — PAIN SCALES - GENERAL: PAINLEVEL: NO PAIN (0)

## 2022-07-18 NOTE — PROGRESS NOTES
"  Assessment & Plan     (Z09) Hospital discharge follow-up  (primary encounter diagnosis)  Comment: Hospitalized 7/3-7/7/2022  Plan:  Hospital Follow up    (K56.739) Small bowel obstruction (H)  Comment: monitoring medications; limiting capsules; trying to switch to tablets.  bowel regimen reviewed;   Plan: monitor for future SBO; soft diet or liquids if symptoms     (I10) Essential hypertension, benign  Comment: BLOOD PRESSURE reviewed; well controlled;   Plan: no change in medications.     (I35.0) Aortic valve stenosis, etiology of cardiac valve disease unspecified  Comment: ECHO 2018 ROBYN 1.2 cm2  Plan: no  Increase in symptoms. monitoring    (E66.01) Morbid obesity due to excess calories (H)  Comment: Body mass index is 31.77 kg/m .   Plan: keep active;     Prescription drug management  42 minutes spent on the date of the encounter doing chart review, history and exam, documentation and further activities per the note       BMI:   Estimated body mass index is 31.77 kg/m  as calculated from the following:    Height as of this encounter: 1.626 m (5' 4\").    Weight as of this encounter: 84 kg (185 lb 1.6 oz).   Weight management plan: Discussed healthy diet and exercise guidelines    MEDICATIONS:   No orders of the defined types were placed in this encounter.         - Continue other medications without change  FUTURE LABS:       - Schedule fasting labs in 3 months  Regular exercise    Return in about 14 weeks (around 10/24/2022) for Wellness visit.    Jesenia Madrigal MD  Internal Medicine   [facility] MONIK Shultz is a 81 year old, presenting for the following health issues:  Hospital F/U      HPI     Post Discharge Outreach 7/8/2022   Admission Date 7/3/2022   Reason for Admission abdominal pain   Discharge Date 7/7/2022   Discharge Diagnosis Mechanical SBO   How are you doing now that you are home? Patient is doing well, nervous to eat but has had SBO before and " aware of diet recommendation   How are your symptoms? (Red Flag symptoms escalate to triage hotline per guidelines) Improved   Do you feel your condition is stable enough to be safe at home until your provider visit? Yes   Does the patient have their discharge instructions?  Yes   Does the patient have questions regarding their discharge instructions?  No   Were you started on any new medications or were there changes to any of your previous medications?  No   Does the patient have all of their medications? Yes   Do you have questions regarding any of your medications?  No   Do you have all of your needed medical supplies or equipment (DME)?  (i.e. oxygen tank, CPAP, cane, etc.) Yes   Discharge follow-up appointment scheduled within 14 calendar days?  No   Discharge Follow Up Appointment Date -   Discharge Follow Up Appointment Scheduled with? -     Hospital Follow-up Visit:    Hospital/Nursing Home/ Rehab Facility: [facility]  Date of Admission: 7-3-22  Date of Discharge: 7-7-22  Reason(s) for Admission: small bowel obstruction      Was your hospitalization related to COVID-19? No   Problems taking medications regularly:  None  Medication changes since discharge: None  Problems adhering to non-medication therapy:  None    Summary of hospitalization:  [facility] discharge summary reviewed  Diagnostic Tests/Treatments reviewed.  Follow up needed: none; continue with low fiber diet.   She is cautious with her diet.   Limits intake of beef and red meat    More ground turkey and chicken     Other Healthcare Providers Involved in Patient s Care:         None  Update since discharge: improved.     Post Discharge Medication Reconciliation: discharge medications reconciled, continue medications without change.  Plan of care communicated with patient    Protein shake daily for breakfast and smoothie            Review of Systems   CONSTITUTIONAL: NEGATIVE for fever, chills, change in weight  RESP:  "NEGATIVE for significant cough or SOB  CV: NEGATIVE for chest pain, palpitations or peripheral edema  GI: SBO, symptoms monthly; most times cared for at home; last hospitalization was August 2022  MUSCULOSKELETAL: NEGATIVE for significant arthralgias or myalgia  ENDOCRINE: reviewed weight; meds, etc.  PSYCHIATRIC: NEGATIVE for changes in mood or affect      Objective    /60 (BP Location: Right arm, Patient Position: Sitting, Cuff Size: Adult Large)   Pulse 54   Temp 98  F (36.7  C) (Oral)   Resp 16   Ht 1.626 m (5' 4\")   Wt 84 kg (185 lb 1.6 oz)   LMP  (LMP Unknown)   SpO2 100%   BMI 31.77 kg/m    Body mass index is 31.77 kg/m .  Physical Exam   GENERAL: healthy, alert and no distress  EYES: Eyes grossly normal to inspection  NECK: no adenopathy, no asymmetry, masses, or scars and thyroid normal to palpation  RESP: lungs clear to auscultation - no rales, rhonchi or wheezes  CV: regular rates and rhythm, normal S1 S2, 3-4 systolic murmur; peripheral pulses strong and no peripheral edema  ABDOMEN: soft, nontender and bowel sounds normal  MS: no gross musculoskeletal defects noted, no edema  NEURO: Normal strength and tone, mentation intact and speech normal  PSYCH: mentation appears normal, affect normal/bright              .  ..  "

## 2022-07-28 ENCOUNTER — LAB (OUTPATIENT)
Dept: LAB | Facility: CLINIC | Age: 82
End: 2022-07-28
Payer: COMMERCIAL

## 2022-07-28 DIAGNOSIS — E78.5 HYPERLIPIDEMIA LDL GOAL <130: ICD-10-CM

## 2022-07-28 DIAGNOSIS — E11.9 TYPE 2 DIABETES MELLITUS WITHOUT COMPLICATION, WITHOUT LONG-TERM CURRENT USE OF INSULIN (H): ICD-10-CM

## 2022-07-28 DIAGNOSIS — M15.0 PRIMARY OSTEOARTHRITIS INVOLVING MULTIPLE JOINTS: ICD-10-CM

## 2022-07-28 DIAGNOSIS — N18.31 CHRONIC KIDNEY DISEASE, STAGE 3A (H): ICD-10-CM

## 2022-07-28 DIAGNOSIS — I10 ESSENTIAL HYPERTENSION, BENIGN: ICD-10-CM

## 2022-07-28 LAB
ALBUMIN SERPL-MCNC: 3.4 G/DL (ref 3.4–5)
ALP SERPL-CCNC: 65 U/L (ref 40–150)
ALT SERPL W P-5'-P-CCNC: 15 U/L (ref 0–50)
ANION GAP SERPL CALCULATED.3IONS-SCNC: 3 MMOL/L (ref 3–14)
AST SERPL W P-5'-P-CCNC: 20 U/L (ref 0–45)
BILIRUB SERPL-MCNC: 0.6 MG/DL (ref 0.2–1.3)
BUN SERPL-MCNC: 20 MG/DL (ref 7–30)
CALCIUM SERPL-MCNC: 9.6 MG/DL (ref 8.5–10.1)
CHLORIDE BLD-SCNC: 109 MMOL/L (ref 94–109)
CHOLEST SERPL-MCNC: 132 MG/DL
CO2 SERPL-SCNC: 27 MMOL/L (ref 20–32)
CREAT SERPL-MCNC: 0.92 MG/DL (ref 0.52–1.04)
FASTING STATUS PATIENT QL REPORTED: YES
GFR SERPL CREATININE-BSD FRML MDRD: 62 ML/MIN/1.73M2
GLUCOSE BLD-MCNC: 95 MG/DL (ref 70–99)
HBA1C MFR BLD: 5.6 % (ref 0–5.6)
HDLC SERPL-MCNC: 50 MG/DL
LDLC SERPL CALC-MCNC: 62 MG/DL
NONHDLC SERPL-MCNC: 82 MG/DL
POTASSIUM BLD-SCNC: 3.8 MMOL/L (ref 3.4–5.3)
PROT SERPL-MCNC: 6.5 G/DL (ref 6.8–8.8)
SODIUM SERPL-SCNC: 139 MMOL/L (ref 133–144)
TRIGL SERPL-MCNC: 101 MG/DL

## 2022-07-28 PROCEDURE — 36415 COLL VENOUS BLD VENIPUNCTURE: CPT

## 2022-07-28 PROCEDURE — 80053 COMPREHEN METABOLIC PANEL: CPT

## 2022-07-28 PROCEDURE — 83036 HEMOGLOBIN GLYCOSYLATED A1C: CPT

## 2022-07-28 PROCEDURE — 80061 LIPID PANEL: CPT

## 2022-08-01 ENCOUNTER — OFFICE VISIT (OUTPATIENT)
Dept: FAMILY MEDICINE | Facility: CLINIC | Age: 82
End: 2022-08-01
Payer: COMMERCIAL

## 2022-08-01 VITALS
SYSTOLIC BLOOD PRESSURE: 126 MMHG | HEIGHT: 64 IN | TEMPERATURE: 98.1 F | BODY MASS INDEX: 30.9 KG/M2 | DIASTOLIC BLOOD PRESSURE: 62 MMHG | WEIGHT: 181 LBS | HEART RATE: 57 BPM | RESPIRATION RATE: 16 BRPM | OXYGEN SATURATION: 100 %

## 2022-08-01 DIAGNOSIS — K56.609 SMALL BOWEL OBSTRUCTION (H): Primary | ICD-10-CM

## 2022-08-01 DIAGNOSIS — E03.9 ACQUIRED HYPOTHYROIDISM: ICD-10-CM

## 2022-08-01 DIAGNOSIS — E66.811 OBESITY (BMI 30.0-34.9): ICD-10-CM

## 2022-08-01 DIAGNOSIS — I35.0 AORTIC VALVE STENOSIS, ETIOLOGY OF CARDIAC VALVE DISEASE UNSPECIFIED: ICD-10-CM

## 2022-08-01 DIAGNOSIS — N18.31 CHRONIC KIDNEY DISEASE, STAGE 3A (H): ICD-10-CM

## 2022-08-01 PROCEDURE — 99214 OFFICE O/P EST MOD 30 MIN: CPT | Performed by: INTERNAL MEDICINE

## 2022-08-01 ASSESSMENT — PAIN SCALES - GENERAL: PAINLEVEL: NO PAIN (0)

## 2022-08-01 NOTE — PROGRESS NOTES
Assessment & Plan     (K56.609) Small bowel obstruction (H)  (primary encounter diagnosis)  Comment: recurrent SBO; pt frustrated about frequent recurrences; she is interested in meeting with a dietitian to get a good idea how she could adjust her diet to help prevent recurrent SBO  Plan: Nutrition Referral        She is careful with her diet.     (N18.31) Chronic kidney disease, stage 3a (H)  Comment: healthy diet encouraged; control BLOOD PRESSURE, keep hydrated.   Plan: Nutrition Referral          (E03.9) Acquired hypothyroidism  Comment:   TSH   Date Value Ref Range Status   02/01/2022 2.56 0.40 - 4.00 mU/L Final   02/24/2021 2.21 0.40 - 4.00 mU/L Final      Plan: overall, she is doing well; no change in medication warranted at this time.     (I35.0) Aortic valve stenosis, etiology of cardiac valve disease unspecified  Comment: 9/1/2018 ROBYN 1.2 cm2; she denies Syncope, pre-syncope, Angina or Dyspnea; BLOOD PRESSURE is well controlled.   Plan: Echocardiogram Complete          (E66.9) Obesity (H)  Comment: Body mass index is 31.07 kg/m .   Plan: encourage healthy eating.    Ordering of each unique test  Prescription drug management  35 minutes spent on the date of the encounter doing chart review, history and exam, documentation and further activities per the note       MEDICATIONS:   No orders of the defined types were placed in this encounter.         - Continue other medications without change  FURTHER TESTING:       - Cardiac ultrasound  Regular exercise    Return in about 12 weeks (around 10/24/2022) for Wellness visit.    Jesenia Madrigal MD  Internal Medicine   [facility] DAVISMOJACQUES Shultz is a 81 year old, presenting for the following health issues:  Hypertension, Diabetes, Lipids, and Thyroid Problem      History of Present Illness       Reason for visit:  Yearly checkup    She eats 0-1 servings of fruits and vegetables daily.She consumes 0 sweetened  beverage(s) daily.She exercises with enough effort to increase her heart rate 9 or less minutes per day.  She exercises with enough effort to increase her heart rate 3 or less days per week.   She is taking medications regularly.     Review of medications and labs.       Diabetes Follow-up      How often are you checking your blood sugar? Not at all; Diet controlled    What concerns do you have today about your diabetes? None     Do you have any of these symptoms? (Select all that apply)  No numbness or tingling in feet.  No redness, sores or blisters on feet.  No complaints of excessive thirst.  No reports of blurry vision.  No significant changes to weight.      SBO- recurrent;  1987 Gall bladder surgery- laser  Later pt reports some healing complications  1998 incisional hernia  1999 incisional hernia  2010 Umbilical hernia encountering lots of scar tissues.     Recurrent SBO and hospitalizatons; she is looking for dietary help to prevent episodes. she has them at home and monitors if hospitalization is warranted.       Hyperlipidemia Follow-Up      Are you regularly taking any medication or supplement to lower your cholesterol?   Yes- simvastatin    Are you having muscle aches or other side effects that you think could be caused by your cholesterol lowering medication?  No    Hypertension Follow-up      Do you check your blood pressure regularly outside of the clinic? Yes   Well controlled.    Are you following a low salt diet? Yes    Are your blood pressures ever more than 140 on the top number (systolic) OR more   than 90 on the bottom number (diastolic), for example 140/90? No    BP Readings from Last 2 Encounters:   08/01/22 126/62   07/18/22 126/60     Hemoglobin A1C POCT (%)   Date Value   02/24/2021 6.1 (H)   08/26/2020 5.9 (H)     Hemoglobin A1C (%)   Date Value   07/28/2022 5.6   02/01/2022 5.6     LDL Cholesterol Calculated (mg/dL)   Date Value   07/28/2022 62   02/01/2022 73   02/24/2021 85  "  08/26/2020 83       Hypothyroidism Follow-up      Since last visit, patient describes the following symptoms: Weight stable, no hair loss, no skin changes, no constipation, no loose stools    Aortic Stenosis- murmur present  ECHO done Interpretation Summary- ECHO 9/10/2018     The left ventricle is normal in size. There is mild concentric left  ventricular hypertrophy. Left ventricular systolic function is normal. The  visual ejection fraction is estimated at 60-65%. Grade I or early diastolic  dysfunction. No regional wall motion abnormalities noted.  The right ventricle is mildly dilated. The right ventricular systolic function  is normal.  Trace to mild mitral and tricuspid regurgitation.  Moderate valvular aortic stenosis. The peak AoV pressure gradient is 35.7  mmHg. The mean AoV pressure gradient is 22.9 mmHg. The calculated aortic valve  are is 1.2 cm^2.  No pericardial effusion.  In comparison to the previous report dated 12/14/2012, there has been an  interval progression in transaortic gradients and now moderate aortic stenosis  is present.      Review of Systems   CONSTITUTIONAL: NEGATIVE for fever, chills, change in weight  ENT/MOUTH: NEGATIVE for ear, mouth and throat problems  RESP: NEGATIVE for significant cough or SOB  CV: NEGATIVE for chest pain, palpitations or peripheral edema and hx of Aortic stenosis with ROBYN 1.2 cm2 noted on ECHO in 2018  GI: recurrent SBO; pt frustrated about frequent recurrences; she is interested in meeting with a dietitian to get a good idea how she could adjust her diet to help prevent recurrent SBO  MUSCULOSKELETAL: NEGATIVE for significant arthralgias or myalgia  NEURO: NEGATIVE for weakness, dizziness or paresthesias  ENDOCRINE: History of diabetes, hyperlipidemia, hypothyroidism.  Labs and medications reviewed  PSYCHIATRIC: NEGATIVE for changes in mood or affect      Objective    /62   Pulse 57   Temp 98.1  F (36.7  C) (Oral)   Resp 16   Ht 1.626 m (5' 4\")  "  Wt 82.1 kg (181 lb)   LMP  (LMP Unknown)   SpO2 100%   BMI 31.07 kg/m    Body mass index is 31.07 kg/m .  Physical Exam   GENERAL: healthy, alert and no distress  NECK: no adenopathy, no asymmetry, masses, or scars and thyroid normal to palpation  RESP: lungs clear to auscultation - no rales, rhonchi or wheezes  CV: regular rates and rhythm, normal S1 S2, no S3 or S4, grade 3/6 systolic murmur heard best over the base, peripheral pulses strong and no peripheral edema  ABDOMEN: soft, nontender, no hepatosplenomegaly, no masses and bowel sounds normal  MS: no gross musculoskeletal defects noted, no edema  NEURO: Normal strength and tone, mentation intact and speech normal  PSYCH: mentation appears normal, affect normal/bright              .  ..

## 2022-08-09 ENCOUNTER — HOSPITAL ENCOUNTER (OUTPATIENT)
Dept: CARDIOLOGY | Facility: CLINIC | Age: 82
Discharge: HOME | End: 2022-08-09
Attending: INTERNAL MEDICINE | Admitting: INTERNAL MEDICINE
Payer: COMMERCIAL

## 2022-08-09 DIAGNOSIS — I35.0 AORTIC VALVE STENOSIS, ETIOLOGY OF CARDIAC VALVE DISEASE UNSPECIFIED: ICD-10-CM

## 2022-08-09 LAB — LVEF ECHO: NORMAL

## 2022-08-09 PROCEDURE — 93306 TTE W/DOPPLER COMPLETE: CPT | Mod: 26 | Performed by: INTERNAL MEDICINE

## 2022-08-09 PROCEDURE — 93306 TTE W/DOPPLER COMPLETE: CPT

## 2022-08-18 DIAGNOSIS — I35.0 AORTIC VALVE STENOSIS: Primary | ICD-10-CM

## 2022-09-15 ENCOUNTER — OFFICE VISIT (OUTPATIENT)
Dept: CARDIOLOGY | Facility: CLINIC | Age: 82
End: 2022-09-15
Attending: INTERNAL MEDICINE
Payer: COMMERCIAL

## 2022-09-15 VITALS
DIASTOLIC BLOOD PRESSURE: 80 MMHG | BODY MASS INDEX: 31.58 KG/M2 | HEART RATE: 53 BPM | WEIGHT: 185 LBS | HEIGHT: 64 IN | SYSTOLIC BLOOD PRESSURE: 128 MMHG | OXYGEN SATURATION: 99 %

## 2022-09-15 DIAGNOSIS — I35.0 SEVERE AORTIC VALVE STENOSIS: Primary | ICD-10-CM

## 2022-09-15 PROBLEM — E66.01 MORBID OBESITY DUE TO EXCESS CALORIES (H): Status: RESOLVED | Noted: 2017-08-21 | Resolved: 2022-09-15

## 2022-09-15 PROBLEM — K92.2 GI BLEED: Status: RESOLVED | Noted: 2018-03-29 | Resolved: 2022-09-15

## 2022-09-15 PROCEDURE — 93000 ELECTROCARDIOGRAM COMPLETE: CPT | Performed by: INTERNAL MEDICINE

## 2022-09-15 PROCEDURE — 99205 OFFICE O/P NEW HI 60 MIN: CPT | Performed by: INTERNAL MEDICINE

## 2022-09-15 NOTE — LETTER
"9/15/2022    Jes[place] Madrigal [place]  [address] [address] [facility] [address]    RE: Lexii Stratton       Dear Colleague,     I had the pleasure of seeing Lexii Stratton in the [facility].    Clinic visit note dictated. Dictation reference number - [id number]        Today's clinic visit entailed:  Review of the result(s) of each unique test - ECG, labs, echocardiogram.  Ordering of each unique test  60 minutes spent on the date of the encounter doing chart review, history and exam, documentation and further activities per the note          Encounter Diagnosis   Name Primary?     Severe aortic valve stenosis Yes         No orders of the defined types were placed in this encounter.        Vitals: /80 (BP Location: Right arm, Patient Position: Sitting, Cuff Size: Adult Large)   Pulse 53   Ht 1.626 m (5' 4\")   Wt 83.9 kg (185 lb)   LMP  (LMP Unknown)   SpO2 99%   BMI 31.76 kg/m    Wt Readings from Last 5 Encounters:   09/15/22 83.9 kg (185 lb)   08/01/22 82.1 kg (181 lb)   07/18/22 84 kg (185 lb 1.6 oz)   07/03/22 86.8 kg (191 lb 6.4 oz)   02/07/22 87.8 kg (193 lb 9.6 oz)           CURRENT MEDICATIONS:  Current Outpatient Medications   Medication Sig Dispense Refill     acetaminophen (TYLENOL) 500 MG tablet Take 1,000 mg by mouth 2 times daily       amLODIPine (NORVASC) 5 MG tablet TAKE ONE TABLET BY MOUTH EVERY NIGHT AT BEDTIME 90 tablet 3     Biotin 5000 MCG PO TABS Take 1 tablet by mouth daily       calcium carbonate (OS-CINDY) 500 MG tablet Take 1 tablet by mouth Every other day with at lunch.       CENTRUM SILVER OR TABS Take 1 tablet by mouth daily 30 0     Cranberry 450 MG CAPS Take 2 capsules by mouth daily (with dinner)       docusate sodium (COLACE) 100 MG capsule Take 100 mg by mouth At Bedtime       ferrous gluconate (FERGON) 324 (38 FE) MG tablet Take 1 tablet (324 mg) by mouth 2 times daily 100 tablet 0     latanoprost (XALATAN) 0.005 % ophthalmic solution Place 1 drop into " both eyes At Bedtime        levothyroxine (SYNTHROID/LEVOTHROID) 50 MCG tablet Take 1 tablet (50 mcg) by mouth daily 90 tablet 3     lisinopril (ZESTRIL) 30 MG tablet TAKE ONE TABLET BY MOUTH EVERY DAY in the morning  FOR HYPERTENSION 90 tablet 3     magnesium 250 MG tablet Take 1 tablet by mouth daily       meloxicam (MOBIC) 7.5 MG tablet Take 1 tablet (7.5 mg) by mouth daily 90 tablet 3     OMEGA-3 FATTY ACIDS 1200 MG OR CAPS Take 1 capsule by mouth daily  0     pantoprazole (PROTONIX) 20 MG EC tablet Take 1 tablet (20 mg) by mouth daily 90 tablet 3     potassium 99 MG TABS Take 1 tablet by mouth daily (with dinner)        Probiotic Product (ACIDOPHILUS PROBIOTIC BLEND) CAPS Take 1 capsule by mouth daily (with breakfast)  30 capsule 0     simvastatin (ZOCOR) 20 MG tablet TAKE ONE TABLET BY MOUTH EVERY NIGHT AT BEDTIME 90 tablet 3     VITAMIN D, CHOLECALCIFEROL, PO Take 2,000 Units by mouth every other day Supper           ALLERGIES:  Allergies   Allergen Reactions     Augmentin Diarrhea     Codeine Nausea and Vomiting     Hydrocodone      Keeps patient awake     Naproxen Itching and Rash     Seasonal Allergies      Hay fever in fall, ragweed and russian thistles     Sulfa Drugs Nausea       PAST MEDICAL HISTORY:    Past Medical History:   Diagnosis Date     Aortic valve stenosis 8/18/2022     Arthritis      Diabetes (H)     Type 2-no meds... Dieet and exercise only as of 9/15/20     Esophageal reflux      Hernia, abdominal      History of blood transfusion 1984     Hypertension     No cardiologist     Incontinence of urine      Mumps     6 years old     Obese      Osteoarthritis      Other and unspecified hyperlipidemia      Other chronic pain     back     Peptic ulcer, unspecified site, unspecified as acute or chronic, without mention of hemorrhage, perforation, or obstruction      Small bowel obstruction (H)      Thyroid disease hypothyroidism     Urinary incontinence      Walking troubles        PAST SURGICAL  HISTORY:    Past Surgical History:   Procedure Laterality Date     ABDOMEN SURGERY       ARTHROPLASTY HIP Right 11/9/2016    Procedure: ARTHROPLASTY HIP;  Surgeon: Angel Lund MD;  Location:  OR     AS REPAIR INCISIONAL HERNIA,REDUCIBLE  1998    inc hernia ( [place]ileth surgery)     BIOPSY       BLADDER SURGERY      hyperdistention surgery and sling     BREAST SURGERY       CHOLECYSTECTOMY  1987     COLONOSCOPY  12/3/2011    Procedure:COLONOSCOPY; COLONOSCOPY; Surgeon:SEMAJ GRAHAM; Location: GI     COLONOSCOPY N/A 3/29/2018    Procedure: COLONOSCOPY;  COLONOSCOPY Rm 544;  Surgeon: Marco Gusman MD;  Location:  GI     CYSTOSCOPY N/A 9/6/2017    Procedure: CYSTOSCOPY;  CYSTOSCOPY AND HYDRODISTENTION ;  Surgeon: Eyal Bai MD;  Location:  OR     ENT SURGERY      Tonsillectomy     ESOPHAGOSCOPY, GASTROSCOPY, DUODENOSCOPY (EGD), COMBINED N/A 9/26/2016    Procedure: COMBINED ESOPHAGOSCOPY, GASTROSCOPY, DUODENOSCOPY (EGD), BIOPSY SINGLE OR MULTIPLE;  Surgeon: Marco Monaco MD;  Location:  GI     EYE SURGERY Left 05/2019     GENITOURINARY SURGERY       GYN SURGERY      Hysterectomy     HERNIA REPAIR, UMBILICAL  7/8/2010    Dr. Kirt Franco times 3     IMPLANT STIMULATOR SACRAL NERVE STAGE ONE N/A 9/17/2020    Procedure: sacral neurostimulation stage one implant of neurostimulator lead;  Surgeon: Shahnaz Carbone MD;  Location:  OR     IMPLANT STIMULATOR SACRAL NERVE STAGE TWO Right 9/24/2020    Procedure: Removal of interstem lead, NOT implanting neurostimulator;  Surgeon: Shahnaz Carbone MD;  Location:  OR     ORTHOPEDIC SURGERY  2009    [facility] - Dr. Lund- bilateral knee replacement     ZZC NONSPECIFIC PROCEDURE  1946    T&A     ZZC NONSPECIFIC PROCEDURE  1984    Vaginal Hysterectomy (has her ovaries)     ZZC NONSPECIFIC PROCEDURE  1973    PPTL     ZZC NONSPECIFIC PROCEDURE  1994    L shoulder to remove bone spurs     ZZC NONSPECIFIC PROCEDURE  2004    cysto  and durasphere Dr Demarco     ZZC NONSPECIFIC PROCEDURE  2005    cysto and durasphere     ZZC NONSPECIFIC PROCEDURE  2007    cysto and durasphere     ZZC NONSPECIFIC PROCEDURE  2009    retropubic TVT sling       FAMILY HISTORY:    Family History   Problem Relation Age of Onset     Heart Disease Mother         heart surgery , new valve     Gallbladder Disease Mother      Coronary Artery Disease Mother      Hyperlipidemia Mother      Asthma Mother      Hypertension Mother      Anesthesia Reaction Mother      Cancer Father         throat ca,  76     Coronary Artery Disease Father      Other Cancer Father      Diabetes Brother         Half Brother     Aortic stenosis Brother         Had TAVR     Cancer Brother         half brother  lung      Heart Disease Maternal Grandmother      Coronary Artery Disease Maternal Grandmother      Heart Disease Maternal Grandfather      Coronary Artery Disease Maternal Grandfather        SOCIAL HISTORY:    Social History     Socioeconomic History     Marital status: Single     Spouse name: None     Number of children: None     Years of education: None     Highest education level: None   Tobacco Use     Smoking status: Never Smoker     Smokeless tobacco: Never Used     Tobacco comment: have never smoked anything   Vaping Use     Vaping Use: Never used   Substance and Sexual Activity     Alcohol use: No     Alcohol/week: 0.0 standard drinks     Drug use: No     Sexual activity: Not Currently     Partners: Male     Birth control/protection: None     Comment: have had a hysterectomy   Other Topics Concern     Parent/sibling w/ CABG, MI or angioplasty before 65F 55M? No                     Service Date: 09/15/2022    PRIMARY CARE AND REFERRING PROVIDER:  Jesenia Madrigal MD     REASON FOR VISIT:  New diagnosis of severe aortic valve stenosis.    HISTORY OF PRESENT ILLNESS:  Lexii Stratton is new to my practice.  She was last seen in Cardiology by Dr. Moustapha Jack for aortic stenosis in .   At that time it was mild.  In the interval, she has followed up with Dr. Madrigal, who recently auscultated a loud murmur and appropriately obtained a transthoracic echocardiogram, which showed severe aortic valve stenosis, leading to this referral.      Lexii is 81 years old,  for the last 35 years, has a huge social support with family, lives independently, self caring.  For the last 5 months, she has noticed exercise intolerance due to fatigue and dyspnea with her accustomed activity, no chest pain.  Her other comorbidities are treated hypertension, never tobacco user.      I personally reviewed her echocardiogram images.  This is consistent with severe aortic valve stenosis.  Her aortic valve is trileaflet, and there is trace regurgitation and significantly decreased systolic excursion.  Peak transaortic velocity is 4 m/sec, mean gradient is 37-40 mmHg, valve area is 0.9 cm2, normal stroke volume index.  She has mild concentric left ventricular hypertrophy with normal systolic function of 60%-65%.  Normal right ventricular systolic function.  No aortopathy.    ECG done today shows normal sinus rhythm with mild left ventricular hypertrophy.    LABORATORY:  Good lipid panel with LDL of 62, normal creatinine of 0.9, normal A1c and CBC.    PHYSICAL EXAMINATION:    VITAL SIGNS:  /80, pulse 53 per minute, height 5 feet 4 inches, weight 185 pounds, BMI 32 kg/m2.  CARDIOVASCULAR:  Her carotid pulse is slow rising with normal volume.  Apical impulse undisplaced.  Well-audible first and second heart sounds.  She has a late peaking ejection systolic murmur that radiates to both carotids consistent with severe aortic valve stenosis.  RESPIRATORY:  Normal breath sounds without rales or wheezes.  EXTREMITIES:  No edema.    DIAGNOSES:    1.  New diagnosis of severe symptomatic aortic valve stenosis.  2.  Benign essential hypertension.    ASSESSMENT:  The patient has onset of symptoms with accustomed exercise  over the last 6 months, clinical exam and echocardiogram consistent with severe aortic valve stenosis.    PLAN:    1.  I reviewed the diagnosis of aortic valve stenosis and transcatheter aortic valve replacement with the patient.  Her brother also has had a TAVR for aortic valve stenosis, so she is familiar with it.  2.  I have personally made the referral and discussed with the nurse coordinator for the Structural Heart team.  They will be in touch with her to coordinate a referral.  3.  No changes to medications.  She is aware that she will require other testing, such as a CT angiogram of the chest and abdomen and coronary angiogram.  No history of contrast or iodine allergy.    Total time today 60 minutes.  New patient.  High complexity medical decision making.    cc:  Jesenia Madrigal MD   [facility]  [id number] [place] [address]    Holly Mota MD        D: 09/15/2022   T: 09/15/2022   MT: carleen    Name:     AKSHAT ONEIL  MRN:      [id number]        Account:      [id number]   :      1940           Service Date: 09/15/2022       Document: [id number]      Thank you for allowing me to participate in the care of your patient.      Sincerely,     Holly Mota MD     [facility]  cc:   Jesenia Madrigal MD  [id number] [place], [place] [id number]

## 2022-09-15 NOTE — PROGRESS NOTES
"  Clinic visit note dictated. Dictation reference number - [id number]        Today's clinic visit entailed:  Review of the result(s) of each unique test - ECG, labs, echocardiogram.  Ordering of each unique test  60 minutes spent on the date of the encounter doing chart review, history and exam, documentation and further activities per the note          Encounter Diagnosis   Name Primary?     Severe aortic valve stenosis Yes         No orders of the defined types were placed in this encounter.        Vitals: /80 (BP Location: Right arm, Patient Position: Sitting, Cuff Size: Adult Large)   Pulse 53   Ht 1.626 m (5' 4\")   Wt 83.9 kg (185 lb)   LMP  (LMP Unknown)   SpO2 99%   BMI 31.76 kg/m    Wt Readings from Last 5 Encounters:   09/15/22 83.9 kg (185 lb)   08/01/22 82.1 kg (181 lb)   07/18/22 84 kg (185 lb 1.6 oz)   07/03/22 86.8 kg (191 lb 6.4 oz)   02/07/22 87.8 kg (193 lb 9.6 oz)           CURRENT MEDICATIONS:  Current Outpatient Medications   Medication Sig Dispense Refill     acetaminophen (TYLENOL) 500 MG tablet Take 1,000 mg by mouth 2 times daily       amLODIPine (NORVASC) 5 MG tablet TAKE ONE TABLET BY MOUTH EVERY NIGHT AT BEDTIME 90 tablet 3     Biotin 5000 MCG PO TABS Take 1 tablet by mouth daily       calcium carbonate (OS-CINDY) 500 MG tablet Take 1 tablet by mouth Every other day with at lunch.       CENTRUM SILVER OR TABS Take 1 tablet by mouth daily 30 0     Cranberry 450 MG CAPS Take 2 capsules by mouth daily (with dinner)       docusate sodium (COLACE) 100 MG capsule Take 100 mg by mouth At Bedtime       ferrous gluconate (FERGON) 324 (38 FE) MG tablet Take 1 tablet (324 mg) by mouth 2 times daily 100 tablet 0     latanoprost (XALATAN) 0.005 % ophthalmic solution Place 1 drop into both eyes At Bedtime        levothyroxine (SYNTHROID/LEVOTHROID) 50 MCG tablet Take 1 tablet (50 mcg) by mouth daily 90 tablet 3     lisinopril (ZESTRIL) 30 MG tablet TAKE ONE TABLET BY MOUTH EVERY DAY in the " morning  FOR HYPERTENSION 90 tablet 3     magnesium 250 MG tablet Take 1 tablet by mouth daily       meloxicam (MOBIC) 7.5 MG tablet Take 1 tablet (7.5 mg) by mouth daily 90 tablet 3     OMEGA-3 FATTY ACIDS 1200 MG OR CAPS Take 1 capsule by mouth daily  0     pantoprazole (PROTONIX) 20 MG EC tablet Take 1 tablet (20 mg) by mouth daily 90 tablet 3     potassium 99 MG TABS Take 1 tablet by mouth daily (with dinner)        Probiotic Product (ACIDOPHILUS PROBIOTIC BLEND) CAPS Take 1 capsule by mouth daily (with breakfast)  30 capsule 0     simvastatin (ZOCOR) 20 MG tablet TAKE ONE TABLET BY MOUTH EVERY NIGHT AT BEDTIME 90 tablet 3     VITAMIN D, CHOLECALCIFEROL, PO Take 2,000 Units by mouth every other day Supper           ALLERGIES:  Allergies   Allergen Reactions     Augmentin Diarrhea     Codeine Nausea and Vomiting     Hydrocodone      Keeps patient awake     Naproxen Itching and Rash     Seasonal Allergies      Hay fever in fall, ragweed and russian thistles     Sulfa Drugs Nausea       PAST MEDICAL HISTORY:    Past Medical History:   Diagnosis Date     Aortic valve stenosis 8/18/2022     Arthritis      Diabetes (H)     Type 2-no meds... Dieet and exercise only as of 9/15/20     Esophageal reflux      Hernia, abdominal      History of blood transfusion 1984     Hypertension     No cardiologist     Incontinence of urine      Mumps     6 years old     Obese      Osteoarthritis      Other and unspecified hyperlipidemia      Other chronic pain     back     Peptic ulcer, unspecified site, unspecified as acute or chronic, without mention of hemorrhage, perforation, or obstruction      Small bowel obstruction (H)      Thyroid disease hypothyroidism     Urinary incontinence      Walking troubles        PAST SURGICAL HISTORY:    Past Surgical History:   Procedure Laterality Date     ABDOMEN SURGERY       ARTHROPLASTY HIP Right 11/9/2016    Procedure: ARTHROPLASTY HIP;  Surgeon: Angel Lund MD;  Location:  OR      AS REPAIR INCISIONAL HERNIA,REDUCIBLE  1998    inc hernia ( [facility] surgery)     BIOPSY       BLADDER SURGERY      hyperdistention surgery and sling     BREAST SURGERY       CHOLECYSTECTOMY  1987     COLONOSCOPY  12/3/2011    Procedure:COLONOSCOPY; COLONOSCOPY; Surgeon:SEMAJ GRAHAM; Location: GI     COLONOSCOPY N/A 3/29/2018    Procedure: COLONOSCOPY;  COLONOSCOPY Rm 544;  Surgeon: Marco Gusman MD;  Location:  GI     CYSTOSCOPY N/A 9/6/2017    Procedure: CYSTOSCOPY;  CYSTOSCOPY AND HYDRODISTENTION ;  Surgeon: Eyal Bai MD;  Location:  OR     ENT SURGERY      Tonsillectomy     ESOPHAGOSCOPY, GASTROSCOPY, DUODENOSCOPY (EGD), COMBINED N/A 9/26/2016    Procedure: COMBINED ESOPHAGOSCOPY, GASTROSCOPY, DUODENOSCOPY (EGD), BIOPSY SINGLE OR MULTIPLE;  Surgeon: Marco Monaco MD;  Location:  GI     EYE SURGERY Left 05/2019     GENITOURINARY SURGERY       GYN SURGERY      Hysterectomy     HERNIA REPAIR, UMBILICAL  7/8/2010    Dr. Kirt Franco times 3     IMPLANT STIMULATOR SACRAL NERVE STAGE ONE N/A 9/17/2020    Procedure: sacral neurostimulation stage one implant of neurostimulator lead;  Surgeon: Shahnaz Carbone MD;  Location:  OR     IMPLANT STIMULATOR SACRAL NERVE STAGE TWO Right 9/24/2020    Procedure: Removal of interstem lead, NOT implanting neurostimulator;  Surgeon: Shahnaz Carbone MD;  Location:  OR     ORTHOPEDIC SURGERY  2009    [facility] - Dr. Lund- bilateral knee replacement     ZZC NONSPECIFIC PROCEDURE  1946    T&A     ZZC NONSPECIFIC PROCEDURE  1984    Vaginal Hysterectomy (has her ovaries)     ZZC NONSPECIFIC PROCEDURE  1973    PPTL     ZZC NONSPECIFIC PROCEDURE  1994    L shoulder to remove bone spurs     ZZC NONSPECIFIC PROCEDURE  2004    cysto and durasphere Dr Demarco     ZZC NONSPECIFIC PROCEDURE  2005    cysto and durasphere     ZZC NONSPECIFIC PROCEDURE  2007    cysto and durasphere     ZZC NONSPECIFIC PROCEDURE  2009    retropubic TVT sling        FAMILY HISTORY:    Family History   Problem Relation Age of Onset     Heart Disease Mother         heart surgery , new valve     Gallbladder Disease Mother      Coronary Artery Disease Mother      Hyperlipidemia Mother      Asthma Mother      Hypertension Mother      Anesthesia Reaction Mother      Cancer Father         throat ca,  76     Coronary Artery Disease Father      Other Cancer Father      Diabetes Brother         Half Brother     Aortic stenosis Brother         Had TAVR     Cancer Brother         half brother  lung      Heart Disease Maternal Grandmother      Coronary Artery Disease Maternal Grandmother      Heart Disease Maternal Grandfather      Coronary Artery Disease Maternal Grandfather        SOCIAL HISTORY:    Social History     Socioeconomic History     Marital status: Single     Spouse name: None     Number of children: None     Years of education: None     Highest education level: None   Tobacco Use     Smoking status: Never Smoker     Smokeless tobacco: Never Used     Tobacco comment: have never smoked anything   Vaping Use     Vaping Use: Never used   Substance and Sexual Activity     Alcohol use: No     Alcohol/week: 0.0 standard drinks     Drug use: No     Sexual activity: Not Currently     Partners: Male     Birth control/protection: None     Comment: have had a hysterectomy   Other Topics Concern     Parent/sibling w/ CABG, MI or angioplasty before 65F 55M? No

## 2022-09-15 NOTE — PROGRESS NOTES
Service Date: 09/15/2022    PRIMARY CARE AND REFERRING PROVIDER:  Jesenia Madrigal MD     REASON FOR VISIT:  New diagnosis of severe aortic valve stenosis.    HISTORY OF PRESENT ILLNESS:    Lexii Stratton is new to my practice.  She was last seen in Cardiology by Dr. Moustapha Jack for mild aortic valve stenosis in 2002.  She recently followed up with her primary MD, Dr. Madrigal, who auscultated a loud murmur and appropriately obtained a transthoracic echocardiogram, which showed severe aortic valve stenosis, leading to this referral.      Lexii is 81 years old,  for the last 35 years, has a huge social network with family and friends, lives independently, self caring.  For the last 5 months, she has noticed exercise intolerance due to fatigue and dyspnea with her accustomed activity, no chest pain.  Her other comorbidities are treated hypertension, never tobacco user.      I personally reviewed her echocardiogram images.  It is consistent with severe aortic valve stenosis.  Her aortic valve is trileaflet, and there is trace regurgitation and significantly decreased systolic excursion.  Peak transaortic velocity is 4 m/sec, mean gradient is 37-40 mmHg, valve area is 0.9 cm2, normal stroke volume index.  She has mild concentric left ventricular hypertrophy with normal systolic function of 60%-65%.  Normal right ventricular systolic function.  No aortopathy.    ECG done today shows normal sinus rhythm with mild left ventricular hypertrophy.    Labs:  Good lipid panel with LDL of 62, normal creatinine of 0.9, normal A1c and CBC.    PHYSICAL EXAMINATION:    VITAL SIGNS:  /80, pulse 53 per minute, height 5 feet 4 inches, weight 185 pounds, BMI 32 kg/m2.  CARDIOVASCULAR:  Her carotid pulse is slow rising with normal volume.  Apical impulse undisplaced.  Well-audible first and second heart sounds.  She has a late peaking ejection systolic murmur that radiates to both carotids consistent with severe aortic valve  stenosis.  RESPIRATORY:  Normal breath sounds without rales or wheezes.  EXTREMITIES:  No edema.    DIAGNOSES:    1.  New diagnosis of severe symptomatic aortic valve stenosis.  2.  Benign essential hypertension.    ASSESSMENT:    The patient has recent onset of symptoms with accustomed exercise over the last 6 months, clinical exam and echocardiogram consistent with severe aortic valve stenosis. Valve replacement is indicated.    PLAN:    1.  I reviewed the diagnosis of aortic valve stenosis and transcatheter aortic valve replacement with the patient.  Her brother also has had a TAVR for aortic valve stenosis, so she is familiar with it.  2.  I have personally made the referral and discussed with the nurse coordinator for the Structural Heart team.  They will be in touch with her to coordinate a referral.  3.  No changes to medications.  She is aware that she will require other testing, such as a CT angiogram of the chest and abdomen and coronary angiogram.  No history of contrast or iodine allergy.      Total time today 60 minutes.  New patient.  High complexity medical decision making.    cc:  Jesenia Madrigal MD   [address] Ava Mota MD        D: 09/15/2022   T: 09/15/2022   MT: carleen    Name:     AKSHAT ONEIL  MRN:      [id number]        Account:      [id number]   :      1940           Service Date: 09/15/2022       Document: [id number]

## 2022-09-20 ENCOUNTER — TELEPHONE (OUTPATIENT)
Dept: CARDIOLOGY | Facility: CLINIC | Age: 82
End: 2022-09-20

## 2022-09-20 NOTE — TELEPHONE ENCOUNTER
Addendum: Called patient again this AM and reviewed below. Patient will get scheduled in TAVR clinic tomorrow with Dr. Castano at 1545.    TAVR referral received by: Dr. Mota    Called patient and left brief message asking for a callback to review below information and help get her scheduled for TAVR Clinic with Structural MD. Direct callback phone number provided.     Chart reviewed, recent Cr/GFR noted:  Recent Labs   Lab Test 07/28/22  0759 07/05/22  0928 07/04/22  1625 07/04/22  0723    143  --  143   POTASSIUM 3.8 4.2 3.8 3.3*   CHLORIDE 109 111*  --  110*   CO2 27 22  --  26   BUN 20 7  --  13   CR 0.92 0.66  --  0.68   ANIONGAP 3 10  --  7   CINDY 9.6 8.8  --  8.7   GLC 95 142*  --  79     Can schedule TAVR CT prior to office visit per protocol.  Contrast allergy: No known   Telephoned patient to introduce self, provide education on aortic stenosis.   Last dental visit: Last visit was 4 months and next appointment is scheduled in November.   Support/living situation: Home independently. 3 kids that live nearby.   Will provide new patient packet. Provided patient direct contact information.    Suzanne Vann RN  Structural Heart Coordinator  [facility]

## 2022-09-22 ENCOUNTER — OFFICE VISIT (OUTPATIENT)
Dept: CARDIOLOGY | Facility: CLINIC | Age: 82
End: 2022-09-22
Payer: COMMERCIAL

## 2022-09-22 VITALS
WEIGHT: 185 LBS | DIASTOLIC BLOOD PRESSURE: 78 MMHG | HEART RATE: 56 BPM | BODY MASS INDEX: 31.58 KG/M2 | HEIGHT: 64 IN | SYSTOLIC BLOOD PRESSURE: 132 MMHG

## 2022-09-22 DIAGNOSIS — R07.9 CHEST PAIN SYNDROME: Primary | ICD-10-CM

## 2022-09-22 DIAGNOSIS — I35.0 SEVERE AORTIC VALVE STENOSIS: Primary | ICD-10-CM

## 2022-09-22 DIAGNOSIS — I35.0 SEVERE AORTIC STENOSIS: ICD-10-CM

## 2022-09-22 PROCEDURE — 99215 OFFICE O/P EST HI 40 MIN: CPT | Performed by: INTERNAL MEDICINE

## 2022-09-22 PROCEDURE — 99203 OFFICE O/P NEW LOW 30 MIN: CPT | Performed by: SURGERY

## 2022-09-22 RX ORDER — NITROGLYCERIN 0.4 MG/1
TABLET SUBLINGUAL
Qty: 25 TABLET | Refills: 11 | Status: SHIPPED | OUTPATIENT
Start: 2022-09-22

## 2022-09-22 NOTE — LETTER
9/22/2022    Jesenia Madrigal MD  [address]  Counts include 234 beds at the [facility] [address]    RE: Lexii Stratton       Dear Colleague,     I had the pleasure of seeing Lexii Stratton in the [facility].  CARDIOLOGY VALVE CLINIC CONSULT    REASON FOR CONSULT: Aortic stenosis    PRIMARY CARE PHYSICIAN:  Jesenia Madrigal  Primary cardiologist Dr. Ava Mota      HISTORY OF PRESENT ILLNESS:    Lexii Stratton is a very nice 81 year old woman referred for evaluation and management of severe aortic stenosis. She was referred by Dr. Ava Mota and follows in primary care w/Dr. Jesenia Madrigal. She has been progressively fatigued and short of breath over the past few months.  She lives independently. She can go for walks and get upstairs and walk up a hill if needed.    She did experience chest pain on one occasion when she went minigolfing in [place] 5-6 days ago.  She pushed herself much harder than usual that day.  She has never had similar symptoms before or since and has never had rest pain.  She does not have edema.    History of adverse reaction to anesthesia or abnormal airway: no  History of bleeding diathesis: Yes, resolved.  Had hematochezia several years ago.  Hemoglobin normal and has not had recurrent bleeding.  She had normal colonoscopy in 2018 and normal stomach and esophagus on endoscopy in 2016.  She has been off aspirin for several years.  Contrast allergy: no     PAST MEDICAL HISTORY:  Past Medical History:   Diagnosis Date     Aortic valve stenosis 8/18/2022     Arthritis      Diabetes (H)     Type 2-no meds... Dieet and exercise only as of 9/15/20     Esophageal reflux      Hernia, abdominal      History of blood transfusion 1984     Hypertension     No cardiologist     Incontinence of urine      Mumps     6 years old     Obese      Osteoarthritis      Other and unspecified hyperlipidemia      Other chronic pain     back     Peptic ulcer, unspecified site, unspecified as acute or chronic,  without mention of hemorrhage, perforation, or obstruction      Small bowel obstruction (H)      Thyroid disease hypothyroidism     Urinary incontinence      Walking troubles        MEDICATIONS:  Current Outpatient Medications   Medication     acetaminophen (TYLENOL) 500 MG tablet     amLODIPine (NORVASC) 5 MG tablet     Biotin 5000 MCG PO TABS     calcium carbonate (OS-CINDY) 500 MG tablet     CENTRUM SILVER OR TABS     Cranberry 450 MG CAPS     docusate sodium (COLACE) 100 MG capsule     ferrous gluconate (FERGON) 324 (38 FE) MG tablet     latanoprost (XALATAN) 0.005 % ophthalmic solution     levothyroxine (SYNTHROID/LEVOTHROID) 50 MCG tablet     lisinopril (ZESTRIL) 30 MG tablet     magnesium 250 MG tablet     meloxicam (MOBIC) 7.5 MG tablet     OMEGA-3 FATTY ACIDS 1200 MG OR CAPS     pantoprazole (PROTONIX) 20 MG EC tablet     potassium 99 MG TABS     Probiotic Product (ACIDOPHILUS PROBIOTIC BLEND) CAPS     simvastatin (ZOCOR) 20 MG tablet     VITAMIN D, CHOLECALCIFEROL, PO     No current facility-administered medications for this visit.       ALLERGIES:  Allergies   Allergen Reactions     Augmentin Diarrhea     Codeine Nausea and Vomiting     Hydrocodone      Keeps patient awake     Naproxen Itching and Rash     Seasonal Allergies      Hay fever in fall, ragweed and russian thistles     Sulfa Drugs Nausea       SOCIAL HISTORY:  I have reviewed this patient's social history and updated it with pertinent information if needed. Lexii JESS Stratton  reports that she has never smoked. She has never used smokeless tobacco. She reports that she does not drink alcohol and does not use drugs.    FAMILY HISTORY:  Brother had a TAVR      REVIEW OF SYSTEMS:  Skin:  not assessed     Eyes:  not assessed    ENT:  not assessed    Respiratory:  Positive for dyspnea on exertion;cough  Cardiovascular:    Positive for;fatigue  Gastroenterology: not assessed    Genitourinary:  not assessed    Musculoskeletal:  not assessed     Neurologic:  not assessed    Psychiatric:  not assessed    Heme/Lymph/Imm:  Positive for allergies  Endocrine:  Negative      PHYSICAL EXAM:      BP: 132/78 Pulse: 56            Vital Signs with Ranges  Pulse:  [56] 56  BP: (132)/(78) 132/78  185 lbs 0 oz    Constitutional: awake, alert, no distress  Eyes: sclera nonicteric  ENT: trachea midline  Respiratory: Clear to auscultation bilaterally  Cardiovascular: Regular rate and rhythm II/VI systolic murmur loudest at right upper sternal border but appreciated at left upper sternal border as well.  GI: nondistended, nontender, bowel sounds present  Lymph/Hematologic: no lymphadenopathy  Skin: dry, no rash no edema  Musculoskeletal: grossly normal muscle bulk and tone  Neurologic: no focal deficits  Neuropsychiatric: Normal affect      Review of Systems:  Skin:  not assessed     Eyes:  not assessed    ENT:  not assessed    Respiratory:  Positive for dyspnea on exertion;cough  Cardiovascular:    Positive for;fatigue  Gastroenterology: not assessed    Genitourinary:  not assessed    Musculoskeletal:  not assessed    Neurologic:  not assessed    Psychiatric:  not assessed    Heme/Lymph/Imm:  Positive for allergies  Endocrine:  Negative         DATA:   LAST CHOLESTEROL:  Lab Results   Component Value Date    CHOL 132 07/28/2022    CHOL 157 02/24/2021     Lab Results   Component Value Date    HDL 50 07/28/2022    HDL 50 02/24/2021     Lab Results   Component Value Date    LDL 62 07/28/2022    LDL 85 02/24/2021     Lab Results   Component Value Date    TRIG 101 07/28/2022    TRIG 110 02/24/2021     Lab Results   Component Value Date    CHOLHDLRATIO 3.3 06/29/2015       LAST BMP:  Lab Results   Component Value Date     07/28/2022     02/24/2021      Lab Results   Component Value Date    POTASSIUM 3.8 07/28/2022    POTASSIUM 3.9 02/24/2021     Lab Results   Component Value Date    CHLORIDE 109 07/28/2022    CHLORIDE 107 02/24/2021     Lab Results   Component Value  Date    CINDY 9.6 07/28/2022    CINDY 9.9 02/24/2021     Lab Results   Component Value Date    CO2 27 07/28/2022    CO2 28 02/24/2021     Lab Results   Component Value Date    BUN 20 07/28/2022    BUN 18 02/24/2021     Lab Results   Component Value Date    CR 0.92 07/28/2022    CR 0.90 02/24/2021     Lab Results   Component Value Date    GLC 95 07/28/2022     02/24/2021       LAST CBC:  Lab Results   Component Value Date    WBC 5.6 07/05/2022    WBC 11.3 03/12/2021     Lab Results   Component Value Date    RBC 4.51 07/05/2022    RBC 4.56 03/12/2021     Lab Results   Component Value Date    HGB 14.2 07/05/2022    HGB 13.8 03/12/2021     Lab Results   Component Value Date    HCT 44.1 07/05/2022    HCT 43.7 03/12/2021     Lab Results   Component Value Date    MCV 98 07/05/2022    MCV 96 03/12/2021     Lab Results   Component Value Date    MCH 31.5 07/05/2022    MCH 30.3 03/12/2021     Lab Results   Component Value Date    MCHC 32.2 07/05/2022    MCHC 31.6 03/12/2021     Lab Results   Component Value Date    RDW 12.5 07/05/2022    RDW 13.1 03/12/2021     Lab Results   Component Value Date     07/05/2022     03/12/2021         EKG sinus bradycardia with marked sinus arrhythmia and LVH    Echo Echo 8/9/2022 interpretation Summary Severe valvular aortic stenosis.The visual ejection fraction is 55-60%.Left ventricular systolic function is normal.Since 2018 the aortic stenoisis has progressed and is now severe.Right ventricular systolic pressure is elevated, consistent with mild to moderate pulmonary hypertension.The study was technically difficult.  Aortic Valve  The aortic valve is not well visualized. Thickened aortic valve leaflets. Most  likely it is a three leaflet aortic valve. No aortic regurgitation is present.  The peak AoV pressure gradient is 62.1 mmHg. The mean AoV pressure gradient is  37.4 mmHg. The calculated aortic valve are is 0.90 cm^2. Severe valvular  aortic  stenosis.      ASSESSMENT:  1. Severe symptomatic aortic stenosis.  Peak transaortic velocity is 4 m/sec, mean gradient is 37-40 mmHg, valve area is 0.9 cm2, normal stroke volume index. NYHA II.  CCS 2.  2. Hypertension  3. Dyslipidemia      RECOMMENDATIONS:  1. Resume aspirin 81mg PO daily  2. Prescription for nitroglycerine tablets with instructions for use  3. Schedule coronary angiogram with possible PCI.  4. CT TAVR protocol  5. Patient was seen in a shared visit with Dr. Loki Lopez from CV surgery today.  She is likely a candidate for transcatheter valve at her age of 81 in independent living status although probably not prohibitively high risk for surgery if needed.  Please also see note by Dr. Lopez.    Risks and benefits of the procedure were discussed with the patient in detail that includes but not limited to the risk of stroke, heart attack, death, cardiac or vascular perforation, emergent stenting or bypass, contrast induced allergic reaction, renal dysfunction (including risks of temporary or permanent dialysis), risk of respiratory depression with sedation, and vascular complications (including bleeding and transfusion). Patient denies any major active bleeding issues and is willing to take and comply with no absolute contraindication to dual antiplatelet therapy and understands associated bleeding risks. Patient understands the overall risks of the procedure and wishes to proceed.      Yulia Castano MD [facility]  Text Page       Thank you for allowing me to participate in the care of your patient.      Sincerely,     Yulia Castano MD     [facility]  cc:   No referring provider defined for this encounter.

## 2022-09-22 NOTE — PROGRESS NOTES
CARDIOLOGY VALVE CLINIC CONSULT    REASON FOR CONSULT: Aortic stenosis    PRIMARY CARE PHYSICIAN:  Jesenia Madrigal  Primary cardiologist Dr. Ava Mota      HISTORY OF PRESENT ILLNESS:    Lexii Stratton is a very nice 81 year old woman referred for evaluation and management of severe aortic stenosis. She was referred by Dr. Ava Mota and follows in primary care w/Dr. Jesenia Madrigal. She has been progressively fatigued and short of breath over the past few months.  She lives independently. She can go for walks and get upstairs and walk up a hill if needed.    She did experience chest pain on one occasion when she went minigolfing in [place] 5-6 days ago.  She pushed herself much harder than usual that day.  She has never had similar symptoms before or since and has never had rest pain.  She does not have edema.    History of adverse reaction to anesthesia or abnormal airway: no  History of bleeding diathesis: Yes, resolved.  Had hematochezia several years ago.  Hemoglobin normal and has not had recurrent bleeding.  She had normal colonoscopy in 2018 and normal stomach and esophagus on endoscopy in 2016.  She has been off aspirin for several years.  Contrast allergy: no     PAST MEDICAL HISTORY:  Past Medical History:   Diagnosis Date     Aortic valve stenosis 8/18/2022     Arthritis      Diabetes (H)     Type 2-no meds... Dieet and exercise only as of 9/15/20     Esophageal reflux      Hernia, abdominal      History of blood transfusion 1984     Hypertension     No cardiologist     Incontinence of urine      Mumps     6 years old     Obese      Osteoarthritis      Other and unspecified hyperlipidemia      Other chronic pain     back     Peptic ulcer, unspecified site, unspecified as acute or chronic, without mention of hemorrhage, perforation, or obstruction      Small bowel obstruction (H)      Thyroid disease hypothyroidism     Urinary incontinence      Walking troubles        MEDICATIONS:  Current  Outpatient Medications   Medication     acetaminophen (TYLENOL) 500 MG tablet     amLODIPine (NORVASC) 5 MG tablet     Biotin 5000 MCG PO TABS     calcium carbonate (OS-CINDY) 500 MG tablet     CENTRUM SILVER OR TABS     Cranberry 450 MG CAPS     docusate sodium (COLACE) 100 MG capsule     ferrous gluconate (FERGON) 324 (38 FE) MG tablet     latanoprost (XALATAN) 0.005 % ophthalmic solution     levothyroxine (SYNTHROID/LEVOTHROID) 50 MCG tablet     lisinopril (ZESTRIL) 30 MG tablet     magnesium 250 MG tablet     meloxicam (MOBIC) 7.5 MG tablet     OMEGA-3 FATTY ACIDS 1200 MG OR CAPS     pantoprazole (PROTONIX) 20 MG EC tablet     potassium 99 MG TABS     Probiotic Product (ACIDOPHILUS PROBIOTIC BLEND) CAPS     simvastatin (ZOCOR) 20 MG tablet     VITAMIN D, CHOLECALCIFEROL, PO     No current facility-administered medications for this visit.       ALLERGIES:  Allergies   Allergen Reactions     Augmentin Diarrhea     Codeine Nausea and Vomiting     Hydrocodone      Keeps patient awake     Naproxen Itching and Rash     Seasonal Allergies      Hay fever in fall, ragweed and russian thistles     Sulfa Drugs Nausea       SOCIAL HISTORY:  I have reviewed this patient's social history and updated it with pertinent information if needed. Lexii Stratton  reports that she has never smoked. She has never used smokeless tobacco. She reports that she does not drink alcohol and does not use drugs.    FAMILY HISTORY:  Brother had a TAVR      REVIEW OF SYSTEMS:  Skin:  not assessed     Eyes:  not assessed    ENT:  not assessed    Respiratory:  Positive for dyspnea on exertion;cough  Cardiovascular:    Positive for;fatigue  Gastroenterology: not assessed    Genitourinary:  not assessed    Musculoskeletal:  not assessed    Neurologic:  not assessed    Psychiatric:  not assessed    Heme/Lymph/Imm:  Positive for allergies  Endocrine:  Negative      PHYSICAL EXAM:      BP: 132/78 Pulse: 56            Vital Signs with Ranges  Pulse:   [56] 56  BP: (132)/(78) 132/78  185 lbs 0 oz    Constitutional: awake, alert, no distress  Eyes: sclera nonicteric  ENT: trachea midline  Respiratory: Clear to auscultation bilaterally  Cardiovascular: Regular rate and rhythm II/VI systolic murmur loudest at right upper sternal border but appreciated at left upper sternal border as well.  GI: nondistended, nontender, bowel sounds present  Lymph/Hematologic: no lymphadenopathy  Skin: dry, no rash no edema  Musculoskeletal: grossly normal muscle bulk and tone  Neurologic: no focal deficits  Neuropsychiatric: Normal affect      Review of Systems:  Skin:  not assessed     Eyes:  not assessed    ENT:  not assessed    Respiratory:  Positive for dyspnea on exertion;cough  Cardiovascular:    Positive for;fatigue  Gastroenterology: not assessed    Genitourinary:  not assessed    Musculoskeletal:  not assessed    Neurologic:  not assessed    Psychiatric:  not assessed    Heme/Lymph/Imm:  Positive for allergies  Endocrine:  Negative         DATA:   LAST CHOLESTEROL:  Lab Results   Component Value Date    CHOL 132 07/28/2022    CHOL 157 02/24/2021     Lab Results   Component Value Date    HDL 50 07/28/2022    HDL 50 02/24/2021     Lab Results   Component Value Date    LDL 62 07/28/2022    LDL 85 02/24/2021     Lab Results   Component Value Date    TRIG 101 07/28/2022    TRIG 110 02/24/2021     Lab Results   Component Value Date    CHOLHDLRATIO 3.3 06/29/2015       LAST BMP:  Lab Results   Component Value Date     07/28/2022     02/24/2021      Lab Results   Component Value Date    POTASSIUM 3.8 07/28/2022    POTASSIUM 3.9 02/24/2021     Lab Results   Component Value Date    CHLORIDE 109 07/28/2022    CHLORIDE 107 02/24/2021     Lab Results   Component Value Date    CINDY 9.6 07/28/2022    CINDY 9.9 02/24/2021     Lab Results   Component Value Date    CO2 27 07/28/2022    CO2 28 02/24/2021     Lab Results   Component Value Date    BUN 20 07/28/2022    BUN 18 02/24/2021      Lab Results   Component Value Date    CR 0.92 07/28/2022    CR 0.90 02/24/2021     Lab Results   Component Value Date    GLC 95 07/28/2022     02/24/2021       LAST CBC:  Lab Results   Component Value Date    WBC 5.6 07/05/2022    WBC 11.3 03/12/2021     Lab Results   Component Value Date    RBC 4.51 07/05/2022    RBC 4.56 03/12/2021     Lab Results   Component Value Date    HGB 14.2 07/05/2022    HGB 13.8 03/12/2021     Lab Results   Component Value Date    HCT 44.1 07/05/2022    HCT 43.7 03/12/2021     Lab Results   Component Value Date    MCV 98 07/05/2022    MCV 96 03/12/2021     Lab Results   Component Value Date    MCH 31.5 07/05/2022    MCH 30.3 03/12/2021     Lab Results   Component Value Date    MCHC 32.2 07/05/2022    MCHC 31.6 03/12/2021     Lab Results   Component Value Date    RDW 12.5 07/05/2022    RDW 13.1 03/12/2021     Lab Results   Component Value Date     07/05/2022     03/12/2021         EKG sinus bradycardia with marked sinus arrhythmia and LVH    Echo Echo 8/9/2022 interpretation Summary Severe valvular aortic stenosis.The visual ejection fraction is 55-60%.Left ventricular systolic function is normal.Since 2018 the aortic stenoisis has progressed and is now severe.Right ventricular systolic pressure is elevated, consistent with mild to moderate pulmonary hypertension.The study was technically difficult.  Aortic Valve  The aortic valve is not well visualized. Thickened aortic valve leaflets. Most  likely it is a three leaflet aortic valve. No aortic regurgitation is present.  The peak AoV pressure gradient is 62.1 mmHg. The mean AoV pressure gradient is  37.4 mmHg. The calculated aortic valve are is 0.90 cm^2. Severe valvular  aortic stenosis.      ASSESSMENT:  1. Severe symptomatic aortic stenosis.  Peak transaortic velocity is 4 m/sec, mean gradient is 37-40 mmHg, valve area is 0.9 cm2, normal stroke volume index. NYHA II.  CCS  2.  2. Hypertension  3. Dyslipidemia      RECOMMENDATIONS:  1. Resume aspirin 81mg PO daily  2. Prescription for nitroglycerine tablets with instructions for use  3. Schedule coronary angiogram with possible PCI.  4. CT TAVR protocol  5. Patient was seen in a shared visit with Dr. Loki Lopez from CV surgery today.  She is likely a candidate for transcatheter valve at her age of 81 in independent living status although probably not prohibitively high risk for surgery if needed.  Please also see note by Dr. Lopez.    Risks and benefits of the procedure were discussed with the patient in detail that includes but not limited to the risk of stroke, heart attack, death, cardiac or vascular perforation, emergent stenting or bypass, contrast induced allergic reaction, renal dysfunction (including risks of temporary or permanent dialysis), risk of respiratory depression with sedation, and vascular complications (including bleeding and transfusion). Patient denies any major active bleeding issues and is willing to take and comply with no absolute contraindication to dual antiplatelet therapy and understands associated bleeding risks. Patient understands the overall risks of the procedure and wishes to proceed.      Yulia Castano MD [facility]  Text Page

## 2022-09-22 NOTE — NURSING NOTE
TAVR Coordinator visit:  Provided additional education regarding TAVR procedure, after being present for discussion with physician. Explained the work-up process and next steps for patient. Patient provided our direct contact number and instructed to call with any questions.     Completed frailty testing and KCCQ.   5 meter walk: 4 seconds  Frailty score: 1/5 (albumin 3.4)    KCCQ Results:   1a. 5  1b. 4  1c. 2  2. 5  3. 4  4. 6  5. 3  6. 4  7. 2  8a. 3  8b. 2  8c. 4    Preliminary STS Risk Score: 2.18  NYHA Class: III    Coronary angiogram and TAVR CT ordered. Message sent to structural  to arrange. Requested expedited coronary angiogram if possible (prefer in the next week).    Jacquelin Avitia RN  Structural Heart Coordinator  [facility]

## 2022-09-26 NOTE — PROGRESS NOTES
Service Date: 09/22/2022    HISTORY OF PRESENT ILLNESS:  Ms. Stratton is a pleasant 81-year-old female who was referred to us for evaluation of severe aortic stenosis.  Ms. Stratton has a past medical history of hypertension, small-bowel obstruction, anemia, GERD, hyperlipidemia, obesity, who has had worsening shortness of breath, fatigue, and tiredness the last several months.  She was diagnosed with severe aortic stenosis.  She gets occasional chest tightness.  Denies any fever, chills, syncope, palpitation, loss of consciousness.    PAST MEDICAL HISTORY:  Hypertension, GERD, anemia, hyperlipidemia, obesity, hypothyroidism, glaucoma.        MEDICATIONS:  Lisinopril, Protonix, simvastatin, amlodipine, levothyroxine.    SOCIAL HISTORY:  Denies current alcohol, drug, or tobacco abuse.    ALLERGIES:  AUGMENTIN, CODEINE, NAPROXEN, SULFA DRUGS.    FAMILY HISTORY:  Reviewed.    REVIEW OF SYSTEMS:  A 10-point review of systems within normal other than mentioned in history and physical.    PHYSICAL EXAMINATION:    VITAL SIGNS:  She is afebrile, blood pressure 110/74, heart rate 68.   GENERAL:  She is awake, alert, oriented x 3.  Appears comfortable at rest.  CARDIOVASCULAR:  S1, S2 normal.  No S3.  A 3/6 systolic murmur at left sternal border.  RESPIRATORY SYSTEM:  Bilateral breath sounds.  No rhonchi or crepitations.  ABDOMEN:  Bowel sounds present, nondistended, nontender.  EXTREMITIES:  Lower extremities warm and well perfused.  Mild pedal edema.  NEUROLOGIC:  No focal deficits.  EYES:  Normal.  ENT:  Normal.    LABORATORY:  Electrolytes within normal limits.  White cell count within normal limits.  Echocardiogram shows ejection fraction 55% to 60%.  Aortic valve area of 0.9 cm2.  Aortic valve mean gradient 27.4 mmHg.  Aortic valve velocity 3.9 meters per second.    ASSESSMENT AND PLAN:  An 81-year-old female with severe symptomatic aortic stenosis.  I agree with the recommendation for a TAVR as she is at moderate risk for  adverse complications after surgical aortic valve replacement given her elderly age and comorbidities including obesity.  She is willing to proceed with this.  She will need a coronary angiogram and a TAVR CT for final evaluation.  If any questions regarding this care, please contact me.    Loki Lopez MD        D: 2022   T: 2022   MT: KONG    Name:     AKSHAT ONEIL  MRN:      [id number]        Account:      [id number]   :      1940           Service Date: 2022       Document: [id number]    cc:  Holly Mota MD

## 2022-09-28 ENCOUNTER — TELEPHONE (OUTPATIENT)
Dept: CARDIOLOGY | Facility: CLINIC | Age: 82
End: 2022-09-28

## 2022-09-28 DIAGNOSIS — R93.1 ABNORMAL FINDINGS DIAGNOSTIC IMAGING OF HEART AND CORONARY CIRCULATION: ICD-10-CM

## 2022-09-28 DIAGNOSIS — I35.0 SEVERE AORTIC VALVE STENOSIS: Primary | ICD-10-CM

## 2022-09-28 RX ORDER — LIDOCAINE 40 MG/G
CREAM TOPICAL
Status: CANCELLED | OUTPATIENT
Start: 2022-09-28

## 2022-09-28 RX ORDER — ASPIRIN 81 MG/1
243 TABLET, CHEWABLE ORAL ONCE
Status: CANCELLED | OUTPATIENT
Start: 2022-09-28

## 2022-09-28 RX ORDER — SODIUM CHLORIDE 9 MG/ML
INJECTION, SOLUTION INTRAVENOUS CONTINUOUS
Status: CANCELLED | OUTPATIENT
Start: 2022-09-28

## 2022-09-28 RX ORDER — ASPIRIN 325 MG
325 TABLET ORAL ONCE
Status: CANCELLED | OUTPATIENT
Start: 2022-09-28 | End: 2022-09-28

## 2022-09-28 RX ORDER — POTASSIUM CHLORIDE 1500 MG/1
20 TABLET, EXTENDED RELEASE ORAL
Status: CANCELLED | OUTPATIENT
Start: 2022-09-28

## 2022-09-28 NOTE — TELEPHONE ENCOUNTER
[facility] HEART [facility] - ANGIOGRAM INSTRUCTIONS:  - Lexii Stratton is scheduled for a coronary angiogram at [facility] on 22. Check in time is at 0630, scheduled as 0830 case.  - Advised patient not eat or drink after midnight on day of procedure.   - Patient advised to take morning medications with a sip of water on the day of the procedure, noting the followin. Aspirin: Patient is currently taking 81mg of aspirin, patient instructed to take 4 tabs (324mg) of aspirin the morning of the procedure.  2. Diabetic Medications: Patient does not take Metformin or other diabetic medications.  3. Anticoagulants: Patient does not take an anticoagulant.  4. Diuretics: Patient does not take diuretics.  5. All vitamins and supplements to be held the morning of the procedure.  - Verified patient does not have an allergy to contrast dye.  - Verified patient has someone available to drive them home from the hospital and can stay with them for 24 hours after the procedure. They understand that one visitor may accompany them into the hospital on the day of angiogram, with both patient and visitor to wear a mask.  - COVID testing: Patient took a home test this morning (22) and it was negative. She will bring this result with her.   - Patient was instructed to take their temperature the morning of procedure and if temperature is >100 degrees F patient should not come in and call [phone].  - Angiogram orders have been signed & held.    Marylou Alba RN  [facility]

## 2022-09-29 ENCOUNTER — TELEPHONE (OUTPATIENT)
Dept: MEDSURG UNIT | Facility: CLINIC | Age: 82
End: 2022-09-29

## 2022-09-29 NOTE — PROGRESS NOTES
Erroneous encounter, disregard.     ISABEL TamezN, RN, PHN, CHFN, HNB-BC   9/29/2022 at 9:12 AM

## 2022-09-30 ENCOUNTER — HOSPITAL ENCOUNTER (OUTPATIENT)
Facility: CLINIC | Age: 82
Discharge: HOME | End: 2022-09-30
Admitting: INTERNAL MEDICINE
Payer: COMMERCIAL

## 2022-09-30 VITALS
HEIGHT: 65 IN | HEART RATE: 54 BPM | DIASTOLIC BLOOD PRESSURE: 71 MMHG | TEMPERATURE: 97.9 F | SYSTOLIC BLOOD PRESSURE: 129 MMHG | WEIGHT: 183.6 LBS | BODY MASS INDEX: 30.59 KG/M2 | OXYGEN SATURATION: 92 % | RESPIRATION RATE: 16 BRPM

## 2022-09-30 DIAGNOSIS — I35.0 AORTIC VALVE STENOSIS: ICD-10-CM

## 2022-09-30 DIAGNOSIS — R07.9 CHEST PAIN SYNDROME: ICD-10-CM

## 2022-09-30 DIAGNOSIS — R93.1 ABNORMAL FINDINGS DIAGNOSTIC IMAGING OF HEART AND CORONARY CIRCULATION: ICD-10-CM

## 2022-09-30 PROBLEM — Z98.890 STATUS POST CORONARY ANGIOGRAM: Status: ACTIVE | Noted: 2022-09-30

## 2022-09-30 LAB
ANION GAP SERPL CALCULATED.3IONS-SCNC: 6 MMOL/L (ref 3–14)
APTT PPP: 29 SECONDS (ref 22–38)
BUN SERPL-MCNC: 18 MG/DL (ref 7–30)
CALCIUM SERPL-MCNC: 9.8 MG/DL (ref 8.5–10.1)
CHLORIDE BLD-SCNC: 109 MMOL/L (ref 94–109)
CO2 SERPL-SCNC: 26 MMOL/L (ref 20–32)
CREAT SERPL-MCNC: 0.78 MG/DL (ref 0.52–1.04)
ERYTHROCYTE [DISTWIDTH] IN BLOOD BY AUTOMATED COUNT: 13.3 % (ref 10–15)
GFR SERPL CREATININE-BSD FRML MDRD: 76 ML/MIN/1.73M2
GLUCOSE BLD-MCNC: 107 MG/DL (ref 70–99)
HCT VFR BLD AUTO: 39.2 % (ref 35–47)
HGB BLD-MCNC: 12.8 G/DL (ref 11.7–15.7)
INR PPP: 1.07 (ref 0.85–1.15)
MCH RBC QN AUTO: 31.8 PG (ref 26.5–33)
MCHC RBC AUTO-ENTMCNC: 32.7 G/DL (ref 31.5–36.5)
MCV RBC AUTO: 97 FL (ref 78–100)
PLATELET # BLD AUTO: 157 10E3/UL (ref 150–450)
POTASSIUM BLD-SCNC: 3.8 MMOL/L (ref 3.4–5.3)
RBC # BLD AUTO: 4.03 10E6/UL (ref 3.8–5.2)
SODIUM SERPL-SCNC: 141 MMOL/L (ref 133–144)
WBC # BLD AUTO: 4.2 10E3/UL (ref 4–11)

## 2022-09-30 PROCEDURE — 99152 MOD SED SAME PHYS/QHP 5/>YRS: CPT | Performed by: INTERNAL MEDICINE

## 2022-09-30 PROCEDURE — 93010 ELECTROCARDIOGRAM REPORT: CPT | Performed by: INTERNAL MEDICINE

## 2022-09-30 PROCEDURE — 80048 BASIC METABOLIC PNL TOTAL CA: CPT | Performed by: INTERNAL MEDICINE

## 2022-09-30 PROCEDURE — 272N000001 HC OR GENERAL SUPPLY STERILE: Performed by: INTERNAL MEDICINE

## 2022-09-30 PROCEDURE — 999N000054 HC STATISTIC EKG NON-CHARGEABLE

## 2022-09-30 PROCEDURE — C1760 CLOSURE DEV, VASC: HCPCS | Performed by: INTERNAL MEDICINE

## 2022-09-30 PROCEDURE — 258N000003 HC RX IP 258 OP 636: Performed by: INTERNAL MEDICINE

## 2022-09-30 PROCEDURE — 36591 DRAW BLOOD OFF VENOUS DEVICE: CPT

## 2022-09-30 PROCEDURE — C1894 INTRO/SHEATH, NON-LASER: HCPCS | Performed by: INTERNAL MEDICINE

## 2022-09-30 PROCEDURE — 85730 THROMBOPLASTIN TIME PARTIAL: CPT | Performed by: INTERNAL MEDICINE

## 2022-09-30 PROCEDURE — 999N000071 HC STATISTIC HEART CATH LAB OR EP LAB

## 2022-09-30 PROCEDURE — 85610 PROTHROMBIN TIME: CPT | Performed by: INTERNAL MEDICINE

## 2022-09-30 PROCEDURE — 93005 ELECTROCARDIOGRAM TRACING: CPT

## 2022-09-30 PROCEDURE — 36415 COLL VENOUS BLD VENIPUNCTURE: CPT | Performed by: INTERNAL MEDICINE

## 2022-09-30 PROCEDURE — 93454 CORONARY ARTERY ANGIO S&I: CPT | Mod: 26 | Performed by: INTERNAL MEDICINE

## 2022-09-30 PROCEDURE — 85027 COMPLETE CBC AUTOMATED: CPT | Performed by: INTERNAL MEDICINE

## 2022-09-30 PROCEDURE — 999N000184 HC STATISTIC TELEMETRY

## 2022-09-30 PROCEDURE — 250N000011 HC RX IP 250 OP 636: Performed by: INTERNAL MEDICINE

## 2022-09-30 PROCEDURE — 250N000013 HC RX MED GY IP 250 OP 250 PS 637: Performed by: INTERNAL MEDICINE

## 2022-09-30 PROCEDURE — 93454 CORONARY ARTERY ANGIO S&I: CPT | Performed by: INTERNAL MEDICINE

## 2022-09-30 PROCEDURE — 250N000009 HC RX 250: Performed by: INTERNAL MEDICINE

## 2022-09-30 DEVICE — CLOSURE ANGIOSEAL 6FR 610130: Type: IMPLANTABLE DEVICE | Status: FUNCTIONAL

## 2022-09-30 RX ORDER — POTASSIUM CHLORIDE 1500 MG/1
20 TABLET, EXTENDED RELEASE ORAL
Status: COMPLETED | OUTPATIENT
Start: 2022-09-30 | End: 2022-09-30

## 2022-09-30 RX ORDER — ASPIRIN 325 MG
325 TABLET ORAL ONCE
Status: DISCONTINUED | OUTPATIENT
Start: 2022-09-30 | End: 2022-09-30 | Stop reason: HOSPADM

## 2022-09-30 RX ORDER — FENTANYL CITRATE 50 UG/ML
INJECTION, SOLUTION INTRAMUSCULAR; INTRAVENOUS
Status: DISCONTINUED | OUTPATIENT
Start: 2022-09-30 | End: 2022-09-30 | Stop reason: HOSPADM

## 2022-09-30 RX ORDER — SODIUM CHLORIDE 9 MG/ML
INJECTION, SOLUTION INTRAVENOUS CONTINUOUS
Status: DISCONTINUED | OUTPATIENT
Start: 2022-09-30 | End: 2022-09-30 | Stop reason: HOSPADM

## 2022-09-30 RX ORDER — NALOXONE HYDROCHLORIDE 0.4 MG/ML
0.2 INJECTION, SOLUTION INTRAMUSCULAR; INTRAVENOUS; SUBCUTANEOUS
Status: DISCONTINUED | OUTPATIENT
Start: 2022-09-30 | End: 2022-09-30 | Stop reason: HOSPADM

## 2022-09-30 RX ORDER — ACETAMINOPHEN 325 MG/1
650 TABLET ORAL EVERY 4 HOURS PRN
Status: DISCONTINUED | OUTPATIENT
Start: 2022-09-30 | End: 2022-09-30 | Stop reason: HOSPADM

## 2022-09-30 RX ORDER — OXYCODONE HYDROCHLORIDE 5 MG/1
5 TABLET ORAL EVERY 4 HOURS PRN
Status: DISCONTINUED | OUTPATIENT
Start: 2022-09-30 | End: 2022-09-30 | Stop reason: HOSPADM

## 2022-09-30 RX ORDER — ATROPINE SULFATE 0.1 MG/ML
0.5 INJECTION INTRAVENOUS
Status: DISCONTINUED | OUTPATIENT
Start: 2022-09-30 | End: 2022-09-30 | Stop reason: HOSPADM

## 2022-09-30 RX ORDER — ASPIRIN 81 MG/1
243 TABLET, CHEWABLE ORAL ONCE
Status: DISCONTINUED | OUTPATIENT
Start: 2022-09-30 | End: 2022-09-30 | Stop reason: HOSPADM

## 2022-09-30 RX ORDER — IOPAMIDOL 755 MG/ML
INJECTION, SOLUTION INTRAVASCULAR
Status: DISCONTINUED | OUTPATIENT
Start: 2022-09-30 | End: 2022-09-30 | Stop reason: HOSPADM

## 2022-09-30 RX ORDER — OXYCODONE HYDROCHLORIDE 5 MG/1
10 TABLET ORAL EVERY 4 HOURS PRN
Status: DISCONTINUED | OUTPATIENT
Start: 2022-09-30 | End: 2022-09-30 | Stop reason: HOSPADM

## 2022-09-30 RX ORDER — LIDOCAINE 40 MG/G
CREAM TOPICAL
Status: DISCONTINUED | OUTPATIENT
Start: 2022-09-30 | End: 2022-09-30 | Stop reason: HOSPADM

## 2022-09-30 RX ORDER — FENTANYL CITRATE 50 UG/ML
25 INJECTION, SOLUTION INTRAMUSCULAR; INTRAVENOUS
Status: DISCONTINUED | OUTPATIENT
Start: 2022-09-30 | End: 2022-09-30 | Stop reason: HOSPADM

## 2022-09-30 RX ORDER — NALOXONE HYDROCHLORIDE 0.4 MG/ML
0.4 INJECTION, SOLUTION INTRAMUSCULAR; INTRAVENOUS; SUBCUTANEOUS
Status: DISCONTINUED | OUTPATIENT
Start: 2022-09-30 | End: 2022-09-30 | Stop reason: HOSPADM

## 2022-09-30 RX ORDER — FLUMAZENIL 0.1 MG/ML
0.2 INJECTION, SOLUTION INTRAVENOUS
Status: DISCONTINUED | OUTPATIENT
Start: 2022-09-30 | End: 2022-09-30 | Stop reason: HOSPADM

## 2022-09-30 RX ADMIN — POTASSIUM CHLORIDE 20 MEQ: 1500 TABLET, EXTENDED RELEASE ORAL at 08:18

## 2022-09-30 RX ADMIN — SODIUM CHLORIDE: 9 INJECTION, SOLUTION INTRAVENOUS at 07:15

## 2022-09-30 ASSESSMENT — ACTIVITIES OF DAILY LIVING (ADL)
ADLS_ACUITY_SCORE: 35

## 2022-09-30 NOTE — PROGRESS NOTES
Care Suites Post Procedure Note    Patient Information  Name: Lexii Stratton  Age: 81 year old    Post Procedure  Time patient returned to Care Suites: 0900  Concerns/abnormal assessment: none  If abnormal assessment, provider notified: N/A  Plan/Other: Right groin soft no bruising/bleeding hematoma. Denies pain CMS goo to RLE. VSS, Sinus solis 40-60's. Dr. Barber updated pt and family, Daughter in law at bedside.    Hailey Luevano RN

## 2022-09-30 NOTE — DISCHARGE INSTRUCTIONS
Cardiac Angiogram Discharge Instructions - Femoral    After you go home:    Have an adult stay with you until tomorrow.  Drink extra fluids for 2 days.  You may resume your normal diet.  No smoking       For 24 hours - due to the sedation you received:  Relax and take it easy.  Do NOT make any important or legal decisions.  Do NOT drive or operate machines at home or at work.  Do NOT drink alcohol.    Care of Groin Puncture Site:    For the first 24 hrs - check the puncture site every 1-2 hours while awake.  For 2 days, when you cough, sneeze, laugh or move your bowels, hold your hand over the puncture site and press firmly.  Remove the bandaid after 24 hours. If there is minor oozing, apply another bandaid and remove it after 12 hours.  It is normal to have a small bruise or pea size lump at the site.  You may shower tomorrow. Do NOT take a bath, or use a hot tub or pool for at least 3 days. Do NOT scrub the site. Do not use lotion or powder near the puncture site.    Activity:            For 2 days:  No stooping or squatting  Do NOT do any heavy activity such as exercise, lifting, or straining.   No housework, yard work or any activity that make you sweat  Do NOT lift more than 10 pounds    Bleeding:    If you start bleeding from the site in your groin, lie down flat and press firmly on/above the site for 10 minutes.   Once bleeding stops, lay flat for 2 hours.   Call [facility] as soon as you can.       Call 911 right away if you have heavy bleeding or bleeding that does not stop.      Medicines:    Take your medications, including blood thinners, unless your provider tells you not to.    If you have stopped any medicines, check with your provider about when to restart them.    Follow Up Appointments:    Follow up with [facility] Nurse Practitioner at [facility] of patient preference in 7-10 days.    Call the clinic if:    You have increased pain or a large or growing hard lump around the site.  The site is  red, swollen, hot or tender.  Blood or fluid is draining from the site.  You have chills or a fever greater than 101 F (38 C).  Your leg feels numb, cool or changes color.  You have hives, a rash or unusual itching.  New pain in the back or belly that you cannot control with Tylenol.  Any questions or concerns.          [facility] [place]:    [phone] [facility]MP (7 days a week)

## 2022-09-30 NOTE — PROGRESS NOTES
Care Suites Admission Nursing Note    Patient Information  Name: Lexii Stratton  Age: 81 year old  Reason for admission: Heart cath  Care Suites arrival time: 0635    Visitor Information  Name: Ash  Informed of visitor restrictions: Yes  1 visitor allowed per patient   Visitor must screen negative for COVID symptoms   Visitor must wear a mask  Waiting rooms closed to visitors    Patient Admission/Assessment   Pre-procedure assessment complete: Yes  If abnormal assessment/labs, provider notified: N/A  NPO: Yes  Medications held per instructions/orders: Yes  Consent: deferred  If applicable, pregnancy test status: deferred  Patient oriented to room: Yes  Education/questions answered: Yes  Plan/other: Lungs clear diminished at bases, pulses weak on right Doppler on left. Trace ankle edema. Denies pain. Reviewed plan for today, questions answered.     Discharge Planning  Discharge name/phone number: Ash   Overnight post sedation caregiver: Madisyn (daughter)  Discharge location: home    Hailey Luevano RN

## 2022-09-30 NOTE — PROGRESS NOTES
Care Suites Discharge Nursing Note    Patient Information  Name: Lexii Stratton  Age: 81 year old    Discharge Education:  Discharge instructions reviewed: Yes  Additional education/resources provided: groin site cares  Patient/patient representative verbalizes understanding: Yes  Patient discharging on new medications: No  Medication education completed: Yes    Discharge Plans:   Discharge location: home  Discharge ride contacted: Yes  Approximate discharge time: 1135    Discharge Criteria:  Discharge criteria met and vital signs stable: Yes    Patient Belongs:  Patient belongings returned to patient: Yes    Beverly Jeronimo RN

## 2022-10-04 LAB
ATRIAL RATE - MUSE: 53 BPM
DIASTOLIC BLOOD PRESSURE - MUSE: NORMAL MMHG
INTERPRETATION ECG - MUSE: NORMAL
P AXIS - MUSE: 51 DEGREES
PR INTERVAL - MUSE: 222 MS
QRS DURATION - MUSE: 96 MS
QT - MUSE: 454 MS
QTC - MUSE: 426 MS
R AXIS - MUSE: -14 DEGREES
SYSTOLIC BLOOD PRESSURE - MUSE: NORMAL MMHG
T AXIS - MUSE: 94 DEGREES
VENTRICULAR RATE- MUSE: 53 BPM

## 2022-10-07 ENCOUNTER — OFFICE VISIT (OUTPATIENT)
Dept: CARDIOLOGY | Facility: CLINIC | Age: 82
End: 2022-10-07
Payer: COMMERCIAL

## 2022-10-07 VITALS
OXYGEN SATURATION: 98 % | BODY MASS INDEX: 31.84 KG/M2 | DIASTOLIC BLOOD PRESSURE: 78 MMHG | HEART RATE: 73 BPM | SYSTOLIC BLOOD PRESSURE: 131 MMHG | HEIGHT: 64 IN | WEIGHT: 186.5 LBS

## 2022-10-07 DIAGNOSIS — I35.0 SEVERE AORTIC VALVE STENOSIS: Primary | ICD-10-CM

## 2022-10-07 DIAGNOSIS — I10 BENIGN ESSENTIAL HYPERTENSION: ICD-10-CM

## 2022-10-07 DIAGNOSIS — E78.5 HYPERLIPIDEMIA LDL GOAL <70: ICD-10-CM

## 2022-10-07 DIAGNOSIS — I25.10 CORONARY ARTERY DISEASE INVOLVING NATIVE CORONARY ARTERY OF NATIVE HEART WITHOUT ANGINA PECTORIS: ICD-10-CM

## 2022-10-07 PROCEDURE — 99214 OFFICE O/P EST MOD 30 MIN: CPT | Performed by: PHYSICIAN ASSISTANT

## 2022-10-07 NOTE — LETTER
10/7/2022    Jesenia Madrigal MD  [address]  [facility] [address]    RE: Lexii Stratton       Dear Colleague,     I had the pleasure of seeing Lexii Stratton in the [facility].  Primary Cardiologist: Dr. Mota    Reason for Visit: Post Angiogram Follow up     History of Present Illness:   Lexii is a very pleasant 81-year-old female with past medical history notable for:  1. Severe symptomatic aortic stenosis.  Peak transaortic velocity is 4 m/sec, mean gradient is 37-40 mmHg, valve area is 0.9 cm2, normal stroke volume index. NYHA II.  CCS 2.  2. Hypertension  3. Dyslipidemia  4. Mild nonocclusive CAD.  On aspirin 81 mg daily     Lexii has no immediate concerns or questions following her pre-TAVR coronary angiogram.  Her femoral site has healed well with no discomfort.  She is anxious to complete her TAVR procedure soon as possible.  She is scheduled for TAVR CT angiogram early next week.  She denies significant symptoms but does report dyspnea on exertion and exercise intolerance.     Assessment and Plan:  Lexii is a very pleasant 81-year-old female with past medical history notable for:  5. Severe symptomatic aortic stenosis.  Peak transaortic velocity is 4 m/sec, mean gradient is 37-40 mmHg, valve area is 0.9 cm2, normal stroke volume index. NYHA II.  CCS 2.  6. Hypertension  7. Dyslipidemia  8. Mild nonocclusive CAD.  On aspirin 81 mg daily    Lexii appears to be doing well from a cardiac standpoint.  She will continue with current medications with no additional changes at this time.  She is planned for CT TAVR early next week.       This note was completed in part using Dragon voice recognition software. Although reviewed after completion, some word and grammatical errors may occur.    Orders this Visit:  No orders of the defined types were placed in this encounter.    No orders of the defined types were placed in this encounter.    There are no discontinued  medications.      No diagnosis found.    CURRENT MEDICATIONS:  Current Outpatient Medications   Medication Sig Dispense Refill     acetaminophen (TYLENOL) 500 MG tablet Take 1,000 mg by mouth 2 times daily       amLODIPine (NORVASC) 5 MG tablet TAKE ONE TABLET BY MOUTH EVERY NIGHT AT BEDTIME 90 tablet 3     aspirin (ASA) 81 MG EC tablet Take 1 tablet (81 mg) by mouth daily 90 tablet 3     Biotin 5000 MCG PO TABS Take 1 tablet by mouth daily       calcium carbonate (OS-CINDY) 500 MG tablet Take 1 tablet by mouth Every other day with at lunch.       CENTRUM SILVER OR TABS Take 1 tablet by mouth daily 30 0     Cranberry 450 MG CAPS Take 2 capsules by mouth daily (with dinner)       docusate sodium (COLACE) 100 MG capsule Take 100 mg by mouth At Bedtime       ferrous gluconate (FERGON) 324 (38 FE) MG tablet Take 1 tablet (324 mg) by mouth 2 times daily 100 tablet 0     latanoprost (XALATAN) 0.005 % ophthalmic solution Place 1 drop into both eyes At Bedtime        levothyroxine (SYNTHROID/LEVOTHROID) 50 MCG tablet Take 1 tablet (50 mcg) by mouth daily 90 tablet 3     lisinopril (ZESTRIL) 30 MG tablet TAKE ONE TABLET BY MOUTH EVERY DAY in the morning  FOR HYPERTENSION 90 tablet 3     magnesium 250 MG tablet Take 1 tablet by mouth daily       meloxicam (MOBIC) 7.5 MG tablet Take 1 tablet (7.5 mg) by mouth daily 90 tablet 3     nitroGLYcerin (NITROSTAT) 0.4 MG sublingual tablet For chest pain place 1 tablet under the tongue every 5 minutes for 3 doses. If symptoms persist 5 minutes after 1st dose call 911. 25 tablet 11     OMEGA-3 FATTY ACIDS 1200 MG OR CAPS Take 1 capsule by mouth daily  0     pantoprazole (PROTONIX) 20 MG EC tablet Take 1 tablet (20 mg) by mouth daily 90 tablet 3     potassium 99 MG TABS Take 1 tablet by mouth daily (with dinner)        Probiotic Product (ACIDOPHILUS PROBIOTIC BLEND) CAPS Take 1 capsule by mouth daily (with breakfast)  30 capsule 0     simvastatin (ZOCOR) 20 MG tablet TAKE ONE TABLET BY  MOUTH EVERY NIGHT AT BEDTIME 90 tablet 3     VITAMIN D, CHOLECALCIFEROL, PO Take 2,000 Units by mouth every other day Supper         ALLERGIES     Allergies   Allergen Reactions     Augmentin Diarrhea     Codeine Nausea and Vomiting     Hydrocodone      Keeps patient awake     Naproxen Itching and Rash     Seasonal Allergies      Hay fever in fall, ragweed and russian thistles     Sulfa Drugs Nausea       PAST MEDICAL HISTORY:  Past Medical History:   Diagnosis Date     Aortic valve stenosis 8/18/2022     Arthritis      Diabetes (H)     Type 2-no meds... Dieet and exercise only as of 9/15/20     Esophageal reflux      Hernia, abdominal      History of blood transfusion 1984     Hypertension     No cardiologist     Incontinence of urine      Mumps     6 years old     Obese      Osteoarthritis      Other and unspecified hyperlipidemia      Other chronic pain     back     Peptic ulcer, unspecified site, unspecified as acute or chronic, without mention of hemorrhage, perforation, or obstruction      Small bowel obstruction (H)      Thyroid disease hypothyroidism     Urinary incontinence      Walking troubles        PAST SURGICAL HISTORY:  Past Surgical History:   Procedure Laterality Date     ABDOMEN SURGERY       ARTHROPLASTY HIP Right 11/9/2016    Procedure: ARTHROPLASTY HIP;  Surgeon: Angle Lund MD;  Location:  OR     AS REPAIR INCISIONAL HERNIA,REDUCIBLE  1998    inc hernia ( Yamileth surgery)     BIOPSY       BLADDER SURGERY      hyperdistention surgery and sling     BREAST SURGERY       CHOLECYSTECTOMY  1987     COLONOSCOPY  12/3/2011    Procedure:COLONOSCOPY; COLONOSCOPY; Surgeon:SEMAJ GRAHAM; Location: GI     COLONOSCOPY N/A 3/29/2018    Procedure: COLONOSCOPY;  COLONOSCOPY Rm 544;  Surgeon: Marco Gusman MD;  Location:  GI     CV CORONARY ANGIOGRAM N/A 9/30/2022    Procedure: Coronary Angiogram;  Surgeon: Garcia Barber MD;  Location:  HEART CARDIAC CATH LAB     CYSTOSCOPY N/A  2017    Procedure: CYSTOSCOPY;  CYSTOSCOPY AND HYDRODISTENTION ;  Surgeon: Eyal Bai MD;  Location:  OR     ENT SURGERY      Tonsillectomy     ESOPHAGOSCOPY, GASTROSCOPY, DUODENOSCOPY (EGD), COMBINED N/A 2016    Procedure: COMBINED ESOPHAGOSCOPY, GASTROSCOPY, DUODENOSCOPY (EGD), BIOPSY SINGLE OR MULTIPLE;  Surgeon: Marco Monaco MD;  Location:  GI     EYE SURGERY Left 2019     GENITOURINARY SURGERY       GYN SURGERY      Hysterectomy     HERNIA REPAIR, UMBILICAL  2010    Dr. Kirt Franco times 3     IMPLANT STIMULATOR SACRAL NERVE STAGE ONE N/A 2020    Procedure: sacral neurostimulation stage one implant of neurostimulator lead;  Surgeon: Shahnaz Carbone MD;  Location:  OR     IMPLANT STIMULATOR SACRAL NERVE STAGE TWO Right 2020    Procedure: Removal of interstem lead, NOT implanting neurostimulator;  Surgeon: Shahnaz Carbone MD;  Location:  OR     ORTHOPEDIC SURGERY      [facility] - Dr. Lnud- bilateral knee replacement     ZZC NONSPECIFIC PROCEDURE      T&A     ZZC NONSPECIFIC PROCEDURE      Vaginal Hysterectomy (has her ovaries)     ZZC NONSPECIFIC PROCEDURE      PPTL     ZZC NONSPECIFIC PROCEDURE      L shoulder to remove bone spurs     ZZC NONSPECIFIC PROCEDURE      cysto and durasphere Dr Demarco     ZZC NONSPECIFIC PROCEDURE  2005    cysto and durasphere     ZZC NONSPECIFIC PROCEDURE  2007    cysto and durasphere     ZZC NONSPECIFIC PROCEDURE  2009    retropubic TVT sling       FAMILY HISTORY:  Family History   Problem Relation Age of Onset     Heart Disease Mother         heart surgery , new valve     Gallbladder Disease Mother      Coronary Artery Disease Mother      Hyperlipidemia Mother      Asthma Mother      Hypertension Mother      Anesthesia Reaction Mother      Cancer Father         throat ca,  76     Coronary Artery Disease Father      Other Cancer Father      Diabetes Brother         Half Brother      "Aortic stenosis Brother         Had TAVR     Cancer Brother         half brother  lung      Heart Disease Maternal Grandmother      Coronary Artery Disease Maternal Grandmother      Heart Disease Maternal Grandfather      Coronary Artery Disease Maternal Grandfather        SOCIAL HISTORY:  Social History     Socioeconomic History     Marital status: Single     Spouse name: None     Number of children: None     Years of education: None     Highest education level: None   Tobacco Use     Smoking status: Never Smoker     Smokeless tobacco: Never Used     Tobacco comment: have never smoked anything   Vaping Use     Vaping Use: Never used   Substance and Sexual Activity     Alcohol use: No     Alcohol/week: 0.0 standard drinks     Drug use: No     Sexual activity: Not Currently     Partners: Male     Birth control/protection: None     Comment: have had a hysterectomy   Other Topics Concern     Parent/sibling w/ CABG, MI or angioplasty before 65F 55M? No       Review of Systems:  Skin:  not assessed     Eyes:  not assessed    ENT:  not assessed    Respiratory:  Positive for shortness of breath;dyspnea on exertion  Cardiovascular:  Negative Positive for;heaviness  Gastroenterology: not assessed    Genitourinary:  not assessed    Musculoskeletal:  not assessed    Neurologic:  not assessed    Psychiatric:  not assessed    Heme/Lymph/Imm:  not assessed    Endocrine:  Negative      Physical Exam:  Vitals: /78 (BP Location: Left arm, Patient Position: Sitting)   Pulse 73   Ht 1.626 m (5' 4\")   Wt 84.6 kg (186 lb 8 oz)   LMP  (LMP Unknown)   SpO2 98%   BMI 32.01 kg/m       GEN:  NAD  NECK: No JVD  C/V:  Regular rate and rhythm; 3/6 MARISELA heard best at RUSB.  RESP: Clear to auscultation bilaterally without wheezing, rales, or rhonchi.  GI: Abdomen soft, nontender, nondistended.   EXTREM: No pitting LE edema.   NEURO: Alert and oriented, cooperative. No obvious focal deficits.   PSYCH: Normal affect.  SKIN: Warm and dry. "       Recent Lab Results:  LIPID RESULTS:  Lab Results   Component Value Date    CHOL 132 07/28/2022    CHOL 157 02/24/2021    HDL 50 07/28/2022    HDL 50 02/24/2021    LDL 62 07/28/2022    LDL 85 02/24/2021    TRIG 101 07/28/2022    TRIG 110 02/24/2021    CHOLHDLRATIO 3.3 06/29/2015       LIVER ENZYME RESULTS:  Lab Results   Component Value Date    AST 20 07/28/2022    AST 21 02/24/2021    ALT 15 07/28/2022    ALT 18 02/24/2021       CBC RESULTS:  Lab Results   Component Value Date    WBC 4.2 09/30/2022    WBC 11.3 (H) 03/12/2021    RBC 4.03 09/30/2022    RBC 4.56 03/12/2021    HGB 12.8 09/30/2022    HGB 13.8 03/12/2021    HCT 39.2 09/30/2022    HCT 43.7 03/12/2021    MCV 97 09/30/2022    MCV 96 03/12/2021    MCH 31.8 09/30/2022    MCH 30.3 03/12/2021    MCHC 32.7 09/30/2022    MCHC 31.6 03/12/2021    RDW 13.3 09/30/2022    RDW 13.1 03/12/2021     09/30/2022     03/12/2021       BMP RESULTS:  Lab Results   Component Value Date     09/30/2022     02/24/2021    POTASSIUM 3.8 09/30/2022    POTASSIUM 3.9 02/24/2021    CHLORIDE 109 09/30/2022    CHLORIDE 107 02/24/2021    CO2 26 09/30/2022    CO2 28 02/24/2021    ANIONGAP 6 09/30/2022    ANIONGAP 5 02/24/2021     (H) 09/30/2022     (H) 02/24/2021    BUN 18 09/30/2022    BUN 18 02/24/2021    CR 0.78 09/30/2022    CR 0.90 02/24/2021    GFRESTIMATED 76 09/30/2022    GFRESTIMATED 48 (L) 08/16/2021    GFRESTIMATED 60 (L) 02/24/2021    GFRESTBLACK 70 02/24/2021    CINDY 9.8 09/30/2022    CINDY 9.9 02/24/2021        A1C RESULTS:  Lab Results   Component Value Date    A1C 5.6 07/28/2022    A1C 6.1 (H) 02/24/2021       INR RESULTS:  Lab Results   Component Value Date    INR 1.07 09/30/2022    INR 1.15 (H) 03/28/2018    INR 1.65 (H) 07/03/2009           Jame George PA-C  October 7, 2022     Thank you for allowing me to participate in the care of your patient.      Sincerely,     Jame George PA-C     [facility]  [facility]  cc: No referring provider defined for this encounter.

## 2022-10-07 NOTE — PROGRESS NOTES
Primary Cardiologist: Dr. Mota    Reason for Visit: Post Angiogram Follow up     History of Present Illness:   Lexii is a very pleasant 81-year-old female with past medical history notable for:  1. Severe symptomatic aortic stenosis.  Peak transaortic velocity is 4 m/sec, mean gradient is 37-40 mmHg, valve area is 0.9 cm2, normal stroke volume index. NYHA II.  CCS 2.  2. Hypertension  3. Dyslipidemia  4. Mild nonocclusive CAD.  On aspirin 81 mg daily     Lexii has no immediate concerns or questions following her pre-TAVR coronary angiogram.  Her femoral site has healed well with no discomfort.  She is anxious to complete her TAVR procedure soon as possible.  She is scheduled for TAVR CT angiogram early next week.  She denies significant symptoms but does report dyspnea on exertion and exercise intolerance.     Assessment and Plan:  Lexii is a very pleasant 81-year-old female with past medical history notable for:  5. Severe symptomatic aortic stenosis.  Peak transaortic velocity is 4 m/sec, mean gradient is 37-40 mmHg, valve area is 0.9 cm2, normal stroke volume index. NYHA II.  CCS 2.  6. Hypertension  7. Dyslipidemia  8. Mild nonocclusive CAD.  On aspirin 81 mg daily    Lexii appears to be doing well from a cardiac standpoint.  She will continue with current medications with no additional changes at this time.  She is planned for CT TAVR early next week.       This note was completed in part using Dragon voice recognition software. Although reviewed after completion, some word and grammatical errors may occur.    Orders this Visit:  No orders of the defined types were placed in this encounter.    No orders of the defined types were placed in this encounter.    There are no discontinued medications.      No diagnosis found.    CURRENT MEDICATIONS:  Current Outpatient Medications   Medication Sig Dispense Refill     acetaminophen (TYLENOL) 500 MG tablet Take 1,000 mg by mouth 2 times daily        amLODIPine (NORVASC) 5 MG tablet TAKE ONE TABLET BY MOUTH EVERY NIGHT AT BEDTIME 90 tablet 3     aspirin (ASA) 81 MG EC tablet Take 1 tablet (81 mg) by mouth daily 90 tablet 3     Biotin 5000 MCG PO TABS Take 1 tablet by mouth daily       calcium carbonate (OS-CINDY) 500 MG tablet Take 1 tablet by mouth Every other day with at lunch.       CENTRUM SILVER OR TABS Take 1 tablet by mouth daily 30 0     Cranberry 450 MG CAPS Take 2 capsules by mouth daily (with dinner)       docusate sodium (COLACE) 100 MG capsule Take 100 mg by mouth At Bedtime       ferrous gluconate (FERGON) 324 (38 FE) MG tablet Take 1 tablet (324 mg) by mouth 2 times daily 100 tablet 0     latanoprost (XALATAN) 0.005 % ophthalmic solution Place 1 drop into both eyes At Bedtime        levothyroxine (SYNTHROID/LEVOTHROID) 50 MCG tablet Take 1 tablet (50 mcg) by mouth daily 90 tablet 3     lisinopril (ZESTRIL) 30 MG tablet TAKE ONE TABLET BY MOUTH EVERY DAY in the morning  FOR HYPERTENSION 90 tablet 3     magnesium 250 MG tablet Take 1 tablet by mouth daily       meloxicam (MOBIC) 7.5 MG tablet Take 1 tablet (7.5 mg) by mouth daily 90 tablet 3     nitroGLYcerin (NITROSTAT) 0.4 MG sublingual tablet For chest pain place 1 tablet under the tongue every 5 minutes for 3 doses. If symptoms persist 5 minutes after 1st dose call 911. 25 tablet 11     OMEGA-3 FATTY ACIDS 1200 MG OR CAPS Take 1 capsule by mouth daily  0     pantoprazole (PROTONIX) 20 MG EC tablet Take 1 tablet (20 mg) by mouth daily 90 tablet 3     potassium 99 MG TABS Take 1 tablet by mouth daily (with dinner)        Probiotic Product (ACIDOPHILUS PROBIOTIC BLEND) CAPS Take 1 capsule by mouth daily (with breakfast)  30 capsule 0     simvastatin (ZOCOR) 20 MG tablet TAKE ONE TABLET BY MOUTH EVERY NIGHT AT BEDTIME 90 tablet 3     VITAMIN D, CHOLECALCIFEROL, PO Take 2,000 Units by mouth every other day Supper         ALLERGIES     Allergies   Allergen Reactions     Augmentin Diarrhea     Codeine  Nausea and Vomiting     Hydrocodone      Keeps patient awake     Naproxen Itching and Rash     Seasonal Allergies      Hay fever in fall, ragweed and russian thistles     Sulfa Drugs Nausea       PAST MEDICAL HISTORY:  Past Medical History:   Diagnosis Date     Aortic valve stenosis 8/18/2022     Arthritis      Diabetes (H)     Type 2-no meds... Dieet and exercise only as of 9/15/20     Esophageal reflux      Hernia, abdominal      History of blood transfusion 1984     Hypertension     No cardiologist     Incontinence of urine      Mumps     6 years old     Obese      Osteoarthritis      Other and unspecified hyperlipidemia      Other chronic pain     back     Peptic ulcer, unspecified site, unspecified as acute or chronic, without mention of hemorrhage, perforation, or obstruction      Small bowel obstruction (H)      Thyroid disease hypothyroidism     Urinary incontinence      Walking troubles        PAST SURGICAL HISTORY:  Past Surgical History:   Procedure Laterality Date     ABDOMEN SURGERY       ARTHROPLASTY HIP Right 11/9/2016    Procedure: ARTHROPLASTY HIP;  Surgeon: Angel Lund MD;  Location:  OR     AS REPAIR INCISIONAL HERNIA,REDUCIBLE  1998    inc hernia ( Yamileth surgery)     BIOPSY       BLADDER SURGERY      hyperdistention surgery and sling     BREAST SURGERY       CHOLECYSTECTOMY  1987     COLONOSCOPY  12/3/2011    Procedure:COLONOSCOPY; COLONOSCOPY; Surgeon:SEMAJ GRAHAM; Location: GI     COLONOSCOPY N/A 3/29/2018    Procedure: COLONOSCOPY;  COLONOSCOPY Rm 544;  Surgeon: Marco Gusman MD;  Location:  GI     CV CORONARY ANGIOGRAM N/A 9/30/2022    Procedure: Coronary Angiogram;  Surgeon: Garcia Barber MD;  Location:  HEART CARDIAC CATH LAB     CYSTOSCOPY N/A 9/6/2017    Procedure: CYSTOSCOPY;  CYSTOSCOPY AND HYDRODISTENTION ;  Surgeon: Eyal Bai MD;  Location:  OR     ENT SURGERY      Tonsillectomy     ESOPHAGOSCOPY, GASTROSCOPY, DUODENOSCOPY (EGD),  COMBINED N/A 2016    Procedure: COMBINED ESOPHAGOSCOPY, GASTROSCOPY, DUODENOSCOPY (EGD), BIOPSY SINGLE OR MULTIPLE;  Surgeon: Marco Monaco MD;  Location:  GI     EYE SURGERY Left 2019     GENITOURINARY SURGERY       GYN SURGERY      Hysterectomy     HERNIA REPAIR, UMBILICAL  2010    Dr. Kirt Franco times 3     IMPLANT STIMULATOR SACRAL NERVE STAGE ONE N/A 2020    Procedure: sacral neurostimulation stage one implant of neurostimulator lead;  Surgeon: Shahnaz Carbone MD;  Location:  OR     IMPLANT STIMULATOR SACRAL NERVE STAGE TWO Right 2020    Procedure: Removal of interstem lead, NOT implanting neurostimulator;  Surgeon: Shahnaz Carbone MD;  Location:  OR     ORTHOPEDIC SURGERY      [facility] - Dr. Lund- bilateral knee replacement     ZZC NONSPECIFIC PROCEDURE      T&A     ZZC NONSPECIFIC PROCEDURE      Vaginal Hysterectomy (has her ovaries)     ZZC NONSPECIFIC PROCEDURE      PPTL     ZZC NONSPECIFIC PROCEDURE      L shoulder to remove bone spurs     ZZC NONSPECIFIC PROCEDURE      cysto and durasphere Dr Demarco     ZZC NONSPECIFIC PROCEDURE  2005    cysto and durasphere     ZZC NONSPECIFIC PROCEDURE  2007    cysto and durasphere     ZZC NONSPECIFIC PROCEDURE  2009    retropubic TVT sling       FAMILY HISTORY:  Family History   Problem Relation Age of Onset     Heart Disease Mother         heart surgery , new valve     Gallbladder Disease Mother      Coronary Artery Disease Mother      Hyperlipidemia Mother      Asthma Mother      Hypertension Mother      Anesthesia Reaction Mother      Cancer Father         throat ca,  76     Coronary Artery Disease Father      Other Cancer Father      Diabetes Brother         Half Brother     Aortic stenosis Brother         Had TAVR     Cancer Brother         half brother  lung      Heart Disease Maternal Grandmother      Coronary Artery Disease Maternal Grandmother      Heart Disease Maternal  "Grandfather      Coronary Artery Disease Maternal Grandfather        SOCIAL HISTORY:  Social History     Socioeconomic History     Marital status: Single     Spouse name: None     Number of children: None     Years of education: None     Highest education level: None   Tobacco Use     Smoking status: Never Smoker     Smokeless tobacco: Never Used     Tobacco comment: have never smoked anything   Vaping Use     Vaping Use: Never used   Substance and Sexual Activity     Alcohol use: No     Alcohol/week: 0.0 standard drinks     Drug use: No     Sexual activity: Not Currently     Partners: Male     Birth control/protection: None     Comment: have had a hysterectomy   Other Topics Concern     Parent/sibling w/ CABG, MI or angioplasty before 65F 55M? No       Review of Systems:  Skin:  not assessed     Eyes:  not assessed    ENT:  not assessed    Respiratory:  Positive for shortness of breath;dyspnea on exertion  Cardiovascular:  Negative Positive for;heaviness  Gastroenterology: not assessed    Genitourinary:  not assessed    Musculoskeletal:  not assessed    Neurologic:  not assessed    Psychiatric:  not assessed    Heme/Lymph/Imm:  not assessed    Endocrine:  Negative      Physical Exam:  Vitals: /78 (BP Location: Left arm, Patient Position: Sitting)   Pulse 73   Ht 1.626 m (5' 4\")   Wt 84.6 kg (186 lb 8 oz)   LMP  (LMP Unknown)   SpO2 98%   BMI 32.01 kg/m       GEN:  NAD  NECK: No JVD  C/V:  Regular rate and rhythm; 3/6 MARISELA heard best at RUSB.  RESP: Clear to auscultation bilaterally without wheezing, rales, or rhonchi.  GI: Abdomen soft, nontender, nondistended.   EXTREM: No pitting LE edema.   NEURO: Alert and oriented, cooperative. No obvious focal deficits.   PSYCH: Normal affect.  SKIN: Warm and dry.       Recent Lab Results:  LIPID RESULTS:  Lab Results   Component Value Date    CHOL 132 07/28/2022    CHOL 157 02/24/2021    HDL 50 07/28/2022    HDL 50 02/24/2021    LDL 62 07/28/2022    LDL 85 " 02/24/2021    TRIG 101 07/28/2022    TRIG 110 02/24/2021    CHOLHDLRATIO 3.3 06/29/2015       LIVER ENZYME RESULTS:  Lab Results   Component Value Date    AST 20 07/28/2022    AST 21 02/24/2021    ALT 15 07/28/2022    ALT 18 02/24/2021       CBC RESULTS:  Lab Results   Component Value Date    WBC 4.2 09/30/2022    WBC 11.3 (H) 03/12/2021    RBC 4.03 09/30/2022    RBC 4.56 03/12/2021    HGB 12.8 09/30/2022    HGB 13.8 03/12/2021    HCT 39.2 09/30/2022    HCT 43.7 03/12/2021    MCV 97 09/30/2022    MCV 96 03/12/2021    MCH 31.8 09/30/2022    MCH 30.3 03/12/2021    MCHC 32.7 09/30/2022    MCHC 31.6 03/12/2021    RDW 13.3 09/30/2022    RDW 13.1 03/12/2021     09/30/2022     03/12/2021       BMP RESULTS:  Lab Results   Component Value Date     09/30/2022     02/24/2021    POTASSIUM 3.8 09/30/2022    POTASSIUM 3.9 02/24/2021    CHLORIDE 109 09/30/2022    CHLORIDE 107 02/24/2021    CO2 26 09/30/2022    CO2 28 02/24/2021    ANIONGAP 6 09/30/2022    ANIONGAP 5 02/24/2021     (H) 09/30/2022     (H) 02/24/2021    BUN 18 09/30/2022    BUN 18 02/24/2021    CR 0.78 09/30/2022    CR 0.90 02/24/2021    GFRESTIMATED 76 09/30/2022    GFRESTIMATED 48 (L) 08/16/2021    GFRESTIMATED 60 (L) 02/24/2021    GFRESTBLACK 70 02/24/2021    CINDY 9.8 09/30/2022    CINDY 9.9 02/24/2021        A1C RESULTS:  Lab Results   Component Value Date    A1C 5.6 07/28/2022    A1C 6.1 (H) 02/24/2021       INR RESULTS:  Lab Results   Component Value Date    INR 1.07 09/30/2022    INR 1.15 (H) 03/28/2018    INR 1.65 (H) 07/03/2009           Jame George PA-C  October 7, 2022

## 2022-10-10 ENCOUNTER — HOSPITAL ENCOUNTER (OUTPATIENT)
Dept: MAMMOGRAPHY | Facility: CLINIC | Age: 82
Discharge: HOME | End: 2022-10-10
Attending: INTERNAL MEDICINE | Admitting: INTERNAL MEDICINE
Payer: COMMERCIAL

## 2022-10-10 DIAGNOSIS — Z12.31 VISIT FOR SCREENING MAMMOGRAM: ICD-10-CM

## 2022-10-10 PROCEDURE — 77067 SCR MAMMO BI INCL CAD: CPT

## 2022-10-11 ENCOUNTER — HOSPITAL ENCOUNTER (OUTPATIENT)
Dept: CARDIOLOGY | Facility: CLINIC | Age: 82
Discharge: HOME | End: 2022-10-11
Attending: INTERNAL MEDICINE | Admitting: INTERNAL MEDICINE
Payer: COMMERCIAL

## 2022-10-11 DIAGNOSIS — R07.9 CHEST PAIN SYNDROME: ICD-10-CM

## 2022-10-11 DIAGNOSIS — I35.0 SEVERE AORTIC STENOSIS: ICD-10-CM

## 2022-10-11 PROCEDURE — 71275 CT ANGIOGRAPHY CHEST: CPT | Mod: 26 | Performed by: INTERNAL MEDICINE

## 2022-10-11 PROCEDURE — 250N000011 HC RX IP 250 OP 636: Performed by: INTERNAL MEDICINE

## 2022-10-11 PROCEDURE — 74174 CTA ABD&PLVS W/CONTRAST: CPT

## 2022-10-11 PROCEDURE — 74174 CTA ABD&PLVS W/CONTRAST: CPT | Mod: 26 | Performed by: INTERNAL MEDICINE

## 2022-10-11 PROCEDURE — 71275 CT ANGIOGRAPHY CHEST: CPT

## 2022-10-11 RX ORDER — IOPAMIDOL 755 MG/ML
115 INJECTION, SOLUTION INTRAVASCULAR ONCE
Status: COMPLETED | OUTPATIENT
Start: 2022-10-11 | End: 2022-10-11

## 2022-10-11 RX ORDER — ONDANSETRON 2 MG/ML
4 INJECTION INTRAMUSCULAR; INTRAVENOUS
Status: DISCONTINUED | OUTPATIENT
Start: 2022-10-11 | End: 2022-10-12 | Stop reason: HOSPADM

## 2022-10-11 RX ORDER — DIPHENHYDRAMINE HYDROCHLORIDE 50 MG/ML
25-50 INJECTION INTRAMUSCULAR; INTRAVENOUS
Status: DISCONTINUED | OUTPATIENT
Start: 2022-10-11 | End: 2022-10-12 | Stop reason: HOSPADM

## 2022-10-11 RX ORDER — METHYLPREDNISOLONE SODIUM SUCCINATE 125 MG/2ML
125 INJECTION, POWDER, LYOPHILIZED, FOR SOLUTION INTRAMUSCULAR; INTRAVENOUS
Status: DISCONTINUED | OUTPATIENT
Start: 2022-10-11 | End: 2022-10-12 | Stop reason: HOSPADM

## 2022-10-11 RX ORDER — DIPHENHYDRAMINE HCL 25 MG
25 CAPSULE ORAL
Status: DISCONTINUED | OUTPATIENT
Start: 2022-10-11 | End: 2022-10-12 | Stop reason: HOSPADM

## 2022-10-11 RX ORDER — ACYCLOVIR 200 MG/1
0-1 CAPSULE ORAL
Status: DISCONTINUED | OUTPATIENT
Start: 2022-10-11 | End: 2022-10-12 | Stop reason: HOSPADM

## 2022-10-11 RX ADMIN — IOPAMIDOL 115 ML: 755 INJECTION, SOLUTION INTRAVENOUS at 11:42

## 2022-10-20 ENCOUNTER — CARE COORDINATION (OUTPATIENT)
Dept: CARDIOLOGY | Facility: CLINIC | Age: 82
End: 2022-10-20

## 2022-10-20 DIAGNOSIS — I35.0 AORTIC VALVE STENOSIS: Primary | ICD-10-CM

## 2022-10-20 DIAGNOSIS — I50.32 CHRONIC DIASTOLIC (CONGESTIVE) HEART FAILURE (H): ICD-10-CM

## 2022-10-20 NOTE — PROGRESS NOTES
Patient was presented this morning at the multidisciplinary valve conference. Plan is to proceed with TAVR procedure, date/time TBD.    Valve: Medtronic FX 26 mm (steep angle)  Approach: TF  [place]: No  Sedation: MAC    Need telephone visit with CT surgery to discuss bailout plans. Slot held on 11/8 1530 with Dr. Peterson.     Above information was called out to the patient.     Jacquelin Avitia RN  Structural Heart Coordinator  [facility]

## 2022-10-26 LAB
ABO/RH(D): NORMAL
ANTIBODY SCREEN: NEGATIVE
SPECIMEN EXPIRATION DATE: NORMAL

## 2022-10-26 NOTE — PATIENT INSTRUCTIONS
TAVR PRE-PROCEDURE INSTRUCTIONS:    - Your TAVR is scheduled Tuesday, November 8th, 2nd case. Please check-in at 8:30AM at the Registration Desk in the [facility] at [facility].    - Use the Hibiclens soap we have provided you the night before and morning of the procedure. Wash from chin to toes, please avoid your face and eyes.    - Nothing to eat or drink the morning of your TAVR.     Medication instructions:  - You make take your morning medications with sips of water.   - Please hold all vitamins and supplements the morning of your procedure.  -You will need to take 325mg of aspirin the morning of your TAVR. They can give this to you in the hospital.     - COVID-19 restrictions: You will need a COVID test prior to procedure, this is scheduled at [facility] on Friday, November 4th at 1:15PM. Please do your best to isolate between this test and your procedure day. On the day of the procedure, you may have one visitor in the pre-operative area. Patient and visitor must wear face masks. Two visitors may see you when you get to your room in the Heart Center (2nd floor of [facility]).    - You will stay overnight on Tuesday night. We will monitor you overnight for signs of bleeding, stroke, as well as need for pacemaker. On Wednesday morning, you will have an echocardiogram of your new valve and work with cardiac rehab.     It was nice to see you today! Please call with any other questions, concerns, or any changes in your health: [phone]    Jacquelin Avitia RN  Structural Heart Coordinator  [facility]

## 2022-10-26 NOTE — PROGRESS NOTES
Called Lexii to offer TAVR date of 22, 2nd case. Lexii accepted.    Reviewed that she will need the followin. H&P in clinic with Isaura 10/27 at 1:50pm, labs to follow at [facility]  2. Telephone visit with Dr. Lopez 11/3 at 4:00pm to discuss bailout plans  3. PCR COVID-19 screening on     Lexii verbalized understanding and agreed to plan. Orders placed. Messaged scheduling to arrange the above.     Jacquelin Avitia, ISABELN, RN, PHN, CHFN, HNB-BC   10/26/2022 at 9:38 AM

## 2022-10-27 ENCOUNTER — OFFICE VISIT (OUTPATIENT)
Dept: CARDIOLOGY | Facility: CLINIC | Age: 82
End: 2022-10-27
Payer: COMMERCIAL

## 2022-10-27 ENCOUNTER — LAB (OUTPATIENT)
Dept: LAB | Facility: CLINIC | Age: 82
End: 2022-10-27
Payer: COMMERCIAL

## 2022-10-27 VITALS
WEIGHT: 186 LBS | HEART RATE: 74 BPM | BODY MASS INDEX: 31.76 KG/M2 | OXYGEN SATURATION: 100 % | SYSTOLIC BLOOD PRESSURE: 138 MMHG | HEIGHT: 64 IN | DIASTOLIC BLOOD PRESSURE: 78 MMHG

## 2022-10-27 DIAGNOSIS — I35.0 SEVERE AORTIC STENOSIS BY PRIOR ECHOCARDIOGRAM: Primary | ICD-10-CM

## 2022-10-27 DIAGNOSIS — I35.0 AORTIC VALVE STENOSIS: ICD-10-CM

## 2022-10-27 DIAGNOSIS — I50.32 CHRONIC DIASTOLIC (CONGESTIVE) HEART FAILURE (H): ICD-10-CM

## 2022-10-27 DIAGNOSIS — E78.5 HYPERLIPIDEMIA LDL GOAL <70: ICD-10-CM

## 2022-10-27 LAB
ALBUMIN SERPL-MCNC: 3.5 G/DL (ref 3.4–5)
ALP SERPL-CCNC: 97 U/L (ref 40–150)
ALT SERPL W P-5'-P-CCNC: 20 U/L (ref 0–50)
ANION GAP SERPL CALCULATED.3IONS-SCNC: 8 MMOL/L (ref 3–14)
AST SERPL W P-5'-P-CCNC: 28 U/L (ref 0–45)
BILIRUB SERPL-MCNC: 1.1 MG/DL (ref 0.2–1.3)
BUN SERPL-MCNC: 18 MG/DL (ref 7–30)
CALCIUM SERPL-MCNC: 9.6 MG/DL (ref 8.5–10.1)
CHLORIDE BLD-SCNC: 105 MMOL/L (ref 94–109)
CO2 SERPL-SCNC: 25 MMOL/L (ref 20–32)
CREAT SERPL-MCNC: 0.7 MG/DL (ref 0.52–1.04)
GFR SERPL CREATININE-BSD FRML MDRD: 86 ML/MIN/1.73M2
GLUCOSE BLD-MCNC: 106 MG/DL (ref 70–99)
INR PPP: 1.06 (ref 0.85–1.15)
NT-PROBNP SERPL-MCNC: 342 PG/ML (ref 0–1800)
POTASSIUM BLD-SCNC: 4 MMOL/L (ref 3.4–5.3)
PROT SERPL-MCNC: 7.1 G/DL (ref 6.8–8.8)
SODIUM SERPL-SCNC: 138 MMOL/L (ref 133–144)

## 2022-10-27 PROCEDURE — 36415 COLL VENOUS BLD VENIPUNCTURE: CPT

## 2022-10-27 PROCEDURE — 83880 ASSAY OF NATRIURETIC PEPTIDE: CPT

## 2022-10-27 PROCEDURE — 85610 PROTHROMBIN TIME: CPT

## 2022-10-27 PROCEDURE — 86901 BLOOD TYPING SEROLOGIC RH(D): CPT

## 2022-10-27 PROCEDURE — 80053 COMPREHEN METABOLIC PANEL: CPT

## 2022-10-27 PROCEDURE — 93000 ELECTROCARDIOGRAM COMPLETE: CPT | Performed by: NURSE PRACTITIONER

## 2022-10-27 PROCEDURE — 99215 OFFICE O/P EST HI 40 MIN: CPT | Performed by: NURSE PRACTITIONER

## 2022-10-27 NOTE — LETTER
10/27/2022    Jesenia Madrigal MD  [address]  [facility] [address]    RE: Lexii Stratton       Dear Colleague,     I had the pleasure of seeing Lexii Stratton in the [facility].      STRUCTURAL HEART CLINIC  H&P    Referring Provider: Dr. Castano  Primary Cardiologist: Dr. Mota     History of Present Illness  Lexii Stratton is a pleasant 81 year old with history of hypertension, chronic diastolic heart failure, and severe aortic stenosis who presents for pre-operative H&P in preparation for transcatheter aortic valve replacement on 11/8/22 at [facility].     Patient with a history of severe aortic stenosis, characterized with an ROBYN 0.9 cm2, mean PG 37.4 mmHg, peak V 3.6 m/s and EF 55-60%. She reports symptoms of progressive fatigue and shortness of breath over the last few months. Patient was evaluated in structural heart clinic where she was deemed an appropriate TAVR candidate. TAVR guided CT showed favorable anatomy for transfemoral approach. Her aortic valve calcium score is 1123. Pre-TAVR coronary angiogram showed non-obstructive CAD. She was counseled for the above procedure.      Today the patient reports doing well from a cardiovascular standpoint.  She continues to endorse ongoing dyspnea with exertion and progressive fatigue.  She otherwise denies any chest pain, syncope or near syncope, or palpitations.  She has no PND orthopnea.  She has no lower extremity edema.  She has no infectious symptoms.  Her blood pressure is well controlled.    Assessment and Plan     Lexii Stratton presents for pre-operative H&P in preparation for a transcatheter aortic valve replacement on 11/8/2022 at [facility].       1. Severe aortic valve stenosis: Patient reports progressive exertional dyspnea and fatigue over the last few months. Her echo shows severe aortic stenosis. She has been evaluated by the our structural heart team and has been deemed an  appropriate candidate for transcatheter aortic valve replacement. We discussed the procedure in detail, including pre and post-procedure care, restrictions and follow-up. She understands risks including 5-10% risk of heart block leading to permanent pacemaker placement, 3% risk of bleeding, infection, vascular complication, 1-3% risk of stroke and very low risk (<1%) of serious complications such as cardiac perforation, aortic root rupture, dissection or death.     All questions answered  Type and screen orders complete  COVID test ordered  Supplies for scrubbing provided  No known contrast dye allergy  No trouble with anesthesia in the past  Labs today WNL, no s/s of infection    2. Chronic diastolic heart failure, NYHA class II, Stage B: Secondary to valvular heart disease. No acute volume overload on exam. Not currently on diuretic therapy.    3. Hypertension: Currently on amlodipine 5 mg daily and lisinopril 30 mg daily.       Medication Recommendations:  Antiplatelet: Continue ASA 81 mg perioperatively, take 324 mg AM of procedure.    Supplements: Hold morning of procedure    Patient is optimized and is acceptable candidate for the proposed procedure.  No further diagnostic evaluation is needed. Pre-procedure instructions provided in written format.     Isaura Kulkarni, DNP, APRN, CNP  Structural Heart Nurse Practitioner  [facility]   Pager: [phone]    Current Medications:  Current Outpatient Medications   Medication Sig Dispense Refill     acetaminophen (TYLENOL) 500 MG tablet Take 1,000 mg by mouth 2 times daily       amLODIPine (NORVASC) 5 MG tablet TAKE ONE TABLET BY MOUTH EVERY NIGHT AT BEDTIME 90 tablet 3     aspirin (ASA) 81 MG EC tablet Take 1 tablet (81 mg) by mouth daily 90 tablet 3     Biotin 5000 MCG PO TABS Take 1 tablet by mouth daily       calcium carbonate (OS-CINDY) 500 MG tablet Take 1 tablet by mouth Every other day with at lunch.       CENTRUM SILVER OR TABS Take 1 tablet by  mouth daily 30 0     Cranberry 450 MG CAPS Take 2 capsules by mouth daily (with dinner)       docusate sodium (COLACE) 100 MG capsule Take 100 mg by mouth At Bedtime       ferrous gluconate (FERGON) 324 (38 FE) MG tablet Take 1 tablet (324 mg) by mouth 2 times daily 100 tablet 0     latanoprost (XALATAN) 0.005 % ophthalmic solution Place 1 drop into both eyes At Bedtime        levothyroxine (SYNTHROID/LEVOTHROID) 50 MCG tablet Take 1 tablet (50 mcg) by mouth daily 90 tablet 3     lisinopril (ZESTRIL) 30 MG tablet TAKE ONE TABLET BY MOUTH EVERY DAY in the morning  FOR HYPERTENSION 90 tablet 3     magnesium 250 MG tablet Take 1 tablet by mouth daily       meloxicam (MOBIC) 7.5 MG tablet Take 1 tablet (7.5 mg) by mouth daily 90 tablet 3     nitroGLYcerin (NITROSTAT) 0.4 MG sublingual tablet For chest pain place 1 tablet under the tongue every 5 minutes for 3 doses. If symptoms persist 5 minutes after 1st dose call 911. 25 tablet 11     OMEGA-3 FATTY ACIDS 1200 MG OR CAPS Take 1 capsule by mouth daily  0     pantoprazole (PROTONIX) 20 MG EC tablet Take 1 tablet (20 mg) by mouth daily 90 tablet 3     potassium 99 MG TABS Take 1 tablet by mouth daily (with dinner)        Probiotic Product (ACIDOPHILUS PROBIOTIC BLEND) CAPS Take 1 capsule by mouth daily (with breakfast)  30 capsule 0     simvastatin (ZOCOR) 20 MG tablet TAKE ONE TABLET BY MOUTH EVERY NIGHT AT BEDTIME 90 tablet 3     VITAMIN D, CHOLECALCIFEROL, PO Take 2,000 Units by mouth every other day Supper         Allergies:     Allergies   Allergen Reactions     Augmentin Diarrhea     Codeine Nausea and Vomiting     Hydrocodone      Keeps patient awake     Naproxen Itching and Rash     Seasonal Allergies      Hay fever in fall, ragweed and russian thistles     Sulfa Drugs Nausea       Past Medical History:  Past Medical History:   Diagnosis Date     Aortic valve stenosis 8/18/2022     Arthritis      Diabetes (H)     Type 2-no meds... Dieet and exercise only as of  9/15/20     Esophageal reflux      Hernia, abdominal      History of blood transfusion 1984     Hypertension     No cardiologist     Incontinence of urine      Mumps     6 years old     Obese      Osteoarthritis      Other and unspecified hyperlipidemia      Other chronic pain     back     Peptic ulcer, unspecified site, unspecified as acute or chronic, without mention of hemorrhage, perforation, or obstruction      Small bowel obstruction (H)      Thyroid disease hypothyroidism     Urinary incontinence      Walking troubles        Past Surgical History:  Past Surgical History:   Procedure Laterality Date     ABDOMEN SURGERY       ARTHROPLASTY HIP Right 11/9/2016    Procedure: ARTHROPLASTY HIP;  Surgeon: Angel Lund MD;  Location:  OR     AS REPAIR INCISIONAL HERNIA,REDUCIBLE  1998    inc hernia ( Yamileth surgery)     BIOPSY       BLADDER SURGERY      hyperdistention surgery and sling     BREAST SURGERY       CHOLECYSTECTOMY  1987     COLONOSCOPY  12/3/2011    Procedure:COLONOSCOPY; COLONOSCOPY; Surgeon:SEMAJ GRAHAM; Location: GI     COLONOSCOPY N/A 3/29/2018    Procedure: COLONOSCOPY;  COLONOSCOPY Rm 544;  Surgeon: Marco Gusman MD;  Location: [facility]     CV CORONARY ANGIOGRAM N/A 9/30/2022    Procedure: Coronary Angiogram;  Surgeon: Garcia Barber MD;  Location:  HEART CARDIAC CATH LAB     CYSTOSCOPY N/A 9/6/2017    Procedure: CYSTOSCOPY;  CYSTOSCOPY AND HYDRODISTENTION ;  Surgeon: Eyal Bai MD;  Location:  OR     ENT SURGERY      Tonsillectomy     ESOPHAGOSCOPY, GASTROSCOPY, DUODENOSCOPY (EGD), COMBINED N/A 9/26/2016    Procedure: COMBINED ESOPHAGOSCOPY, GASTROSCOPY, DUODENOSCOPY (EGD), BIOPSY SINGLE OR MULTIPLE;  Surgeon: Marco Monaco MD;  Location:  GI     EYE SURGERY Left 05/2019     GENITOURINARY SURGERY       GYN SURGERY      Hysterectomy     HERNIA REPAIR, UMBILICAL  7/8/2010    Dr. Kirt Franco times 3     IMPLANT STIMULATOR SACRAL NERVE STAGE ONE N/A  2020    Procedure: sacral neurostimulation stage one implant of neurostimulator lead;  Surgeon: Shahnaz Carbone MD;  Location: [facility] OR     IMPLANT STIMULATOR SACRAL NERVE STAGE TWO Right 2020    Procedure: Removal of interstem lead, NOT implanting neurostimulator;  Surgeon: Shahnaz Carbone MD;  Location: [facility] OR     ORTHOPEDIC SURGERY      [facility] - Dr. Lund- bilateral knee replacement     Z NONSPECIFIC PROCEDURE      T&A     ZZ NONSPECIFIC PROCEDURE      Vaginal Hysterectomy (has her ovaries)     ZZ NONSPECIFIC PROCEDURE      PPTL     ZZC NONSPECIFIC PROCEDURE      L shoulder to remove bone spurs     ZZ NONSPECIFIC PROCEDURE  2004    cysto and durasphere Dr Demarco     Z NONSPECIFIC PROCEDURE  2005    cysto and durasphere     ZZC NONSPECIFIC PROCEDURE  2007    cysto and durasphere     ZZC NONSPECIFIC PROCEDURE  2009    retropubic TVT sling       Family History:  Family History   Problem Relation Age of Onset     Heart Disease Mother         heart surgery , new valve     Gallbladder Disease Mother      Coronary Artery Disease Mother      Hyperlipidemia Mother      Asthma Mother      Hypertension Mother      Anesthesia Reaction Mother      Cancer Father         throat ca,  76     Coronary Artery Disease Father      Other Cancer Father      Diabetes Brother         Half Brother     Aortic stenosis Brother         Had TAVR     Cancer Brother         half brother  lung      Heart Disease Maternal Grandmother      Coronary Artery Disease Maternal Grandmother      Heart Disease Maternal Grandfather      Coronary Artery Disease Maternal Grandfather        Social History:  Social History     Socioeconomic History     Marital status: Single   Tobacco Use     Smoking status: Never     Smokeless tobacco: Never     Tobacco comments:     have never smoked anything   Vaping Use     Vaping Use: Never used   Substance and Sexual Activity     Alcohol use: No      Alcohol/week: 0.0 standard drinks     Drug use: No     Sexual activity: Not Currently     Partners: Male     Birth control/protection: None     Comment: have had a hysterectomy   Other Topics Concern     Parent/sibling w/ CABG, MI or angioplasty before 65F 55M? No       Review of Systems:  Constitutional: No fever, chills, or sweats. No weight gain/loss   ENT: No visual disturbance, ear ache, epistaxis, sore throat  Allergies/Immunologic: Negative.   Respiratory: No cough, hemoptysia  Cardiovascular: As per HPI  GI: No nausea, vomiting, hematemesis, melena, or hematochezia  : No urinary frequency, dysuria, or hematuria  Integument: Negative  Psychiatric: Negative  Neuro: Negative  Endocrinology: Negative   Musculoskeletal: Negative      Physical Exam:  Vitals: LMP  (LMP Unknown)    General: NAD  HEENT:  Dentition intact.    Neck: No jugular venous distension.   Heart: RRR with grade II MARISELA at RUSB  Lungs: CTA.    Abdomen: Soft, nontender, nondistended.   Extremities: No clubbing, cyanosis, or edema.  The pulses are +4/4 at the radial, brachial, femoral, popliteal, DP, and PT sites bilaterally.  No bruits are noted.  Neurologic: Alert and oriented to person/place/time, normal speech, gait and affect  Skin: No petechiae, purpura or rash.      Diagnostic Studies:  ECG: SR with 1st degree AVB  Personally reviewed and interpreted by me.    Coronary Angiogram: 9/30/2022  - Mild non-obstructive CAD    Echocardiogram: 8/9/2022  Interpretation Summary     Severe valvular aortic stenosis.  The visual ejection fraction is 55-60%.  Left ventricular systolic function is normal.  Since 2018 the aortic stenoisis has progressed and is now severe.  Right ventricular systolic pressure is elevated, consistent with mild to  moderate pulmonary hypertension.  The study was technically difficult.    Aortic Valve  The aortic valve is not well visualized. Thickened aortic valve leaflets. Most  likely it is a three leaflet aortic valve. No  aortic regurgitation is present.  The peak AoV pressure gradient is 62.1 mmHg. The mean AoV pressure gradient is  37.4 mmHg. The calculated aortic valve are is 0.90 cm^2. Severe valvular  aortic stenosis.    Laboratory Studies:  Personally reviewed and interpreted by me.    LIPID RESULTS:  Lab Results   Component Value Date    CHOL 132 07/28/2022    CHOL 157 02/24/2021    HDL 50 07/28/2022    HDL 50 02/24/2021    LDL 62 07/28/2022    LDL 85 02/24/2021    TRIG 101 07/28/2022    TRIG 110 02/24/2021    CHOLHDLRATIO 3.3 06/29/2015       LIVER ENZYME RESULTS:  Lab Results   Component Value Date    AST 20 07/28/2022    AST 21 02/24/2021    ALT 15 07/28/2022    ALT 18 02/24/2021       CBC RESULTS:  Lab Results   Component Value Date    WBC 4.2 09/30/2022    WBC 11.3 (H) 03/12/2021    RBC 4.03 09/30/2022    RBC 4.56 03/12/2021    HGB 12.8 09/30/2022    HGB 13.8 03/12/2021    HCT 39.2 09/30/2022    HCT 43.7 03/12/2021    MCV 97 09/30/2022    MCV 96 03/12/2021    MCH 31.8 09/30/2022    MCH 30.3 03/12/2021    MCHC 32.7 09/30/2022    MCHC 31.6 03/12/2021    RDW 13.3 09/30/2022    RDW 13.1 03/12/2021     09/30/2022     03/12/2021       BMP RESULTS:  Lab Results   Component Value Date     09/30/2022     02/24/2021    POTASSIUM 3.8 09/30/2022    POTASSIUM 3.9 02/24/2021    CHLORIDE 109 09/30/2022    CHLORIDE 107 02/24/2021    CO2 26 09/30/2022    CO2 28 02/24/2021    ANIONGAP 6 09/30/2022    ANIONGAP 5 02/24/2021     (H) 09/30/2022     (H) 02/24/2021    BUN 18 09/30/2022    BUN 18 02/24/2021    CR 0.78 09/30/2022    CR 0.90 02/24/2021    GFRESTIMATED 76 09/30/2022    GFRESTIMATED 48 (L) 08/16/2021    GFRESTIMATED 60 (L) 02/24/2021    GFRESTBLACK 70 02/24/2021    CINDY 9.8 09/30/2022    CINDY 9.9 02/24/2021        A1C RESULTS:  Lab Results   Component Value Date    A1C 5.6 07/28/2022    A1C 6.1 (H) 02/24/2021       INR RESULTS:  Lab Results   Component Value Date    INR 1.07 09/30/2022    INR  1.15 (H) 03/28/2018    INR 1.65 (H) 07/03/2009       Thank you for allowing me to participate in the care of your patient.      Sincerely,     Isaura Kulkarni [facility]  cc:   No referring provider defined for this encounter.

## 2022-10-27 NOTE — PROGRESS NOTES
STRUCTURAL HEART CLINIC  H&P    Referring Provider: Dr. Castano  Primary Cardiologist: Dr. Mota     History of Present Illness  Lexii Stratton is a pleasant 81 year old with history of hypertension, chronic diastolic heart failure, and severe aortic stenosis who presents for pre-operative H&P in preparation for transcatheter aortic valve replacement on 11/8/22 at [facility].     Patient with a history of severe aortic stenosis, characterized with an ROBYN 0.9 cm2, mean PG 37.4 mmHg, peak V 3.6 m/s and EF 55-60%. She reports symptoms of progressive fatigue and shortness of breath over the last few months. Patient was evaluated in structural heart clinic where she was deemed an appropriate TAVR candidate. TAVR guided CT showed favorable anatomy for transfemoral approach. Her aortic valve calcium score is 1123. Pre-TAVR coronary angiogram showed non-obstructive CAD. She was counseled for the above procedure.      Today the patient reports doing well from a cardiovascular standpoint.  She continues to endorse ongoing dyspnea with exertion and progressive fatigue.  She otherwise denies any chest pain, syncope or near syncope, or palpitations.  She has no PND orthopnea.  She has no lower extremity edema.  She has no infectious symptoms.  Her blood pressure is well controlled.    Assessment and Plan     Lexii Stratton presents for pre-operative H&P in preparation for a transcatheter aortic valve replacement on 11/8/2022 at [facility].       1. Severe aortic valve stenosis: Patient reports progressive exertional dyspnea and fatigue over the last few months. Her echo shows severe aortic stenosis. She has been evaluated by the our structural heart team and has been deemed an appropriate candidate for transcatheter aortic valve replacement. We discussed the procedure in detail, including pre and post-procedure care, restrictions and follow-up. She understands risks including 5-10% risk of  heart block leading to permanent pacemaker placement, 3% risk of bleeding, infection, vascular complication, 1-3% risk of stroke and very low risk (<1%) of serious complications such as cardiac perforation, aortic root rupture, dissection or death.     All questions answered  Type and screen orders complete  COVID test ordered  Supplies for scrubbing provided  No known contrast dye allergy  No trouble with anesthesia in the past  Labs today WNL, no s/s of infection    2. Chronic diastolic heart failure, NYHA class II, Stage B: Secondary to valvular heart disease. No acute volume overload on exam. Not currently on diuretic therapy.    3. Hypertension: Currently on amlodipine 5 mg daily and lisinopril 30 mg daily.       Medication Recommendations:  Antiplatelet: Continue ASA 81 mg perioperatively, take 324 mg AM of procedure.    Supplements: Hold morning of procedure    Patient is optimized and is acceptable candidate for the proposed procedure.  No further diagnostic evaluation is needed. Pre-procedure instructions provided in written format.     Isaura Kulkarni, JANIE, APRN, CNP  Structural Heart Nurse Practitioner  [facility]   Pager: [phone]    Current Medications:  Current Outpatient Medications   Medication Sig Dispense Refill     acetaminophen (TYLENOL) 500 MG tablet Take 1,000 mg by mouth 2 times daily       amLODIPine (NORVASC) 5 MG tablet TAKE ONE TABLET BY MOUTH EVERY NIGHT AT BEDTIME 90 tablet 3     aspirin (ASA) 81 MG EC tablet Take 1 tablet (81 mg) by mouth daily 90 tablet 3     Biotin 5000 MCG PO TABS Take 1 tablet by mouth daily       calcium carbonate (OS-CINDY) 500 MG tablet Take 1 tablet by mouth Every other day with at lunch.       CENTRUM SILVER OR TABS Take 1 tablet by mouth daily 30 0     Cranberry 450 MG CAPS Take 2 capsules by mouth daily (with dinner)       docusate sodium (COLACE) 100 MG capsule Take 100 mg by mouth At Bedtime       ferrous gluconate (FERGON) 324 (38 FE) MG tablet  Take 1 tablet (324 mg) by mouth 2 times daily 100 tablet 0     latanoprost (XALATAN) 0.005 % ophthalmic solution Place 1 drop into both eyes At Bedtime        levothyroxine (SYNTHROID/LEVOTHROID) 50 MCG tablet Take 1 tablet (50 mcg) by mouth daily 90 tablet 3     lisinopril (ZESTRIL) 30 MG tablet TAKE ONE TABLET BY MOUTH EVERY DAY in the morning  FOR HYPERTENSION 90 tablet 3     magnesium 250 MG tablet Take 1 tablet by mouth daily       meloxicam (MOBIC) 7.5 MG tablet Take 1 tablet (7.5 mg) by mouth daily 90 tablet 3     nitroGLYcerin (NITROSTAT) 0.4 MG sublingual tablet For chest pain place 1 tablet under the tongue every 5 minutes for 3 doses. If symptoms persist 5 minutes after 1st dose call 911. 25 tablet 11     OMEGA-3 FATTY ACIDS 1200 MG OR CAPS Take 1 capsule by mouth daily  0     pantoprazole (PROTONIX) 20 MG EC tablet Take 1 tablet (20 mg) by mouth daily 90 tablet 3     potassium 99 MG TABS Take 1 tablet by mouth daily (with dinner)        Probiotic Product (ACIDOPHILUS PROBIOTIC BLEND) CAPS Take 1 capsule by mouth daily (with breakfast)  30 capsule 0     simvastatin (ZOCOR) 20 MG tablet TAKE ONE TABLET BY MOUTH EVERY NIGHT AT BEDTIME 90 tablet 3     VITAMIN D, CHOLECALCIFEROL, PO Take 2,000 Units by mouth every other day Supper         Allergies:     Allergies   Allergen Reactions     Augmentin Diarrhea     Codeine Nausea and Vomiting     Hydrocodone      Keeps patient awake     Naproxen Itching and Rash     Seasonal Allergies      Hay fever in fall, ragweed and russian thistles     Sulfa Drugs Nausea       Past Medical History:  Past Medical History:   Diagnosis Date     Aortic valve stenosis 8/18/2022     Arthritis      Diabetes (H)     Type 2-no meds... Dieet and exercise only as of 9/15/20     Esophageal reflux      Hernia, abdominal      History of blood transfusion 1984     Hypertension     No cardiologist     Incontinence of urine      Mumps     6 years old     Obese      Osteoarthritis      Other  and unspecified hyperlipidemia      Other chronic pain     back     Peptic ulcer, unspecified site, unspecified as acute or chronic, without mention of hemorrhage, perforation, or obstruction      Small bowel obstruction (H)      Thyroid disease hypothyroidism     Urinary incontinence      Walking troubles        Past Surgical History:  Past Surgical History:   Procedure Laterality Date     ABDOMEN SURGERY       ARTHROPLASTY HIP Right 11/9/2016    Procedure: ARTHROPLASTY HIP;  Surgeon: Angel Lund MD;  Location:  OR     AS REPAIR INCISIONAL HERNIA,REDUCIBLE  1998    inc hernia ( Yamileth surgery)     BIOPSY       BLADDER SURGERY      hyperdistention surgery and sling     BREAST SURGERY       CHOLECYSTECTOMY  1987     COLONOSCOPY  12/3/2011    Procedure:COLONOSCOPY; COLONOSCOPY; Surgeon:SEMAJ GRAHAM; Location: GI     COLONOSCOPY N/A 3/29/2018    Procedure: COLONOSCOPY;  COLONOSCOPY Rm 544;  Surgeon: Marco Gusman MD;  Location:  GI     CV CORONARY ANGIOGRAM N/A 9/30/2022    Procedure: Coronary Angiogram;  Surgeon: Garcia Barber MD;  Location:  HEART CARDIAC CATH LAB     CYSTOSCOPY N/A 9/6/2017    Procedure: CYSTOSCOPY;  CYSTOSCOPY AND HYDRODISTENTION ;  Surgeon: Eyal Bai MD;  Location:  OR     ENT SURGERY      Tonsillectomy     ESOPHAGOSCOPY, GASTROSCOPY, DUODENOSCOPY (EGD), COMBINED N/A 9/26/2016    Procedure: COMBINED ESOPHAGOSCOPY, GASTROSCOPY, DUODENOSCOPY (EGD), BIOPSY SINGLE OR MULTIPLE;  Surgeon: Marco Monaco MD;  Location:  GI     EYE SURGERY Left 05/2019     GENITOURINARY SURGERY       GYN SURGERY      Hysterectomy     HERNIA REPAIR, UMBILICAL  7/8/2010    Dr. Kirt Franco times 3     IMPLANT STIMULATOR SACRAL NERVE STAGE ONE N/A 9/17/2020    Procedure: sacral neurostimulation stage one implant of neurostimulator lead;  Surgeon: Shahnaz Carbone MD;  Location:  OR     IMPLANT STIMULATOR SACRAL NERVE STAGE TWO Right 9/24/2020    Procedure:  Removal of interstem lead, NOT implanting neurostimulator;  Surgeon: Shahnaz Carbone MD;  Location: [facility] OR     ORTHOPEDIC SURGERY      [facility] - Dr. Lund- bilateral knee replacement     [facility] NONSPECIFIC PROCEDURE      T&A     Z NONSPECIFIC PROCEDURE      Vaginal Hysterectomy (has her ovaries)     Z NONSPECIFIC PROCEDURE      PPTL     Z NONSPECIFIC PROCEDURE      L shoulder to remove bone spurs     Z NONSPECIFIC PROCEDURE      cysto and durasphere Dr Demarco     [facility] NONSPECIFIC PROCEDURE  2005    cysto and durasphere     Z NONSPECIFIC PROCEDURE  2007    cysto and durasphere     Z NONSPECIFIC PROCEDURE  2009    retropubic TVT sling       Family History:  Family History   Problem Relation Age of Onset     Heart Disease Mother         heart surgery , new valve     Gallbladder Disease Mother      Coronary Artery Disease Mother      Hyperlipidemia Mother      Asthma Mother      Hypertension Mother      Anesthesia Reaction Mother      Cancer Father         throat ca,  76     Coronary Artery Disease Father      Other Cancer Father      Diabetes Brother         Half Brother     Aortic stenosis Brother         Had TAVR     Cancer Brother         half brother  lung      Heart Disease Maternal Grandmother      Coronary Artery Disease Maternal Grandmother      Heart Disease Maternal Grandfather      Coronary Artery Disease Maternal Grandfather        Social History:  Social History     Socioeconomic History     Marital status: Single   Tobacco Use     Smoking status: Never     Smokeless tobacco: Never     Tobacco comments:     have never smoked anything   Vaping Use     Vaping Use: Never used   Substance and Sexual Activity     Alcohol use: No     Alcohol/week: 0.0 standard drinks     Drug use: No     Sexual activity: Not Currently     Partners: Male     Birth control/protection: None     Comment: have had a hysterectomy   Other Topics Concern     Parent/sibling w/ CABG, MI or  angioplasty before 65F 55M? No       Review of Systems:  Constitutional: No fever, chills, or sweats. No weight gain/loss   ENT: No visual disturbance, ear ache, epistaxis, sore throat  Allergies/Immunologic: Negative.   Respiratory: No cough, hemoptysia  Cardiovascular: As per HPI  GI: No nausea, vomiting, hematemesis, melena, or hematochezia  : No urinary frequency, dysuria, or hematuria  Integument: Negative  Psychiatric: Negative  Neuro: Negative  Endocrinology: Negative   Musculoskeletal: Negative      Physical Exam:  Vitals: LMP  (LMP Unknown)    General: NAD  HEENT:  Dentition intact.    Neck: No jugular venous distension.   Heart: RRR with grade II MARISELA at RUSB  Lungs: CTA.    Abdomen: Soft, nontender, nondistended.   Extremities: No clubbing, cyanosis, or edema.  The pulses are +4/4 at the radial, brachial, femoral, popliteal, DP, and PT sites bilaterally.  No bruits are noted.  Neurologic: Alert and oriented to person/place/time, normal speech, gait and affect  Skin: No petechiae, purpura or rash.      Diagnostic Studies:  ECG: SR with 1st degree AVB  Personally reviewed and interpreted by me.    Coronary Angiogram: 9/30/2022  - Mild non-obstructive CAD    Echocardiogram: 8/9/2022  Interpretation Summary     Severe valvular aortic stenosis.  The visual ejection fraction is 55-60%.  Left ventricular systolic function is normal.  Since 2018 the aortic stenoisis has progressed and is now severe.  Right ventricular systolic pressure is elevated, consistent with mild to  moderate pulmonary hypertension.  The study was technically difficult.    Aortic Valve  The aortic valve is not well visualized. Thickened aortic valve leaflets. Most  likely it is a three leaflet aortic valve. No aortic regurgitation is present.  The peak AoV pressure gradient is 62.1 mmHg. The mean AoV pressure gradient is  37.4 mmHg. The calculated aortic valve are is 0.90 cm^2. Severe valvular  aortic stenosis.    Laboratory  Studies:  Personally reviewed and interpreted by me.    LIPID RESULTS:  Lab Results   Component Value Date    CHOL 132 07/28/2022    CHOL 157 02/24/2021    HDL 50 07/28/2022    HDL 50 02/24/2021    LDL 62 07/28/2022    LDL 85 02/24/2021    TRIG 101 07/28/2022    TRIG 110 02/24/2021    CHOLHDLRATIO 3.3 06/29/2015       LIVER ENZYME RESULTS:  Lab Results   Component Value Date    AST 20 07/28/2022    AST 21 02/24/2021    ALT 15 07/28/2022    ALT 18 02/24/2021       CBC RESULTS:  Lab Results   Component Value Date    WBC 4.2 09/30/2022    WBC 11.3 (H) 03/12/2021    RBC 4.03 09/30/2022    RBC 4.56 03/12/2021    HGB 12.8 09/30/2022    HGB 13.8 03/12/2021    HCT 39.2 09/30/2022    HCT 43.7 03/12/2021    MCV 97 09/30/2022    MCV 96 03/12/2021    MCH 31.8 09/30/2022    MCH 30.3 03/12/2021    MCHC 32.7 09/30/2022    MCHC 31.6 03/12/2021    RDW 13.3 09/30/2022    RDW 13.1 03/12/2021     09/30/2022     03/12/2021       BMP RESULTS:  Lab Results   Component Value Date     09/30/2022     02/24/2021    POTASSIUM 3.8 09/30/2022    POTASSIUM 3.9 02/24/2021    CHLORIDE 109 09/30/2022    CHLORIDE 107 02/24/2021    CO2 26 09/30/2022    CO2 28 02/24/2021    ANIONGAP 6 09/30/2022    ANIONGAP 5 02/24/2021     (H) 09/30/2022     (H) 02/24/2021    BUN 18 09/30/2022    BUN 18 02/24/2021    CR 0.78 09/30/2022    CR 0.90 02/24/2021    GFRESTIMATED 76 09/30/2022    GFRESTIMATED 48 (L) 08/16/2021    GFRESTIMATED 60 (L) 02/24/2021    GFRESTBLACK 70 02/24/2021    CINDY 9.8 09/30/2022    CINDY 9.9 02/24/2021        A1C RESULTS:  Lab Results   Component Value Date    A1C 5.6 07/28/2022    A1C 6.1 (H) 02/24/2021       INR RESULTS:  Lab Results   Component Value Date    INR 1.07 09/30/2022    INR 1.15 (H) 03/28/2018    INR 1.65 (H) 07/03/2009

## 2022-11-03 ENCOUNTER — CARE COORDINATION (OUTPATIENT)
Dept: CARDIOLOGY | Facility: CLINIC | Age: 82
End: 2022-11-03

## 2022-11-03 DIAGNOSIS — I35.0 AORTIC VALVE STENOSIS: Primary | ICD-10-CM

## 2022-11-04 ENCOUNTER — LAB (OUTPATIENT)
Dept: LAB | Facility: CLINIC | Age: 82
End: 2022-11-04
Payer: COMMERCIAL

## 2022-11-04 DIAGNOSIS — I35.0 AORTIC VALVE STENOSIS: ICD-10-CM

## 2022-11-04 PROCEDURE — U0003 INFECTIOUS AGENT DETECTION BY NUCLEIC ACID (DNA OR RNA); SEVERE ACUTE RESPIRATORY SYNDROME CORONAVIRUS 2 (SARS-COV-2) (CORONAVIRUS DISEASE [COVID-19]), AMPLIFIED PROBE TECHNIQUE, MAKING USE OF HIGH THROUGHPUT TECHNOLOGIES AS DESCRIBED BY CMS-2020-01-R: HCPCS

## 2022-11-04 PROCEDURE — U0005 INFEC AGEN DETEC AMPLI PROBE: HCPCS

## 2022-11-04 RX ORDER — CEFAZOLIN SODIUM 2 G/100ML
2 INJECTION, SOLUTION INTRAVENOUS
Status: CANCELLED | OUTPATIENT
Start: 2022-11-04

## 2022-11-04 RX ORDER — LIDOCAINE 40 MG/G
CREAM TOPICAL
Status: CANCELLED | OUTPATIENT
Start: 2022-11-04

## 2022-11-04 RX ORDER — SODIUM CHLORIDE 9 MG/ML
INJECTION, SOLUTION INTRAVENOUS CONTINUOUS
Status: CANCELLED | OUTPATIENT
Start: 2022-11-04

## 2022-11-04 RX ORDER — ASPIRIN 325 MG
325 TABLET ORAL ONCE
Status: CANCELLED | OUTPATIENT
Start: 2022-11-04

## 2022-11-05 LAB — SARS-COV-2 RNA RESP QL NAA+PROBE: NEGATIVE

## 2022-11-07 RX ORDER — AMOXICILLIN 500 MG/1
2000 CAPSULE ORAL
COMMUNITY

## 2022-11-07 NOTE — PROGRESS NOTES
PTA medications updated by Medication Scribe prior to surgery via phone call with patient (last doses completed by Nurse)     Medication history sources: Patient, Surescripts and H&P  In the past week, patient estimated taking medication this percent of the time: Greater than 90%  Adherence assessment: N/A Not Observed    Significant changes made to the medication list:  None      Additional medication history information:   Patient was advised to bring: latanoprost OP solution    Medication reconciliation completed by provider prior to medication history? No    Time spent in this activity: 40 minutes    The information provided in this note is only as accurate as the sources available at the time of update(s)      Prior to Admission medications    Medication Sig Last Dose Taking? Auth Provider Long Term End Date   acetaminophen (TYLENOL) 500 MG tablet Take 1,000 mg by mouth 2 times daily (2 x 500 mg = 1000 mg) 11/7/2022 at PM Yes Unknown, Entered By History     amLODIPine (NORVASC) 5 MG tablet TAKE ONE TABLET BY MOUTH EVERY NIGHT AT BEDTIME  Patient taking differently: Take by mouth At Bedtime  at HS Yes Jesenia Madrigal MD Yes    amoxicillin (AMOXIL) 500 MG capsule Take 2,000 mg by mouth once as needed (1 hour prior to dental procedures 4 x 500 mg = 2000 mg) Unknown at PRN Yes Reported, Patient     aspirin (ASA) 81 MG EC tablet Take 1 tablet (81 mg) by mouth daily  at AM Yes Yulia Castano MD No    Biotin 5000 MCG PO TABS Take 1 tablet by mouth daily 11/7/2022 at PM Yes Reported, Patient     calcium carbonate (OS-CINDY) 500 MG tablet Take 1 tablet by mouth every other day 11/6/2022 at PM Yes Unknown, Entered By History     CENTRUM SILVER OR TABS Take 1 tablet by mouth daily 11/7/2022 at AM Yes John Daniels MD     Cranberry 450 MG CAPS Take 1 capsule by mouth daily (with dinner)  at 1800 Yes Unknown, Entered By History     docusate sodium (COLACE) 100 MG capsule Take 100 mg by mouth At Bedtime  at  HS Yes Unknown, Entered By History     ferrous gluconate (FERGON) 324 (38 FE) MG tablet Take 1 tablet (324 mg) by mouth 2 times daily 11/7/2022 at PM Yes Maximino Shabazz MD     latanoprost (XALATAN) 0.005 % ophthalmic solution Place 1 drop into both eyes At Bedtime   at HS Yes Reported, Patient Yes    levothyroxine (SYNTHROID/LEVOTHROID) 50 MCG tablet Take 1 tablet (50 mcg) by mouth daily  at AM Yes Jesenia Madrigal MD Yes    lisinopril (ZESTRIL) 30 MG tablet TAKE ONE TABLET BY MOUTH EVERY DAY in the morning  FOR HYPERTENSION  Patient taking differently: Take 30 mg by mouth every morning  at AM Yes Maggie Mares MD Yes    magnesium 250 MG tablet Take 1 tablet by mouth daily 11/7/2022 at AM Yes Unknown, Entered By History     meloxicam (MOBIC) 7.5 MG tablet Take 1 tablet (7.5 mg) by mouth daily  at AM Yes Jesenia Madrigal MD Yes    nitroGLYcerin (NITROSTAT) 0.4 MG sublingual tablet For chest pain place 1 tablet under the tongue every 5 minutes for 3 doses. If symptoms persist 5 minutes after 1st dose call 911.  Patient taking differently: 0.4 mg every 5 minutes as needed For chest pain place 1 tablet under the tongue every 5 minutes for 3 doses. If symptoms persist 5 minutes after 1st dose call 911. Has not taken at PRN Yes Yulia Castano MD Yes    OMEGA-3 FATTY ACIDS 1200 MG OR CAPS Take 1 capsule by mouth daily 11/7/2022 at AM Yes John Daniels MD     pantoprazole (PROTONIX) 20 MG EC tablet Take 1 tablet (20 mg) by mouth daily  at AM Yes Jesenia Madrigal MD     potassium 99 MG TABS Take 1 tablet by mouth daily (with dinner)   at 1800 Yes Reported, Patient     Probiotic Product (ACIDOPHILUS PROBIOTIC BLEND) CAPS Take 1 capsule by mouth daily (with breakfast)  11/7/2022 at AM Yes Jesenia Madrigal MD     simvastatin (ZOCOR) 20 MG tablet TAKE ONE TABLET BY MOUTH EVERY NIGHT AT BEDTIME  Patient taking differently: Take 20 mg by mouth At Bedtime  at HS Yes Jesenia Madrigal MD  Yes    VITAMIN D, CHOLECALCIFEROL, PO Take 2,000 Units by mouth daily  at 1800 Yes Reported, Patient

## 2022-11-08 ENCOUNTER — ANESTHESIA EVENT (OUTPATIENT)
Dept: CARDIOLOGY | Facility: CLINIC | Age: 82
DRG: 267 | End: 2022-11-08
Payer: MEDICARE

## 2022-11-08 ENCOUNTER — HOSPITAL ENCOUNTER (INPATIENT)
Facility: CLINIC | Age: 82
LOS: 1 days | Discharge: HOME | DRG: 267 | End: 2022-11-09
Admitting: INTERNAL MEDICINE
Payer: MEDICARE

## 2022-11-08 ENCOUNTER — ANESTHESIA (OUTPATIENT)
Dept: CARDIOLOGY | Facility: CLINIC | Age: 82
DRG: 267 | End: 2022-11-08
Payer: MEDICARE

## 2022-11-08 ENCOUNTER — HOSPITAL ENCOUNTER (OUTPATIENT)
Dept: CARDIOLOGY | Facility: CLINIC | Age: 82
Discharge: HOME | DRG: 267 | End: 2022-11-08
Attending: INTERNAL MEDICINE
Payer: MEDICARE

## 2022-11-08 DIAGNOSIS — Z95.2 S/P TAVR (TRANSCATHETER AORTIC VALVE REPLACEMENT): Primary | ICD-10-CM

## 2022-11-08 DIAGNOSIS — I35.0 AORTIC VALVE STENOSIS: ICD-10-CM

## 2022-11-08 DIAGNOSIS — Z95.3 S/P TAVR (TRANSCATHETER AORTIC VALVE REPLACEMENT), BIOPROSTHETIC: Primary | ICD-10-CM

## 2022-11-08 DIAGNOSIS — I44.2 ATRIOVENTRICULAR BLOCK, COMPLETE (H): ICD-10-CM

## 2022-11-08 LAB
ACT BLD: 118 SECONDS (ref 74–150)
ACT BLD: 343 SECONDS (ref 74–150)
ACT BLD: 377 SECONDS (ref 74–150)
BLD PROD TYP BPU: NORMAL
BLD PROD TYP BPU: NORMAL
BLOOD COMPONENT TYPE: NORMAL
BLOOD COMPONENT TYPE: NORMAL
CODING SYSTEM: NORMAL
CODING SYSTEM: NORMAL
CROSSMATCH: NORMAL
CROSSMATCH: NORMAL
ERYTHROCYTE [DISTWIDTH] IN BLOOD BY AUTOMATED COUNT: 12.8 % (ref 10–15)
GLUCOSE BLD-MCNC: 113 MG/DL (ref 70–99)
GLUCOSE BLDC GLUCOMTR-MCNC: 84 MG/DL (ref 70–99)
GLUCOSE BLDC GLUCOMTR-MCNC: 97 MG/DL (ref 70–99)
HCT VFR BLD AUTO: 46.8 % (ref 35–47)
HGB BLD-MCNC: 15.3 G/DL (ref 11.7–15.7)
ISSUE DATE AND TIME: NORMAL
ISSUE DATE AND TIME: NORMAL
LVEF ECHO: NORMAL
MCH RBC QN AUTO: 31.7 PG (ref 26.5–33)
MCHC RBC AUTO-ENTMCNC: 32.7 G/DL (ref 31.5–36.5)
MCV RBC AUTO: 97 FL (ref 78–100)
PLATELET # BLD AUTO: 179 10E3/UL (ref 150–450)
POTASSIUM BLD-SCNC: 3.8 MMOL/L (ref 3.4–5.3)
RBC # BLD AUTO: 4.82 10E6/UL (ref 3.8–5.2)
UNIT ABO/RH: NORMAL
UNIT ABO/RH: NORMAL
UNIT NUMBER: NORMAL
UNIT NUMBER: NORMAL
UNIT STATUS: NORMAL
UNIT STATUS: NORMAL
UNIT TYPE ISBT: 5100
UNIT TYPE ISBT: 5100
WBC # BLD AUTO: 6 10E3/UL (ref 4–11)

## 2022-11-08 PROCEDURE — 36415 COLL VENOUS BLD VENIPUNCTURE: CPT | Performed by: INTERNAL MEDICINE

## 2022-11-08 PROCEDURE — 0JH606Z INSERTION OF PACEMAKER, DUAL CHAMBER INTO CHEST SUBCUTANEOUS TISSUE AND FASCIA, OPEN APPROACH: ICD-10-PCS | Performed by: INTERNAL MEDICINE

## 2022-11-08 PROCEDURE — 93321 DOPPLER ECHO F-UP/LMTD STD: CPT | Mod: 26 | Performed by: INTERNAL MEDICINE

## 2022-11-08 PROCEDURE — 85347 COAGULATION TIME ACTIVATED: CPT

## 2022-11-08 PROCEDURE — C1760 CLOSURE DEV, VASC: HCPCS | Performed by: INTERNAL MEDICINE

## 2022-11-08 PROCEDURE — C1730 CATH, EP, 19 OR FEW ELECT: HCPCS | Performed by: INTERNAL MEDICINE

## 2022-11-08 PROCEDURE — 84132 ASSAY OF SERUM POTASSIUM: CPT | Performed by: SURGERY

## 2022-11-08 PROCEDURE — 33361 REPLACE AORTIC VALVE PERQ: CPT | Mod: 62 | Performed by: INTERNAL MEDICINE

## 2022-11-08 PROCEDURE — 86923 COMPATIBILITY TEST ELECTRIC: CPT | Performed by: INTERNAL MEDICINE

## 2022-11-08 PROCEDURE — C1894 INTRO/SHEATH, NON-LASER: HCPCS | Performed by: INTERNAL MEDICINE

## 2022-11-08 PROCEDURE — 210N000002 HC R&B HEART CARE

## 2022-11-08 PROCEDURE — C1785 PMKR, DUAL, RATE-RESP: HCPCS | Performed by: INTERNAL MEDICINE

## 2022-11-08 PROCEDURE — 93325 DOPPLER ECHO COLOR FLOW MAPG: CPT | Mod: 26 | Performed by: INTERNAL MEDICINE

## 2022-11-08 PROCEDURE — 99223 1ST HOSP IP/OBS HIGH 75: CPT | Performed by: PHYSICIAN ASSISTANT

## 2022-11-08 PROCEDURE — 250N000011 HC RX IP 250 OP 636: Performed by: NURSE PRACTITIONER

## 2022-11-08 PROCEDURE — 99152 MOD SED SAME PHYS/QHP 5/>YRS: CPT | Mod: XE | Performed by: INTERNAL MEDICINE

## 2022-11-08 PROCEDURE — 272N000001 HC OR GENERAL SUPPLY STERILE: Performed by: INTERNAL MEDICINE

## 2022-11-08 PROCEDURE — 250N000009 HC RX 250: Performed by: NURSE ANESTHETIST, CERTIFIED REGISTERED

## 2022-11-08 PROCEDURE — 33361 REPLACE AORTIC VALVE PERQ: CPT | Performed by: INTERNAL MEDICINE

## 2022-11-08 PROCEDURE — 258N000003 HC RX IP 258 OP 636: Performed by: REGISTERED NURSE

## 2022-11-08 PROCEDURE — 272N000595 HC VALVE (TAVR): Performed by: INTERNAL MEDICINE

## 2022-11-08 PROCEDURE — 02RF38Z REPLACEMENT OF AORTIC VALVE WITH ZOOPLASTIC TISSUE, PERCUTANEOUS APPROACH: ICD-10-PCS | Performed by: INTERNAL MEDICINE

## 2022-11-08 PROCEDURE — 93005 ELECTROCARDIOGRAM TRACING: CPT

## 2022-11-08 PROCEDURE — 82947 ASSAY GLUCOSE BLOOD QUANT: CPT | Performed by: ANESTHESIOLOGY

## 2022-11-08 PROCEDURE — 250N000011 HC RX IP 250 OP 636: Performed by: REGISTERED NURSE

## 2022-11-08 PROCEDURE — 93010 ELECTROCARDIOGRAM REPORT: CPT | Performed by: INTERNAL MEDICINE

## 2022-11-08 PROCEDURE — 33208 INSRT HEART PM ATRIAL & VENT: CPT | Mod: KX | Performed by: INTERNAL MEDICINE

## 2022-11-08 PROCEDURE — 250N000009 HC RX 250: Performed by: REGISTERED NURSE

## 2022-11-08 PROCEDURE — C1898 LEAD, PMKR, OTHER THAN TRANS: HCPCS | Performed by: INTERNAL MEDICINE

## 2022-11-08 PROCEDURE — 250N000011 HC RX IP 250 OP 636: Performed by: INTERNAL MEDICINE

## 2022-11-08 PROCEDURE — 250N000013 HC RX MED GY IP 250 OP 250 PS 637: Performed by: NURSE PRACTITIONER

## 2022-11-08 PROCEDURE — 250N000009 HC RX 250: Performed by: INTERNAL MEDICINE

## 2022-11-08 PROCEDURE — 370N000017 HC ANESTHESIA TECHNICAL FEE, PER MIN: Performed by: INTERNAL MEDICINE

## 2022-11-08 PROCEDURE — 99152 MOD SED SAME PHYS/QHP 5/>YRS: CPT | Performed by: INTERNAL MEDICINE

## 2022-11-08 PROCEDURE — 258N000003 HC RX IP 258 OP 636: Performed by: NURSE ANESTHETIST, CERTIFIED REGISTERED

## 2022-11-08 PROCEDURE — C1769 GUIDE WIRE: HCPCS | Performed by: INTERNAL MEDICINE

## 2022-11-08 PROCEDURE — 250N000011 HC RX IP 250 OP 636: Performed by: NURSE ANESTHETIST, CERTIFIED REGISTERED

## 2022-11-08 PROCEDURE — 93308 TTE F-UP OR LMTD: CPT | Mod: 26 | Performed by: INTERNAL MEDICINE

## 2022-11-08 PROCEDURE — P9016 RBC LEUKOCYTES REDUCED: HCPCS | Performed by: INTERNAL MEDICINE

## 2022-11-08 PROCEDURE — 33361 REPLACE AORTIC VALVE PERQ: CPT | Mod: 62 | Performed by: SURGERY

## 2022-11-08 PROCEDURE — 85027 COMPLETE CBC AUTOMATED: CPT | Performed by: INTERNAL MEDICINE

## 2022-11-08 PROCEDURE — 99153 MOD SED SAME PHYS/QHP EA: CPT | Performed by: INTERNAL MEDICINE

## 2022-11-08 PROCEDURE — 93325 DOPPLER ECHO COLOR FLOW MAPG: CPT

## 2022-11-08 PROCEDURE — 258N000003 HC RX IP 258 OP 636: Performed by: INTERNAL MEDICINE

## 2022-11-08 DEVICE — PCMKR CARD ACCOLADE MRI EL DR: Type: IMPLANTABLE DEVICE | Status: FUNCTIONAL

## 2022-11-08 DEVICE — DEVICE CLOSURE VASCULAR MANTA 18FR 2115: Type: IMPLANTABLE DEVICE | Status: FUNCTIONAL

## 2022-11-08 DEVICE — LEAD INGEVITY+ AF IS1 7841 52CM: Type: IMPLANTABLE DEVICE | Status: FUNCTIONAL

## 2022-11-08 DEVICE — LEAD INGEVITY+ AF IS1 7840 4CM: Type: IMPLANTABLE DEVICE | Status: FUNCTIONAL

## 2022-11-08 RX ORDER — ONDANSETRON 2 MG/ML
INJECTION INTRAMUSCULAR; INTRAVENOUS PRN
Status: DISCONTINUED | OUTPATIENT
Start: 2022-11-08 | End: 2022-11-08

## 2022-11-08 RX ORDER — MEPERIDINE HYDROCHLORIDE 25 MG/ML
12.5 INJECTION INTRAMUSCULAR; INTRAVENOUS; SUBCUTANEOUS EVERY 5 MIN PRN
Status: DISCONTINUED | OUTPATIENT
Start: 2022-11-08 | End: 2022-11-08 | Stop reason: HOSPADM

## 2022-11-08 RX ORDER — ASPIRIN 81 MG/1
81 TABLET ORAL DAILY
Status: DISCONTINUED | OUTPATIENT
Start: 2022-11-09 | End: 2022-11-09 | Stop reason: HOSPADM

## 2022-11-08 RX ORDER — PROTAMINE SULFATE 10 MG/ML
INJECTION, SOLUTION INTRAVENOUS PRN
Status: DISCONTINUED | OUTPATIENT
Start: 2022-11-08 | End: 2022-11-08

## 2022-11-08 RX ORDER — DEXTROSE MONOHYDRATE 25 G/50ML
25-50 INJECTION, SOLUTION INTRAVENOUS
Status: DISCONTINUED | OUTPATIENT
Start: 2022-11-08 | End: 2022-11-09 | Stop reason: HOSPADM

## 2022-11-08 RX ORDER — NALOXONE HYDROCHLORIDE 0.4 MG/ML
0.2 INJECTION, SOLUTION INTRAMUSCULAR; INTRAVENOUS; SUBCUTANEOUS
Status: DISCONTINUED | OUTPATIENT
Start: 2022-11-08 | End: 2022-11-09 | Stop reason: HOSPADM

## 2022-11-08 RX ORDER — CLINDAMYCIN PHOSPHATE 900 MG/50ML
900 INJECTION, SOLUTION INTRAVENOUS
Status: DISCONTINUED | OUTPATIENT
Start: 2022-11-08 | End: 2022-11-08 | Stop reason: HOSPADM

## 2022-11-08 RX ORDER — BUPIVACAINE HYDROCHLORIDE 2.5 MG/ML
INJECTION, SOLUTION EPIDURAL; INFILTRATION; INTRACAUDAL
Status: DISCONTINUED | OUTPATIENT
Start: 2022-11-08 | End: 2022-11-08 | Stop reason: HOSPADM

## 2022-11-08 RX ORDER — AMLODIPINE BESYLATE 5 MG/1
5 TABLET ORAL AT BEDTIME
Status: DISCONTINUED | OUTPATIENT
Start: 2022-11-08 | End: 2022-11-09 | Stop reason: HOSPADM

## 2022-11-08 RX ORDER — ALBUTEROL SULFATE 0.83 MG/ML
2.5 SOLUTION RESPIRATORY (INHALATION) EVERY 4 HOURS PRN
Status: DISCONTINUED | OUTPATIENT
Start: 2022-11-08 | End: 2022-11-08 | Stop reason: HOSPADM

## 2022-11-08 RX ORDER — HYDRALAZINE HYDROCHLORIDE 20 MG/ML
2.5-5 INJECTION INTRAMUSCULAR; INTRAVENOUS EVERY 10 MIN PRN
Status: DISCONTINUED | OUTPATIENT
Start: 2022-11-08 | End: 2022-11-08 | Stop reason: HOSPADM

## 2022-11-08 RX ORDER — OXYCODONE AND ACETAMINOPHEN 5; 325 MG/1; MG/1
1 TABLET ORAL EVERY 4 HOURS PRN
Status: DISCONTINUED | OUTPATIENT
Start: 2022-11-08 | End: 2022-11-09 | Stop reason: HOSPADM

## 2022-11-08 RX ORDER — ONDANSETRON 4 MG/1
4 TABLET, ORALLY DISINTEGRATING ORAL EVERY 30 MIN PRN
Status: DISCONTINUED | OUTPATIENT
Start: 2022-11-08 | End: 2022-11-08 | Stop reason: HOSPADM

## 2022-11-08 RX ORDER — HYDROMORPHONE HYDROCHLORIDE 1 MG/ML
0.4 INJECTION, SOLUTION INTRAMUSCULAR; INTRAVENOUS; SUBCUTANEOUS EVERY 5 MIN PRN
Status: DISCONTINUED | OUTPATIENT
Start: 2022-11-08 | End: 2022-11-08 | Stop reason: HOSPADM

## 2022-11-08 RX ORDER — OXYCODONE HYDROCHLORIDE 5 MG/1
5 TABLET ORAL EVERY 4 HOURS PRN
Status: DISCONTINUED | OUTPATIENT
Start: 2022-11-08 | End: 2022-11-09 | Stop reason: HOSPADM

## 2022-11-08 RX ORDER — ACETAMINOPHEN 325 MG/1
650 TABLET ORAL EVERY 4 HOURS PRN
Status: DISCONTINUED | OUTPATIENT
Start: 2022-11-08 | End: 2022-11-09 | Stop reason: HOSPADM

## 2022-11-08 RX ORDER — LIDOCAINE 40 MG/G
CREAM TOPICAL
Status: DISCONTINUED | OUTPATIENT
Start: 2022-11-08 | End: 2022-11-08 | Stop reason: HOSPADM

## 2022-11-08 RX ORDER — CEFAZOLIN SODIUM/WATER 2 G/20 ML
2 SYRINGE (ML) INTRAVENOUS
Status: COMPLETED | OUTPATIENT
Start: 2022-11-08 | End: 2022-11-08

## 2022-11-08 RX ORDER — NICOTINE POLACRILEX 4 MG
15-30 LOZENGE BUCCAL
Status: DISCONTINUED | OUTPATIENT
Start: 2022-11-08 | End: 2022-11-09 | Stop reason: HOSPADM

## 2022-11-08 RX ORDER — SODIUM CHLORIDE, SODIUM LACTATE, POTASSIUM CHLORIDE, CALCIUM CHLORIDE 600; 310; 30; 20 MG/100ML; MG/100ML; MG/100ML; MG/100ML
INJECTION, SOLUTION INTRAVENOUS CONTINUOUS
Status: DISCONTINUED | OUTPATIENT
Start: 2022-11-08 | End: 2022-11-08 | Stop reason: HOSPADM

## 2022-11-08 RX ORDER — CEFAZOLIN SODIUM 1 G/3ML
1 INJECTION, POWDER, FOR SOLUTION INTRAMUSCULAR; INTRAVENOUS
Status: DISCONTINUED | OUTPATIENT
Start: 2022-11-08 | End: 2022-11-08 | Stop reason: HOSPADM

## 2022-11-08 RX ORDER — NALOXONE HYDROCHLORIDE 0.4 MG/ML
0.4 INJECTION, SOLUTION INTRAMUSCULAR; INTRAVENOUS; SUBCUTANEOUS
Status: DISCONTINUED | OUTPATIENT
Start: 2022-11-08 | End: 2022-11-09 | Stop reason: HOSPADM

## 2022-11-08 RX ORDER — HEPARIN SODIUM 1000 [USP'U]/ML
INJECTION, SOLUTION INTRAVENOUS; SUBCUTANEOUS PRN
Status: DISCONTINUED | OUTPATIENT
Start: 2022-11-08 | End: 2022-11-08

## 2022-11-08 RX ORDER — DOBUTAMINE HYDROCHLORIDE 200 MG/100ML
5-40 INJECTION INTRAVENOUS CONTINUOUS PRN
Status: DISCONTINUED | OUTPATIENT
Start: 2022-11-08 | End: 2022-11-08 | Stop reason: HOSPADM

## 2022-11-08 RX ORDER — ASPIRIN 325 MG
325 TABLET ORAL ONCE
Status: DISCONTINUED | OUTPATIENT
Start: 2022-11-08 | End: 2022-11-08 | Stop reason: HOSPADM

## 2022-11-08 RX ORDER — HYDRALAZINE HYDROCHLORIDE 20 MG/ML
10 INJECTION INTRAMUSCULAR; INTRAVENOUS
Status: DISCONTINUED | OUTPATIENT
Start: 2022-11-08 | End: 2022-11-09 | Stop reason: HOSPADM

## 2022-11-08 RX ORDER — FENTANYL CITRATE 50 UG/ML
INJECTION, SOLUTION INTRAMUSCULAR; INTRAVENOUS
Status: DISCONTINUED | OUTPATIENT
Start: 2022-11-08 | End: 2022-11-08 | Stop reason: HOSPADM

## 2022-11-08 RX ORDER — HEPARIN SODIUM 200 [USP'U]/100ML
100-500 INJECTION, SOLUTION INTRAVENOUS CONTINUOUS PRN
Status: DISCONTINUED | OUTPATIENT
Start: 2022-11-08 | End: 2022-11-08 | Stop reason: HOSPADM

## 2022-11-08 RX ORDER — SIMVASTATIN 20 MG
20 TABLET ORAL AT BEDTIME
Status: DISCONTINUED | OUTPATIENT
Start: 2022-11-08 | End: 2022-11-09 | Stop reason: HOSPADM

## 2022-11-08 RX ORDER — FENTANYL CITRATE 50 UG/ML
INJECTION, SOLUTION INTRAMUSCULAR; INTRAVENOUS PRN
Status: DISCONTINUED | OUTPATIENT
Start: 2022-11-08 | End: 2022-11-08

## 2022-11-08 RX ORDER — NITROGLYCERIN 10 MG/100ML
INJECTION INTRAVENOUS PRN
Status: DISCONTINUED | OUTPATIENT
Start: 2022-11-08 | End: 2022-11-08

## 2022-11-08 RX ORDER — SODIUM CHLORIDE 450 MG/100ML
INJECTION, SOLUTION INTRAVENOUS CONTINUOUS
Status: DISCONTINUED | OUTPATIENT
Start: 2022-11-08 | End: 2022-11-08 | Stop reason: HOSPADM

## 2022-11-08 RX ORDER — PROPOFOL 10 MG/ML
INJECTION, EMULSION INTRAVENOUS CONTINUOUS PRN
Status: DISCONTINUED | OUTPATIENT
Start: 2022-11-08 | End: 2022-11-08

## 2022-11-08 RX ORDER — SODIUM CHLORIDE, SODIUM LACTATE, POTASSIUM CHLORIDE, CALCIUM CHLORIDE 600; 310; 30; 20 MG/100ML; MG/100ML; MG/100ML; MG/100ML
INJECTION, SOLUTION INTRAVENOUS CONTINUOUS PRN
Status: DISCONTINUED | OUTPATIENT
Start: 2022-11-08 | End: 2022-11-08

## 2022-11-08 RX ORDER — LEVOTHYROXINE SODIUM 50 UG/1
50 TABLET ORAL DAILY
Status: DISCONTINUED | OUTPATIENT
Start: 2022-11-09 | End: 2022-11-09 | Stop reason: HOSPADM

## 2022-11-08 RX ORDER — CEFAZOLIN SODIUM 2 G/100ML
INJECTION, SOLUTION INTRAVENOUS CONTINUOUS PRN
Status: COMPLETED | OUTPATIENT
Start: 2022-11-08 | End: 2022-11-08

## 2022-11-08 RX ORDER — HEPARIN SODIUM 200 [USP'U]/100ML
100-600 INJECTION, SOLUTION INTRAVENOUS CONTINUOUS PRN
Status: DISCONTINUED | OUTPATIENT
Start: 2022-11-08 | End: 2022-11-08 | Stop reason: HOSPADM

## 2022-11-08 RX ORDER — FENTANYL CITRATE 50 UG/ML
50 INJECTION, SOLUTION INTRAMUSCULAR; INTRAVENOUS EVERY 5 MIN PRN
Status: DISCONTINUED | OUTPATIENT
Start: 2022-11-08 | End: 2022-11-08 | Stop reason: HOSPADM

## 2022-11-08 RX ORDER — NITROGLYCERIN 0.4 MG/1
0.4 TABLET SUBLINGUAL EVERY 5 MIN PRN
Status: DISCONTINUED | OUTPATIENT
Start: 2022-11-08 | End: 2022-11-09 | Stop reason: HOSPADM

## 2022-11-08 RX ORDER — MIDAZOLAM HCL IN 0.9 % NACL/PF 1 MG/ML
.5-6 PLASTIC BAG, INJECTION (ML) INTRAVENOUS CONTINUOUS PRN
Status: DISCONTINUED | OUTPATIENT
Start: 2022-11-08 | End: 2022-11-08 | Stop reason: HOSPADM

## 2022-11-08 RX ORDER — ONDANSETRON 2 MG/ML
4 INJECTION INTRAMUSCULAR; INTRAVENOUS EVERY 30 MIN PRN
Status: DISCONTINUED | OUTPATIENT
Start: 2022-11-08 | End: 2022-11-08 | Stop reason: HOSPADM

## 2022-11-08 RX ORDER — LIDOCAINE HYDROCHLORIDE 20 MG/ML
INJECTION, SOLUTION INFILTRATION; PERINEURAL PRN
Status: DISCONTINUED | OUTPATIENT
Start: 2022-11-08 | End: 2022-11-08

## 2022-11-08 RX ORDER — SODIUM CHLORIDE 9 MG/ML
INJECTION, SOLUTION INTRAVENOUS CONTINUOUS
Status: DISCONTINUED | OUTPATIENT
Start: 2022-11-08 | End: 2022-11-08 | Stop reason: HOSPADM

## 2022-11-08 RX ORDER — LABETALOL 20 MG/4 ML (5 MG/ML) INTRAVENOUS SYRINGE
10
Status: DISCONTINUED | OUTPATIENT
Start: 2022-11-08 | End: 2022-11-08 | Stop reason: HOSPADM

## 2022-11-08 RX ADMIN — PHENYLEPHRINE HYDROCHLORIDE 100 MCG: 10 INJECTION INTRAVENOUS at 11:44

## 2022-11-08 RX ADMIN — NOREPINEPHRINE BITARTRATE 100 MCG: 1 INJECTION, SOLUTION, CONCENTRATE INTRAVENOUS at 12:16

## 2022-11-08 RX ADMIN — NOREPINEPHRINE BITARTRATE 0.3 MCG/KG/MIN: 1 INJECTION, SOLUTION, CONCENTRATE INTRAVENOUS at 11:43

## 2022-11-08 RX ADMIN — DEXMEDETOMIDINE HYDROCHLORIDE 1 MCG/KG/HR: 100 INJECTION, SOLUTION INTRAVENOUS at 10:50

## 2022-11-08 RX ADMIN — NOREPINEPHRINE BITARTRATE 100 MCG: 1 INJECTION, SOLUTION, CONCENTRATE INTRAVENOUS at 12:50

## 2022-11-08 RX ADMIN — HYDRALAZINE HYDROCHLORIDE 10 MG: 20 INJECTION, SOLUTION INTRAMUSCULAR; INTRAVENOUS at 22:17

## 2022-11-08 RX ADMIN — PROPOFOL 30 MCG/KG/MIN: 10 INJECTION, EMULSION INTRAVENOUS at 10:57

## 2022-11-08 RX ADMIN — AMLODIPINE BESYLATE 5 MG: 5 TABLET ORAL at 21:13

## 2022-11-08 RX ADMIN — Medication 2 G: at 10:51

## 2022-11-08 RX ADMIN — SODIUM CHLORIDE: 9 INJECTION, SOLUTION INTRAVENOUS at 10:35

## 2022-11-08 RX ADMIN — FENTANYL CITRATE 50 MCG: 50 INJECTION, SOLUTION INTRAMUSCULAR; INTRAVENOUS at 11:18

## 2022-11-08 RX ADMIN — DEXMEDETOMIDINE HYDROCHLORIDE 12 MCG: 100 INJECTION, SOLUTION INTRAVENOUS at 10:50

## 2022-11-08 RX ADMIN — LIDOCAINE HYDROCHLORIDE 80 MG: 20 INJECTION, SOLUTION INFILTRATION; PERINEURAL at 10:50

## 2022-11-08 RX ADMIN — SODIUM CHLORIDE, POTASSIUM CHLORIDE, SODIUM LACTATE AND CALCIUM CHLORIDE: 600; 310; 30; 20 INJECTION, SOLUTION INTRAVENOUS at 11:36

## 2022-11-08 RX ADMIN — NITROGLYCERIN 20 MCG: 10 INJECTION INTRAVENOUS at 11:48

## 2022-11-08 RX ADMIN — HEPARIN SODIUM 15000 UNITS: 1000 INJECTION INTRAVENOUS; SUBCUTANEOUS at 11:39

## 2022-11-08 RX ADMIN — PROTAMINE SULFATE 60 MG: 10 INJECTION, SOLUTION INTRAVENOUS at 12:12

## 2022-11-08 RX ADMIN — ONDANSETRON 4 MG: 2 INJECTION INTRAMUSCULAR; INTRAVENOUS at 10:50

## 2022-11-08 RX ADMIN — NOREPINEPHRINE BITARTRATE 100 MCG: 1 INJECTION, SOLUTION, CONCENTRATE INTRAVENOUS at 11:44

## 2022-11-08 RX ADMIN — FENTANYL CITRATE 25 MCG: 50 INJECTION, SOLUTION INTRAMUSCULAR; INTRAVENOUS at 11:01

## 2022-11-08 RX ADMIN — ACETAMINOPHEN 650 MG: 325 TABLET, FILM COATED ORAL at 21:12

## 2022-11-08 RX ADMIN — FENTANYL CITRATE 25 MCG: 50 INJECTION, SOLUTION INTRAMUSCULAR; INTRAVENOUS at 10:55

## 2022-11-08 RX ADMIN — SIMVASTATIN 20 MG: 20 TABLET, FILM COATED ORAL at 21:13

## 2022-11-08 RX ADMIN — NOREPINEPHRINE BITARTRATE 100 MCG: 1 INJECTION, SOLUTION, CONCENTRATE INTRAVENOUS at 12:19

## 2022-11-08 ASSESSMENT — ACTIVITIES OF DAILY LIVING (ADL)
ADLS_ACUITY_SCORE: 22
ADLS_ACUITY_SCORE: 22
ADLS_ACUITY_SCORE: 26
ADLS_ACUITY_SCORE: 22
ADLS_ACUITY_SCORE: 35
ADLS_ACUITY_SCORE: 22

## 2022-11-08 NOTE — ANESTHESIA PREPROCEDURE EVALUATION
Anesthesia Pre-Procedure Evaluation    Patient: Lexii Stratton   MRN: [id number] : 1940        Procedure : Procedure(s):  Transcatheter Aortic Valve Replacement-Femoral Approach          Past Medical History:   Diagnosis Date     Aortic valve stenosis 2022     Arthritis      Diabetes (H)     Type 2-no meds... Dieet and exercise only as of 9/15/20     Esophageal reflux      Hernia, abdominal      History of blood transfusion      Hypertension     No cardiologist     Incontinence of urine      Mumps     6 years old     Obese      Osteoarthritis      Other and unspecified hyperlipidemia      Other chronic pain     back     Peptic ulcer, unspecified site, unspecified as acute or chronic, without mention of hemorrhage, perforation, or obstruction      Small bowel obstruction (H)      Thyroid disease hypothyroidism     Urinary incontinence      Walking troubles       Past Surgical History:   Procedure Laterality Date     ABDOMEN SURGERY       ARTHROPLASTY HIP Right 2016    Procedure: ARTHROPLASTY HIP;  Surgeon: Angel Lund MD;  Location:  OR     AS REPAIR INCISIONAL HERNIA,REDUCIBLE  1998    inc hernia ( Yamileth surgery)     BIOPSY       BLADDER SURGERY      hyperdistention surgery and sling     BREAST SURGERY       CHOLECYSTECTOMY       COLONOSCOPY  12/3/2011    Procedure:COLONOSCOPY; COLONOSCOPY; Surgeon:SEMAJ GRAHAM; Location: GI     COLONOSCOPY N/A 3/29/2018    Procedure: COLONOSCOPY;  COLONOSCOPY Rm 544;  Surgeon: Marco Gusman MD;  Location:  GI     CV CORONARY ANGIOGRAM N/A 2022    Procedure: Coronary Angiogram;  Surgeon: Garcia Barber MD;  Location:  HEART CARDIAC CATH LAB     CYSTOSCOPY N/A 2017    Procedure: CYSTOSCOPY;  CYSTOSCOPY AND HYDRODISTENTION ;  Surgeon: Eyal Bai MD;  Location:  OR     ENT SURGERY      Tonsillectomy     ESOPHAGOSCOPY, GASTROSCOPY, DUODENOSCOPY (EGD), COMBINED N/A 2016    Procedure: COMBINED  ESOPHAGOSCOPY, GASTROSCOPY, DUODENOSCOPY (EGD), BIOPSY SINGLE OR MULTIPLE;  Surgeon: Marco Monaco MD;  Location: [facility] GI     EYE SURGERY Left 05/2019     GENITOURINARY SURGERY       GYN SURGERY      Hysterectomy     HERNIA REPAIR, UMBILICAL  7/8/2010    Dr. Kirt Franco times 3     IMPLANT STIMULATOR SACRAL NERVE STAGE ONE N/A 9/17/2020    Procedure: sacral neurostimulation stage one implant of neurostimulator lead;  Surgeon: Shahnaz Carbone MD;  Location: [facility] OR     IMPLANT STIMULATOR SACRAL NERVE STAGE TWO Right 9/24/2020    Procedure: Removal of interstem lead, NOT implanting neurostimulator;  Surgeon: Shahnaz Carbone MD;  Location:  OR     ORTHOPEDIC SURGERY  2009    [facility] - Dr. Lund- bilateral knee replacement     [facility] NONSPECIFIC PROCEDURE  1946    T&A     [facility] NONSPECIFIC PROCEDURE  1984    Vaginal Hysterectomy (has her ovaries)     Z NONSPECIFIC PROCEDURE  1973    PPTL     Z NONSPECIFIC PROCEDURE  1994    L shoulder to remove bone spurs     Z NONSPECIFIC PROCEDURE  2004    cysto and durasphere Dr Demarco     [facility] NONSPECIFIC PROCEDURE  2005    cysto and durasphere     [facility] NONSPECIFIC PROCEDURE  2007    cysto and durasphere     Z NONSPECIFIC PROCEDURE  2009    retropubic TVT sling      Allergies   Allergen Reactions     Augmentin Diarrhea     Codeine Nausea and Vomiting     Hydrocodone      Keeps patient awake     Naproxen Itching and Rash     Seasonal Allergies      Hay fever in fall, ragweed and russian thistles     Sulfa Drugs Nausea      Social History     Tobacco Use     Smoking status: Never     Smokeless tobacco: Never     Tobacco comments:     have never smoked anything   Substance Use Topics     Alcohol use: No     Alcohol/week: 0.0 standard drinks      Wt Readings from Last 1 Encounters:   10/27/22 84.4 kg (186 lb)        Anesthesia Evaluation   Pt has had prior anesthetic.     No history of anesthetic complications       ROS/MED HX  ENT/Pulmonary:    (-) sleep  apnea   Neurologic:    (-) no CVA   Cardiovascular:     (+) Dyslipidemia hypertension-----valvular problems/murmurs type: AS  (-) CAD   METS/Exercise Tolerance:     Hematologic:       Musculoskeletal:       GI/Hepatic:     (+) GERD,     Renal/Genitourinary:     (+) renal disease, type: CRI,     Endo:     (+) type II DM, thyroid problem, Obesity,     Psychiatric/Substance Use:       Infectious Disease:       Malignancy:       Other:            Physical Exam    Airway        Mallampati: II   TM distance: > 3 FB   Neck ROM: full   Mouth opening: > 3 cm    Respiratory Devices and Support         Dental  no notable dental history         Cardiovascular   cardiovascular exam normal          Pulmonary   pulmonary exam normal                OUTSIDE LABS:  CBC:   Lab Results   Component Value Date    WBC 4.2 09/30/2022    WBC 5.6 07/05/2022    HGB 12.8 09/30/2022    HGB 14.2 07/05/2022    HCT 39.2 09/30/2022    HCT 44.1 07/05/2022     09/30/2022     07/05/2022     BMP:   Lab Results   Component Value Date     10/27/2022     09/30/2022    POTASSIUM 4.0 10/27/2022    POTASSIUM 3.8 09/30/2022    CHLORIDE 105 10/27/2022    CHLORIDE 109 09/30/2022    CO2 25 10/27/2022    CO2 26 09/30/2022    BUN 18 10/27/2022    BUN 18 09/30/2022    CR 0.70 10/27/2022    CR 0.78 09/30/2022     (H) 10/27/2022     (H) 09/30/2022     COAGS:   Lab Results   Component Value Date    PTT 29 09/30/2022    INR 1.06 10/27/2022     POC:   Lab Results   Component Value Date     (H) 09/24/2020     HEPATIC:   Lab Results   Component Value Date    ALBUMIN 3.5 10/27/2022    PROTTOTAL 7.1 10/27/2022    ALT 20 10/27/2022    AST 28 10/27/2022    ALKPHOS 97 10/27/2022    BILITOTAL 1.1 10/27/2022     OTHER:   Lab Results   Component Value Date    LACT 0.8 07/03/2022    A1C 5.6 07/28/2022    CINDY 9.6 10/27/2022    MAG 2.0 03/30/2018    LIPASE 151 07/03/2022    TSH 2.56 02/01/2022    T4 1.32 02/25/2019    CRP 12.7 (H)  07/30/2013    SED 68 (H) 09/08/2016       Anesthesia Plan    ASA Status:  3   NPO Status:  NPO Appropriate    Anesthesia Type: MAC.     - Reason for MAC: straight local not clinically adequate              Consents    Anesthesia Plan(s) and associated risks, benefits, and realistic alternatives discussed. Questions answered and patient/representative(s) expressed understanding.    - Discussed:     - Discussed with:  Patient         Postoperative Care    Pain management: Multi-modal analgesia.   PONV prophylaxis: Ondansetron (or other 5HT-3), Background Propofol Infusion     Comments:                Amanda Palacios MD, MD

## 2022-11-08 NOTE — H&P
"[facility]    History and Physical - Hospitalist Service       Date of Admission:  11/8/2022    Assessment & Plan   Lexii Stratton is a 81 year old female with PMHx of hypertension, chronic diastolic heart failure, severe aortic stenosis admitted on 11/8/2022. She underwent TAVR 11/8/22. Post-procedure, pt went into [facility]B s/p temporary pacemaker placement with place for PPM placement later today.     Severe aortic stenosis S/P TAVR (11/8/22):  - Defer routine post-procedural cares to Cardiology   - Echo  - Cardiology consult   - Cardiac rehab     Postprocedural CHB s/p temporary pacemaker placement:   - Plan for PPM placement later day    - Defer to Cardiology     Hypertension:   [Norvasc 5 mg po at bedtime, Lisinopril 30 mg po every day]  - Resume pending post-procedural vitals     Chronic diastolic heart failure: In the setting of valve disease above.  Not on diuretics PTA. Appears euvolemic on exam.   - I&Os, daily weights     Diet controlled T2DM: A1C 5.6 on 7/28/22.   - Medium sliding scale insulin ordered   - BS per protocol   - Monitor for hypoglycemia     GERD: Continue PTA Protonix   Hypothyroidism: Continue PTA Synthroid   CKD II: Baseline creatinine 0.7-0.9     Diet: NPO per Anesthesia Guidelines for Procedure/Surgery Except for: Meds  NPO for Medical/Clinical Reasons Except for: Other; Specify: May take medications with a drink of water prior to Electrophysiology study    DVT Prophylaxis: Defer to Cardiology   Hammond Catheter: Not present  Central Lines: PRESENT       Cardiac Monitoring: None  Code Status: Full Code, confirmed with pt at bedside.     Clinically Significant Risk Factors Present on Admission                  # Hypertension: home medication list includes antihypertensive(s)     # Obesity: Estimated body mass index is 32.3 kg/m  as calculated from the following:    Height as of this encounter: 1.626 m (5' 4\").    Weight as of this encounter: 85.4 kg (188 lb 3.2 oz). "           Disposition Plan  Pending clinical course      The patient's care was discussed with the Attending Physician, Dr. Bardales, Bedside Nurse and Patient.    Sherin George PA-C  Hospitalist Service  [facility]  Securely message with the Vocera Web Console (learn more here)  Text page via [facility] Paging/Directory   ______________________________________________________________________    Chief Complaint   S/P TAVR    History is obtained from the patient and chart review.     History of Present Illness   Lexii Stratton is a 81 year old female with PMHx of hypertension, chronic diastolic heart failure, severe aortic stenosis admitted on 11/8/2022. She underwent TAVR 11/8/22. Post-procedure, pt went into CHB s/p temporary pacemaker placement with place for PPM placement later today.     Sx of progressive fatigue and SOB over the past few months. Refer to structural heart H&P by Isaura Kulkarni CNP from 10/27/22. Per Cardiology sign out, post-procedure went in CHB s/p temporary pacemaker placement.     Seen in the PACU post TAVR, just prior to being taken for PPM placement. Resting comfortably though complains of right hip pain from having to lay flat. Denies CP, SOB, dizziness, lightheadedness. Mentating well. No recent medication changes. Compliant with regimen. No acute concerns.     Review of Systems    The 10 point Review of Systems is negative other than noted in the HPI .    Past Medical History    I have reviewed this patient's medical history and updated it with pertinent information if needed.   Past Medical History:   Diagnosis Date     Aortic valve stenosis 8/18/2022     Arthritis      Diabetes (H)     Type 2-no meds... Dieet and exercise only as of 9/15/20     Esophageal reflux      Hernia, abdominal      History of blood transfusion 1984     Hypertension     No cardiologist     Incontinence of urine      Mumps     6 years old     Obese      Osteoarthritis       Other and unspecified hyperlipidemia      Other chronic pain     back     Peptic ulcer, unspecified site, unspecified as acute or chronic, without mention of hemorrhage, perforation, or obstruction      Small bowel obstruction (H)      Thyroid disease hypothyroidism     Urinary incontinence      Walking troubles      Past Surgical History   I have reviewed this patient's surgical history and updated it with pertinent information if needed.  Past Surgical History:   Procedure Laterality Date     ABDOMEN SURGERY       ARTHROPLASTY HIP Right 11/9/2016    Procedure: ARTHROPLASTY HIP;  Surgeon: Angel Lund MD;  Location:  OR     AS REPAIR INCISIONAL HERNIA,REDUCIBLE  1998    inc hernia ( [facility] surgery)     BIOPSY       BLADDER SURGERY      hyperdistention surgery and sling     BREAST SURGERY       CHOLECYSTECTOMY  1987     COLONOSCOPY  12/3/2011    Procedure:COLONOSCOPY; COLONOSCOPY; Surgeon:SEMAJ GRAHAM; Location: GI     COLONOSCOPY N/A 3/29/2018    Procedure: COLONOSCOPY;  COLONOSCOPY Rm 544;  Surgeon: Marco Gusman MD;  Location:  GI     CV CORONARY ANGIOGRAM N/A 9/30/2022    Procedure: Coronary Angiogram;  Surgeon: Garcia Barber MD;  Location:  HEART CARDIAC CATH LAB     CYSTOSCOPY N/A 9/6/2017    Procedure: CYSTOSCOPY;  CYSTOSCOPY AND HYDRODISTENTION ;  Surgeon: Eyal Bai MD;  Location:  OR     ENT SURGERY      Tonsillectomy     ESOPHAGOSCOPY, GASTROSCOPY, DUODENOSCOPY (EGD), COMBINED N/A 9/26/2016    Procedure: COMBINED ESOPHAGOSCOPY, GASTROSCOPY, DUODENOSCOPY (EGD), BIOPSY SINGLE OR MULTIPLE;  Surgeon: Marco Monaco MD;  Location:  GI     EYE SURGERY Left 05/2019     GENITOURINARY SURGERY       GYN SURGERY      Hysterectomy     HERNIA REPAIR, UMBILICAL  7/8/2010    Dr. Kirt Franco times 3     IMPLANT STIMULATOR SACRAL NERVE STAGE ONE N/A 9/17/2020    Procedure: sacral neurostimulation stage one implant of neurostimulator lead;  Surgeon: Shahnaz Carbone  Osiris Jacques MD;  Location:  OR     IMPLANT STIMULATOR SACRAL NERVE STAGE TWO Right 2020    Procedure: Removal of interstem lead, NOT implanting neurostimulator;  Surgeon: Shahnaz Carbone MD;  Location:  OR     ORTHOPEDIC SURGERY      [facility] - Dr. Lund- bilateral knee replacement     [facility] NONSPECIFIC PROCEDURE      T&A     [facility] NONSPECIFIC PROCEDURE      Vaginal Hysterectomy (has her ovaries)     [facility] NONSPECIFIC PROCEDURE      PPTL     [facility] NONSPECIFIC PROCEDURE      L shoulder to remove bone spurs     [facility] NONSPECIFIC PROCEDURE      cysto and durasphere Dr Demarco     [facility] NONSPECIFIC PROCEDURE  2005    cysto and durasphere     [facility] NONSPECIFIC PROCEDURE  2007    cysto and durasphere     [facility] NONSPECIFIC PROCEDURE  2009    retropubic TVT sling     Social History   I have reviewed this patient's social history and updated it with pertinent information if needed.  Social History     Tobacco Use     Smoking status: Never     Smokeless tobacco: Never     Tobacco comments:     have never smoked anything   Vaping Use     Vaping Use: Never used   Substance Use Topics     Alcohol use: No     Alcohol/week: 0.0 standard drinks     Drug use: No     Family History   I have reviewed this patient's family history and updated it with pertinent information if needed.  Family History   Problem Relation Age of Onset     Heart Disease Mother         heart surgery , new valve     Gallbladder Disease Mother      Coronary Artery Disease Mother      Hyperlipidemia Mother      Asthma Mother      Hypertension Mother      Anesthesia Reaction Mother      Cancer Father         throat ca,  76     Coronary Artery Disease Father      Other Cancer Father      Diabetes Brother         Half Brother     Aortic stenosis Brother         Had TAVR     Cancer Brother         half brother  lung      Heart Disease Maternal Grandmother      Coronary Artery Disease Maternal Grandmother      Heart Disease Maternal  Grandfather      Coronary Artery Disease Maternal Grandfather      Prior to Admission Medications   Prior to Admission Medications   Prescriptions Last Dose Informant Patient Reported? Taking?   Biotin 5000 MCG PO TABS 11/7/2022 at PM Self Yes Yes   Sig: Take 1 tablet by mouth daily   CENTRUM SILVER OR TABS 11/7/2022 at AM Self No Yes   Sig: Take 1 tablet by mouth daily   Cranberry 450 MG CAPS 11/7/2022 at 1800 Self Yes Yes   Sig: Take 1 capsule by mouth daily (with dinner)   OMEGA-3 FATTY ACIDS 1200 MG OR CAPS 11/7/2022 at AM Self Yes Yes   Sig: Take 1 capsule by mouth daily   Probiotic Product (ACIDOPHILUS PROBIOTIC BLEND) CAPS 11/7/2022 at AM Self Yes Yes   Sig: Take 1 capsule by mouth daily (with breakfast)    VITAMIN D, CHOLECALCIFEROL, PO 11/7/2022 at 1800 Self Yes Yes   Sig: Take 2,000 Units by mouth daily   acetaminophen (TYLENOL) 500 MG tablet 11/7/2022 at PM Self Yes Yes   Sig: Take 1,000 mg by mouth 2 times daily (2 x 500 mg = 1000 mg)   amLODIPine (NORVASC) 5 MG tablet 11/7/2022 Self No Yes   Sig: TAKE ONE TABLET BY MOUTH EVERY NIGHT AT BEDTIME   Patient taking differently: Take by mouth At Bedtime   amoxicillin (AMOXIL) 500 MG capsule Unknown at PRN Self Yes Yes   Sig: Take 2,000 mg by mouth once as needed (1 hour prior to dental procedures 4 x 500 mg = 2000 mg)   aspirin (ASA) 81 MG EC tablet 11/8/2022 at AM Self Yes Yes   Sig: Take 1 tablet (81 mg) by mouth daily   calcium carbonate (OS-CINDY) 500 MG tablet 11/7/2022 at PM Self Yes Yes   Sig: Take 1 tablet by mouth every other day   docusate sodium (COLACE) 100 MG capsule 11/7/2022 at HS Self Yes Yes   Sig: Take 100 mg by mouth At Bedtime   ferrous gluconate (FERGON) 324 (38 FE) MG tablet 11/7/2022 at PM Self No Yes   Sig: Take 1 tablet (324 mg) by mouth 2 times daily   latanoprost (XALATAN) 0.005 % ophthalmic solution 11/7/2022 at HS Self Yes Yes   Sig: Place 1 drop into both eyes At Bedtime    levothyroxine (SYNTHROID/LEVOTHROID) 50 MCG tablet  11/8/2022 at AM Self No Yes   Sig: Take 1 tablet (50 mcg) by mouth daily   lisinopril (ZESTRIL) 30 MG tablet 11/8/2022 at AM Self No Yes   Sig: TAKE ONE TABLET BY MOUTH EVERY DAY in the morning  FOR HYPERTENSION   Patient taking differently: Take 30 mg by mouth every morning   magnesium 250 MG tablet 11/7/2022 at AM Self Yes Yes   Sig: Take 1 tablet by mouth daily   meloxicam (MOBIC) 7.5 MG tablet 11/8/2022 at AM Self No Yes   Sig: Take 1 tablet (7.5 mg) by mouth daily   nitroGLYcerin (NITROSTAT) 0.4 MG sublingual tablet Unknown at PRN Self No Yes   Sig: For chest pain place 1 tablet under the tongue every 5 minutes for 3 doses. If symptoms persist 5 minutes after 1st dose call 911.   Patient taking differently: 0.4 mg every 5 minutes as needed For chest pain place 1 tablet under the tongue every 5 minutes for 3 doses. If symptoms persist 5 minutes after 1st dose call 911.   pantoprazole (PROTONIX) 20 MG EC tablet 11/8/2022 at AM Self No Yes   Sig: Take 1 tablet (20 mg) by mouth daily   potassium 99 MG TABS 11/7/2022 at 1800 Self Yes Yes   Sig: Take 1 tablet by mouth daily (with dinner)    simvastatin (ZOCOR) 20 MG tablet 11/7/2022 at HS Self No Yes   Sig: TAKE ONE TABLET BY MOUTH EVERY NIGHT AT BEDTIME   Patient taking differently: Take 20 mg by mouth At Bedtime      Facility-Administered Medications: None     Allergies   Allergies   Allergen Reactions     Augmentin Diarrhea     Codeine Nausea and Vomiting     Hydrocodone      Keeps patient awake     Naproxen Itching and Rash     Seasonal Allergies      Hay fever in fall, ragweed and russian thistles     Sulfa Drugs Nausea       Physical Exam   Vital Signs: Temp: 98.2  F (36.8  C) Temp src: Temporal BP: 115/66 Pulse: 79   Resp: 15 SpO2: 97 % O2 Device: Simple face mask Oxygen Delivery: 2 LPM  Weight: 188 lbs 3.2 oz    CONSTITUTIONAL: Pt laying in bed, dressed in hospital garb. Appears comfortable. Cooperative with interview  HEENT: Normocephalic, atraumatic.    CARDIOVASCULAR: RRR, no murmurs, rubs, or extra heart sounds appreciated. Pulses +2/4 and regular in upper and lower extremities, bilaterally.   RESPIRATORY: No increased work of breathing. CTA, bilat; no wheezes, rales, or rhonchi appreciated.  GASTROINTESTINAL:  Abdomen soft, non-distended. BS auscultated in all four quadrants. Negative for tenderness to palpation.  No masses or organomegaly noted.  MUSCULOSKELETAL: No gross deformities noted. Normal muscle tone.   HEMATOLOGIC/LYMPHATIC/IMMUNOLOGIC: Negative for lower extremity edema, bilaterally.  NEUROLOGIC: Alert and oriented to person, place, and time.  strength intact. No focal neuro deficits.   SKIN: Warm, dry, intact. Temporary pacer wires noted.     Data   Data reviewed today: I reviewed all medications, new labs and imaging results over the last 24 hours. I personally reviewed all labs and imaging to date.     Recent Labs   Lab 11/08/22  0904   WBC 6.0   HGB 15.3   MCV 97      POTASSIUM 3.8   *     Recent Results (from the past 24 hour(s))   Cardiac Catheterization    Narrative      Transfemoral transcatheter aortic valve replacement with 26mm Medtronic   Evolut FX valve.     SUMMARY:    1. Lifestyle-limiting severe aortic stenosis.  2. Successful transfemoral transcatheter aortic valve replacement with a   26mm Medtronic Evolut FX bioprosthetic aortic valve.   3. Temporary pacemaker insertion.  4. Left heart catheterization with LVEDP of 12 mmHg.  5. Right and left common femoral arteriotomies closed with closure   devices.  6.  Bradycardia with A-V dissociation and junctional escape rhythm noted   after valve deployment.     PLAN:    1. Aspirin 81 mg po daily lifelong.  2. Temporary pacemaker secured in place in the left femoral vein.   Electrophysiology consulted for consideration of permanent pacemaker.   3. Bedrest per protocol.  4. Echocardiogram tomorrow.   5. Lifelong antibiotic prophylaxis prior to all dental procedures.

## 2022-11-08 NOTE — PRE-PROCEDURE
GENERAL PRE-PROCEDURE:   Procedure:  PPM implant  Date/Time:  11/8/2022 2:15 PM    Written consent obtained?: Yes    Risks and benefits: Risks, benefits and alternatives were discussed    Consent given by:  Patient  Patient states understanding of procedure being performed: Yes    Patient's understanding of procedure matches consent: Yes    Procedure consent matches procedure scheduled: Yes    Expected level of sedation:  Moderate  Appropriately NPO:  Yes  Mallampati  :  Grade 2- soft palate, base of uvula, tonsillar pillars, and portion of posterior pharyngeal wall visible  Lungs:  Lungs clear with good breath sounds bilaterally  Heart:  Normal heart sounds and rate  History & Physical reviewed:  History and physical reviewed and no updates needed  Statement of review:  I have reviewed the lab findings, diagnostic data, medications, and the plan for sedation

## 2022-11-08 NOTE — ANESTHESIA CARE TRANSFER NOTE
Patient: Lexii Stratton    Procedure: Procedure(s):  Transcatheter Aortic Valve Replacement-Femoral Approach       Diagnosis: valvular disease  Diagnosis Additional Information: No value filed.    Anesthesia Type:   MAC     Note:    Oropharynx: oropharynx clear of all foreign objects and spontaneously breathing    Oxygen Supplementation: face mask  Level of Supplemental Oxygen (L/min / FiO2): 6  Independent Airway: airway patency satisfactory and stable  Dentition: dentition unchanged  Vital Signs Stable: post-procedure vital signs reviewed and stable  Report to RN Given: handoff report given  Patient transferred to: PACU    Handoff Report: Identifed the Patient, Identified the Reponsible Provider, Reviewed the pertinent medical history, Discussed the surgical course, Reviewed Intra-OP anesthesia mangement and issues during anesthesia, Set expectations for post-procedure period and Allowed opportunity for questions and acknowledgement of understanding      Vitals:  Vitals Value Taken Time   BP 94/56 11/08/22 1249   Temp 98    Pulse 69 11/08/22 1250   Resp 24 11/08/22 1250   SpO2 96 % 11/08/22 1250   Vitals shown include unvalidated device data.    Electronically Signed By: ANDREW Yin CRNA  November 8, 2022  12:52 PM

## 2022-11-08 NOTE — Clinical Note
Valve advanced to Aortic valve. NEURO:  Sedation w/ versed, morphine- no chnages  Titrate to RASS of -2 to -3    RESP:      Acute respiratory failure      Intubated with a 7.5 tube on 3/30  on /25/80/12.5      Wean vent as tolerated      Daily CXR      Solumedrol 60q12      Ramonutssin DM     CARDS:      CAD s/p cardiac stent- continue ASA, not on B-blockers at home      Levo, MAP >75       Atrial fibrillation, added metopriolol 12.5 q12h, if remains in A-fib x 24hrs will consider anticoaugulation      Lisinopril at home, discontinued         GI-        TF;Glucerna 50 ml/hr         LR @50/hr       PPI w Protonix and Senna for bowel regimen- continue lactulose until BM    Renal      Preserved renal function      UO 40cc/hr      Continue LR @ 50 ml/hr      Twice daily electrolytes        Heme-      Trend H&H      On HSQ      SCD    ac for afib     ID-      Monitor for temps      WBC 15 > 13      COVID -19 + with respiratory failure      Procalcitonin 1.25      Completed courses of Azithromycin and Plaquenil on 4/3,      Continue Cefepime q12 until 4/8    Endocrine     IDDM on lantus at home     On insulin gtt     H/O Hypothyroidism not on medications-  TSH 2.2     On solumedrol    Dispo_ continue ICU level of care NEURO:  Sedation w/ versed, morphine   Titrate to RASS of -2 to -3    RESP:      Acute respiratory failure      Intubated with a 7.5 tube on 3/30  on /25/40/15 - decreased fio2 from 50>40% on rounds      Wean vent as tolerated      Daily CXR      Solumedrol 60q12      Robutssin DM     CARDS:     new onset of afib RVR, converted back to NSR, continue metoprolol 12.5 BID, no need for AC      CAD s/p cardiac stent- continue ASA, not on B-blockers at home      Levo, MAP >75       Lisinopril at home, discontinued     elevated troponins, no longer trending         GI-        TF;Glucerna 50 ml/hr  via OGT       LR @50/hr       PPI w Protonix and Senna for bowel regimen      continue lactulose until BM    Renal-      ALIYAH , Cr stable 1.1      lebron in place, strict i&os      Continue LR @ 50 ml/hr      Twice daily electrolytes     monitor replete elytes prn      Heme-      Trend H&H      On HSQ      SCD      no AC     ID-      Monitor for temps      WBC 15 > 13      COVID -19 + with respiratory failure      Procalcitonin 1.25      Completed courses of Azithromycin and Plaquenil on 4/3,      Continue Cefepime q12 until 4/8    Endocrine     IDDM on lantus at home     On insulin gtt per protocol     H/O Hypothyroidism not on medications-  TSH 2.2     On solumedrol 60mg q12hr    Dispo_ continue ICU level of care

## 2022-11-08 NOTE — OP NOTE
Procedure Date: 11/8/2022     PREOPERATIVE DIAGNOSES:   1.  Symptomatic severe aortic valve stenosis.      POSTOPERATIVE DIAGNOSES:   1.  Symptomatic severe aortic valve stenosis.      PROCEDURE PERFORMED:  Right transfemoral implantation of a 26mm Medtronic Evolut FX valve     INTERVENTIONAL CARDIOLOGISTS:  Yulia Castano MD      CARDIAC SURGEON:  Jesse Peterson MD      ANESTHESIA:  Monitored anesthesia care with local.      INDICATIONS FOR PROCEDURE:  The patient is diagnosed with severe symptomatic aortic valve stenosis. The case was discussed with heart team and we presented the option for TAVR due to comorbidities and favorable candidacy for TAVR.  Following discussion of risks and benefits, patient elected to proceed.      DESCRIPTION OF PROCEDURE:  The patient was brought to the Cath Lab and placed supine on the table.  Appropriate monitoring lines were placed and monitored anesthesia care was delivered.  The patient was sterilely prepped and draped in the usual fashion and timeout was performed to confirm patient identity, procedure to be performed and the administration of preoperative antibiotics.      Combination of ultrasound and fluoroscopy was utilized to obtain bilateral common femoral arterial access and left common femoral venous access.  This was exchanged for sheath access.  A temporary pacemaker was advanced to the RV.  A pigtail catheter was advanced from the left-sided arterial access into the aortic root.  The large bore sheath was then placed on the right over a stiff wire.  The patient was systemically heparinized.      The native valve was crossed with an AL1 catheter and a straight wire.  This was exchanged for a J wire followed by a pigtail followed by a Safari.  The valve delivery system was advanced over the Safari wire into position within the aortic root.  Under rapid pacing, the valve was deployed with an excellent fluoroscopic result.  Upon cessation of pacing, the patient's  hemodynamics recovered promptly.      The valve delivery system was withdrawn from within the aortic root and root aortography and echocardiography confirmed excellent valve position and function without any perivalvular insufficiency.      The valve delivery system was fully withdrawn.  The Safari wire was recaptured within a pigtail and removed.  Protamine was administered to reverse the patient's heparinization.      The temporary pacemaker was left in to treat bradycardia.  The left-sided pigtail was withdrawn to the level of the iliac bifurcation.  The large bore sheath was removed over an access wire and the Manta device was deployed.  There appeared to be appropriate hemostasis.    Completion iliofemoral angiography revealed no extravasation or stenosis.   The left-sided arterial access was managed with Angio-Seal and manual pressure was utilized for the venous puncture site.      At the end of the procedure, all counts were correct.  The patient was taken to recovery in stable condition.      Jesse Peterson MD   Cardiothoracic Surgery  P: [phone]  C: [phone]

## 2022-11-08 NOTE — ANESTHESIA POSTPROCEDURE EVALUATION
Patient: Lexii Stratton    Procedure: Procedure(s):  Transcatheter Aortic Valve Replacement-Femoral Approach       Anesthesia Type:  MAC    Note:     Postop Pain Control: Uneventful            Sign Out: Well controlled pain   PONV: No   Neuro/Psych: Uneventful            Sign Out: Acceptable/Baseline neuro status   Airway/Respiratory: Uneventful            Sign Out: Acceptable/Baseline resp. status   CV/Hemodynamics: Uneventful            Sign Out: Acceptable CV status; No obvious hypovolemia; No obvious fluid overload   Other NRE: NONE   DID A NON-ROUTINE EVENT OCCUR? No           Last vitals:  Vitals Value Taken Time   /66 11/08/22 1350   Temp 36.8  C (98.2  F) 11/08/22 1247   Pulse 79 11/08/22 1356   Resp 15 11/08/22 1434   SpO2 97 % 11/08/22 1356   Vitals shown include unvalidated device data.    Electronically Signed By: Amanda Palacios MD, MD  November 8, 2022  2:40 PM

## 2022-11-09 ENCOUNTER — APPOINTMENT (OUTPATIENT)
Dept: CARDIOLOGY | Facility: CLINIC | Age: 82
DRG: 267 | End: 2022-11-09
Attending: NURSE PRACTITIONER
Payer: MEDICARE

## 2022-11-09 ENCOUNTER — APPOINTMENT (OUTPATIENT)
Dept: OCCUPATIONAL THERAPY | Facility: CLINIC | Age: 82
DRG: 267 | End: 2022-11-09
Attending: NURSE PRACTITIONER
Payer: MEDICARE

## 2022-11-09 ENCOUNTER — APPOINTMENT (OUTPATIENT)
Dept: OCCUPATIONAL THERAPY | Facility: CLINIC | Age: 82
DRG: 267 | End: 2022-11-09
Payer: MEDICARE

## 2022-11-09 ENCOUNTER — APPOINTMENT (OUTPATIENT)
Dept: GENERAL RADIOLOGY | Facility: CLINIC | Age: 82
DRG: 267 | End: 2022-11-09
Attending: INTERNAL MEDICINE
Payer: MEDICARE

## 2022-11-09 ENCOUNTER — DOCUMENTATION ONLY (OUTPATIENT)
Dept: CARDIOLOGY | Facility: CLINIC | Age: 82
End: 2022-11-09

## 2022-11-09 VITALS
SYSTOLIC BLOOD PRESSURE: 142 MMHG | HEIGHT: 64 IN | TEMPERATURE: 99 F | HEART RATE: 91 BPM | RESPIRATION RATE: 16 BRPM | OXYGEN SATURATION: 98 % | DIASTOLIC BLOOD PRESSURE: 82 MMHG | WEIGHT: 184.9 LBS | BODY MASS INDEX: 31.57 KG/M2

## 2022-11-09 DIAGNOSIS — Z95.2 S/P TAVR (TRANSCATHETER AORTIC VALVE REPLACEMENT): Primary | ICD-10-CM

## 2022-11-09 DIAGNOSIS — I44.2 COMPLETE HEART BLOCK (H): Primary | ICD-10-CM

## 2022-11-09 DIAGNOSIS — Z95.0 CARDIAC PACEMAKER IN SITU: ICD-10-CM

## 2022-11-09 LAB
ANION GAP SERPL CALCULATED.3IONS-SCNC: 10 MMOL/L (ref 3–14)
BUN SERPL-MCNC: 16 MG/DL (ref 7–30)
CALCIUM SERPL-MCNC: 9.3 MG/DL (ref 8.5–10.1)
CHLORIDE BLD-SCNC: 108 MMOL/L (ref 94–109)
CO2 SERPL-SCNC: 22 MMOL/L (ref 20–32)
CREAT SERPL-MCNC: 0.74 MG/DL (ref 0.52–1.04)
GFR SERPL CREATININE-BSD FRML MDRD: 81 ML/MIN/1.73M2
GLUCOSE BLD-MCNC: 95 MG/DL (ref 70–99)
GLUCOSE BLDC GLUCOMTR-MCNC: 102 MG/DL (ref 70–99)
GLUCOSE BLDC GLUCOMTR-MCNC: 197 MG/DL (ref 70–99)
GLUCOSE BLDC GLUCOMTR-MCNC: 96 MG/DL (ref 70–99)
LVEF ECHO: NORMAL
MAGNESIUM SERPL-MCNC: 1.8 MG/DL (ref 1.6–2.3)
PHOSPHATE SERPL-MCNC: 3.5 MG/DL (ref 2.5–4.5)
POTASSIUM BLD-SCNC: 4.2 MMOL/L (ref 3.4–5.3)
SODIUM SERPL-SCNC: 140 MMOL/L (ref 133–144)

## 2022-11-09 PROCEDURE — 93005 ELECTROCARDIOGRAM TRACING: CPT

## 2022-11-09 PROCEDURE — 36415 COLL VENOUS BLD VENIPUNCTURE: CPT | Performed by: NURSE PRACTITIONER

## 2022-11-09 PROCEDURE — 93321 DOPPLER ECHO F-UP/LMTD STD: CPT

## 2022-11-09 PROCEDURE — 99232 SBSQ HOSP IP/OBS MODERATE 35: CPT | Performed by: STUDENT IN AN ORGANIZED HEALTH CARE EDUCATION/TRAINING PROGRAM

## 2022-11-09 PROCEDURE — 250N000013 HC RX MED GY IP 250 OP 250 PS 637: Performed by: NURSE PRACTITIONER

## 2022-11-09 PROCEDURE — 80048 BASIC METABOLIC PNL TOTAL CA: CPT | Performed by: NURSE PRACTITIONER

## 2022-11-09 PROCEDURE — 999N000065 XR CHEST 2 VIEWS

## 2022-11-09 PROCEDURE — 93325 DOPPLER ECHO COLOR FLOW MAPG: CPT | Mod: 26 | Performed by: INTERNAL MEDICINE

## 2022-11-09 PROCEDURE — 93321 DOPPLER ECHO F-UP/LMTD STD: CPT | Mod: 26 | Performed by: INTERNAL MEDICINE

## 2022-11-09 PROCEDURE — 84100 ASSAY OF PHOSPHORUS: CPT | Performed by: NURSE PRACTITIONER

## 2022-11-09 PROCEDURE — 999N000208 ECHOCARDIOGRAM LIMITED

## 2022-11-09 PROCEDURE — 93010 ELECTROCARDIOGRAM REPORT: CPT | Performed by: INTERNAL MEDICINE

## 2022-11-09 PROCEDURE — 97110 THERAPEUTIC EXERCISES: CPT | Mod: GO | Performed by: OCCUPATIONAL THERAPIST

## 2022-11-09 PROCEDURE — 97166 OT EVAL MOD COMPLEX 45 MIN: CPT | Mod: GO | Performed by: OCCUPATIONAL THERAPIST

## 2022-11-09 PROCEDURE — 97535 SELF CARE MNGMENT TRAINING: CPT | Mod: GO | Performed by: OCCUPATIONAL THERAPIST

## 2022-11-09 PROCEDURE — 99232 SBSQ HOSP IP/OBS MODERATE 35: CPT | Performed by: NURSE PRACTITIONER

## 2022-11-09 PROCEDURE — 99024 POSTOP FOLLOW-UP VISIT: CPT | Mod: 25 | Performed by: NURSE PRACTITIONER

## 2022-11-09 PROCEDURE — 83735 ASSAY OF MAGNESIUM: CPT | Performed by: NURSE PRACTITIONER

## 2022-11-09 PROCEDURE — 255N000002 HC RX 255 OP 636

## 2022-11-09 PROCEDURE — 99207 PR SC NO CHARGE VISIT: CPT | Performed by: NURSE PRACTITIONER

## 2022-11-09 PROCEDURE — 93308 TTE F-UP OR LMTD: CPT | Mod: 26 | Performed by: INTERNAL MEDICINE

## 2022-11-09 RX ADMIN — LEVOTHYROXINE SODIUM 50 MCG: 50 TABLET ORAL at 09:07

## 2022-11-09 RX ADMIN — ASPIRIN 81 MG: 81 TABLET, COATED ORAL at 09:07

## 2022-11-09 RX ADMIN — HUMAN ALBUMIN MICROSPHERES AND PERFLUTREN 9 ML: 10; .22 INJECTION, SOLUTION INTRAVENOUS at 09:08

## 2022-11-09 RX ADMIN — LISINOPRIL 30 MG: 10 TABLET ORAL at 09:06

## 2022-11-09 ASSESSMENT — ACTIVITIES OF DAILY LIVING (ADL)
ADLS_ACUITY_SCORE: 26
PREVIOUS_RESPONSIBILITIES: MEAL PREP;HOUSEKEEPING;LAUNDRY;SHOPPING;MEDICATION MANAGEMENT;FINANCES;DRIVING
ADLS_ACUITY_SCORE: 26

## 2022-11-09 NOTE — PROGRESS NOTES
[facility] PPM Implant on 11/8/22 Instructions    Dressing:  Clean, dry, and intact  Chest XR reviewed:  Yes  Pneumo present?:  No  Lead Measurements per Device Rep:  WNL    Education Provided:  - Avoid raising left arm above the level of the shoulder for 3 weeks.  - Do not shower until outer occlusive dressing has been removed.  - Remove outer dressing in 3 - 4 days.  - Leave steri-strips in place, these will be removed at the 1 week check.   - Call Device Clinic with increased swelling, drainage, or fever > 101 degrees.   - Follow-up with the Device Clinic as scheduled.     Pt verbalized understanding of instructions and AVS updated.      BEN Martinez

## 2022-11-09 NOTE — DISCHARGE INSTRUCTIONS
TAVR Discharge Instructions  You have just had your aortic valve replaced with a new biological tissue valve. You should feel better after your surgery, but complete recovery may take several weeks. Please follow these instructions carefully and please call your valve coordinator with any questions or concerns.      CONTACT INFORMATION:   [facility]:  [phone] (7 days a week)  Scheduling and general questions - Magaly ([phone])  Valve Coordinators - Suzanne RN ([phone]), Caridad RN ([phone]), and Marylou RN ([phone])    AFTER YOU GO HOME:  Drink extra fluids for 2 days.  You may resume your normal diet.  Do not smoke.  Do not drink alcohol.  Relax and take it easy.  Do not make any important or legal decisions.    FOR 1 WEEK AFTER TAVR:  Do not drive or operate machines at home or at work. No driving until you return for your 1 week follow-up appointment. You and the provider will discuss this at the appointment.   Refrain from sexual intercourse for 1 week.  You may shower the day after your procedure. Do NOT take a bath, or use a hot tub or pool for at least 1 week. Do NOT scrub the site. Do not use lotion or powder near the puncture site.  Do not lift more than 10 pounds.   Do not do any heavy activity such as exercise, lifting, or straining.  No housework, yard work, or any activities that make you sweat.    CARE OF WRIST AND GROIN SITES:  For 2 days, when you cough, sneeze, laugh or move your bowels, hold your hand over the puncture site and press firmly on/above the site.  Remove the bandage after 24 hours. If there is minor oozing, apply another bandage and remove it after 12 hours.  It is normal to have bruising or pea size lump at the site.  If you have a wrist site puncture: Do not use your hand or arm to support your weight (such as rising from a chair) or bend your wrist (such as lifting a garage door) for 1 week.  No stooping or squatting for 1 week.      BLEEDING:  If you start bleeding from the site in your wrist:  Sit down and press firmly on/above the site with your fingers for 10 minutes.   Once bleeding stops, keep your arm still for 2 hours.   Call [facility] or valve coordinator as soon as you can.  If you start bleeding from the site in your groin:  Lie down flat and press firmly on/above the site for 10 minutes.  Once bleeding stops lay flat for 2 hours.   Call [facility] or valve coordinator as soon as you can.  Call 911 right away if you have heavy bleeding or bleeding that does not stop.    MEDICINES:  You may be started on an anti-platelet medication, such as Plavix or aspirin. Please call the [facility] with any questions regarding this medication.  If you have stopped any medicines, check with your provider about when to restart them.  You will require lifetime prophylactic antibiotics for dental cleanings and dental work after your TAVR, please inquire with the Heart Clinic prior to your scheduled cleanings.     FOLLOW-UP APPOINTMENTS:  Follow-up with a Structural Heart advanced practice provider (NP or PA) at [facility] in 7-10 days after your procedure.  You will also follow-up at [facility] 1 month after your procedure which will include a visit with an advanced practice provider an echocardiogram (echo), an electrocardiogram (ECG), and lab work. This follow-up should be scheduled for you prior to you leaving the hospital.  Cardiac Rehab will contact you for follow up care. You can enroll at a site convenient to you.  We will have you see your primary cardiologist about 6 months after your TAVR.  We will see you 1 year after your TAVR with a visit with an advanced practice provider (NP or PA) an echocardiogram (echo), electrocardiogram (ECG), and lab work.     CALL THE CLINIC IF:   You have increased pain or a large or growing hard lump  around the site.  The site is red, swollen, hot, or tender.  Blood or fluid is draining from the site.  You have chills or a fever greater than 101 F (38 C).  Your arm or leg feels numb, cool, or changes color.  You have hives, a rash, or unusual itching.  New pain in the back or belly that you cannot control with Tylenol.  Any questions or concerns.    OTHER INSTRUCTIONS:  You will receive a card with your new valve information in the mail, directly from the . Retain this card with your health care records.    DENTAL WORK:  You must have antibiotics before all dental work to prevent endocarditis, or an infection on your new heart valve. Please call the valve coordinators to get a prescription for this. Please do not schedule any dental cleanings for at least 3-6 months after your TAVR.            Discharge Instructions for Pacemaker Implantation  You have had a procedure to insert a pacemaker. Once inside your body, this small electronic device helps keep your heart from beating too slowly. A pacemaker can t fix existing heart problems. But it can help you feel better and have more energy. As you recover, follow all of the instructions you are given, including those below.  Activity  Follow the instructions you are given about limiting your activity.  Do not raise your arm on the incision side above shoulder level for 3 weeks. This gives the device lead wires time to attach securely inside your heart.  Ask your doctor when you can expect to return to work.  You can still exercise. It s good for your body and your heart. Talk with your doctor about an exercise plan.  Other Precautions  Follow your doctor's directions carefully for wound care. You may remove the outer dressing in 3 - 4 days (Friday night or Saturday). Leave the steri-strips in place; these will be removed at your 1 week follow-up. Never put any creams, lotions, or products like peroxide on an incision unless your doctor tells you to.    You may shower once the outer dressing has been removed.   Before you receive any treatment, tell all health care providers (including your dentist) that you have a pacemaker.  You will be given an ID card that contains information about your pacemaker. Always carry this card with you. You can show this card if your pacemaker sets off a metal detector. You should also show it to avoid screening with a hand-held security wand.  Keep your cell phone away from your pacemaker. Don t carry the phone in your shirt pocket, even when it s turned off.  Avoid strong magnets. Examples are those used in MRIs or in hand-held security wands.  Avoid strong electrical fields. Examples are those made by radio transmitting towers,  ham  radios, and heavy-duty electrical equipment.  Avoid leaning over the open manuel of a running car. A running engine creates an electrical field. Most household and yard appliances will not cause any problems. If you use any large power tools, such as an industrial , talk with your doctor.   Follow-Up  Follow up in the device clinic as scheduled. You have an appointment scheduled for Thursday, November 17th at 11:00am. Your appointment is at [facility] in [address], [address], [place] [address].  You also have your follow-up with Isaura Kulkarni CNP on this day at 1:10pm.     Make regular follow-up appointments with your device clinic. They will check the pacemaker to make sure it s working properly.  When to Call Your Device Clinic [phone]  Call your doctor immediately if you have any of the following:  Dizziness  Chest pain  Lack of energy  Fainting spells  Twitching chest muscles  Rapid pulse or pounding heartbeat  Shortness of breath  Pain around your pacemaker  Fever above 100.4 F (38 C) or other signs of infection (redness, swelling, drainage, or warmth at the incision site)     7948-4583 [employer]. [address] [place] [address]  [place] [address]. All rights reserved. This information is not intended as a substitute for professional medical care. Always follow your healthcare professional's instructions.    [facility] in [place]: [phone]  Device Clinic (Monday to Friday, 8am-4pm): [phone]  *The device clinic is closed on weekends and holidays.  Any calls received during this time will be answered on the next business  day. For any urgent questions after hours, please call the main clinic number and you will be put in contact with the cardiologist on call.

## 2022-11-09 NOTE — PROGRESS NOTES
Occupational Therapy Discharge Summary    Reason for therapy discharge:    Discharged to home with outpatient therapy.    Progress towards therapy goal(s). See goals on Care Plan in [facility] electronic health record for goal details.  Goals partially met.  Barriers to achieving goals:   discharge from facility.    Therapy recommendation(s):    Continued therapy is recommended.  Rationale/Recommendations:  Pt ambulated with FWW with decreased pain and increased activity tolerance. Agree with plan for pt to return home with dtr to stay with pt until over the weekend and A with strenuous IADL's transportaiton, shopping, vacuuming, laundry, etc and oP CR at [facility] for moitored progressive exercise..

## 2022-11-09 NOTE — PROGRESS NOTES
[facility]  Electrophysiology Progress Note  Date of Admission: 11/8/2022  Date of Service: 11/09/2022    Assessment & Plan   Lexii Stratton is a 81 year old female with past medical history significant for severe aortic stenosis, HTN, chronic diastolic heart failure admitted on 11/8/2022 post an elective TAVR and had CHB and underwent dual chamber PPM.     Interval History:    Assessment:   1. Complete heart block post TAVR     Dual chamber PPM on 11/8/2022    CXR: lead placement confirmed and no pneumothorax    Device check: WNL    2. Severe aortic stenosis    S/p TAVR with 26 mm Medtronic Evolut FX valve    ASA and antiplatelet     3. Hypertension    Well controlled on amlodipine 5 mg daily and lisinopril 30 mg daily    Plan:  1. Follow up with device clinic in ~1 week  2. Follow up with EP JULIANA in 3 months     ANDREW CLARK CNP  Pager:  [phone]     Physical Exam   Temp: 99  F (37.2  C) Temp src: Oral BP: (P) 116/57 Pulse: (P) 84   Resp: 16 SpO2: 97 % O2 Device: None (Room air) Oxygen Delivery: 2 LPM  Vitals:    11/08/22 1046 11/09/22 0607   Weight: 85.4 kg (188 lb 3.2 oz) 83.9 kg (184 lb 14.4 oz)       Constitutional:   NAD    Skin:   Warm and dry   Head:   Nontraumatic   Neck:   No JVD   Chest:   PPM Site CDI   Cardiovascular:   regular rate and rhythm   Abdomen:   Benign    Extremities and Back:   No edema   Neurological:   Grossly nonfocal     Medications       amLODIPine  5 mg Oral At Bedtime     aspirin  81 mg Oral Daily     insulin aspart  1-7 Units Subcutaneous TID AC     insulin aspart  1-5 Units Subcutaneous At Bedtime     levothyroxine  50 mcg Oral Daily     lisinopril  30 mg Oral Daily     simvastatin  20 mg Oral At Bedtime       Code Status    Full Code    Allergies   Allergies   Allergen Reactions     Augmentin Diarrhea     Codeine Nausea and Vomiting     Hydrocodone      Keeps patient awake     Naproxen Itching and Rash     Seasonal Allergies      Hay  fever in fall, ragweed and russian thistles     Sulfa Drugs Nausea

## 2022-11-09 NOTE — PROGRESS NOTES
Reviewed with siddhartha Landeros seen and examined.  81 year old white female, post TAVR, complicated with complete AV block, s/p DDD PM on 111-8-2022. The pacemaker wound looks fine. Chest x-ray reviewed, good lead positions without pneumothorax.  History of diabetes, GERD, hypertension, peptic ulcer, small bowel obstruction, urinary incontinence.  Agree for discharge today.  Sign off

## 2022-11-09 NOTE — CODE/RAPID RESPONSE
Brief House NP Note    RRT called for halo in left eye that started after patient received her evening latanoprost eyedrops. Patient stated that the halo was not present when closing her eyes. Patient was hypertensive so I recommended PRN hydralazine. Physical and neurological exam was otherwise without focal deficits. I flushed patient's left eye with normal saline and the patient stated that the halo resolved.     Given the resolution with flushing this was most likely a foreign object. Continue to monitor for now. No new orders or interventions.    ANDREW Saxena, CNP  Hospitalist - House [facility]  Text me on the [employer] jose carlos for a textback  Text page with web based paging for a callback

## 2022-11-09 NOTE — PLAN OF CARE
Goal Outcome Evaluation:      Plan of Care Reviewed With: patient     A+Ox4. Neuros intact. VSS 2 L O2, will wean. PPM site WDL. A paced. Bedrest until 7pm. Voiding per external cath. Denies pain. No N/V. Bilat groin site soft, no hematoma or bruit. Echo in am.

## 2022-11-09 NOTE — PLAN OF CARE
"Goal Outcome Evaluation:    Neuro- x4, intact   2200 neuro check pt reported light flash/halo in L eye, not present when eyes closed. RRT called otherwise neuros intact. L eye was flushed w/ ns and vision returned to baseline.   Most Recent Vitals- /58   Pulse 68   Temp 97.5  F (36.4  C) (Oral)   Resp 16   Ht 1.626 m (5' 4\")   Wt 83.9 kg (184 lb 14.4 oz)   LMP  (LMP Unknown)   SpO2 98%   BMI 31.74 kg/m    Tele/Cardiac- 100% A paced  Resp- RA  Activity- Sba   Pain- tylenol x1 for L chest incisional pain  Skin- Bilateral groin sites, L chest PPM site   GI/- external catheter in place  Labs pending   Blood sugar 84 and 96  Diet: 2 Gram Sodium Diet    Plan-  AM chest XR, EKG, echo. Monitor neuro status and cardiac rehab.             "

## 2022-11-09 NOTE — PROGRESS NOTES
[facility]  Structural Heart Progress Note     Date of Service: 11/09/22     [facility]  Lexii Stratton is a 81 year old female with severe aortic stenosis who was admitted on 11/8/2022 for elective transcatheter aortic valve replacement that was c/b complete heart block perioperatively, now s/p dual chamber permanent pacemaker implant.      Assessment:   1) Severe aortic stenosis s/p TAVR with 26 mm Medtronic Evolut FX valve  - via RCFA, no vascular access complications  - CHB perioperatively s/p PPM   - Neuros intact  - ASA 81 mg daily for antiplatelet therapy      2) Complete heart block perioperatively s/p dual chamber PPM  - EKG shows AV paced rhythm  - EP following, CXR and device check today     3) Hypertension   - Well-controlled on PTA amlodipine 5 mg daily and lisinopril 30 mg daily     4) Chronic diastolic heart failure, NYHA class II  - NTpBNP 342, LVEDP 12 mmHg  - Euvolemic on exam, not on diuretic therapy     Plan:  1. Obtain echocardiogram.   2. Antiplatelet therapy with ASA 81 mg daily.   3. CXR and device check today.    4. Outpatient cardiac rehab.   5. Lifelong antibiotic prophylaxis prior to any dental procedure.   6. Follow-up with structural clinic in 1 week.   7. Pending echo findings and cardiac rehab, ok to discharge.       Isaura Kulkarni, DNP, APRN, CNP  Page: [phone]    Interval History: Patient had some visual changes overnight in the setting of hypertension, resolved with saline flush. No focal neurological deficits. Denies any CP, SOB, syncope or near syncope, or palpitations. Bilateral groin sites intact. Hemodynamically stable. Renal and electrolytes nml.     Physical Exam   Temp: 97.5  F (36.4  C) Temp src: Oral BP: 120/58 Pulse: 68   Resp: 16 SpO2: 98 % O2 Device: None (Room air) Oxygen Delivery: 2 LPM  Vitals:    11/08/22 1046 11/09/22 0607   Weight: 85.4 kg (188 lb 3.2 oz) 83.9 kg (184 lb 14.4 oz)     Vital Signs with Ranges  Temp:  [97.2  F (36.2   C)-98.2  F (36.8  C)] 97.5  F (36.4  C)  Pulse:  [58-79] 68  Resp:  [10-25] 16  BP: ()/(55-88) 120/58  SpO2:  [93 %-100 %] 98 %  I/O last 3 completed shifts:  In: 1840 [P.O.:240; I.V.:1600]  Out: 1275 [Urine:1275]    Constitutional: awake  Eyes: pupils equal, round and reactive to light  Respiratory: CTA bilaterally and no increased work of breathing  Cardiovascular: regular rate and rhythm, normal S1 and S2 and grade I/II MARISELA  GI: soft  Skin: bilateral groin sites intact with bruit or bleeding, device pocket intact without hematoma.   Musculoskeletal: no lower extremity pitting edema present  tone is normal  Neurologic: no focal deficits, A&O x 3, normal affect     Medications       amLODIPine  5 mg Oral At Bedtime     aspirin  81 mg Oral Daily     insulin aspart  1-7 Units Subcutaneous TID AC     insulin aspart  1-5 Units Subcutaneous At Bedtime     levothyroxine  50 mcg Oral Daily     lisinopril  30 mg Oral Daily     simvastatin  20 mg Oral At Bedtime       Data   Recent Results (from the past 24 hour(s))   Echocardiogram Complete   Result Value    LVEF  60-65%    Narrative    [id number]  [id number]  [id number]  [id number]^TEE^KARENA     [facility]  Echocardiography Laboratory  [address] [place] [address]     Name: AKSHAT ONEIL  MRN: [id number]  : 1940  Study Date: 2022 10:13 AM  Age: 81 yrs  Gender: Female  Patient [facility]: [facility]  Reason For Study: TAVR  Ordering Physician: KARENA OLIVEIRA  Referring Physician: Jesenia Madrigal  Performed By: Kathy Salas     BSA: 1.9 m2  Height: 64 in  Weight: 186 lb  HR: 56  BP: 149/77 mmHg  ______________________________________________________________________________  Procedure  Complete Portable Echo Adult.  ______________________________________________________________________________  Interpretation Summary     PROCEDURE  Intra-procedural transthoracic echocardiogram for transfemoral transcatheter  aortic  valve replacement with Medronic 26mm TAVR valve. Image acquisition by  sonographer.     PRE-PROCEDURAL FINDINGS:  1. Severe aortic stenosis with trace aortic regurgitation.  2. Moderate to severe concentric left ventricular hypertrophy with normal  systolic function. Visually LVEF 60-65%.  3. Mild mitral regurgitation.     POST-PROCEDURAL SURVEY:  1. Medronic 26mm TAVR valve was successfully deployed.  2. Valve is well seated. No periprosthetic regurgitation.  3. Post-procedure valve hemodynamics - mean gradient 4.1 mmHg.  4. No pericardial effusion.  ______________________________________________________________________________  Left Ventricle  The left ventricle is normal in size. There is mild concentric left  ventricular hypertrophy. Left ventricular systolic function is normal. The  visual ejection fraction is 60-65%. Grade I or early diastolic dysfunction. No  regional wall motion abnormalities noted.     Right Ventricle  The right ventricle is normal size. The right ventricular systolic function is  normal.     Mitral Valve  There is mild (1+) mitral regurgitation.     Tricuspid Valve  There is mild to moderate (1-2+) tricuspid regurgitation. The right  ventricular systolic pressure is approximated at 30.0 mmHg plus the right  atrial pressure.     Aortic Valve  Severe valvular aortic stenosis.     Vessels  Normal size ascending aorta. The inferior vena cava is normal.     Pericardium  There is no pericardial effusion.     Rhythm  Sinus rhythm was noted.     ______________________________________________________________________________  MMode/2D Measurements & Calculations  LVOT diam: 1.9 cm  LVOT area: 2.8 cm2     Doppler Measurements & Calculations  Ao V2 max: 137.1 cm/sec  Ao max P.0 mmHg  Ao V2 mean: 93.6 cm/sec  Ao mean P.1 mmHg  Ao V2 VTI: 26.7 cm  ROBYN(I,D): 2.2 cm2  ROBYN(V,D): 2.1 cm2  LV V1 max P.3 mmHg  LV V1 max: 103.8 cm/sec  LV V1 VTI: 20.8 cm  SV(LVOT): 57.6 ml  SI(LVOT): 30.4 ml/m2  TR  max rj: 273.2 cm/sec  TR max P.0 mmHg     AV Rj Ratio (DI): 0.76  ROBYN Index (cm2/m2): 1.1     ______________________________________________________________________________  Report approved by: Chris Huang 2022 04:08 PM         Cardiac Catheterization    Narrative      Transfemoral transcatheter aortic valve replacement with 26mm Medtronic   Evolut FX valve.     SUMMARY:    1. Lifestyle-limiting severe aortic stenosis.  2. Successful transfemoral transcatheter aortic valve replacement with a   26mm Medtronic Evolut FX bioprosthetic aortic valve.   3. Temporary pacemaker insertion.  4. Left heart catheterization with LVEDP of 12 mmHg.  5. Right and left common femoral arteriotomies closed with closure   devices.  6.  Bradycardia with A-V dissociation and junctional escape rhythm noted   after valve deployment.     PLAN:    1. Aspirin 81 mg po daily lifelong.  2. Temporary pacemaker secured in place in the left femoral vein.   Electrophysiology consulted for consideration of permanent pacemaker.   3. Bedrest per protocol.  4. Echocardiogram tomorrow.   5. Lifelong antibiotic prophylaxis prior to all dental procedures.

## 2022-11-09 NOTE — PROGRESS NOTES
11/09/22 1118   Appointment Info   Signing Clinician's Name / Credentials (OT) Jaimie Gallardo, OTR/L   Living Environment   People in Home alone   Current Living Arrangements house   Home Accessibility stairs to enter home;stairs within home   Number of Stairs, Main Entrance 1   Number of Stairs, Within Home, Primary greater than 10 stairs  (15 stairs to basement,laundry but doesn't need to go down awhile)   Transportation Anticipated family or friend will provide;car, drives self   Living Environment Comments pt reports her daughter will stay with her the rest of the week and weekend   Self-Care   Equipment Currently Used at Home raised toilet seat  (has a FWW and SEC if needed.)   Activity/Exercise/Self-Care Comment walk in shower and has a cut out tub   Instrumental Activities of Daily Living (IADL)   Previous Responsibilities meal prep;housekeeping;laundry;shopping;medication management;finances;driving   IADL Comments hired help to A with yard work.   General Information   Onset of Illness/Injury or Date of Surgery 11/08/22   Referring Physician Isaura Kulkarni CNP   Patient/Family Therapy Goal Statement (OT) home, dtr to stay with pt through the weekend   Additional Occupational Profile Info/Pertinent History of Current Problem Lexii Stratton is a 81 year old female with severe aortic stenosis who was admitted on 11/8/2022 for elective transcatheter aortic valve replacement that was c/b complete heart block perioperatively, now s/p dual chamber permanent pacemaker implan   Existing Precautions/Restrictions fall;cardiac;pacemaker  (post TAVR precautions.)   Cognitive Status Examination   Orientation Status orientation to person, place and time   Affect/Mental Status (Cognitive) WFL   Follows Commands WFL   Sensory   Sensory Quick Adds sensation intact   Pain Assessment   Patient Currently in Pain No   Range of Motion Comprehensive   Comment, General Range of Motion R UE AROM , L: UE shoudler to  90 degree flex only due to PPM Precautions   Bed Mobility   Scooting/Bridging Salol (Bed Mobility)   (SBA)   Supine-Sit [place] (Bed Mobility)   (SBA)   Sit-Supine [place] (Bed Mobility) contact guard   Transfer Skill: Bed to Chair/Chair to Bed   Bed-Chair [place] (Transfers) contact guard   Sit-Stand Transfer   Sit-Stand [place] (Transfers) contact guard   Lower Body Dressing Assessment/Training   Salol Level (Lower Body Dressing) contact guard assist   Clinical Impression   Criteria for Skilled Therapeutic Interventions Met (OT) Yes, treatment indicated   OT Diagnosis impaired I with ADL's and functional mobiity   OT Problem List-Impairments impacting ADL problems related to;activity tolerance impaired;balance;post-surgical precautions;strength   Assessment of Occupational Performance 3-5 Performance Deficits   Identified Performance Deficits impaired I with ADLa nd safety with strenuous IADL's ie vacuuming, dressing, toilet transfer, heavier laundry, etc   Planned Therapy Interventions (OT) ADL retraining;transfer training;home program guidelines;progressive activity/exercise   Clinical Decision Making Complexity (OT) moderate complexity   Risk & Benefits of therapy have been explained evaluation/treatment results reviewed;care plan/treatment goals reviewed;risks/benefits reviewed;current/potential barriers reviewed;participants voiced agreement with care plan;participants included;patient   OT Total Evaluation Time   OT Eval, Moderate Complexity Minutes (63585) 10   OT Goals   Therapy Frequency (OT) 2 times/day   OT Predicted Duration/Target Date for Goal Attainment 11/10/22   Self-Care/Home Management   Self-Care/Home Mgmt/ADL, Compensatory, Meal Prep Minutes (63372) 18   Treatment Detail/Skilled Intervention OT: see below for TAVR ed.   Therapeutic Procedures/Exercise   Therapeutic Procedure: strength, endurance, ROM, flexibillity minutes (48610) 13   Symptoms Noted  During/After Treatment fatigue;shortness of breath;increased pain   Treatment Detail/Skilled Intervention OT: sit <>stand with SBA/S, reminders for PPM precautions, ambuialtes with CGA/SBA. pt limited due to back pain and educated in trying a FWW next session to A with decreasing back pain and more support.   Treatment Time Includes (CR Only) Monitoring of vital signs (see vital signs flowsheet for details)   Ambulation   Duration (minutes) 3.5 minutes   Effort Scale 6   Symptoms Fatigue  (LBP, SOB)   Cardiovascular Response Normal   Vital Signs Details see vs flow sheet   Cardiac Education   Education Provided Home exercise program;OMNI Scale;Outpatient Cardiac Rehab;Precautions;Resuming home activities;Signs and symptoms   Education Packet Given to Patient Yes   All Patient Education Handouts Reviewed with Patient and/or Family Yes   Cardiac Rehab Phase II Plan   Phase II Order Received Yes   Phase II Appointment Status Scheduled   Date/Time 11/15   [facility]   OT Discharge Planning   OT Plan OT Plan: LE dress, toilet transfer, bring FWW and timed ambulation wthin PPM precautions, stairs   OT Discharge Recommendation (DC Rec) home with assist;home with outpatient cardiac rehab   OT Rationale for DC Rec Anticipate home, pt reports her daughter is going to stay with pt through the weekend. pt darline likely require initial A with dressing, tubshower transfer, transportation, shopping, vacuuming, sweeping, etc and OP CR at [facility] for monitored progressive exercise. recommend use of FWW due to back pain, etc   OT Brief overview of current status supine <> sit S/mod i, sit <> stand and ambualtes with CGA/SBA, recommend use of FWW intiially, has at home. ambulated approx 3.5 mins, limited due to Low back pain, fatigue, vitals appear stable, see vs flow sheet.   Total Session Time   Timed Code Treatment Minutes 31   Total Session Time (sum of timed and untimed services) 41

## 2022-11-09 NOTE — PLAN OF CARE
VSS, not in pain at time of discharge. Pt states all belongings and paperwork are accounted for. Medications have been filled at in house pharmacy and are with pt at time of discharge. Discharge education complete including meds, follow up and all instructions. Post TAVR and PPM instructions gone over with pt and family. No further questions on discharge information. Family here to drive pt home.

## 2022-11-10 ENCOUNTER — PATIENT OUTREACH (OUTPATIENT)
Dept: CARE COORDINATION | Facility: CLINIC | Age: 82
End: 2022-11-10

## 2022-11-10 ENCOUNTER — CARE COORDINATION (OUTPATIENT)
Dept: CARDIOLOGY | Facility: CLINIC | Age: 82
End: 2022-11-10

## 2022-11-10 LAB
ATRIAL RATE - MUSE: 60 BPM
DIASTOLIC BLOOD PRESSURE - MUSE: NORMAL MMHG
INTERPRETATION ECG - MUSE: NORMAL
P AXIS - MUSE: -20 DEGREES
PR INTERVAL - MUSE: 192 MS
QRS DURATION - MUSE: 168 MS
QT - MUSE: 522 MS
QTC - MUSE: 522 MS
R AXIS - MUSE: -82 DEGREES
SYSTOLIC BLOOD PRESSURE - MUSE: NORMAL MMHG
T AXIS - MUSE: 86 DEGREES
VENTRICULAR RATE- MUSE: 60 BPM

## 2022-11-10 NOTE — PROGRESS NOTES
"Clinic Care Coordination Contact  [facility]: Post-Discharge Note  SITUATION                                                      Admission:    Admission Date: 11/08/22   Reason for Admission: Aortic stenosis, severe  Discharge:   Discharge Date: 11/09/22  Discharge Diagnosis: Severe aortic stenosis S/P TAVR (11/8/22),  Postprocedural CHB s/p temporary pacemaker,   Hypertension,  Chronic diastolic CHF, not in acute exacerbation,   Diet controlled T2DM,   GERD,   Hypothyroidism,  CKD II    BACKGROUND                                                      Per hospital discharge summary and inpatient provider notes:    Lexii Stratton is a 81 year old female with PMHx of hypertension, chronic diastolic heart failure, severe aortic stenosis admitted on 11/8/2022. She underwent TAVR 11/8/22. Post-procedure, pt went into CHB s/p temporary pacemaker placement with place for PPM placement later today.      Sx of progressive fatigue and SOB over the past few months. Refer to structural heart H&P by Isaura Kulkarni CNP from 10/27/22. Per Cardiology sign out, post-procedure went in CHB s/p temporary pacemaker placement.      Seen in the PACU post TAVR, just prior to being taken for PPM placement. Resting comfortably though complains of right hip pain from having to lay flat. Denies CP, SOB, dizziness, lightheadedness. Mentating well. No recent medication changes. Compliant with regimen. No acute concerns.     ASSESSMENT      Discharge Assessment  How are you doing now that you are home?: \"Well I had a very very good nights sleep which felt wonderful. I just took a walk because it's nice outside. I probably walked about 8 minutes with the walker.\"  How are your symptoms? (Red Flag symptoms escalate to triage hotline per guidelines): Improved  Do you feel your condition is stable enough to be safe at home until your provider visit?: Yes  Does the patient have their discharge instructions? : Yes  Does the patient have " questions regarding their discharge instructions? : No  Were you started on any new medications or were there changes to any of your previous medications? : Yes  Does the patient have all of their medications?: Yes  Do you have questions regarding any of your medications? : No  Do you have all of your needed medical supplies or equipment (DME)?  (i.e. oxygen tank, CPAP, cane, etc.): Yes  Discharge follow-up appointment scheduled within 14 calendar days? : Yes  Discharge Follow Up Appointment Date: 11/21/22  Discharge Follow Up Appointment Scheduled with?: Primary Care Provider    Post-op ([facility] CTA Only)  If the patient had a surgery or procedure, do they have any questions for a nurse?: No    PLAN                                                      Outpatient Plan:      Follow-up and recommended labs and tests      Follow-up with cardiology as directed       Future Appointments   Date Time Provider Department Center   11/15/2022  2:45 PM [facility] CARDIAC REHAB 4 [facility] [place] R[facility]   11[facility]17[facility]2022 11[facility]   11/17/2022  1:10 PM Isaura Kulkarni, CNP [facility] PSA CLIN   11/21/2022 10:20 AM Jesenia Madrigal MD [facility] [place] [facility]   12/1/2022  1:30 PM RSCCECHO2 [facility] RSCC   12/1/2022  2:45 PM RU LAB RHCLB [place] RID   12/8/2022  3:10 PM Isaura Kulkarni, CNP [facility] PSA CLIN         For any urgent concerns, please contact our 24 hour nurse triage line: [phone] ([phone])       Shahnaz Philip  Community Health Worker  [facility]  Ph: [phone]

## 2022-11-10 NOTE — PROGRESS NOTES
TAVR POST-DISCHARGE CALL:    TAVR Date: 11/8/22  Concerns at discharge?  S/p PPM    Telephoned patient to follow-up post-hospitalization.   How are you feeling?: Feeling well, has already noticed an increase in exercise tolerance.  Are you having any new symptoms since hospital stay?:  None  Do you have any questions about your medications?: None  Do you have a dressing on your access site? Any concerns with bleeding, bruising, firmness?  None    Reviewed follow-up information and confirmed our contact information.  Future Appointments   Date Time Provider Department Center   11/15/2022  2:45 PM RH CARDIAC REHAB 4 [facility] [place] RID   [facility]17[facility]   11/17/2022  1:10 PM Isaura Kulkarni, CNP [facility] CL[facility]   11/21/2022 10:20 AM Jesenia Madrigal MD [facility] [place] [facility]   12/1/2022  1:30 PM RSCCECHO2 [facility]CV[facility] RSCC   12/1/2022  2:45 PM RU LAB [facility]CLB [place] RID   12/8/2022  3:10 PM Isaura Kulkarni, CNP [facility] PSA CLIN           Jacquelin Avitia RN  Structural Heart Coordinator  [facility]

## 2022-11-10 NOTE — DISCHARGE SUMMARY
[facility]  Hospitalist Discharge Summary      Date of Admission:  11/8/2022  Date of Discharge:  11/9/2022  4:45 PM  Discharging Provider: Leyla Bardales  Discharge Service: Hospitalist Service    Discharge Diagnoses   Severe aortic stenosis S/P TAVR (11/8/22)  Postprocedural CHB s/p temporary pacemaker   Hypertension  Chronic diastolic CHF, not in acute exacerbation   Diet controlled T2DM   GERD   Hypothyroidism  CKD II     Follow-ups Needed After Discharge   Follow-up Appointments     Follow-up and recommended labs and tests       Follow-up with cardiology as directed             Unresulted Labs Ordered in the Past 30 Days of this Admission     Date and Time Order Name Status Description    11/8/2022  9:45 AM Prepare red blood cells (unit) Preliminary     11/8/2022  9:45 AM Prepare red blood cells (unit) Preliminary           Discharge Disposition   Discharged to home  Condition at discharge: Stable    Hospital Course   Lexii Stratton is a 81 year old female with PMHx of hypertension, chronic diastolic heart failure, severe aortic stenosis admitted on 11/8/2022. She underwent TAVR 11/8/22. Post-procedure, pt went into CHB s/p temporary pacemaker placement with place for PPM placement later today.     Severe aortic stenosis S/P TAVR (11/8/22):  Postprocedural CHB s/p temporary pacemaker placement:   - outpatient Cardiac rehab   - Follow-up with structural clinic in 1 week   - Follow up with device clinic in ~1 week  - Follow up with EP JULIANA in 3 months   - Daily ASA     Hypertension:   [Norvasc 5 mg po at bedtime, Lisinopril 30 mg po every day]  - Resume PTA medications    Chronic diastolic heart failure, not in acute exacerbation    In the setting of valve disease above.  Not on diuretics PTA. Appears euvolemic on exam.     Diet controlled T2DM: A1C 5.6 on 7/28/22.     GERD: Continue PTA Protonix   Hypothyroidism: Continue PTA Synthroid   CKD II: Baseline creatinine  0.7-0.9    Consultations This Hospital Stay   CARDIAC REHAB IP CONSULT  HOSPITALIST IP CONSULT  CARDIOLOGY IP CONSULT    Code Status   Prior    Time Spent on this Encounter   I, Leyla Bardales, personally saw the patient today and spent greater than 30 minutes discharging this patient.       Leyla Bardales  [facility]  [address]  [place] [address]  Phone: [phone]  ______________________________________________________________________    Physical Exam   Vital Signs: Temp: 99  F (37.2  C) Temp src: Oral BP: (!) 142/82 Pulse: 91   Resp: 16 SpO2: 98 % O2 Device: None (Room air)    Weight: 184 lbs 14.4 oz  Constitutional: Awake, alert, cooperative, no apparent distress.  Eyes: Conjunctiva and pupils examined and normal.  HEENT: Moist mucous membranes, normal dentition.  Respiratory: Non-labored breathing.  Cardiovascular: Regular rate and rhythm  Skin: No rashes, no cyanosis, no edema.  Musculoskeletal: No joint swelling, erythema or tenderness.  Neurologic: Cranial nerves 2-12 intact, normal strength and sensation.  Psychiatric: Alert, oriented to person, place and time, no obvious anxiety or depression.         Primary Care Physician   Jesenia Madrigal    Discharge Orders      CARDIAC REHAB REFERRAL      Reason for your hospital stay    You were here for aortic valve replacement. This was complicated by complete heart block so you underwent permanent pacemaker placement as well.     Follow-up and recommended labs and tests     Follow-up with cardiology as directed     Activity    Your activity upon discharge: activity as tolerated     Diet    Follow this diet upon discharge: Orders Placed This Encounter      2 Gram Sodium Diet     Case Request EP: Pacemaker Device & Lead Implant - Right Atrial & Right Ventricular       Significant Results and Procedures   Most Recent 3 CBC's:Recent Labs   Lab Test 11/08/22  0904 09/30/22  0754 07/05/22  0928   WBC  6.0 4.2 5.6   HGB 15.3 12.8 14.2   MCV 97 97 98    157 159     Most Recent 3 BMP's:Recent Labs   Lab Test 11/09/22  1210 11/09/22  0754 11/09/22  0627 11/08/22  1732 11/08/22  0904 10/27/22  1553 09/30/22  0754   NA  --   --  140  --   --  138 141   POTASSIUM  --   --  4.2  --  3.8 4.0 3.8   CHLORIDE  --   --  108  --   --  105 109   CO2  --   --  22  --   --  25 26   BUN  --   --  16  --   --  18 18   CR  --   --  0.74  --   --  0.70 0.78   ANIONGAP  --   --  10  --   --  8 6   CINDY  --   --  9.3  --   --  9.6 9.8   * 102* 95   < > 113* 106* 107*    < > = values in this interval not displayed.   ,   Results for orders placed or performed during the hospital encounter of 11/08/22   X-ray Chest 2 vws*    Narrative    CHEST TWO VIEWS  11/9/2022 9:36 AM       INDICATION: Status post pacer/ICD.    COMPARISON: 8/16/2021       Impression    IMPRESSION: New cardiac pacemaker leads in the right atrium and right  ventricle. TAVR is also new. No pneumothorax. The lungs are clear.    MELODY SILVA MD         SYSTEM ID:  [id number]   EP Device    Narrative    PROCEDURES PERFORMED:  1. Conscious sedation.  2. Cardiac fluoroscopy.  3. Dual-chamber permanent pacemaker implantation.  4. Temporary pacing wire removal.    INDICATION: Symptomatic bradycardia secondary to complete AV block.    HPI: 81-year-old lady with a history of hypertension, hyperlipidemia,   diabetes and symptomatic severe aortic stenosis, who underwent TAVR   procedure and develop complete AV block after prosthtetic valve   deployment.  A temporary pacing wire was placed, and she was referred for   permanent pacemaker implantation.    Risks and benefits of the procedure were reviewed including but not   limited to arrhythmia, pain, infection, bleeding, skin damage from ionized   radiation, kidney damage or allergic reactions to conscious dye, injury to   the neighboring vascular structures, need for emergent cardiopulmonary   resuscitation, heart  attack, stroke or death. Alternatives were discussed   including doing nothing. All questions were answered to the patient's   satisfaction. Informed consent was obtained and patient wished to proceed.    FLUOROSCOPY DATA:  Fluoro time:  27 seconds.  Radiation dose (AK): 2 mGy.  Dose-area product (DAP):  202 mGy.cm2.    DESCRIPTION OF PROCEDURE: After written informed consent was obtained,   patient was brought to the EP lab. An intravenous infusion of prophylactic   antibiotic was begun. The chest was sterilely prepped from the level of   iliac crest to level of the chin with antibiotic soap and disinfectant,   draped appropriately. Following infiltration with 1% lidocaine, an   incision was made parallel to and 1 cm beneath the clavicle and extended   across the deltopectoral groove. Using blunt dissection, the excision was   extended down the anterior pectoral fascia. Using blunt and sharp   dissection, a pocket was developed parallel to the fascia and extended   inferiorly.    Two pocket punctures via fluoroscopy guidance and modified Seldinger   technique was used to attain access into the left subclavian vein. A 6   Italian sheath was inserted over the wire. The right ventricular lead was   advanced under fluoroscopy and a secure location found. The lead was   fixated.  Stimulation and sensing thresholds were found to be   satisfactory. No diaphragmatic stimulation was noticed with high output   pacing. The lead was sutured to the underlying pectoral muscle with   interrupted 0 silk suture over a plastic collar. A 6 Italian sheath was   inserted over the wire. The right atrial lead was advanced under   fluoroscopy and secure location found. The lead was fixated. Stimulation   and sensing thresholds were found to be satisfactory. No diaphragmatic   stimulation was noted with high output pacing. The lead was sutured to the   underlying pectoral muscle with interrupted 0 silk suture over a plastic    collar.    After irrigation with saline solution, the pocket was inspected, no   bleeding was seen. The generator was connected to the leads and inserted   into the pocket. The wound was closed with two layers of subcutaneous   sutures.    After pacemaker was implanted, temporary pacing wire was removed under   fluoroscopy guidance.  Venous vascular sheath was removed and hemostasis   was obtained by manual pressure.    PACEMAKER GENERATOR:  Dulzura [employer], model L331, serial #[id number].    LEAD INFORMATION:  Right atrial lead: Dul[employer], model 7840, serial #[id number].  Right ventricular lead: Dul[employer], model 7841, serial #[id number].    MEASURED DATA:  Right atrial lead: Sensing 2.6 mV, threshold 0.6 volts at 0.4 msec,   impedance 582 ohms.  Right ventricular lead: Sensing 23.6 mV, threshold 0.7 volt at 0.4 msec,   impedance 855 ohms.    IMPRESSION:  1. Successful dual chamber pacemaker implantation in left infraclavicular   region above the pectoral fascia.  2. Bradycardia pacing set to DDD .  3. Successful temporary pacing wire removal.    RECOMMENDATIONS:  1. Avoid vascular access to the left jugular and subclavian veins.  2. Keep wound clean, dry and covered for seven days.  3. PA and lateral chest x-ray to rule out dislodgement.  4. Device interrogation prior to discharge.  5. Left arm in sling overnight and then off in the morning.  6. May start oral medications. Avoid IV or subcutaneous heparin for at   least two weeks. If heparin must be used, please contract the procedural   team to discuss.  7. Wound care as follows:  a) Do not get incision wet for three days.  b) Leave the Steri-Strips in place, allow to come off naturally. If they   do not come off in two weeks, you may remove them.  c) Check incision daily and call if they notice one of the following:   redness, drainage (pus or blood), swelling, warmth or if you develop   fever.    If you have questions regarding wound care, please  contact our office.     William Boyd MD   Echocardiogram Limited     Value    LVEF  65-70%    [facility]    [id number]  [id number]  [id number]  [id number]^ANGELICA^MANSI^RONEN     [facility]  Echocardiography Laboratory  [address] [place] [id number]     Name: AKSHAT ONEIL  MRN: [id number]  : 1940  Study Date: 2022 09:37 AM  Age: 81 yrs  Gender: Female  Patient Location: [facility]  Reason For Study: Aortic Valve Replacement  Ordering Physician: MANSI DOMINGUEZ  Referring Physician: Jesenia Madrigal  Performed By: Chaya Mejai     BSA: 1.9 m2  Height: 64 in  Weight: 188 lb  HR: 84  BP: 114/88 mmHg  ______________________________________________________________________________  Procedure  Limited Portable Echo Adult. Optison (NDC #6837-1795) given intravenously.  ______________________________________________________________________________  Interpretation Summary     1. The left ventricle is normal in size. Hyperdynamic left ventricular  function. The visual ejection fraction is 65-70%. No regional wall motion  abnormalities noted. There is no thrombus seen in the left ventricle.  2. The right ventricle is normal size. There is a catheter/pacemaker lead seen  in the right ventricle. The right ventricular systolic function is normal.  3. There is a bioprosthetic aortic valve. (Medtronic 26mm TAVR implanted on  2022). The mean AoV pressure gradient is 15.0 mmHg. The peak AoV  pressure gradient is 25.1 mmHg. These values are within normal limits for this  type of prosthesis. There is no aortic regurgitation.  4. No pericardial efffusion.  5. In comparison to the previous study dated 2022, the transaortic  gradients are higher on today's study. The other findings are similar.  ______________________________________________________________________________  Left Ventricle  The left ventricle is normal in size. Hyperdynamic left ventricular function.  The visual ejection  fraction is 65-70%. No regional wall motion abnormalities  noted. There is no thrombus seen in the left ventricle.     Right Ventricle  The right ventricle is normal size. There is a catheter/pacemaker lead seen in  the right ventricle. The right ventricular systolic function is normal.     Mitral Valve  There is trace mitral regurgitation.     Tricuspid Valve  There is mild (1+) tricuspid regurgitation. The right ventricular systolic  pressure is approximated at 31.6 mmHg plus the right atrial pressure.     Aortic Valve  There is a bioprosthetic aortic valve. (Medtronic 26mm TAVR implanted on  2022). The mean AoV pressure gradient is 15.0 mmHg. The peak AoV  pressure gradient is 25.1 mmHg. These values are within normal limits for this  type of prosthesis. There is no aortic regurgitation.     Pulmonic Valve  There is no pulmonic valvular stenosis.     Pericardium  There is no pericardial effusion.  ______________________________________________________________________________  MMode/2D Measurements & Calculations  LVOT diam: 2.0 cm  LVOT area: 3.1 cm2     Doppler Measurements & Calculations  MV E max rj: 72.7 cm/sec  MV A max rj: 128.0 cm/sec  MV E/A: 0.57  MV dec slope: 453.0 cm/sec2  MV dec time: 0.16 sec  Ao V2 max: 250.3 cm/sec  Ao max P.1 mmHg  Ao V2 mean: 183.8 cm/sec  Ao mean PG: 15.0 mmHg  Ao V2 VTI: 48.5 cm  ROBYN(I,D): 2.1 cm2  ROBYN(V,D): 2.5 cm2  Ao acc time: 0.14 sec  LV V1 max PG: 15.5 mmHg  LV V1 max: 197.0 cm/sec  LV V1 VTI: 32.7 cm  SV(LVOT): 102.7 ml  SI(LVOT): 53.9 ml/m2  TR max rj: 281.0 cm/sec  TR max P.6 mmHg     AV Rj Ratio (DI): 0.79  ROBYN Index (cm2/m2): 1.1  E/E' av.4  Lateral E/e': 8.4  Medial E/e': 8.4     ______________________________________________________________________________  Report approved by: Chris Huang 2022 11:12 AM         Cardiac Catheterization    Narrative      Transfemoral transcatheter aortic valve replacement with 26mm Medtronic   Evolut  FX valve.     SUMMARY:    1. Lifestyle-limiting severe aortic stenosis.  2. Successful transfemoral transcatheter aortic valve replacement with a   26mm Medtronic Evolut FX bioprosthetic aortic valve.   3. Temporary pacemaker insertion.  4. Left heart catheterization with LVEDP of 12 mmHg.  5. Right and left common femoral arteriotomies closed with closure   devices.  6.  Bradycardia with A-V dissociation and junctional escape rhythm noted   after valve deployment.     PLAN:    1. Aspirin 81 mg po daily lifelong.  2. Temporary pacemaker secured in place in the left femoral vein.   Electrophysiology consulted for consideration of permanent pacemaker.   3. Bedrest per protocol.  4. Echocardiogram tomorrow.   5. Lifelong antibiotic prophylaxis prior to all dental procedures.           Discharge Medications   Discharge Medication List as of 11/9/2022  4:03 PM      CONTINUE these medications which have NOT CHANGED    Details   acetaminophen (TYLENOL) 500 MG tablet Take 1,000 mg by mouth 2 times daily (2 x 500 mg = 1000 mg), Historical      amLODIPine (NORVASC) 5 MG tablet TAKE ONE TABLET BY MOUTH EVERY NIGHT AT BEDTIME, Disp-90 tablet, R-3, E-Prescribe      amoxicillin (AMOXIL) 500 MG capsule Take 2,000 mg by mouth once as needed (1 hour prior to dental procedures 4 x 500 mg = 2000 mg), Historical      aspirin (ASA) 81 MG EC tablet Take 1 tablet (81 mg) by mouth daily, Disp-90 tablet, R-3, Historical      Biotin 5000 MCG PO TABS Take 1 tablet by mouth daily, Historical      calcium carbonate (OS-CINDY) 500 MG tablet Take 1 tablet by mouth every other day, Historical      CENTRUM SILVER OR TABS Take 1 tablet by mouth daily, Disp-30, R-0, Historical      Cranberry 450 MG CAPS Take 1 capsule by mouth daily (with dinner), Historical      docusate sodium (COLACE) 100 MG capsule Take 100 mg by mouth At Bedtime, Historical      ferrous gluconate (FERGON) 324 (38 FE) MG tablet Take 1 tablet (324 mg) by mouth 2 times daily,  Disp-100 tablet, R-0, No Print Out      latanoprost (XALATAN) 0.005 % ophthalmic solution Place 1 drop into both eyes At Bedtime , Historical      levothyroxine (SYNTHROID/LEVOTHROID) 50 MCG tablet Take 1 tablet (50 mcg) by mouth daily, Disp-90 tablet, R-3, E-Prescribe      lisinopril (ZESTRIL) 30 MG tablet TAKE ONE TABLET BY MOUTH EVERY DAY in the morning  FOR HYPERTENSION, Disp-90 tablet, R-3, E-Prescribe      magnesium 250 MG tablet Take 1 tablet by mouth daily, Historical      meloxicam (MOBIC) 7.5 MG tablet Take 1 tablet (7.5 mg) by mouth daily, Disp-90 tablet, R-3, E-Prescribe      nitroGLYcerin (NITROSTAT) 0.4 MG sublingual tablet For chest pain place 1 tablet under the tongue every 5 minutes for 3 doses. If symptoms persist 5 minutes after 1st dose call 911., Disp-25 tablet, R-11, E-Prescribe      OMEGA-3 FATTY ACIDS 1200 MG OR CAPS Take 1 capsule by mouth daily, R-0, Historical      pantoprazole (PROTONIX) 20 MG EC tablet Take 1 tablet (20 mg) by mouth daily, Disp-90 tablet, R-3, E-Prescribe      potassium 99 MG TABS Take 1 tablet by mouth daily (with dinner) , Historical      Probiotic Product (ACIDOPHILUS PROBIOTIC BLEND) CAPS Take 1 capsule by mouth daily (with breakfast) , Disp-30 capsule, R-0, Historical      simvastatin (ZOCOR) 20 MG tablet TAKE ONE TABLET BY MOUTH EVERY NIGHT AT BEDTIME, Disp-90 tablet, R-3, E-Prescribe      VITAMIN D, CHOLECALCIFEROL, PO Take 2,000 Units by mouth daily, Historical           Allergies   Allergies   Allergen Reactions     Augmentin Diarrhea     Codeine Nausea and Vomiting     Hydrocodone      Keeps patient awake     Naproxen Itching and Rash     Seasonal Allergies      Hay fever in fall, ragweed and russian thistles     Sulfa Drugs Nausea

## 2022-11-14 ASSESSMENT — ENCOUNTER SYMPTOMS
COUGH: 0
HEMATURIA: 0
PARESTHESIAS: 0
SORE THROAT: 0
DIZZINESS: 0
WEAKNESS: 0
HEMATOCHEZIA: 0
ABDOMINAL PAIN: 0
MYALGIAS: 1
NERVOUS/ANXIOUS: 0
DYSURIA: 0
PALPITATIONS: 0
HEARTBURN: 0
NAUSEA: 0
CHILLS: 0
EYE PAIN: 0
BREAST MASS: 0
JOINT SWELLING: 0
CONSTIPATION: 0
FREQUENCY: 1
ARTHRALGIAS: 1
SHORTNESS OF BREATH: 0
FEVER: 0
DIARRHEA: 0
HEADACHES: 0

## 2022-11-14 ASSESSMENT — ACTIVITIES OF DAILY LIVING (ADL): CURRENT_FUNCTION: NO ASSISTANCE NEEDED

## 2022-11-15 LAB
ATRIAL RATE - MUSE: 73 BPM
DIASTOLIC BLOOD PRESSURE - MUSE: NORMAL MMHG
INTERPRETATION ECG - MUSE: NORMAL
P AXIS - MUSE: 36 DEGREES
PR INTERVAL - MUSE: 228 MS
QRS DURATION - MUSE: 134 MS
QT - MUSE: 462 MS
QTC - MUSE: 508 MS
R AXIS - MUSE: -63 DEGREES
SYSTOLIC BLOOD PRESSURE - MUSE: NORMAL MMHG
T AXIS - MUSE: 107 DEGREES
VENTRICULAR RATE- MUSE: 73 BPM

## 2022-11-17 ENCOUNTER — OFFICE VISIT (OUTPATIENT)
Dept: CARDIOLOGY | Facility: CLINIC | Age: 82
End: 2022-11-17
Payer: COMMERCIAL

## 2022-11-17 ENCOUNTER — ANCILLARY PROCEDURE (OUTPATIENT)
Dept: CARDIOLOGY | Facility: CLINIC | Age: 82
End: 2022-11-17
Attending: INTERNAL MEDICINE
Payer: COMMERCIAL

## 2022-11-17 VITALS
HEIGHT: 64 IN | OXYGEN SATURATION: 100 % | SYSTOLIC BLOOD PRESSURE: 136 MMHG | HEART RATE: 60 BPM | BODY MASS INDEX: 31.58 KG/M2 | DIASTOLIC BLOOD PRESSURE: 84 MMHG | WEIGHT: 185 LBS

## 2022-11-17 DIAGNOSIS — I44.2 COMPLETE HEART BLOCK (H): ICD-10-CM

## 2022-11-17 DIAGNOSIS — Z95.0 CARDIAC PACEMAKER IN SITU: ICD-10-CM

## 2022-11-17 DIAGNOSIS — Z95.3 S/P TAVR (TRANSCATHETER AORTIC VALVE REPLACEMENT), BIOPROSTHETIC: ICD-10-CM

## 2022-11-17 LAB
MDC_IDC_LEAD_IMPLANT_DT: NORMAL
MDC_IDC_LEAD_IMPLANT_DT: NORMAL
MDC_IDC_LEAD_LOCATION: NORMAL
MDC_IDC_LEAD_LOCATION: NORMAL
MDC_IDC_LEAD_LOCATION_DETAIL_1: NORMAL
MDC_IDC_LEAD_LOCATION_DETAIL_1: NORMAL
MDC_IDC_LEAD_MFG: NORMAL
MDC_IDC_LEAD_MFG: NORMAL
MDC_IDC_LEAD_MODEL: NORMAL
MDC_IDC_LEAD_MODEL: NORMAL
MDC_IDC_LEAD_POLARITY_TYPE: NORMAL
MDC_IDC_LEAD_POLARITY_TYPE: NORMAL
MDC_IDC_LEAD_SERIAL: NORMAL
MDC_IDC_LEAD_SERIAL: NORMAL
MDC_IDC_MSMT_BATTERY_STATUS: NORMAL
MDC_IDC_MSMT_LEADCHNL_RA_IMPEDANCE_VALUE: 616 OHM
MDC_IDC_MSMT_LEADCHNL_RA_PACING_THRESHOLD_AMPLITUDE: 0.7 V
MDC_IDC_MSMT_LEADCHNL_RA_PACING_THRESHOLD_PULSEWIDTH: 0.4 MS
MDC_IDC_MSMT_LEADCHNL_RA_SENSING_INTR_AMPL: 2.4 MV
MDC_IDC_MSMT_LEADCHNL_RV_IMPEDANCE_VALUE: 778 OHM
MDC_IDC_MSMT_LEADCHNL_RV_PACING_THRESHOLD_AMPLITUDE: 0.8 V
MDC_IDC_MSMT_LEADCHNL_RV_PACING_THRESHOLD_PULSEWIDTH: 0.4 MS
MDC_IDC_MSMT_LEADCHNL_RV_SENSING_INTR_AMPL: 25 MV
MDC_IDC_PG_IMPLANT_DTM: NORMAL
MDC_IDC_PG_MFG: NORMAL
MDC_IDC_PG_MODEL: NORMAL
MDC_IDC_PG_SERIAL: NORMAL
MDC_IDC_PG_TYPE: NORMAL
MDC_IDC_SESS_CLINIC_NAME: NORMAL
MDC_IDC_SESS_DTM: NORMAL
MDC_IDC_SESS_TYPE: NORMAL
MDC_IDC_SET_BRADY_AT_MODE_SWITCH_MODE: NORMAL
MDC_IDC_SET_BRADY_AT_MODE_SWITCH_RATE: 170 {BEATS}/MIN
MDC_IDC_SET_BRADY_HYSTRATE: NORMAL
MDC_IDC_SET_BRADY_LOWRATE: 60 {BEATS}/MIN
MDC_IDC_SET_BRADY_MAX_SENSOR_RATE: 130 {BEATS}/MIN
MDC_IDC_SET_BRADY_MAX_TRACKING_RATE: 130 {BEATS}/MIN
MDC_IDC_SET_BRADY_MODE: NORMAL
MDC_IDC_SET_BRADY_PAV_DELAY_HIGH: 200 MS
MDC_IDC_SET_BRADY_PAV_DELAY_LOW: 250 MS
MDC_IDC_SET_BRADY_SAV_DELAY_HIGH: 200 MS
MDC_IDC_SET_BRADY_SAV_DELAY_LOW: 250 MS
MDC_IDC_SET_LEADCHNL_RA_PACING_AMPLITUDE: 2 V
MDC_IDC_SET_LEADCHNL_RA_PACING_CAPTURE_MODE: NORMAL
MDC_IDC_SET_LEADCHNL_RA_PACING_POLARITY: NORMAL
MDC_IDC_SET_LEADCHNL_RA_PACING_PULSEWIDTH: 0.4 MS
MDC_IDC_SET_LEADCHNL_RA_SENSING_ADAPTATION_MODE: NORMAL
MDC_IDC_SET_LEADCHNL_RA_SENSING_POLARITY: NORMAL
MDC_IDC_SET_LEADCHNL_RA_SENSING_SENSITIVITY: 0.25 MV
MDC_IDC_SET_LEADCHNL_RV_PACING_AMPLITUDE: 1.3 V
MDC_IDC_SET_LEADCHNL_RV_PACING_CAPTURE_MODE: NORMAL
MDC_IDC_SET_LEADCHNL_RV_PACING_POLARITY: NORMAL
MDC_IDC_SET_LEADCHNL_RV_PACING_PULSEWIDTH: 0.4 MS
MDC_IDC_SET_LEADCHNL_RV_SENSING_ADAPTATION_MODE: NORMAL
MDC_IDC_SET_LEADCHNL_RV_SENSING_POLARITY: NORMAL
MDC_IDC_SET_LEADCHNL_RV_SENSING_SENSITIVITY: 1.5 MV
MDC_IDC_SET_ZONE_DETECTION_INTERVAL: 375 MS
MDC_IDC_SET_ZONE_TYPE: NORMAL
MDC_IDC_SET_ZONE_VENDOR_TYPE: NORMAL
MDC_IDC_STAT_EPISODE_RECENT_COUNT: 0
MDC_IDC_STAT_EPISODE_RECENT_COUNT_DTM_END: NORMAL
MDC_IDC_STAT_EPISODE_RECENT_COUNT_DTM_START: NORMAL
MDC_IDC_STAT_EPISODE_TOTAL_COUNT: 0
MDC_IDC_STAT_EPISODE_TOTAL_COUNT_DTM_END: NORMAL
MDC_IDC_STAT_EPISODE_TYPE: NORMAL
MDC_IDC_STAT_EPISODE_TYPE: NORMAL
MDC_IDC_STAT_EPISODE_VENDOR_TYPE: NORMAL
MDC_IDC_STAT_EPISODE_VENDOR_TYPE: NORMAL

## 2022-11-17 PROCEDURE — 99214 OFFICE O/P EST MOD 30 MIN: CPT | Mod: 25 | Performed by: NURSE PRACTITIONER

## 2022-11-17 PROCEDURE — 93280 PM DEVICE PROGR EVAL DUAL: CPT | Performed by: INTERNAL MEDICINE

## 2022-11-17 NOTE — PROGRESS NOTES
Cardiology Clinic Progress Note  Lexii Stratton MRN# [id number]   YOB: 1940 Age: 81 year old     Primary cardiologist: Dr. 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     Patient reports doing well since her procedure.  Her biggest complaint is an itchy rash that appears to be from the adhesive used during admission last week.  She thinks the rash is getting better.  She was not started on any new medication.  She otherwise denies any chest pain, shortness of breath, syncope or near syncope, or palpitations.  She admits to not being very active due to the recent cold weather and snow.  She is looking forward to starting cardiac rehab next week.    Her blood pressure is well controlled today.  Her weight is stable.    Her most recent labs from 11/9/2022 show normal kidney function and electrolytes.  Her CBC shows hemoglobin of 15.3 and platelets of 179.         Assessment and Plan:     ASSESSMENT:    1. Severe aortic stenosis s/p TAVR with 26 mm Medtronic Evolut FX valve. Post-procedure echo showed well-seated bioprosthetic valve with a mean gradient of 15 mmHg in the setting of hyperdynamic LV function.  On aspirin 81 mg daily for antiplatelet therapy.  2. CHB s/p dual chamber PPM.  Device check today shows normal device function.  Incision site has healed well without any drainage, redness, or swelling.  3. Hypertension.  Well-controlled on current regimen.  4. Chronic diastolic heart failure, NYHA class I.  Appears euvolemic on exam, not on diuretic therapy.      PLAN:     1. Continue aspirin 81 mg daily lifelong.  2. We discussed the need for lifelong antibiotic prophylaxis prior to any dental  "procedure.  3. Recommend cortisone cream and cold packs for itching relief, has follow-up with PCP on 11/21.   4. Start outpatient cardiac rehab.  5. Follow-up with me in 1 month with repeat echocardiogram, labs, and EKG.           Isaura Kulkarni DNP, APRN, CNP  Page: [phone] (8a-5p M-F)    Orders this Visit:  No orders of the defined types were placed in this encounter.    No orders of the defined types were placed in this encounter.    There are no discontinued medications.    Today's clinic visit entailed:  Review of the result(s) of each unique test - TAVR, echo, labs, EKG  Ordering of each unique test  Prescription drug management  30 minutes spent on the date of the encounter doing chart review, review of test results, patient visit and documentation   Provider  Link to [facility] Help Grid     The level of medical decision making during this visit was of moderate complexity.           Review of Systems:     Review of Systems:  Skin:  not assessed     Eyes:  not assessed    ENT:  not assessed    Respiratory:  Negative    Cardiovascular:  Negative    Gastroenterology: not assessed    Genitourinary:  not assessed    Musculoskeletal:  not assessed    Neurologic:  not assessed    Psychiatric:  not assessed    Heme/Lymph/Imm:  Positive for allergies  Endocrine:  Negative              Physical Exam:   Vitals: /84   Pulse 60   Ht 1.626 m (5' 4\")   Wt 83.9 kg (185 lb)   LMP  (LMP Unknown)   SpO2 100%   BMI 31.76 kg/m    Constitutional:  well developed        Skin:  warm and dry to the touch        Head:  normocephalic        Eyes:  pupils equal and round        ENT:  not assessed this visit        Neck:  JVP normal        Chest:  clear to auscultation;normal respiratory excursion        Cardiac: regular rhythm;normal S1 and S2       grade 1;systolic ejection murmur          Abdomen:  abdomen soft        Vascular: pulses full and equal                                      Extremities and Back:  no edema    "     Neurological:  no gross motor deficits;affect appropriate             Medications:     Current Outpatient Medications   Medication Sig Dispense Refill     acetaminophen (TYLENOL) 500 MG tablet Take 1,000 mg by mouth 2 times daily (2 x 500 mg = 1000 mg)       amLODIPine (NORVASC) 5 MG tablet TAKE ONE TABLET BY MOUTH EVERY NIGHT AT BEDTIME (Patient taking differently: Take by mouth At Bedtime) 90 tablet 3     amoxicillin (AMOXIL) 500 MG capsule Take 2,000 mg by mouth once as needed (1 hour prior to dental procedures 4 x 500 mg = 2000 mg)       aspirin (ASA) 81 MG EC tablet Take 1 tablet (81 mg) by mouth daily 90 tablet 3     Biotin 5000 MCG PO TABS Take 1 tablet by mouth daily       calcium carbonate (OS-CINDY) 500 MG tablet Take 1 tablet by mouth every other day       CENTRUM SILVER OR TABS Take 1 tablet by mouth daily 30 0     Cranberry 450 MG CAPS Take 1 capsule by mouth daily (with dinner)       docusate sodium (COLACE) 100 MG capsule Take 100 mg by mouth At Bedtime       ferrous gluconate (FERGON) 324 (38 FE) MG tablet Take 1 tablet (324 mg) by mouth 2 times daily 100 tablet 0     latanoprost (XALATAN) 0.005 % ophthalmic solution Place 1 drop into both eyes At Bedtime        levothyroxine (SYNTHROID/LEVOTHROID) 50 MCG tablet Take 1 tablet (50 mcg) by mouth daily 90 tablet 3     lisinopril (ZESTRIL) 30 MG tablet TAKE ONE TABLET BY MOUTH EVERY DAY in the morning  FOR HYPERTENSION (Patient taking differently: Take 30 mg by mouth every morning) 90 tablet 3     magnesium 250 MG tablet Take 1 tablet by mouth daily       meloxicam (MOBIC) 7.5 MG tablet Take 1 tablet (7.5 mg) by mouth daily 90 tablet 3     nitroGLYcerin (NITROSTAT) 0.4 MG sublingual tablet For chest pain place 1 tablet under the tongue every 5 minutes for 3 doses. If symptoms persist 5 minutes after 1st dose call 911. (Patient taking differently: 0.4 mg every 5 minutes as needed For chest pain place 1 tablet under the tongue every 5 minutes for 3  doses. If symptoms persist 5 minutes after 1st dose call 911.) 25 tablet 11     OMEGA-3 FATTY ACIDS 1200 MG OR CAPS Take 1 capsule by mouth daily  0     pantoprazole (PROTONIX) 20 MG EC tablet Take 1 tablet (20 mg) by mouth daily 90 tablet 3     potassium 99 MG TABS Take 1 tablet by mouth daily (with dinner)        Probiotic Product (ACIDOPHILUS PROBIOTIC BLEND) CAPS Take 1 capsule by mouth daily (with breakfast)  30 capsule 0     simvastatin (ZOCOR) 20 MG tablet TAKE ONE TABLET BY MOUTH EVERY NIGHT AT BEDTIME (Patient taking differently: Take 20 mg by mouth At Bedtime) 90 tablet 3     VITAMIN D, CHOLECALCIFEROL, PO Take 2,000 Units by mouth daily         Family History   Problem Relation Age of Onset     Heart Disease Mother         heart surgery , new valve     Gallbladder Disease Mother      Coronary Artery Disease Mother      Hyperlipidemia Mother      Asthma Mother      Hypertension Mother      Anesthesia Reaction Mother      Cancer Father         throat ca,  76     Coronary Artery Disease Father      Other Cancer Father      Diabetes Brother         Half Brother     Aortic stenosis Brother         Had TAVR     Cancer Brother         half brother  lung      Heart Disease Maternal Grandmother      Coronary Artery Disease Maternal Grandmother      Heart Disease Maternal Grandfather      Coronary Artery Disease Maternal Grandfather        Social History     Socioeconomic History     Marital status: Single     Spouse name: Not on file     Number of children: Not on file     Years of education: Not on file     Highest education level: Not on file   Occupational History     Not on file   Tobacco Use     Smoking status: Never     Smokeless tobacco: Never     Tobacco comments:     have never smoked anything   Vaping Use     Vaping Use: Never used   Substance and Sexual Activity     Alcohol use: No     Alcohol/week: 0.0 standard drinks     Drug use: No     Sexual activity: Not Currently     Partners: Male     Birth  control/protection: None     Comment: have had a hysterectomy   Other Topics Concern     Parent/sibling w/ CABG, MI or angioplasty before 65F 55M? No   Social History Narrative     Not on file     Social Determinants of Health     Financial Resource Strain: Not on file   Food Insecurity: Not on file   Transportation Needs: Not on file   Physical Activity: Not on file   Stress: Not on file   Social Connections: Not on file   Intimate Partner Violence: Not on file   Housing Stability: Not on file            Past Medical History:     Past Medical History:   Diagnosis Date     Aortic valve stenosis 8/18/2022     Arthritis      Diabetes (H)     Type 2-no meds... Dieet and exercise only as of 9/15/20     Esophageal reflux      Hernia, abdominal      History of blood transfusion 1984     Hypertension     No cardiologist     Incontinence of urine      Mumps     6 years old     Obese      Osteoarthritis      Other and unspecified hyperlipidemia      Other chronic pain     back     Peptic ulcer, unspecified site, unspecified as acute or chronic, without mention of hemorrhage, perforation, or obstruction      Small bowel obstruction (H)      Thyroid disease hypothyroidism     Urinary incontinence      Walking troubles               Past Surgical History:     Past Surgical History:   Procedure Laterality Date     ABDOMEN SURGERY       ARTHROPLASTY HIP Right 11/9/2016    Procedure: ARTHROPLASTY HIP;  Surgeon: Angel Lund MD;  Location: [facility] OR     AS REPAIR INCISIONAL HERNIA,REDUCIBLE  1998    inc hernia ( Yamileth surgery)     BIOPSY       BLADDER SURGERY      hyperdistention surgery and sling     BREAST SURGERY       CHOLECYSTECTOMY  1987     COLONOSCOPY  12/3/2011    Procedure:COLONOSCOPY; COLONOSCOPY; Surgeon:SEMAJ GRAHAM; Location: GI     COLONOSCOPY N/A 3/29/2018    Procedure: COLONOSCOPY;  COLONOSCOPY Rm 544;  Surgeon: Marco Gusman MD;  Location:  GI     CV CORONARY ANGIOGRAM N/A 9/30/2022    Procedure:  Coronary Angiogram;  Surgeon: Garcia Barber MD;  Location:  [facility]     CV TRANSCATHETER AORTIC VALVE REPLACEMENT-FEMORAL APPROACH N/A 11/8/2022    Procedure: Transcatheter Aortic Valve Replacement-Femoral Approach;  Surgeon: Yulia Castano MD;  Location:  [facility]     CYSTOSCOPY N/A 9/6/2017    Procedure: CYSTOSCOPY;  CYSTOSCOPY AND HYDRODISTENTION ;  Surgeon: Eyal Bai MD;  Location:  OR     ENT SURGERY      Tonsillectomy     EP PACEMAKER DEVICE & LEAD IMPLANT- RIGHT ATRIAL & RIGHT VENTRICULAR N/A 11/8/2022    Procedure: Pacemaker Device & Lead Implant - Right Atrial & Right Ventricular;  Surgeon: William Boyd MD;  Location:  [facility]     ESOPHAGOSCOPY, GASTROSCOPY, DUODENOSCOPY (EGD), COMBINED N/A 9/26/2016    Procedure: COMBINED ESOPHAGOSCOPY, GASTROSCOPY, DUODENOSCOPY (EGD), BIOPSY SINGLE OR MULTIPLE;  Surgeon: Marco Monaco MD;  Location:  GI     EYE SURGERY Left 05/2019     GENITOURINARY SURGERY       GYN SURGERY      Hysterectomy     HERNIA REPAIR, UMBILICAL  7/8/2010    Dr. Kirt Franco times 3     IMPLANT STIMULATOR SACRAL NERVE STAGE ONE N/A 9/17/2020    Procedure: sacral neurostimulation stage one implant of neurostimulator lead;  Surgeon: Shahnaz Carbone MD;  Location:  OR     IMPLANT STIMULATOR SACRAL NERVE STAGE TWO Right 9/24/2020    Procedure: Removal of interstem lead, NOT implanting neurostimulator;  Surgeon: Shahnaz Carbone MD;  Location:  OR     ORTHOPEDIC SURGERY  2009    [facility] - Dr. Lund- bilateral knee replacement     ZZC NONSPECIFIC PROCEDURE  1946    T&A     ZZC NONSPECIFIC PROCEDURE  1984    Vaginal Hysterectomy (has her ovaries)     ZZC NONSPECIFIC PROCEDURE  1973    PPTL     ZZC NONSPECIFIC PROCEDURE  1994    L shoulder to remove bone spurs     ZZC NONSPECIFIC PROCEDURE  2004    cysto and durasphere Dr Demarco     ZZC NONSPECIFIC PROCEDURE  2005    cysto and  durasphere     ZZC NONSPECIFIC PROCEDURE  2007    cysto and durasphere     ZZC NONSPECIFIC PROCEDURE  2009    retropubic TVT sling              Allergies:   Augmentin, Codeine, Hydrocodone, Naproxen, Seasonal allergies, and Sulfa drugs       Data:   All laboratory data reviewed:    Recent Labs   Lab Test 10/27/22  1553 07/28/22  0759 02/01/22  0801 02/24/21  0856 08/26/20  0933 02/20/20  0848 02/25/19  1118 09/06/18  0952 06/29/18  0801   LDL  --  62 73 85   < > 86   < > 71  --    HDL  --  50 50 50   < > 42*   < > 38*  --    NHDL  --  82 99 107   < > 108   < > 104  --    CHOL  --  132 149 157   < > 150   < > 142  --    TRIG  --  101 128 110   < > 110   < > 166*  --    TSH  --   --  2.56 2.21  --  2.20   < >  --   --    NTBNP 342  --   --   --   --   --   --   --   --    IRON  --   --   --   --   --   --   --  60  --    FEB  --   --   --   --   --   --   --  375  --    IRONSAT  --   --   --   --   --   --   --  16  --    IRAIS  --   --   --   --   --   --   --   --  31    < > = values in this interval not displayed.       Lab Results   Component Value Date    WBC 6.0 11/08/2022    WBC 11.3 (H) 03/12/2021    RBC 4.82 11/08/2022    RBC 4.56 03/12/2021    HGB 15.3 11/08/2022    HGB 13.8 03/12/2021    HCT 46.8 11/08/2022    HCT 43.7 03/12/2021    MCV 97 11/08/2022    MCV 96 03/12/2021    MCH 31.7 11/08/2022    MCH 30.3 03/12/2021    MCHC 32.7 11/08/2022    MCHC 31.6 03/12/2021    RDW 12.8 11/08/2022    RDW 13.1 03/12/2021     11/08/2022     03/12/2021       Lab Results   Component Value Date     11/09/2022     02/24/2021    POTASSIUM 4.2 11/09/2022    POTASSIUM 3.9 02/24/2021    CHLORIDE 108 11/09/2022    CHLORIDE 107 02/24/2021    CO2 22 11/09/2022    CO2 28 02/24/2021    ANIONGAP 10 11/09/2022    ANIONGAP 5 02/24/2021     (H) 11/09/2022    GLC 95 11/09/2022     (H) 02/24/2021    BUN 16 11/09/2022    BUN 18 02/24/2021    CR 0.74 11/09/2022    CR 0.90 02/24/2021    GFRESTIMATED 81  11/09/2022    GFRESTIMATED 48 (L) 08/16/2021    GFRESTIMATED 60 (L) 02/24/2021    GFRESTBLACK 70 02/24/2021    CINDY 9.3 11/09/2022    CINDY 9.9 02/24/2021      Lab Results   Component Value Date    AST 28 10/27/2022    AST 21 02/24/2021    ALT 20 10/27/2022    ALT 18 02/24/2021       Lab Results   Component Value Date    A1C 5.6 07/28/2022    A1C 6.1 (H) 02/24/2021       Lab Results   Component Value Date    INR 1.06 10/27/2022    INR 1.07 09/30/2022    INR 1.15 (H) 03/28/2018    INR 1.65 (H) 07/03/2009

## 2022-11-17 NOTE — LETTER
11/17/2022    Jesenia Madrigal MD  [address]  Formerly [facility] [address]    RE: Lexii Stratton       Dear Colleague,     I had the pleasure of seeing Lexii Stratton in the [facility].  Cardiology Clinic Progress Note  Lexii Stratton MRN# [id number]   YOB: 1940 Age: 81 year old     Primary cardiologist: Dr. 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     Patient reports doing well since her procedure.  Her biggest complaint is an itchy rash that appears to be from the adhesive used during admission last week.  She thinks the rash is getting better.  She was not started on any new medication.  She otherwise denies any chest pain, shortness of breath, syncope or near syncope, or palpitations.  She admits to not being very active due to the recent cold weather and snow.  She is looking forward to starting cardiac rehab next week.    Her blood pressure is well controlled today.  Her weight is stable.    Her most recent labs from 11/9/2022 show normal kidney function and electrolytes.  Her CBC shows hemoglobin of 15.3 and platelets of 179.         Assessment and Plan:     ASSESSMENT:    1. Severe aortic stenosis s/p TAVR with 26 mm Medtronic Evolut FX valve. Post-procedure echo showed well-seated bioprosthetic valve with a mean gradient of 15 mmHg in the setting of hyperdynamic LV function.  On aspirin 81 mg daily for antiplatelet therapy.  2. CHB s/p dual chamber PPM.  Device check today shows normal device function.  Incision site has healed well without any drainage, redness, or swelling.  3. Hypertension.  Well-controlled on current regimen.  4. Chronic diastolic heart failure,  "NYHA class I.  Appears euvolemic on exam, not on diuretic therapy.      PLAN:     1. Continue aspirin 81 mg daily lifelong.  2. We discussed the need for lifelong antibiotic prophylaxis prior to any dental procedure.  3. Recommend cortisone cream and cold packs for itching relief, has follow-up with PCP on 11/21.   4. Start outpatient cardiac rehab.  5. Follow-up with me in 1 month with repeat echocardiogram, labs, and EKG.           Isaura Kulkarni, DNP, APRN, CNP  Page: [phone] (8a-5p M-F)    Orders this Visit:  No orders of the defined types were placed in this encounter.    No orders of the defined types were placed in this encounter.    There are no discontinued medications.    Today's clinic visit entailed:  Review of the result(s) of each unique test - TAVR, echo, labs, EKG  Ordering of each unique test  Prescription drug management  30 minutes spent on the date of the encounter doing chart review, review of test results, patient visit and documentation   Provider  Link to [facility] Help Grid     The level of medical decision making during this visit was of moderate complexity.           Review of Systems:     Review of Systems:  Skin:  not assessed     Eyes:  not assessed    ENT:  not assessed    Respiratory:  Negative    Cardiovascular:  Negative    Gastroenterology: not assessed    Genitourinary:  not assessed    Musculoskeletal:  not assessed    Neurologic:  not assessed    Psychiatric:  not assessed    Heme/Lymph/Imm:  Positive for allergies  Endocrine:  Negative              Physical Exam:   Vitals: /84   Pulse 60   Ht 1.626 m (5' 4\")   Wt 83.9 kg (185 lb)   LMP  (LMP Unknown)   SpO2 100%   BMI 31.76 kg/m    Constitutional:  well developed        Skin:  warm and dry to the touch        Head:  normocephalic        Eyes:  pupils equal and round        ENT:  not assessed this visit        Neck:  JVP normal        Chest:  clear to auscultation;normal respiratory excursion        Cardiac: regular " rhythm;normal S1 and S2       grade 1;systolic ejection murmur          Abdomen:  abdomen soft        Vascular: pulses full and equal                                      Extremities and Back:  no edema        Neurological:  no gross motor deficits;affect appropriate             Medications:     Current Outpatient Medications   Medication Sig Dispense Refill     acetaminophen (TYLENOL) 500 MG tablet Take 1,000 mg by mouth 2 times daily (2 x 500 mg = 1000 mg)       amLODIPine (NORVASC) 5 MG tablet TAKE ONE TABLET BY MOUTH EVERY NIGHT AT BEDTIME (Patient taking differently: Take by mouth At Bedtime) 90 tablet 3     amoxicillin (AMOXIL) 500 MG capsule Take 2,000 mg by mouth once as needed (1 hour prior to dental procedures 4 x 500 mg = 2000 mg)       aspirin (ASA) 81 MG EC tablet Take 1 tablet (81 mg) by mouth daily 90 tablet 3     Biotin 5000 MCG PO TABS Take 1 tablet by mouth daily       calcium carbonate (OS-CINDY) 500 MG tablet Take 1 tablet by mouth every other day       CENTRUM SILVER OR TABS Take 1 tablet by mouth daily 30 0     Cranberry 450 MG CAPS Take 1 capsule by mouth daily (with dinner)       docusate sodium (COLACE) 100 MG capsule Take 100 mg by mouth At Bedtime       ferrous gluconate (FERGON) 324 (38 FE) MG tablet Take 1 tablet (324 mg) by mouth 2 times daily 100 tablet 0     latanoprost (XALATAN) 0.005 % ophthalmic solution Place 1 drop into both eyes At Bedtime        levothyroxine (SYNTHROID/LEVOTHROID) 50 MCG tablet Take 1 tablet (50 mcg) by mouth daily 90 tablet 3     lisinopril (ZESTRIL) 30 MG tablet TAKE ONE TABLET BY MOUTH EVERY DAY in the morning  FOR HYPERTENSION (Patient taking differently: Take 30 mg by mouth every morning) 90 tablet 3     magnesium 250 MG tablet Take 1 tablet by mouth daily       meloxicam (MOBIC) 7.5 MG tablet Take 1 tablet (7.5 mg) by mouth daily 90 tablet 3     nitroGLYcerin (NITROSTAT) 0.4 MG sublingual tablet For chest pain place 1 tablet under the tongue every 5  minutes for 3 doses. If symptoms persist 5 minutes after 1st dose call 911. (Patient taking differently: 0.4 mg every 5 minutes as needed For chest pain place 1 tablet under the tongue every 5 minutes for 3 doses. If symptoms persist 5 minutes after 1st dose call 911.) 25 tablet 11     OMEGA-3 FATTY ACIDS 1200 MG OR CAPS Take 1 capsule by mouth daily  0     pantoprazole (PROTONIX) 20 MG EC tablet Take 1 tablet (20 mg) by mouth daily 90 tablet 3     potassium 99 MG TABS Take 1 tablet by mouth daily (with dinner)        Probiotic Product (ACIDOPHILUS PROBIOTIC BLEND) CAPS Take 1 capsule by mouth daily (with breakfast)  30 capsule 0     simvastatin (ZOCOR) 20 MG tablet TAKE ONE TABLET BY MOUTH EVERY NIGHT AT BEDTIME (Patient taking differently: Take 20 mg by mouth At Bedtime) 90 tablet 3     VITAMIN D, CHOLECALCIFEROL, PO Take 2,000 Units by mouth daily         Family History   Problem Relation Age of Onset     Heart Disease Mother         heart surgery , new valve     Gallbladder Disease Mother      Coronary Artery Disease Mother      Hyperlipidemia Mother      Asthma Mother      Hypertension Mother      Anesthesia Reaction Mother      Cancer Father         throat ca,  76     Coronary Artery Disease Father      Other Cancer Father      Diabetes Brother         Half Brother     Aortic stenosis Brother         Had TAVR     Cancer Brother         half brother  lung      Heart Disease Maternal Grandmother      Coronary Artery Disease Maternal Grandmother      Heart Disease Maternal Grandfather      Coronary Artery Disease Maternal Grandfather        Social History     Socioeconomic History     Marital status: Single     Spouse name: Not on file     Number of children: Not on file     Years of education: Not on file     Highest education level: Not on file   Occupational History     Not on file   Tobacco Use     Smoking status: Never     Smokeless tobacco: Never     Tobacco comments:     have never smoked anything    Vaping Use     Vaping Use: Never used   Substance and Sexual Activity     Alcohol use: No     Alcohol/week: 0.0 standard drinks     Drug use: No     Sexual activity: Not Currently     Partners: Male     Birth control/protection: None     Comment: have had a hysterectomy   Other Topics Concern     Parent/sibling w/ CABG, MI or angioplasty before 65F 55M? No   Social History Narrative     Not on file     Social Determinants of Health     Financial Resource Strain: Not on file   Food Insecurity: Not on file   Transportation Needs: Not on file   Physical Activity: Not on file   Stress: Not on file   Social Connections: Not on file   Intimate Partner Violence: Not on file   Housing Stability: Not on file            Past Medical History:     Past Medical History:   Diagnosis Date     Aortic valve stenosis 8/18/2022     Arthritis      Diabetes (H)     Type 2-no meds... Dieet and exercise only as of 9/15/20     Esophageal reflux      Hernia, abdominal      History of blood transfusion 1984     Hypertension     No cardiologist     Incontinence of urine      Mumps     6 years old     Obese      Osteoarthritis      Other and unspecified hyperlipidemia      Other chronic pain     back     Peptic ulcer, unspecified site, unspecified as acute or chronic, without mention of hemorrhage, perforation, or obstruction      Small bowel obstruction (H)      Thyroid disease hypothyroidism     Urinary incontinence      Walking troubles               Past Surgical History:     Past Surgical History:   Procedure Laterality Date     ABDOMEN SURGERY       ARTHROPLASTY HIP Right 11/9/2016    Procedure: ARTHROPLASTY HIP;  Surgeon: Angel Lund MD;  Location: [facility] OR     AS REPAIR INCISIONAL HERNIA,REDUCIBLE  1998    inc hernia ( Yamileth surgery)     BIOPSY       BLADDER SURGERY      hyperdistention surgery and sling     BREAST SURGERY       CHOLECYSTECTOMY  1987     COLONOSCOPY  12/3/2011    Procedure:COLONOSCOPY; COLONOSCOPY;  Surgeon:SEMAJ GRAHAM; Location: GI     COLONOSCOPY N/A 3/29/2018    Procedure: COLONOSCOPY;  COLONOSCOPY Rm 544;  Surgeon: Marco Gusman MD;  Location:  GI     CV CORONARY ANGIOGRAM N/A 9/30/2022    Procedure: Coronary Angiogram;  Surgeon: Garcia Barber MD;  Location:  HEART CARDI[facility] CATH LAB     CV TRANSCATHETER AORTIC VALVE REPLACEMENT-FEMORAL APPROACH N/A 11/8/2022    Procedure: Transcatheter Aortic Valve Replacement-Femoral Approach;  Surgeon: Yulia Castano MD;  Location:  HEART CARDIAC CATH LAB     CYSTOSCOPY N/A 9/6/2017    Procedure: CYSTOSCOPY;  CYSTOSCOPY AND HYDRODISTENTION ;  Surgeon: Eyal Bai MD;  Location:  OR     ENT SURGERY      Tonsillectomy     EP PACEMAKER DEVICE & LEAD IMPLANT- RIGHT ATRIAL & RIGHT VENTRICULAR N/A 11/8/2022    Procedure: Pacemaker Device & Lead Implant - Right Atrial & Right Ventricular;  Surgeon: William Boyd MD;  Location:  HEART CARDIAC CATH LAB     ESOPHAGOSCOPY, GASTROSCOPY, DUODENOSCOPY (EGD), COMBINED N/A 9/26/2016    Procedure: COMBINED ESOPHAGOSCOPY, GASTROSCOPY, DUODENOSCOPY (EGD), BIOPSY SINGLE OR MULTIPLE;  Surgeon: Marco Monaco MD;  Location:  GI     EYE SURGERY Left 05/2019     GENITOURINARY SURGERY       GYN SURGERY      Hysterectomy     HERNIA REPAIR, UMBILICAL  7/8/2010    Dr. Kirt Franco times 3     IMPLANT STIMULATOR SACRAL NERVE STAGE ONE N/A 9/17/2020    Procedure: sacral neurostimulation stage one implant of neurostimulator lead;  Surgeon: Shahnaz Carbone MD;  Location:  OR     IMPLANT STIMULATOR SACRAL NERVE STAGE TWO Right 9/24/2020    Procedure: Removal of interstem lead, NOT implanting neurostimulator;  Surgeon: Shahnaz Carbone MD;  Location:  OR     ORTHOPEDIC SURGERY  2009    [facility] - Dr. Lund- bilateral knee replacement     ZZ NONSPECIFIC PROCEDURE  1946    T&A     ZZC NONSPECIFIC PROCEDURE  1984    Vaginal Hysterectomy (has her ovaries)     ZZ NONSPECIFIC  PROCEDURE  1973    PPTL     [facility] NONSPECIFIC PROCEDURE  1994    L shoulder to remove bone spurs     ZZ NONSPECIFIC PROCEDURE  2004    cysto and durasphere Dr Demarco     Z NONSPECIFIC PROCEDURE  2005    cysto and durasphere     ZZC NONSPECIFIC PROCEDURE  2007    cysto and durasphere     ZZC NONSPECIFIC PROCEDURE  2009    retropubic TVT sling              Allergies:   Augmentin, Codeine, Hydrocodone, Naproxen, Seasonal allergies, and Sulfa drugs       Data:   All laboratory data reviewed:    Recent Labs   Lab Test 10/27/22  1553 07/28/22  0759 02/01/22  0801 02/24/21  0856 08/26/20  0933 02/20/20  0848 02/25/19  1118 09/06/18  0952 06/29/18  0801   LDL  --  62 73 85   < > 86   < > 71  --    HDL  --  50 50 50   < > 42*   < > 38*  --    NHDL  --  82 99 107   < > 108   < > 104  --    CHOL  --  132 149 157   < > 150   < > 142  --    TRIG  --  101 128 110   < > 110   < > 166*  --    TSH  --   --  2.56 2.21  --  2.20   < >  --   --    NTBNP 342  --   --   --   --   --   --   --   --    IRON  --   --   --   --   --   --   --  60  --    FEB  --   --   --   --   --   --   --  375  --    IRONSAT  --   --   --   --   --   --   --  16  --    IRAIS  --   --   --   --   --   --   --   --  31    < > = values in this interval not displayed.       Lab Results   Component Value Date    WBC 6.0 11/08/2022    WBC 11.3 (H) 03/12/2021    RBC 4.82 11/08/2022    RBC 4.56 03/12/2021    HGB 15.3 11/08/2022    HGB 13.8 03/12/2021    HCT 46.8 11/08/2022    HCT 43.7 03/12/2021    MCV 97 11/08/2022    MCV 96 03/12/2021    MCH 31.7 11/08/2022    MCH 30.3 03/12/2021    MCHC 32.7 11/08/2022    MCHC 31.6 03/12/2021    RDW 12.8 11/08/2022    RDW 13.1 03/12/2021     11/08/2022     03/12/2021       Lab Results   Component Value Date     11/09/2022     02/24/2021    POTASSIUM 4.2 11/09/2022    POTASSIUM 3.9 02/24/2021    CHLORIDE 108 11/09/2022    CHLORIDE 107 02/24/2021    CO2 22 11/09/2022    CO2 28 02/24/2021    ANIONGAP 10  11/09/2022    ANIONGAP 5 02/24/2021     (H) 11/09/2022    GLC 95 11/09/2022     (H) 02/24/2021    BUN 16 11/09/2022    BUN 18 02/24/2021    CR 0.74 11/09/2022    CR 0.90 02/24/2021    GFRESTIMATED 81 11/09/2022    GFRESTIMATED 48 (L) 08/16/2021    GFRESTIMATED 60 (L) 02/24/2021    GFRESTBLACK 70 02/24/2021    CINDY 9.3 11/09/2022    CINDY 9.9 02/24/2021      Lab Results   Component Value Date    AST 28 10/27/2022    AST 21 02/24/2021    ALT 20 10/27/2022    ALT 18 02/24/2021       Lab Results   Component Value Date    A1C 5.6 07/28/2022    A1C 6.1 (H) 02/24/2021       Lab Results   Component Value Date    INR 1.06 10/27/2022    INR 1.07 09/30/2022    INR 1.15 (H) 03/28/2018    INR 1.65 (H) 07/03/2009       Thank you for allowing me to participate in the care of your patient.      Sincerely,     Isaura Kulkarni, [facility]  cc:   Yulia Castano MD  [address] [address]

## 2022-11-21 ENCOUNTER — OFFICE VISIT (OUTPATIENT)
Dept: FAMILY MEDICINE | Facility: CLINIC | Age: 82
End: 2022-11-21
Payer: COMMERCIAL

## 2022-11-21 VITALS
HEART RATE: 67 BPM | DIASTOLIC BLOOD PRESSURE: 70 MMHG | BODY MASS INDEX: 30.22 KG/M2 | WEIGHT: 177 LBS | RESPIRATION RATE: 14 BRPM | SYSTOLIC BLOOD PRESSURE: 144 MMHG | OXYGEN SATURATION: 97 % | HEIGHT: 64 IN

## 2022-11-21 DIAGNOSIS — E03.9 ACQUIRED HYPOTHYROIDISM: ICD-10-CM

## 2022-11-21 DIAGNOSIS — I10 ESSENTIAL HYPERTENSION, BENIGN: ICD-10-CM

## 2022-11-21 DIAGNOSIS — Z95.0 CARDIAC PACEMAKER: ICD-10-CM

## 2022-11-21 DIAGNOSIS — E08.65: ICD-10-CM

## 2022-11-21 DIAGNOSIS — K21.9 GASTROESOPHAGEAL REFLUX DISEASE WITHOUT ESOPHAGITIS: ICD-10-CM

## 2022-11-21 DIAGNOSIS — Z51.81 SBE (SUBACUTE BACTERIAL ENDOCARDITIS) PROPHYLAXIS MONITORING: ICD-10-CM

## 2022-11-21 DIAGNOSIS — M15.0 PRIMARY OSTEOARTHRITIS INVOLVING MULTIPLE JOINTS: ICD-10-CM

## 2022-11-21 DIAGNOSIS — Z95.3 S/P TAVR (TRANSCATHETER AORTIC VALVE REPLACEMENT), BIOPROSTHETIC: ICD-10-CM

## 2022-11-21 DIAGNOSIS — Z00.00 ENCOUNTER FOR MEDICARE ANNUAL WELLNESS EXAM: Primary | ICD-10-CM

## 2022-11-21 DIAGNOSIS — E78.5 HYPERLIPIDEMIA LDL GOAL <130: ICD-10-CM

## 2022-11-21 PROCEDURE — 99214 OFFICE O/P EST MOD 30 MIN: CPT | Mod: 25 | Performed by: INTERNAL MEDICINE

## 2022-11-21 PROCEDURE — G0439 PPPS, SUBSEQ VISIT: HCPCS | Performed by: INTERNAL MEDICINE

## 2022-11-21 RX ORDER — AMLODIPINE BESYLATE 5 MG/1
TABLET ORAL
Qty: 90 TABLET | Refills: 3 | Status: SHIPPED | OUTPATIENT
Start: 2022-11-21 | End: 2023-11-22

## 2022-11-21 RX ORDER — LEVOTHYROXINE SODIUM 50 UG/1
50 TABLET ORAL DAILY
Qty: 90 TABLET | Refills: 3 | Status: SHIPPED | OUTPATIENT
Start: 2022-11-21 | End: 2023-11-22

## 2022-11-21 RX ORDER — LISINOPRIL 30 MG/1
30 TABLET ORAL EVERY MORNING
Qty: 90 TABLET | Refills: 3 | Status: SHIPPED | OUTPATIENT
Start: 2022-11-21 | End: 2023-11-22

## 2022-11-21 RX ORDER — MELOXICAM 7.5 MG/1
7.5 TABLET ORAL DAILY
Qty: 90 TABLET | Refills: 3 | Status: SHIPPED | OUTPATIENT
Start: 2022-11-21 | End: 2023-04-06

## 2022-11-21 RX ORDER — PANTOPRAZOLE SODIUM 20 MG/1
20 TABLET, DELAYED RELEASE ORAL DAILY
Qty: 90 TABLET | Refills: 3 | Status: SHIPPED | OUTPATIENT
Start: 2022-11-21 | End: 2023-11-22

## 2022-11-21 RX ORDER — SIMVASTATIN 20 MG
TABLET ORAL
Qty: 90 TABLET | Refills: 3 | Status: SHIPPED | OUTPATIENT
Start: 2022-11-21 | End: 2023-11-22

## 2022-11-21 ASSESSMENT — ENCOUNTER SYMPTOMS
CONSTIPATION: 0
EYE PAIN: 0
HEARTBURN: 0
WEAKNESS: 0
SHORTNESS OF BREATH: 0
FEVER: 0
BREAST MASS: 0
PARESTHESIAS: 0
HEADACHES: 0
HEMATOCHEZIA: 0
NERVOUS/ANXIOUS: 0
DIARRHEA: 0
FREQUENCY: 1
PALPITATIONS: 0
DYSURIA: 0
ABDOMINAL PAIN: 0
MYALGIAS: 1
HEMATURIA: 0
CHILLS: 0
JOINT SWELLING: 0
ARTHRALGIAS: 1
SORE THROAT: 0
COUGH: 0
DIZZINESS: 0
NAUSEA: 0

## 2022-11-21 ASSESSMENT — ACTIVITIES OF DAILY LIVING (ADL): CURRENT_FUNCTION: NO ASSISTANCE NEEDED

## 2022-11-21 ASSESSMENT — PAIN SCALES - GENERAL: PAINLEVEL: NO PAIN (0)

## 2022-11-21 NOTE — PROGRESS NOTES
"Chief Complaint   Patient presents with     Physical       SUBJECTIVE:   Lexii is a 81 year old who presents for Preventive Visit.    Patient has been advised of split billing requirements and indicates understanding: Yes     Are you in the first 12 months of your Medicare coverage?  No    Healthy Habits:     In general, how would you rate your overall health?  Good    Frequency of exercise:  2-3 days/week    Duration of exercise:  Less than 15 minutes    Do you usually eat at least 4 servings of fruit and vegetables a day, include whole grains    & fiber and avoid regularly eating high fat or \"junk\" foods?  No    Taking medications regularly:  Yes    Medication side effects:  Not applicable    Ability to successfully perform activities of daily living:  No assistance needed    Home Safety:  No safety concerns identified    Hearing Impairment:  Difficulty understanding soft or whispered speech    In the past 6 months, have you been bothered by leaking of urine? Yes    In general, how would you rate your overall mental or emotional health?  Good      PHQ-2 Total Score: 0    Additional concerns today:  Yes    Have you ever done Advance Care Planning? (For example, a Health Directive, POLST, or a discussion with a medical provider or your loved ones about your wishes): Yes, advance care planning is on file.      Fall risk  Fallen 2 or more times in the past year?: No  Any fall with injury in the past year?: No    Cognitive Screening   1) Repeat 3 items   2) Clock draw: NORMAL  3) 3 item recall: Recalls 3 objects  Results: 3 items recalled: COGNITIVE IMPAIRMENT LESS LIKELY    Mini-CogTM Copyright LUCIE Velasquez. Licensed by the author for use in [facility]; reprinted with permission (minerva@.[facility]). All rights reserved.      Do you have sleep apnea, excessive snoring or daytime drowsiness?: no    Reviewed and updated as needed this visit by clinical staff   Tobacco  Allergies  Meds  Problems  Med Hx  Surg " Hx  Fam Hx          Reviewed and updated as needed this visit by Provider   Tobacco  Allergies  Meds  Problems  Med Hx  Surg Hx  Fam Hx         Social History     Tobacco Use     Smoking status: Never     Smokeless tobacco: Never     Tobacco comments:     have never smoked anything   Substance Use Topics     Alcohol use: No     If you drink alcohol do you typically have >3 drinks per day or >7 drinks per week? No      Current providers sharing in care for this patient include:   Patient Care Team:  Jesenia Madrigal MD as PCP - General (Internal Medicine)  Jesenia Madrigal MD as Assigned PCP  Jesenia Madrigal MD as Referring Physician (Internal Medicine)  Isaura Kulkarni CNP as Assigned Heart and Vascular Provider    The following health maintenance items are reviewed in Epic and correct as of today:  Health Maintenance   Topic Date Due     HF ACTION PLAN  Never done     MEDICARE ANNUAL WELLNESS VISIT  10/21/2022     DIABETIC FOOT EXAM  10/21/2022     A1C  10/28/2022     MICROALBUMIN  02/01/2023     EYE EXAM  02/01/2023     BMP  05/09/2023     DTAP/TDAP/TD IMMUNIZATION (3 - Td or Tdap) 06/11/2023     LIPID  07/28/2023     ANNUAL REVIEW OF HM ORDERS  07/31/2023     MAMMO SCREENING  10/10/2023     ALT  10/27/2023     CBC  11/08/2023     HEMOGLOBIN  11/08/2023     FALL RISK ASSESSMENT  11/21/2023     ADVANCE CARE PLANNING  11/21/2027     DEXA  12/07/2027     COLORECTAL CANCER SCREENING  03/29/2028     TSH W/FREE T4 REFLEX  Completed     PHQ-2 (once per calendar year)  Completed     INFLUENZA VACCINE  Completed     Pneumococcal Vaccine: 65+ Years  Completed     URINALYSIS  Completed     ZOSTER IMMUNIZATION  Completed     COVID-19 Vaccine  Completed     IPV IMMUNIZATION  Aged Out     MENINGITIS IMMUNIZATION  Aged Out     Labs reviewed in EPIC  BP Readings from Last 3 Encounters:   11/21/22 (!) 164/88   11/17/22 136/84   11/09/22 (!) 142/82    Wt Readings from Last 3 Encounters:   11/21/22 80.3  kg (177 lb)   11/17/22 83.9 kg (185 lb)   11/09/22 83.9 kg (184 lb 14.4 oz)                  Patient Active Problem List   Diagnosis     Generalized osteoarthrosis, unspecified site     Esophageal reflux     Obesity     Other symptoms involving urinary system     Acquired hypothyroidism     Hypersomnia with sleep apnea     Essential hypertension, benign     Impaired fasting glucose     Hyperlipidemia LDL goal <130     Advance Care Planning     Chest pain syndrome     Partial small bowel obstruction (H)     Anemia, unspecified     Iron and its compounds causing adverse effect in therapeutic use(E934.0)     S/P total hip arthroplasty     Overactive bladder     Elevated hemoglobin A1c     Pelvic floor dysfunction     Diabetes mellitus, type 2 (H)     SBO (small bowel obstruction) (H)     Chronic kidney disease, stage 3a (H)     Small bowel obstruction (H)     Aortic valve stenosis     Status post coronary angiogram     Aortic stenosis, severe     Past Surgical History:   Procedure Laterality Date     ABDOMEN SURGERY       ARTHROPLASTY HIP Right 11/09/2016    Procedure: ARTHROPLASTY HIP;  Surgeon: Angel Lund MD;  Location: [facility] OR     AS REPAIR INCISIONAL HERNIA,REDUCIBLE  1998    inc hernia ( [facility]ile[facility] surgery)     BIOPSY       BLADDER SURGERY      hyperdistention surgery and sling     BREAST SURGERY       CARDIAC SURGERY  2022     CHOLECYSTECTOMY  1987     COLONOSCOPY  12/03/2011    Procedure:COLONOSCOPY; COLONOSCOPY; Surgeon:SEMAJ GRAHAM; Location: GI     COLONOSCOPY N/A 03/29/2018    Procedure: COLONOSCOPY;  COLONOSCOPY Rm 544;  Surgeon: Marco Gusman MD;  Location:  GI     CV CORONARY ANGIOGRAM N/A 09/30/2022    Procedure: Coronary Angiogram;  Surgeon: Garcia Barber MD;  Location:  HEART CARDIAC CATH LAB     CV TRANSCATHETER AORTIC VALVE REPLACEMENT-FEMORAL APPROACH N/A 11/08/2022    Procedure: Transcatheter Aortic Valve Replacement-Femoral Approach;  Surgeon: Yulia Castano MD;   Location:  HEART CARDIAC CATH LAB     CYSTOSCOPY N/A 09/06/2017    Procedure: CYSTOSCOPY;  CYSTOSCOPY AND HYDRODISTENTION ;  Surgeon: Eyal Bai MD;  Location:  OR     ENT SURGERY      Tonsillectomy     EP PACEMAKER DEVICE & LEAD IMPLANT- RIGHT ATRIAL & RIGHT VENTRICULAR N/A 11/08/2022    Procedure: Pacemaker Device & Lead Implant - Right Atrial & Right Ventricular;  Surgeon: William Boyd MD;  Location:  HEART CARDIAC CATH LAB     ESOPHAGOSCOPY, GASTROSCOPY, DUODENOSCOPY (EGD), COMBINED N/A 09/26/2016    Procedure: COMBINED ESOPHAGOSCOPY, GASTROSCOPY, DUODENOSCOPY (EGD), BIOPSY SINGLE OR MULTIPLE;  Surgeon: Marco Monaco MD;  Location:  GI     EYE SURGERY Left 05/2019     GENITOURINARY SURGERY       GYN SURGERY      Hysterectomy     HERNIA REPAIR, UMBILICAL  07/08/2010    Dr. Kirt Franco times 3     IMPLANT STIMULATOR SACRAL NERVE STAGE ONE N/A 09/17/2020    Procedure: sacral neurostimulation stage one implant of neurostimulator lead;  Surgeon: Shahnaz Carbone MD;  Location:  OR     IMPLANT STIMULATOR SACRAL NERVE STAGE TWO Right 09/24/2020    Procedure: Removal of interstem lead, NOT implanting neurostimulator;  Surgeon: Shahnaz Carbone MD;  Location:  OR     ORTHOPEDIC SURGERY  2009    [facility] - Dr. Lund- bilateral knee replacement     ZZ NONSPECIFIC PROCEDURE  1946    T&A     ZZC NONSPECIFIC PROCEDURE  1984    Vaginal Hysterectomy (has her ovaries)     ZZC NONSPECIFIC PROCEDURE  1973    PPTL     ZZC NONSPECIFIC PROCEDURE  1994    L shoulder to remove bone spurs     ZZC NONSPECIFIC PROCEDURE  2004    cysto and durasphere Dr Demarco     ZZ NONSPECIFIC PROCEDURE  2005    cysto and durasphere     ZZC NONSPECIFIC PROCEDURE  2007    cysto and durasphere     ZZC NONSPECIFIC PROCEDURE  2009    retropubic TVT sling       Social History     Tobacco Use     Smoking status: Never     Smokeless tobacco: Never     Tobacco comments:     have never smoked anything    Substance Use Topics     Alcohol use: No     Family History   Problem Relation Age of Onset     Heart Disease Mother         heart surgery , new valve     Gallbladder Disease Mother      Coronary Artery Disease Mother      Hyperlipidemia Mother      Asthma Mother      Hypertension Mother      Anesthesia Reaction Mother      Cancer Father         throat ca,  76     Coronary Artery Disease Father      Other Cancer Father      Diabetes Brother         Half Brother     Aortic stenosis Brother         Had TAVR     Cancer Brother         half brother  lung      Heart Disease Maternal Grandmother      Coronary Artery Disease Maternal Grandmother      Heart Disease Maternal Grandfather      Coronary Artery Disease Maternal Grandfather          Current Outpatient Medications   Medication Sig Dispense Refill     acetaminophen (TYLENOL) 500 MG tablet Take 1,000 mg by mouth 2 times daily (2 x 500 mg = 1000 mg)       amLODIPine (NORVASC) 5 MG tablet TAKE ONE TABLET BY MOUTH EVERY NIGHT AT BEDTIME (Patient taking differently: Take by mouth At Bedtime) 90 tablet 3     amoxicillin (AMOXIL) 500 MG capsule Take 2,000 mg by mouth once as needed (1 hour prior to dental procedures 4 x 500 mg = 2000 mg)       aspirin (ASA) 81 MG EC tablet Take 1 tablet (81 mg) by mouth daily 90 tablet 3     Biotin 5000 MCG PO TABS Take 1 tablet by mouth daily       calcium carbonate (OS-CINDY) 500 MG tablet Take 1 tablet by mouth every other day       CENTRUM SILVER OR TABS Take 1 tablet by mouth daily 30 0     Cranberry 450 MG CAPS Take 1 capsule by mouth daily (with dinner)       docusate sodium (COLACE) 100 MG capsule Take 100 mg by mouth At Bedtime       ferrous gluconate (FERGON) 324 (38 FE) MG tablet Take 1 tablet (324 mg) by mouth 2 times daily 100 tablet 0     latanoprost (XALATAN) 0.005 % ophthalmic solution Place 1 drop into both eyes At Bedtime        levothyroxine (SYNTHROID/LEVOTHROID) 50 MCG tablet Take 1 tablet (50 mcg) by mouth daily  90 tablet 3     lisinopril (ZESTRIL) 30 MG tablet TAKE ONE TABLET BY MOUTH EVERY DAY in the morning  FOR HYPERTENSION (Patient taking differently: Take 30 mg by mouth every morning) 90 tablet 3     magnesium 250 MG tablet Take 1 tablet by mouth daily       meloxicam (MOBIC) 7.5 MG tablet Take 1 tablet (7.5 mg) by mouth daily 90 tablet 3     nitroGLYcerin (NITROSTAT) 0.4 MG sublingual tablet For chest pain place 1 tablet under the tongue every 5 minutes for 3 doses. If symptoms persist 5 minutes after 1st dose call 911. (Patient taking differently: 0.4 mg every 5 minutes as needed For chest pain place 1 tablet under the tongue every 5 minutes for 3 doses. If symptoms persist 5 minutes after 1st dose call 911.) 25 tablet 11     OMEGA-3 FATTY ACIDS 1200 MG OR CAPS Take 1 capsule by mouth daily  0     pantoprazole (PROTONIX) 20 MG EC tablet Take 1 tablet (20 mg) by mouth daily 90 tablet 3     potassium 99 MG TABS Take 1 tablet by mouth daily (with dinner)        Probiotic Product (ACIDOPHILUS PROBIOTIC BLEND) CAPS Take 1 capsule by mouth daily (with breakfast)  30 capsule 0     simvastatin (ZOCOR) 20 MG tablet TAKE ONE TABLET BY MOUTH EVERY NIGHT AT BEDTIME (Patient taking differently: Take 20 mg by mouth At Bedtime) 90 tablet 3     VITAMIN D, CHOLECALCIFEROL, PO Take 2,000 Units by mouth daily       Allergies   Allergen Reactions     Augmentin Diarrhea     Codeine Nausea and Vomiting     Hydrocodone      Keeps patient awake     Naproxen Itching and Rash     Seasonal Allergies      Hay fever in fall, ragweed and russian thistles     Sulfa Drugs Nausea     Recent Labs   Lab Test 11/09/22  0627 11/08/22  0904 10/27/22  1553 09/30/22  0754 07/28/22  0759 07/04/22  0723 07/03/22  1531 02/01/22  0801 08/16/21  1114 02/24/21  0856 08/26/20  0933   A1C  --   --   --   --  5.6  --   --  5.6  --  6.1* 5.9*   LDL  --   --   --   --  62  --   --  73  --  85 83   HDL  --   --   --   --  50  --   --  50  --  50 43*   TRIG  --   --    "--   --  101  --   --  128  --  110 133   ALT  --   --  20  --  15  --  17 18   < > 18 18   CR 0.74  --  0.70   < > 0.92   < > 0.94 1.00   < > 0.90 1.06*   GFRESTIMATED 81  --  86   < > 62   < > 61 56*   < > 60* 50*   GFRESTBLACK  --   --   --   --   --   --   --   --   --  70 58*   POTASSIUM 4.2 3.8 4.0   < > 3.8   < > 3.7 4.1   < > 3.9 4.5   TSH  --   --   --   --   --   --   --  2.56  --  2.21  --     < > = values in this interval not displayed.      Pertinent mammograms are reviewed under the imaging tab.    Review of Systems   Constitutional: Negative for chills and fever.   HENT: Negative for congestion, ear pain, hearing loss and sore throat.    Eyes: Negative for pain and visual disturbance.   Respiratory: Negative for cough and shortness of breath.    Cardiovascular: Negative for chest pain, palpitations and peripheral edema.   Gastrointestinal: Negative for abdominal pain, constipation, diarrhea, heartburn, hematochezia and nausea.   Breasts:  Negative for tenderness, breast mass and discharge.   Genitourinary: Positive for frequency and urgency. Negative for dysuria, genital sores, hematuria, pelvic pain, vaginal bleeding and vaginal discharge.   Musculoskeletal: Positive for arthralgias and myalgias. Negative for joint swelling.   Skin: Positive for rash.   Neurological: Negative for dizziness, weakness, headaches and paresthesias.   Psychiatric/Behavioral: Negative for mood changes. The patient is not nervous/anxious.      Follows with Urology;   Recently had TAVR;  found to have complete heart block and now has pacemaker.   Diabetes vs prediabetes.   Lab Results   Component Value Date    A1C 5.6 07/28/2022    A1C 5.6 02/01/2022    A1C 6.1 02/24/2021    A1C 5.9 08/26/2020            OBJECTIVE:   BP (!) 164/88 (BP Location: Right arm, Patient Position: Sitting, Cuff Size: Adult Large)   Pulse 67   Resp 14   Ht 1.626 m (5' 4\")   Wt 80.3 kg (177 lb)   LMP  (LMP Unknown)   SpO2 97%   BMI 30.38 kg/m   " "Estimated body mass index is 30.38 kg/m  as calculated from the following:    Height as of this encounter: 1.626 m (5' 4\").    Weight as of this encounter: 80.3 kg (177 lb).  Physical Exam  GENERAL: healthy, alert and no distress  EYES: Eyes grossly normal to inspection  NECK: no adenopathy, no asymmetry, masses, or scars and thyroid normal to palpation  RESP: lungs clear to auscultation - no rales, rhonchi or wheezes  CV: regular rates and rhythm, normal S1 S2, grade 2/6 systolic murmur heard best over the RUSB, peripheral pulses strong and no peripheral edema  ABDOMEN: soft, nontender, no hepatosplenomegaly, no masses and bowel sounds normal  MS: no gross musculoskeletal defects noted, no edema  NEURO: Normal strength and tone, mentation intact and speech normal  PSYCH: mentation appears normal, affect normal/bright    Diagnostic Test Results:  Labs reviewed in Epic              ASSESSMENT / PLAN:   (Z00.00) Encounter for Medicare annual wellness exam  (primary encounter diagnosis)  Comment: HEALTH CARE MAINTENANCE reviewed  Plan: immunizations reviewed    (E08.65) Diabetes mellitus due to underlying condition, controlled, with hyperglycemia, without long-term current use of insulin (H)  Comment: no DM meds; A1C well controlled.   Plan: healthy eating. Keeping active.    (I10) Essential hypertension, benign  Comment: recheck BLOOD PRESSURE improved; no change in medications. Medications effective and well tolerated.   Plan: amLODIPine (NORVASC) 5 MG tablet, lisinopril         (ZESTRIL) 30 MG tablet          (E78.5) Hyperlipidemia LDL goal <130  Comment: cardiology stopped the Fenofibrate; pt unclear why; continue statin therapy  Plan: simvastatin (ZOCOR) 20 MG tablet        Defer decision to stop Fenofibrate to cardiology.    (E03.9) Acquired hypothyroidism  Comment: clinically euthyroid; no change in meds,   Plan: levothyroxine (SYNTHROID/LEVOTHROID) 50 MCG tablet          (Z51.81) SBE (subacute bacterial " endocarditis) prophylaxis monitoring  Comment: cardiology informed pt  lifelong SBE prophylaxis- pt takse Amloxicillin 2000 mg prior to dental cleaning;    Plan:     (E03.9) Acquired hypothyroidism  Comment: low dose thyroid replacement; labs reviewed. no change in therapy  Plan: levothyroxine (SYNTHROID/LEVOTHROID) 50 MCG tablet          (M15.9) Primary osteoarthritis involving multiple joints  Comment: back primarily; warm water exercise pool ( [employer])- on hold due to pandemic.  Plan: meloxicam (MOBIC) 7.5 MG tablet          (K21.9) Gastroesophageal reflux disease without esophagitis  Comment: PPI use reviewed. Triggers reviewed.  Plan: pantoprazole (PROTONIX) 20 MG EC tablet        Pt reminded of benefits of maximizing calcium and vit D to help with bone health (due to being on PPI)       Patient has been advised of split billing requirements and indicates understanding: Yes  MED REC REQUIRED  Post Medication Reconciliation Status: discharge medications reconciled and changed, per note/orders    COUNSELING:  Reviewed preventive health counseling, as reflected in patient instructions       Regular exercise       Healthy diet/nutrition        She reports that she has never smoked. She has never used smokeless tobacco.      Appropriate preventive services were discussed with this patient, including applicable screening as appropriate for cardiovascular disease, diabetes, osteopenia/osteoporosis, and glaucoma.  As appropriate for age/gender, discussed screening for colorectal cancer, prostate cancer, breast cancer, and cervical cancer. Checklist reviewing preventive services available has been given to the patient.    Reviewed patients plan of care and provided an AVS. The Complex Care Plan (for patients with higher acuity and needing more deliberate coordination of services) for Lexii meets the Care Plan requirement. This Care Plan has been established and reviewed with the Patient.      Jesenia Wells  MD Rohan  Internal Medicine   [facility]  30 minutes in addition to HEALTH CARE MAINTENANCE are spent with patient, over 50% of that time spent providing counselling, discussing and reviewing medical conditions/concerns, meds and potential side effects.     Identified Health Risks:

## 2022-11-21 NOTE — PATIENT INSTRUCTIONS
Patient Education   Personalized Prevention Plan  You are due for the preventive services outlined below.  Your care team is available to assist you in scheduling these services.  If you have already completed any of these items, please share that information with your care team to update in your medical record.  Health Maintenance Due   Topic Date Due     Heart Failure Action Plan  Never done     Annual Wellness Visit  10/21/2022     Diabetic Foot Exam  10/21/2022     A1C Lab  10/28/2022

## 2022-11-23 ENCOUNTER — TRANSFERRED RECORDS (OUTPATIENT)
Dept: HEALTH INFORMATION MANAGEMENT | Facility: CLINIC | Age: 82
End: 2022-11-23

## 2022-11-23 ENCOUNTER — HOSPITAL ENCOUNTER (OUTPATIENT)
Dept: CARDIAC REHAB | Facility: CLINIC | Age: 82
Discharge: HOME | End: 2022-11-23
Attending: INTERNAL MEDICINE
Payer: COMMERCIAL

## 2022-11-23 DIAGNOSIS — Z95.2 S/P TAVR (TRANSCATHETER AORTIC VALVE REPLACEMENT): ICD-10-CM

## 2022-11-23 PROCEDURE — 93797 PHYS/QHP OP CAR RHAB WO ECG: CPT | Mod: XU

## 2022-11-23 PROCEDURE — 93798 PHYS/QHP OP CAR RHAB W/ECG: CPT

## 2022-11-27 ENCOUNTER — HEALTH MAINTENANCE LETTER (OUTPATIENT)
Age: 82
End: 2022-11-27

## 2022-12-01 ENCOUNTER — LAB (OUTPATIENT)
Dept: LAB | Facility: CLINIC | Age: 82
End: 2022-12-01
Payer: COMMERCIAL

## 2022-12-01 ENCOUNTER — HOSPITAL ENCOUNTER (OUTPATIENT)
Dept: CARDIOLOGY | Facility: CLINIC | Age: 82
Discharge: HOME | End: 2022-12-01
Attending: INTERNAL MEDICINE | Admitting: INTERNAL MEDICINE
Payer: COMMERCIAL

## 2022-12-01 DIAGNOSIS — Z95.3 S/P TAVR (TRANSCATHETER AORTIC VALVE REPLACEMENT), BIOPROSTHETIC: ICD-10-CM

## 2022-12-01 LAB
ANION GAP SERPL CALCULATED.3IONS-SCNC: 10 MMOL/L (ref 7–15)
BUN SERPL-MCNC: 20.1 MG/DL (ref 8–23)
CALCIUM SERPL-MCNC: 10 MG/DL (ref 8.8–10.2)
CHLORIDE SERPL-SCNC: 104 MMOL/L (ref 98–107)
CREAT SERPL-MCNC: 0.81 MG/DL (ref 0.51–0.95)
DEPRECATED HCO3 PLAS-SCNC: 28 MMOL/L (ref 22–29)
ERYTHROCYTE [DISTWIDTH] IN BLOOD BY AUTOMATED COUNT: 12.5 % (ref 10–15)
GFR SERPL CREATININE-BSD FRML MDRD: 73 ML/MIN/1.73M2
GLUCOSE SERPL-MCNC: 112 MG/DL (ref 70–99)
HCT VFR BLD AUTO: 43 % (ref 35–47)
HGB BLD-MCNC: 13.8 G/DL (ref 11.7–15.7)
LVEF ECHO: NORMAL
MCH RBC QN AUTO: 32.2 PG (ref 26.5–33)
MCHC RBC AUTO-ENTMCNC: 32.1 G/DL (ref 31.5–36.5)
MCV RBC AUTO: 100 FL (ref 78–100)
PLATELET # BLD AUTO: 161 10E3/UL (ref 150–450)
POTASSIUM SERPL-SCNC: 4.3 MMOL/L (ref 3.4–5.3)
RBC # BLD AUTO: 4.29 10E6/UL (ref 3.8–5.2)
SODIUM SERPL-SCNC: 142 MMOL/L (ref 136–145)
WBC # BLD AUTO: 7.2 10E3/UL (ref 4–11)

## 2022-12-01 PROCEDURE — 93321 DOPPLER ECHO F-UP/LMTD STD: CPT | Mod: 26 | Performed by: INTERNAL MEDICINE

## 2022-12-01 PROCEDURE — 36415 COLL VENOUS BLD VENIPUNCTURE: CPT | Performed by: INTERNAL MEDICINE

## 2022-12-01 PROCEDURE — 93308 TTE F-UP OR LMTD: CPT

## 2022-12-01 PROCEDURE — 93308 TTE F-UP OR LMTD: CPT | Mod: 26 | Performed by: INTERNAL MEDICINE

## 2022-12-01 PROCEDURE — 93325 DOPPLER ECHO COLOR FLOW MAPG: CPT

## 2022-12-01 PROCEDURE — 85027 COMPLETE CBC AUTOMATED: CPT | Performed by: INTERNAL MEDICINE

## 2022-12-01 PROCEDURE — 80048 BASIC METABOLIC PNL TOTAL CA: CPT | Performed by: INTERNAL MEDICINE

## 2022-12-01 PROCEDURE — 93325 DOPPLER ECHO COLOR FLOW MAPG: CPT | Mod: 26 | Performed by: INTERNAL MEDICINE

## 2022-12-02 ENCOUNTER — HOSPITAL ENCOUNTER (OUTPATIENT)
Dept: CARDIAC REHAB | Facility: CLINIC | Age: 82
Discharge: HOME | End: 2022-12-02
Attending: INTERNAL MEDICINE
Payer: COMMERCIAL

## 2022-12-02 PROCEDURE — 93798 PHYS/QHP OP CAR RHAB W/ECG: CPT

## 2022-12-05 ENCOUNTER — HOSPITAL ENCOUNTER (OUTPATIENT)
Dept: CARDIAC REHAB | Facility: CLINIC | Age: 82
Discharge: HOME | End: 2022-12-05
Attending: INTERNAL MEDICINE
Payer: COMMERCIAL

## 2022-12-05 PROCEDURE — 93798 PHYS/QHP OP CAR RHAB W/ECG: CPT | Performed by: REHABILITATION PRACTITIONER

## 2022-12-06 ENCOUNTER — CARE COORDINATION (OUTPATIENT)
Dept: CARDIOLOGY | Facility: CLINIC | Age: 82
End: 2022-12-06

## 2022-12-06 NOTE — PROGRESS NOTES
S-(situation): Requesting tips for low-sodium diet    B-(background): States she has been told that she should be on low-salt diet for her heart health. Already on a low-fiber diet for history of bowel obstruction. Scheduled appt with JACQUELINE at [facility] in January    A-(assessment): Pt states she is doing quite well post-TAVR and post-PPM. Able to do 3 loads of laundry yesterday, much more than she was able to do pre-TAVR. Now in cardiac rehab. Denies symptoms of fluid retention. She eats Spam regularly because the consistency is tolerable with GI issues. She buys the low-sodium variety. Only eats up to 1 serving per day.    R-(recommendations): Advised that at this point she doesn't need a low-sodium diet and would rather recommend a mindfulness approach. Educated that 1 serving of low-sodium Spam per day is just fine, though it would be about 25% of the recommended daily sodium target. Educated on using fresh herbs vs salt/salt substitutes for seasoning. Also educated on signs of fluid retention and encouraged to call writer if they occur.  Verbalized understanding. Follow-up with Isaura WATERMAN on Thursday as planned.     Jacquelin Avitia, ISABELN, RN, PHN, CHFN, HNB-BC   12/6/2022 at 9:13 AM

## 2022-12-07 ENCOUNTER — HOSPITAL ENCOUNTER (OUTPATIENT)
Dept: CARDIAC REHAB | Facility: CLINIC | Age: 82
Discharge: HOME | End: 2022-12-07
Attending: INTERNAL MEDICINE
Payer: COMMERCIAL

## 2022-12-07 PROCEDURE — 93798 PHYS/QHP OP CAR RHAB W/ECG: CPT

## 2022-12-08 ENCOUNTER — OFFICE VISIT (OUTPATIENT)
Dept: CARDIOLOGY | Facility: CLINIC | Age: 82
End: 2022-12-08
Attending: INTERNAL MEDICINE
Payer: COMMERCIAL

## 2022-12-08 VITALS
SYSTOLIC BLOOD PRESSURE: 139 MMHG | HEART RATE: 60 BPM | DIASTOLIC BLOOD PRESSURE: 73 MMHG | BODY MASS INDEX: 31.62 KG/M2 | WEIGHT: 185.2 LBS | HEIGHT: 64 IN | OXYGEN SATURATION: 98 %

## 2022-12-08 DIAGNOSIS — Z95.3 S/P TAVR (TRANSCATHETER AORTIC VALVE REPLACEMENT), BIOPROSTHETIC: Primary | ICD-10-CM

## 2022-12-08 PROCEDURE — 99214 OFFICE O/P EST MOD 30 MIN: CPT | Performed by: NURSE PRACTITIONER

## 2022-12-08 PROCEDURE — 93010 ELECTROCARDIOGRAM REPORT: CPT | Performed by: NURSE PRACTITIONER

## 2022-12-08 NOTE — PROGRESS NOTES
Cardiology Clinic Progress Note  Lexii Stratton MRN# [id number]   YOB: 1940 Age: 81 year old     Primary cardiologist: Dr. 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     Patient continues to do well since her procedure.  Her rash has completely resolved. She otherwise denies any chest pain, shortness of breath, syncope or near syncope, or palpitations.  She is thoroughly enjoying cardiac rehab and is tolerating this well without any symptoms.  Her device pocket has healed well.    Her blood pressure is well controlled today.  Her weight is stable.    Repeat echocardiogram shows well seated bio prosthetic aortic valve with a mean gradient of 4 mmHg, no PVL, and LVEF 60-65%.     Her most recent labs from 12/1/2022 show normal renal function and electrolytes, hemoglobin 13.8, normal platelet function.         Assessment and Plan:     ASSESSMENT:    1. Severe aortic stenosis s/p TAVR with 26 mm Medtronic Evolut FX valve. Gradients remain stable, on aspirin 81 mg daily for antiplatelet therapy. Tolerating cardiac rehab.   2. CHB s/p dual chamber PPM.  Most recent device check shows normal device function.  Incision site has healed well without any drainage, redness, or swelling.  3. Hypertension.  Well-controlled on current regimen.  4. Chronic diastolic heart failure, NYHA class I.  Appears euvolemic on exam, not on diuretic therapy.      PLAN:     1. Continue aspirin 81 mg daily lifelong.  2. We discussed the need for lifelong antibiotic prophylaxis prior to any dental procedure.  3. Continue outpatient cardiac rehab.  4. Follow-up with Dr. Castano in approximately 6 months with repeat echo, labs, and  "EKG.           Isaura Kulkarni DNP, APRN, CNP  Page: [phone] (8a-5p M-F)    Orders this Visit:  No orders of the defined types were placed in this encounter.    No orders of the defined types were placed in this encounter.    There are no discontinued medications.    Today's clinic visit entailed:  Review of the result(s) of each unique test - echo, labs, EKG  Ordering of each unique test  Prescription drug management  30 minutes spent on the date of the encounter doing chart review, review of test results, patient visit and documentation   Provider  Link to [facility] Help Grid     The level of medical decision making during this visit was of moderate complexity.           Review of Systems:     Review of Systems:  Skin:  not assessed     Eyes:  not assessed    ENT:  not assessed    Respiratory:  Negative    Cardiovascular:  Negative    Gastroenterology: not assessed    Genitourinary:  not assessed    Musculoskeletal:  not assessed    Neurologic:  not assessed    Psychiatric:  not assessed    Heme/Lymph/Imm:  not assessed    Endocrine:  not assessed              Physical Exam:   Vitals: /73   Pulse 60   Ht 1.626 m (5' 4\")   Wt 84 kg (185 lb 3.2 oz)   LMP  (LMP Unknown)   SpO2 98%   BMI 31.79 kg/m    Constitutional:  cooperative        Skin:  warm and dry to the touch        Head:  normocephalic        Eyes:  pupils equal and round        ENT:  no pallor or cyanosis        Neck:  JVP normal        Chest:  normal breath sounds, clear to auscultation, normal A-P diameter, normal symmetry, normal respiratory excursion, no use of accessory muscles        Cardiac: regular rhythm;normal S1 and S2       systolic ejection murmur;grade 1          Abdomen:  abdomen soft        Vascular: pulses full and equal                                      Extremities and Back:  no edema        Neurological:  affect appropriate;no gross motor deficits             Medications:     Current Outpatient Medications   Medication Sig " Dispense Refill     acetaminophen (TYLENOL) 500 MG tablet Take 1,000 mg by mouth 2 times daily (2 x 500 mg = 1000 mg)       amLODIPine (NORVASC) 5 MG tablet TAKE ONE TABLET BY MOUTH EVERY NIGHT AT BEDTIME 90 tablet 3     amoxicillin (AMOXIL) 500 MG capsule Take 2,000 mg by mouth once as needed (1 hour prior to dental procedures 4 x 500 mg = 2000 mg)       aspirin (ASA) 81 MG EC tablet Take 1 tablet (81 mg) by mouth daily 90 tablet 3     Biotin 5000 MCG PO TABS Take 1 tablet by mouth daily       calcium carbonate (OS-CINDY) 500 MG tablet Take 1 tablet by mouth every other day       CENTRUM SILVER OR TABS Take 1 tablet by mouth daily 30 0     Cranberry 450 MG CAPS Take 1 capsule by mouth daily (with dinner)       docusate sodium (COLACE) 100 MG capsule Take 100 mg by mouth At Bedtime       ferrous gluconate (FERGON) 324 (38 FE) MG tablet Take 1 tablet (324 mg) by mouth 2 times daily 100 tablet 0     latanoprost (XALATAN) 0.005 % ophthalmic solution Place 1 drop into both eyes At Bedtime        levothyroxine (SYNTHROID/LEVOTHROID) 50 MCG tablet Take 1 tablet (50 mcg) by mouth daily 90 tablet 3     lisinopril (ZESTRIL) 30 MG tablet Take 1 tablet (30 mg) by mouth every morning 90 tablet 3     magnesium 250 MG tablet Take 1 tablet by mouth daily       meloxicam (MOBIC) 7.5 MG tablet Take 1 tablet (7.5 mg) by mouth daily 90 tablet 3     nitroGLYcerin (NITROSTAT) 0.4 MG sublingual tablet For chest pain place 1 tablet under the tongue every 5 minutes for 3 doses. If symptoms persist 5 minutes after 1st dose call 911. (Patient taking differently: 0.4 mg every 5 minutes as needed For chest pain place 1 tablet under the tongue every 5 minutes for 3 doses. If symptoms persist 5 minutes after 1st dose call 911.) 25 tablet 11     OMEGA-3 FATTY ACIDS 1200 MG OR CAPS Take 1 capsule by mouth daily  0     pantoprazole (PROTONIX) 20 MG EC tablet Take 1 tablet (20 mg) by mouth daily 90 tablet 3     potassium 99 MG TABS Take 1 tablet by  mouth daily (with dinner)        Probiotic Product (ACIDOPHILUS PROBIOTIC BLEND) CAPS Take 1 capsule by mouth daily (with breakfast)  30 capsule 0     simvastatin (ZOCOR) 20 MG tablet TAKE ONE TABLET BY MOUTH EVERY NIGHT AT BEDTIME 90 tablet 3     VITAMIN D, CHOLECALCIFEROL, PO Take 2,000 Units by mouth daily         Family History   Problem Relation Age of Onset     Heart Disease Mother         heart surgery , new valve     Gallbladder Disease Mother      Coronary Artery Disease Mother      Hyperlipidemia Mother      Asthma Mother      Hypertension Mother      Anesthesia Reaction Mother      Cancer Father         throat ca,  76     Coronary Artery Disease Father      Other Cancer Father      Diabetes Brother         Half Brother     Aortic stenosis Brother         Had TAVR     Cancer Brother         half brother  lung      Heart Disease Maternal Grandmother      Coronary Artery Disease Maternal Grandmother      Heart Disease Maternal Grandfather      Coronary Artery Disease Maternal Grandfather        Social History     Socioeconomic History     Marital status: Single     Spouse name: Not on file     Number of children: Not on file     Years of education: Not on file     Highest education level: Not on file   Occupational History     Not on file   Tobacco Use     Smoking status: Never     Smokeless tobacco: Never     Tobacco comments:     have never smoked anything   Vaping Use     Vaping Use: Never used   Substance and Sexual Activity     Alcohol use: No     Drug use: No     Sexual activity: Not Currently     Partners: Male     Birth control/protection: None     Comment: have had a hysterectomy   Other Topics Concern     Parent/sibling w/ CABG, MI or angioplasty before 65F 55M? No   Social History Narrative     Not on file     Social Determinants of Health     Financial Resource Strain: Not on file   Food Insecurity: Not on file   Transportation Needs: Not on file   Physical Activity: Not on file   Stress: Not  on file   Social Connections: Not on file   Intimate Partner Violence: Not on file   Housing Stability: Not on file            Past Medical History:     Past Medical History:   Diagnosis Date     Aortic valve stenosis 8/18/2022     Arthritis      Diabetes (H)     Type 2-no meds... Dieet and exercise only as of 9/15/20     Esophageal reflux      Hernia, abdominal      History of blood transfusion 1984     Hypertension     No cardiologist     Incontinence of urine      Mumps     6 years old     Obese      Osteoarthritis      Other and unspecified hyperlipidemia      Other chronic pain     back     Peptic ulcer, unspecified site, unspecified as acute or chronic, without mention of hemorrhage, perforation, or obstruction      Small bowel obstruction (H)      Thyroid disease hypothyroidism     Urinary incontinence      Walking troubles               Past Surgical History:     Past Surgical History:   Procedure Laterality Date     ABDOMEN SURGERY       ARTHROPLASTY HIP Right 11/09/2016    Procedure: ARTHROPLASTY HIP;  Surgeon: Angel Lund MD;  Location: [facility] OR     AS REPAIR INCISIONAL HERNIA,REDUCIBLE  1998    inc hernia ( [facility] surgery)     BIOPSY       BLADDER SURGERY      hyperdistention surgery and sling     BREAST SURGERY       CARDIAC SURGERY  2022     CHOLECYSTECTOMY  1987     COLONOSCOPY  12/03/2011    Procedure:COLONOSCOPY; COLONOSCOPY; Surgeon:SEMAJ GRAHAM; Location: GI     COLONOSCOPY N/A 03/29/2018    Procedure: COLONOSCOPY;  COLONOSCOPY Rm 544;  Surgeon: Marco Gusman MD;  Location:  GI     CV CORONARY ANGIOGRAM N/A 09/30/2022    Procedure: Coronary Angiogram;  Surgeon: Garcia Barber MD;  Location: [facility] CARDIAC CATH LAB     CV TRANSCATHETER AORTIC VALVE REPLACEMENT-FEMORAL APPROACH N/A 11/08/2022    Procedure: Transcatheter Aortic Valve Replacement-Femoral Approach;  Surgeon: Yulia Castano MD;  Location: [facility] CARDIAC CATH LAB     CYSTOSCOPY N/A 09/06/2017     Procedure: CYSTOSCOPY;  CYSTOSCOPY AND HYDRODISTENTION ;  Surgeon: Eyal Bai MD;  Location:  OR     ENT SURGERY      Tonsillectomy     EP PACEMAKER DEVICE & LEAD IMPLANT- RIGHT ATRIAL & RIGHT VENTRICULAR N/A 11/08/2022    Procedure: Pacemaker Device & Lead Implant - Right Atrial & Right Ventricular;  Surgeon: William Boyd MD;  Location:  HEART CARDIAC CATH LAB     ESOPHAGOSCOPY, GASTROSCOPY, DUODENOSCOPY (EGD), COMBINED N/A 09/26/2016    Procedure: COMBINED ESOPHAGOSCOPY, GASTROSCOPY, DUODENOSCOPY (EGD), BIOPSY SINGLE OR MULTIPLE;  Surgeon: Marco Monaco MD;  Location:  GI     EYE SURGERY Left 05/2019     GENITOURINARY SURGERY       GYN SURGERY      Hysterectomy     HERNIA REPAIR, UMBILICAL  07/08/2010    Dr. Kirt Franco times 3     IMPLANT STIMULATOR SACRAL NERVE STAGE ONE N/A 09/17/2020    Procedure: sacral neurostimulation stage one implant of neurostimulator lead;  Surgeon: Shahnaz Carbone MD;  Location:  OR     IMPLANT STIMULATOR SACRAL NERVE STAGE TWO Right 09/24/2020    Procedure: Removal of interstem lead, NOT implanting neurostimulator;  Surgeon: Shahnaz Carbone MD;  Location:  OR     ORTHOPEDIC SURGERY  2009    [facility] - Dr. Lund- bilateral knee replacement     ZZC NONSPECIFIC PROCEDURE  1946    T&A     ZZC NONSPECIFIC PROCEDURE  1984    Vaginal Hysterectomy (has her ovaries)     ZZC NONSPECIFIC PROCEDURE  1973    PPTL     ZZC NONSPECIFIC PROCEDURE  1994    L shoulder to remove bone spurs     ZZC NONSPECIFIC PROCEDURE  2004    cysto and durasphere Dr Demarco     ZZC NONSPECIFIC PROCEDURE  2005    cysto and durasphere     ZZC NONSPECIFIC PROCEDURE  2007    cysto and durasphere     ZZC NONSPECIFIC PROCEDURE  2009    retropubic TVT sling              Allergies:   Augmentin, Codeine, Hydrocodone, Naproxen, Seasonal allergies, and Sulfa drugs       Data:   All laboratory data reviewed:    Recent Labs   Lab Test 10/27/22  1553 07/28/22  4624  02/01/22  0801 02/24/21  0856 08/26/20  0933 02/20/20  0848 02/25/19  1118 09/06/18  0952 06/29/18  0801   LDL  --  62 73 85   < > 86   < > 71  --    HDL  --  50 50 50   < > 42*   < > 38*  --    NHDL  --  82 99 107   < > 108   < > 104  --    CHOL  --  132 149 157   < > 150   < > 142  --    TRIG  --  101 128 110   < > 110   < > 166*  --    TSH  --   --  2.56 2.21  --  2.20   < >  --   --    NTBNP 342  --   --   --   --   --   --   --   --    IRON  --   --   --   --   --   --   --  60  --    FEB  --   --   --   --   --   --   --  375  --    IRONSAT  --   --   --   --   --   --   --  16  --    IRAIS  --   --   --   --   --   --   --   --  31    < > = values in this interval not displayed.       Lab Results   Component Value Date    WBC 7.2 12/01/2022    WBC 11.3 (H) 03/12/2021    RBC 4.29 12/01/2022    RBC 4.56 03/12/2021    HGB 13.8 12/01/2022    HGB 13.8 03/12/2021    HCT 43.0 12/01/2022    HCT 43.7 03/12/2021     12/01/2022    MCV 96 03/12/2021    MCH 32.2 12/01/2022    MCH 30.3 03/12/2021    MCHC 32.1 12/01/2022    MCHC 31.6 03/12/2021    RDW 12.5 12/01/2022    RDW 13.1 03/12/2021     12/01/2022     03/12/2021       Lab Results   Component Value Date     12/01/2022     02/24/2021    POTASSIUM 4.3 12/01/2022    POTASSIUM 4.2 11/09/2022    POTASSIUM 3.9 02/24/2021    CHLORIDE 104 12/01/2022    CHLORIDE 108 11/09/2022    CHLORIDE 107 02/24/2021    CO2 28 12/01/2022    CO2 22 11/09/2022    CO2 28 02/24/2021    ANIONGAP 10 12/01/2022    ANIONGAP 10 11/09/2022    ANIONGAP 5 02/24/2021     (H) 12/01/2022     (H) 11/09/2022    GLC 95 11/09/2022     (H) 02/24/2021    BUN 20.1 12/01/2022    BUN 16 11/09/2022    BUN 18 02/24/2021    CR 0.81 12/01/2022    CR 0.90 02/24/2021    GFRESTIMATED 73 12/01/2022    GFRESTIMATED 48 (L) 08/16/2021    GFRESTIMATED 60 (L) 02/24/2021    GFRESTBLACK 70 02/24/2021    CINDY 10.0 12/01/2022    CINDY 9.9 02/24/2021      Lab Results   Component  Value Date    AST 28 10/27/2022    AST 21 02/24/2021    ALT 20 10/27/2022    ALT 18 02/24/2021       Lab Results   Component Value Date    A1C 5.6 07/28/2022    A1C 6.1 (H) 02/24/2021       Lab Results   Component Value Date    INR 1.06 10/27/2022    INR 1.07 09/30/2022    INR 1.15 (H) 03/28/2018    INR 1.65 (H) 07/03/2009     KCCQ:   1. 2  2. 4  3. 5  4. 5  5. 6  6. 7  7. 5  8. 5  9. 4  10. 5  11. 5  12. 5

## 2022-12-08 NOTE — PATIENT INSTRUCTIONS
Today's Plan:     1) Continue cardiac rehab.     2) Follow-up with Dr. Castano in 6 months at [place].     Suzanne RN ([phone]), Jacquelin RN ([phone]), and Marylou CONTRERAS ([phone])    Scheduling phone number: [phone]    Reminder: Please bring in all current medications, over the counter supplements and vitamin bottles to your next appointment.-    It was a pleasure seeing you today!     Isaura Kulkarni, GUEVARA  12/8/2022    -

## 2022-12-08 NOTE — LETTER
12/8/2022    Jesenia Madrigal MD  [address]  [facility] [address]    RE: Lexii Stratton       Dear Colleague,     I had the pleasure of seeing Lexii Stratton in the [facility].  Cardiology Clinic Progress Note  Lexii Stratton MRN# [id number]   YOB: 1940 Age: 81 year old     Primary cardiologist: Dr. 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     Patient continues to do well since her procedure.  Her rash has completely resolved. She otherwise denies any chest pain, shortness of breath, syncope or near syncope, or palpitations.  She is thoroughly enjoying cardiac rehab and is tolerating this well without any symptoms.  Her device pocket has healed well.    Her blood pressure is well controlled today.  Her weight is stable.    Repeat echocardiogram shows well seated bio prosthetic aortic valve with a mean gradient of 4 mmHg, no PVL, and LVEF 60-65%.     Her most recent labs from 12/1/2022 show normal renal function and electrolytes, hemoglobin 13.8, normal platelet function.         Assessment and Plan:     ASSESSMENT:    1. Severe aortic stenosis s/p TAVR with 26 mm Medtronic Evolut FX valve. Gradients remain stable, on aspirin 81 mg daily for antiplatelet therapy. Tolerating cardiac rehab.   2. CHB s/p dual chamber PPM.  Most recent device check shows normal device function.  Incision site has healed well without any drainage, redness, or swelling.  3. Hypertension.  Well-controlled on current regimen.  4. Chronic diastolic heart failure, NYHA class I.  Appears euvolemic on exam, not on diuretic therapy.      PLAN:     1. Continue aspirin 81 mg daily lifelong.  2. We  "discussed the need for lifelong antibiotic prophylaxis prior to any dental procedure.  3. Continue outpatient cardiac rehab.  4. Follow-up with Dr. Castano in approximately 6 months with repeat echo, labs, and EKG.           Isaura Kulkarni, JANIE, APRN, CNP  Page: [phone] (8a-5p M-F)    Orders this Visit:  No orders of the defined types were placed in this encounter.    No orders of the defined types were placed in this encounter.    There are no discontinued medications.    Today's clinic visit entailed:  Review of the result(s) of each unique test - echo, labs, EKG  Ordering of each unique test  Prescription drug management  30 minutes spent on the date of the encounter doing chart review, review of test results, patient visit and documentation   Provider  Link to [facility] Help Grid     The level of medical decision making during this visit was of moderate complexity.           Review of Systems:     Review of Systems:  Skin:  not assessed     Eyes:  not assessed    ENT:  not assessed    Respiratory:  Negative    Cardiovascular:  Negative    Gastroenterology: not assessed    Genitourinary:  not assessed    Musculoskeletal:  not assessed    Neurologic:  not assessed    Psychiatric:  not assessed    Heme/Lymph/Imm:  not assessed    Endocrine:  not assessed              Physical Exam:   Vitals: /73   Pulse 60   Ht 1.626 m (5' 4\")   Wt 84 kg (185 lb 3.2 oz)   LMP  (LMP Unknown)   SpO2 98%   BMI 31.79 kg/m    Constitutional:  cooperative        Skin:  warm and dry to the touch        Head:  normocephalic        Eyes:  pupils equal and round        ENT:  no pallor or cyanosis        Neck:  JVP normal        Chest:  normal breath sounds, clear to auscultation, normal A-P diameter, normal symmetry, normal respiratory excursion, no use of accessory muscles        Cardiac: regular rhythm;normal S1 and S2       systolic ejection murmur;grade 1          Abdomen:  abdomen soft        Vascular: pulses full and " equal                                      Extremities and Back:  no edema        Neurological:  affect appropriate;no gross motor deficits             Medications:     Current Outpatient Medications   Medication Sig Dispense Refill     acetaminophen (TYLENOL) 500 MG tablet Take 1,000 mg by mouth 2 times daily (2 x 500 mg = 1000 mg)       amLODIPine (NORVASC) 5 MG tablet TAKE ONE TABLET BY MOUTH EVERY NIGHT AT BEDTIME 90 tablet 3     amoxicillin (AMOXIL) 500 MG capsule Take 2,000 mg by mouth once as needed (1 hour prior to dental procedures 4 x 500 mg = 2000 mg)       aspirin (ASA) 81 MG EC tablet Take 1 tablet (81 mg) by mouth daily 90 tablet 3     Biotin 5000 MCG PO TABS Take 1 tablet by mouth daily       calcium carbonate (OS-CINDY) 500 MG tablet Take 1 tablet by mouth every other day       CENTRUM SILVER OR TABS Take 1 tablet by mouth daily 30 0     Cranberry 450 MG CAPS Take 1 capsule by mouth daily (with dinner)       docusate sodium (COLACE) 100 MG capsule Take 100 mg by mouth At Bedtime       ferrous gluconate (FERGON) 324 (38 FE) MG tablet Take 1 tablet (324 mg) by mouth 2 times daily 100 tablet 0     latanoprost (XALATAN) 0.005 % ophthalmic solution Place 1 drop into both eyes At Bedtime        levothyroxine (SYNTHROID/LEVOTHROID) 50 MCG tablet Take 1 tablet (50 mcg) by mouth daily 90 tablet 3     lisinopril (ZESTRIL) 30 MG tablet Take 1 tablet (30 mg) by mouth every morning 90 tablet 3     magnesium 250 MG tablet Take 1 tablet by mouth daily       meloxicam (MOBIC) 7.5 MG tablet Take 1 tablet (7.5 mg) by mouth daily 90 tablet 3     nitroGLYcerin (NITROSTAT) 0.4 MG sublingual tablet For chest pain place 1 tablet under the tongue every 5 minutes for 3 doses. If symptoms persist 5 minutes after 1st dose call 911. (Patient taking differently: 0.4 mg every 5 minutes as needed For chest pain place 1 tablet under the tongue every 5 minutes for 3 doses. If symptoms persist 5 minutes after 1st dose call 911.) 25  tablet 11     OMEGA-3 FATTY ACIDS 1200 MG OR CAPS Take 1 capsule by mouth daily  0     pantoprazole (PROTONIX) 20 MG EC tablet Take 1 tablet (20 mg) by mouth daily 90 tablet 3     potassium 99 MG TABS Take 1 tablet by mouth daily (with dinner)        Probiotic Product (ACIDOPHILUS PROBIOTIC BLEND) CAPS Take 1 capsule by mouth daily (with breakfast)  30 capsule 0     simvastatin (ZOCOR) 20 MG tablet TAKE ONE TABLET BY MOUTH EVERY NIGHT AT BEDTIME 90 tablet 3     VITAMIN D, CHOLECALCIFEROL, PO Take 2,000 Units by mouth daily         Family History   Problem Relation Age of Onset     Heart Disease Mother         heart surgery , new valve     Gallbladder Disease Mother      Coronary Artery Disease Mother      Hyperlipidemia Mother      Asthma Mother      Hypertension Mother      Anesthesia Reaction Mother      Cancer Father         throat ca,  76     Coronary Artery Disease Father      Other Cancer Father      Diabetes Brother         Half Brother     Aortic stenosis Brother         Had TAVR     Cancer Brother         half brother  lung      Heart Disease Maternal Grandmother      Coronary Artery Disease Maternal Grandmother      Heart Disease Maternal Grandfather      Coronary Artery Disease Maternal Grandfather        Social History     Socioeconomic History     Marital status: Single     Spouse name: Not on file     Number of children: Not on file     Years of education: Not on file     Highest education level: Not on file   Occupational History     Not on file   Tobacco Use     Smoking status: Never     Smokeless tobacco: Never     Tobacco comments:     have never smoked anything   Vaping Use     Vaping Use: Never used   Substance and Sexual Activity     Alcohol use: No     Drug use: No     Sexual activity: Not Currently     Partners: Male     Birth control/protection: None     Comment: have had a hysterectomy   Other Topics Concern     Parent/sibling w/ CABG, MI or angioplasty before 65F 55M? No   Social  History Narrative     Not on file     Social Determinants of Health     Financial Resource Strain: Not on file   Food Insecurity: Not on file   Transportation Needs: Not on file   Physical Activity: Not on file   Stress: Not on file   Social Connections: Not on file   Intimate Partner Violence: Not on file   Housing Stability: Not on file            Past Medical History:     Past Medical History:   Diagnosis Date     Aortic valve stenosis 8/18/2022     Arthritis      Diabetes (H)     Type 2-no meds... Dieet and exercise only as of 9/15/20     Esophageal reflux      Hernia, abdominal      History of blood transfusion 1984     Hypertension     No cardiologist     Incontinence of urine      Mumps     6 years old     Obese      Osteoarthritis      Other and unspecified hyperlipidemia      Other chronic pain     back     Peptic ulcer, unspecified site, unspecified as acute or chronic, without mention of hemorrhage, perforation, or obstruction      Small bowel obstruction (H)      Thyroid disease hypothyroidism     Urinary incontinence      Walking troubles               Past Surgical History:     Past Surgical History:   Procedure Laterality Date     ABDOMEN SURGERY       ARTHROPLASTY HIP Right 11/09/2016    Procedure: ARTHROPLASTY HIP;  Surgeon: Angel Lund MD;  Location:  OR     AS REPAIR INCISIONAL HERNIA,REDUCIBLE  1998    inc hernia ( Yamileth surgery)     BIOPSY       BLADDER SURGERY      hyperdistention surgery and sling     BREAST SURGERY       CARDIAC SURGERY  2022     CHOLECYSTECTOMY  1987     COLONOSCOPY  12/03/2011    Procedure:COLONOSCOPY; COLONOSCOPY; Surgeon:SEMAJ GRAHAM; Location: GI     COLONOSCOPY N/A 03/29/2018    Procedure: COLONOSCOPY;  COLONOSCOPY Rm 544;  Surgeon: Marco Gusman MD;  Location:  GI     CV CORONARY ANGIOGRAM N/A 09/30/2022    Procedure: Coronary Angiogram;  Surgeon: Garcia Barber MD;  Location:  HEART CARDIAC CATH LAB     CV TRANSCATHETER AORTIC VALVE  REPLACEMENT-FEMORAL APPROACH N/A 11/08/2022    Procedure: Transcatheter Aortic Valve Replacement-Femoral Approach;  Surgeon: Yulia Castano MD;  Location:  HEART CARDIAC CATH LAB     CYSTOSCOPY N/A 09/06/2017    Procedure: CYSTOSCOPY;  CYSTOSCOPY AND HYDRODISTENTION ;  Surgeon: Eyal Bai MD;  Location:  OR     ENT SURGERY      Tonsillectomy     EP PACEMAKER DEVICE & LEAD IMPLANT- RIGHT ATRIAL & RIGHT VENTRICULAR N/A 11/08/2022    Procedure: Pacemaker Device & Lead Implant - Right Atrial & Right Ventricular;  Surgeon: William Boyd MD;  Location:  HEART CARDIAC CATH LAB     ESOPHAGOSCOPY, GASTROSCOPY, DUODENOSCOPY (EGD), COMBINED N/A 09/26/2016    Procedure: COMBINED ESOPHAGOSCOPY, GASTROSCOPY, DUODENOSCOPY (EGD), BIOPSY SINGLE OR MULTIPLE;  Surgeon: Marco Monaco MD;  Location:  GI     EYE SURGERY Left 05/2019     GENITOURINARY SURGERY       GYN SURGERY      Hysterectomy     HERNIA REPAIR, UMBILICAL  07/08/2010    Dr. Kirt Franco times 3     IMPLANT STIMULATOR SACRAL NERVE STAGE ONE N/A 09/17/2020    Procedure: sacral neurostimulation stage one implant of neurostimulator lead;  Surgeon: Shahnaz Carbone MD;  Location:  OR     IMPLANT STIMULATOR SACRAL NERVE STAGE TWO Right 09/24/2020    Procedure: Removal of interstem lead, NOT implanting neurostimulator;  Surgeon: Shahnaz Carbone MD;  Location:  OR     ORTHOPEDIC SURGERY  2009    [facility] - Dr. Lund- bilateral knee replacement     ZZC NONSPECIFIC PROCEDURE  1946    T&A     ZZC NONSPECIFIC PROCEDURE  1984    Vaginal Hysterectomy (has her ovaries)     ZZC NONSPECIFIC PROCEDURE  1973    PPTL     ZZC NONSPECIFIC PROCEDURE  1994    L shoulder to remove bone spurs     ZZC NONSPECIFIC PROCEDURE  2004    cysto and durasphere Dr Demarco     ZZC NONSPECIFIC PROCEDURE  2005    cysto and durasphere     ZZC NONSPECIFIC PROCEDURE  2007    cysto and durasphere     ZZC NONSPECIFIC PROCEDURE  2009    retropubic TVT  sling              Allergies:   Augmentin, Codeine, Hydrocodone, Naproxen, Seasonal allergies, and Sulfa drugs       Data:   All laboratory data reviewed:    Recent Labs   Lab Test 10/27/22  1553 07/28/22  0759 02/01/22  0801 02/24/21  0856 08/26/20  0933 02/20/20  0848 02/25/19  1118 09/06/18  0952 06/29/18  0801   LDL  --  62 73 85   < > 86   < > 71  --    HDL  --  50 50 50   < > 42*   < > 38*  --    NHDL  --  82 99 107   < > 108   < > 104  --    CHOL  --  132 149 157   < > 150   < > 142  --    TRIG  --  101 128 110   < > 110   < > 166*  --    TSH  --   --  2.56 2.21  --  2.20   < >  --   --    NTBNP 342  --   --   --   --   --   --   --   --    IRON  --   --   --   --   --   --   --  60  --    FEB  --   --   --   --   --   --   --  375  --    IRONSAT  --   --   --   --   --   --   --  16  --    IRAIS  --   --   --   --   --   --   --   --  31    < > = values in this interval not displayed.       Lab Results   Component Value Date    WBC 7.2 12/01/2022    WBC 11.3 (H) 03/12/2021    RBC 4.29 12/01/2022    RBC 4.56 03/12/2021    HGB 13.8 12/01/2022    HGB 13.8 03/12/2021    HCT 43.0 12/01/2022    HCT 43.7 03/12/2021     12/01/2022    MCV 96 03/12/2021    MCH 32.2 12/01/2022    MCH 30.3 03/12/2021    MCHC 32.1 12/01/2022    MCHC 31.6 03/12/2021    RDW 12.5 12/01/2022    RDW 13.1 03/12/2021     12/01/2022     03/12/2021       Lab Results   Component Value Date     12/01/2022     02/24/2021    POTASSIUM 4.3 12/01/2022    POTASSIUM 4.2 11/09/2022    POTASSIUM 3.9 02/24/2021    CHLORIDE 104 12/01/2022    CHLORIDE 108 11/09/2022    CHLORIDE 107 02/24/2021    CO2 28 12/01/2022    CO2 22 11/09/2022    CO2 28 02/24/2021    ANIONGAP 10 12/01/2022    ANIONGAP 10 11/09/2022    ANIONGAP 5 02/24/2021     (H) 12/01/2022     (H) 11/09/2022    GLC 95 11/09/2022     (H) 02/24/2021    BUN 20.1 12/01/2022    BUN 16 11/09/2022    BUN 18 02/24/2021    CR 0.81 12/01/2022    CR 0.90  02/24/2021    GFRESTIMATED 73 12/01/2022    GFRESTIMATED 48 (L) 08/16/2021    GFRESTIMATED 60 (L) 02/24/2021    GFRESTBLACK 70 02/24/2021    CINDY 10.0 12/01/2022    CINDY 9.9 02/24/2021      Lab Results   Component Value Date    AST 28 10/27/2022    AST 21 02/24/2021    ALT 20 10/27/2022    ALT 18 02/24/2021       Lab Results   Component Value Date    A1C 5.6 07/28/2022    A1C 6.1 (H) 02/24/2021       Lab Results   Component Value Date    INR 1.06 10/27/2022    INR 1.07 09/30/2022    INR 1.15 (H) 03/28/2018    INR 1.65 (H) 07/03/2009     KCCQ:   1. 2  2. 4  3. 5  4. 5  5. 6  6. 7  7. 5  8. 5  9. 4  10. 5  11. 5  12. 5    Thank you for allowing me to participate in the care of your patient.      Sincerely,     Isaura Kulkarni, [facility]  cc:   Yulia Castano MD  [address] [address]

## 2022-12-09 ENCOUNTER — HOSPITAL ENCOUNTER (OUTPATIENT)
Dept: CARDIAC REHAB | Facility: CLINIC | Age: 82
Discharge: HOME | End: 2022-12-09
Attending: INTERNAL MEDICINE
Payer: COMMERCIAL

## 2022-12-09 PROCEDURE — 93798 PHYS/QHP OP CAR RHAB W/ECG: CPT

## 2022-12-12 ENCOUNTER — HOSPITAL ENCOUNTER (OUTPATIENT)
Dept: CARDIAC REHAB | Facility: CLINIC | Age: 82
Discharge: HOME | End: 2022-12-12
Attending: INTERNAL MEDICINE
Payer: COMMERCIAL

## 2022-12-12 PROCEDURE — 93798 PHYS/QHP OP CAR RHAB W/ECG: CPT

## 2022-12-14 ENCOUNTER — HOSPITAL ENCOUNTER (OUTPATIENT)
Dept: CARDIAC REHAB | Facility: CLINIC | Age: 82
Discharge: HOME | End: 2022-12-14
Attending: INTERNAL MEDICINE
Payer: COMMERCIAL

## 2022-12-14 PROCEDURE — 93798 PHYS/QHP OP CAR RHAB W/ECG: CPT

## 2022-12-19 ENCOUNTER — HOSPITAL ENCOUNTER (OUTPATIENT)
Dept: CARDIAC REHAB | Facility: CLINIC | Age: 82
Discharge: HOME | End: 2022-12-19
Attending: INTERNAL MEDICINE
Payer: COMMERCIAL

## 2022-12-19 PROCEDURE — 93798 PHYS/QHP OP CAR RHAB W/ECG: CPT

## 2022-12-20 ENCOUNTER — ANCILLARY PROCEDURE (OUTPATIENT)
Dept: CARDIOLOGY | Facility: CLINIC | Age: 82
End: 2022-12-20
Attending: INTERNAL MEDICINE
Payer: COMMERCIAL

## 2022-12-20 DIAGNOSIS — Z95.0 CARDIAC PACEMAKER IN SITU: ICD-10-CM

## 2022-12-20 DIAGNOSIS — I44.2 COMPLETE HEART BLOCK (H): ICD-10-CM

## 2022-12-20 LAB
MDC_IDC_LEAD_IMPLANT_DT: NORMAL
MDC_IDC_LEAD_IMPLANT_DT: NORMAL
MDC_IDC_LEAD_LOCATION: NORMAL
MDC_IDC_LEAD_LOCATION: NORMAL
MDC_IDC_LEAD_LOCATION_DETAIL_1: NORMAL
MDC_IDC_LEAD_LOCATION_DETAIL_1: NORMAL
MDC_IDC_LEAD_MFG: NORMAL
MDC_IDC_LEAD_MFG: NORMAL
MDC_IDC_LEAD_MODEL: NORMAL
MDC_IDC_LEAD_MODEL: NORMAL
MDC_IDC_LEAD_POLARITY_TYPE: NORMAL
MDC_IDC_LEAD_POLARITY_TYPE: NORMAL
MDC_IDC_LEAD_SERIAL: NORMAL
MDC_IDC_LEAD_SERIAL: NORMAL
MDC_IDC_MSMT_BATTERY_DTM: NORMAL
MDC_IDC_MSMT_BATTERY_REMAINING_LONGEVITY: 168 MO
MDC_IDC_MSMT_BATTERY_REMAINING_PERCENTAGE: 100 %
MDC_IDC_MSMT_BATTERY_STATUS: NORMAL
MDC_IDC_MSMT_LEADCHNL_RA_IMPEDANCE_VALUE: 548 OHM
MDC_IDC_MSMT_LEADCHNL_RA_PACING_THRESHOLD_AMPLITUDE: 0.7 V
MDC_IDC_MSMT_LEADCHNL_RA_PACING_THRESHOLD_PULSEWIDTH: 0.4 MS
MDC_IDC_MSMT_LEADCHNL_RV_IMPEDANCE_VALUE: 669 OHM
MDC_IDC_MSMT_LEADCHNL_RV_PACING_THRESHOLD_AMPLITUDE: 0.8 V
MDC_IDC_MSMT_LEADCHNL_RV_PACING_THRESHOLD_PULSEWIDTH: 0.4 MS
MDC_IDC_PG_IMPLANT_DTM: NORMAL
MDC_IDC_PG_MFG: NORMAL
MDC_IDC_PG_MODEL: NORMAL
MDC_IDC_PG_SERIAL: NORMAL
MDC_IDC_PG_TYPE: NORMAL
MDC_IDC_SESS_CLINIC_NAME: NORMAL
MDC_IDC_SESS_DTM: NORMAL
MDC_IDC_SESS_TYPE: NORMAL
MDC_IDC_SET_BRADY_AT_MODE_SWITCH_MODE: NORMAL
MDC_IDC_SET_BRADY_AT_MODE_SWITCH_RATE: 170 {BEATS}/MIN
MDC_IDC_SET_BRADY_LOWRATE: 60 {BEATS}/MIN
MDC_IDC_SET_BRADY_MAX_SENSOR_RATE: 130 {BEATS}/MIN
MDC_IDC_SET_BRADY_MAX_TRACKING_RATE: 130 {BEATS}/MIN
MDC_IDC_SET_BRADY_MODE: NORMAL
MDC_IDC_SET_BRADY_PAV_DELAY_HIGH: 200 MS
MDC_IDC_SET_BRADY_PAV_DELAY_LOW: 250 MS
MDC_IDC_SET_BRADY_SAV_DELAY_HIGH: 200 MS
MDC_IDC_SET_BRADY_SAV_DELAY_LOW: 250 MS
MDC_IDC_SET_LEADCHNL_RA_PACING_AMPLITUDE: 2 V
MDC_IDC_SET_LEADCHNL_RA_PACING_CAPTURE_MODE: NORMAL
MDC_IDC_SET_LEADCHNL_RA_PACING_POLARITY: NORMAL
MDC_IDC_SET_LEADCHNL_RA_PACING_PULSEWIDTH: 0.4 MS
MDC_IDC_SET_LEADCHNL_RA_SENSING_ADAPTATION_MODE: NORMAL
MDC_IDC_SET_LEADCHNL_RA_SENSING_POLARITY: NORMAL
MDC_IDC_SET_LEADCHNL_RA_SENSING_SENSITIVITY: 0.25 MV
MDC_IDC_SET_LEADCHNL_RV_PACING_AMPLITUDE: 1.5 V
MDC_IDC_SET_LEADCHNL_RV_PACING_CAPTURE_MODE: NORMAL
MDC_IDC_SET_LEADCHNL_RV_PACING_POLARITY: NORMAL
MDC_IDC_SET_LEADCHNL_RV_PACING_PULSEWIDTH: 0.4 MS
MDC_IDC_SET_LEADCHNL_RV_SENSING_ADAPTATION_MODE: NORMAL
MDC_IDC_SET_LEADCHNL_RV_SENSING_POLARITY: NORMAL
MDC_IDC_SET_LEADCHNL_RV_SENSING_SENSITIVITY: 1.5 MV
MDC_IDC_SET_ZONE_DETECTION_INTERVAL: 375 MS
MDC_IDC_SET_ZONE_TYPE: NORMAL
MDC_IDC_SET_ZONE_VENDOR_TYPE: NORMAL
MDC_IDC_STAT_AT_BURDEN_PERCENT: 1 %
MDC_IDC_STAT_AT_DTM_END: NORMAL
MDC_IDC_STAT_AT_DTM_START: NORMAL
MDC_IDC_STAT_BRADY_DTM_END: NORMAL
MDC_IDC_STAT_BRADY_DTM_START: NORMAL
MDC_IDC_STAT_BRADY_RA_PERCENT_PACED: 63 %
MDC_IDC_STAT_BRADY_RV_PERCENT_PACED: 1 %
MDC_IDC_STAT_EPISODE_RECENT_COUNT: 0
MDC_IDC_STAT_EPISODE_RECENT_COUNT: 0
MDC_IDC_STAT_EPISODE_RECENT_COUNT: 3
MDC_IDC_STAT_EPISODE_RECENT_COUNT: 4
MDC_IDC_STAT_EPISODE_RECENT_COUNT: 61
MDC_IDC_STAT_EPISODE_RECENT_COUNT_DTM_END: NORMAL
MDC_IDC_STAT_EPISODE_RECENT_COUNT_DTM_START: NORMAL
MDC_IDC_STAT_EPISODE_TYPE: NORMAL
MDC_IDC_STAT_EPISODE_VENDOR_TYPE: NORMAL

## 2022-12-20 PROCEDURE — 93280 PM DEVICE PROGR EVAL DUAL: CPT | Performed by: INTERNAL MEDICINE

## 2022-12-26 ENCOUNTER — HOSPITAL ENCOUNTER (OUTPATIENT)
Dept: CARDIAC REHAB | Facility: CLINIC | Age: 82
Discharge: HOME | End: 2022-12-26
Attending: INTERNAL MEDICINE
Payer: COMMERCIAL

## 2022-12-26 PROCEDURE — 93798 PHYS/QHP OP CAR RHAB W/ECG: CPT

## 2022-12-28 ENCOUNTER — HOSPITAL ENCOUNTER (OUTPATIENT)
Dept: CARDIAC REHAB | Facility: CLINIC | Age: 82
Discharge: HOME | End: 2022-12-28
Attending: INTERNAL MEDICINE
Payer: COMMERCIAL

## 2022-12-28 PROCEDURE — 93798 PHYS/QHP OP CAR RHAB W/ECG: CPT | Performed by: REHABILITATION PRACTITIONER

## 2022-12-30 ENCOUNTER — HOSPITAL ENCOUNTER (OUTPATIENT)
Dept: CARDIAC REHAB | Facility: CLINIC | Age: 82
Discharge: HOME | End: 2022-12-30
Attending: INTERNAL MEDICINE
Payer: COMMERCIAL

## 2022-12-30 PROCEDURE — 93798 PHYS/QHP OP CAR RHAB W/ECG: CPT

## 2023-01-02 ENCOUNTER — HOSPITAL ENCOUNTER (OUTPATIENT)
Dept: CARDIAC REHAB | Facility: CLINIC | Age: 83
Discharge: HOME | End: 2023-01-02
Attending: INTERNAL MEDICINE
Payer: COMMERCIAL

## 2023-01-02 PROCEDURE — 93798 PHYS/QHP OP CAR RHAB W/ECG: CPT | Performed by: REHABILITATION PRACTITIONER

## 2023-01-06 ENCOUNTER — HOSPITAL ENCOUNTER (OUTPATIENT)
Dept: CARDIAC REHAB | Facility: CLINIC | Age: 83
Discharge: HOME | End: 2023-01-06
Attending: INTERNAL MEDICINE
Payer: COMMERCIAL

## 2023-01-06 PROCEDURE — 93798 PHYS/QHP OP CAR RHAB W/ECG: CPT | Performed by: OCCUPATIONAL THERAPIST

## 2023-01-09 ENCOUNTER — HOSPITAL ENCOUNTER (OUTPATIENT)
Dept: CARDIAC REHAB | Facility: CLINIC | Age: 83
Discharge: HOME | End: 2023-01-09
Attending: INTERNAL MEDICINE
Payer: COMMERCIAL

## 2023-01-09 PROCEDURE — 93798 PHYS/QHP OP CAR RHAB W/ECG: CPT

## 2023-01-11 ENCOUNTER — HOSPITAL ENCOUNTER (OUTPATIENT)
Dept: CARDIAC REHAB | Facility: CLINIC | Age: 83
Discharge: HOME | End: 2023-01-11
Attending: INTERNAL MEDICINE
Payer: COMMERCIAL

## 2023-01-11 PROCEDURE — 93798 PHYS/QHP OP CAR RHAB W/ECG: CPT | Performed by: REHABILITATION PRACTITIONER

## 2023-01-13 ENCOUNTER — HOSPITAL ENCOUNTER (OUTPATIENT)
Dept: CARDIAC REHAB | Facility: CLINIC | Age: 83
Discharge: HOME | End: 2023-01-13
Attending: INTERNAL MEDICINE
Payer: COMMERCIAL

## 2023-01-13 PROCEDURE — 93798 PHYS/QHP OP CAR RHAB W/ECG: CPT

## 2023-01-16 ENCOUNTER — HOSPITAL ENCOUNTER (OUTPATIENT)
Dept: CARDIAC REHAB | Facility: CLINIC | Age: 83
Discharge: HOME | End: 2023-01-16
Attending: INTERNAL MEDICINE
Payer: COMMERCIAL

## 2023-01-16 PROCEDURE — 93798 PHYS/QHP OP CAR RHAB W/ECG: CPT

## 2023-01-18 ENCOUNTER — TELEPHONE (OUTPATIENT)
Dept: CARDIAC REHAB | Facility: CLINIC | Age: 83
End: 2023-01-18

## 2023-01-18 ENCOUNTER — HOSPITAL ENCOUNTER (OUTPATIENT)
Dept: CARDIAC REHAB | Facility: CLINIC | Age: 83
Discharge: HOME | End: 2023-01-18
Attending: INTERNAL MEDICINE
Payer: COMMERCIAL

## 2023-01-18 VITALS — BODY MASS INDEX: 31.62 KG/M2 | HEIGHT: 64 IN | WEIGHT: 185.19 LBS

## 2023-01-18 PROCEDURE — 93798 PHYS/QHP OP CAR RHAB W/ECG: CPT | Performed by: REHABILITATION PRACTITIONER

## 2023-01-18 PROCEDURE — 93797 PHYS/QHP OP CAR RHAB WO ECG: CPT | Mod: XU

## 2023-01-18 NOTE — TELEPHONE ENCOUNTER
Dr Castano,   Your patient Lexii Stratton is attending outpatient cardiac rehab.  She has had two episodes now of Tachycardia with rates up to 150-160 bpm both with light resistance training (3# weights)  She reports fatigue no other symptoms.  She eventually paces out of the rhythm.  Both episodes, patient reports that she has been constipated and has not been eating due to fear of more constipation.  She tells me that she is drinking fluids but is not eating well.  I wonder if she is dehydrated and this is contributing to her tachycardia?  I also am concerned that she might be having this tachycardia more than just in rehab.  Please advise.  Thank you!     Sincerely,   Brandie Venegas   [phone]

## 2023-01-18 NOTE — PROGRESS NOTES
"NUTRITION ASSESSMENT & EDUCATION NOTE    REASON FOR ASSESSMENT  Lexii Stratton is a 82 year old female seen by Registered Dietitian for 1:1 Cardiac Rehab consult     NUTRITION HISTORY    Information obtained from patient, chart    Patient has been instructed to follow a heart healthy, low fiber    Previous diet education: low fiber    Patient has attended Nutrition 2    Nutrition-related goals: Avoid SBOs    Grocery shopping, cooking: self      Changes to diet since cardiac event: stopped eating soup    Barriers to dietary changes: needs to follow a low fiber diet dt frequent small bowel obstructions, cannot eat meat, raw veggies or vegetables with skin/seeds    ANTHROPOMETRICS  Height: 5' 4\"  Weight: 185 lbs 2.98 oz  Body mass index is 31.79 kg/m .  Weight Status:  Obesity Grade I BMI 30-34.9  IBW: 54.7kg  % IBW: 154%  Weight History:   Wt Readings from Last 30 Encounters:   01/18/23 84 kg (185 lb 3 oz)   12/08/22 84 kg (185 lb 3.2 oz)   11/21/22 80.3 kg (177 lb)   11/17/22 83.9 kg (185 lb)   11/09/22 83.9 kg (184 lb 14.4 oz)   10/27/22 84.4 kg (186 lb)   10/07/22 84.6 kg (186 lb 8 oz)   09/30/22 83.3 kg (183 lb 9.6 oz)   09/22/22 83.9 kg (185 lb)   09/15/22 83.9 kg (185 lb)   08/01/22 82.1 kg (181 lb)   07/18/22 84 kg (185 lb 1.6 oz)   07/03/22 86.8 kg (191 lb 6.4 oz)   02/07/22 87.8 kg (193 lb 9.6 oz)   10/21/21 91.2 kg (201 lb)   08/26/21 93.3 kg (205 lb 11.2 oz)   08/19/21 93.8 kg (206 lb 11.2 oz)   03/12/21 95.3 kg (210 lb)   02/24/21 95.4 kg (210 lb 6.4 oz)   10/19/20 94.3 kg (207 lb 14.4 oz)   09/17/20 92.1 kg (203 lb)   09/14/20 92.3 kg (203 lb 6.4 oz)   08/26/20 93 kg (205 lb)   02/20/20 95.3 kg (210 lb 3.2 oz)   11/12/19 95.3 kg (210 lb)   08/27/19 97.8 kg (215 lb 11.2 oz)   08/13/19 96.6 kg (213 lb)   05/10/19 96.6 kg (213 lb)   05/08/19 103 kg (227 lb)   02/25/19 103 kg (227 lb)     Stated:  February 2021 210lbs  November 2022 177lbs right after surgery (unintentional)    ASSESSED NUTRITION NEEDS PER " APPROVED PRACTICE GUIDELINES:  Estimated Energy Needs: 1680 kcals ([place] [employer] Jeor)  Justification: obese  Estimated Protein Needs: 62-74 grams protein (1-1.2 g pro/Kg)  Justification: preservation of lean body mass  Estimated Fluid Needs: 3112-8572  mL (25-30 mL/kg)  Justification: maintenance    NUTRITION DIAGNOSIS  Food- and nutrition- related knowledge deficit related to SBO as evidenced by having concerns with food choices and following a very limited diet dt fear of having another bowel obstruction    INTERVENTIONS  Nutrition Prescription:  Cardiac diet:   Low saturated fat, Na <2000 mg  Fiber 8-13 g per day  Emphasis on Heart Healthy diet for balanced meals     Implementation    Assessed learning needs and learning preference.    Nutrition Education (Content):  a) Provided handouts:  a. Nutrition 2 slide show  b. Sodium Free Flavoring tips  c. Low-Fiber (8grams) Nutrition Therapy  d. Fiber-Restricted (13g) Nutrition Therapy (2018)  e. Heart Healthy Label Reading Tips (2018)  b) Discussed heart healthy diet recommendations, including label reading, minimizing added salts/saturated fats/sugars, balanced meals TID, heart healthy substitutes, eliminating most trans fat, sugar free beverages    Nutrition Education (Application):  a) Discussed eating habits and recommended alternative food choices:  a. Emphasized fruits, vegetables, low sodium nuts/heart healthy fats, fish, low fat dairy  b. Reviewed recipes and new food ideas such vegetables and tofu for an increase in plant based options for protein  c. Encouraged water and low sugar sweetened beverages   d. Discussed cooking more from scratch to monitor meal ingredients, portions, menu choices     Goals/Follow up/Monitoring For Cardiac Rehab Staff    Adherence to nutrition related recommendations, follow with cardiac rehab    Fiber 8-13g per day, Na < 2000mg/day, added sugars < 25g/day    Additional questions/concerns related to heart healthy  guidelines      Tony Moreno RDN,FRANCISCO,[facility]  Office: [phone]    DIRECT PATIENT CARE TIME: 60 MINUTES

## 2023-01-20 ENCOUNTER — HOSPITAL ENCOUNTER (OUTPATIENT)
Dept: CARDIAC REHAB | Facility: CLINIC | Age: 83
Discharge: HOME | End: 2023-01-20
Attending: INTERNAL MEDICINE
Payer: COMMERCIAL

## 2023-01-20 PROCEDURE — 93798 PHYS/QHP OP CAR RHAB W/ECG: CPT

## 2023-01-23 ENCOUNTER — HOSPITAL ENCOUNTER (OUTPATIENT)
Dept: CARDIAC REHAB | Facility: CLINIC | Age: 83
Discharge: HOME | End: 2023-01-23
Attending: INTERNAL MEDICINE
Payer: COMMERCIAL

## 2023-01-23 PROCEDURE — 93798 PHYS/QHP OP CAR RHAB W/ECG: CPT | Performed by: REHABILITATION PRACTITIONER

## 2023-01-25 ENCOUNTER — HOSPITAL ENCOUNTER (OUTPATIENT)
Dept: CARDIAC REHAB | Facility: CLINIC | Age: 83
Discharge: HOME | End: 2023-01-25
Attending: INTERNAL MEDICINE
Payer: COMMERCIAL

## 2023-01-25 PROCEDURE — 93798 PHYS/QHP OP CAR RHAB W/ECG: CPT | Performed by: REHABILITATION PRACTITIONER

## 2023-01-27 ENCOUNTER — HOSPITAL ENCOUNTER (OUTPATIENT)
Dept: CARDIAC REHAB | Facility: CLINIC | Age: 83
Discharge: HOME | End: 2023-01-27
Attending: INTERNAL MEDICINE
Payer: COMMERCIAL

## 2023-01-27 PROCEDURE — 93798 PHYS/QHP OP CAR RHAB W/ECG: CPT

## 2023-01-30 ENCOUNTER — HOSPITAL ENCOUNTER (OUTPATIENT)
Dept: CARDIAC REHAB | Facility: CLINIC | Age: 83
Discharge: HOME | End: 2023-01-30
Attending: INTERNAL MEDICINE
Payer: COMMERCIAL

## 2023-01-30 PROCEDURE — 93798 PHYS/QHP OP CAR RHAB W/ECG: CPT

## 2023-02-01 ENCOUNTER — TRANSFERRED RECORDS (OUTPATIENT)
Dept: MULTI SPECIALTY CLINIC | Facility: CLINIC | Age: 83
End: 2023-02-01

## 2023-02-01 ENCOUNTER — HOSPITAL ENCOUNTER (OUTPATIENT)
Dept: CARDIAC REHAB | Facility: CLINIC | Age: 83
Discharge: HOME | End: 2023-02-01
Attending: INTERNAL MEDICINE
Payer: COMMERCIAL

## 2023-02-01 LAB — RETINOPATHY: NORMAL

## 2023-02-01 PROCEDURE — 93798 PHYS/QHP OP CAR RHAB W/ECG: CPT | Performed by: REHABILITATION PRACTITIONER

## 2023-02-03 ENCOUNTER — HOSPITAL ENCOUNTER (OUTPATIENT)
Dept: CARDIAC REHAB | Facility: CLINIC | Age: 83
Discharge: HOME | End: 2023-02-03
Attending: INTERNAL MEDICINE
Payer: COMMERCIAL

## 2023-02-03 PROCEDURE — 93798 PHYS/QHP OP CAR RHAB W/ECG: CPT | Performed by: REHABILITATION PRACTITIONER

## 2023-02-06 ENCOUNTER — HOSPITAL ENCOUNTER (OUTPATIENT)
Dept: CARDIAC REHAB | Facility: CLINIC | Age: 83
Discharge: HOME | End: 2023-02-06
Attending: INTERNAL MEDICINE
Payer: COMMERCIAL

## 2023-02-06 PROCEDURE — 93798 PHYS/QHP OP CAR RHAB W/ECG: CPT

## 2023-02-08 ENCOUNTER — HOSPITAL ENCOUNTER (OUTPATIENT)
Dept: CARDIAC REHAB | Facility: CLINIC | Age: 83
Discharge: HOME | End: 2023-02-08
Attending: INTERNAL MEDICINE
Payer: COMMERCIAL

## 2023-02-08 PROCEDURE — 93798 PHYS/QHP OP CAR RHAB W/ECG: CPT

## 2023-02-10 ENCOUNTER — HOSPITAL ENCOUNTER (OUTPATIENT)
Dept: CARDIAC REHAB | Facility: CLINIC | Age: 83
Discharge: HOME | End: 2023-02-10
Attending: INTERNAL MEDICINE
Payer: COMMERCIAL

## 2023-02-10 PROCEDURE — 93798 PHYS/QHP OP CAR RHAB W/ECG: CPT

## 2023-02-13 ENCOUNTER — HOSPITAL ENCOUNTER (OUTPATIENT)
Dept: CARDIAC REHAB | Facility: CLINIC | Age: 83
Discharge: HOME | End: 2023-02-13
Attending: INTERNAL MEDICINE
Payer: COMMERCIAL

## 2023-02-13 PROCEDURE — 93798 PHYS/QHP OP CAR RHAB W/ECG: CPT

## 2023-02-15 ENCOUNTER — HOSPITAL ENCOUNTER (OUTPATIENT)
Dept: CARDIAC REHAB | Facility: CLINIC | Age: 83
Discharge: HOME | End: 2023-02-15
Attending: INTERNAL MEDICINE
Payer: COMMERCIAL

## 2023-02-15 PROCEDURE — 93798 PHYS/QHP OP CAR RHAB W/ECG: CPT

## 2023-02-17 ENCOUNTER — HOSPITAL ENCOUNTER (OUTPATIENT)
Dept: CARDIAC REHAB | Facility: CLINIC | Age: 83
Discharge: HOME | End: 2023-02-17
Attending: INTERNAL MEDICINE
Payer: COMMERCIAL

## 2023-02-17 PROCEDURE — 93798 PHYS/QHP OP CAR RHAB W/ECG: CPT | Performed by: OCCUPATIONAL THERAPIST

## 2023-02-20 ENCOUNTER — TELEPHONE (OUTPATIENT)
Dept: CARDIOLOGY | Facility: CLINIC | Age: 83
End: 2023-02-20
Payer: COMMERCIAL

## 2023-02-20 ENCOUNTER — HOSPITAL ENCOUNTER (OUTPATIENT)
Dept: CARDIAC REHAB | Facility: CLINIC | Age: 83
Discharge: HOME | End: 2023-02-20
Attending: INTERNAL MEDICINE
Payer: COMMERCIAL

## 2023-02-20 PROCEDURE — 93798 PHYS/QHP OP CAR RHAB W/ECG: CPT | Performed by: REHABILITATION PRACTITIONER

## 2023-02-20 NOTE — TELEPHONE ENCOUNTER
Received PPM alert on patient for nonsustained episodes.  2 EGMs available.      - EGM from 2/17/23 at 1042 shows abrupt onset with possible V:A conduction vs AVNRT (minimal change in morphology, though this is a PPM so is not always a reliable factor).  Episode logged as lasting for 14 seconds with average rate of 164bpm, though no offset is available as it falls below the tachy detection rate of 160bpm.  - EGM from 2/17/23 at 1046 (following the above episode) shows similar presentation without onset/offset available and average rate of 161bpm.  Unclear if this is a second episode or if it is a continuation of the above.      Pt had a TAVR in 11/2022 that was complicated by CHB post-op with PPM implant.    Underlying rhythm from 11/17/22 was SB/SR in the 50-70s.      Called and spoke with patient who was asymptomatic.  She stated she was at cardiac rehab and was lifting some weights.  She was told by the staff to sit and rest for a little bit and then her heart rate came down.  They thought it may have been related to her breathing.  She stated this has happened a couple of times at cardiac rehab.      Will route update to EP MD of the day, Dr. Slade, to confirm rhythm and to determine if further follow-up is needed by patient's primary MD, Dr. Castano.     BEN Martinez         Underlying from 11/17/22:         EGM from 2/17/23 at 1042:                 EGM from 2/17/23 at 1046:

## 2023-02-21 ENCOUNTER — HOSPITAL ENCOUNTER (OUTPATIENT)
Dept: CARDIAC REHAB | Facility: CLINIC | Age: 83
Discharge: HOME | End: 2023-02-21
Attending: INTERNAL MEDICINE
Payer: COMMERCIAL

## 2023-02-21 PROCEDURE — 93798 PHYS/QHP OP CAR RHAB W/ECG: CPT

## 2023-02-21 PROCEDURE — 93797 PHYS/QHP OP CAR RHAB WO ECG: CPT

## 2023-02-21 NOTE — TELEPHONE ENCOUNTER
Jonel Slade MD  You 17 minutes ago (7:27 AM)     DI  It is SVT.  Not sure if she has sx's.   Please forward to Dr. Castano or her team for management advice.   DI        Received above response from Dr. Slade confirming SVT.  Per previous conversation with patient, she was asymptomatic.  She stated this has happened several times at cardiac rehab and usually resolves within a couple of minutes.  Will route to Dr. Castano for notification and review.     BEN Martinez

## 2023-03-21 ENCOUNTER — TELEPHONE (OUTPATIENT)
Dept: CARDIOLOGY | Facility: CLINIC | Age: 83
End: 2023-03-21

## 2023-03-21 ENCOUNTER — ANCILLARY PROCEDURE (OUTPATIENT)
Dept: CARDIOLOGY | Facility: CLINIC | Age: 83
End: 2023-03-21
Attending: INTERNAL MEDICINE
Payer: COMMERCIAL

## 2023-03-21 DIAGNOSIS — I44.2 COMPLETE HEART BLOCK (H): ICD-10-CM

## 2023-03-21 DIAGNOSIS — Z95.0 CARDIAC PACEMAKER IN SITU: ICD-10-CM

## 2023-03-21 DIAGNOSIS — I48.0 PAROXYSMAL ATRIAL FIBRILLATION (H): Primary | ICD-10-CM

## 2023-03-21 LAB
MDC_IDC_EPISODE_DTM: NORMAL
MDC_IDC_EPISODE_ID: NORMAL
MDC_IDC_EPISODE_TYPE: NORMAL
MDC_IDC_LEAD_IMPLANT_DT: NORMAL
MDC_IDC_LEAD_IMPLANT_DT: NORMAL
MDC_IDC_LEAD_LOCATION: NORMAL
MDC_IDC_LEAD_LOCATION: NORMAL
MDC_IDC_LEAD_LOCATION_DETAIL_1: NORMAL
MDC_IDC_LEAD_LOCATION_DETAIL_1: NORMAL
MDC_IDC_LEAD_MFG: NORMAL
MDC_IDC_LEAD_MFG: NORMAL
MDC_IDC_LEAD_MODEL: NORMAL
MDC_IDC_LEAD_MODEL: NORMAL
MDC_IDC_LEAD_POLARITY_TYPE: NORMAL
MDC_IDC_LEAD_POLARITY_TYPE: NORMAL
MDC_IDC_LEAD_SERIAL: NORMAL
MDC_IDC_LEAD_SERIAL: NORMAL
MDC_IDC_MSMT_BATTERY_DTM: NORMAL
MDC_IDC_MSMT_BATTERY_REMAINING_LONGEVITY: 132 MO
MDC_IDC_MSMT_BATTERY_REMAINING_PERCENTAGE: 100 %
MDC_IDC_MSMT_BATTERY_STATUS: NORMAL
MDC_IDC_MSMT_LEADCHNL_RA_IMPEDANCE_VALUE: 598 OHM
MDC_IDC_MSMT_LEADCHNL_RA_PACING_THRESHOLD_AMPLITUDE: 1.1 V
MDC_IDC_MSMT_LEADCHNL_RA_PACING_THRESHOLD_PULSEWIDTH: 0.4 MS
MDC_IDC_MSMT_LEADCHNL_RV_IMPEDANCE_VALUE: 701 OHM
MDC_IDC_MSMT_LEADCHNL_RV_PACING_THRESHOLD_AMPLITUDE: 1 V
MDC_IDC_MSMT_LEADCHNL_RV_PACING_THRESHOLD_PULSEWIDTH: 0.4 MS
MDC_IDC_PG_IMPLANT_DTM: NORMAL
MDC_IDC_PG_MFG: NORMAL
MDC_IDC_PG_MODEL: NORMAL
MDC_IDC_PG_SERIAL: NORMAL
MDC_IDC_PG_TYPE: NORMAL
MDC_IDC_SESS_CLINIC_NAME: NORMAL
MDC_IDC_SESS_DTM: NORMAL
MDC_IDC_SESS_TYPE: NORMAL
MDC_IDC_SET_BRADY_AT_MODE_SWITCH_MODE: NORMAL
MDC_IDC_SET_BRADY_AT_MODE_SWITCH_RATE: 170 {BEATS}/MIN
MDC_IDC_SET_BRADY_LOWRATE: 60 {BEATS}/MIN
MDC_IDC_SET_BRADY_MAX_SENSOR_RATE: 130 {BEATS}/MIN
MDC_IDC_SET_BRADY_MAX_TRACKING_RATE: 130 {BEATS}/MIN
MDC_IDC_SET_BRADY_MODE: NORMAL
MDC_IDC_SET_BRADY_PAV_DELAY_HIGH: 200 MS
MDC_IDC_SET_BRADY_PAV_DELAY_LOW: 250 MS
MDC_IDC_SET_BRADY_SAV_DELAY_HIGH: 200 MS
MDC_IDC_SET_BRADY_SAV_DELAY_LOW: 250 MS
MDC_IDC_SET_LEADCHNL_RA_PACING_AMPLITUDE: 2.2 V
MDC_IDC_SET_LEADCHNL_RA_PACING_CAPTURE_MODE: NORMAL
MDC_IDC_SET_LEADCHNL_RA_PACING_POLARITY: NORMAL
MDC_IDC_SET_LEADCHNL_RA_PACING_PULSEWIDTH: 0.4 MS
MDC_IDC_SET_LEADCHNL_RA_SENSING_ADAPTATION_MODE: NORMAL
MDC_IDC_SET_LEADCHNL_RA_SENSING_POLARITY: NORMAL
MDC_IDC_SET_LEADCHNL_RA_SENSING_SENSITIVITY: 0.25 MV
MDC_IDC_SET_LEADCHNL_RV_PACING_AMPLITUDE: 1.4 V
MDC_IDC_SET_LEADCHNL_RV_PACING_CAPTURE_MODE: NORMAL
MDC_IDC_SET_LEADCHNL_RV_PACING_POLARITY: NORMAL
MDC_IDC_SET_LEADCHNL_RV_PACING_PULSEWIDTH: 0.4 MS
MDC_IDC_SET_LEADCHNL_RV_SENSING_ADAPTATION_MODE: NORMAL
MDC_IDC_SET_LEADCHNL_RV_SENSING_POLARITY: NORMAL
MDC_IDC_SET_LEADCHNL_RV_SENSING_SENSITIVITY: 1.5 MV
MDC_IDC_SET_ZONE_DETECTION_INTERVAL: 375 MS
MDC_IDC_SET_ZONE_TYPE: NORMAL
MDC_IDC_SET_ZONE_VENDOR_TYPE: NORMAL
MDC_IDC_STAT_AT_BURDEN_PERCENT: 1 %
MDC_IDC_STAT_AT_DTM_END: NORMAL
MDC_IDC_STAT_AT_DTM_START: NORMAL
MDC_IDC_STAT_BRADY_DTM_END: NORMAL
MDC_IDC_STAT_BRADY_DTM_START: NORMAL
MDC_IDC_STAT_BRADY_RA_PERCENT_PACED: 88 %
MDC_IDC_STAT_BRADY_RV_PERCENT_PACED: 3 %
MDC_IDC_STAT_EPISODE_RECENT_COUNT: 0
MDC_IDC_STAT_EPISODE_RECENT_COUNT: 0
MDC_IDC_STAT_EPISODE_RECENT_COUNT: 1
MDC_IDC_STAT_EPISODE_RECENT_COUNT: 1
MDC_IDC_STAT_EPISODE_RECENT_COUNT: 5
MDC_IDC_STAT_EPISODE_RECENT_COUNT_DTM_END: NORMAL
MDC_IDC_STAT_EPISODE_RECENT_COUNT_DTM_START: NORMAL
MDC_IDC_STAT_EPISODE_TYPE: NORMAL
MDC_IDC_STAT_EPISODE_VENDOR_TYPE: NORMAL

## 2023-03-21 PROCEDURE — 93294 REM INTERROG EVL PM/LDLS PM: CPT | Performed by: INTERNAL MEDICINE

## 2023-03-21 PROCEDURE — 93296 REM INTERROG EVL PM/IDS: CPT | Performed by: INTERNAL MEDICINE

## 2023-03-21 NOTE — TELEPHONE ENCOUNTER
"Dr. Mota,    MILE: Your patient had new onset AFib detected on their pacemaker check today. 5 episodes total, all occurring on 12/25/22. Longest episode lasted 2 minutes, ventricular rates are controlled. Currently taking ASA only. Any changes?        "[employer] Accolade (D) Remote PPM Device Check  AP: 88%  : 3%  Mode: DDDR 60/130  Presenting Rhythm: AP/VS  Heart Rate: Adequate rates per histogram  Sensing: stable  Pacing Threshold: stable  Impedance: stable  Battery Status: 11 years  Atrial Arrhythmia: 5 mode switch episodes. 2 EGMs for review show A>Vs lasting 3-4 seconds, ventricular rates controlled and 1 EGM shows AFib lasting 1 minute 4 seconds, ventricular rates controlled. The longest episode logged lasted 2 minutes, no EGM for review. All episodes occurred 12/25/22. Taking ASA only. Will notify Dr Mota as AFib finding is new.   Ventricular Arrhythmia: 2 ventricular high rates. Episodes reviewed by RN on 2/17.  \"EGM from 2/17/23 at 1042 shows abrupt onset with possible V:A conduction vs AVNRT (minimal change in morphology, though this is a PPM so is not always a reliable factor).  Episodes logged as lasting for 14-20 seconds with average rate of 164bpm, though no offset is available as it falls below the tachy detection rate of 160bpm.\"      Care Plan: F/u PPM Latitude q 3 months. CaridadCVT  "

## 2023-03-22 NOTE — TELEPHONE ENCOUNTER
Pt agrees to start Eliquis. Discussed side effects and advised to seek medical attention if uncontrolled bleeding or any head injury.    Rx sent to [facility] per pt request. She agreed to stop ASA.     ISABEL TamezN, RN, PHN, CHFN, HNB-BC   3/22/2023 at 10:43 AM

## 2023-03-24 NOTE — TELEPHONE ENCOUNTER
Patient returned call, states she is hesitant to start Eliquis. Prefers clinic visit to address, per Isaura this can be with general cardiology. Appt scheduled, see below:     Future Appointments   Date Time Provider Department Center   4/6/2023  3:10 PM Ingrid Delong PA-C [facility]   5/15/2023  8:30 AM Yulia Castano MD [facility]   5/18/2023  8:30 AM [facility]   5/22/2023  9:40 AM Jesenia Madrigal MD [facility]   6/27/2023 12:00 AM [facility] [address] [facility]     ISABEL TamezN, RN, PHN, CHFN, HNB-BC   3/24/2023 at 9:35 AM

## 2023-03-25 ENCOUNTER — HEALTH MAINTENANCE LETTER (OUTPATIENT)
Age: 83
End: 2023-03-25

## 2023-04-06 ENCOUNTER — MYC MEDICAL ADVICE (OUTPATIENT)
Dept: CARDIOLOGY | Facility: CLINIC | Age: 83
End: 2023-04-06

## 2023-04-06 ENCOUNTER — TELEPHONE (OUTPATIENT)
Dept: CARDIOLOGY | Facility: CLINIC | Age: 83
End: 2023-04-06

## 2023-04-06 ENCOUNTER — OFFICE VISIT (OUTPATIENT)
Dept: CARDIOLOGY | Facility: CLINIC | Age: 83
End: 2023-04-06
Payer: COMMERCIAL

## 2023-04-06 VITALS
BODY MASS INDEX: 31.29 KG/M2 | OXYGEN SATURATION: 95 % | DIASTOLIC BLOOD PRESSURE: 80 MMHG | HEIGHT: 64 IN | SYSTOLIC BLOOD PRESSURE: 136 MMHG | WEIGHT: 183.3 LBS | HEART RATE: 60 BPM

## 2023-04-06 DIAGNOSIS — I48.0 PAF (PAROXYSMAL ATRIAL FIBRILLATION) (H): Primary | ICD-10-CM

## 2023-04-06 PROCEDURE — 99215 OFFICE O/P EST HI 40 MIN: CPT | Performed by: INTERNAL MEDICINE

## 2023-04-06 NOTE — PATIENT INSTRUCTIONS
Call the nurse for any questions or concerns at [phone]    Plan:  1. Medication changes:     STOP Aspirin  STOP meloxicam   START Eliquis 5 mg twice daily    2. Follow up with Dr. Castano as scheduled on 5/15/2023   -Scheduling phone number: [phone]    It was great seeing you today!    Ingrid Delong PA-C  Physician Assistant  [facility]

## 2023-04-06 NOTE — PROGRESS NOTES
Cardiology Clinic Progress Note  Lexii Stratton MRN# [id number]   YOB: 1940 Age: 82 year old   Primary Cardiologist: Dr. Castano Reason for visit: New onset atrial fibrillation            Assessment and Plan:   Lexii Stratton is a very pleasant 82 year old female who is here today for cardiology follow up after routine pacemaker check revealed episodes of atrial fibrillation.    Paroxysmal atrial fibrillation  -Noted on routine pacemaker check completed 3/2023; 5 episodes with the longest lasting 2 minutes  -Risks and benefits of anticoagulation discussed  -Given elevated [id number] score of 4 (agex2, female, HTN), recommend initiation of Eliquis 5 mg twice daily for cardioembolic risk reduction  Hypertension  -On amlodipine 5 mg daily, lisinopril 30 mg once daily  -BP well controlled on current regimen; 136/80 in clinic today  Chronic diastolic heart failure  -Not on diuretics  -Appears well compensated on exam  Severe aortic stenosis  -Status post TAVR with 26 mm Medtronic Evolut FX valve   -gradients remain stable  Complete heart block status post dual-chamber permanent pacemaker implant 11/9/2022  -Ongoing device checks per routine    Changes today:   Stop ASA.  Stop meloxicam.  Start Eliquis 5 mg twice daily.    Follow up plan:   Ongoing device checks per routine  Follow-up with Dr. Castano as scheduled on 5/15/2023.      Ingrid Delong PA-C  [facility], [place]  Pager: [phone]          History of Presenting Illness:    Lexii Stratton is a very pleasant 82 year old female with a history of chronic diastolic heart failure, hypertension, and severe aortic stenosis status post TAVR complicated by complete heart block status post dual-chamber permanent pacemaker implant 11/9/2023.    Post TAVR, patient has done very well with improvement in her shortness of breath as well as fatigue.  Routine pacemaker interrogation was completed on 3/21/2023.  This revealed patient  had 5 episodes of atrial fibrillation with the longest lasting 2 minutes in duration.  Ventricular rates were controlled.  Initiation of anticoagulation was recommended.  However, patient was resistant and preferred clinic visit to discuss prior to moving forward.    Patient reports she has been feeling very well since her last appointment. Denies shortness of breath, orthopnea and PND. Denies chest pain, palpitations, lightheadedness, dizziness, near syncope and syncope. Taking all medications daily as prescribed.      Lots of questions regarding anticoagulation and indications, risks and benefits.    Blood pressure 136/80 and heart rate 60 in clinic today.  Patient remains very active since finishing cardiac rehab.  Walks on a track every Monday, Wednesday, and Friday for 45 minutes to an hour.  No alcohol, tobacco, or caffeine use.         Social History      Social History     Socioeconomic History     Marital status: Single     Spouse name: Not on file     Number of children: Not on file     Years of education: Not on file     Highest education level: Not on file   Occupational History     Not on file   Tobacco Use     Smoking status: Never     Smokeless tobacco: Never     Tobacco comments:     have never smoked anything   Vaping Use     Vaping status: Never Used   Substance and Sexual Activity     Alcohol use: No     Drug use: No     Sexual activity: Not Currently     Partners: Male     Birth control/protection: None     Comment: have had a hysterectomy   Other Topics Concern     Parent/sibling w/ CABG, MI or angioplasty before 65F 55M? No   Social History Narrative     Not on file     Social Determinants of Health     Financial Resource Strain: Not on file   Food Insecurity: Not on file   Transportation Needs: Not on file   Physical Activity: Not on file   Stress: Not on file   Social Connections: Not on file   Intimate Partner Violence: Not on file   Housing Stability: Not on file            Review of  "Systems:   Please see HPI         Physical Exam:   Vitals: /80 (BP Location: Right arm)   Pulse 60   Ht 1.626 m (5' 4\")   Wt 83.1 kg (183 lb 4.8 oz)   LMP  (LMP Unknown)   SpO2 95%   BMI 31.46 kg/m     Wt Readings from Last 4 Encounters:   01/18/23 84 kg (185 lb 3 oz)   12/08/22 84 kg (185 lb 3.2 oz)   11/21/22 80.3 kg (177 lb)   11/17/22 83.9 kg (185 lb)     GEN: well nourished, in no acute distress.  HEENT:  Pupils equal, round. Sclerae nonicteric.   NECK: Supple, no masses appreciated.   C/V:  Regular rate and rhythm, systolic ejection murmur noted  RESP: Respirations are unlabored. Clear to auscultation bilaterally without wheezing, rales, or rhonchi.  GI: Abdomen soft, nontender.  EXTREM: no LE edema.  NEURO: Alert and oriented, cooperative.  SKIN: Warm and dry.        Data:   LIPID RESULTS:  Lab Results   Component Value Date    CHOL 132 07/28/2022    CHOL 157 02/24/2021    HDL 50 07/28/2022    HDL 50 02/24/2021    LDL 62 07/28/2022    LDL 85 02/24/2021    TRIG 101 07/28/2022    TRIG 110 02/24/2021    CHOLHDLRATIO 3.3 06/29/2015     LIVER ENZYME RESULTS:  Lab Results   Component Value Date    AST 28 10/27/2022    AST 21 02/24/2021    ALT 20 10/27/2022    ALT 18 02/24/2021     CBC RESULTS:  Lab Results   Component Value Date    WBC 7.2 12/01/2022    WBC 11.3 (H) 03/12/2021    RBC 4.29 12/01/2022    RBC 4.56 03/12/2021    HGB 13.8 12/01/2022    HGB 13.8 03/12/2021    HCT 43.0 12/01/2022    HCT 43.7 03/12/2021     12/01/2022    MCV 96 03/12/2021    MCH 32.2 12/01/2022    MCH 30.3 03/12/2021    MCHC 32.1 12/01/2022    MCHC 31.6 03/12/2021    RDW 12.5 12/01/2022    RDW 13.1 03/12/2021     12/01/2022     03/12/2021     BMP RESULTS:  Lab Results   Component Value Date     12/01/2022     02/24/2021    POTASSIUM 4.3 12/01/2022    POTASSIUM 4.2 11/09/2022    POTASSIUM 3.9 02/24/2021    CHLORIDE 104 12/01/2022    CHLORIDE 108 11/09/2022    CHLORIDE 107 02/24/2021    CO2 28 " 12/01/2022    CO2 22 11/09/2022    CO2 28 02/24/2021    ANIONGAP 10 12/01/2022    ANIONGAP 10 11/09/2022    ANIONGAP 5 02/24/2021     (H) 12/01/2022     (H) 11/09/2022    GLC 95 11/09/2022     (H) 02/24/2021    BUN 20.1 12/01/2022    BUN 16 11/09/2022    BUN 18 02/24/2021    CR 0.81 12/01/2022    CR 0.90 02/24/2021    GFRESTIMATED 73 12/01/2022    GFRESTIMATED 48 (L) 08/16/2021    GFRESTIMATED 60 (L) 02/24/2021    GFRESTBLACK 70 02/24/2021    CINDY 10.0 12/01/2022    CINDY 9.9 02/24/2021      A1C RESULTS:  Lab Results   Component Value Date    A1C 5.6 07/28/2022    A1C 6.1 (H) 02/24/2021     INR RESULTS:  Lab Results   Component Value Date    INR 1.06 10/27/2022    INR 1.07 09/30/2022    INR 1.15 (H) 03/28/2018    INR 1.65 (H) 07/03/2009            Medications     Current Outpatient Medications   Medication Sig Dispense Refill     acetaminophen (TYLENOL) 500 MG tablet Take 1,000 mg by mouth 2 times daily (2 x 500 mg = 1000 mg)       amLODIPine (NORVASC) 5 MG tablet TAKE ONE TABLET BY MOUTH EVERY NIGHT AT BEDTIME 90 tablet 3     amoxicillin (AMOXIL) 500 MG capsule Take 2,000 mg by mouth once as needed (1 hour prior to dental procedures 4 x 500 mg = 2000 mg)       aspirin (ASA) 81 MG EC tablet Take 1 tablet (81 mg) by mouth daily 90 tablet 3     Biotin 5000 MCG PO TABS Take 1 tablet by mouth daily       calcium carbonate (OS-CINDY) 500 MG tablet Take 1 tablet by mouth every other day       CENTRUM SILVER OR TABS Take 1 tablet by mouth daily 30 0     Cranberry 450 MG CAPS Take 1 capsule by mouth daily (with dinner)       docusate sodium (COLACE) 100 MG capsule Take 100 mg by mouth At Bedtime       ferrous gluconate (FERGON) 324 (38 FE) MG tablet Take 1 tablet (324 mg) by mouth 2 times daily 100 tablet 0     latanoprost (XALATAN) 0.005 % ophthalmic solution Place 1 drop into both eyes At Bedtime        levothyroxine (SYNTHROID/LEVOTHROID) 50 MCG tablet Take 1 tablet (50 mcg) by mouth daily 90 tablet 3      lisinopril (ZESTRIL) 30 MG tablet Take 1 tablet (30 mg) by mouth every morning 90 tablet 3     magnesium 250 MG tablet Take 1 tablet by mouth daily       meloxicam (MOBIC) 7.5 MG tablet Take 1 tablet (7.5 mg) by mouth daily 90 tablet 3     nitroGLYcerin (NITROSTAT) 0.4 MG sublingual tablet For chest pain place 1 tablet under the tongue every 5 minutes for 3 doses. If symptoms persist 5 minutes after 1st dose call 911. (Patient taking differently: 0.4 mg every 5 minutes as needed For chest pain place 1 tablet under the tongue every 5 minutes for 3 doses. If symptoms persist 5 minutes after 1st dose call 911.) 25 tablet 11     OMEGA-3 FATTY ACIDS 1200 MG OR CAPS Take 1 capsule by mouth daily  0     pantoprazole (PROTONIX) 20 MG EC tablet Take 1 tablet (20 mg) by mouth daily 90 tablet 3     potassium 99 MG TABS Take 1 tablet by mouth daily (with dinner)        Probiotic Product (ACIDOPHILUS PROBIOTIC BLEND) CAPS Take 1 capsule by mouth daily (with breakfast)  30 capsule 0     simvastatin (ZOCOR) 20 MG tablet TAKE ONE TABLET BY MOUTH EVERY NIGHT AT BEDTIME 90 tablet 3     VITAMIN D, CHOLECALCIFEROL, PO Take 2,000 Units by mouth daily            Past Medical History     Past Medical History:   Diagnosis Date     Aortic valve stenosis 8/18/2022     Arthritis      Diabetes (H)     Type 2-no meds... Dieet and exercise only as of 9/15/20     Esophageal reflux      Hernia, abdominal      History of blood transfusion 1984     Hypertension     No cardiologist     Incontinence of urine      Mumps     6 years old     Obese      Osteoarthritis      Other and unspecified hyperlipidemia      Other chronic pain     back     Peptic ulcer, unspecified site, unspecified as acute or chronic, without mention of hemorrhage, perforation, or obstruction      Small bowel obstruction (H)      Thyroid disease hypothyroidism     Urinary incontinence      Walking troubles      Past Surgical History:   Procedure Laterality Date     ABDOMEN SURGERY        ARTHROPLASTY HIP Right 11/09/2016    Procedure: ARTHROPLASTY HIP;  Surgeon: Angel Lund MD;  Location:  OR     AS REPAIR INCISIONAL HERNIA,REDUCIBLE  1998    inc hernia ( [facility] surgery)     BIOPSY       BLADDER SURGERY      hyperdistention surgery and sling     BREAST SURGERY       CARDIAC SURGERY  2022     CHOLECYSTECTOMY  1987     COLONOSCOPY  12/03/2011    Procedure:COLONOSCOPY; COLONOSCOPY; Surgeon:SEMAJ GRAHAM; Location: GI     COLONOSCOPY N/A 03/29/2018    Procedure: COLONOSCOPY;  COLONOSCOPY Rm 544;  Surgeon: Marco Gusman MD;  Location:  GI     CV CORONARY ANGIOGRAM N/A 09/30/2022    Procedure: Coronary Angiogram;  Surgeon: Garcia Barber MD;  Location:  HEART CARDIAC CATH LAB     CV TRANSCATHETER AORTIC VALVE REPLACEMENT-FEMORAL APPROACH N/A 11/08/2022    Procedure: Transcatheter Aortic Valve Replacement-Femoral Approach;  Surgeon: Yulia Castano MD;  Location: [facility] CARDIAC CATH LAB     CYSTOSCOPY N/A 09/06/2017    Procedure: CYSTOSCOPY;  CYSTOSCOPY AND HYDRODISTENTION ;  Surgeon: Eyal Bai MD;  Location:  OR     ENT SURGERY      Tonsillectomy     EP PACEMAKER DEVICE & LEAD IMPLANT- RIGHT ATRIAL & RIGHT VENTRICULAR N/A 11/08/2022    Procedure: Pacemaker Device & Lead Implant - Right Atrial & Right Ventricular;  Surgeon: William Boyd MD;  Location:  HEART CARDIAC CATH LAB     ESOPHAGOSCOPY, GASTROSCOPY, DUODENOSCOPY (EGD), COMBINED N/A 09/26/2016    Procedure: COMBINED ESOPHAGOSCOPY, GASTROSCOPY, DUODENOSCOPY (EGD), BIOPSY SINGLE OR MULTIPLE;  Surgeon: Marco Monaco MD;  Location:  GI     EYE SURGERY Left 05/2019     GENITOURINARY SURGERY       GYN SURGERY      Hysterectomy     HERNIA REPAIR, UMBILICAL  07/08/2010    Dr. Kirt Franco times 3     IMPLANT STIMULATOR SACRAL NERVE STAGE ONE N/A 09/17/2020    Procedure: sacral neurostimulation stage one implant of neurostimulator lead;  Surgeon: Shahnaz Carbone MD;   Location: [facility] OR     IMPLANT STIMULATOR SACRAL NERVE STAGE TWO Right 2020    Procedure: Removal of interstem lead, NOT implanting neurostimulator;  Surgeon: Shahnaz Carbone MD;  Location: [facility] OR     ORTHOPEDIC SURGERY      [facility] - Dr. Lund- bilateral knee replacement     [facility] NONSPECIFIC PROCEDURE      T&A     [facility] NONSPECIFIC PROCEDURE      Vaginal Hysterectomy (has her ovaries)     [facility] NONSPECIFIC PROCEDURE      PPTL     [facility] NONSPECIFIC PROCEDURE      L shoulder to remove bone spurs     [facility] NONSPECIFIC PROCEDURE      cysto and durasphere Dr Demarco     [facility] NONSPECIFIC PROCEDURE  2005    cysto and durasphere     [facility] NONSPECIFIC PROCEDURE  2007    cysto and durasphere     [facility] NONSPECIFIC PROCEDURE  2009    retropubic TVT sling     Family History   Problem Relation Age of Onset     Heart Disease Mother         heart surgery , new valve     Gallbladder Disease Mother      Coronary Artery Disease Mother      Hyperlipidemia Mother      Asthma Mother      Hypertension Mother      Anesthesia Reaction Mother      Cancer Father         throat ca,  76     Coronary Artery Disease Father      Other Cancer Father      Diabetes Brother         Half Brother     Aortic stenosis Brother         Had TAVR     Cancer Brother         half brother  lung      Heart Disease Maternal Grandmother      Coronary Artery Disease Maternal Grandmother      Heart Disease Maternal Grandfather      Coronary Artery Disease Maternal Grandfather             Allergies   Augmentin, Codeine, Hydrocodone, Naproxen, Seasonal allergies, and Sulfa drugs      40 minutes spent on the date of the encounter doing chart review, history and exam, documentation and further activities as noted above    Ingrid Delong PA-C  [facility]  Pager: [phone]

## 2023-04-06 NOTE — LETTER
4/6/2023    Jesenia Madrigal MD  [address]  [facility] [address]    RE: Lexii Stratton       Dear Colleague,     I had the pleasure of seeing Lexii Stratton in the [facility].  Cardiology Clinic Progress Note  Lexii Stratton MRN# [id number]   YOB: 1940 Age: 82 year old   Primary Cardiologist: Dr. Castano Reason for visit: New onset atrial fibrillation            Assessment and Plan:   Lexii Stratton is a very pleasant 82 year old female who is here today for cardiology follow up after routine pacemaker check revealed episodes of atrial fibrillation.    Paroxysmal atrial fibrillation  -Noted on routine pacemaker check completed 3/2023; 5 episodes with the longest lasting 2 minutes  -Risks and benefits of anticoagulation discussed  -Given elevated [id number] score of 4 (agex2, female, HTN), recommend initiation of Eliquis 5 mg twice daily for cardioembolic risk reduction  Hypertension  -On amlodipine 5 mg daily, lisinopril 30 mg once daily  -BP well controlled on current regimen; 136/80 in clinic today  Chronic diastolic heart failure  -Not on diuretics  -Appears well compensated on exam  Severe aortic stenosis  -Status post TAVR with 26 mm Medtronic Evolut FX valve   -gradients remain stable  Complete heart block status post dual-chamber permanent pacemaker implant 11/9/2022  -Ongoing device checks per routine    Changes today:   Stop ASA.  Stop meloxicam.  Start Eliquis 5 mg twice daily.    Follow up plan:   Ongoing device checks per routine  Follow-up with Dr. Castano as scheduled on 5/15/2023.      DOR[facility] Mathew [facility] - [facility] Care  Pager: [phone]          History of Presenting Illness:    Lexii Stratton is a very pleasant 82 year old female with a history of chronic diastolic heart failure, hypertension, and severe aortic stenosis status post TAVR complicated by complete heart block status post dual-chamber permanent  pacemaker implant 11/9/2023.    Post TAVR, patient has done very well with improvement in her shortness of breath as well as fatigue.  Routine pacemaker interrogation was completed on 3/21/2023.  This revealed patient had 5 episodes of atrial fibrillation with the longest lasting 2 minutes in duration.  Ventricular rates were controlled.  Initiation of anticoagulation was recommended.  However, patient was resistant and preferred clinic visit to discuss prior to moving forward.    Patient reports she has been feeling very well since her last appointment. Denies shortness of breath, orthopnea and PND. Denies chest pain, palpitations, lightheadedness, dizziness, near syncope and syncope. Taking all medications daily as prescribed.      Lots of questions regarding anticoagulation and indications, risks and benefits.    Blood pressure 136/80 and heart rate 60 in clinic today.  Patient remains very active since finishing cardiac rehab.  Walks on a track every Monday, Wednesday, and Friday for 45 minutes to an hour.  No alcohol, tobacco, or caffeine use.         Social History      Social History     Socioeconomic History     Marital status: Single     Spouse name: Not on file     Number of children: Not on file     Years of education: Not on file     Highest education level: Not on file   Occupational History     Not on file   Tobacco Use     Smoking status: Never     Smokeless tobacco: Never     Tobacco comments:     have never smoked anything   Vaping Use     Vaping status: Never Used   Substance and Sexual Activity     Alcohol use: No     Drug use: No     Sexual activity: Not Currently     Partners: Male     Birth control/protection: None     Comment: have had a hysterectomy   Other Topics Concern     Parent/sibling w/ CABG, MI or angioplasty before 65F 55M? No   Social History Narrative     Not on file     Social Determinants of Health     Financial Resource Strain: Not on file   Food Insecurity: Not on file  "  Transportation Needs: Not on file   Physical Activity: Not on file   Stress: Not on file   Social Connections: Not on file   Intimate Partner Violence: Not on file   Housing Stability: Not on file            Review of Systems:   Please see HPI         Physical Exam:   Vitals: /80 (BP Location: Right arm)   Pulse 60   Ht 1.626 m (5' 4\")   Wt 83.1 kg (183 lb 4.8 oz)   LMP  (LMP Unknown)   SpO2 95%   BMI 31.46 kg/m     Wt Readings from Last 4 Encounters:   01/18/23 84 kg (185 lb 3 oz)   12/08/22 84 kg (185 lb 3.2 oz)   11/21/22 80.3 kg (177 lb)   11/17/22 83.9 kg (185 lb)     GEN: well nourished, in no acute distress.  HEENT:  Pupils equal, round. Sclerae nonicteric.   NECK: Supple, no masses appreciated.   C/V:  Regular rate and rhythm, systolic ejection murmur noted  RESP: Respirations are unlabored. Clear to auscultation bilaterally without wheezing, rales, or rhonchi.  GI: Abdomen soft, nontender.  EXTREM: no LE edema.  NEURO: Alert and oriented, cooperative.  SKIN: Warm and dry.        Data:   LIPID RESULTS:  Lab Results   Component Value Date    CHOL 132 07/28/2022    CHOL 157 02/24/2021    HDL 50 07/28/2022    HDL 50 02/24/2021    LDL 62 07/28/2022    LDL 85 02/24/2021    TRIG 101 07/28/2022    TRIG 110 02/24/2021    CHOLHDLRATIO 3.3 06/29/2015     LIVER ENZYME RESULTS:  Lab Results   Component Value Date    AST 28 10/27/2022    AST 21 02/24/2021    ALT 20 10/27/2022    ALT 18 02/24/2021     CBC RESULTS:  Lab Results   Component Value Date    WBC 7.2 12/01/2022    WBC 11.3 (H) 03/12/2021    RBC 4.29 12/01/2022    RBC 4.56 03/12/2021    HGB 13.8 12/01/2022    HGB 13.8 03/12/2021    HCT 43.0 12/01/2022    HCT 43.7 03/12/2021     12/01/2022    MCV 96 03/12/2021    MCH 32.2 12/01/2022    MCH 30.3 03/12/2021    MCHC 32.1 12/01/2022    MCHC 31.6 03/12/2021    RDW 12.5 12/01/2022    RDW 13.1 03/12/2021     12/01/2022     03/12/2021     BMP RESULTS:  Lab Results   Component Value Date "     12/01/2022     02/24/2021    POTASSIUM 4.3 12/01/2022    POTASSIUM 4.2 11/09/2022    POTASSIUM 3.9 02/24/2021    CHLORIDE 104 12/01/2022    CHLORIDE 108 11/09/2022    CHLORIDE 107 02/24/2021    CO2 28 12/01/2022    CO2 22 11/09/2022    CO2 28 02/24/2021    ANIONGAP 10 12/01/2022    ANIONGAP 10 11/09/2022    ANIONGAP 5 02/24/2021     (H) 12/01/2022     (H) 11/09/2022    GLC 95 11/09/2022     (H) 02/24/2021    BUN 20.1 12/01/2022    BUN 16 11/09/2022    BUN 18 02/24/2021    CR 0.81 12/01/2022    CR 0.90 02/24/2021    GFRESTIMATED 73 12/01/2022    GFRESTIMATED 48 (L) 08/16/2021    GFRESTIMATED 60 (L) 02/24/2021    GFRESTBLACK 70 02/24/2021    CINDY 10.0 12/01/2022    CINDY 9.9 02/24/2021      A1C RESULTS:  Lab Results   Component Value Date    A1C 5.6 07/28/2022    A1C 6.1 (H) 02/24/2021     INR RESULTS:  Lab Results   Component Value Date    INR 1.06 10/27/2022    INR 1.07 09/30/2022    INR 1.15 (H) 03/28/2018    INR 1.65 (H) 07/03/2009            Medications     Current Outpatient Medications   Medication Sig Dispense Refill     acetaminophen (TYLENOL) 500 MG tablet Take 1,000 mg by mouth 2 times daily (2 x 500 mg = 1000 mg)       amLODIPine (NORVASC) 5 MG tablet TAKE ONE TABLET BY MOUTH EVERY NIGHT AT BEDTIME 90 tablet 3     amoxicillin (AMOXIL) 500 MG capsule Take 2,000 mg by mouth once as needed (1 hour prior to dental procedures 4 x 500 mg = 2000 mg)       aspirin (ASA) 81 MG EC tablet Take 1 tablet (81 mg) by mouth daily 90 tablet 3     Biotin 5000 MCG PO TABS Take 1 tablet by mouth daily       calcium carbonate (OS-CINDY) 500 MG tablet Take 1 tablet by mouth every other day       CENTRUM SILVER OR TABS Take 1 tablet by mouth daily 30 0     Cranberry 450 MG CAPS Take 1 capsule by mouth daily (with dinner)       docusate sodium (COLACE) 100 MG capsule Take 100 mg by mouth At Bedtime       ferrous gluconate (FERGON) 324 (38 FE) MG tablet Take 1 tablet (324 mg) by mouth 2 times daily  100 tablet 0     latanoprost (XALATAN) 0.005 % ophthalmic solution Place 1 drop into both eyes At Bedtime        levothyroxine (SYNTHROID/LEVOTHROID) 50 MCG tablet Take 1 tablet (50 mcg) by mouth daily 90 tablet 3     lisinopril (ZESTRIL) 30 MG tablet Take 1 tablet (30 mg) by mouth every morning 90 tablet 3     magnesium 250 MG tablet Take 1 tablet by mouth daily       meloxicam (MOBIC) 7.5 MG tablet Take 1 tablet (7.5 mg) by mouth daily 90 tablet 3     nitroGLYcerin (NITROSTAT) 0.4 MG sublingual tablet For chest pain place 1 tablet under the tongue every 5 minutes for 3 doses. If symptoms persist 5 minutes after 1st dose call 911. (Patient taking differently: 0.4 mg every 5 minutes as needed For chest pain place 1 tablet under the tongue every 5 minutes for 3 doses. If symptoms persist 5 minutes after 1st dose call 911.) 25 tablet 11     OMEGA-3 FATTY ACIDS 1200 MG OR CAPS Take 1 capsule by mouth daily  0     pantoprazole (PROTONIX) 20 MG EC tablet Take 1 tablet (20 mg) by mouth daily 90 tablet 3     potassium 99 MG TABS Take 1 tablet by mouth daily (with dinner)        Probiotic Product (ACIDOPHILUS PROBIOTIC BLEND) CAPS Take 1 capsule by mouth daily (with breakfast)  30 capsule 0     simvastatin (ZOCOR) 20 MG tablet TAKE ONE TABLET BY MOUTH EVERY NIGHT AT BEDTIME 90 tablet 3     VITAMIN D, CHOLECALCIFEROL, PO Take 2,000 Units by mouth daily            Past Medical History     Past Medical History:   Diagnosis Date     Aortic valve stenosis 8/18/2022     Arthritis      Diabetes (H)     Type 2-no meds... Dieet and exercise only as of 9/15/20     Esophageal reflux      Hernia, abdominal      History of blood transfusion 1984     Hypertension     No cardiologist     Incontinence of urine      Mumps     6 years old     Obese      Osteoarthritis      Other and unspecified hyperlipidemia      Other chronic pain     back     Peptic ulcer, unspecified site, unspecified as acute or chronic, without mention of hemorrhage,  perforation, or obstruction      Small bowel obstruction (H)      Thyroid disease hypothyroidism     Urinary incontinence      Walking troubles      Past Surgical History:   Procedure Laterality Date     ABDOMEN SURGERY       ARTHROPLASTY HIP Right 11/09/2016    Procedure: ARTHROPLASTY HIP;  Surgeon: Angel Lund MD;  Location:  OR     AS REPAIR INCISIONAL HERNIA,REDUCIBLE  1998    inc hernia ( [facility] surgery)     BIOPSY       BLADDER SURGERY      hyperdistention surgery and sling     BREAST SURGERY       CARDIAC SURGERY  2022     CHOLECYSTECTOMY  1987     COLONOSCOPY  12/03/2011    Procedure:COLONOSCOPY; COLONOSCOPY; Surgeon:SEMAJ GRAHAM; Location: GI     COLONOSCOPY N/A 03/29/2018    Procedure: COLONOSCOPY;  COLONOSCOPY Rm 544;  Surgeon: Marco Gusman MD;  Location: [facility]     CV CORONARY ANGIOGRAM N/A 09/30/2022    Procedure: Coronary Angiogram;  Surgeon: Garcia Barber MD;  Location: [facility] CARDIAC CATH LAB     CV TRANSCATHETER AORTIC VALVE REPLACEMENT-FEMORAL APPROACH N/A 11/08/2022    Procedure: Transcatheter Aortic Valve Replacement-Femoral Approach;  Surgeon: Yulia Castano MD;  Location: [facility] CARDIAC CATH LAB     CYSTOSCOPY N/A 09/06/2017    Procedure: CYSTOSCOPY;  CYSTOSCOPY AND HYDRODISTENTION ;  Surgeon: Eyal Bai MD;  Location:  OR     ENT SURGERY      Tonsillectomy     EP PACEMAKER DEVICE & LEAD IMPLANT- RIGHT ATRIAL & RIGHT VENTRICULAR N/A 11/08/2022    Procedure: Pacemaker Device & Lead Implant - Right Atrial & Right Ventricular;  Surgeon: William Boyd MD;  Location: [facility] CARDIAC CATH LAB     ESOPHAGOSCOPY, GASTROSCOPY, DUODENOSCOPY (EGD), COMBINED N/A 09/26/2016    Procedure: COMBINED ESOPHAGOSCOPY, GASTROSCOPY, DUODENOSCOPY (EGD), BIOPSY SINGLE OR MULTIPLE;  Surgeon: Marco Monaco MD;  Location:  GI     EYE SURGERY Left 05/2019     GENITOURINARY SURGERY       GYN SURGERY      Hysterectomy     HERNIA REPAIR, UMBILICAL   2010    Dr. Kirt Franco times 3     IMPLANT STIMULATOR SACRAL NERVE STAGE ONE N/A 2020    Procedure: sacral neurostimulation stage one implant of neurostimulator lead;  Surgeon: Shahnaz Carbone MD;  Location: [facility] OR     IMPLANT STIMULATOR SACRAL NERVE STAGE TWO Right 2020    Procedure: Removal of interstem lead, NOT implanting neurostimulator;  Surgeon: Shahnaz Carbone MD;  Location: [facility] OR     ORTHOPEDIC SURGERY      [facility] - Dr. Lund- bilateral knee replacement     ZZ NONSPECIFIC PROCEDURE      T&A     ZZ NONSPECIFIC PROCEDURE      Vaginal Hysterectomy (has her ovaries)     ZZC NONSPECIFIC PROCEDURE      PPTL     ZZC NONSPECIFIC PROCEDURE      L shoulder to remove bone spurs     ZZC NONSPECIFIC PROCEDURE      cysto and durasphere Dr Demarco     Z NONSPECIFIC PROCEDURE  2005    cysto and durasphere     ZZC NONSPECIFIC PROCEDURE  2007    cysto and durasphere     ZZC NONSPECIFIC PROCEDURE  2009    retropubic TVT sling     Family History   Problem Relation Age of Onset     Heart Disease Mother         heart surgery , new valve     Gallbladder Disease Mother      Coronary Artery Disease Mother      Hyperlipidemia Mother      Asthma Mother      Hypertension Mother      Anesthesia Reaction Mother      Cancer Father         throat ca,  76     Coronary Artery Disease Father      Other Cancer Father      Diabetes Brother         Half Brother     Aortic stenosis Brother         Had TAVR     Cancer Brother         half brother  lung      Heart Disease Maternal Grandmother      Coronary Artery Disease Maternal Grandmother      Heart Disease Maternal Grandfather      Coronary Artery Disease Maternal Grandfather             Allergies   Augmentin, Codeine, Hydrocodone, Naproxen, Seasonal allergies, and Sulfa drugs      40 minutes spent on the date of the encounter doing chart review, history and exam, documentation and further activities as noted  above    DORIS Mathew [facility] - Heart Care  Pager: [phone]      Thank you for allowing me to participate in the care of your patient.      Sincerely,     DORIS Mathew [facility]  cc:   No referring provider defined for this encounter.

## 2023-04-06 NOTE — TELEPHONE ENCOUNTER
Ingrid Delong PA-C [facility] Team 7  Hi,     Can you please contact this patient letting her know it is okay for her to proceed with chiropractic care for her back osteoarthritis.  She should avoid having work done in her neck region, however.     Thanks,   Ingrid       I left  for pt with Ingrid's recommendations regarding chiropractic care. Ok to proceed, but pt should avoid having work done in her neck region. Isabel CONTRERAS April 6, 2023, 4:58 PM

## 2023-04-14 ENCOUNTER — TRANSFERRED RECORDS (OUTPATIENT)
Dept: HEALTH INFORMATION MANAGEMENT | Facility: CLINIC | Age: 83
End: 2023-04-14
Payer: COMMERCIAL

## 2023-05-15 ENCOUNTER — OFFICE VISIT (OUTPATIENT)
Dept: CARDIOLOGY | Facility: CLINIC | Age: 83
End: 2023-05-15
Payer: COMMERCIAL

## 2023-05-15 VITALS
BODY MASS INDEX: 31.7 KG/M2 | OXYGEN SATURATION: 99 % | WEIGHT: 185.7 LBS | HEIGHT: 64 IN | DIASTOLIC BLOOD PRESSURE: 78 MMHG | SYSTOLIC BLOOD PRESSURE: 134 MMHG | HEART RATE: 65 BPM

## 2023-05-15 DIAGNOSIS — Z95.3 S/P TAVR (TRANSCATHETER AORTIC VALVE REPLACEMENT), BIOPROSTHETIC: ICD-10-CM

## 2023-05-15 PROCEDURE — 99214 OFFICE O/P EST MOD 30 MIN: CPT | Performed by: INTERNAL MEDICINE

## 2023-05-15 NOTE — PROGRESS NOTES
[facility] Clinic Progress note    Assessment:  Paroxysmal atrial fibrillation  -Noted on routine pacemaker check completed 3/2023  - initiation of Eliquis 5 mg twice daily at follow up clinic visit  Hypertension  -On amlodipine 5 mg daily, lisinopril 30 mg once daily  -BP well controlled on current regimen; 136/80 in clinic today  Chronic diastolic heart failure  -Not on diuretics  -Appears well compensated on exam  Severe aortic stenosis  -Status post TAVR with 26 mm Medtronic Evolut FX valve 11/8/22  -gradients remain stable  Complete heart block status post dual-chamber permanent pacemaker implant 11/9/2022 post-TAVR  -Ongoing device checks per routine  Minimal CAD on pre-TAVR angio 9/30/22  -on simvastatin. LDL <70    Plan:  1. Continue current medications  2. She is due this summer for her annual physical and then one year post-TAVR visit in Nov 2023.     HPI:     Lexii Stratton is a very nice 82 year old here for 6 months follow up post-TAVR. She was diagnosed with afib on her device check 3/21/23 and subsequently started on Eliquis. She is tolerating the eliquis with no bleeding issues. Her Meloxicam was stopped and her left hand arthritis flared, but she got a cortisone injection and that was helpful.     She is walking 2 miles 3 days a week with her walker on a walking track at [place]. It takes about 50 minutes. She has no chest pain or abnormal dyspnea. Her energy is good. No edema. She is not symptomatic with her paroxysmal afib.       Last echo  12/1/22  The visual ejection fraction is 60-65%.  The left ventricle is normal in structure, function and size.  No regional wall motion abnormalities noted.  26 mm Medtronic CoreValve in place. Well seated. Mean gradient is 4 mmHg. No  PVL  There is no pericardial effusion.     Compared to 11/9/22 report, Mean AV gradient is lower and LV function is not  hyperdynamic.      CURRENT MEDICATIONS:  Current Outpatient Medications   Medication Sig Dispense  Refill     acetaminophen (TYLENOL) 500 MG tablet Take 1,000 mg by mouth 2 times daily (2 x 500 mg = 1000 mg)       amLODIPine (NORVASC) 5 MG tablet TAKE ONE TABLET BY MOUTH EVERY NIGHT AT BEDTIME 90 tablet 3     amoxicillin (AMOXIL) 500 MG capsule Take 2,000 mg by mouth once as needed (1 hour prior to dental procedures 4 x 500 mg = 2000 mg)       apixaban ANTICOAGULANT (ELIQUIS ANTICOAGULANT) 5 MG tablet Take 1 tablet (5 mg) by mouth 2 times daily 180 tablet 3     Biotin 5000 MCG PO TABS Take 1 tablet by mouth daily       calcium carbonate (OS-CINDY) 500 MG tablet Take 1 tablet by mouth every other day       CENTRUM SILVER OR TABS Take 1 tablet by mouth daily 30 0     Cranberry 450 MG CAPS Take 1 capsule by mouth daily (with dinner)       docusate sodium (COLACE) 100 MG capsule Take 100 mg by mouth At Bedtime       ferrous gluconate (FERGON) 324 (38 FE) MG tablet Take 1 tablet (324 mg) by mouth 2 times daily 100 tablet 0     latanoprost (XALATAN) 0.005 % ophthalmic solution Place 1 drop into both eyes At Bedtime        levothyroxine (SYNTHROID/LEVOTHROID) 50 MCG tablet Take 1 tablet (50 mcg) by mouth daily 90 tablet 3     lisinopril (ZESTRIL) 30 MG tablet Take 1 tablet (30 mg) by mouth every morning 90 tablet 3     magnesium 250 MG tablet Take 1 tablet by mouth daily       nitroGLYcerin (NITROSTAT) 0.4 MG sublingual tablet For chest pain place 1 tablet under the tongue every 5 minutes for 3 doses. If symptoms persist 5 minutes after 1st dose call 911. (Patient taking differently: 0.4 mg every 5 minutes as needed For chest pain place 1 tablet under the tongue every 5 minutes for 3 doses. If symptoms persist 5 minutes after 1st dose call 911.) 25 tablet 11     OMEGA-3 FATTY ACIDS 1200 MG OR CAPS Take 1 capsule by mouth daily  0     pantoprazole (PROTONIX) 20 MG EC tablet Take 1 tablet (20 mg) by mouth daily 90 tablet 3     potassium 99 MG TABS Take 1 tablet by mouth daily (with dinner)        Probiotic Product  (ACIDOPHILUS PROBIOTIC BLEND) CAPS Take 1 capsule by mouth daily (with breakfast)  30 capsule 0     simvastatin (ZOCOR) 20 MG tablet TAKE ONE TABLET BY MOUTH EVERY NIGHT AT BEDTIME 90 tablet 3     VITAMIN D, CHOLECALCIFEROL, PO Take 2,000 Units by mouth daily         ALLERGIES     Allergies   Allergen Reactions     Amoxicillin-Pot Clavulanate Diarrhea     Hydrocodone      Keeps patient awake     Morphine And Related Nausea and Vomiting     Naproxen Itching and Rash     Seasonal Allergies      Hay fever in fall, ragweed and russian thistles     Sulfa Antibiotics Nausea       PAST MEDICAL, SURGICAL, FAMILY, SOCIAL HISTORY:  History was reviewed and updated as needed, see medical record.    REVIEW OF SYSTEMS:  Skin:  Negative     Eyes:  Positive for    ENT:  Positive for hearing loss  Respiratory:  Positive for sleep apnea;CPAP  Cardiovascular:  Negative    Gastroenterology: Positive for heartburn;reflux  Genitourinary:  Positive for nocturia;urinary frequency;urgency  Musculoskeletal:  Positive for back pain;arthritis;neck pain  Neurologic:  Negative    Psychiatric:  Negative    Heme/Lymph/Imm:  Positive for allergies  Endocrine:  Negative thyroid disorder    PHYSICAL EXAM:      BP: 134/78 Pulse: 65     SpO2: 99 %      Vital Signs with Ranges  Pulse:  [65] 65  BP: (134)/(78) 134/78  SpO2:  [99 %] 99 %  185 lbs 11.2 oz    Constitutional: awake, alert, no distress. Neatly groomed.  Eyes: sclera nonicteric  ENT: trachea midline  Respiratory: clear bilaterally  Cardiovascular: regular rate and rhythm 1/6 Systolic ejection-quality murmur right upper sternal border  GI: nondistended, nontender, bowel sounds present  Skin: dry, no rash no edema, small peripheral varicosities bilterally  Musculoskeletal: grossly normal muscle bulk and tone  Neurologic: no  gross focal deficits  Neuropsychiatric: normal affect    Recent Lab Results:  LIPID RESULTS:  Lab Results   Component Value Date    CHOL 132 07/28/2022    CHOL 157  02/24/2021    HDL 50 07/28/2022    HDL 50 02/24/2021    LDL 62 07/28/2022    LDL 85 02/24/2021    TRIG 101 07/28/2022    TRIG 110 02/24/2021    CHOLHDLRATIO 3.3 06/29/2015       LIVER ENZYME RESULTS:  Lab Results   Component Value Date    AST 28 10/27/2022    AST 21 02/24/2021    ALT 20 10/27/2022    ALT 18 02/24/2021       CBC RESULTS:  Lab Results   Component Value Date    WBC 7.2 12/01/2022    WBC 11.3 (H) 03/12/2021    RBC 4.29 12/01/2022    RBC 4.56 03/12/2021    HGB 13.8 12/01/2022    HGB 13.8 03/12/2021    HCT 43.0 12/01/2022    HCT 43.7 03/12/2021     12/01/2022    MCV 96 03/12/2021    MCH 32.2 12/01/2022    MCH 30.3 03/12/2021    MCHC 32.1 12/01/2022    MCHC 31.6 03/12/2021    RDW 12.5 12/01/2022    RDW 13.1 03/12/2021     12/01/2022     03/12/2021       BMP RESULTS:  Lab Results   Component Value Date     12/01/2022     02/24/2021    POTASSIUM 4.3 12/01/2022    POTASSIUM 4.2 11/09/2022    POTASSIUM 3.9 02/24/2021    CHLORIDE 104 12/01/2022    CHLORIDE 108 11/09/2022    CHLORIDE 107 02/24/2021    CO2 28 12/01/2022    CO2 22 11/09/2022    CO2 28 02/24/2021    ANIONGAP 10 12/01/2022    ANIONGAP 10 11/09/2022    ANIONGAP 5 02/24/2021     (H) 12/01/2022     (H) 11/09/2022    GLC 95 11/09/2022     (H) 02/24/2021    BUN 20.1 12/01/2022    BUN 16 11/09/2022    BUN 18 02/24/2021    CR 0.81 12/01/2022    CR 0.90 02/24/2021    GFRESTIMATED 73 12/01/2022    GFRESTIMATED 48 (L) 08/16/2021    GFRESTIMATED 60 (L) 02/24/2021    GFRESTBLACK 70 02/24/2021    CINDY 10.0 12/01/2022    CINDY 9.9 02/24/2021        A1C RESULTS:  Lab Results   Component Value Date    A1C 5.6 07/28/2022    A1C 6.1 (H) 02/24/2021       INR RESULTS:  Lab Results   Component Value Date    INR 1.06 10/27/2022    INR 1.07 09/30/2022    INR 1.15 (H) 03/28/2018    INR 1.65 (H) 07/03/2009

## 2023-05-15 NOTE — LETTER
5/15/2023    Jesenia Madrigal MD  [address]  [facility] [address]    RE: Lexii Stratton       Dear Colleague,     I had the pleasure of seeing Lexii Stratton in the [facility].  [facility] Progress note    Assessment:  Paroxysmal atrial fibrillation  -Noted on routine pacemaker check completed 3/2023  - initiation of Eliquis 5 mg twice daily at follow up clinic visit  Hypertension  -On amlodipine 5 mg daily, lisinopril 30 mg once daily  -BP well controlled on current regimen; 136/80 in clinic today  Chronic diastolic heart failure  -Not on diuretics  -Appears well compensated on exam  Severe aortic stenosis  -Status post TAVR with 26 mm Medtronic Evolut FX valve 11/8/22  -gradients remain stable  Complete heart block status post dual-chamber permanent pacemaker implant 11/9/2022 post-TAVR  -Ongoing device checks per routine  Minimal CAD on pre-TAVR angio 9/30/22  -on simvastatin. LDL <70    Plan:  Continue current medications  She is due this summer for her annual physical and then one year post-TAVR visit in Nov 2023.     HPI:     Lexii Stratton is a very nice 82 year old here for 6 months follow up post-TAVR. She was diagnosed with afib on her device check 3/21/23 and subsequently started on Eliquis. She is tolerating the eliquis with no bleeding issues. Her Meloxicam was stopped and her left hand arthritis flared, but she got a cortisone injection and that was helpful.     She is walking 2 miles 3 days a week with her walker on a walking track at [place]. It takes about 50 minutes. She has no chest pain or abnormal dyspnea. Her energy is good. No edema. She is not symptomatic with her paroxysmal afib.       Last echo  12/1/22  The visual ejection fraction is 60-65%.  The left ventricle is normal in structure, function and size.  No regional wall motion abnormalities noted.  26 mm Medtronic CoreValve in place. Well seated. Mean gradient is 4 mmHg. No  PVL  There is no  pericardial effusion.     Compared to 11/9/22 report, Mean AV gradient is lower and LV function is not  hyperdynamic.      CURRENT MEDICATIONS:  Current Outpatient Medications   Medication Sig Dispense Refill    acetaminophen (TYLENOL) 500 MG tablet Take 1,000 mg by mouth 2 times daily (2 x 500 mg = 1000 mg)      amLODIPine (NORVASC) 5 MG tablet TAKE ONE TABLET BY MOUTH EVERY NIGHT AT BEDTIME 90 tablet 3    amoxicillin (AMOXIL) 500 MG capsule Take 2,000 mg by mouth once as needed (1 hour prior to dental procedures 4 x 500 mg = 2000 mg)      apixaban ANTICOAGULANT (ELIQUIS ANTICOAGULANT) 5 MG tablet Take 1 tablet (5 mg) by mouth 2 times daily 180 tablet 3    Biotin 5000 MCG PO TABS Take 1 tablet by mouth daily      calcium carbonate (OS-CINDY) 500 MG tablet Take 1 tablet by mouth every other day      CENTRUM SILVER OR TABS Take 1 tablet by mouth daily 30 0    Cranberry 450 MG CAPS Take 1 capsule by mouth daily (with dinner)      docusate sodium (COLACE) 100 MG capsule Take 100 mg by mouth At Bedtime      ferrous gluconate (FERGON) 324 (38 FE) MG tablet Take 1 tablet (324 mg) by mouth 2 times daily 100 tablet 0    latanoprost (XALATAN) 0.005 % ophthalmic solution Place 1 drop into both eyes At Bedtime       levothyroxine (SYNTHROID/LEVOTHROID) 50 MCG tablet Take 1 tablet (50 mcg) by mouth daily 90 tablet 3    lisinopril (ZESTRIL) 30 MG tablet Take 1 tablet (30 mg) by mouth every morning 90 tablet 3    magnesium 250 MG tablet Take 1 tablet by mouth daily      nitroGLYcerin (NITROSTAT) 0.4 MG sublingual tablet For chest pain place 1 tablet under the tongue every 5 minutes for 3 doses. If symptoms persist 5 minutes after 1st dose call 911. (Patient taking differently: 0.4 mg every 5 minutes as needed For chest pain place 1 tablet under the tongue every 5 minutes for 3 doses. If symptoms persist 5 minutes after 1st dose call 911.) 25 tablet 11    OMEGA-3 FATTY ACIDS 1200 MG OR CAPS Take 1 capsule by mouth daily  0     pantoprazole (PROTONIX) 20 MG EC tablet Take 1 tablet (20 mg) by mouth daily 90 tablet 3    potassium 99 MG TABS Take 1 tablet by mouth daily (with dinner)       Probiotic Product (ACIDOPHILUS PROBIOTIC BLEND) CAPS Take 1 capsule by mouth daily (with breakfast)  30 capsule 0    simvastatin (ZOCOR) 20 MG tablet TAKE ONE TABLET BY MOUTH EVERY NIGHT AT BEDTIME 90 tablet 3    VITAMIN D, CHOLECALCIFEROL, PO Take 2,000 Units by mouth daily         ALLERGIES     Allergies   Allergen Reactions    Amoxicillin-Pot Clavulanate Diarrhea    Hydrocodone      Keeps patient awake    Morphine And Related Nausea and Vomiting    Naproxen Itching and Rash    Seasonal Allergies      Hay fever in fall, ragweed and russian thistles    Sulfa Antibiotics Nausea       PAST MEDICAL, SURGICAL, FAMILY, SOCIAL HISTORY:  History was reviewed and updated as needed, see medical record.    REVIEW OF SYSTEMS:  Skin:  Negative     Eyes:  Positive for    ENT:  Positive for hearing loss  Respiratory:  Positive for sleep apnea;CPAP  Cardiovascular:  Negative    Gastroenterology: Positive for heartburn;reflux  Genitourinary:  Positive for nocturia;urinary frequency;urgency  Musculoskeletal:  Positive for back pain;arthritis;neck pain  Neurologic:  Negative    Psychiatric:  Negative    Heme/Lymph/Imm:  Positive for allergies  Endocrine:  Negative thyroid disorder    PHYSICAL EXAM:      BP: 134/78 Pulse: 65     SpO2: 99 %      Vital Signs with Ranges  Pulse:  [65] 65  BP: (134)/(78) 134/78  SpO2:  [99 %] 99 %  185 lbs 11.2 oz    Constitutional: awake, alert, no distress. Neatly groomed.  Eyes: sclera nonicteric  ENT: trachea midline  Respiratory: clear bilaterally  Cardiovascular: regular rate and rhythm 1/6 Systolic ejection-quality murmur right upper sternal border  GI: nondistended, nontender, bowel sounds present  Skin: dry, no rash no edema, small peripheral varicosities bilterally  Musculoskeletal: grossly normal muscle bulk and tone  Neurologic: no   gross focal deficits  Neuropsychiatric: normal affect    Recent Lab Results:  LIPID RESULTS:  Lab Results   Component Value Date    CHOL 132 07/28/2022    CHOL 157 02/24/2021    HDL 50 07/28/2022    HDL 50 02/24/2021    LDL 62 07/28/2022    LDL 85 02/24/2021    TRIG 101 07/28/2022    TRIG 110 02/24/2021    CHOLHDLRATIO 3.3 06/29/2015       LIVER ENZYME RESULTS:  Lab Results   Component Value Date    AST 28 10/27/2022    AST 21 02/24/2021    ALT 20 10/27/2022    ALT 18 02/24/2021       CBC RESULTS:  Lab Results   Component Value Date    WBC 7.2 12/01/2022    WBC 11.3 (H) 03/12/2021    RBC 4.29 12/01/2022    RBC 4.56 03/12/2021    HGB 13.8 12/01/2022    HGB 13.8 03/12/2021    HCT 43.0 12/01/2022    HCT 43.7 03/12/2021     12/01/2022    MCV 96 03/12/2021    MCH 32.2 12/01/2022    MCH 30.3 03/12/2021    MCHC 32.1 12/01/2022    MCHC 31.6 03/12/2021    RDW 12.5 12/01/2022    RDW 13.1 03/12/2021     12/01/2022     03/12/2021       BMP RESULTS:  Lab Results   Component Value Date     12/01/2022     02/24/2021    POTASSIUM 4.3 12/01/2022    POTASSIUM 4.2 11/09/2022    POTASSIUM 3.9 02/24/2021    CHLORIDE 104 12/01/2022    CHLORIDE 108 11/09/2022    CHLORIDE 107 02/24/2021    CO2 28 12/01/2022    CO2 22 11/09/2022    CO2 28 02/24/2021    ANIONGAP 10 12/01/2022    ANIONGAP 10 11/09/2022    ANIONGAP 5 02/24/2021     (H) 12/01/2022     (H) 11/09/2022    GLC 95 11/09/2022     (H) 02/24/2021    BUN 20.1 12/01/2022    BUN 16 11/09/2022    BUN 18 02/24/2021    CR 0.81 12/01/2022    CR 0.90 02/24/2021    GFRESTIMATED 73 12/01/2022    GFRESTIMATED 48 (L) 08/16/2021    GFRESTIMATED 60 (L) 02/24/2021    GFRESTBLACK 70 02/24/2021    CINDY 10.0 12/01/2022    CINDY 9.9 02/24/2021        A1C RESULTS:  Lab Results   Component Value Date    A1C 5.6 07/28/2022    A1C 6.1 (H) 02/24/2021       INR RESULTS:  Lab Results   Component Value Date    INR 1.06 10/27/2022    INR 1.07 09/30/2022    INR  1.15 (H) 03/28/2018    INR 1.65 (H) 07/03/2009               Thank you for allowing me to participate in the care of your patient.      Sincerely,     Yulia Castano MD     [facility]  cc:   Yulia Castano MD  [address] [address]

## 2023-05-18 ENCOUNTER — LAB (OUTPATIENT)
Dept: LAB | Facility: CLINIC | Age: 83
End: 2023-05-18
Payer: COMMERCIAL

## 2023-05-18 DIAGNOSIS — E78.5 HYPERLIPIDEMIA LDL GOAL <130: ICD-10-CM

## 2023-05-18 DIAGNOSIS — E03.9 ACQUIRED HYPOTHYROIDISM: ICD-10-CM

## 2023-05-18 DIAGNOSIS — E11.9 DIABETES MELLITUS, TYPE 2 (H): ICD-10-CM

## 2023-05-18 DIAGNOSIS — I10 ESSENTIAL HYPERTENSION, BENIGN: ICD-10-CM

## 2023-05-18 DIAGNOSIS — E08.65: ICD-10-CM

## 2023-05-18 DIAGNOSIS — N18.31 CHRONIC KIDNEY DISEASE, STAGE 3A (H): ICD-10-CM

## 2023-05-18 LAB
CREAT UR-MCNC: 37.3 MG/DL
HBA1C MFR BLD: 6 % (ref 0–5.6)
MICROALBUMIN UR-MCNC: 22.3 MG/L
MICROALBUMIN/CREAT UR: 59.79 MG/G CR (ref 0–25)
TSH SERPL DL<=0.005 MIU/L-ACNC: 2.52 UIU/ML (ref 0.3–4.2)

## 2023-05-18 PROCEDURE — 80061 LIPID PANEL: CPT

## 2023-05-18 PROCEDURE — 80053 COMPREHEN METABOLIC PANEL: CPT

## 2023-05-18 PROCEDURE — 82570 ASSAY OF URINE CREATININE: CPT

## 2023-05-18 PROCEDURE — 84443 ASSAY THYROID STIM HORMONE: CPT

## 2023-05-18 PROCEDURE — 82043 UR ALBUMIN QUANTITATIVE: CPT

## 2023-05-18 PROCEDURE — 36415 COLL VENOUS BLD VENIPUNCTURE: CPT

## 2023-05-18 PROCEDURE — 83036 HEMOGLOBIN GLYCOSYLATED A1C: CPT

## 2023-05-22 ENCOUNTER — OFFICE VISIT (OUTPATIENT)
Dept: FAMILY MEDICINE | Facility: CLINIC | Age: 83
End: 2023-05-22
Payer: COMMERCIAL

## 2023-05-22 VITALS
TEMPERATURE: 98 F | SYSTOLIC BLOOD PRESSURE: 124 MMHG | DIASTOLIC BLOOD PRESSURE: 68 MMHG | HEIGHT: 64 IN | HEART RATE: 60 BPM | RESPIRATION RATE: 17 BRPM | WEIGHT: 186 LBS | OXYGEN SATURATION: 100 % | BODY MASS INDEX: 31.76 KG/M2

## 2023-05-22 DIAGNOSIS — E08.65: Primary | ICD-10-CM

## 2023-05-22 DIAGNOSIS — I10 ESSENTIAL HYPERTENSION, BENIGN: ICD-10-CM

## 2023-05-22 DIAGNOSIS — E03.9 ACQUIRED HYPOTHYROIDISM: ICD-10-CM

## 2023-05-22 DIAGNOSIS — E78.5 HYPERLIPIDEMIA LDL GOAL <130: ICD-10-CM

## 2023-05-22 DIAGNOSIS — N18.31 CHRONIC KIDNEY DISEASE, STAGE 3A (H): ICD-10-CM

## 2023-05-22 DIAGNOSIS — Z95.0 CARDIAC PACEMAKER: ICD-10-CM

## 2023-05-22 DIAGNOSIS — E66.811 OBESITY (BMI 30.0-34.9): ICD-10-CM

## 2023-05-22 DIAGNOSIS — Z95.3 S/P TAVR (TRANSCATHETER AORTIC VALVE REPLACEMENT), BIOPROSTHETIC: ICD-10-CM

## 2023-05-22 LAB
ALBUMIN SERPL BCG-MCNC: 4 G/DL (ref 3.5–5.2)
ALP SERPL-CCNC: 103 U/L (ref 35–104)
ALT SERPL W P-5'-P-CCNC: 13 U/L (ref 10–35)
ANION GAP SERPL CALCULATED.3IONS-SCNC: 18 MMOL/L (ref 7–15)
AST SERPL W P-5'-P-CCNC: 31 U/L (ref 10–35)
BILIRUB SERPL-MCNC: 0.7 MG/DL
BUN SERPL-MCNC: 12.2 MG/DL (ref 8–23)
CALCIUM SERPL-MCNC: 10.1 MG/DL (ref 8.8–10.2)
CHLORIDE SERPL-SCNC: 102 MMOL/L (ref 98–107)
CHOLEST SERPL-MCNC: 193 MG/DL
CREAT SERPL-MCNC: 0.77 MG/DL (ref 0.51–0.95)
DEPRECATED HCO3 PLAS-SCNC: 21 MMOL/L (ref 22–29)
GFR SERPL CREATININE-BSD FRML MDRD: 77 ML/MIN/1.73M2
GLUCOSE SERPL-MCNC: 88 MG/DL (ref 70–99)
HDLC SERPL-MCNC: 49 MG/DL
LDLC SERPL CALC-MCNC: 112 MG/DL
NONHDLC SERPL-MCNC: 144 MG/DL
POTASSIUM SERPL-SCNC: 5 MMOL/L (ref 3.4–5.3)
PROT SERPL-MCNC: 6.5 G/DL (ref 6.4–8.3)
SODIUM SERPL-SCNC: 141 MMOL/L (ref 136–145)
TRIGL SERPL-MCNC: 159 MG/DL

## 2023-05-22 PROCEDURE — 99214 OFFICE O/P EST MOD 30 MIN: CPT | Performed by: INTERNAL MEDICINE

## 2023-05-22 PROCEDURE — 99207 PR FOOT EXAM NO CHARGE: CPT | Mod: 25 | Performed by: INTERNAL MEDICINE

## 2023-05-22 NOTE — PROGRESS NOTES
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   Lab Results   Component Value Date    A1C 6.0 05/18/2023    A1C 6.1 02/24/2021     Plan: FOOT EXAM, Comprehensive metabolic panel (BMP +        Alb, Alk Phos, ALT, AST, Total. Bili, TP)          (N18.31) Chronic kidney disease, stage 3a (H)  Comment: monitor renal function; pt on Lisinopril  Plan: Comprehensive metabolic panel (BMP + Alb, Alk         Phos, ALT, AST, Total. Bili, TP), Lipid panel         reflex to direct LDL Fasting,           (E78.5) Hyperlipidemia LDL goal <130  Comment: Simvastatin well tolerated;   Plan: Comprehensive metabolic panel (BMP + Alb, Alk         Phos, ALT, AST, Total. Bili, TP), Lipid panel         reflex to direct LDL Fasting,           (E03.9) Acquired hypothyroidism  Comment: monitoring thyroid; Levothyroxine effective dose.  Plan: continue same med/dose    (I10) Essential hypertension, benign  Comment: BLOOD PRESSURE well controlled; doing well on Lisinopril and Amlodipine.  Plan: Comprehensive metabolic panel (BMP + Alb, Alk         Phos, ALT, AST, Total. Bili, TP),           (Z95.3) S/p TAVR (transcatheter aortic valve replacement), bioprosthetic  Comment: 11/8/2022 TAVR successful; Cardiac rehab x 31  Walking at [facility] 3 times per week; she is now up to 2 miles per time in 45 minutes; feeling well;   Plan: keep active;  Brief episode of postoperative Atrial Fibrillation ; rate controlled with pacemaker; anticoagulated with Eliquis    (E66.9) Obesity (BMI 30.0-34.9)  Comment: Body mass index is 31.93 kg/m .   Plan: weight is improving with regular exercise and healthy eating.     (Z95.0) Cardiac pacemaker  Comment: placed following TAVR procedure 11/8/2023  Plan: pacemaker    Review of the result(s) of each unique  "test - chart reviewed incuding labs, meds and plan of care.  Ordering of each unique test  Prescription drug management  38 minutes spent by me on the date of the encounter doing chart review, history and exam, documentation and further activities per the note       BMI:   Estimated body mass index is 31.93 kg/m  as calculated from the following:    Height as of this encounter: 1.626 m (5' 4\").    Weight as of this encounter: 84.4 kg (186 lb).   Weight management plan: Discussed healthy diet and exercise guidelines    MEDICATIONS:   No orders of the defined types were placed in this encounter.         - Continue other medications without change  FUTURE APPOINTMENTS:       - Make follow-up visit after next lab draw  SELF MONITORING:       - Please check blood glucose readings periodic glucose readings.       - Please check blood pressure readings weekly    Jesenia Madrigal MD  Internal Medicine   [facility] DAVISMOJACQUES Shultz is a 82 year old, presenting for the following health issues:  Hypertension, Diabetes, Lipids, and Thyroid Problem        5/22/2023     9:23 AM   Additional Questions   Roomed by Nan TOMLINSON LPN     History of Present Illness       Hyperlipidemia:  She presents for follow up of hyperlipidemia.  She is taking medication to lower cholesterol. She is not having myalgia or other side effects to statin medications.    Hypertension: She presents for follow up of hypertension.  She does not check blood pressure  regularly outside of the clinic. Outpatient blood pressures have not been over 140/90. She follows a low salt diet.     She eats 0-1 servings of fruits and vegetables daily.She consumes 0 sweetened beverage(s) daily. She exercises with enough effort to increase her heart rate 3 or less days per week.   She is taking medications regularly.       Diabetes Follow-up      How often are you checking your blood sugar? Not at all    What concerns do you have " "today about your diabetes? None     Do you have any of these symptoms? (Select all that apply)  No numbness or tingling in feet.  No redness, sores or blisters on feet.  No complaints of excessive thirst.  No reports of blurry vision.  No significant changes to weight.    Have you had a diabetic eye exam in the last 12 months? Yes-  Location: [place] [facility]    Thyroid follow up:   No concerns.     BP Readings from Last 2 Encounters:   05/22/23 124/68   05/15/23 134/78     Hemoglobin A1C (%)   Date Value   05/18/2023 6.0 (H)   07/28/2022 5.6   02/24/2021 6.1 (H)   08/26/2020 5.9 (H)     LDL Cholesterol Calculated (mg/dL)   Date Value   07/28/2022 62   02/01/2022 73   02/24/2021 85   08/26/2020 83           Review of Systems   CONSTITUTIONAL: NEGATIVE for fever, chills, change in weight  EYES: NEGATIVE for vision changes or irritation  RESP:NEGATIVE for significant cough or SOB  CV: Pacemaker and TAVR 11/8/2022 doing well; exercising regularly; 2 miles in 45 minutes; walking with walker; feeling well;  PAF episode in December but notified in April 2023; she is now on Eliquis; well tolerated.   GI: no SBO symptoms since early April; close monitoring of diet. Managed at home.   MUSCULOSKELETAL: right thumb OA; recent injection by [facility]; ( more symptoms since stopping Meloxicam ( pt on Eliquis since 4/2023)  NEURO: NEGATIVE for weakness, dizziness or paresthesias  ENDOCRINE: lipids; cardiology stopped Fenofibrate 11/2022; Lipids pending. doing well on Simvastatin   HEME/ALLERGY/IMMUNE: pt on Eliquis  PSYCHIATRIC: NEGATIVE for changes in mood or affect      Objective    /68   Pulse 60   Temp 98  F (36.7  C) (Oral)   Resp 17   Ht 1.626 m (5' 4\")   Wt 84.4 kg (186 lb)   LMP  (LMP Unknown)   SpO2 100%   BMI 31.93 kg/m    Body mass index is 31.93 kg/m .  Physical Exam   GENERAL: healthy, alert and no distress  NECK: no adenopathy, no asymmetry, masses, or scars and thyroid normal to palpation  RESP: lungs " clear to auscultation - no rales, rhonchi or wheezes  CV: regular rate and rhythm, normal S1 S2, no S3 or S4, no murmur, click or rub, no peripheral edema and peripheral pulses strong  ABDOMEN: soft, nontender, no hepatosplenomegaly, no masses and bowel sounds normal  MS: no gross musculoskeletal defects noted, no edema

## 2023-06-27 ENCOUNTER — ANCILLARY PROCEDURE (OUTPATIENT)
Dept: CARDIOLOGY | Facility: CLINIC | Age: 83
End: 2023-06-27
Attending: INTERNAL MEDICINE
Payer: COMMERCIAL

## 2023-06-27 DIAGNOSIS — I44.2 COMPLETE HEART BLOCK (H): ICD-10-CM

## 2023-06-27 DIAGNOSIS — Z95.0 CARDIAC PACEMAKER IN SITU: ICD-10-CM

## 2023-06-27 PROCEDURE — 93296 REM INTERROG EVL PM/IDS: CPT | Performed by: INTERNAL MEDICINE

## 2023-06-27 PROCEDURE — 93294 REM INTERROG EVL PM/LDLS PM: CPT | Performed by: INTERNAL MEDICINE

## 2023-07-03 LAB
MDC_IDC_EPISODE_DTM: NORMAL
MDC_IDC_EPISODE_ID: NORMAL
MDC_IDC_EPISODE_TYPE: NORMAL
MDC_IDC_LEAD_IMPLANT_DT: NORMAL
MDC_IDC_LEAD_IMPLANT_DT: NORMAL
MDC_IDC_LEAD_LOCATION: NORMAL
MDC_IDC_LEAD_LOCATION: NORMAL
MDC_IDC_LEAD_LOCATION_DETAIL_1: NORMAL
MDC_IDC_LEAD_LOCATION_DETAIL_1: NORMAL
MDC_IDC_LEAD_MFG: NORMAL
MDC_IDC_LEAD_MFG: NORMAL
MDC_IDC_LEAD_MODEL: NORMAL
MDC_IDC_LEAD_MODEL: NORMAL
MDC_IDC_LEAD_POLARITY_TYPE: NORMAL
MDC_IDC_LEAD_POLARITY_TYPE: NORMAL
MDC_IDC_LEAD_SERIAL: NORMAL
MDC_IDC_LEAD_SERIAL: NORMAL
MDC_IDC_MSMT_BATTERY_DTM: NORMAL
MDC_IDC_MSMT_BATTERY_REMAINING_LONGEVITY: 162 MO
MDC_IDC_MSMT_BATTERY_REMAINING_PERCENTAGE: 100 %
MDC_IDC_MSMT_BATTERY_STATUS: NORMAL
MDC_IDC_MSMT_LEADCHNL_RA_IMPEDANCE_VALUE: 589 OHM
MDC_IDC_MSMT_LEADCHNL_RA_PACING_THRESHOLD_AMPLITUDE: 0.6 V
MDC_IDC_MSMT_LEADCHNL_RA_PACING_THRESHOLD_PULSEWIDTH: 0.4 MS
MDC_IDC_MSMT_LEADCHNL_RV_IMPEDANCE_VALUE: 701 OHM
MDC_IDC_MSMT_LEADCHNL_RV_PACING_THRESHOLD_AMPLITUDE: 2.4 V
MDC_IDC_MSMT_LEADCHNL_RV_PACING_THRESHOLD_PULSEWIDTH: 0.4 MS
MDC_IDC_PG_IMPLANT_DTM: NORMAL
MDC_IDC_PG_MFG: NORMAL
MDC_IDC_PG_MODEL: NORMAL
MDC_IDC_PG_SERIAL: NORMAL
MDC_IDC_PG_TYPE: NORMAL
MDC_IDC_SESS_CLINIC_NAME: NORMAL
MDC_IDC_SESS_DTM: NORMAL
MDC_IDC_SESS_TYPE: NORMAL
MDC_IDC_SET_BRADY_AT_MODE_SWITCH_MODE: NORMAL
MDC_IDC_SET_BRADY_AT_MODE_SWITCH_RATE: 170 {BEATS}/MIN
MDC_IDC_SET_BRADY_LOWRATE: 60 {BEATS}/MIN
MDC_IDC_SET_BRADY_MAX_SENSOR_RATE: 130 {BEATS}/MIN
MDC_IDC_SET_BRADY_MAX_TRACKING_RATE: 130 {BEATS}/MIN
MDC_IDC_SET_BRADY_MODE: NORMAL
MDC_IDC_SET_BRADY_PAV_DELAY_HIGH: 200 MS
MDC_IDC_SET_BRADY_PAV_DELAY_LOW: 250 MS
MDC_IDC_SET_BRADY_SAV_DELAY_HIGH: 200 MS
MDC_IDC_SET_BRADY_SAV_DELAY_LOW: 250 MS
MDC_IDC_SET_LEADCHNL_RA_PACING_AMPLITUDE: 2 V
MDC_IDC_SET_LEADCHNL_RA_PACING_CAPTURE_MODE: NORMAL
MDC_IDC_SET_LEADCHNL_RA_PACING_POLARITY: NORMAL
MDC_IDC_SET_LEADCHNL_RA_PACING_PULSEWIDTH: 0.4 MS
MDC_IDC_SET_LEADCHNL_RA_SENSING_ADAPTATION_MODE: NORMAL
MDC_IDC_SET_LEADCHNL_RA_SENSING_POLARITY: NORMAL
MDC_IDC_SET_LEADCHNL_RA_SENSING_SENSITIVITY: 0.25 MV
MDC_IDC_SET_LEADCHNL_RV_PACING_AMPLITUDE: 1.4 V
MDC_IDC_SET_LEADCHNL_RV_PACING_CAPTURE_MODE: NORMAL
MDC_IDC_SET_LEADCHNL_RV_PACING_POLARITY: NORMAL
MDC_IDC_SET_LEADCHNL_RV_PACING_PULSEWIDTH: 0.4 MS
MDC_IDC_SET_LEADCHNL_RV_SENSING_ADAPTATION_MODE: NORMAL
MDC_IDC_SET_LEADCHNL_RV_SENSING_POLARITY: NORMAL
MDC_IDC_SET_LEADCHNL_RV_SENSING_SENSITIVITY: 1.5 MV
MDC_IDC_SET_ZONE_DETECTION_INTERVAL: 375 MS
MDC_IDC_SET_ZONE_TYPE: NORMAL
MDC_IDC_SET_ZONE_VENDOR_TYPE: NORMAL
MDC_IDC_STAT_AT_BURDEN_PERCENT: 1 %
MDC_IDC_STAT_AT_DTM_END: NORMAL
MDC_IDC_STAT_AT_DTM_START: NORMAL
MDC_IDC_STAT_BRADY_DTM_END: NORMAL
MDC_IDC_STAT_BRADY_DTM_START: NORMAL
MDC_IDC_STAT_BRADY_RA_PERCENT_PACED: 93 %
MDC_IDC_STAT_BRADY_RV_PERCENT_PACED: 3 %
MDC_IDC_STAT_EPISODE_RECENT_COUNT: 0
MDC_IDC_STAT_EPISODE_RECENT_COUNT: 0
MDC_IDC_STAT_EPISODE_RECENT_COUNT: 1
MDC_IDC_STAT_EPISODE_RECENT_COUNT: 2
MDC_IDC_STAT_EPISODE_RECENT_COUNT: 5
MDC_IDC_STAT_EPISODE_RECENT_COUNT_DTM_END: NORMAL
MDC_IDC_STAT_EPISODE_RECENT_COUNT_DTM_START: NORMAL
MDC_IDC_STAT_EPISODE_TYPE: NORMAL
MDC_IDC_STAT_EPISODE_VENDOR_TYPE: NORMAL

## 2023-07-04 NOTE — PLAN OF CARE
Pertinent assessments: Aox4. Bradycardic and hypertensive. On RA. C/o lower back pain, PRN dilaudid given x1 and ice applied, with relief.  Denies nausea or abdominal pain. BS - audible. Pt reports passing small amounts of gas. Purewick in place, adequate output    Major Shift Events: Urine culture resulted, MD paged.     Treatment Plan: IVF, pain management, NPO   No

## 2023-07-05 ENCOUNTER — APPOINTMENT (OUTPATIENT)
Dept: GENERAL RADIOLOGY | Facility: CLINIC | Age: 83
DRG: 389 | End: 2023-07-05
Attending: EMERGENCY MEDICINE
Payer: COMMERCIAL

## 2023-07-05 ENCOUNTER — APPOINTMENT (OUTPATIENT)
Dept: CT IMAGING | Facility: CLINIC | Age: 83
DRG: 389 | End: 2023-07-05
Attending: EMERGENCY MEDICINE
Payer: COMMERCIAL

## 2023-07-05 ENCOUNTER — HOSPITAL ENCOUNTER (INPATIENT)
Facility: CLINIC | Age: 83
LOS: 3 days | Discharge: HOME | DRG: 389 | End: 2023-07-08
Attending: EMERGENCY MEDICINE | Admitting: INTERNAL MEDICINE
Payer: COMMERCIAL

## 2023-07-05 DIAGNOSIS — K56.609 SMALL BOWEL OBSTRUCTION (H): ICD-10-CM

## 2023-07-05 LAB
ALBUMIN SERPL BCG-MCNC: 4.3 G/DL (ref 3.5–5.2)
ALP SERPL-CCNC: 134 U/L (ref 35–104)
ALT SERPL W P-5'-P-CCNC: 15 U/L (ref 0–50)
ANION GAP SERPL CALCULATED.3IONS-SCNC: 17 MMOL/L (ref 7–15)
AST SERPL W P-5'-P-CCNC: 36 U/L (ref 0–45)
BASOPHILS # BLD AUTO: 0.1 10E3/UL (ref 0–0.2)
BASOPHILS NFR BLD AUTO: 1 %
BILIRUB SERPL-MCNC: 0.8 MG/DL
BUN SERPL-MCNC: 20 MG/DL (ref 8–23)
CALCIUM SERPL-MCNC: 10.7 MG/DL (ref 8.8–10.2)
CHLORIDE SERPL-SCNC: 101 MMOL/L (ref 98–107)
CREAT BLD-MCNC: 1 MG/DL (ref 0.5–1)
CREAT SERPL-MCNC: 0.94 MG/DL (ref 0.51–0.95)
DEPRECATED HCO3 PLAS-SCNC: 23 MMOL/L (ref 22–29)
EOSINOPHIL # BLD AUTO: 0.1 10E3/UL (ref 0–0.7)
EOSINOPHIL NFR BLD AUTO: 2 %
ERYTHROCYTE [DISTWIDTH] IN BLOOD BY AUTOMATED COUNT: 12.4 % (ref 10–15)
GFR SERPL CREATININE-BSD FRML MDRD: 56 ML/MIN/1.73M2
GFR SERPL CREATININE-BSD FRML MDRD: 60 ML/MIN/1.73M2
GLUCOSE SERPL-MCNC: 136 MG/DL (ref 70–99)
HCT VFR BLD AUTO: 49.2 % (ref 35–47)
HGB BLD-MCNC: 16.5 G/DL (ref 11.7–15.7)
HOLD SPECIMEN: NORMAL
HOLD SPECIMEN: NORMAL
IMM GRANULOCYTES # BLD: 0 10E3/UL
IMM GRANULOCYTES NFR BLD: 0 %
LYMPHOCYTES # BLD AUTO: 2.5 10E3/UL (ref 0.8–5.3)
LYMPHOCYTES NFR BLD AUTO: 28 %
MCH RBC QN AUTO: 32.3 PG (ref 26.5–33)
MCHC RBC AUTO-ENTMCNC: 33.5 G/DL (ref 31.5–36.5)
MCV RBC AUTO: 96 FL (ref 78–100)
MONOCYTES # BLD AUTO: 0.7 10E3/UL (ref 0–1.3)
MONOCYTES NFR BLD AUTO: 7 %
NEUTROPHILS # BLD AUTO: 5.6 10E3/UL (ref 1.6–8.3)
NEUTROPHILS NFR BLD AUTO: 62 %
NRBC # BLD AUTO: 0 10E3/UL
NRBC BLD AUTO-RTO: 0 /100
PLATELET # BLD AUTO: 181 10E3/UL (ref 150–450)
POTASSIUM SERPL-SCNC: 4.5 MMOL/L (ref 3.4–5.3)
PROT SERPL-MCNC: 7.8 G/DL (ref 6.4–8.3)
RBC # BLD AUTO: 5.11 10E6/UL (ref 3.8–5.2)
SODIUM SERPL-SCNC: 141 MMOL/L (ref 136–145)
WBC # BLD AUTO: 8.9 10E3/UL (ref 4–11)

## 2023-07-05 PROCEDURE — 258N000003 HC RX IP 258 OP 636: Performed by: EMERGENCY MEDICINE

## 2023-07-05 PROCEDURE — 250N000011 HC RX IP 250 OP 636: Performed by: EMERGENCY MEDICINE

## 2023-07-05 PROCEDURE — 36415 COLL VENOUS BLD VENIPUNCTURE: CPT | Performed by: EMERGENCY MEDICINE

## 2023-07-05 PROCEDURE — 99285 EMERGENCY DEPT VISIT HI MDM: CPT | Mod: 25

## 2023-07-05 PROCEDURE — 258N000003 HC RX IP 258 OP 636: Performed by: INTERNAL MEDICINE

## 2023-07-05 PROCEDURE — 96361 HYDRATE IV INFUSION ADD-ON: CPT

## 2023-07-05 PROCEDURE — 74177 CT ABD & PELVIS W/CONTRAST: CPT

## 2023-07-05 PROCEDURE — 99222 1ST HOSP IP/OBS MODERATE 55: CPT | Mod: AI | Performed by: INTERNAL MEDICINE

## 2023-07-05 PROCEDURE — 96374 THER/PROPH/DIAG INJ IV PUSH: CPT | Mod: 59

## 2023-07-05 PROCEDURE — 96375 TX/PRO/DX INJ NEW DRUG ADDON: CPT

## 2023-07-05 PROCEDURE — 120N000001 HC R&B MED SURG/OB

## 2023-07-05 PROCEDURE — 80053 COMPREHEN METABOLIC PANEL: CPT | Performed by: EMERGENCY MEDICINE

## 2023-07-05 PROCEDURE — 85014 HEMATOCRIT: CPT | Performed by: EMERGENCY MEDICINE

## 2023-07-05 PROCEDURE — 82565 ASSAY OF CREATININE: CPT

## 2023-07-05 PROCEDURE — 250N000009 HC RX 250: Performed by: EMERGENCY MEDICINE

## 2023-07-05 PROCEDURE — 999N000065 XR ABDOMEN PORT 1 VIEW

## 2023-07-05 RX ORDER — HYDROMORPHONE HYDROCHLORIDE 1 MG/ML
0.5 INJECTION, SOLUTION INTRAMUSCULAR; INTRAVENOUS; SUBCUTANEOUS EVERY 30 MIN PRN
Status: DISCONTINUED | OUTPATIENT
Start: 2023-07-05 | End: 2023-07-05

## 2023-07-05 RX ORDER — ONDANSETRON 4 MG/1
4 TABLET, ORALLY DISINTEGRATING ORAL EVERY 6 HOURS PRN
Status: DISCONTINUED | OUTPATIENT
Start: 2023-07-05 | End: 2023-07-08 | Stop reason: HOSPADM

## 2023-07-05 RX ORDER — OXYCODONE HYDROCHLORIDE 5 MG/1
5 TABLET ORAL EVERY 4 HOURS PRN
Status: DISCONTINUED | OUTPATIENT
Start: 2023-07-05 | End: 2023-07-08 | Stop reason: HOSPADM

## 2023-07-05 RX ORDER — ACETAMINOPHEN 325 MG/1
650 TABLET ORAL EVERY 4 HOURS PRN
Status: DISCONTINUED | OUTPATIENT
Start: 2023-07-05 | End: 2023-07-08 | Stop reason: HOSPADM

## 2023-07-05 RX ORDER — ACETAMINOPHEN 650 MG/1
650 SUPPOSITORY RECTAL EVERY 4 HOURS PRN
Status: DISCONTINUED | OUTPATIENT
Start: 2023-07-05 | End: 2023-07-08 | Stop reason: HOSPADM

## 2023-07-05 RX ORDER — SODIUM CHLORIDE 9 MG/ML
INJECTION, SOLUTION INTRAVENOUS CONTINUOUS
Status: DISCONTINUED | OUTPATIENT
Start: 2023-07-05 | End: 2023-07-07

## 2023-07-05 RX ORDER — IOPAMIDOL 755 MG/ML
500 INJECTION, SOLUTION INTRAVASCULAR ONCE
Status: COMPLETED | OUTPATIENT
Start: 2023-07-05 | End: 2023-07-05

## 2023-07-05 RX ORDER — HYDROMORPHONE HYDROCHLORIDE 1 MG/ML
0.5 INJECTION, SOLUTION INTRAMUSCULAR; INTRAVENOUS; SUBCUTANEOUS EVERY 4 HOURS PRN
Status: DISCONTINUED | OUTPATIENT
Start: 2023-07-05 | End: 2023-07-08 | Stop reason: HOSPADM

## 2023-07-05 RX ORDER — LEVOTHYROXINE SODIUM 50 UG/1
50 TABLET ORAL DAILY
Status: DISCONTINUED | OUTPATIENT
Start: 2023-07-06 | End: 2023-07-08 | Stop reason: HOSPADM

## 2023-07-05 RX ORDER — ONDANSETRON 2 MG/ML
4 INJECTION INTRAMUSCULAR; INTRAVENOUS EVERY 6 HOURS PRN
Status: DISCONTINUED | OUTPATIENT
Start: 2023-07-05 | End: 2023-07-08 | Stop reason: HOSPADM

## 2023-07-05 RX ORDER — AMLODIPINE BESYLATE 5 MG/1
5 TABLET ORAL DAILY
Status: DISCONTINUED | OUTPATIENT
Start: 2023-07-06 | End: 2023-07-08 | Stop reason: HOSPADM

## 2023-07-05 RX ORDER — ONDANSETRON 2 MG/ML
4 INJECTION INTRAMUSCULAR; INTRAVENOUS ONCE
Status: COMPLETED | OUTPATIENT
Start: 2023-07-05 | End: 2023-07-05

## 2023-07-05 RX ORDER — LATANOPROST 50 UG/ML
1 SOLUTION/ DROPS OPHTHALMIC AT BEDTIME
Status: DISCONTINUED | OUTPATIENT
Start: 2023-07-05 | End: 2023-07-08 | Stop reason: HOSPADM

## 2023-07-05 RX ORDER — DOCUSATE SODIUM 100 MG/1
100 CAPSULE, LIQUID FILLED ORAL EVERY OTHER DAY
COMMUNITY
End: 2023-07-25

## 2023-07-05 RX ADMIN — HYDROMORPHONE HYDROCHLORIDE 0.5 MG: 1 INJECTION, SOLUTION INTRAMUSCULAR; INTRAVENOUS; SUBCUTANEOUS at 17:56

## 2023-07-05 RX ADMIN — SODIUM CHLORIDE: 9 INJECTION, SOLUTION INTRAVENOUS at 22:06

## 2023-07-05 RX ADMIN — ONDANSETRON 4 MG: 2 INJECTION INTRAMUSCULAR; INTRAVENOUS at 17:57

## 2023-07-05 RX ADMIN — IOPAMIDOL 92 ML: 755 INJECTION, SOLUTION INTRAVENOUS at 18:48

## 2023-07-05 RX ADMIN — SODIUM CHLORIDE 1000 ML: 9 INJECTION, SOLUTION INTRAVENOUS at 17:57

## 2023-07-05 RX ADMIN — SODIUM CHLORIDE 63 ML: 9 INJECTION, SOLUTION INTRAVENOUS at 18:48

## 2023-07-05 ASSESSMENT — ACTIVITIES OF DAILY LIVING (ADL)
ADLS_ACUITY_SCORE: 35
ADLS_ACUITY_SCORE: 35
ADLS_ACUITY_SCORE: 22

## 2023-07-05 NOTE — ED TRIAGE NOTES
Pt arrives complaining of 10/10 abdominal pain stating she has been to this hospital three times in relation to bowel obstructions. Reports having a bowel movement this morning.

## 2023-07-05 NOTE — ED PROVIDER NOTES
History     Chief Complaint:  Abdominal Pain       HPI   Lexii Stratton is a 82 year old anticoagulated female with a history of DM II, SBO, CKD, and GERD who presents with 10/10 periumbilical abdominal pain that began around 1500. She had a bowel movement this morning and denies noticing any blood. She denies vomiting, dysuria, or hematuria. She has had three bowel obstructions in the past.    Independent Historian:   None - Patient Only    Review of External Notes:   None       Medications:  Norvasc  Biotin  Triglide  Fergon  Allegra  Synthroid  Zestril  Mobic  Macrodantin  Protonix  Metamucil  Eliquis  Zocor     Past Medical History:    Arthritis  Diabetes mellitus type 2  Esophageal reflux  Abdominal hernia  History of blood transfusion  Hypertension  Incontinence of urine  Mumps  Obese  Osteoarthritis  Hyperlipidemia  Peptic ulcer  Small bowel obstruction  Hypothyroidism  Walking troubles  Pelvic floor dysfunction  GI bleed  Anemia  Hypersomnia with sleep apnea  Generalized osteoarthrosis      Past Surgical History:    Arthroplasty hip  Repair incisional hernia  Hyperdistention surgery and sling bladder  Cholecystectomy  Colonoscopy x2  Cystoscopy  Tonsillectomy  EGD, combined  Eye surgery  Hysterectomy  Umbilical hernia repair x3  Implant stimulator sacral nerve stage one  Implant stimulator sacral nerve stage two   Bilateral knee replacement  L shoulder remove bone spur  Cysto and durasphere x3  Retropubic tvt sling    Physical Exam     Patient Vitals for the past 24 hrs:   BP Temp Temp src Pulse Resp SpO2 Weight   07/05/23 2045 (!) 157/63 -- -- 60 18 97 % --   07/05/23 1945 (!) 127/98 -- -- 60 18 96 % --   07/05/23 1742 (!) 150/100 97.4  F (36.3  C) Temporal 72 20 100 % 82.6 kg (182 lb)        Physical Exam  Constitutional: Patient is well appearing. No distress.  Head: Atraumatic.  Neck: Normal range of motion. Neck supple.   Cardiovascular: Normal rate, regular rhythm, normal heart sounds and intact  distal perfusion.   Pulmonary/Chest: Breath sounds normal. No respiratory distress.  Abdominal: Soft. Bowel sounds are normal. No distension. No tenderness. No rebound or guarding.   Musculoskeletal: Normal range of motion. No edema or tenderness.   Neurological: Alert and orientated to person, place, and time. No observable focal neuro deficit  Skin: Warm and dry. No rash noted. Not diaphoretic.     Emergency Department Course     Imaging:  CT Abdomen Pelvis w Contrast   Final Result   IMPRESSION: Distal small bowel obstruction, with a suspected transition point in the right lower quadrant.      XR Abdomen Port 1 View    (Results Pending)      Report per radiology    Laboratory:  Labs Ordered and Resulted from Time of ED Arrival to Time of ED Departure   COMPREHENSIVE METABOLIC PANEL - Abnormal       Result Value    Sodium 141      Potassium 4.5      Chloride 101      Carbon Dioxide (CO2) 23      Anion Gap 17 (*)     Urea Nitrogen 20.0      Creatinine 0.94      Calcium 10.7 (*)     Glucose 136 (*)     Alkaline Phosphatase 134 (*)     AST 36      ALT 15      Protein Total 7.8      Albumin 4.3      Bilirubin Total 0.8      GFR Estimate 60 (*)    CBC WITH PLATELETS AND DIFFERENTIAL - Abnormal    WBC Count 8.9      RBC Count 5.11      Hemoglobin 16.5 (*)     Hematocrit 49.2 (*)     MCV 96      MCH 32.3      MCHC 33.5      RDW 12.4      Platelet Count 181      % Neutrophils 62      % Lymphocytes 28      % Monocytes 7      % Eosinophils 2      % Basophils 1      % Immature Granulocytes 0      NRBCs per 100 WBC 0      Absolute Neutrophils 5.6      Absolute Lymphocytes 2.5      Absolute Monocytes 0.7      Absolute Eosinophils 0.1      Absolute Basophils 0.1      Absolute Immature Granulocytes 0.0      Absolute NRBCs 0.0     ISTAT CREATININE POCT - Abnormal    Creatinine POCT 1.0      GFR, ESTIMATED POCT 56 (*)         Emergency Department Course & Assessments:    Interventions:  Medications   melatonin tablet 1 mg (has no  administration in time range)   sodium chloride 0.9% infusion (has no administration in time range)   ondansetron (ZOFRAN ODT) ODT tab 4 mg (has no administration in time range)     Or   ondansetron (ZOFRAN) injection 4 mg (has no administration in time range)   Patient is already receiving anticoagulation with heparin, enoxaparin (LOVENOX), warfarin (COUMADIN)  or other anticoagulant medication (has no administration in time range)   HYDROmorphone (PF) (DILAUDID) injection 0.5 mg (has no administration in time range)   acetaminophen (TYLENOL) tablet 650 mg (has no administration in time range)     Or   acetaminophen (TYLENOL) Suppository 650 mg (has no administration in time range)   oxyCODONE (ROXICODONE) tablet 5 mg (has no administration in time range)   0.9% sodium chloride BOLUS (0 mLs Intravenous Stopped 7/5/23 1931)   ondansetron (ZOFRAN) injection 4 mg (4 mg Intravenous $Given 7/5/23 1757)   iopamidol (ISOVUE-370) solution 500 mL (92 mLs Intravenous $Given 7/5/23 1848)   CT scan flush (63 mLs Intravenous $Given 7/5/23 1848)        Assessments:  1807 I obtained history and examined the patient, as noted above.   I rechecked and updated the patient.    Independent Interpretation (X-rays, CTs, rhythm strip):  I reviewed the patient's CT scan.    Consultations/Discussion of Management or Tests:  1924 I spoke with Dr. Savage from the hospitalist service regarding the patient's presentation, findings here in the ED, and plan of care.     Social Determinants of Health affecting care:   None    Disposition:  The patient was admitted to the hospital under the care of Dr. Savage.     Impression & Plan      Medical Decision Making:  Lexii Stratton is a 82 year old female who for abdominal pain. Clinically this is consistent with bowel obstruction, CT confirms this.  Patient has been resuscitated with IVF and NG placed.  Will admit to medicine for further cares and bowel rest.  No indication at  this point for emergency surgical consult.  There are no signs of surgical emergencies or intraabdominal catastrophes such as volvulus, gut ischemia, perforation, etc.      Diagnosis:    ICD-10-CM    1. Small bowel obstruction (H)  K56.609            Scribe Disclosure:  I, Selfbijal Lai, am serving as a scribe at 5:49 PM on 7/5/2023 to document services personally performed by Star Billings MD based on my observations and the provider's statements to me.      Star Billings MD  07/06/23 0012

## 2023-07-06 ENCOUNTER — APPOINTMENT (OUTPATIENT)
Dept: GENERAL RADIOLOGY | Facility: CLINIC | Age: 83
DRG: 389 | End: 2023-07-06
Attending: SURGERY
Payer: COMMERCIAL

## 2023-07-06 LAB
ANION GAP SERPL CALCULATED.3IONS-SCNC: 7 MMOL/L (ref 7–15)
BUN SERPL-MCNC: 14.6 MG/DL (ref 8–23)
CALCIUM SERPL-MCNC: 8.7 MG/DL (ref 8.8–10.2)
CHLORIDE SERPL-SCNC: 109 MMOL/L (ref 98–107)
CREAT SERPL-MCNC: 0.81 MG/DL (ref 0.51–0.95)
DEPRECATED HCO3 PLAS-SCNC: 24 MMOL/L (ref 22–29)
ERYTHROCYTE [DISTWIDTH] IN BLOOD BY AUTOMATED COUNT: 12.7 % (ref 10–15)
GFR SERPL CREATININE-BSD FRML MDRD: 72 ML/MIN/1.73M2
GLUCOSE SERPL-MCNC: 101 MG/DL (ref 70–99)
HCT VFR BLD AUTO: 37.9 % (ref 35–47)
HGB BLD-MCNC: 12.4 G/DL (ref 11.7–15.7)
MCH RBC QN AUTO: 32 PG (ref 26.5–33)
MCHC RBC AUTO-ENTMCNC: 32.7 G/DL (ref 31.5–36.5)
MCV RBC AUTO: 98 FL (ref 78–100)
PLATELET # BLD AUTO: 131 10E3/UL (ref 150–450)
POTASSIUM SERPL-SCNC: 4.2 MMOL/L (ref 3.4–5.3)
RBC # BLD AUTO: 3.87 10E6/UL (ref 3.8–5.2)
SODIUM SERPL-SCNC: 140 MMOL/L (ref 136–145)
WBC # BLD AUTO: 6 10E3/UL (ref 4–11)

## 2023-07-06 PROCEDURE — 258N000003 HC RX IP 258 OP 636: Performed by: INTERNAL MEDICINE

## 2023-07-06 PROCEDURE — 74018 RADEX ABDOMEN 1 VIEW: CPT

## 2023-07-06 PROCEDURE — 99231 SBSQ HOSP IP/OBS SF/LOW 25: CPT | Performed by: STUDENT IN AN ORGANIZED HEALTH CARE EDUCATION/TRAINING PROGRAM

## 2023-07-06 PROCEDURE — 36415 COLL VENOUS BLD VENIPUNCTURE: CPT | Performed by: INTERNAL MEDICINE

## 2023-07-06 PROCEDURE — 99222 1ST HOSP IP/OBS MODERATE 55: CPT | Performed by: SURGERY

## 2023-07-06 PROCEDURE — C9113 INJ PANTOPRAZOLE SODIUM, VIA: HCPCS | Mod: JZ | Performed by: INTERNAL MEDICINE

## 2023-07-06 PROCEDURE — 250N000013 HC RX MED GY IP 250 OP 250 PS 637: Performed by: HOSPITALIST

## 2023-07-06 PROCEDURE — 80048 BASIC METABOLIC PNL TOTAL CA: CPT | Performed by: INTERNAL MEDICINE

## 2023-07-06 PROCEDURE — 250N000011 HC RX IP 250 OP 636: Mod: JZ | Performed by: INTERNAL MEDICINE

## 2023-07-06 PROCEDURE — 85027 COMPLETE CBC AUTOMATED: CPT | Performed by: INTERNAL MEDICINE

## 2023-07-06 PROCEDURE — 120N000001 HC R&B MED SURG/OB

## 2023-07-06 PROCEDURE — 250N000013 HC RX MED GY IP 250 OP 250 PS 637: Performed by: INTERNAL MEDICINE

## 2023-07-06 RX ADMIN — AMLODIPINE BESYLATE 5 MG: 5 TABLET ORAL at 07:43

## 2023-07-06 RX ADMIN — SODIUM CHLORIDE: 9 INJECTION, SOLUTION INTRAVENOUS at 17:54

## 2023-07-06 RX ADMIN — PANTOPRAZOLE SODIUM 40 MG: 40 INJECTION, POWDER, FOR SOLUTION INTRAVENOUS at 00:13

## 2023-07-06 RX ADMIN — LEVOTHYROXINE SODIUM 50 MCG: 50 TABLET ORAL at 07:43

## 2023-07-06 RX ADMIN — APIXABAN 5 MG: 5 TABLET, FILM COATED ORAL at 00:13

## 2023-07-06 RX ADMIN — PHENOL 1 ML: 1.5 LIQUID ORAL at 05:47

## 2023-07-06 RX ADMIN — LATANOPROST 1 DROP: 50 SOLUTION/ DROPS OPHTHALMIC at 22:01

## 2023-07-06 RX ADMIN — PHENOL 1 ML: 1.5 LIQUID ORAL at 00:38

## 2023-07-06 RX ADMIN — APIXABAN 5 MG: 5 TABLET, FILM COATED ORAL at 07:43

## 2023-07-06 RX ADMIN — PHENOL 1 ML: 1.5 LIQUID ORAL at 10:55

## 2023-07-06 RX ADMIN — DIATRIZOATE MEGLUMINE AND DIATRIZOATE SODIUM 75 ML: 660; 100 SOLUTION ORAL; RECTAL at 15:06

## 2023-07-06 RX ADMIN — ACETAMINOPHEN 650 MG: 325 TABLET, FILM COATED ORAL at 00:22

## 2023-07-06 RX ADMIN — PANTOPRAZOLE SODIUM 40 MG: 40 INJECTION, POWDER, FOR SOLUTION INTRAVENOUS at 22:47

## 2023-07-06 RX ADMIN — APIXABAN 5 MG: 5 TABLET, FILM COATED ORAL at 19:46

## 2023-07-06 RX ADMIN — LATANOPROST 1 DROP: 50 SOLUTION/ DROPS OPHTHALMIC at 00:13

## 2023-07-06 RX ADMIN — SODIUM CHLORIDE: 9 INJECTION, SOLUTION INTRAVENOUS at 07:43

## 2023-07-06 ASSESSMENT — ACTIVITIES OF DAILY LIVING (ADL)
ADLS_ACUITY_SCORE: 26

## 2023-07-06 NOTE — ED NOTES
[facility]  ED Nurse Handoff Report    ED Chief complaint: Abdominal Pain  . ED Diagnosis:   Final diagnoses:   Small bowel obstruction (H)       Allergies:   Allergies   Allergen Reactions     Amoxicillin-Pot Clavulanate Diarrhea     Hydrocodone      Keeps patient awake     Morphine And Related Nausea and Vomiting     Naproxen Itching and Rash     Seasonal Allergies      Hay fever in fall, ragweed and russian thistles     Sulfa Antibiotics Nausea       Code Status: Full Code    Activity level - Baseline/Home:  in bed and independent.  Activity Level - Current:   in bed.   Lift room needed: No.   Bariatric: No   Needed: No   Isolation: No.   Infection: Not Applicable.     Respiratory status: Room air    Vital Signs (within 30 minutes):   Vitals:    07/05/23 1742 07/05/23 1945 07/05/23 2045   BP: (!) 150/100 (!) 127/98 (!) 157/63   Pulse: 72 60 60   Resp: 20 18 18   Temp: 97.4  F (36.3  C)     TempSrc: Temporal     SpO2: 100% 96% 97%   Weight: 82.6 kg (182 lb)         Cardiac Rhythm:  ,      Pain level:    Patient confused: No.   Patient Falls Risk: patient and family education.   Elimination Status: Has voided     Patient Report - Initial Complaint: Abdominal Pain.   Focused Assessment: Abd, NGT output     Abnormal Results:   Labs Ordered and Resulted from Time of ED Arrival to Time of ED Departure   COMPREHENSIVE METABOLIC PANEL - Abnormal       Result Value    Sodium 141      Potassium 4.5      Chloride 101      Carbon Dioxide (CO2) 23      Anion Gap 17 (*)     Urea Nitrogen 20.0      Creatinine 0.94      Calcium 10.7 (*)     Glucose 136 (*)     Alkaline Phosphatase 134 (*)     AST 36      ALT 15      Protein Total 7.8      Albumin 4.3      Bilirubin Total 0.8      GFR Estimate 60 (*)    CBC WITH PLATELETS AND DIFFERENTIAL - Abnormal    WBC Count 8.9      RBC Count 5.11      Hemoglobin 16.5 (*)     Hematocrit 49.2 (*)     MCV 96      MCH 32.3      MCHC 33.5      RDW 12.4       Platelet Count 181      % Neutrophils 62      % Lymphocytes 28      % Monocytes 7      % Eosinophils 2      % Basophils 1      % Immature Granulocytes 0      NRBCs per 100 WBC 0      Absolute Neutrophils 5.6      Absolute Lymphocytes 2.5      Absolute Monocytes 0.7      Absolute Eosinophils 0.1      Absolute Basophils 0.1      Absolute Immature Granulocytes 0.0      Absolute NRBCs 0.0     ISTAT CREATININE POCT - Abnormal    Creatinine POCT 1.0      GFR, ESTIMATED POCT 56 (*)         XR Abdomen Port 1 View   Final Result   IMPRESSION: Enteric tube extending below level of the EG junction curled in the upper aspect of the stomach. Dilated small bowel loops compatible with obstruction. Contrast material within the intrarenal collecting systems. Degenerative changes    throughout the spine.      CT Abdomen Pelvis w Contrast   Final Result   IMPRESSION: Distal small bowel obstruction, with a suspected transition point in the right lower quadrant.          Treatments provided: IVF, IV pain meds  Family Comments: NA  OBS brochure/video discussed/provided to patient:  N/A  ED Medications:   Medications   melatonin tablet 1 mg (has no administration in time range)   sodium chloride 0.9% infusion (has no administration in time range)   ondansetron (ZOFRAN ODT) ODT tab 4 mg (has no administration in time range)     Or   ondansetron (ZOFRAN) injection 4 mg (has no administration in time range)   Patient is already receiving anticoagulation with heparin, enoxaparin (LOVENOX), warfarin (COUMADIN)  or other anticoagulant medication (has no administration in time range)   HYDROmorphone (PF) (DILAUDID) injection 0.5 mg (has no administration in time range)   acetaminophen (TYLENOL) tablet 650 mg (has no administration in time range)     Or   acetaminophen (TYLENOL) Suppository 650 mg (has no administration in time range)   oxyCODONE (ROXICODONE) tablet 5 mg (has no administration in time range)   0.9% sodium chloride BOLUS (0 mLs  Intravenous Stopped 7/5/23 1931)   ondansetron (ZOFRAN) injection 4 mg (4 mg Intravenous $Given 7/5/23 1757)   iopamidol (ISOVUE-370) solution 500 mL (92 mLs Intravenous $Given 7/5/23 1848)   CT scan flush (63 mLs Intravenous $Given 7/5/23 1848)       Drips infusing:  Yes  For the majority of the shift this patient was Green.   Interventions performed were NA.    Sepsis treatment initiated: No    Cares/treatment/interventions/medications to be completed following ED care: Nausea, pain, NGT output    ED Nurse Name: Janessa Cuevas RN  9:41 PM    RECEIVING UNIT ED HANDOFF REVIEW    Above ED Nurse Handoff Report was reviewed: Yes  Reviewed by: Kodi Saab RN on July 5, 2023 at 9:58 PM

## 2023-07-06 NOTE — PROGRESS NOTES
[facility]    Medicine Progress Note - Hospitalist Service  Date of Admission: 7/5/2023     Assessment & Plan         Lexii Stratton is a 82 year old female with past medical history significant for paroxysmal atrial fibrillation on DOAC, complete heart block s/p PPM, hypertension, severe aortic stenosis s/p TAVR, and recurrent SBOs who presented to [facility] on 7/5/2023 with abdominal pain and was found to have recurrent SBO.    Small Bowel Obstruction, Recurrent  Presents with abdominal pain, n/v, and poor oral intake. Hx of recurrent SBOs ever since surgeries for hernias in the past. In the ED, evidence of SBO on CT abd/pelvis and NGT placed. Continues to be NPO with NGT to LIS. General surgery consulted and gastrograffin challenge ordered.     Hypercalcemia  Mild and suspect secondary to calcium supplementation for osteoporosis. Hold calcium supplementation for now and go down on dose at time of discharge  -NS@100ml/hr and trend     pAF  CHB s/p PPM  Severe Aortic Stenosis s/p TAVR  Continue PTA Eliquis. Not on any rate or rhythm control medications.     PUD  Will switch PPI to IV while patient is NPO     Essential HTN  Home medications     Diet: Orders Placed This Encounter      NPO for Medical/Clinical Reasons Except for: Meds, Ice Chips     DVT Prophylaxis: DOAC  [place]: None  Code Status: Full Code    Expected discharge: Likely 1-3 days pending resolution of SBO.    The patient's care was discussed with the Patient.    Mitul Colindres MD, S  Hospitalist Service  [facility]  ______________________________________________________________________    Interval History   Nursing notes reviewed; no acute events overnight. Patient reports she is feeling better with NGT. Frustrated by recurrent SBO. No new symptoms.    A full 10+ point review of systems was performed and found to be negative with the exception of those items noted here.    Physical Exam  "  Temp: 97.2  F (36.2  C) Temp src: Temporal BP: (!) 124/91 Pulse: 63   Resp: 18 SpO2: 98 % O2 Device: None (Room air)   Height: 162.6 cm (5' 4\") Weight: 85.3 kg (188 lb)  Estimated body mass index is 32.27 kg/m  as calculated from the following:    Height as of this encounter: 1.626 m (5' 4\").    Weight as of this encounter: 85.3 kg (188 lb).    General: Very pleasant female resting comfortably in hospital bed.  Awake, alert, interactive.  HEENT: Normocephalic, atraumatic.  PERRL, EOMI.  Conjunctiva clear, sclerae anicteric.  Mucous membranes moist.  Cardiac: Regular rate and rhythm without murmur, gallop, or rub.  No peripheral edema.  Respiratory: Normal work of breathing.  Clear to auscultation bilaterally without wheezing, rales, or rhonchi.  GI: Hypoactive bowel sounds. Abdomen soft, only mildly tender on right.  : Deferred.  Musculoskeletal: Moving all extremities appropriately.  Skin: No rashes or abrasions on exposed skin.  Neurologic: Alert and oriented x4.  Cranial nerves II through XII grossly intact.  Psychologic: Appropriate mood and affect.    Data   All laboratory results and other diagnostic data from the past 24 hours is available in Epic and has been personally reviewed.    Recent Labs   Lab 07/06/23  0732 07/05/23  1756 07/05/23  1755   WBC 6.0  --  8.9   HGB 12.4  --  16.5*   MCV 98  --  96   *  --  181     --  141   POTASSIUM 4.2  --  4.5   CHLORIDE 109*  --  101   CO2 24  --  23   BUN 14.6  --  20.0   CR 0.81 1.0 0.94   ANIONGAP 7  --  17*   CINDY 8.7*  --  10.7*   *  --  136*   ALBUMIN  --   --  4.3   PROTTOTAL  --   --  7.8   BILITOTAL  --   --  0.8   ALKPHOS  --   --  134*   ALT  --   --  15   AST  --   --  36     Imaging results reviewed over the past 24 hrs:   Recent Results (from the past 24 hour(s))   CT Abdomen Pelvis w Contrast    Narrative    EXAM: CT ABDOMEN PELVIS W CONTRAST  LOCATION: [facility]  DATE: 7/5/2023    INDICATION: Abdominal " pain.  COMPARISON: CTA of the chest, abdomen, and pelvis performed 10/11/2022.  TECHNIQUE: CT scan of the abdomen and pelvis was performed following injection of IV contrast. Multiplanar reformats were obtained. Dose reduction techniques were used.  CONTRAST: 92mL Isovue 370    FINDINGS:   LOWER CHEST: The visualized lung bases are clear. TAVR.    HEPATOBILIARY: The gallbladder is not seen, and is likely surgically absent. Mild intra and extrahepatic biliary prominence is unchanged, and may be related to the patient's age and postcholecystectomy state. No hepatic masses are identified.    PANCREAS: Normal.    SPLEEN: Normal.    ADRENAL GLANDS: Normal.    KIDNEYS/BLADDER: Mild scattered cortical scarring in both kidneys. No hydronephrosis.    BOWEL: There are several dilated gas and fluid-filled loops of small bowel in the mid and lower abdomen, with a probable transition point near the postoperative changes in the right lower quadrant (series 7 image 38). Findings are consistent with a   distal small bowel obstruction. There are several decompressed loops of distal small bowel in the right lower quadrant, and the colon is decompressed.    LYMPH NODES: No lymphadenopathy.    VASCULATURE: Mild atherosclerotic aortoiliac calcification.    PELVIC ORGANS: Hysterectomy. No free fluid in the pelvis.    MUSCULOSKELETAL: Right hip arthroplasty. Degenerative changes in the visualized thoracolumbar spine. Mild thoracolumbar curve.      Impression    IMPRESSION: Distal small bowel obstruction, with a suspected transition point in the right lower quadrant.   XR Abdomen Port 1 View    Narrative    EXAM: XR ABDOMEN PORT 1 VIEW  LOCATION: [facility]  DATE: 7/5/2023    INDICATION: NG placement  COMPARISON: CT 07/05/2023      Impression    IMPRESSION: Enteric tube extending below level of the EG junction curled in the upper aspect of the stomach. Dilated small bowel loops compatible with obstruction. Contrast  material within the intrarenal collecting systems. Degenerative changes   throughout the spine.   XR Abdomen Port 1 View    Narrative    ABDOMEN ONE VIEW PORTABLE July 6, 2023 1:23 PM     HISTORY: Mild obstruction.    COMPARISON: 7/5/2023.      Impression    IMPRESSION: Several dilated loops of small bowel in the midabdomen  have a similar appearance to the previous exam, suspicious for a  persistent small bowel obstruction. Enteric tube, with tip in the  proximal stomach. Thoracolumbar curve. TAVR.    ISAÍAS DELUCA MD         SYSTEM ID:  [id number]

## 2023-07-06 NOTE — PLAN OF CARE
Goal Outcome Evaluation:       Pt A&Ox4. VSS, on RA. Up Ax1 with belt and IV pool. Denies pain and nausea. C/o sore throat, chloraseptic spray given. NGT clamped after Gastrogafin contrast administered at 3 pm. Follow up XR in 8 hours after administration. LS clear, BS active. Incontinent of bladder at times. NPO except meds and ice chips. IVF NS @ 100. Resting between cares. Voiding okay, call appropriately.

## 2023-07-06 NOTE — H&P
[facility]  Hospitalist History & Physical     Assessment & Plan     82F with hx of pAF on Eliqus, CHB s/p PPM, HTN, severe aortic stenosis s/p TAVR, and recurrent SBOs presents with abdominal pain and found to have recurrent SBO.    Small Bowel Obstruction, Recurrent  -Presents with abdominal pain, n/v, and poor oral intake  -Hx of recurrent SBOs ever since surgeries for hernias in the past  -In the ED, evidence of SBO on CT abd/pelvis and NGT placed  PLAN  -NPO  -NGT to low intermittent suction  -NS@100ml/hr  -Pain regimen  -General Surgery consultation    Hypercalcemia  -Mild and suspect secondary to calcium supplementation for osteoporosis  -Hold calcium supplementation for now and go down on dose at time of discharge  -NS@100ml/hr and trend    pAF  CHB s/p PPM  Severe Aortic Stenosis s/p TAVR  -Home Eliquis  -Not on any rate or rhythm control medications    PUD  -Will switch PPI to IV while patient is NPO    Essential HTN  -Home medications    DVT Prophy  -Home Eliquis    Disposition  -Med Surg      Hector Stahl MD    History of Present Illness     Lexii Stratton is a 82 year old with hx of pAF on Eliqus, CHB s/p PPM, HTN, PUD, severe aortic stenosis s/p TAVR, and recurrent SBOs presents with abdominal pain.  Patient has had 3 hernia surgeries in the past and since that time has suffered from recurrent SBO's.  Has not had a admission for SBO in about a year but says that she often feels like she has one and tries to get through it at home.  Over the past 2 weeks has had intermittent nausea, vomiting, and abdominal pain.  Says she was trying to deal with it at home but could not take the pain anymore today so came into the ED.  Also experiencing abdominal distention.  Poor oral intake.  Last bowel movement was this morning.  In the ED, hypertensive but other VSS.  Calcium 10.7.  Other labs WNL.  CT abdomen pelvis with evidence of recurrent SBO.  NG placed in ED with improvement in symptoms.   Admitted to medicine.    Review of Systems     A Comprehensive greater than 10 system review of systems was carried out.  Pertinent positives and negatives are noted above.  Otherwise negative for contributory information.     Past Medical History     Past Medical History:   Diagnosis Date     Aortic valve stenosis 8/18/2022     Arthritis      Diabetes (H)     Type 2-no meds... Dieet and exercise only as of 9/15/20     Esophageal reflux      Hernia, abdominal      History of blood transfusion 1984     Hypertension     No cardiologist     Incontinence of urine      Mumps     6 years old     Obese      Osteoarthritis      Other and unspecified hyperlipidemia      Other chronic pain     back     Peptic ulcer, unspecified site, unspecified as acute or chronic, without mention of hemorrhage, perforation, or obstruction      Small bowel obstruction (H)      Thyroid disease hypothyroidism     Urinary incontinence      Walking troubles      Medications     Current Outpatient Medications   Medication Sig Dispense Refill     acetaminophen (TYLENOL) 500 MG tablet Take 1,000 mg by mouth 2 times daily (2 x 500 mg = 1000 mg)       amLODIPine (NORVASC) 5 MG tablet TAKE ONE TABLET BY MOUTH EVERY NIGHT AT BEDTIME 90 tablet 3     amoxicillin (AMOXIL) 500 MG capsule Take 2,000 mg by mouth once as needed (1 hour prior to dental procedures 4 x 500 mg = 2000 mg)       apixaban ANTICOAGULANT (ELIQUIS ANTICOAGULANT) 5 MG tablet Take 1 tablet (5 mg) by mouth 2 times daily 180 tablet 3     Biotin 5000 MCG PO TABS Take 1 tablet by mouth daily       calcium carbonate (OS-CINDY) 500 MG tablet Take 1 tablet by mouth every other day       CENTRUM SILVER OR TABS Take 1 tablet by mouth daily 30 0     Cranberry 450 MG CAPS Take 1 capsule by mouth daily (with dinner)       docusate sodium (COLACE) 100 MG capsule Take 100 mg by mouth At Bedtime       ferrous gluconate (FERGON) 324 (38 FE) MG tablet Take 1 tablet (324 mg) by mouth 2 times daily 100  tablet 0     latanoprost (XALATAN) 0.005 % ophthalmic solution Place 1 drop into both eyes At Bedtime        levothyroxine (SYNTHROID/LEVOTHROID) 50 MCG tablet Take 1 tablet (50 mcg) by mouth daily 90 tablet 3     lisinopril (ZESTRIL) 30 MG tablet Take 1 tablet (30 mg) by mouth every morning 90 tablet 3     magnesium 250 MG tablet Take 1 tablet by mouth daily       nitroGLYcerin (NITROSTAT) 0.4 MG sublingual tablet For chest pain place 1 tablet under the tongue every 5 minutes for 3 doses. If symptoms persist 5 minutes after 1st dose call 911. (Patient taking differently: 0.4 mg every 5 minutes as needed For chest pain place 1 tablet under the tongue every 5 minutes for 3 doses. If symptoms persist 5 minutes after 1st dose call 911.) 25 tablet 11     OMEGA-3 FATTY ACIDS 1200 MG OR CAPS Take 1 capsule by mouth daily  0     pantoprazole (PROTONIX) 20 MG EC tablet Take 1 tablet (20 mg) by mouth daily 90 tablet 3     potassium 99 MG TABS Take 1 tablet by mouth daily (with dinner)        Probiotic Product (ACIDOPHILUS PROBIOTIC BLEND) CAPS Take 1 capsule by mouth daily (with breakfast)  30 capsule 0     simvastatin (ZOCOR) 20 MG tablet TAKE ONE TABLET BY MOUTH EVERY NIGHT AT BEDTIME 90 tablet 3     VITAMIN D, CHOLECALCIFEROL, PO Take 2,000 Units by mouth daily       Past Surgical History     Past Surgical History:   Procedure Laterality Date     ABDOMEN SURGERY       ARTHROPLASTY HIP Right 11/09/2016    Procedure: ARTHROPLASTY HIP;  Surgeon: Angel Lund MD;  Location: [facility] OR     AS REPAIR INCISIONAL HERNIA,REDUCIBLE  1998    inc hernia ( [facility] surgery)     BIOPSY       BLADDER SURGERY      hyperdistention surgery and sling     BREAST SURGERY       CARDIAC SURGERY  2022     CHOLECYSTECTOMY  1987     COLONOSCOPY  12/03/2011    Procedure:COLONOSCOPY; COLONOSCOPY; Surgeon:SEMAJ GRAHAM; Location: GI     COLONOSCOPY N/A 03/29/2018    Procedure: COLONOSCOPY;  COLONOSCOPY Rm 544;  Surgeon: Marco Gusman MD;   Location:  GI     CV CORONARY ANGIOGRAM N/A 09/30/2022    Procedure: Coronary Angiogram;  Surgeon: Garcia Barber MD;  Location:  HEART CARDIAC CATH LAB     CV TRANSCATHETER AORTIC VALVE REPLACEMENT-FEMORAL APPROACH N/A 11/08/2022    Procedure: Transcatheter Aortic Valve Replacement-Femoral Approach;  Surgeon: Yulia Castano MD;  Location:  HEART CARDIAC CATH LAB     CYSTOSCOPY N/A 09/06/2017    Procedure: CYSTOSCOPY;  CYSTOSCOPY AND HYDRODISTENTION ;  Surgeon: Eyal Bai MD;  Location:  OR     ENT SURGERY      Tonsillectomy     EP PACEMAKER DEVICE & LEAD IMPLANT- RIGHT ATRIAL & RIGHT VENTRICULAR N/A 11/08/2022    Procedure: Pacemaker Device & Lead Implant - Right Atrial & Right Ventricular;  Surgeon: William Boyd MD;  Location:  HEART CARDIAC CATH LAB     ESOPHAGOSCOPY, GASTROSCOPY, DUODENOSCOPY (EGD), COMBINED N/A 09/26/2016    Procedure: COMBINED ESOPHAGOSCOPY, GASTROSCOPY, DUODENOSCOPY (EGD), BIOPSY SINGLE OR MULTIPLE;  Surgeon: Marco Monaco MD;  Location:  GI     EYE SURGERY Left 05/2019     GENITOURINARY SURGERY       GYN SURGERY      Hysterectomy     HERNIA REPAIR, UMBILICAL  07/08/2010    Dr. Kirt Franco times 3     IMPLANT STIMULATOR SACRAL NERVE STAGE ONE N/A 09/17/2020    Procedure: sacral neurostimulation stage one implant of neurostimulator lead;  Surgeon: Shahnaz Carbone MD;  Location:  OR     IMPLANT STIMULATOR SACRAL NERVE STAGE TWO Right 09/24/2020    Procedure: Removal of interstem lead, NOT implanting neurostimulator;  Surgeon: Shahnaz Carbone MD;  Location:  OR     ORTHOPEDIC SURGERY  2009    [facility] - Dr. Lund- bilateral knee replacement     ZZC NONSPECIFIC PROCEDURE  1946    T&A     ZZC NONSPECIFIC PROCEDURE  1984    Vaginal Hysterectomy (has her ovaries)     ZZC NONSPECIFIC PROCEDURE  1973    PPTL     ZZC NONSPECIFIC PROCEDURE  1994    L shoulder to remove bone spurs     ZZC NONSPECIFIC PROCEDURE  2004    cysto and  durasphere Dr Demarco     [facility] NONSPECIFIC PROCEDURE  2005    cysto and durasphere     ZZC NONSPECIFIC PROCEDURE  2007    cysto and durasphere     ZZC NONSPECIFIC PROCEDURE  2009    retropubic TVT sling     Family History     Family History   Problem Relation Age of Onset     Heart Disease Mother         heart surgery , new valve     Gallbladder Disease Mother      Coronary Artery Disease Mother      Hyperlipidemia Mother      Asthma Mother      Hypertension Mother      Anesthesia Reaction Mother      Cancer Father         throat ca,  76     Coronary Artery Disease Father      Other Cancer Father      Heart Disease Maternal Grandmother      Coronary Artery Disease Maternal Grandmother      Heart Disease Maternal Grandfather      Coronary Artery Disease Maternal Grandfather      Cancer Brother         lung     Aortic stenosis Brother         TAVR     Diabetes Brother         Half Brother     Allergies     Allergies   Allergen Reactions     Amoxicillin-Pot Clavulanate Diarrhea     Hydrocodone      Keeps patient awake     Morphine And Related Nausea and Vomiting     Naproxen Itching and Rash     Seasonal Allergies      Hay fever in fall, ragweed and russian thistles     Sulfa Antibiotics Nausea     Social History     Social History     Tobacco Use     Smoking status: Never     Smokeless tobacco: Never     Tobacco comments:     have never smoked anything   Substance Use Topics     Alcohol use: No     Physical Exam   Blood pressure (!) 150/100, pulse 72, temperature 97.4  F (36.3  C), temperature source Temporal, resp. rate 20, weight 82.6 kg (182 lb), SpO2 100 %, not currently breastfeeding.    General: Ill appearing, cooperative with exam, in NAD.  HEENT: Atraumatic. No erythema in posterior pharynx.  Lymph: No cervical or inguinal lymphadenopathy.  Cardiac: RRR. No murmurs.  Lungs: CTAB. Nl WOB.  Abd: Mild diffuse abd tenderness. No rebound or gaurding. Nl bowel sounds.  Ext: No edema. 2+ pulses.  Skin: No  rashes, abrasions, or contusions.  Psych: A&Ox3. Nl affect.  Neuro: 5/5 strength. Sensation intact.    Labs & Imaging     Reviewed and Pertinent results discussed in assessment and plan.

## 2023-07-06 NOTE — PHARMACY-ADMISSION MEDICATION HISTORY
Pharmacist Admission Medication History    Admission medication history is complete. The information provided in this note is only as accurate as the sources available at the time of the update.    Medication reconciliation/reorder completed by provider prior to medication history? No    Information Source(s): Patient via in-person    Pertinent Information: patient provided own medlist     Changes made to PTA medication list:    Added: colace qOD    Deleted: None    Changed: None    Medication Affordability:  Not including over the counter (OTC) medications, was there a time in the past 3 months when you did not take your medications as prescribed because of cost?: No    Allergies reviewed with patient and updates made in EHR: yes    Medication History Completed By: John Montes RPH 7/5/2023 9:10 PM    Prior to Admission medications    Medication Sig Last Dose Taking? Auth Provider Long Term End Date   acetaminophen (TYLENOL) 500 MG tablet Take 1,000 mg by mouth 2 times daily (2 x 500 mg = 1000 mg) 7/5/2023 at am Yes Unknown, Entered By History     amLODIPine (NORVASC) 5 MG tablet TAKE ONE TABLET BY MOUTH EVERY NIGHT AT BEDTIME 7/4/2023 at hs Yes Jesenia Madrigal MD Yes    amoxicillin (AMOXIL) 500 MG capsule Take 2,000 mg by mouth once as needed (1 hour prior to dental procedures 4 x 500 mg = 2000 mg) dental only Yes Reported, Patient     apixaban ANTICOAGULANT (ELIQUIS ANTICOAGULANT) 5 MG tablet Take 1 tablet (5 mg) by mouth 2 times daily 7/5/2023 at am Yes Ingrid Delong PA-C     Biotin 5000 MCG PO TABS Take 1 tablet by mouth daily 7/4/2023 at 1200 Yes Reported, Patient     calcium carbonate (OS-CINDY) 500 MG tablet Take 1 tablet by mouth every other day 7/4/2023 at 1200 Yes Unknown, Entered By History     CENTRUM SILVER OR TABS Take 1 tablet by mouth daily 7/5/2023 at am Yes John Daniels MD     Cranberry 450 MG CAPS Take 1 capsule by mouth daily (with dinner) 7/4/2023 at dinner hr Yes Unknown,  Entered By History     docusate sodium (COLACE) 100 MG capsule Take 100 mg by mouth every other day 7/5/2023 at am Yes Unknown, Entered By History     docusate sodium (COLACE) 100 MG capsule Take 100 mg by mouth At Bedtime 7/4/2023 Yes Unknown, Entered By History     ferrous gluconate (FERGON) 324 (38 FE) MG tablet Take 1 tablet (324 mg) by mouth 2 times daily 7/5/2023 at am Yes Maximino Shabazz MD     latanoprost (XALATAN) 0.005 % ophthalmic solution Place 1 drop into both eyes At Bedtime  7/4/2023 at hs Yes Reported, Patient Yes    levothyroxine (SYNTHROID/LEVOTHROID) 50 MCG tablet Take 1 tablet (50 mcg) by mouth daily 7/5/2023 at am Yes Jesenia Madrigal MD Yes    lisinopril (ZESTRIL) 30 MG tablet Take 1 tablet (30 mg) by mouth every morning 7/5/2023 at am Yes Jesenia Madrigal MD Yes    magnesium 250 MG tablet Take 1 tablet by mouth daily 7/5/2023 Yes Unknown, Entered By History     nitroGLYcerin (NITROSTAT) 0.4 MG sublingual tablet For chest pain place 1 tablet under the tongue every 5 minutes for 3 doses. If symptoms persist 5 minutes after 1st dose call 911.  Patient taking differently: 0.4 mg every 5 minutes as needed For chest pain place 1 tablet under the tongue every 5 minutes for 3 doses. If symptoms persist 5 minutes after 1st dose call 911. prn Yes Yulia Castano MD Yes    OMEGA-3 FATTY ACIDS 1200 MG OR CAPS Take 1 capsule by mouth daily 7/5/2023 at am Yes John Daniels MD     pantoprazole (PROTONIX) 20 MG EC tablet Take 1 tablet (20 mg) by mouth daily 7/5/2023 at am Yes Jesenia Madrigal MD     potassium 99 MG TABS Take 1 tablet by mouth daily (with dinner)  7/4/2023 at dinner Yes Reported, Patient     Probiotic Product (ACIDOPHILUS PROBIOTIC BLEND) CAPS Take 1 capsule by mouth daily (with breakfast)  7/5/2023 at am Yes Jesenia Madrigal MD     simvastatin (ZOCOR) 20 MG tablet TAKE ONE TABLET BY MOUTH EVERY NIGHT AT BEDTIME 7/4/2023 at  Jesenia Odom MD  Yes    VITAMIN D, CHOLECALCIFEROL, PO Take 2,000 Units by mouth daily 7/4/2023 at dinner hr Yes Reported, Patient

## 2023-07-06 NOTE — PLAN OF CARE
Goal Outcome Evaluation:      Plan of Care Reviewed With: patient    Overall Patient Progress: no changeOverall Patient Progress: no change    A/Ox4, VSS on RA. Denies pain in abdomen, but states there is pain in neck. PRN Tylenol given. NPO except meds and ice chips. PRN chloraseptic for sore throat. NG tube to LIS, minimal output. SBA with GB. Pt voiding adequately. General surgery to see pt.

## 2023-07-06 NOTE — CONSULTS
Chief complaint:  Abdominal pain    HPI:  This patient is a 82 year old female who presents with recurrent small bowel obstruction.  The patient has a complex history of multiple hernia repairs and abdominal surgeries.  Her last hernia repair was done by me in 2010.  Since that time, the patient has had intermittent obstructive symptoms.  She has not often been hospitalized, but has had short-lived bouts of what she feels her bowel obstructions at home.  This has been happening frequently over the last couple of months.  She has been trying to carefully watch her diet.    Past Medical History:   has a past medical history of Aortic valve stenosis (8/18/2022), Arthritis, Diabetes (H), Esophageal reflux, Hernia, abdominal, History of blood transfusion (1984), Hypertension, Incontinence of urine, Mumps, Obese, Osteoarthritis, Other and unspecified hyperlipidemia, Other chronic pain, Peptic ulcer, unspecified site, unspecified as acute or chronic, without mention of hemorrhage, perforation, or obstruction, Small bowel obstruction (H), Thyroid disease (hypothyroidism), Urinary incontinence, and Walking troubles.    Past Surgical History:  Past Surgical History:   Procedure Laterality Date     ABDOMEN SURGERY       ARTHROPLASTY HIP Right 11/09/2016    Procedure: ARTHROPLASTY HIP;  Surgeon: Angel Lund MD;  Location:  OR     AS REPAIR INCISIONAL HERNIA,REDUCIBLE  1998    inc hernia ( [facility] surgery)     BIOPSY       BLADDER SURGERY      hyperdistention surgery and sling     BREAST SURGERY       CARDIAC SURGERY  2022     CHOLECYSTECTOMY  1987     COLONOSCOPY  12/03/2011    Procedure:COLONOSCOPY; COLONOSCOPY; Surgeon:SEMAJ GRAHAM; Location: GI     COLONOSCOPY N/A 03/29/2018    Procedure: COLONOSCOPY;  COLONOSCOPY Rm 544;  Surgeon: Marco Gusman MD;  Location:  GI     CV CORONARY ANGIOGRAM N/A 09/30/2022    Procedure: Coronary Angiogram;  Surgeon: Garcia Barber MD;  Location:  HEART CARDIAC  CATH LAB     CV TRANSCATHETER AORTIC VALVE REPLACEMENT-FEMORAL APPROACH N/A 11/08/2022    Procedure: Transcatheter Aortic Valve Replacement-Femoral Approach;  Surgeon: Yulia Castano MD;  Location:  HEART CARDIAC CATH LAB     CYSTOSCOPY N/A 09/06/2017    Procedure: CYSTOSCOPY;  CYSTOSCOPY AND HYDRODISTENTION ;  Surgeon: Eyal Bai MD;  Location:  OR     ENT SURGERY      Tonsillectomy     EP PACEMAKER DEVICE & LEAD IMPLANT- RIGHT ATRIAL & RIGHT VENTRICULAR N/A 11/08/2022    Procedure: Pacemaker Device & Lead Implant - Right Atrial & Right Ventricular;  Surgeon: William Boyd MD;  Location:  HEART CARDIAC CATH LAB     ESOPHAGOSCOPY, GASTROSCOPY, DUODENOSCOPY (EGD), COMBINED N/A 09/26/2016    Procedure: COMBINED ESOPHAGOSCOPY, GASTROSCOPY, DUODENOSCOPY (EGD), BIOPSY SINGLE OR MULTIPLE;  Surgeon: Marco Monaco MD;  Location:  GI     EYE SURGERY Left 05/2019     GENITOURINARY SURGERY       GYN SURGERY      Hysterectomy     HERNIA REPAIR, UMBILICAL  07/08/2010    Dr. Kirt Franco times 3     IMPLANT STIMULATOR SACRAL NERVE STAGE ONE N/A 09/17/2020    Procedure: sacral neurostimulation stage one implant of neurostimulator lead;  Surgeon: Shahnaz Carbone MD;  Location:  OR     IMPLANT STIMULATOR SACRAL NERVE STAGE TWO Right 09/24/2020    Procedure: Removal of interstem lead, NOT implanting neurostimulator;  Surgeon: Shahnaz Carbone MD;  Location:  OR     ORTHOPEDIC SURGERY  2009    [facility] - Dr. Lund- bilateral knee replacement     ZZC NONSPECIFIC PROCEDURE  1946    T&A     ZZC NONSPECIFIC PROCEDURE  1984    Vaginal Hysterectomy (has her ovaries)     ZZC NONSPECIFIC PROCEDURE  1973    PPTL     ZZC NONSPECIFIC PROCEDURE  1994    L shoulder to remove bone spurs     ZZC NONSPECIFIC PROCEDURE  2004    cysto and durasphere Dr Demarco     ZZC NONSPECIFIC PROCEDURE  2005    cysto and durasphere     ZZC NONSPECIFIC PROCEDURE  2007    cysto and durasphere     ZZC  NONSPECIFIC PROCEDURE  2009    retropubic TVT sling        Social History:  Social History     Socioeconomic History     Marital status: Single     Spouse name: Not on file     Number of children: Not on file     Years of education: Not on file     Highest education level: Not on file   Occupational History     Not on file   Tobacco Use     Smoking status: Never     Smokeless tobacco: Never     Tobacco comments:     have never smoked anything   Vaping Use     Vaping Use: Never used   Substance and Sexual Activity     Alcohol use: No     Drug use: No     Sexual activity: Not Currently     Partners: Male     Birth control/protection: None     Comment: have had a hysterectomy   Other Topics Concern     Parent/sibling w/ CABG, MI or angioplasty before 65F 55M? No   Social History Narrative     Not on file     Social Determinants of Health     Financial Resource Strain: Not on file   Food Insecurity: Not on file   Transportation Needs: Not on file   Physical Activity: Not on file   Stress: Not on file   Social Connections: Not on file   Intimate Partner Violence: Not on file   Housing Stability: Not on file        Family History:  Family History   Problem Relation Age of Onset     Heart Disease Mother         heart surgery , new valve     Gallbladder Disease Mother      Coronary Artery Disease Mother      Hyperlipidemia Mother      Asthma Mother      Hypertension Mother      Anesthesia Reaction Mother      Cancer Father         throat ca,  76     Coronary Artery Disease Father      Other Cancer Father      Heart Disease Maternal Grandmother      Coronary Artery Disease Maternal Grandmother      Heart Disease Maternal Grandfather      Coronary Artery Disease Maternal Grandfather      Cancer Brother         lung     Aortic stenosis Brother         TAVR     Diabetes Brother         Half Brother       Review of Systems:  The 10 point Review of Systems is negative other than noted in the HPI and above.    Physical  Exam:  General - This is a well developed, well nourished female in no apparent distress.  HEENT - Normocephalic, atraumatic.  No scleral icterus.  Neck - supple without masses  Lungs - clear to ascultation.    Heart - Regular rate & rhythm without murmur  Abdomen:   soft, non-distended with minimal tenderness noted in the right abdomen.  Extremities - warm without edema  Neurologic - nonfocal    Relevant labs:  White blood cell count is 6000.  Hemoglobin is 12.4.    Imaging:  CT scan shows small bowel obstruction with a transition point in the right abdomen.  This appears to be associated with the patient's small bowel anastomosis.    Assessment and Plan:  This is a patient with recurrent small bowel obstructions.  Unfortunately, her abdominal wall is very complex, containing multiple pieces of mesh.  Conceivably, the area of obstruction could be approached from lateral to the mesh.  I would like to see if we can get the patient past this acute episode, but then I think it would be reasonable to sit down in the office and discuss whether it might be time to consider some sort of surgical intervention.  We will proceed with Gastrografin challenge today.  This was ordered earlier this morning, but unfortunately radiology was waiting for a separate order for a plain film to confirm NG placement again this morning.  We will work on getting that film performed now.      Rafi Franco MD  [facility]

## 2023-07-07 PROCEDURE — 250N000011 HC RX IP 250 OP 636: Mod: JZ | Performed by: INTERNAL MEDICINE

## 2023-07-07 PROCEDURE — 250N000013 HC RX MED GY IP 250 OP 250 PS 637: Performed by: STUDENT IN AN ORGANIZED HEALTH CARE EDUCATION/TRAINING PROGRAM

## 2023-07-07 PROCEDURE — 99231 SBSQ HOSP IP/OBS SF/LOW 25: CPT | Performed by: PHYSICIAN ASSISTANT

## 2023-07-07 PROCEDURE — 99231 SBSQ HOSP IP/OBS SF/LOW 25: CPT | Performed by: STUDENT IN AN ORGANIZED HEALTH CARE EDUCATION/TRAINING PROGRAM

## 2023-07-07 PROCEDURE — 999N000127 HC STATISTIC PERIPHERAL IV START W US GUIDANCE

## 2023-07-07 PROCEDURE — 120N000001 HC R&B MED SURG/OB

## 2023-07-07 PROCEDURE — 250N000013 HC RX MED GY IP 250 OP 250 PS 637: Performed by: INTERNAL MEDICINE

## 2023-07-07 PROCEDURE — 258N000003 HC RX IP 258 OP 636: Performed by: INTERNAL MEDICINE

## 2023-07-07 PROCEDURE — C9113 INJ PANTOPRAZOLE SODIUM, VIA: HCPCS | Mod: JZ | Performed by: INTERNAL MEDICINE

## 2023-07-07 RX ADMIN — Medication 1 MG: at 01:28

## 2023-07-07 RX ADMIN — APIXABAN 5 MG: 5 TABLET, FILM COATED ORAL at 20:04

## 2023-07-07 RX ADMIN — PANTOPRAZOLE SODIUM 40 MG: 40 INJECTION, POWDER, FOR SOLUTION INTRAVENOUS at 22:11

## 2023-07-07 RX ADMIN — APIXABAN 5 MG: 5 TABLET, FILM COATED ORAL at 08:47

## 2023-07-07 RX ADMIN — LEVOTHYROXINE SODIUM 50 MCG: 50 TABLET ORAL at 08:47

## 2023-07-07 RX ADMIN — AMLODIPINE BESYLATE 5 MG: 5 TABLET ORAL at 08:47

## 2023-07-07 RX ADMIN — SODIUM CHLORIDE: 9 INJECTION, SOLUTION INTRAVENOUS at 04:19

## 2023-07-07 RX ADMIN — LATANOPROST 1 DROP: 50 SOLUTION/ DROPS OPHTHALMIC at 22:10

## 2023-07-07 RX ADMIN — DICLOFENAC SODIUM 2 G: 10 GEL TOPICAL at 20:04

## 2023-07-07 ASSESSMENT — ACTIVITIES OF DAILY LIVING (ADL)
ADLS_ACUITY_SCORE: 25
ADLS_ACUITY_SCORE: 28
ADLS_ACUITY_SCORE: 26
ADLS_ACUITY_SCORE: 26
ADLS_ACUITY_SCORE: 29
ADLS_ACUITY_SCORE: 26
ADLS_ACUITY_SCORE: 26
ADLS_ACUITY_SCORE: 25
ADLS_ACUITY_SCORE: 25
ADLS_ACUITY_SCORE: 29
ADLS_ACUITY_SCORE: 26
ADLS_ACUITY_SCORE: 27

## 2023-07-07 NOTE — PLAN OF CARE
"  Pt, A&O, SBA for mobility has some pain in her right hand, IV fluids was stopped, pt. Is on clear liquid diet,  NG tube in placed and clamped, no nausea, voided adequately, plan to tolerated regular diet and go home.    /54 (BP Location: Left arm)   Pulse 60   Temp 97.9  F (36.6  C) (Temporal)   Resp 14   Ht 1.626 m (5' 4\")   Wt 85.3 kg (188 lb)   LMP  (LMP Unknown)   SpO2 97%   BMI 32.27 kg/m        "

## 2023-07-07 NOTE — PROGRESS NOTES
[facility]    General Surgery Progress Note          Assessment and Plan:   Assessment:    Lexii Stratton is a 82 year old female admitted with recurrent small bowel obstruction  - GG challenge 7/7 shows contrast in colon        Plan:   Diet: clear liquid  NG: clamp for 4 hrs, return to LIS, if less than 200 mL output, may remove NG  IVFs: NS @ 100 mL  Activity: as tolerated  Dispo: 1-2 days depending on diet tolerance and GI function           Interval History:   Pt resting in bed.  Feeling worn out, was up throughout the night with several loose stools.  Denies abdominal pain, no nausea, feeling hungry.         Physical Exam:   Temp: 97.9  F (36.6  C) Temp src: Temporal BP: 134/54 Pulse: 60   Resp: 14   SpO2: 97 % O2 Device: None (Room air)      No intake/output data recorded.    Constitutional: alert and no distress   Abdomen: soft, non tender.             Data:   Data   Recent Labs   Lab 07/06/23  0732 07/05/23  1756 07/05/23  1755   WBC 6.0  --  8.9   HGB 12.4  --  16.5*   MCV 98  --  96   *  --  181     --  141   POTASSIUM 4.2  --  4.5   CHLORIDE 109*  --  101   CO2 24  --  23   BUN 14.6  --  20.0   CR 0.81 1.0 0.94   ANIONGAP 7  --  17*   CINDY 8.7*  --  10.7*   *  --  136*   ALBUMIN  --   --  4.3   PROTTOTAL  --   --  7.8   BILITOTAL  --   --  0.8   ALKPHOS  --   --  134*   ALT  --   --  15   AST  --   --  36        DORIS Kwan [facility]  Text page: [phone]  8-4 pm   After 4 pm call [phone]      Seen and agree,    Rafi Franco MD  [facility]                         stand-by assist

## 2023-07-07 NOTE — PLAN OF CARE
/49  Pulse 62   Temp 98.6  F  Resp 18  SpO2 95%     Patient is alert and oriented x4, no complaints of pain, SBA, NPO, NG on low intermittent suction, had no output from the NG, had multiple incontinent loose BM during the night. No nausea or vomiting reported. IV running NS at 100 mL/hr.

## 2023-07-07 NOTE — PROGRESS NOTES
[facility]    Medicine Progress Note - Hospitalist Service  Date of Admission: 7/5/2023     Assessment & Plan         Lexii Stratton is a 82 year old female with past medical history significant for paroxysmal atrial fibrillation on DOAC, complete heart block s/p PPM, hypertension, severe aortic stenosis s/p TAVR, and recurrent SBOs who presented to [facility] on 7/5/2023 with abdominal pain and was found to have recurrent SBO.    Small Bowel Obstruction, Recurrent  Presents with abdominal pain, n/v, and poor oral intake. Hx of recurrent SBOs ever since surgeries for hernias in the past. In the ED, evidence of SBO on CT abd/pelvis and NGT placed. Continues to be NPO with NGT to LIS. General surgery consulted and gastrograffin challenge ordered showing penetration to colon. Patient now with diarrhea throughout day indicating improvement in SBO. Surgery suggests conservative management for acute setting, but with possible planning for surgery outpatient. NGT clamping trial and CLD today.     Hypercalcemia, resolved  Mild and suspect secondary to calcium supplementation for osteoporosis. Hold calcium supplementation for now and go down on dose at time of discharge     pAF  CHB s/p PPM  Severe Aortic Stenosis s/p TAVR  Continue PTA Eliquis. Not on any rate or rhythm control medications.     PUD  Will switch PPI to IV while patient is NPO     Essential HTN  Home medications    Swollen Right Forearm/Hand  Suspect extravasation. Stopping IVF, but will keep IV for emergency use. Heat/cold/diclofenac provided. US right arm if not improving tomorrow.     Diet: Orders Placed This Encounter      Clear Liquid Diet     DVT Prophylaxis: DOAC  Hammond: None  Code Status: Full Code    Expected discharge: Likely 1-3 days pending resolution of SBO.    The patient's care was discussed with the Patient.    Mitul Colindres MD, MHS  Hospitalist Service  [facility]  "Hospital  ______________________________________________________________________    Interval History   Nursing notes reviewed; no acute events overnight. Patient stated that she has started having diarrhea and flatus. Also with new pain to right CMC joint after blood draw and swollen hand/arm.    A full 10+ point review of systems was performed and found to be negative with the exception of those items noted here.    Physical Exam   Temp: 97  F (36.1  C) Temp src: Temporal BP: (!) 163/71 Pulse: 62   Resp: 24 SpO2: 99 % O2 Device: Nasal cannula   Height: 162.6 cm (5' 4\") Weight: 85.3 kg (188 lb)  Estimated body mass index is 32.27 kg/m  as calculated from the following:    Height as of this encounter: 1.626 m (5' 4\").    Weight as of this encounter: 85.3 kg (188 lb).    General: Very pleasant female resting comfortably in hospital bed.  Awake, alert, interactive.  HEENT: Normocephalic, atraumatic.  PERRL, EOMI.  Conjunctiva clear, sclerae anicteric.  Mucous membranes moist.  Cardiac: Regular rate and rhythm without murmur, gallop, or rub.  No peripheral edema.  Respiratory: Normal work of breathing.  Clear to auscultation bilaterally without wheezing, rales, or rhonchi.  GI: Hypoactive bowel sounds. Abdomen soft, only mildly tender on right.  : Deferred.  Musculoskeletal: Moving all extremities appropriately. Right CMC with overlaying bruising. Bruising to right AC near IV site and forearm/hand swollen.  Skin: No rashes or abrasions on exposed skin.  Neurologic: Alert and oriented x4.  Cranial nerves II through XII grossly intact.  Psychologic: Appropriate mood and affect.    Data   All laboratory results and other diagnostic data from the past 24 hours is available in Epic and has been personally reviewed.    Recent Labs   Lab 07/06/23  0732 07/05/23  1756 07/05/23  1755   WBC 6.0  --  8.9   HGB 12.4  --  16.5*   MCV 98  --  96   *  --  181     --  141   POTASSIUM 4.2  --  4.5   CHLORIDE 109*  --  101 "   CO2 24  --  23   BUN 14.6  --  20.0   CR 0.81 1.0 0.94   ANIONGAP 7  --  17*   CINDY 8.7*  --  10.7*   *  --  136*   ALBUMIN  --   --  4.3   PROTTOTAL  --   --  7.8   BILITOTAL  --   --  0.8   ALKPHOS  --   --  134*   ALT  --   --  15   AST  --   --  36     Imaging results reviewed over the past 24 hrs:   Recent Results (from the past 24 hour(s))   XR Gastrografin  Challenge    Narrative    XR GASTROGRAFIN CHALLENGE 7/6/2023 11:41 PM    HISTORY: Small bowel obstruction    COMPARISON: X-ray 7/6/2023     FINDINGS: Enteric tube with tip and side port in the gastric fundus.  Mildly dilated segments of small bowel within the left lower quadrant  are similar to the prior exam. Passage of oral contrast throughout the  entire colon and into the rectum. No definite free air. Mild  degenerative changes and lumbar dextro scoliosis.      Impression    IMPRESSION: Findings suggesting a resolving low-grade partial small  bowel obstruction. Residual mild dilatation of the small bowel is  noted..    SHOAIB SMITH MD         SYSTEM ID:  [id number]

## 2023-07-08 VITALS
WEIGHT: 188 LBS | BODY MASS INDEX: 32.1 KG/M2 | HEIGHT: 64 IN | HEART RATE: 60 BPM | RESPIRATION RATE: 18 BRPM | DIASTOLIC BLOOD PRESSURE: 60 MMHG | TEMPERATURE: 98.5 F | OXYGEN SATURATION: 94 % | SYSTOLIC BLOOD PRESSURE: 133 MMHG

## 2023-07-08 PROCEDURE — 99232 SBSQ HOSP IP/OBS MODERATE 35: CPT | Performed by: PHYSICIAN ASSISTANT

## 2023-07-08 PROCEDURE — 250N000013 HC RX MED GY IP 250 OP 250 PS 637: Performed by: INTERNAL MEDICINE

## 2023-07-08 PROCEDURE — 99238 HOSP IP/OBS DSCHRG MGMT 30/<: CPT | Performed by: STUDENT IN AN ORGANIZED HEALTH CARE EDUCATION/TRAINING PROGRAM

## 2023-07-08 RX ORDER — NALOXONE HYDROCHLORIDE 0.4 MG/ML
0.4 INJECTION, SOLUTION INTRAMUSCULAR; INTRAVENOUS; SUBCUTANEOUS
Status: DISCONTINUED | OUTPATIENT
Start: 2023-07-08 | End: 2023-07-08 | Stop reason: HOSPADM

## 2023-07-08 RX ORDER — NALOXONE HYDROCHLORIDE 0.4 MG/ML
0.2 INJECTION, SOLUTION INTRAMUSCULAR; INTRAVENOUS; SUBCUTANEOUS
Status: DISCONTINUED | OUTPATIENT
Start: 2023-07-08 | End: 2023-07-08 | Stop reason: HOSPADM

## 2023-07-08 RX ORDER — POLYETHYLENE GLYCOL 3350 17 G/17G
1 POWDER, FOR SOLUTION ORAL DAILY
Qty: 510 G | Refills: 0 | Status: SHIPPED | OUTPATIENT
Start: 2023-07-08 | End: 2023-11-08

## 2023-07-08 RX ADMIN — DICLOFENAC SODIUM 2 G: 10 GEL TOPICAL at 09:18

## 2023-07-08 RX ADMIN — ACETAMINOPHEN 650 MG: 325 TABLET, FILM COATED ORAL at 06:23

## 2023-07-08 RX ADMIN — LEVOTHYROXINE SODIUM 50 MCG: 50 TABLET ORAL at 09:19

## 2023-07-08 RX ADMIN — DICLOFENAC SODIUM 2 G: 10 GEL TOPICAL at 11:51

## 2023-07-08 RX ADMIN — AMLODIPINE BESYLATE 5 MG: 5 TABLET ORAL at 09:19

## 2023-07-08 RX ADMIN — APIXABAN 5 MG: 5 TABLET, FILM COATED ORAL at 09:19

## 2023-07-08 RX ADMIN — ACETAMINOPHEN 650 MG: 325 TABLET, FILM COATED ORAL at 11:47

## 2023-07-08 ASSESSMENT — ACTIVITIES OF DAILY LIVING (ADL)
ADLS_ACUITY_SCORE: 29
ADLS_ACUITY_SCORE: 25
ADLS_ACUITY_SCORE: 29

## 2023-07-08 NOTE — PROGRESS NOTES
[facility]    General Surgery Progress Note          Assessment and Plan:   Assessment:    Lexii Stratton is a 82 year old female admitted with recurrent small bowel obstruction  - GG challenge 7/7 shows contrast in colon        Plan:   ADAT to baseline soft diet  OK to discharge today if tolerates full liquid diet  F/u Dr. Franco 2-3 weeks to discuss possible revision of anastomosis         Interval History:   Comfortable in bed, slept well last night. Abdomen feels back to normal. Tolerating CLD and interested in advancing diet. + passing flatus and several BMs.         Physical Exam:   Temp: 97.4  F (36.3  C) Temp src: Temporal BP: 137/61 Pulse: 57   Resp: 22   SpO2: 97 % O2 Device: Nasal cannula      I/O last 3 completed shifts:  In: 480 [P.O.:480]  Out: 110 [Urine:110]    Constitutional: alert and no distress   Abdomen: soft, non tender.             Data:   Data   Recent Labs   Lab 07/06/23  0732 07/05/23  1756 07/05/23  1755   WBC 6.0  --  8.9   HGB 12.4  --  16.5*   MCV 98  --  96   *  --  181     --  141   POTASSIUM 4.2  --  4.5   CHLORIDE 109*  --  101   CO2 24  --  23   BUN 14.6  --  20.0   CR 0.81 1.0 0.94   ANIONGAP 7  --  17*   CINDY 8.7*  --  10.7*   *  --  136*   ALBUMIN  --   --  4.3   PROTTOTAL  --   --  7.8   BILITOTAL  --   --  0.8   ALKPHOS  --   --  134*   ALT  --   --  15   AST  --   --  36        Hiwot Lara PA-C  [facility]  Text page: [phone] 8-4 pm   After 4 pm call [phone]

## 2023-07-08 NOTE — PLAN OF CARE
Pt A&O x4. VS stable; afebrile. PO tylenol and voltaren gel managing pain. CMS intact. Up w/ SBA. Voiding in good amts. Having BMs. Advanced to soft diet-tolerated well.     Reviewed discharge instructions with patient. Questions answered. Patient discharged to home via self with discharge instructions, and belongings.

## 2023-07-08 NOTE — PLAN OF CARE
Goal Outcome Evaluation:      Plan of Care Reviewed With: patient    Overall Patient Progress: improving    Patient is alert and oriented times four; able to communicate needs and concerns. VS are stable. RA. Denied pain, SOB, nausea, or vomiting. CMS intact. Stand by assist for mobilities. Ambulated with staff. Tolerating clear liquid diet. Can advance to full liquid diet in the morning. Voiding w/o difficulty. Has Pure wick for tonight per request. No output after four hours of clamp. NG tube removed as ordered. New IV placed by vascular. Saline lock. Had loose bowel movement times two. Possible discharge home tomorrow if tolerating regular diet.

## 2023-07-08 NOTE — PLAN OF CARE
Goal Outcome Evaluation:    Pt is alert and oriented x 4, able to make needs known. VS are stable. RA .denied pain , SOB , nausea and vomiting. CMS intact. Up with assist one .Tolerating clear liquid diet. Plan to discharge home if tolerating regular diet.

## 2023-07-09 NOTE — DISCHARGE SUMMARY
"[facility]  Hospitalist Discharge Summary      Date of Admission:  7/5/2023  Date of Discharge:  7/8/2023  2:11 PM  Discharging Provider: Mitul Colindres MD  Discharge Service: Hospitalist Service    Discharge Diagnoses   Small Bowel Obstruction, Recurrent  Hypercalcemia, resolved  pAF  CHB s/p PPM  Severe Aortic Stenosis s/p TAVR  PUD  Essential HTN  Swollen Right Forearm/Hand    Clinically Significant Risk Factors     # Obesity: Estimated body mass index is 32.27 kg/m  as calculated from the following:    Height as of this encounter: 1.626 m (5' 4\").    Weight as of this encounter: 85.3 kg (188 lb).       Follow-ups Needed After Discharge   Follow up with Dr. Franco and PCP    Discharge Disposition   Discharged to home  Condition at discharge: Stable    Hospital Course   Lexii Stratton is a 82 year old female with past medical history significant for paroxysmal atrial fibrillation on DOAC, complete heart block s/p PPM, hypertension, severe aortic stenosis s/p TAVR, and recurrent SBOs who presented to [facility] on 7/5/2023 with abdominal pain and was found to have recurrent SBO. Hx of recurrent SBOs ever since surgeries for hernias in the past. In the ED, evidence of SBO on CT abd/pelvis and NGT placed. NGT to LIS and bowel reset. Gastrograffin on 7/6/2023 with penetration to colon. Patient now with diarrhea throughout day indicating improvement in SBO. Surgery suggests conservative management for acute setting, but with possible planning for surgery outpatient.    Consultations This Hospital Stay   SURGERY GENERAL IP CONSULT    Code Status   Prior    Time Spent on this Encounter   I, Mitul Colindres MD, personally saw the patient today and spent less than or equal to 30 minutes discharging this patient.       Mitul Colindres MD  [facility]THO [facility]  [address] [facility] [address]  Phone: [phone]  Fax: " "[phone]  ______________________________________________________________________    Physical Exam                     Height: 162.6 cm (5' 4\") Weight: 85.3 kg (188 lb)  Estimated body mass index is 32.27 kg/m  as calculated from the following:    Height as of this encounter: 1.626 m (5' 4\").    Weight as of this encounter: 85.3 kg (188 lb).    General: Very pleasant female resting comfortably in bedside chair.  Awake, alert, interactive.  HEENT: Normocephalic, atraumatic.  PERRL, EOMI.  Conjunctiva clear, sclerae anicteric.  Mucous membranes moist.  Cardiac: Regular rate and rhythm without murmur, gallop, or rub.  No peripheral edema.  Respiratory: Normal work of breathing.  Clear to auscultation bilaterally without wheezing, rales, or rhonchi.  GI: Hypoactive bowel sounds. Abdomen soft, only mildly tender on right.  : Deferred.  Musculoskeletal: Moving all extremities appropriately. Right CMC with overlaying bruising. Bruising to right AC near IV site.  Skin: No rashes or abrasions on exposed skin.  Neurologic: Alert and oriented x4.  Cranial nerves II through XII grossly intact.  Psychologic: Appropriate mood and affect.         Primary Care Physician   Jesenia Madrigal    Discharge Orders      Adult General Surg Referral      Reason for your hospital stay    You were in the hospital for a recurrent small bowel obstruction. It was treated with nasogastric tube decompression, bowel rest, and pain control. This has improved and you are safe to discharge home with continued soft diet. Follow up in clinic with surgery.     Follow-up and recommended labs and tests     Follow up with primary care provider, Jesenia Madrigal, within 7 days.     Follow up with Dr. Franco in surgery clinic in 2-3 weeks.     Activity    Your activity upon discharge: activity as tolerated     Diet    Follow this diet upon discharge: Orders Placed This Encounter      Mechanical/Dental Soft Diet       Significant Results and " Procedures   Most Recent 3 CBC's:Recent Labs   Lab Test 07/06/23  0732 07/05/23  1755 12/01/22  1404   WBC 6.0 8.9 7.2   HGB 12.4 16.5* 13.8   MCV 98 96 100   * 181 161     Most Recent 3 BMP's:Recent Labs   Lab Test 07/06/23  0732 07/05/23  1756 07/05/23  1755 05/18/23  0829     --  141 141   POTASSIUM 4.2  --  4.5 5.0   CHLORIDE 109*  --  101 102   CO2 24  --  23 21*   BUN 14.6  --  20.0 12.2   CR 0.81 1.0 0.94 0.77   ANIONGAP 7  --  17* 18*   CINDY 8.7*  --  10.7* 10.1   *  --  136* 88     Most Recent 2 LFT's:Recent Labs   Lab Test 07/05/23  1755 05/18/23  0829   AST 36 31   ALT 15 13   ALKPHOS 134* 103   BILITOTAL 0.8 0.7   ,   Results for orders placed or performed during the hospital encounter of 07/05/23   CT Abdomen Pelvis w Contrast    Narrative    EXAM: CT ABDOMEN PELVIS W CONTRAST  LOCATION: [facility]  DATE: 7/5/2023    INDICATION: Abdominal pain.  COMPARISON: CTA of the chest, abdomen, and pelvis performed 10/11/2022.  TECHNIQUE: CT scan of the abdomen and pelvis was performed following injection of IV contrast. Multiplanar reformats were obtained. Dose reduction techniques were used.  CONTRAST: 92mL Isovue 370    FINDINGS:   LOWER CHEST: The visualized lung bases are clear. TAVR.    HEPATOBILIARY: The gallbladder is not seen, and is likely surgically absent. Mild intra and extrahepatic biliary prominence is unchanged, and may be related to the patient's age and postcholecystectomy state. No hepatic masses are identified.    PANCREAS: Normal.    SPLEEN: Normal.    ADRENAL GLANDS: Normal.    KIDNEYS/BLADDER: Mild scattered cortical scarring in both kidneys. No hydronephrosis.    BOWEL: There are several dilated gas and fluid-filled loops of small bowel in the mid and lower abdomen, with a probable transition point near the postoperative changes in the right lower quadrant (series 7 image 38). Findings are consistent with a   distal small bowel obstruction. There  are several decompressed loops of distal small bowel in the right lower quadrant, and the colon is decompressed.    LYMPH NODES: No lymphadenopathy.    VASCULATURE: Mild atherosclerotic aortoiliac calcification.    PELVIC ORGANS: Hysterectomy. No free fluid in the pelvis.    MUSCULOSKELETAL: Right hip arthroplasty. Degenerative changes in the visualized thoracolumbar spine. Mild thoracolumbar curve.      Impression    IMPRESSION: Distal small bowel obstruction, with a suspected transition point in the right lower quadrant.   XR Abdomen Port 1 View    Narrative    EXAM: XR ABDOMEN PORT 1 VIEW  LOCATION: [facility]  DATE: 7/5/2023    INDICATION: NG placement  COMPARISON: CT 07/05/2023      Impression    IMPRESSION: Enteric tube extending below level of the EG junction curled in the upper aspect of the stomach. Dilated small bowel loops compatible with obstruction. Contrast material within the intrarenal collecting systems. Degenerative changes   throughout the spine.   XR Gastrografin  Challenge    Narrative    XR GASTROGRAFIN CHALLENGE 7/6/2023 11:41 PM    HISTORY: Small bowel obstruction    COMPARISON: X-ray 7/6/2023     FINDINGS: Enteric tube with tip and side port in the gastric fundus.  Mildly dilated segments of small bowel within the left lower quadrant  are similar to the prior exam. Passage of oral contrast throughout the  entire colon and into the rectum. No definite free air. Mild  degenerative changes and lumbar dextro scoliosis.      Impression    IMPRESSION: Findings suggesting a resolving low-grade partial small  bowel obstruction. Residual mild dilatation of the small bowel is  noted..    SHOAIB SMITH MD         SYSTEM ID:  [id number]   XR Abdomen Port 1 View    Narrative    ABDOMEN ONE VIEW PORTABLE July 6, 2023 1:23 PM     HISTORY: Mild obstruction.    COMPARISON: 7/5/2023.      Impression    IMPRESSION: Several dilated loops of small bowel in the midabdomen  have a similar  appearance to the previous exam, suspicious for a  persistent small bowel obstruction. Enteric tube, with tip in the  proximal stomach. Thoracolumbar curve. SHAZIARJoseph DELUCA MD         SYSTEM ID:  [id number]       Discharge Medications   Discharge Medication List as of 7/8/2023  1:48 PM      START taking these medications    Details   polyethylene glycol (MIRALAX) 17 GM/Dose powder Take 17 g (1 Capful) by mouth daily Decrease frequency if developing loose stools., Disp-510 g, R-0, E-Prescribe         CONTINUE these medications which have NOT CHANGED    Details   acetaminophen (TYLENOL) 500 MG tablet Take 1,000 mg by mouth 2 times daily (2 x 500 mg = 1000 mg), Historical      amLODIPine (NORVASC) 5 MG tablet TAKE ONE TABLET BY MOUTH EVERY NIGHT AT BEDTIME, Disp-90 tablet, R-3, E-Prescribe      amoxicillin (AMOXIL) 500 MG capsule Take 2,000 mg by mouth once as needed (1 hour prior to dental procedures 4 x 500 mg = 2000 mg), Historical      apixaban ANTICOAGULANT (ELIQUIS ANTICOAGULANT) 5 MG tablet Take 1 tablet (5 mg) by mouth 2 times daily, Disp-180 tablet, R-3, E-Prescribe      Biotin 5000 MCG PO TABS Take 1 tablet by mouth daily, Historical      calcium carbonate (OS-CINDY) 500 MG tablet Take 1 tablet by mouth every other day, Historical      CENTRUM SILVER OR TABS Take 1 tablet by mouth daily, Disp-30, R-0, Historical      Cranberry 450 MG CAPS Take 1 capsule by mouth daily (with dinner), Historical      !! docusate sodium (COLACE) 100 MG capsule Take 100 mg by mouth every other day, Historical      !! docusate sodium (COLACE) 100 MG capsule Take 100 mg by mouth At Bedtime, Historical      ferrous gluconate (FERGON) 324 (38 FE) MG tablet Take 1 tablet (324 mg) by mouth 2 times daily, Disp-100 tablet, R-0, No Print Out      latanoprost (XALATAN) 0.005 % ophthalmic solution Place 1 drop into both eyes At Bedtime , Historical      levothyroxine (SYNTHROID/LEVOTHROID) 50 MCG tablet Take 1 tablet (50 mcg) by  mouth daily, Disp-90 tablet, R-3, E-Prescribe      lisinopril (ZESTRIL) 30 MG tablet Take 1 tablet (30 mg) by mouth every morning, Disp-90 tablet, R-3, E-Prescribe      magnesium 250 MG tablet Take 1 tablet by mouth daily, Historical      nitroGLYcerin (NITROSTAT) 0.4 MG sublingual tablet For chest pain place 1 tablet under the tongue every 5 minutes for 3 doses. If symptoms persist 5 minutes after 1st dose call 911., Disp-25 tablet, R-11, E-Prescribe      OMEGA-3 FATTY ACIDS 1200 MG OR CAPS Take 1 capsule by mouth daily, R-0, Historical      pantoprazole (PROTONIX) 20 MG EC tablet Take 1 tablet (20 mg) by mouth daily, Disp-90 tablet, R-3, E-Prescribe      potassium 99 MG TABS Take 1 tablet by mouth daily (with dinner) , Historical      Probiotic Product (ACIDOPHILUS PROBIOTIC BLEND) CAPS Take 1 capsule by mouth daily (with breakfast) , Disp-30 capsule, R-0, Historical      simvastatin (ZOCOR) 20 MG tablet TAKE ONE TABLET BY MOUTH EVERY NIGHT AT BEDTIME, Disp-90 tablet, R-3, E-Prescribe      VITAMIN D, CHOLECALCIFEROL, PO Take 2,000 Units by mouth daily, Historical       !! - Potential duplicate medications found. Please discuss with provider.        Allergies   Allergies   Allergen Reactions     Amoxicillin-Pot Clavulanate Diarrhea     Hydrocodone      Keeps patient awake     Morphine And Related Nausea and Vomiting     Naproxen Itching and Rash     Seasonal Allergies      Hay fever in fall, ragweed and russian thistles     Sulfa Antibiotics Nausea

## 2023-07-10 ENCOUNTER — MYC MEDICAL ADVICE (OUTPATIENT)
Dept: FAMILY MEDICINE | Facility: CLINIC | Age: 83
End: 2023-07-10
Payer: COMMERCIAL

## 2023-07-10 NOTE — TELEPHONE ENCOUNTER
Called the pt.  Advised yes, they do recommend she f/u with her pcp for a hospital f/u appt.  Appt scheduled.  Earlier appt offered, but pt is unable to make that appt.

## 2023-07-11 ENCOUNTER — PATIENT OUTREACH (OUTPATIENT)
Dept: CARE COORDINATION | Facility: CLINIC | Age: 83
End: 2023-07-11
Payer: COMMERCIAL

## 2023-07-11 NOTE — PROGRESS NOTES
[facility] Contact  [facility]/Voicemail     Clinical Data: Transitional Care Management Outreach     Outreach attempted x 2. Unable to leave vm  Plan:  [facility] will do no further outreaches at this time.       Vielka Dee  Community Health Worker  [facility]  [phone]      *Connected Care Resource Team does NOT follow patient ongoing. Referrals are identified based on internal discharge reports and the outreach is to ensure patient has an understanding of their discharge instructions.

## 2023-07-17 ENCOUNTER — TELEPHONE (OUTPATIENT)
Dept: SURGERY | Facility: CLINIC | Age: 83
End: 2023-07-17

## 2023-07-17 ENCOUNTER — TELEPHONE (OUTPATIENT)
Dept: SURGERY | Facility: CLINIC | Age: 83
End: 2023-07-17
Payer: COMMERCIAL

## 2023-07-17 PROCEDURE — 99207 PR NO CHARGE LOS: CPT | Performed by: PHYSICIAN ASSISTANT

## 2023-07-17 NOTE — TELEPHONE ENCOUNTER
Name of caller: Patient    Reason for Call:  See notes    Surgeon:  Dr. Franco    Recent Surgery:  No    If yes, when & what type:  N/A      Best phone number to reach pt at is: [phone]  Ok to leave a message with medical info? Yes.    Pharmacy preferred (if calling for a refill): na

## 2023-07-17 NOTE — TELEPHONE ENCOUNTER
[facility] Phone Call:  Patient called regarding diarrhea. She has an extensive surgical history and was recently hospitalized with an SBO. Dr. Franco wanted to see her 2-3 weeks after she was discharged and the patient has an appointment July 25th.  This is to discuss anastomosis revision. Today she tells me that she is having a very loose stool every day. She is eating soft foods and no fiber. She is frustrated with this. I recommend she tray Metamucil 1-2 times per day to see if this could possibly help her bowel movements become more regular. She agrees to try this. If she has any increase in pain or bloating she will stop and call us to discuss further recommendations.      Jamison Witt PA-C

## 2023-07-18 ENCOUNTER — MYC MEDICAL ADVICE (OUTPATIENT)
Dept: FAMILY MEDICINE | Facility: CLINIC | Age: 83
End: 2023-07-18
Payer: COMMERCIAL

## 2023-07-25 ENCOUNTER — OFFICE VISIT (OUTPATIENT)
Dept: SURGERY | Facility: CLINIC | Age: 83
End: 2023-07-25
Payer: COMMERCIAL

## 2023-07-25 VITALS
OXYGEN SATURATION: 98 % | SYSTOLIC BLOOD PRESSURE: 124 MMHG | HEIGHT: 64 IN | DIASTOLIC BLOOD PRESSURE: 72 MMHG | BODY MASS INDEX: 32.1 KG/M2 | HEART RATE: 61 BPM | WEIGHT: 188 LBS

## 2023-07-25 DIAGNOSIS — K56.609 SMALL BOWEL OBSTRUCTION (H): Primary | ICD-10-CM

## 2023-07-25 PROCEDURE — 99214 OFFICE O/P EST MOD 30 MIN: CPT | Performed by: SURGERY

## 2023-07-25 NOTE — PROGRESS NOTES
The patient returns today to follow-up after her recent hospitalization for small bowel obstruction.  She has had multiple episodes of more mild small bowel obstruction, for which she did not present to the emergency room.  She has had fairly frequent events, and feels that she has begun to lose weight and really cannot continue this way.  She had an episode just last week.  During her last hospitalization, I asked her to come in and discuss the possibility of surgical intervention electively.    Past medical and surgical histories are reviewed.    Physical exam:  The patient is in no apparent distress.  Breathing is nonlabored.  The abdomen is soft and nontender.    We reviewed the patient's recent CT together.  This reveals a significantly dilated small bowel with a transition point at an anastomosis in the right lower quadrant.    Assessment and plan: This is a patient with recurrent small bowel obstructions and what appears to be a very specific point of recurring obstruction.  This is quite close to the right lower quadrant abdominal wall.  Previous surgeries were difficult due to the extensive adhesions.  I have suggested to the patient that we might be able to successfully make a small right lower quadrant incision and revise the anastomosis, likely with excision of the old anastomosis.  We discussed the procedure, along with its risks and complications, in detail.  The patient would like to proceed.  She does understand there is some chance of needing to convert to a full laparotomy.  We will work on getting surgery scheduled for her in the near future.    A total of 35 minutes was spent today in chart review, patient examination and discussion, and documentation.    Rafi Franco MD  [facility]    Please route or send letter to:  Primary Care Provider (PCP)

## 2023-07-25 NOTE — LETTER
July 26, 2023      RE:   Lexii Stratton 1940      Dear Colleague,    Thank you for referring your patient, Lexii Stratton, to [facility]. Please see a copy of my visit note below.    The patient returns today to follow-up after her recent hospitalization for small bowel obstruction.  She has had multiple episodes of more mild small bowel obstruction, for which she did not present to the emergency room.  She has had fairly frequent events, and feels that she has begun to lose weight and really cannot continue this way.  She had an episode just last week.  During her last hospitalization, I asked her to come in and discuss the possibility of surgical intervention electively.    Past medical and surgical histories are reviewed.    Physical exam:  The patient is in no apparent distress.  Breathing is nonlabored.  The abdomen is soft and nontender.    We reviewed the patient's recent CT together.  This reveals a significantly dilated small bowel with a transition point at an anastomosis in the right lower quadrant.    Assessment and plan: This is a patient with recurrent small bowel obstructions and what appears to be a very specific point of recurring obstruction.  This is quite close to the right lower quadrant abdominal wall.  Previous surgeries were difficult due to the extensive adhesions.  I have suggested to the patient that we might be able to successfully make a small right lower quadrant incision and revise the anastomosis, likely with excision of the old anastomosis.  We discussed the procedure, along with its risks and complications, in detail.  The patient would like to proceed.  She does understand there is some chance of needing to convert to a full laparotomy.  We will work on getting surgery scheduled for her in the near future.    Again, thank you for allowing me to participate in the care of your patient.      Sincerely,    Rafi Franco MD  [facility]

## 2023-07-26 ENCOUNTER — TELEPHONE (OUTPATIENT)
Dept: SURGERY | Facility: CLINIC | Age: 83
End: 2023-07-26
Payer: COMMERCIAL

## 2023-07-26 NOTE — TELEPHONE ENCOUNTER
Type of surgery: SMALL BOWEL RESECTION, REVISION OF ANASTAMOSIS  Location of surgery: [facility]  Date and time of surgery: 8-11-23, 11:50 AM  Surgeon: DR JONES  Pre-Op Appt Date: PATIENT TO SCHEDULE   Post-Op Appt Date: NA   Packet sent out: Yes  Pre-cert/Authorization completed:  Not Applicable  Date: 7-26-23        SMALL BOWEL RESECTION, REVISION OF ANASTAMOSIS GENERAL PT INST TO HAVE H&P 2 HRS REQ PA ASSIST [facility] ALW  (180 mins average)

## 2023-07-26 NOTE — TELEPHONE ENCOUNTER
Called the pt to discuss.  Changed appt on 8/8/23 to a pre-op for her procedure on 8/11/23 by Dr. Franco.

## 2023-08-08 ENCOUNTER — OFFICE VISIT (OUTPATIENT)
Dept: FAMILY MEDICINE | Facility: CLINIC | Age: 83
End: 2023-08-08
Payer: COMMERCIAL

## 2023-08-08 VITALS
HEART RATE: 60 BPM | HEIGHT: 64 IN | SYSTOLIC BLOOD PRESSURE: 122 MMHG | BODY MASS INDEX: 31.1 KG/M2 | DIASTOLIC BLOOD PRESSURE: 62 MMHG | TEMPERATURE: 97.6 F | RESPIRATION RATE: 18 BRPM | OXYGEN SATURATION: 100 % | WEIGHT: 182.2 LBS

## 2023-08-08 DIAGNOSIS — Z01.818 PREOP GENERAL PHYSICAL EXAM: Primary | ICD-10-CM

## 2023-08-08 DIAGNOSIS — E08.65: ICD-10-CM

## 2023-08-08 DIAGNOSIS — Z95.3 S/P TAVR (TRANSCATHETER AORTIC VALVE REPLACEMENT), BIOPROSTHETIC: ICD-10-CM

## 2023-08-08 DIAGNOSIS — Z95.0 CARDIAC PACEMAKER IN SITU: ICD-10-CM

## 2023-08-08 DIAGNOSIS — E03.9 ACQUIRED HYPOTHYROIDISM: ICD-10-CM

## 2023-08-08 DIAGNOSIS — K21.9 GASTROESOPHAGEAL REFLUX DISEASE, UNSPECIFIED WHETHER ESOPHAGITIS PRESENT: ICD-10-CM

## 2023-08-08 DIAGNOSIS — I10 ESSENTIAL HYPERTENSION, BENIGN: ICD-10-CM

## 2023-08-08 DIAGNOSIS — Z87.19 HX SBO: ICD-10-CM

## 2023-08-08 LAB
ANION GAP SERPL CALCULATED.3IONS-SCNC: 11 MMOL/L (ref 7–15)
BASOPHILS # BLD AUTO: 0 10E3/UL (ref 0–0.2)
BASOPHILS NFR BLD AUTO: 1 %
BUN SERPL-MCNC: 15.4 MG/DL (ref 8–23)
CALCIUM SERPL-MCNC: 10.2 MG/DL (ref 8.8–10.2)
CHLORIDE SERPL-SCNC: 104 MMOL/L (ref 98–107)
CREAT SERPL-MCNC: 0.95 MG/DL (ref 0.51–0.95)
DEPRECATED HCO3 PLAS-SCNC: 24 MMOL/L (ref 22–29)
EOSINOPHIL # BLD AUTO: 0.2 10E3/UL (ref 0–0.7)
EOSINOPHIL NFR BLD AUTO: 5 %
ERYTHROCYTE [DISTWIDTH] IN BLOOD BY AUTOMATED COUNT: 12.5 % (ref 10–15)
GFR SERPL CREATININE-BSD FRML MDRD: 60 ML/MIN/1.73M2
GLUCOSE SERPL-MCNC: 106 MG/DL (ref 70–99)
HBA1C MFR BLD: 6 % (ref 0–5.6)
HCT VFR BLD AUTO: 43.3 % (ref 35–47)
HGB BLD-MCNC: 14.4 G/DL (ref 11.7–15.7)
IMM GRANULOCYTES # BLD: 0 10E3/UL
IMM GRANULOCYTES NFR BLD: 0 %
LYMPHOCYTES # BLD AUTO: 1.7 10E3/UL (ref 0.8–5.3)
LYMPHOCYTES NFR BLD AUTO: 34 %
MCH RBC QN AUTO: 31.9 PG (ref 26.5–33)
MCHC RBC AUTO-ENTMCNC: 33.3 G/DL (ref 31.5–36.5)
MCV RBC AUTO: 96 FL (ref 78–100)
MONOCYTES # BLD AUTO: 0.5 10E3/UL (ref 0–1.3)
MONOCYTES NFR BLD AUTO: 10 %
NEUTROPHILS # BLD AUTO: 2.5 10E3/UL (ref 1.6–8.3)
NEUTROPHILS NFR BLD AUTO: 50 %
PLATELET # BLD AUTO: 141 10E3/UL (ref 150–450)
POTASSIUM SERPL-SCNC: 4.7 MMOL/L (ref 3.4–5.3)
RBC # BLD AUTO: 4.52 10E6/UL (ref 3.8–5.2)
SODIUM SERPL-SCNC: 139 MMOL/L (ref 136–145)
WBC # BLD AUTO: 5 10E3/UL (ref 4–11)

## 2023-08-08 PROCEDURE — 80048 BASIC METABOLIC PNL TOTAL CA: CPT | Performed by: INTERNAL MEDICINE

## 2023-08-08 PROCEDURE — 99215 OFFICE O/P EST HI 40 MIN: CPT | Performed by: INTERNAL MEDICINE

## 2023-08-08 PROCEDURE — 83036 HEMOGLOBIN GLYCOSYLATED A1C: CPT | Performed by: INTERNAL MEDICINE

## 2023-08-08 PROCEDURE — 85025 COMPLETE CBC W/AUTO DIFF WBC: CPT | Performed by: INTERNAL MEDICINE

## 2023-08-08 PROCEDURE — 36415 COLL VENOUS BLD VENIPUNCTURE: CPT | Performed by: INTERNAL MEDICINE

## 2023-08-08 ASSESSMENT — PAIN SCALES - GENERAL: PAINLEVEL: NO PAIN (0)

## 2023-08-08 NOTE — PATIENT INSTRUCTIONS
For informational purposes only. Not to replace the advice of your health care provider. Copyright   2003,  [place] [employer] [facility]. All rights reserved. Clinically reviewed by Mary Jane Ruano MD. [employer] [id number] - REV .  Preparing for Your Surgery  Getting started  A nurse will call you to review your health history and instructions. They will give you an arrival time based on your scheduled surgery time. Please be ready to share:  Your doctor's clinic name and phone number  Your medical, surgical, and anesthesia history  A list of allergies and sensitivities  A list of medicines, including herbal treatments and over-the-counter drugs  Whether the patient has a legal guardian (ask how to send us the papers in advance)  Please tell us if you're pregnant--or if there's any chance you might be pregnant. Some surgeries may injure a fetus (unborn baby), so they require a pregnancy test. Surgeries that are safe for a fetus don't always need a test, and you can choose whether to have one.   If you have a child who's having surgery, please ask for a copy of Preparing for Your Child's Surgery.    Preparing for surgery  Within 10 to 30 days of surgery: Have a pre-op exam (sometimes called an H&P, or History and Physical). This can be done at a clinic or pre-operative center.  If you're having a , you may not need this exam. Talk to your care team.  At your pre-op exam, talk to your care team about all medicines you take. If you need to stop any medicines before surgery, ask when to start taking them again.  We do this for your safety. Many medicines can make you bleed too much during surgery. Some change how well surgery (anesthesia) drugs work.  Call your insurance company to let them know you're having surgery. (If you don't have insurance, call [phone].)  Call your clinic if there's any change in your health. This includes signs of a cold or flu (sore throat, runny nose, cough, rash, fever). It also  includes a scrape or scratch near the surgery site.  If you have questions on the day of surgery, call your hospital or surgery center.  Eating and drinking guidelines  For your safety: Unless your surgeon tells you otherwise, follow the guidelines below.  Eat and drink as usual until 8 hours before you arrive for surgery. After that, no food or milk.  Drink clear liquids until 2 hours before you arrive. These are liquids you can see through, like water, Gatorade, and [employer]el Water. They also include plain black coffee and tea (no cream or milk), candy, and breath mints. You can spit out gum when you arrive.  If you drink alcohol: Stop drinking it the night before surgery.  If your care team tells you to take medicine on the morning of surgery, it's okay to take it with a sip of water.  Preventing infection  Shower or bathe the night before and morning of your surgery. Follow the instructions your clinic gave you. (If no instructions, use regular soap.)  Don't shave or clip hair near your surgery site. We'll remove the hair if needed.  Don't smoke or vape the morning of surgery. You may chew nicotine gum up to 2 hours before surgery. A nicotine patch is okay.  Note: Some surgeries require you to completely quit smoking and nicotine. Check with your surgeon.  Your care team will make every effort to keep you safe from infection. We will:  Clean our hands often with soap and water (or an alcohol-based hand rub).  Clean the skin at your surgery site with a special soap that kills germs.  Give you a special gown to keep you warm. (Cold raises the risk of infection.)  Wear special hair covers, masks, gowns and gloves during surgery.  Give antibiotic medicine, if prescribed. Not all surgeries need antibiotics.  What to bring on the day of surgery  Photo ID and insurance card  Copy of your health care directive, if you have one  Glasses and hearing aids (bring cases)  You can't wear contacts during surgery  Inhaler and eye  drops, if you use them (tell us about these when you arrive)  CPAP machine or breathing device, if you use them  A few personal items, if spending the night  If you have . . .  A pacemaker, ICD (cardiac defibrillator) or other implant: Bring the ID card.  An implanted stimulator: Bring the remote control.  A legal guardian: Bring a copy of the certified (court-stamped) guardianship papers.  Please remove any jewelry, including body piercings. Leave jewelry and other valuables at home.  If you're going home the day of surgery  You must have a responsible adult drive you home. They should stay with you overnight as well.  If you don't have someone to stay with you, and you aren't safe to go home alone, we may keep you overnight. Insurance often won't pay for this.  After surgery  If it's hard to control your pain or you need more pain medicine, please call your surgeon's office.  Questions?   If you have any questions for your care team, list them here: _________________________________________________________________________________________________________________________________________________________________________ ____________________________________ ____________________________________ ____________________________________    How to Take Your Medication Before Surgery    RECOMMENDATIONS:    --Approval given to proceed with proposed procedure, without further diagnostic evaluation  --Pt advised to avoid NSAIDS and supplements (Motrin, Ibuprofen, Aleve or Naprosyn);  If needed, Tylenol or Acetaminophen are fine to use.  --Stop Eliquis 8/8/2023 AM ( off med for 72 hours prior to surgery)  --meds reviewed; may take meds on AM of surgery. Lisinopril, Levothyroxine and Pantoprazole with a small sip of water.    --Pain medications, time off from work and FMLA following surgery deferred to surgeon.   --Hibiclens to be used for shower Thursday night and Friday AM   -- Bowel prep deferred to surgeon.

## 2023-08-08 NOTE — PHARMACY-ADMISSION MEDICATION HISTORY
Medication history and patient interview completed by pharmacy intern/student or pre-admitting RN.  Reviewed by pharmacist, including [employer] dispense records, [facility] Care Everywhere, and chart review.     Important information from pre-op visit:   -- Stop Eliquis 8/8/2023 AM (off med for 72 hours prior to surgery)  -- Meds reviewed; may take meds on AM of surgery. Lisinopril, Levothyroxine and Pantoprazole with a small sip of water.      Melba Brush, Edmundo.D.            Prior to Admission medications    Medication Sig Last Dose Taking? Auth Provider Long Term End Date   acetaminophen (TYLENOL) 500 MG tablet Take 1,000 mg by mouth 2 times daily (2 x 500 mg = 1000 mg)  Yes Unknown, Entered By History     amLODIPine (NORVASC) 5 MG tablet TAKE ONE TABLET BY MOUTH EVERY NIGHT AT BEDTIME  Yes Jesenia Madrigal MD Yes    apixaban ANTICOAGULANT (ELIQUIS ANTICOAGULANT) 5 MG tablet Take 1 tablet (5 mg) by mouth 2 times daily  Yes Ingrid Delong PA-C     Biotin 5000 MCG PO TABS Take 1 tablet by mouth daily  Yes Reported, Patient     calcium carbonate (OS-CINDY) 500 MG tablet Take 1 tablet by mouth every other day  Yes Unknown, Entered By History     CENTRUM SILVER OR TABS Take 1 tablet by mouth daily  Yes John Daniels MD     Cranberry 450 MG CAPS Take 1 capsule by mouth daily (with dinner)  Yes Unknown, Entered By History     ferrous gluconate (FERGON) 324 (38 FE) MG tablet Take 1 tablet (324 mg) by mouth 2 times daily  Yes Maximino Shabazz MD     latanoprost (XALATAN) 0.005 % ophthalmic solution Place 1 drop into both eyes At Bedtime   Yes Reported, Patient Yes    levothyroxine (SYNTHROID/LEVOTHROID) 50 MCG tablet Take 1 tablet (50 mcg) by mouth daily  Yes Jesenia Madrigal MD Yes    lisinopril (ZESTRIL) 30 MG tablet Take 1 tablet (30 mg) by mouth every morning  Yes Jesenia Madrigal MD Yes    magnesium 250 MG tablet Take 1 tablet by mouth daily  Yes Unknown, Entered By History     OMEGA-3  FATTY ACIDS 1200 MG OR CAPS Take 1 capsule by mouth daily 7/28/2023 Yes John Daniels MD     pantoprazole (PROTONIX) 20 MG EC tablet Take 1 tablet (20 mg) by mouth daily  Yes Jesenia Madrigal MD     polyethylene glycol (MIRALAX) 17 GM/Dose powder Take 17 g (1 Capful) by mouth daily Decrease frequency if developing loose stools.  Yes Mitul Colindres MD     potassium 99 MG TABS Take 1 tablet by mouth daily (with dinner)   Yes Reported, Patient     Probiotic Product (ACIDOPHILUS PROBIOTIC BLEND) CAPS Take 1 capsule by mouth daily (with breakfast)   Yes Jesenia Madrigal MD     simvastatin (ZOCOR) 20 MG tablet TAKE ONE TABLET BY MOUTH EVERY NIGHT AT BEDTIME  Yes Jesenia Madrigal MD Yes    VITAMIN D, CHOLECALCIFEROL, PO Take 2,000 Units by mouth daily  Yes Reported, Patient     amoxicillin (AMOXIL) 500 MG capsule Take 2,000 mg by mouth once as needed (1 hour prior to dental procedures 4 x 500 mg = 2000 mg)   Reported, Patient     nitroGLYcerin (NITROSTAT) 0.4 MG sublingual tablet For chest pain place 1 tablet under the tongue every 5 minutes for 3 doses. If symptoms persist 5 minutes after 1st dose call 911.  Patient taking differently: 0.4 mg every 5 minutes as needed For chest pain place 1 tablet under the tongue every 5 minutes for 3 doses. If symptoms persist 5 minutes after 1st dose call 911.   Yulia Castano MD Yes

## 2023-08-08 NOTE — Clinical Note
Waiting on labs.  Does Lexii need bowel prep prior to her bowel resection surgery on Friday?  If so, please have your office connect with her.  Thanks.

## 2023-08-08 NOTE — PROGRESS NOTES
[facility]  [address] [address]  Phone: [phone]  Primary Provider: Hamlet Elizondo  Pre-op Performing Provider: HAMLET ELIZONDO      PREOPERATIVE EVALUATION:  Today's date: 8/8/2023    Lexii Stratton is a 82 year old female who presents for a preoperative evaluation.      8/8/2023     8:42 AM   Additional Questions   Roomed by Tony CROCKETT   Accompanied by No one         8/8/2023     8:42 AM   Patient Reported Additional Medications   Patient reports taking the following new medications None       Surgical Information:  Surgery/Procedure: Small Bowel resection, revision of anatamosis  Surgery Location: [facility]  Surgeon: Dr. Rafi Franco  Surgery Date: 08/11/2023  Time of Surgery: 1150  Where patient plans to recover: Other: hospital stay 2-5 days  Fax number for surgical facility: Note does not need to be faxed, will be available electronically in Epic.    Assessment & Plan     The proposed surgical procedure is considered INTERMEDIATE risk.    (Z01.818) Preop general physical exam  (primary encounter diagnosis)  Comment:   Plan: HEMOGLOBIN A1C, CBC with platelets and         differential, Basic metabolic panel  (Ca, Cl,         CO2, Creat, Gluc, K, Na, BUN)            (Z87.19) Hx SBO  Comment: recurrent; SBO, mesh placed 2018; recurrent symptpms; many managed at home and some in hospital.   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   Plan: Continue Pantoprazole.     (I10) Essential hypertension,  benign  Comment: BLOOD PRESSURE well controlled on Lisinopril   Plan: no change in medications.    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 Franco  Plan:     (Z95.3) S/p TAVR (transcatheter aortic valve replacement), bioprosthetic  1/8/2022  Comment: doing well overall  Plan:         RECOMMENDATIONS:    --Approval given to proceed with proposed procedure, without further diagnostic evaluation  --Pt advised to avoid NSAIDS and supplements (Motrin, Ibuprofen, Aleve or Naprosyn);  If needed, Tylenol or Acetaminophen are fine to use.  --Stop Eliquis 8/8/2023 AM ( off med for 72 hours prior to surgery)  --meds reviewed; may take meds on AM of surgery. Lisinopril, Levothyroxine and Pantoprazole with a small sip of water.    --Pain medications, time off from work and FMLA following surgery deferred to surgeon.   --Hibiclens to be used for shower Thursday night and Friday AM   -- Bowel prep deferred to surgeon.       Review of the result(s) of each unique test - chart reviewed including meds. Labs and plan of care.   Ordering of each unique test  Prescription drug management    Jesenia Madrigal MD  Internal Medicine  electronically signed     40 minutes spent by me on the date of the encounter doing chart review, history and exam, documentation and further activities per the note                Subjective       HPI related to upcoming procedure: Lexii Stratton is a 82 year old female who presents for preoperative evaluation prior to elective small bowel resection  revision at previous anastomosis sight.   Wt Readings from Last 3 Encounters:   08/08/23 82.6 kg (182 lb 3.2 oz)   07/25/23 85.3 kg (188 lb)   07/05/23 85.3 kg (188 lb)            8/1/2023     9:45 AM   Preop Questions   1. Have you ever had a heart attack or stroke? No   2. Have you ever had surgery on your heart or blood vessels, such  as a stent placement, a coronary artery bypass, or surgery on an artery in your head, neck, heart, or legs? YES - TAVR 11/2022, Pacemaker 11/222   3. Do you have chest pain with activity? No   4. Do you have a history of  heart failure? No   5. Do you currently have a cold, bronchitis or symptoms of other infection? No   6. Do you have a cough, shortness of breath, or wheezing? No   7. Do you or anyone in your family have previous history of blood clots? No   8. Do you or does anyone in your family have a serious bleeding problem such as prolonged bleeding following surgeries or cuts? No   9. Have you ever had problems with anemia or been told to take iron pills? YES - years ago;   Lab Results   Component Value Date    HGB 12.4 07/06/2023    HGB 13.8 03/12/2021       10. Have you had any abnormal blood loss such as black, tarry or bloody stools, or abnormal vaginal bleeding? No   11. Have you ever had a blood transfusion? YES - 2018   11a. Have you ever had a transfusion reaction? No   12. Are you willing to have a blood transfusion if it is medically needed before, during, or after your surgery? Yes   13. Have you or any of your relatives ever had problems with anesthesia? YES - personally, no issues with anesthesia   14. Do you have sleep apnea, excessive snoring or daytime drowsiness? No   15. Do you have any artifical heart valves or other implanted medical devices like a pacemaker, defibrillator, or continuous glucose monitor? YES - TVAR, pacemaker placed 11/2022   15a. What type of device do you have? [employer]   15b. Name of the clinic that manages your device:  [facility]   16. Do you have artificial joints? YES - BTKA, RTHA   17. Are you allergic to latex? No       Health Care Directive:  Patient has a Health Care Directive on file      Preoperative Review of :   reviewed - no record of controlled substances prescribed.        Review of Systems  CONSTITUTIONAL: NEGATIVE for fever,  chills, change in weight  EYES: NEGATIVE for vision changes or irritation  ENT/MOUTH: NEGATIVE for ear, mouth and throat problems  RESP: NEGATIVE for significant cough or SOB  CV: NEGATIVE for chest pain, palpitations or peripheral edema  GI: recurrent SBO, many episodes treated with bowel rest at home and periodically in the hospital; surgery 2018 with mesh placement.  MUSCULOSKELETAL:past joint surgeries include one hip and both knees; she has been walking several times per week; a bit less distance 1.5 miles rather than 2 miles before recent hospitalization.  NEURO: NEGATIVE for weakness, dizziness or paresthesias  ENDOCRINE: diabetes and lipids reviewed; manage with diet and exercise and lipids - Simvastatin .  HEME: NEGATIVE for bleeding problems  PSYCHIATRIC: NEGATIVE for changes in mood or affect    Patient Active Problem List    Diagnosis Date Noted    Hyperlipidemia LDL goal <130 10/31/2010     Priority: High    Impaired fasting glucose 07/16/2007     Priority: High    Essential hypertension, benign 06/25/2006     Priority: High    Diabetes mellitus due to underlying condition, controlled, with hyperglycemia, without long-term current use of insulin (H) 05/22/2023     Priority: Medium    Aortic stenosis, severe 11/08/2022     Priority: Medium    Status post coronary angiogram 09/30/2022     Priority: Medium    Aortic valve stenosis 08/18/2022     Priority: Medium    Small bowel obstruction (H) 07/03/2022     Priority: Medium    Chronic kidney disease, stage 3a (H) 02/07/2022     Priority: Medium    SBO (small bowel obstruction) (H) 08/16/2021     Priority: Medium    Diabetes mellitus, type 2 (H) 08/27/2019     Priority: Medium    Pelvic floor dysfunction 05/08/2019     Priority: Medium    Elevated hemoglobin A1c 02/25/2019     Priority: Medium     9/2018  A1C 6.4  Diet and exercise      Overactive bladder 02/01/2017     Priority: Medium     [facility] Dr. Bai; has tried 7 medications without  benefit, Interstim x 2, Botox in bladder, x 5; no infection now. Ongoing symptoms; defer to Urology.      S/P total hip arthroplasty 11/09/2016     Priority: Medium    Anemia, unspecified 09/19/2016     Priority: Medium     Diagnosis updated by automated process. Provider to review and confirm.      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 Provider to review      Acquired hypothyroidism 11/11/2004     Priority: Medium    Other symptoms involving urinary system 10/05/2002     Priority: Medium     Problem list name updated by automated process. Provider to review and confirm      Generalized osteoarthrosis, unspecified site 10/03/2002     Priority: Medium    Esophageal reflux 10/03/2002     Priority: Medium    Obesity 10/03/2002     Priority: Medium     Problem list name updated by automated process. Provider to review      Advance Care Planning 06/07/2011     Priority: Low     Advance Care Planning 1/9/2017: Receipt of ACP document:  Received: Health Care Directive which was witnessed or notarized on 5/27/09.  Document previously scanned on 11/14/16.  Validation form completed and sent to be scanned.  Code Status reflects choices in most recent ACP document.  Confirmed/documented designated decision maker(s).  Added by Beatriz Esteban RN, Advance Care Planning Liaison.          Past Medical History:   Diagnosis Date    Aortic valve stenosis 8/18/2022    Arthritis     Diabetes (H)     Type 2-no meds... Dieet and exercise only as of 9/15/20    Esophageal reflux     Hernia, abdominal     History of blood transfusion 1984     Hypertension     No cardiologist    Incontinence of urine     Mumps     6 years old    Obese     Osteoarthritis     Other and unspecified hyperlipidemia     Other chronic pain     back    Peptic ulcer, unspecified site, unspecified as acute or chronic, without mention of hemorrhage, perforation, or obstruction     Small bowel obstruction (H)     Thyroid disease hypothyroidism    Urinary incontinence     Walking troubles      Past Surgical History:   Procedure Laterality Date    ABDOMEN SURGERY      ARTHROPLASTY HIP Right 11/09/2016    Procedure: ARTHROPLASTY HIP;  Surgeon: Angel Lund MD;  Location:  OR    AS REPAIR INCISIONAL HERNIA,REDUCIBLE  1998    inc hernia ( [facility]ile[facility] surgery)    BIOPSY      BLADDER SURGERY      hyperdistention surgery and sling    BREAST SURGERY      CARDIAC SURGERY  2022    CHOLECYSTECTOMY  1987    COLONOSCOPY  12/03/2011    Procedure:COLONOSCOPY; COLONOSCOPY; Surgeon:SEMAJ GRAHAM; Location: GI    COLONOSCOPY N/A 03/29/2018    Procedure: COLONOSCOPY;  COLONOSCOPY Rm 544;  Surgeon: Marco Gusman MD;  Location:  GI    CV CORONARY ANGIOGRAM N/A 09/30/2022    Procedure: Coronary Angiogram;  Surgeon: Garcia Barber MD;  Location: [facility] CARDIAC CATH LAB    CV TRANSCATHETER AORTIC VALVE REPLACEMENT-FEMORAL APPROACH N/A 11/08/2022    Procedure: Transcatheter Aortic Valve Replacement-Femoral Approach;  Surgeon: Yulia Castano MD;  Location: [facility] CARDIAC CATH LAB    CYSTOSCOPY N/A 09/06/2017    Procedure: CYSTOSCOPY;  CYSTOSCOPY AND HYDRODISTENTION ;  Surgeon: Eyal Bai MD;  Location:  OR    ENT SURGERY      Tonsillectomy    EP PACEMAKER DEVICE & LEAD IMPLANT- RIGHT ATRIAL & RIGHT VENTRICULAR N/A 11/08/2022    Procedure: Pacemaker Device & Lead Implant - Right Atrial & Right Ventricular;  Surgeon: William Boyd MD;  Location: [facility] CARDIAC CATH LAB    ESOPHAGOSCOPY, GASTROSCOPY, DUODENOSCOPY (EGD), COMBINED N/A 09/26/2016    Procedure:  COMBINED ESOPHAGOSCOPY, GASTROSCOPY, DUODENOSCOPY (EGD), BIOPSY SINGLE OR MULTIPLE;  Surgeon: Marco Monaco MD;  Location:  GI    EYE SURGERY Left 05/2019    GENITOURINARY SURGERY      GYN SURGERY      Hysterectomy    HERNIA REPAIR, UMBILICAL  07/08/2010    Dr. Kirt Franco times 3    IMPLANT STIMULATOR SACRAL NERVE STAGE ONE N/A 09/17/2020    Procedure: sacral neurostimulation stage one implant of neurostimulator lead;  Surgeon: Shahnaz Carbone MD;  Location:  OR    IMPLANT STIMULATOR SACRAL NERVE STAGE TWO Right 09/24/2020    Procedure: Removal of interstem lead, NOT implanting neurostimulator;  Surgeon: Shahnaz Carbone MD;  Location:  OR    ORTHOPEDIC SURGERY  2009    [facility] - Dr. Lund- bilateral knee replacement    [facility] NONSPECIFIC PROCEDURE  1946    T&A    [facility] NONSPECIFIC PROCEDURE  1984    Vaginal Hysterectomy (has her ovaries)    Z NONSPECIFIC PROCEDURE  1973    PPTL    [facility] NONSPECIFIC PROCEDURE  1994    L shoulder to remove bone spurs    Z NONSPECIFIC PROCEDURE  2004    cysto and durasphere Dr Demarco    [facility] NONSPECIFIC PROCEDURE  2005    cysto and durasphere    [facility] NONSPECIFIC PROCEDURE  2007    cysto and durasphere    [facility] NONSPECIFIC PROCEDURE  2009    retropubic TVT sling     Current Outpatient Medications   Medication Sig Dispense Refill    acetaminophen (TYLENOL) 500 MG tablet Take 1,000 mg by mouth 2 times daily (2 x 500 mg = 1000 mg)      amLODIPine (NORVASC) 5 MG tablet TAKE ONE TABLET BY MOUTH EVERY NIGHT AT BEDTIME 90 tablet 3    amoxicillin (AMOXIL) 500 MG capsule Take 2,000 mg by mouth once as needed (1 hour prior to dental procedures 4 x 500 mg = 2000 mg)      apixaban ANTICOAGULANT (ELIQUIS ANTICOAGULANT) 5 MG tablet Take 1 tablet (5 mg) by mouth 2 times daily 180 tablet 3    Biotin 5000 MCG PO TABS Take 1 tablet by mouth daily      calcium carbonate (OS-CINDY) 500 MG tablet Take 1 tablet by mouth every other day      CENTRUM SILVER OR TABS Take 1 tablet by  mouth daily 30 0    Cranberry 450 MG CAPS Take 1 capsule by mouth daily (with dinner)      ferrous gluconate (FERGON) 324 (38 FE) MG tablet Take 1 tablet (324 mg) by mouth 2 times daily 100 tablet 0    latanoprost (XALATAN) 0.005 % ophthalmic solution Place 1 drop into both eyes At Bedtime       levothyroxine (SYNTHROID/LEVOTHROID) 50 MCG tablet Take 1 tablet (50 mcg) by mouth daily 90 tablet 3    lisinopril (ZESTRIL) 30 MG tablet Take 1 tablet (30 mg) by mouth every morning 90 tablet 3    magnesium 250 MG tablet Take 1 tablet by mouth daily      nitroGLYcerin (NITROSTAT) 0.4 MG sublingual tablet For chest pain place 1 tablet under the tongue every 5 minutes for 3 doses. If symptoms persist 5 minutes after 1st dose call 911. (Patient taking differently: 0.4 mg every 5 minutes as needed For chest pain place 1 tablet under the tongue every 5 minutes for 3 doses. If symptoms persist 5 minutes after 1st dose call 911.) 25 tablet 11    OMEGA-3 FATTY ACIDS 1200 MG OR CAPS Take 1 capsule by mouth daily  0    pantoprazole (PROTONIX) 20 MG EC tablet Take 1 tablet (20 mg) by mouth daily 90 tablet 3    polyethylene glycol (MIRALAX) 17 GM/Dose powder Take 17 g (1 Capful) by mouth daily Decrease frequency if developing loose stools. 510 g 0    potassium 99 MG TABS Take 1 tablet by mouth daily (with dinner)       Probiotic Product (ACIDOPHILUS PROBIOTIC BLEND) CAPS Take 1 capsule by mouth daily (with breakfast)  30 capsule 0    simvastatin (ZOCOR) 20 MG tablet TAKE ONE TABLET BY MOUTH EVERY NIGHT AT BEDTIME 90 tablet 3    VITAMIN D, CHOLECALCIFEROL, PO Take 2,000 Units by mouth daily         Allergies   Allergen Reactions    Amoxicillin-Pot Clavulanate Diarrhea    Hydrocodone      Keeps patient awake    Morphine And Related Nausea and Vomiting    Naproxen Itching and Rash    Seasonal Allergies      Hay fever in fall, ragweed and russian thistles    Sulfa Antibiotics Nausea        Social History     Tobacco Use    Smoking status:  "Never     Passive exposure: Never    Smokeless tobacco: Never    Tobacco comments:     have never smoked anything   Substance Use Topics    Alcohol use: No     Family History   Problem Relation Age of Onset    Heart Disease Mother         heart surgery , new valve    Gallbladder Disease Mother     Coronary Artery Disease Mother     Hyperlipidemia Mother     Asthma Mother     Hypertension Mother     Anesthesia Reaction Mother     Cancer Father         throat ca,  76    Coronary Artery Disease Father     Other Cancer Father     Heart Disease Maternal Grandmother     Coronary Artery Disease Maternal Grandmother     Heart Disease Maternal Grandfather     Coronary Artery Disease Maternal Grandfather     Cancer Brother         lung    Aortic stenosis Brother         TAVR    Diabetes Brother         Half Brother     History   Drug Use No         Objective     /62 (BP Location: Right arm, Patient Position: Sitting, Cuff Size: Adult Regular)   Pulse 60   Temp 97.6  F (36.4  C) (Oral)   Resp 18   Ht 1.613 m (5' 3.5\")   Wt 82.6 kg (182 lb 3.2 oz)   LMP  (LMP Unknown)   SpO2 100%   BMI 31.77 kg/m      Physical Exam    GENERAL APPEARANCE: healthy, alert and no distress     EYES: EOMI, PERRL     HENT: oropharynx clear     NECK: no adenopathy     RESP: lungs clear to auscultation - no rales, rhonchi or wheezes     CV: regular rates and rhythm, grade 2/6 systolic murmur heard best over the base of the heart,      ABDOMEN:  soft, nontender, no HSM or masses and bowel sounds normal     MS: extremities normal- no gross deformities noted, no evidence of inflammation in joints, FROM in all extremities.     NEURO: Normal strength and tone, sensory exam grossly normal, mentation intact and speech normal     PSYCH: mentation appears normal. and affect normal/bright    Recent Labs   Lab Test 23  0732 23  1756 23  1755 23  0829 22  0904 10/27/22  1553 22  0754 22  0759   HGB 12.4  " --  16.5*  --    < >  --  12.8  --    *  --  181  --    < >  --  157  --    INR  --   --   --   --   --  1.06 1.07  --      --  141 141   < > 138 141 139   POTASSIUM 4.2  --  4.5 5.0   < > 4.0 3.8 3.8   CR 0.81 1.0 0.94 0.77   < > 0.70 0.78 0.92   A1C  --   --   --  6.0*  --   --   --  5.6    < > = values in this interval not displayed.        Diagnostics:  Recent Results (from the past 48 hour(s))   HEMOGLOBIN A1C    Collection Time: 08/08/23  9:46 AM   Result Value Ref Range    Hemoglobin A1C 6.0 (H) 0.0 - 5.6 %   Basic metabolic panel  (Ca, Cl, CO2, Creat, Gluc, K, Na, BUN)    Collection Time: 08/08/23  9:46 AM   Result Value Ref Range    Sodium 139 136 - 145 mmol/L    Potassium 4.7 3.4 - 5.3 mmol/L    Chloride 104 98 - 107 mmol/L    Carbon Dioxide (CO2) 24 22 - 29 mmol/L    Anion Gap 11 7 - 15 mmol/L    Urea Nitrogen 15.4 8.0 - 23.0 mg/dL    Creatinine 0.95 0.51 - 0.95 mg/dL    Calcium 10.2 8.8 - 10.2 mg/dL    Glucose 106 (H) 70 - 99 mg/dL    GFR Estimate 60 (L) >60 mL/min/1.73m2   CBC with platelets and differential    Collection Time: 08/08/23  9:46 AM   Result Value Ref Range    WBC Count 5.0 4.0 - 11.0 10e3/uL    RBC Count 4.52 3.80 - 5.20 10e6/uL    Hemoglobin 14.4 11.7 - 15.7 g/dL    Hematocrit 43.3 35.0 - 47.0 %    MCV 96 78 - 100 fL    MCH 31.9 26.5 - 33.0 pg    MCHC 33.3 31.5 - 36.5 g/dL    RDW 12.5 10.0 - 15.0 %    Platelet Count 141 (L) 150 - 450 10e3/uL    % Neutrophils 50 %    % Lymphocytes 34 %    % Monocytes 10 %    % Eosinophils 5 %    % Basophils 1 %    % Immature Granulocytes 0 %    Absolute Neutrophils 2.5 1.6 - 8.3 10e3/uL    Absolute Lymphocytes 1.7 0.8 - 5.3 10e3/uL    Absolute Monocytes 0.5 0.0 - 1.3 10e3/uL    Absolute Eosinophils 0.2 0.0 - 0.7 10e3/uL    Absolute Basophils 0.0 0.0 - 0.2 10e3/uL    Absolute Immature Granulocytes 0.0 <=0.4 10e3/uL      EKG: EKG not done due to present of Pacemaker ( complete heart block.)      Revised Cardiac Risk Index (RCRI):  The patient  has the following serious cardiovascular risks for perioperative complications:   - No serious cardiac risks = 0 points   PACEMAKER in place           Signed Electronically by: Jesenia Madrigal MD  Copy of this evaluation report is provided to requesting physician.

## 2023-08-11 ENCOUNTER — HOSPITAL ENCOUNTER (INPATIENT)
Facility: CLINIC | Age: 83
LOS: 5 days | Discharge: HOME | DRG: 330 | End: 2023-08-16
Attending: SURGERY | Admitting: SURGERY
Payer: COMMERCIAL

## 2023-08-11 ENCOUNTER — APPOINTMENT (OUTPATIENT)
Dept: SURGERY | Facility: PHYSICIAN GROUP | Age: 83
End: 2023-08-11
Payer: COMMERCIAL

## 2023-08-11 ENCOUNTER — ANESTHESIA (OUTPATIENT)
Dept: SURGERY | Facility: CLINIC | Age: 83
DRG: 330 | End: 2023-08-11
Payer: COMMERCIAL

## 2023-08-11 ENCOUNTER — ANESTHESIA EVENT (OUTPATIENT)
Dept: SURGERY | Facility: CLINIC | Age: 83
DRG: 330 | End: 2023-08-11
Payer: COMMERCIAL

## 2023-08-11 DIAGNOSIS — K56.609 SMALL BOWEL OBSTRUCTION (H): ICD-10-CM

## 2023-08-11 LAB
CREAT SERPL-MCNC: 0.86 MG/DL (ref 0.51–0.95)
GFR SERPL CREATININE-BSD FRML MDRD: 67 ML/MIN/1.73M2
GLUCOSE BLDC GLUCOMTR-MCNC: 105 MG/DL (ref 70–99)
GLUCOSE BLDC GLUCOMTR-MCNC: 135 MG/DL (ref 70–99)
LACTATE SERPL-SCNC: 0.9 MMOL/L (ref 0.7–2)

## 2023-08-11 PROCEDURE — 370N000017 HC ANESTHESIA TECHNICAL FEE, PER MIN: Performed by: SURGERY

## 2023-08-11 PROCEDURE — 83605 ASSAY OF LACTIC ACID: CPT | Performed by: SURGERY

## 2023-08-11 PROCEDURE — 360N000077 HC SURGERY LEVEL 4, PER MIN: Performed by: SURGERY

## 2023-08-11 PROCEDURE — 0DN80ZZ RELEASE SMALL INTESTINE, OPEN APPROACH: ICD-10-PCS | Performed by: SURGERY

## 2023-08-11 PROCEDURE — 258N000003 HC RX IP 258 OP 636: Performed by: ANESTHESIOLOGY

## 2023-08-11 PROCEDURE — 44120 REMOVAL OF SMALL INTESTINE: CPT | Mod: 22 | Performed by: SURGERY

## 2023-08-11 PROCEDURE — 250N000011 HC RX IP 250 OP 636: Performed by: SURGERY

## 2023-08-11 PROCEDURE — 999N000141 HC STATISTIC PRE-PROCEDURE NURSING ASSESSMENT: Performed by: SURGERY

## 2023-08-11 PROCEDURE — 250N000011 HC RX IP 250 OP 636: Performed by: NURSE ANESTHETIST, CERTIFIED REGISTERED

## 2023-08-11 PROCEDURE — 88307 TISSUE EXAM BY PATHOLOGIST: CPT | Mod: TC | Performed by: SURGERY

## 2023-08-11 PROCEDURE — 250N000013 HC RX MED GY IP 250 OP 250 PS 637: Performed by: SURGERY

## 2023-08-11 PROCEDURE — 0DB80ZZ EXCISION OF SMALL INTESTINE, OPEN APPROACH: ICD-10-PCS | Performed by: SURGERY

## 2023-08-11 PROCEDURE — 88307 TISSUE EXAM BY PATHOLOGIST: CPT | Mod: 26 | Performed by: PATHOLOGY

## 2023-08-11 PROCEDURE — 250N000011 HC RX IP 250 OP 636: Performed by: ANESTHESIOLOGY

## 2023-08-11 PROCEDURE — 710N000009 HC RECOVERY PHASE 1, LEVEL 1, PER MIN: Performed by: SURGERY

## 2023-08-11 PROCEDURE — 44120 REMOVAL OF SMALL INTESTINE: CPT | Mod: 22 | Performed by: PHYSICIAN ASSISTANT

## 2023-08-11 PROCEDURE — 36415 COLL VENOUS BLD VENIPUNCTURE: CPT | Performed by: SURGERY

## 2023-08-11 PROCEDURE — 250N000009 HC RX 250: Performed by: NURSE ANESTHETIST, CERTIFIED REGISTERED

## 2023-08-11 PROCEDURE — 250N000009 HC RX 250: Performed by: SURGERY

## 2023-08-11 PROCEDURE — 82565 ASSAY OF CREATININE: CPT | Performed by: SURGERY

## 2023-08-11 PROCEDURE — 250N000025 HC SEVOFLURANE, PER MIN: Performed by: SURGERY

## 2023-08-11 PROCEDURE — 258N000003 HC RX IP 258 OP 636: Performed by: SURGERY

## 2023-08-11 PROCEDURE — 120N000001 HC R&B MED SURG/OB

## 2023-08-11 PROCEDURE — 272N000001 HC OR GENERAL SUPPLY STERILE: Performed by: SURGERY

## 2023-08-11 RX ORDER — HYDROMORPHONE HCL IN WATER/PF 6 MG/30 ML
0.1 PATIENT CONTROLLED ANALGESIA SYRINGE INTRAVENOUS
Status: DISCONTINUED | OUTPATIENT
Start: 2023-08-11 | End: 2023-08-16 | Stop reason: HOSPADM

## 2023-08-11 RX ORDER — ONDANSETRON 4 MG/1
4 TABLET, ORALLY DISINTEGRATING ORAL EVERY 6 HOURS PRN
Status: DISCONTINUED | OUTPATIENT
Start: 2023-08-11 | End: 2023-08-16 | Stop reason: HOSPADM

## 2023-08-11 RX ORDER — NALOXONE HYDROCHLORIDE 0.4 MG/ML
0.4 INJECTION, SOLUTION INTRAMUSCULAR; INTRAVENOUS; SUBCUTANEOUS
Status: DISCONTINUED | OUTPATIENT
Start: 2023-08-11 | End: 2023-08-16 | Stop reason: HOSPADM

## 2023-08-11 RX ORDER — CEFOTETAN DISODIUM 2 G/20ML
2 INJECTION, POWDER, FOR SOLUTION INTRAMUSCULAR; INTRAVENOUS
Status: COMPLETED | OUTPATIENT
Start: 2023-08-11 | End: 2023-08-11

## 2023-08-11 RX ORDER — LATANOPROST 50 UG/ML
1 SOLUTION/ DROPS OPHTHALMIC AT BEDTIME
Status: DISCONTINUED | OUTPATIENT
Start: 2023-08-11 | End: 2023-08-16 | Stop reason: HOSPADM

## 2023-08-11 RX ORDER — ENOXAPARIN SODIUM 100 MG/ML
40 INJECTION SUBCUTANEOUS EVERY 24 HOURS
Status: DISCONTINUED | OUTPATIENT
Start: 2023-08-12 | End: 2023-08-16 | Stop reason: HOSPADM

## 2023-08-11 RX ORDER — SODIUM CHLORIDE, SODIUM LACTATE, POTASSIUM CHLORIDE, CALCIUM CHLORIDE 600; 310; 30; 20 MG/100ML; MG/100ML; MG/100ML; MG/100ML
INJECTION, SOLUTION INTRAVENOUS CONTINUOUS
Status: DISCONTINUED | OUTPATIENT
Start: 2023-08-11 | End: 2023-08-11 | Stop reason: HOSPADM

## 2023-08-11 RX ORDER — DEXTROSE MONOHYDRATE, SODIUM CHLORIDE, AND POTASSIUM CHLORIDE 50; 1.49; 4.5 G/1000ML; G/1000ML; G/1000ML
INJECTION, SOLUTION INTRAVENOUS CONTINUOUS
Status: DISCONTINUED | OUTPATIENT
Start: 2023-08-11 | End: 2023-08-16 | Stop reason: HOSPADM

## 2023-08-11 RX ORDER — OXYCODONE HYDROCHLORIDE 5 MG/1
5 TABLET ORAL EVERY 4 HOURS PRN
Status: DISCONTINUED | OUTPATIENT
Start: 2023-08-11 | End: 2023-08-16 | Stop reason: HOSPADM

## 2023-08-11 RX ORDER — ONDANSETRON 2 MG/ML
4 INJECTION INTRAMUSCULAR; INTRAVENOUS EVERY 30 MIN PRN
Status: DISCONTINUED | OUTPATIENT
Start: 2023-08-11 | End: 2023-08-11 | Stop reason: HOSPADM

## 2023-08-11 RX ORDER — FENTANYL CITRATE 50 UG/ML
25 INJECTION, SOLUTION INTRAMUSCULAR; INTRAVENOUS EVERY 5 MIN PRN
Status: DISCONTINUED | OUTPATIENT
Start: 2023-08-11 | End: 2023-08-11 | Stop reason: HOSPADM

## 2023-08-11 RX ORDER — GLYCOPYRROLATE 0.2 MG/ML
INJECTION, SOLUTION INTRAMUSCULAR; INTRAVENOUS PRN
Status: DISCONTINUED | OUTPATIENT
Start: 2023-08-11 | End: 2023-08-11

## 2023-08-11 RX ORDER — HYDRALAZINE HYDROCHLORIDE 20 MG/ML
2.5-5 INJECTION INTRAMUSCULAR; INTRAVENOUS EVERY 10 MIN PRN
Status: DISCONTINUED | OUTPATIENT
Start: 2023-08-11 | End: 2023-08-11 | Stop reason: HOSPADM

## 2023-08-11 RX ORDER — DEXAMETHASONE SODIUM PHOSPHATE 4 MG/ML
INJECTION, SOLUTION INTRA-ARTICULAR; INTRALESIONAL; INTRAMUSCULAR; INTRAVENOUS; SOFT TISSUE PRN
Status: DISCONTINUED | OUTPATIENT
Start: 2023-08-11 | End: 2023-08-11

## 2023-08-11 RX ORDER — LIDOCAINE 40 MG/G
CREAM TOPICAL
Status: DISCONTINUED | OUTPATIENT
Start: 2023-08-11 | End: 2023-08-16 | Stop reason: HOSPADM

## 2023-08-11 RX ORDER — PROPOFOL 10 MG/ML
INJECTION, EMULSION INTRAVENOUS CONTINUOUS PRN
Status: DISCONTINUED | OUTPATIENT
Start: 2023-08-11 | End: 2023-08-11

## 2023-08-11 RX ORDER — ACETAMINOPHEN 325 MG/1
975 TABLET ORAL ONCE
Status: DISCONTINUED | OUTPATIENT
Start: 2023-08-11 | End: 2023-08-11 | Stop reason: HOSPADM

## 2023-08-11 RX ORDER — BUPIVACAINE HYDROCHLORIDE 5 MG/ML
INJECTION, SOLUTION PERINEURAL PRN
Status: DISCONTINUED | OUTPATIENT
Start: 2023-08-11 | End: 2023-08-11 | Stop reason: HOSPADM

## 2023-08-11 RX ORDER — PROPOFOL 10 MG/ML
INJECTION, EMULSION INTRAVENOUS PRN
Status: DISCONTINUED | OUTPATIENT
Start: 2023-08-11 | End: 2023-08-11

## 2023-08-11 RX ORDER — FENTANYL CITRATE 50 UG/ML
INJECTION, SOLUTION INTRAMUSCULAR; INTRAVENOUS PRN
Status: DISCONTINUED | OUTPATIENT
Start: 2023-08-11 | End: 2023-08-11

## 2023-08-11 RX ORDER — ONDANSETRON 2 MG/ML
4 INJECTION INTRAMUSCULAR; INTRAVENOUS EVERY 6 HOURS PRN
Status: DISCONTINUED | OUTPATIENT
Start: 2023-08-11 | End: 2023-08-16 | Stop reason: HOSPADM

## 2023-08-11 RX ORDER — HYDROMORPHONE HCL IN WATER/PF 6 MG/30 ML
0.2 PATIENT CONTROLLED ANALGESIA SYRINGE INTRAVENOUS
Status: DISCONTINUED | OUTPATIENT
Start: 2023-08-11 | End: 2023-08-16 | Stop reason: HOSPADM

## 2023-08-11 RX ORDER — NALOXONE HYDROCHLORIDE 0.4 MG/ML
0.2 INJECTION, SOLUTION INTRAMUSCULAR; INTRAVENOUS; SUBCUTANEOUS
Status: DISCONTINUED | OUTPATIENT
Start: 2023-08-11 | End: 2023-08-16 | Stop reason: HOSPADM

## 2023-08-11 RX ORDER — LIDOCAINE HYDROCHLORIDE 20 MG/ML
INJECTION, SOLUTION INFILTRATION; PERINEURAL PRN
Status: DISCONTINUED | OUTPATIENT
Start: 2023-08-11 | End: 2023-08-11

## 2023-08-11 RX ORDER — METOPROLOL TARTRATE 1 MG/ML
1-2 INJECTION, SOLUTION INTRAVENOUS EVERY 5 MIN PRN
Status: DISCONTINUED | OUTPATIENT
Start: 2023-08-11 | End: 2023-08-11 | Stop reason: HOSPADM

## 2023-08-11 RX ORDER — ONDANSETRON 2 MG/ML
INJECTION INTRAMUSCULAR; INTRAVENOUS PRN
Status: DISCONTINUED | OUTPATIENT
Start: 2023-08-11 | End: 2023-08-11

## 2023-08-11 RX ORDER — ONDANSETRON 4 MG/1
4 TABLET, ORALLY DISINTEGRATING ORAL EVERY 30 MIN PRN
Status: DISCONTINUED | OUTPATIENT
Start: 2023-08-11 | End: 2023-08-11 | Stop reason: HOSPADM

## 2023-08-11 RX ORDER — ACETAMINOPHEN 325 MG/1
975 TABLET ORAL EVERY 8 HOURS
Status: COMPLETED | OUTPATIENT
Start: 2023-08-11 | End: 2023-08-14

## 2023-08-11 RX ORDER — LIDOCAINE 40 MG/G
CREAM TOPICAL
Status: DISCONTINUED | OUTPATIENT
Start: 2023-08-11 | End: 2023-08-11 | Stop reason: HOSPADM

## 2023-08-11 RX ORDER — ACETAMINOPHEN 325 MG/1
650 TABLET ORAL EVERY 4 HOURS PRN
Status: DISCONTINUED | OUTPATIENT
Start: 2023-08-14 | End: 2023-08-16 | Stop reason: HOSPADM

## 2023-08-11 RX ORDER — NEOSTIGMINE METHYLSULFATE 1 MG/ML
VIAL (ML) INJECTION PRN
Status: DISCONTINUED | OUTPATIENT
Start: 2023-08-11 | End: 2023-08-11

## 2023-08-11 RX ORDER — PROCHLORPERAZINE MALEATE 5 MG
5 TABLET ORAL EVERY 6 HOURS PRN
Status: DISCONTINUED | OUTPATIENT
Start: 2023-08-11 | End: 2023-08-16 | Stop reason: HOSPADM

## 2023-08-11 RX ORDER — FENTANYL CITRATE 50 UG/ML
50 INJECTION, SOLUTION INTRAMUSCULAR; INTRAVENOUS EVERY 5 MIN PRN
Status: DISCONTINUED | OUTPATIENT
Start: 2023-08-11 | End: 2023-08-11 | Stop reason: HOSPADM

## 2023-08-11 RX ADMIN — PROCHLORPERAZINE EDISYLATE 5 MG: 5 INJECTION INTRAMUSCULAR; INTRAVENOUS at 16:34

## 2023-08-11 RX ADMIN — PROPOFOL 50 MCG/KG/MIN: 10 INJECTION, EMULSION INTRAVENOUS at 13:22

## 2023-08-11 RX ADMIN — SODIUM CHLORIDE, POTASSIUM CHLORIDE, SODIUM LACTATE AND CALCIUM CHLORIDE: 600; 310; 30; 20 INJECTION, SOLUTION INTRAVENOUS at 13:39

## 2023-08-11 RX ADMIN — ROCURONIUM BROMIDE 50 MG: 50 INJECTION, SOLUTION INTRAVENOUS at 13:13

## 2023-08-11 RX ADMIN — GLYCOPYRROLATE 0.2 MG: 0.2 INJECTION, SOLUTION INTRAMUSCULAR; INTRAVENOUS at 13:13

## 2023-08-11 RX ADMIN — HYDROMORPHONE HYDROCHLORIDE 0.5 MG: 1 INJECTION, SOLUTION INTRAMUSCULAR; INTRAVENOUS; SUBCUTANEOUS at 13:31

## 2023-08-11 RX ADMIN — ONDANSETRON 4 MG: 2 INJECTION INTRAMUSCULAR; INTRAVENOUS at 15:48

## 2023-08-11 RX ADMIN — ROCURONIUM BROMIDE 10 MG: 50 INJECTION, SOLUTION INTRAVENOUS at 14:07

## 2023-08-11 RX ADMIN — LIDOCAINE HYDROCHLORIDE 50 MG: 20 INJECTION, SOLUTION INFILTRATION; PERINEURAL at 13:13

## 2023-08-11 RX ADMIN — LATANOPROST 1 DROP: 50 SOLUTION OPHTHALMIC at 22:48

## 2023-08-11 RX ADMIN — HYDROMORPHONE HYDROCHLORIDE 0.5 MG: 1 INJECTION, SOLUTION INTRAMUSCULAR; INTRAVENOUS; SUBCUTANEOUS at 14:24

## 2023-08-11 RX ADMIN — SODIUM CHLORIDE, POTASSIUM CHLORIDE, SODIUM LACTATE AND CALCIUM CHLORIDE: 600; 310; 30; 20 INJECTION, SOLUTION INTRAVENOUS at 10:59

## 2023-08-11 RX ADMIN — HYDROMORPHONE HYDROCHLORIDE 0.1 MG: 0.2 INJECTION, SOLUTION INTRAMUSCULAR; INTRAVENOUS; SUBCUTANEOUS at 22:56

## 2023-08-11 RX ADMIN — FENTANYL CITRATE 100 MCG: 50 INJECTION, SOLUTION INTRAMUSCULAR; INTRAVENOUS at 13:13

## 2023-08-11 RX ADMIN — SUGAMMADEX 100 MG: 100 INJECTION, SOLUTION INTRAVENOUS at 15:20

## 2023-08-11 RX ADMIN — PROPOFOL 180 MG: 10 INJECTION, EMULSION INTRAVENOUS at 13:13

## 2023-08-11 RX ADMIN — GLYCOPYRROLATE 0.6 MG: 0.2 INJECTION, SOLUTION INTRAMUSCULAR; INTRAVENOUS at 15:06

## 2023-08-11 RX ADMIN — FENTANYL CITRATE 50 MCG: 50 INJECTION, SOLUTION INTRAMUSCULAR; INTRAVENOUS at 16:03

## 2023-08-11 RX ADMIN — NEOSTIGMINE METHYLSULFATE 4 MG: 1 INJECTION, SOLUTION INTRAVENOUS at 15:06

## 2023-08-11 RX ADMIN — FENTANYL CITRATE 50 MCG: 50 INJECTION, SOLUTION INTRAMUSCULAR; INTRAVENOUS at 15:51

## 2023-08-11 RX ADMIN — DEXAMETHASONE SODIUM PHOSPHATE 4 MG: 4 INJECTION, SOLUTION INTRA-ARTICULAR; INTRALESIONAL; INTRAMUSCULAR; INTRAVENOUS; SOFT TISSUE at 13:13

## 2023-08-11 RX ADMIN — ONDANSETRON 4 MG: 2 INJECTION INTRAMUSCULAR; INTRAVENOUS at 15:00

## 2023-08-11 RX ADMIN — ACETAMINOPHEN 975 MG: 325 TABLET, FILM COATED ORAL at 18:34

## 2023-08-11 RX ADMIN — POTASSIUM CHLORIDE, DEXTROSE MONOHYDRATE AND SODIUM CHLORIDE: 150; 5; 450 INJECTION, SOLUTION INTRAVENOUS at 18:32

## 2023-08-11 RX ADMIN — SODIUM CHLORIDE, POTASSIUM CHLORIDE, SODIUM LACTATE AND CALCIUM CHLORIDE: 600; 310; 30; 20 INJECTION, SOLUTION INTRAVENOUS at 12:54

## 2023-08-11 RX ADMIN — CEFOTETAN DISODIUM 2 G: 2 INJECTION, POWDER, FOR SOLUTION INTRAMUSCULAR; INTRAVENOUS at 12:39

## 2023-08-11 ASSESSMENT — ACTIVITIES OF DAILY LIVING (ADL)
ADLS_ACUITY_SCORE: 23
ADLS_ACUITY_SCORE: 25
ADLS_ACUITY_SCORE: 23
ADLS_ACUITY_SCORE: 25

## 2023-08-11 NOTE — ANESTHESIA CARE TRANSFER NOTE
Patient: Lexii Stratton    Procedure: Procedure(s):  Small bowel resection, Revision of anastamosis       Diagnosis: Small bowel obstruction (H) [K56.609]  Diagnosis Additional Information: No value filed.    Anesthesia Type:   General     Note:    Oropharynx: oropharynx clear of all foreign objects  Level of Consciousness: awake  Oxygen Supplementation: face mask    Independent Airway: airway patency satisfactory and stable  Dentition: dentition unchanged  Vital Signs Stable: post-procedure vital signs reviewed and stable  Report to RN Given: handoff report given  Patient transferred to: PACU    Handoff Report: Identifed the Patient, Identified the Reponsible Provider, Reviewed the pertinent medical history, Discussed the surgical course, Reviewed Intra-OP anesthesia mangement and issues during anesthesia, Set expectations for post-procedure period and Allowed opportunity for questions and acknowledgement of understanding      Vitals:  Vitals Value Taken Time   /61 08/11/23 1523   Temp     Pulse 96 08/11/23 1524   Resp 24 08/11/23 1524   SpO2 99 % 08/11/23 1524   Vitals shown include unvalidated device data.    Electronically Signed By: ANDREW Christie CRNA  August 11, 2023  3:25 PM

## 2023-08-11 NOTE — ANESTHESIA POSTPROCEDURE EVALUATION
Patient: Lexii Stratton    Procedure: Procedure(s):  Small bowel resection, Revision of anastamosis       Anesthesia Type:  General    Note:  Disposition: Inpatient   Postop Pain Control: Uneventful            Sign Out: Well controlled pain   PONV: No   Neuro/Psych: Uneventful            Sign Out: Acceptable/Baseline neuro status   Airway/Respiratory: Uneventful            Sign Out: Acceptable/Baseline resp. status   CV/Hemodynamics: Uneventful            Sign Out: Acceptable CV status; No obvious hypovolemia; No obvious fluid overload   Other NRE: NONE   DID A NON-ROUTINE EVENT OCCUR? No           Last vitals:  Vitals Value Taken Time   /80 08/11/23 1535   Temp 96.1  F (35.6  C) 08/11/23 1525   Pulse 60 08/11/23 1537   Resp 33 08/11/23 1537   SpO2 100 % 08/11/23 1537   Vitals shown include unvalidated device data.    Electronically Signed By: Arpit Keatnig MD  August 11, 2023  3:38 PM

## 2023-08-11 NOTE — ANESTHESIA PREPROCEDURE EVALUATION
Anesthesia Pre-Procedure Evaluation    Patient: Lexii Stratton   MRN: [id number] : 1940        Procedure : Procedure(s):  Small bowel resection, Revision of anastamosis          Past Medical History:   Diagnosis Date    Aortic valve stenosis 2022    Arthritis     Diabetes (H)     Type 2-no meds... Dieet and exercise only as of 9/15/20    Esophageal reflux     Hernia, abdominal     History of blood transfusion     Hypertension     No cardiologist    Incontinence of urine     Mumps     6 years old    Obese     Osteoarthritis     Other and unspecified hyperlipidemia     Other chronic pain     back    Peptic ulcer, unspecified site, unspecified as acute or chronic, without mention of hemorrhage, perforation, or obstruction     Small bowel obstruction (H)     Thyroid disease hypothyroidism    Urinary incontinence     Walking troubles       Past Surgical History:   Procedure Laterality Date    ABDOMEN SURGERY      ARTHROPLASTY HIP Right 2016    Procedure: ARTHROPLASTY HIP;  Surgeon: Angel Lund MD;  Location: [facility] OR    AS REPAIR INCISIONAL HERNIA,REDUCIBLE  1998    inc hernia ( [facility] surgery)    BIOPSY      BLADDER SURGERY      hyperdistention surgery and sling    BREAST SURGERY      CARDIAC SURGERY      CHOLECYSTECTOMY      COLONOSCOPY  2011    Procedure:COLONOSCOPY; COLONOSCOPY; Surgeon:SEMAJ GRAHAM; Location: GI    COLONOSCOPY N/A 2018    Procedure: COLONOSCOPY;  COLONOSCOPY Rm 544;  Surgeon: Marco Gusman MD;  Location:  GI    CV CORONARY ANGIOGRAM N/A 2022    Procedure: Coronary Angiogram;  Surgeon: Garcia Barber MD;  Location: [facility] CARDIAC CATH LAB    CV TRANSCATHETER AORTIC VALVE REPLACEMENT-FEMORAL APPROACH N/A 2022    Procedure: Transcatheter Aortic Valve Replacement-Femoral Approach;  Surgeon: Yulia Castano MD;  Location: [facility] CARDIAC CATH LAB    CYSTOSCOPY N/A 2017    Procedure: CYSTOSCOPY;  CYSTOSCOPY  AND HYDRODISTENTION ;  Surgeon: Eyal Bai MD;  Location:  OR    ENT SURGERY      Tonsillectomy    EP PACEMAKER DEVICE & LEAD IMPLANT- RIGHT ATRIAL & RIGHT VENTRICULAR N/A 11/08/2022    Procedure: Pacemaker Device & Lead Implant - Right Atrial & Right Ventricular;  Surgeon: William Boyd MD;  Location:  [facility]ART CARDIAC CATH LAB    ESOPHAGOSCOPY, GASTROSCOPY, DUODENOSCOPY (EGD), COMBINED N/A 09/26/2016    Procedure: COMBINED ESOPHAGOSCOPY, GASTROSCOPY, DUODENOSCOPY (EGD), BIOPSY SINGLE OR MULTIPLE;  Surgeon: Marco Monaco MD;  Location:  GI    EYE SURGERY Left 05/2019    GENITOURINARY SURGERY      GYN SURGERY      Hysterectomy    HERNIA REPAIR, UMBILICAL  07/08/2010    Dr. Kirt Franco times 3    IMPLANT STIMULATOR SACRAL NERVE STAGE ONE N/A 09/17/2020    Procedure: sacral neurostimulation stage one implant of neurostimulator lead;  Surgeon: Shahnaz Carbone MD;  Location:  OR    IMPLANT STIMULATOR SACRAL NERVE STAGE TWO Right 09/24/2020    Procedure: Removal of interstem lead, NOT implanting neurostimulator;  Surgeon: Shahnaz Carbone MD;  Location:  OR    ORTHOPEDIC SURGERY  2009    [facility] - Dr. Lund- bilateral knee replacement    ZZ NONSPECIFIC PROCEDURE  1946    T&A    Z NONSPECIFIC PROCEDURE  1984    Vaginal Hysterectomy (has her ovaries)    ZZC NONSPECIFIC PROCEDURE  1973    PPTL    ZZC NONSPECIFIC PROCEDURE  1994    L shoulder to remove bone spurs    ZZC NONSPECIFIC PROCEDURE  2004    cysto and durasphere Dr Demarco    Z NONSPECIFIC PROCEDURE  2005    cysto and durasphere    ZZC NONSPECIFIC PROCEDURE  2007    cysto and durasphere    ZZC NONSPECIFIC PROCEDURE  2009    retropubic TVT sling      Allergies   Allergen Reactions    Amoxicillin-Pot Clavulanate Diarrhea    Hydrocodone      Keeps patient awake    Morphine And Related Nausea and Vomiting    Naproxen Itching and Rash    Seasonal Allergies      Hay fever in fall, ragweed and russian thistles     Sulfa Antibiotics Nausea      Social History     Tobacco Use    Smoking status: Never     Passive exposure: Never    Smokeless tobacco: Never    Tobacco comments:     have never smoked anything   Substance Use Topics    Alcohol use: No      Wt Readings from Last 1 Encounters:   08/11/23 82.5 kg (181 lb 14.4 oz)        Anesthesia Evaluation   Pt has had prior anesthetic. Type: General.        ROS/MED HX  ENT/Pulmonary:  - neg pulmonary ROS     Neurologic:  - neg neurologic ROS     Cardiovascular:     (+)  hypertension- -   -  - -                                      METS/Exercise Tolerance:     Hematologic: Comments: Lab Test        08/08/23     07/06/23     07/05/23     11/08/22     10/27/22     09/30/22     03/29/18     03/28/18                       0946          0732          1755          0904          1553          0754          0606          2057          WBC          5.0          6.0          8.9            < >         --          4.2            < >        9.1           HGB          14.4         12.4         16.5*          < >         --          12.8           < >        5.7*          MCV          96           98           96             < >         --          97             < >        86            PLT          141*         131*         181            < >         --          157            < >        275           INR           --           --           --           --          1.06         1.07          --          1.15*          < > = values in this interval not displayed.                  Lab Test        08/11/23 08/08/23 07/06/23 07/05/23 07/05/23                       0957          0946          0732          1756          1755          NA            --          139          140           --          141           POTASSIUM     --          4.7          4.2           --          4.5           CHLORIDE      --          104          109*          --          101           CO2           --           24           24            --          23            BUN           --          15.4         14.6          --          20.0          CR            --          0.95         0.81         1.0          0.94          ANIONGAP      --          11           7             --          17*           CINDY           --          10.2         8.7*          --          10.7*         GLC          105*         106*         101*          --          136*                Musculoskeletal:       GI/Hepatic: Comment: Small bowel obstruction    (+) GERD, Asymptomatic on medication,                  Renal/Genitourinary:     (+) renal disease,             Endo:     (+)  type II DM,        thyroid problem,     Obesity,       Psychiatric/Substance Use:  - neg psychiatric ROS     Infectious Disease:       Malignancy:       Other:  - neg other ROS    (+)  , H/O Chronic Pain,         Physical Exam    Airway        Mallampati: II   TM distance: > 3 FB   Neck ROM: full   Mouth opening: > 3 cm    Respiratory Devices and Support         Dental           Cardiovascular   cardiovascular exam normal          Pulmonary   pulmonary exam normal                OUTSIDE LABS:  CBC:   Lab Results   Component Value Date    WBC 5.0 08/08/2023    WBC 6.0 07/06/2023    HGB 14.4 08/08/2023    HGB 12.4 07/06/2023    HCT 43.3 08/08/2023    HCT 37.9 07/06/2023     (L) 08/08/2023     (L) 07/06/2023     BMP:   Lab Results   Component Value Date     08/08/2023     07/06/2023    POTASSIUM 4.7 08/08/2023    POTASSIUM 4.2 07/06/2023    CHLORIDE 104 08/08/2023    CHLORIDE 109 (H) 07/06/2023    CO2 24 08/08/2023    CO2 24 07/06/2023    BUN 15.4 08/08/2023    BUN 14.6 07/06/2023    CR 0.95 08/08/2023    CR 0.81 07/06/2023     (H) 08/11/2023     (H) 08/08/2023     COAGS:   Lab Results   Component Value Date    PTT 29 09/30/2022    INR 1.06 10/27/2022     POC:   Lab Results   Component Value Date     (H) 09/24/2020      HEPATIC:   Lab Results   Component Value Date    ALBUMIN 4.3 07/05/2023    PROTTOTAL 7.8 07/05/2023    ALT 15 07/05/2023    AST 36 07/05/2023    ALKPHOS 134 (H) 07/05/2023    BILITOTAL 0.8 07/05/2023     OTHER:   Lab Results   Component Value Date    LACT 0.8 07/03/2022    A1C 6.0 (H) 08/08/2023    CINDY 10.2 08/08/2023    PHOS 3.5 11/09/2022    MAG 1.8 11/09/2022    LIPASE 151 07/03/2022    TSH 2.52 05/18/2023    T4 1.32 02/25/2019    CRP 12.7 (H) 07/30/2013    SED 68 (H) 09/08/2016       Anesthesia Plan    ASA Status:  3       Anesthesia Type: General.     - Airway: ETT   Induction: Intravenous, Propofol.   Maintenance: Balanced.        Consents    Anesthesia Plan(s) and associated risks, benefits, and realistic alternatives discussed. Questions answered and patient/representative(s) expressed understanding.     - Discussed:     - Discussed with:  Patient      - Extended Intubation/Ventilatory Support Discussed: No.      - Patient is DNR/DNI Status: No     Use of blood products discussed: Yes.     - Discussed with: Patient.     - Consented: consented to blood products            Reason for refusal: other.     Postoperative Care    Pain management: IV analgesics.   PONV prophylaxis: Ondansetron (or other 5HT-3), Dexamethasone or Solumedrol     Comments:                Moustapha Person MD

## 2023-08-11 NOTE — ANESTHESIA PROCEDURE NOTES
Airway       Patient location during procedure: OR       Procedure Start/Stop Times: 8/11/2023 1:12 PM  Staff -        CRNA: Marylou Estes APRN CRNA       Performed By: CRNA  Consent for Airway        Urgency: elective  Indications and Patient Condition       Indications for airway management: wang-procedural       Induction type:intravenous       Mask difficulty assessment: 1 - vent by mask    Final Airway Details       Final airway type: endotracheal airway       Successful airway: ETT - single  Endotracheal Airway Details        ETT size (mm): 7.0       Cuffed: yes       Successful intubation technique: direct laryngoscopy       DL Blade Type: Steele 2       Grade View of Cords: 1       Adjucts: stylet       Position: Right       Measured from: lips       Secured at (cm): 21       Bite block used: None    Post intubation assessment        Placement verified by: capnometry, equal breath sounds and chest rise        Number of attempts at approach: 1       Number of other approaches attempted: 0       Secured with: plastic tape       Ease of procedure: easy       Dentition: Intact and Unchanged    Medication(s) Administered   Medication Administration Time: 8/11/2023 1:12 PM

## 2023-08-11 NOTE — OP NOTE
General Surgery Operative Note    Pre-operative diagnosis:  Small bowel obstruction (H) [K56.609], recurrent   Post-operative diagnosis: Same   Procedure: Small bowel resection with revision of anastomosis.  Extensive lysis of adhesions.   Surgeon: Rafi Franco MD   Assistant(s): No Jackson PA-C  - the PA's assistance was medically necessary in providing adequate exposure in the operating field, maintaining hemostasis, cuttting suture, clamping and ligating blood vessels, and visualization of the anatomic structures throughout the surgical procedure.    Anesthesia: General    Estimated blood loss: 30 cc's   Drains placed: None   Complications:  None   Findings:  Extensive adhesions to the small bowel in the anterior abdominal wall.  Old mesh was visualized.  The old anastomosis was identified and resected.  A new side-to-side, functional end-to-end, stapled anastomosis was performed.  Inspection of the old anastomosis on the back table revealed a 3 mm opening between the proximal and the distal bowel.     Indications for operation: This is an 82-year-old woman who has been having intermittent bowel obstructions for years.  These have accelerated recently, resulting in frequent episodes of painful bowel obstruction.  Only a few of these have resulted in hospitalization.  At the last hospitalization, CT scan showed an obvious narrowing at the old anastomosis.  We therefore discussed the options of continued observation versus trying to revise this anastomosis through a lateral incision, as the patient had extensive scar tissue related to previous surgeries and previous hernia repair.  The patient agreed to proceed.    Details of the operation: After informed consent, the patient was taken to the operating room, where she underwent satisfactory induction of general anesthesia.  The patient was sterilely prepped and draped in a right sided muscle-sparing incision was made, retracting the rectus muscle  medially.  We then entered the peritoneal cavity without difficulty.  There were extensive adhesions more medially.  Many of these involved the old intraperitoneal mesh.  There were also numerous adhesions between loops of bowel.  We were eventually able to get the sufficiently mobilized that we could identify the old anastomosis.  A wound protector was placed.  There was a loop of small bowel densely adherent to one of the staple lines.  This was .  And the serosa was repaired using interrupted 3-0 Vicryl sutures.  We then took down more adhesions, eventually mobilizing the old anastomosis up into the incision.  Windows were made in the mesentery at the proximal and distal bowel loops.  And a PATRICIA stapler was fired across at each location and a LigaSure device was used to divide the mesentery.  A side-to-side, functional end-to-end, stapled anastomosis was now created using additional fires of the PATRICIA stapler and the common defect was closed using a TA type stapler.  This resulted in a widely patent anastomosis.  The staple line was oversewn using a 3-0 Vicryl suture..  The mesenteric defect was closed using a running 2-0 Vicryl suture.  There was excellent hemostasis.  The abdomen was now irrigated out.  The wound protector was removed and the omentum was placed underneath the incision.  The abdominal wall was now closed in 2 layers using running 0 Vicryl sutures.  Skin was closed using 3 oh subdermal sutures and skin adhesive.    The patient tolerated the procedure well and was transferred to the recovery room in satisfactory condition.  Bunge and needle counts were correct at the close of the case.    Specimens:   ID Type Source Tests Collected by Time Destination   1 : Small bowel anastamosis Tissue Small Intestine SURGICAL PATHOLOGY EXAM Rafi Franco MD 8/11/2023  2:59 PM            Rafi Franco MD

## 2023-08-12 ENCOUNTER — APPOINTMENT (OUTPATIENT)
Dept: PHYSICAL THERAPY | Facility: CLINIC | Age: 83
DRG: 330 | End: 2023-08-12
Attending: PHYSICIAN ASSISTANT
Payer: COMMERCIAL

## 2023-08-12 LAB
GLUCOSE BLDC GLUCOMTR-MCNC: 164 MG/DL (ref 70–99)
GLUCOSE BLDC GLUCOMTR-MCNC: 165 MG/DL (ref 70–99)
GLUCOSE BLDC GLUCOMTR-MCNC: 184 MG/DL (ref 70–99)
GLUCOSE BLDC GLUCOMTR-MCNC: 211 MG/DL (ref 70–99)
GLUCOSE BLDC GLUCOMTR-MCNC: 218 MG/DL (ref 70–99)
HOLD SPECIMEN: NORMAL
PLATELET # BLD AUTO: 143 10E3/UL (ref 150–450)

## 2023-08-12 PROCEDURE — 250N000012 HC RX MED GY IP 250 OP 636 PS 637: Performed by: PHYSICIAN ASSISTANT

## 2023-08-12 PROCEDURE — 36415 COLL VENOUS BLD VENIPUNCTURE: CPT | Performed by: SURGERY

## 2023-08-12 PROCEDURE — 258N000003 HC RX IP 258 OP 636: Performed by: SURGERY

## 2023-08-12 PROCEDURE — C9113 INJ PANTOPRAZOLE SODIUM, VIA: HCPCS | Mod: JZ | Performed by: SURGERY

## 2023-08-12 PROCEDURE — 85049 AUTOMATED PLATELET COUNT: CPT | Performed by: SURGERY

## 2023-08-12 PROCEDURE — 250N000013 HC RX MED GY IP 250 OP 250 PS 637: Performed by: SURGERY

## 2023-08-12 PROCEDURE — 99222 1ST HOSP IP/OBS MODERATE 55: CPT | Performed by: PHYSICIAN ASSISTANT

## 2023-08-12 PROCEDURE — 97161 PT EVAL LOW COMPLEX 20 MIN: CPT | Mod: GP | Performed by: PHYSICAL THERAPIST

## 2023-08-12 PROCEDURE — 120N000001 HC R&B MED SURG/OB

## 2023-08-12 PROCEDURE — 97116 GAIT TRAINING THERAPY: CPT | Mod: GP | Performed by: PHYSICAL THERAPIST

## 2023-08-12 PROCEDURE — 97530 THERAPEUTIC ACTIVITIES: CPT | Mod: GP | Performed by: PHYSICAL THERAPIST

## 2023-08-12 PROCEDURE — 250N000011 HC RX IP 250 OP 636: Mod: JZ | Performed by: SURGERY

## 2023-08-12 RX ORDER — DEXTROSE MONOHYDRATE 25 G/50ML
25-50 INJECTION, SOLUTION INTRAVENOUS
Status: DISCONTINUED | OUTPATIENT
Start: 2023-08-12 | End: 2023-08-15

## 2023-08-12 RX ORDER — NICOTINE POLACRILEX 4 MG
15-30 LOZENGE BUCCAL
Status: DISCONTINUED | OUTPATIENT
Start: 2023-08-12 | End: 2023-08-15

## 2023-08-12 RX ADMIN — POTASSIUM CHLORIDE, DEXTROSE MONOHYDRATE AND SODIUM CHLORIDE: 150; 5; 450 INJECTION, SOLUTION INTRAVENOUS at 22:12

## 2023-08-12 RX ADMIN — POTASSIUM CHLORIDE, DEXTROSE MONOHYDRATE AND SODIUM CHLORIDE: 150; 5; 450 INJECTION, SOLUTION INTRAVENOUS at 12:16

## 2023-08-12 RX ADMIN — LATANOPROST 1 DROP: 50 SOLUTION OPHTHALMIC at 22:13

## 2023-08-12 RX ADMIN — POTASSIUM CHLORIDE, DEXTROSE MONOHYDRATE AND SODIUM CHLORIDE: 150; 5; 450 INJECTION, SOLUTION INTRAVENOUS at 02:24

## 2023-08-12 RX ADMIN — ENOXAPARIN SODIUM 40 MG: 40 INJECTION SUBCUTANEOUS at 12:57

## 2023-08-12 RX ADMIN — ACETAMINOPHEN 975 MG: 325 TABLET, FILM COATED ORAL at 18:58

## 2023-08-12 RX ADMIN — PANTOPRAZOLE SODIUM 40 MG: 40 INJECTION, POWDER, FOR SOLUTION INTRAVENOUS at 09:37

## 2023-08-12 RX ADMIN — INSULIN ASPART 1 UNITS: 100 INJECTION, SOLUTION INTRAVENOUS; SUBCUTANEOUS at 09:43

## 2023-08-12 RX ADMIN — ACETAMINOPHEN 975 MG: 325 TABLET, FILM COATED ORAL at 09:41

## 2023-08-12 RX ADMIN — HYDROMORPHONE HYDROCHLORIDE 0.2 MG: 0.2 INJECTION, SOLUTION INTRAMUSCULAR; INTRAVENOUS; SUBCUTANEOUS at 13:02

## 2023-08-12 RX ADMIN — ACETAMINOPHEN 975 MG: 325 TABLET, FILM COATED ORAL at 02:22

## 2023-08-12 ASSESSMENT — ACTIVITIES OF DAILY LIVING (ADL)
ADLS_ACUITY_SCORE: 31
ADLS_ACUITY_SCORE: 29
ADLS_ACUITY_SCORE: 29
ADLS_ACUITY_SCORE: 31
ADLS_ACUITY_SCORE: 29
ADLS_ACUITY_SCORE: 31
ADLS_ACUITY_SCORE: 31
ADLS_ACUITY_SCORE: 29
ADLS_ACUITY_SCORE: 31
ADLS_ACUITY_SCORE: 25

## 2023-08-12 NOTE — PLAN OF CARE
A&Ox4, On RA. Triggered sepsis during the end of the shift. Lactic acid level ordered. Assist x1 using gait belt & walker. Dilaudid given x1 for pain. BS hypoactive. LS clear. Bladder scan showed 358. Hammond catheter inserted per surgeon's PA. D5 1/2 NS +KCL infusing at 100ml/hr. Up in chair.

## 2023-08-12 NOTE — PROGRESS NOTES
"[facility]   General Surgery Progress Note           Assessment and Plan:   Assessment:   -Small bowel obstruction (H) [K56.609], recurrent   -1 Day Post-Op Small bowel resection with revision of anastomosis. Extensive lysis of adhesions ; Pathology: pending  -Postoperative hypoxia, on supplemental oxygen  -Hospitalist consulted for medical management  -Concern for urinary retention  -Afebrile      Plan:   -Bladder scan and likely place deluna  -IV fluids: D5 1/2NS+KCl @100mL/hr; wean when tolerating adequate oral intake  -Pain management: acetaminophen (TYLENOL)  -Prophylaxis: Enoxaparin (Lovenox) SQ, PPI, PCDs  -Diet: NPO x ice/meds, add nasal and throat spray for dryness.  -PT/OT consult for activation and discharge planning.  Advance activity as tolerated, encouraged OOB and ambulate 3-4 times a day  -IS hourly as able, encouraged  -Appreciate Hospitalist assistance with medical management  -Dispo: Multiple days  Seen and agree.         Interval History:   Mostly comfortable, bloated feeling at abdomen, \"fullness\" at bladder.  \"I'm not urinating like normal\".  Has had a couple bladder scans done and has a purewick in place; has not met criteria for straight cath yet (question adequately assessing), but due to tenderness will recheck and likely place deluna for decompression and bladder rest for a day.  Encouraged activity/walking, just got a walker in the room, which she usually uses at baseline.  Will get PT/OT involved for assessment of other assistive devices and discharge planning.  Await return of bowel function to start diet.         Physical Exam:   Blood pressure 115/40, pulse 61, temperature 97.4  F (36.3  C), temperature source Oral, resp. rate 23, height 1.613 m (5' 3.5\"), weight 82.5 kg (181 lb 14.4 oz), SpO2 99 %, not currently breastfeeding.  I/O last 3 completed shifts:  In: 2536 [I.V.:2536]  Out: 130 [Urine:100; Blood:30]  Deluna: none yet, pure wick in place, may have UR with overflow " incontinence   Abdomen:   soft, non-distended, tenderness noted near incision and at central low abd/pelvis and hypoactive bowel sounds   Incision - clean, dry, skin glue intact              Data:   Pathology: pending    Recent Labs   Lab 08/12/23  0636 08/08/23  0946   WBC  --  5.0   HGB  --  14.4   HCT  --  43.3   MCV  --  96   * 141*     Recent Labs   Lab 08/12/23  0609 08/11/23  1824 08/11/23  1628 08/11/23  0957 08/08/23  0946   NA  --   --   --   --  139   POTASSIUM  --   --   --   --  4.7   CHLORIDE  --   --   --   --  104   CO2  --   --   --   --  24   ANIONGAP  --   --   --   --  11   *  --  135* 105* 106*   BUN  --   --   --   --  15.4   CR  --  0.86  --   --  0.95   GFRESTIMATED  --  67  --   --  60*   CINDY  --   --   --   --  10.2       No Jackson PA-C

## 2023-08-12 NOTE — PLAN OF CARE
Goal Outcome Evaluation:  Pertinent assessments: Up to floor, settled from surgery, POD #0 from xlap, small bowel resection, and revision of anastomosis. Pt has not gotten out of bed, IVF infusing, Tylenol for pain. SIRS triggered.  Major Shift Events: up to floor, settled in room, 2L NC  Treatment Plan: ROBF, encourage ambulation, pain management, wean O2 as able.

## 2023-08-12 NOTE — PLAN OF CARE
Goal Outcome Evaluation:                    To Do:  End of Shift Summary  For vital signs and complete assessments, please see documentation flowsheets.     Pertinent assessments: Pt A/Ox4. VSS. Capno, on 2L O2.  Scheduled Tylenol and Dilaudid prn given for abdo pain. Denies nausea and SOB. BS hypo, not passing flatus. Abdo incision WDL. Pt refuses to try and use the commode. Pt agreed to try commode, up assist x1 with belt, pt voided and felt dizzy. VSS.   Pt voided when used BSC .  Major Shift Events: Has not voided, bladder scanned for 220 and 370. Straight cath ordered.  Treatment Plan: await bowel return, symptom management and encourage ambulation.  Bedside Nurse: Eula Tomas RN

## 2023-08-12 NOTE — CONSULTS
Hospitalist Consultation      Lexii Stratton MRN# [id number]   YOB: 1940 Age: 82 year old   Date of Admission: 8/11/2023     Requesting Physician:  Joan   Reason for consult: Medical management           Assessment and Plan:   This patient is a 82 year old female with a PMH significant for DM2, CKD, HTN, HLD, hypothyroid, severe AORTIC STENOSIS s/p TAVR 11/2022, CHB s/p pacemaker 11/2022, pAFib-on DOAC, GERD/Shah's and history of recurrent SBOs who is POD #1 s/p small bowel resection with revision of anastomosis; extensive lysis of adhesions.      #. S/p Small bowel resection with revision of anastomosis. Extensive lysis of adhesions   doing well, cont  pain control as per primary.  - diet per primary team    #. DM2  Last A1c 6.0 (8/8/23). Diet controlled. Will cover with sliding scale insulin while here.     #. HTN  Takes Norvasc and Lisinopril as OP. BP has been on the lower end post-op. Will continue to hold PTA meds for now. Resume as able based on BP trend.     #. HLD  Hold zocor while admitted    #.  Hypothyroid  Last TSH 2.52 in [place] 2023. Hold levothyroxine while NPO, resume when diet advances.     #. GERD/history of Shah's  On IV Protonix, transition to PTA PO Protonix once diet advances    #. History of pAFib  Noted on initial pacemaker download in March 2023. Started on Eliquis for [id number] score of 5.   Resume PTA Eliquis per Gen Surgery recs.     #.  Glaucoma  Resume PTA Xalatan.     #. Urinary Retention  Patient noted to have some urinary retention overnight. Bladder scans showing 380cc, 220cc, 370cc. ISC ordered. CTM.     #. DVT Prophylaxis: on Lovenox as per primary  #. Code Status: FULL CODE             History of Present Illness:   This patient is a 82 year old female who is POD #1 s/p Small bowel resection with revision of anastomosis. Extensive lysis of adhesions. She has a history of recurrent SBOs and has been having recurrent symptoms over the past couple months.  Has not been able to progress past a full liquid diet over the past month or so due to symptoms.  Was seen by Gen Surg who noted CT scan showed an obvious narrowing at the old anastomosis. Patient elected surgical intervention to try and revise this.   Intra-op report reviewed and showed no intra-op complications.   Overnight did pretty well, no complaints, VSS.   Currently, NPO, pain controlled, no CP, SOB, no n/v.   O/w other medical problems have been stable, with no recent c/o illness.                 Past Medical History:     Past Medical History:   Diagnosis Date    Aortic valve stenosis 8/18/2022    Arthritis     Diabetes (H)     Type 2-no meds... Dieet and exercise only as of 9/15/20    Esophageal reflux     Hernia, abdominal     History of blood transfusion 1984    Hypertension     No cardiologist    Incontinence of urine     Mumps     6 years old    Obese     Osteoarthritis     Other and unspecified hyperlipidemia     Other chronic pain     back    Peptic ulcer, unspecified site, unspecified as acute or chronic, without mention of hemorrhage, perforation, or obstruction     Small bowel obstruction (H)     Thyroid disease hypothyroidism    Urinary incontinence     Walking troubles                Past Surgical History:     Past Surgical History:   Procedure Laterality Date    ABDOMEN SURGERY      ARTHROPLASTY HIP Right 11/09/2016    Procedure: ARTHROPLASTY HIP;  Surgeon: Angel Lund MD;  Location: [facility] OR    AS REPAIR INCISIONAL HERNIA,REDUCIBLE  1998    inc hernia ( [facility] surgery)    BIOPSY      BLADDER SURGERY      hyperdistention surgery and sling    BREAST SURGERY      CARDIAC SURGERY  2022    CHOLECYSTECTOMY  1987    COLONOSCOPY  12/03/2011    Procedure:COLONOSCOPY; COLONOSCOPY; Surgeon:SEMAJ GRAHAM; Location: GI    COLONOSCOPY N/A 03/29/2018    Procedure: COLONOSCOPY;  COLONOSCOPY Rm 544;  Surgeon: Marco Gusman MD;  Location:  GI    CV CORONARY ANGIOGRAM N/A 09/30/2022     Procedure: Coronary Angiogram;  Surgeon: Garcia Barber MD;  Location:  [facility]    CV TRANSCATHETER AORTIC VALVE REPLACEMENT-FEMORAL APPROACH N/A 11/08/2022    Procedure: Transcatheter Aortic Valve Replacement-Femoral Approach;  Surgeon: Yulia Castano MD;  Location:  [facility]    CYSTOSCOPY N/A 09/06/2017    Procedure: CYSTOSCOPY;  CYSTOSCOPY AND HYDRODISTENTION ;  Surgeon: Eyal Bai MD;  Location:  OR    ENT SURGERY      Tonsillectomy    EP PACEMAKER DEVICE & LEAD IMPLANT- RIGHT ATRIAL & RIGHT VENTRICULAR N/A 11/08/2022    Procedure: Pacemaker Device & Lead Implant - Right Atrial & Right Ventricular;  Surgeon: William Boyd MD;  Location:  [facility]    ESOPHAGOSCOPY, GASTROSCOPY, DUODENOSCOPY (EGD), COMBINED N/A 09/26/2016    Procedure: COMBINED ESOPHAGOSCOPY, GASTROSCOPY, DUODENOSCOPY (EGD), BIOPSY SINGLE OR MULTIPLE;  Surgeon: Marco Monaco MD;  Location:  GI    EYE SURGERY Left 05/2019    GENITOURINARY SURGERY      GYN SURGERY      Hysterectomy    HERNIA REPAIR, UMBILICAL  07/08/2010    Dr. Kirt Franco times 3    IMPLANT STIMULATOR SACRAL NERVE STAGE ONE N/A 09/17/2020    Procedure: sacral neurostimulation stage one implant of neurostimulator lead;  Surgeon: Shahnaz Carbone MD;  Location:  OR    IMPLANT STIMULATOR SACRAL NERVE STAGE TWO Right 09/24/2020    Procedure: Removal of interstem lead, NOT implanting neurostimulator;  Surgeon: Shahnaz Carbone MD;  Location:  OR    ORTHOPEDIC SURGERY  2009    [facility] - Dr. Lund- bilateral knee replacement    ZZC NONSPECIFIC PROCEDURE  1946    T&A    ZZC NONSPECIFIC PROCEDURE  1984    Vaginal Hysterectomy (has her ovaries)    ZZC NONSPECIFIC PROCEDURE  1973    PPTL    ZZC NONSPECIFIC PROCEDURE  1994    L shoulder to remove bone spurs    ZZC NONSPECIFIC PROCEDURE  2004    cysto and durasphere Dr Demarco    ZZ NONSPECIFIC PROCEDURE  2005    cysto and  durasphere    ZZC NONSPECIFIC PROCEDURE  2007    cysto and durasphere    ZZC NONSPECIFIC PROCEDURE  2009    retropubic TVT sling                 Social History:     Social History     Tobacco Use    Smoking status: Never     Passive exposure: Never    Smokeless tobacco: Never    Tobacco comments:     have never smoked anything   Vaping Use    Vaping Use: Never used   Substance Use Topics    Alcohol use: No    Drug use: No                 Family History:   I have reviewed this patient's family history  family history includes Anesthesia Reaction in her mother; Aortic stenosis in her brother; Asthma in her mother; Cancer in her brother and father; Coronary Artery Disease in her father, maternal grandfather, maternal grandmother, and mother; Diabetes in her brother; Gallbladder Disease in her mother; Heart Disease in her maternal grandfather, maternal grandmother, and mother; Hyperlipidemia in her mother; Hypertension in her mother; Other Cancer in her father.  Family Hx fully reviewed and is non contributory to this admission.             Allergies:     Allergies   Allergen Reactions    Amoxicillin-Pot Clavulanate Diarrhea    Hydrocodone      Keeps patient awake    Morphine And Related Nausea and Vomiting    Naproxen Itching and Rash    Seasonal Allergies      Hay fever in fall, ragweed and russian thistles    Sulfa Antibiotics Nausea             Medications:     Prior to Admission medications    Medication Sig Last Dose Taking? Auth Provider Long Term End Date   acetaminophen (TYLENOL) 500 MG tablet Take 1,000 mg by mouth 2 times daily (2 x 500 mg = 1000 mg) 8/10/2023 Yes Unknown, Entered By History     amLODIPine (NORVASC) 5 MG tablet TAKE ONE TABLET BY MOUTH EVERY NIGHT AT BEDTIME 8/10/2023 Yes Jesenia Madrigal MD Yes    amoxicillin (AMOXIL) 500 MG capsule Take 2,000 mg by mouth once as needed (1 hour prior to dental procedures 4 x 500 mg = 2000 mg) More than a month Yes Reported, Patient     apixaban  ANTICOAGULANT (ELIQUIS ANTICOAGULANT) 5 MG tablet Take 1 tablet (5 mg) by mouth 2 times daily Past Week at 8/8/2023 Yes Ingrid Delong PA-C     Biotin 5000 MCG PO TABS Take 1 tablet by mouth daily 8/10/2023 Yes Reported, Patient     calcium carbonate (OS-CINDY) 500 MG tablet Take 1 tablet by mouth every other day 8/10/2023 Yes Unknown, Entered By History     CENTRUM SILVER OR TABS Take 1 tablet by mouth daily Past Week Yes John Daniels MD     Cranberry 450 MG CAPS Take 1 capsule by mouth daily (with dinner) 8/10/2023 Yes Unknown, Entered By History     ferrous gluconate (FERGON) 324 (38 FE) MG tablet Take 1 tablet (324 mg) by mouth 2 times daily Past Week at 8/10/2023 Yes Maximino Shabazz MD     latanoprost (XALATAN) 0.005 % ophthalmic solution Place 1 drop into both eyes At Bedtime  8/10/2023 Yes Reported, Patient Yes    levothyroxine (SYNTHROID/LEVOTHROID) 50 MCG tablet Take 1 tablet (50 mcg) by mouth daily 8/11/2023 Yes Jesenia Madrigal MD Yes    lisinopril (ZESTRIL) 30 MG tablet Take 1 tablet (30 mg) by mouth every morning 8/11/2023 Yes Jesenia Madrigal MD Yes    magnesium 250 MG tablet Take 1 tablet by mouth daily 8/10/2023 Yes Unknown, Entered By History     OMEGA-3 FATTY ACIDS 1200 MG OR CAPS Take 1 capsule by mouth daily Past Week at 8/4/2023 Yes John Daniels MD     pantoprazole (PROTONIX) 20 MG EC tablet Take 1 tablet (20 mg) by mouth daily 8/11/2023 Yes Jesenia Madrigal MD     polyethylene glycol (MIRALAX) 17 GM/Dose powder Take 17 g (1 Capful) by mouth daily Decrease frequency if developing loose stools. Past Month at 7/5/2023 Yes Mitul Colindres MD     potassium 99 MG TABS Take 1 tablet by mouth daily (with dinner)  8/10/2023 Yes Reported, Patient     Probiotic Product (ACIDOPHILUS PROBIOTIC BLEND) CAPS Take 1 capsule by mouth daily (with breakfast)  8/10/2023 Yes Jesenia Madrigal MD     simvastatin (ZOCOR) 20 MG tablet TAKE ONE TABLET BY MOUTH EVERY NIGHT AT  "BEDTIME 8/10/2023 Yes Jesenia Madrigal MD Yes    VITAMIN D, CHOLECALCIFEROL, PO Take 2,000 Units by mouth daily Past Week at 8/10/2023 Yes Reported, Patient     nitroGLYcerin (NITROSTAT) 0.4 MG sublingual tablet For chest pain place 1 tablet under the tongue every 5 minutes for 3 doses. If symptoms persist 5 minutes after 1st dose call 911.  Patient taking differently: 0.4 mg every 5 minutes as needed For chest pain place 1 tablet under the tongue every 5 minutes for 3 doses. If symptoms persist 5 minutes after 1st dose call 911.   Yulia Castano MD Yes                Review of Systems:     A comprehensive greater than 10 system review of systems was carried out.  Pertinent positives and negatives are noted above.  Otherwise negative for contributory info.            Physical Exam:   Vitals were reviewed  Blood pressure 115/40, pulse 61, temperature 97.4  F (36.3  C), temperature source Oral, resp. rate 23, height 1.613 m (5' 3.5\"), weight 82.5 kg (181 lb 14.4 oz), SpO2 99 %, not currently breastfeeding.  Exam:    GENERAL:  Comfortable.  PSYCH: pleasant, oriented, No acute distress.  HEENT:  PERRLA. Normal conjunctiva, normal hearing, nasal mucosa and Oropharynx are normal.  NECK:  Supple, no neck vein distention, adenopathy or bruits, normal thyroid.  HEART:  Normal S1, S2 with no murmur, no pericardial rub, S3 or S4.  LUNGS:  Clear to auscultation, normal Respiratory effort.  ABDOMEN:  Soft, no hepatosplenomegaly, normal bowel sounds.  EXTREMITIES:  No pedal edema, +2 pulses bilateral and equal.  abdominal dressing c/d/i  SKIN:  Dry to touch, No rash, wound or ulcerations.  NEUROLOGIC:  Nonfocal with normal cranial nerve and motor power and sensation.            Data:   Past 24 hours labs, studies, and imaging were reviewed.  Recent Labs   Lab 08/12/23  0636 08/08/23  0946   WBC  --  5.0   HGB  --  14.4   HCT  --  43.3   MCV  --  96   * 141*          Lab Results   Component Value Date     " 08/08/2023     07/06/2023     07/05/2023     02/24/2021     08/26/2020     02/20/2020    Lab Results   Component Value Date    CHLORIDE 104 08/08/2023    CHLORIDE 109 07/06/2023    CHLORIDE 101 07/05/2023    CHLORIDE 108 11/09/2022    CHLORIDE 105 10/27/2022    CHLORIDE 109 09/30/2022    CHLORIDE 107 02/24/2021    CHLORIDE 106 08/26/2020    CHLORIDE 108 02/20/2020    Lab Results   Component Value Date    BUN 15.4 08/08/2023    BUN 14.6 07/06/2023    BUN 20.0 07/05/2023    BUN 16 11/09/2022    BUN 18 10/27/2022    BUN 18 09/30/2022    BUN 18 02/24/2021    BUN 29 08/26/2020    BUN 23 02/20/2020      Lab Results   Component Value Date    POTASSIUM 4.7 08/08/2023    POTASSIUM 4.2 07/06/2023    POTASSIUM 4.5 07/05/2023    POTASSIUM 4.2 11/09/2022    POTASSIUM 3.8 11/08/2022    POTASSIUM 4.0 10/27/2022    POTASSIUM 3.9 02/24/2021    POTASSIUM 4.5 08/26/2020    POTASSIUM 4.2 02/20/2020    Lab Results   Component Value Date    CO2 24 08/08/2023    CO2 24 07/06/2023    CO2 23 07/05/2023    CO2 22 11/09/2022    CO2 25 10/27/2022    CO2 26 09/30/2022    CO2 28 02/24/2021    CO2 25 08/26/2020    CO2 25 02/20/2020    Lab Results   Component Value Date    CR 0.86 08/11/2023    CR 0.95 08/08/2023    CR 0.81 07/06/2023    CR 0.90 02/24/2021    CR 1.06 08/26/2020    CR 0.79 02/20/2020        Recent Labs   Lab 08/12/23  0609 08/11/23  1824 08/11/23  1628 08/11/23  0957 08/08/23  0946   NA  --   --   --   --  139   POTASSIUM  --   --   --   --  4.7   CHLORIDE  --   --   --   --  104   CO2  --   --   --   --  24   ANIONGAP  --   --   --   --  11   *  --  135* 105* 106*   BUN  --   --   --   --  15.4   CR  --  0.86  --   --  0.95   GFRESTIMATED  --  67  --   --  60*   CINDY  --   --   --   --  10.2     Recent Labs   Lab 08/08/23  0946   HGB 14.4     Recent Labs   Lab 08/11/23  1914   LACT 0.9     Recent Labs   Lab 08/12/23  0636 08/08/23  0946   * 141*     No results for input(s): COLOR,  APPEARANCE, URINEGLC, URINEBILI, URINEKETONE, SG, UBLD, URINEPH, PROTEIN, UROBILINOGEN, NITRITE, LEUKEST, RBCU, WBCU in the last 168 hours.    Jaimie Berger PA-C

## 2023-08-12 NOTE — PROGRESS NOTES
08/12/23 1510   Appointment Info   Signing Clinician's Name / Credentials (PT) Ligia Parson, PT   Living Environment   People in Home alone   Current Living Arrangements house   Home Accessibility stairs to enter home;stairs within home   Number of Stairs, Main Entrance 1   Number of Stairs, Within Home, Primary greater than 10 stairs   Stair Railings, Within Home, Primary railings safe and in good condition   Transportation Anticipated car, drives self;family or friend will provide   Living Environment Comments lower level laundry   Self-Care   Usual Activity Tolerance good   Current Activity Tolerance moderate   Equipment Currently Used at Home walker, rolling  (outside of home)   Fall history within last six months no   General Information   Onset of Illness/Injury or Date of Surgery 08/11/23   Referring Physician No Jackson PA-C   Patient/Family Therapy Goals Statement (PT) return home   Pertinent History of Current Problem (include personal factors and/or comorbidities that impact the POC) per chart: patient with a SBO; 1 Day Post-Op Small bowel resection with revision of anastomosis. Extensive lysis of adhesions; see medical record for further information   Existing Precautions/Restrictions fall   General Observations patient up in chair upon therapist arrival   Cognition   Cognitive Status Comments appears intact   Pain Assessment   Patient Currently in Pain Yes, see Vital Sign flowsheet  (abdominal discomfort)   Integumentary/Edema   Integumentary/Edema Comments abdominal incission   Range of Motion (ROM)   ROM Comment knees limited by stiffness from prior surgeries   Strength (Manual Muscle Testing)   Strength Comments functional weakness noted with mobility; difficulty with sit>stand   Bed Mobility   Comment, (Bed Mobility) NT; patient up in a recliner chair and wants to remain there   Transfers   Comment, (Transfers) sit>stand with mod A; use of walker in standing   Gait/Stairs (Locomotion)    Comment, (Gait/Stairs) able to ambulate with walker and CGA   Balance   Balance Comments current need for walker   Clinical Impression   Criteria for Skilled Therapeutic Intervention Yes, treatment indicated   PT Diagnosis (PT) impaired functional mobility   Influenced by the following impairments pain; abdominal precautions; functional weakness   Functional limitations due to impairments impaired independence with mobility and cares secondary to above deficits   Clinical Presentation (PT Evaluation Complexity) Evolving/Changing   Clinical Presentation Rationale clinical judgement; level of assist   Clinical Decision Making (Complexity) low complexity   Planned Therapy Interventions (PT) bed mobility training;gait training;stair training;strengthening;transfer training;progressive activity/exercise   Anticipated Equipment Needs at Discharge (PT) walker, rolling;cane, straight   Risk & Benefits of therapy have been explained evaluation/treatment results reviewed;care plan/treatment goals reviewed;risks/benefits reviewed;current/potential barriers reviewed;participants voiced agreement with care plan;participants included;patient   PT Total Evaluation Time   PT Eval, Low Complexity Minutes (77144) 10   Plan of Care Review   Plan of Care Reviewed With patient;daughter   Physical Therapy Goals   PT Frequency Daily   PT Predicted Duration/Target Date for Goal Attainment 08/18/23   PT Goals Bed Mobility;Transfers;Gait;Stairs   PT: Bed Mobility Independent;Supine to/from sit;Rolling;Within precautions   PT: Transfers Modified independent;Sit to/from stand;Bed to/from chair;Assistive device   PT: Gait Modified independent;Rolling walker;150 feet   PT: Stairs Modified independent;Greater than 10 stairs;Assistive device  (with rail)   PT Discharge Planning   PT Discharge Recommendation (DC Rec) home;home with assist;home with home care physical therapy   PT Rationale for DC Rec Anticipate with ongoing PT and continued  recovery from surgery, patient should be able to return home with some assist from family/friends as needed and Home PT; recommend use of walker; has a lift chair at home also; if patient unable to meet PT goals by discharge and doesn't have adequate assist available, may need TCU.   PT Brief overview of current status A x 1 with walker

## 2023-08-12 NOTE — PROGRESS NOTES
Cross Cover    Called for symptomatic urinary retention at 380 ml  Bladder scan with prn straight cath orders in

## 2023-08-12 NOTE — CONSULTS
CLINICAL NUTRITION SERVICES  -  ASSESSMENT NOTE    Recommendations Ordered by Registered Dietitian (RD):   Diet advancement per MD   Plan to order oral nutritional supplements (ensure enlive) when diet advances >/=full liquids    Malnutrition:   % Weight Loss:  Weight loss does not meet criteria for malnutrition - 3% in 2-3 weeks   % Intake: <75% for >/= 1 month (moderate malnutrition) or greater   Subcutaneous Fat Loss:  Orbital region mild depletion - will not use given only one indicator   Muscle Loss:  Scapular bone region mild depletion - will not use given only one indicator   Fluid Retention:  None noted    Malnutrition Diagnosis: Patient does not meet two of the above criteria necessary for diagnosing malnutrition      REASON FOR ASSESSMENT  Lexii Stratton is a 82 year old female seen by Registered Dietitian for Admission Nutrition Risk Screen for positive    Past medical history: DM2, CKD, HTN, HLD, hypothyroid, severe AORTIC STENOSIS s/p TAVR 11/2022, CHB s/p pacemaker 11/2022, pAFib-on DOAC, GERD/Shah's and history of recurrent SBOs     Admitted for: S/p Small bowel resection with revision of anastomosis. Extensive lysis of adhesions      NUTRITION HISTORY  - Information obtained from patient and chart  - Typical food/fluid intake PTA: patient reports consuming only full liquids for the past ~ 1 month given multiple recurrent SBOs during this time. Notes that she was hesitant to consume much past this given fear for causing another SBO. Also endorses vomiting and diarrhea as large barriers to PO intake.  - Supplements: 1 protein shake in the morning + occasionally another in the evening for dinner   - Food allergies: NKFA    CURRENT NUTRITION ORDERS  Diet Order:     NPO     Current Intake/Tolerance:  NPO     Obtained from Chart/Interdisciplinary Team:  -Patient underwent small bowel resection with revision of anastomosis. Extensive lysis of adhesions on 8/11  - Reviewed stooling patterns- none to  "comment on   - Please refer to flowsheets for more information on skin     ANTHROPOMETRICS  Height: 5' 4.571\"  Weight: 82.5 kg ( 181 lbs 14.4 oz)   Body mass index is 31.71 kg/m .  Weight Status:  Obesity Grade I BMI 30-34.9  Weight History: weight is fairly stable - possible weight loss of 2.8 kg (3%) over the past 2-3 weeks. Patient reports a weight loss of 6-7 lbs over the past ~ 1 month - notes that her usual body weight is around 180-181 lbs.   Wt Readings from Last 10 Encounters:   08/11/23 82.5 kg (181 lb 14.4 oz)   08/08/23 82.6 kg (182 lb 3.2 oz)   07/25/23 85.3 kg (188 lb)   07/05/23 85.3 kg (188 lb)   05/22/23 84.4 kg (186 lb)   05/15/23 84.2 kg (185 lb 11.2 oz)   04/06/23 83.1 kg (183 lb 4.8 oz)   01/18/23 84 kg (185 lb 3 oz)   12/08/22 84 kg (185 lb 3.2 oz)   11/21/22 80.3 kg (177 lb)       LABS  Labs reviewed    Labs:  Electrolytes  Potassium (mmol/L)   Date Value   08/08/2023 4.7   07/06/2023 4.2   07/05/2023 4.5   11/09/2022 4.2   11/08/2022 3.8   10/27/2022 4.0   02/24/2021 3.9   08/26/2020 4.5   02/20/2020 4.2     Phosphorus (mg/dL)   Date Value   11/09/2022 3.5    Blood Glucose  Glucose (mg/dL)   Date Value   08/08/2023 106 (H)   07/06/2023 101 (H)   07/05/2023 136 (H)   05/18/2023 88   12/01/2022 112 (H)   11/09/2022 95   11/08/2022 113 (H)   10/27/2022 106 (H)   09/30/2022 107 (H)   07/28/2022 95   02/24/2021 125 (H)   08/26/2020 119 (H)   02/20/2020 128 (H)   08/27/2019 128 (H)   02/25/2019 128 (H)     GLUCOSE BY METER POCT (mg/dL)   Date Value   08/12/2023 218 (H)   08/12/2023 211 (H)   08/11/2023 135 (H)   08/11/2023 105 (H)   11/09/2022 197 (H)     Hemoglobin A1C (%)   Date Value   08/08/2023 6.0 (H)   05/18/2023 6.0 (H)   07/28/2022 5.6   02/01/2022 5.6   02/24/2021 6.1 (H)   08/26/2020 5.9 (H)   02/20/2020 5.7 (H)   08/27/2019 5.9 (H)   02/25/2019 6.6 (H)    Inflammatory Markers  CRP Inflammation (mg/L)   Date Value   07/30/2013 12.7 (H)     WBC (10e9/L)   Date Value   03/12/2021 11.3 (H) "   03/29/2018 8.1   03/28/2018 9.1     WBC Count (10e3/uL)   Date Value   08/08/2023 5.0   07/06/2023 6.0   07/05/2023 8.9     Albumin (g/dL)   Date Value   07/05/2023 4.3   05/18/2023 4.0   10/27/2022 3.5   07/28/2022 3.4   07/03/2022 3.4   02/24/2021 3.4   08/26/2020 3.5   02/20/2020 3.2 (L)      Magnesium (mg/dL)   Date Value   11/09/2022 1.8   03/30/2018 2.0   12/26/2014 1.7   12/25/2014 1.6     Sodium (mmol/L)   Date Value   08/08/2023 139   07/06/2023 140   07/05/2023 141   02/24/2021 140   08/26/2020 138   02/20/2020 139    Renal  Urea Nitrogen (mg/dL)   Date Value   08/08/2023 15.4   07/06/2023 14.6   07/05/2023 20.0   11/09/2022 16   10/27/2022 18   09/30/2022 18   02/24/2021 18   08/26/2020 29   02/20/2020 23     Creatinine (mg/dL)   Date Value   08/11/2023 0.86   08/08/2023 0.95   07/06/2023 0.81   02/24/2021 0.90   08/26/2020 1.06 (H)   02/20/2020 0.79     Additional  Triglycerides (mg/dL)   Date Value   05/18/2023 159 (H)   07/28/2022 101   02/01/2022 128   02/24/2021 110   08/26/2020 133   02/20/2020 110     Ketones Urine (mg/dL)   Date Value   07/03/2022 Negative   03/12/2021 Negative        MEDICATIONS  Medications reviewed     acetaminophen  975 mg Oral Q8H    enoxaparin ANTICOAGULANT  40 mg Subcutaneous Q24H    insulin aspart  1-4 Units Subcutaneous Q4H    latanoprost  1 drop Both Eyes At Bedtime    pantoprazole  40 mg Intravenous Daily with breakfast    sodium chloride (PF)  3 mL Intracatheter Q8H       dextrose 5% and 0.45% NaCl + KCl 20 mEq/L 100 mL/hr at 08/12/23 1216      [START ON 8/14/2023] acetaminophen, glucose **OR** dextrose **OR** glucagon, HYDROmorphone **OR** HYDROmorphone, lidocaine 4%, lidocaine (buffered or not buffered), naloxone **OR** naloxone **OR** naloxone **OR** naloxone, ondansetron **OR** ondansetron, oxyCODONE IR **OR** oxyCODONE, phenol MT, prochlorperazine **OR** prochlorperazine, sodium chloride, sodium chloride (PF)     ASSESSED NUTRITION NEEDS PER APPROVED PRACTICE  GUIDELINES:    Dosing Weight 82.5 kg   Estimated Energy Needs: 1650+ kcals (20+ Kcal/Kg)  Justification: maintenance  Estimated Protein Needs: 83-99 grams protein (1-1.2 g pro/Kg)  Justification: post-op  Estimated Fluid Needs: per MD     NUTRITION DIAGNOSIS:  Inadequate oral intake related to altered GI function as evidenced by patient likely consuming <50-75% of nutritional needs over the past ~ 1 month     NUTRITION INTERVENTIONS  Recommendations / Nutrition Prescription  Diet advancement per MD   Plan to order oral nutritional supplements (ensure enlive) when diet advances >/=full liquids     Implementation  Nutrition education: Provided education on the role of the RD     Nutrition Goals  Diet to advance >/=full liquids in the next 24-48 hours     MONITORING AND EVALUATION:  Progress towards goals will be monitored and evaluated per protocol and Practice Guidelines    Pau Hinkle RD, LD  Clinical Dietitian     3rd floor/ICU: [phone]  All other floors: [phone]  Weekend/holiday: [phone]  Office: [phone]

## 2023-08-13 ENCOUNTER — APPOINTMENT (OUTPATIENT)
Dept: PHYSICAL THERAPY | Facility: CLINIC | Age: 83
DRG: 330 | End: 2023-08-13
Attending: SURGERY
Payer: COMMERCIAL

## 2023-08-13 ENCOUNTER — APPOINTMENT (OUTPATIENT)
Dept: OCCUPATIONAL THERAPY | Facility: CLINIC | Age: 83
DRG: 330 | End: 2023-08-13
Attending: PHYSICIAN ASSISTANT
Payer: COMMERCIAL

## 2023-08-13 LAB
GLUCOSE BLDC GLUCOMTR-MCNC: 118 MG/DL (ref 70–99)
GLUCOSE BLDC GLUCOMTR-MCNC: 143 MG/DL (ref 70–99)
GLUCOSE BLDC GLUCOMTR-MCNC: 147 MG/DL (ref 70–99)
GLUCOSE BLDC GLUCOMTR-MCNC: 153 MG/DL (ref 70–99)
GLUCOSE BLDC GLUCOMTR-MCNC: 169 MG/DL (ref 70–99)
GLUCOSE BLDC GLUCOMTR-MCNC: 173 MG/DL (ref 70–99)

## 2023-08-13 PROCEDURE — 97530 THERAPEUTIC ACTIVITIES: CPT | Mod: GP | Performed by: PHYSICAL THERAPIST

## 2023-08-13 PROCEDURE — 99232 SBSQ HOSP IP/OBS MODERATE 35: CPT | Performed by: INTERNAL MEDICINE

## 2023-08-13 PROCEDURE — 250N000011 HC RX IP 250 OP 636: Mod: JZ | Performed by: SURGERY

## 2023-08-13 PROCEDURE — 97116 GAIT TRAINING THERAPY: CPT | Mod: GP | Performed by: PHYSICAL THERAPIST

## 2023-08-13 PROCEDURE — 97165 OT EVAL LOW COMPLEX 30 MIN: CPT | Mod: GO | Performed by: OCCUPATIONAL THERAPIST

## 2023-08-13 PROCEDURE — C9113 INJ PANTOPRAZOLE SODIUM, VIA: HCPCS | Mod: JZ | Performed by: SURGERY

## 2023-08-13 PROCEDURE — 250N000013 HC RX MED GY IP 250 OP 250 PS 637: Performed by: SURGERY

## 2023-08-13 PROCEDURE — 97530 THERAPEUTIC ACTIVITIES: CPT | Mod: GO | Performed by: OCCUPATIONAL THERAPIST

## 2023-08-13 PROCEDURE — 120N000001 HC R&B MED SURG/OB

## 2023-08-13 RX ADMIN — ACETAMINOPHEN 975 MG: 325 TABLET, FILM COATED ORAL at 18:23

## 2023-08-13 RX ADMIN — PANTOPRAZOLE SODIUM 40 MG: 40 INJECTION, POWDER, FOR SOLUTION INTRAVENOUS at 12:46

## 2023-08-13 RX ADMIN — LATANOPROST 1 DROP: 50 SOLUTION OPHTHALMIC at 22:02

## 2023-08-13 RX ADMIN — INSULIN ASPART 1 UNITS: 100 INJECTION, SOLUTION INTRAVENOUS; SUBCUTANEOUS at 14:29

## 2023-08-13 RX ADMIN — INSULIN ASPART 1 UNITS: 100 INJECTION, SOLUTION INTRAVENOUS; SUBCUTANEOUS at 10:29

## 2023-08-13 RX ADMIN — ACETAMINOPHEN 975 MG: 325 TABLET, FILM COATED ORAL at 01:34

## 2023-08-13 RX ADMIN — ENOXAPARIN SODIUM 40 MG: 40 INJECTION SUBCUTANEOUS at 12:43

## 2023-08-13 RX ADMIN — ACETAMINOPHEN 975 MG: 325 TABLET, FILM COATED ORAL at 10:11

## 2023-08-13 ASSESSMENT — ACTIVITIES OF DAILY LIVING (ADL)
ADLS_ACUITY_SCORE: 26
DEPENDENT_IADLS:: INDEPENDENT
ADLS_ACUITY_SCORE: 31
ADLS_ACUITY_SCORE: 26
ADLS_ACUITY_SCORE: 31
ADLS_ACUITY_SCORE: 27
ADLS_ACUITY_SCORE: 31
ADLS_ACUITY_SCORE: 26
ADLS_ACUITY_SCORE: 26
ADLS_ACUITY_SCORE: 31
ADLS_ACUITY_SCORE: 27
ADLS_ACUITY_SCORE: 26
ADLS_ACUITY_SCORE: 31

## 2023-08-13 NOTE — PROGRESS NOTES
[facility]  Hospitalist Progress Note  Martín Hernandez M.D., M.B.A.   08/13/2023    Reason for Stay/active problem list    SBO          Assessment and Plan:        Summary of Stay:  This patient is a 82 year old female with a PMH significant for DM2, CKD, HTN, HLD, hypothyroid, severe AORTIC STENOSIS s/p TAVR 11/2022, CHB s/p pacemaker 11/2022, pAFib-on DOAC, GERD/Shah's and history of recurrent SBOs who is s/p small bowel resection with revision of anastomosis; extensive lysis of adhesions.         Problem List with Assessment and Plan:    #. S/p Small bowel resection with revision of anastomosis. Extensive lysis of adhesions   doing well, cont  pain control as per primary.  - diet per primary team     #. DM2  Last A1c 6.0 (8/8/23). Diet controlled. Will cover with sliding scale insulin while here.      #. HTN  Takes Norvasc and Lisinopril as OP. BP has been on the lower end post-op. Will continue to hold PTA meds for now. Resume as able based on BP trend.      #. HLD  Hold zocor while admitted     #.  Hypothyroid  Last TSH 2.52 in [place] 2023. Hold levothyroxine while NPO, resume when diet advances.      #. GERD/history of Shah's  On IV Protonix, transition to PTA PO Protonix once diet advances     #. History of pAFib  Noted on initial pacemaker  in March 2023. Started on Eliquis for [id number] score of 5.   Resume PTA Eliquis per Gen Surgery recs.      #.  Glaucoma  Resume PTA Xalatan.      #. Urinary Retention  --Patient noted to have some urinary retention, Hammond placed 8/12       VTE Prophylaxis: Enoxaparin (Lovenox) SQ  Code Status: Full Code  Diet: NPO for Medical/Clinical Reasons Except for: Meds, Ice Chips    Hammond Catheter: PRESENT, indication: Retention    Family updated today: No     Disposition:  per primary           Interval History (Subjective):        Patient is seen and examined by me today and medical record reviewed.Overnight events noted and care discussed with nursing  "staff.She is doing well.No new complaint. Still NPO                     Physical Exam:        Last Vital Signs:  /45 (BP Location: Left arm)   Pulse 61   Temp 97.9  F (36.6  C) (Oral)   Resp 24   Ht 1.613 m (5' 3.5\")   Wt 82.5 kg (181 lb 14.4 oz)   LMP  (LMP Unknown)   SpO2 95%   BMI 31.71 kg/m      I/O last 3 completed shifts:  In: 1692 [I.V.:1692]  Out: 775 [Urine:775]    Wt Readings from Last 5 Encounters:   08/11/23 82.5 kg (181 lb 14.4 oz)   08/08/23 82.6 kg (182 lb 3.2 oz)   07/25/23 85.3 kg (188 lb)   07/05/23 85.3 kg (188 lb)   05/22/23 84.4 kg (186 lb)        Constitutional: Awake, alert, cooperative, no apparent distress     Respiratory: Clear to auscultation bilaterally, no crackles or wheezing   Cardiovascular: Regular rate and rhythm, normal S1 and S2, and no murmur noted   Abdomen: Normal bowel sounds, soft, non-distended, non-tender   Skin: No new rashes, no cyanosis, dry to touch   Neuro: Alert with  no new focal weakness   Extremities: No edema   Other(s):        All other systems: Negative          Medications:        All current medications were reviewed with changes reflected in problem list.         Data:      All new lab and imaging data was reviewed.      Data reviewed today: I reviewed all new labs and imaging results over the last 24 hours. I personally reviewed       Recent Labs   Lab 08/12/23  0636 08/08/23  0946   WBC  --  5.0   HGB  --  14.4   HCT  --  43.3   MCV  --  96   * 141*     No results for input(s): CULT in the last 168 hours.  Recent Labs   Lab 08/13/23  0924 08/13/23  0536 08/13/23  0129 08/12/23  0609 08/11/23  1824 08/11/23  0957 08/08/23  0946   NA  --   --   --   --   --   --  139   POTASSIUM  --   --   --   --   --   --  4.7   CHLORIDE  --   --   --   --   --   --  104   CO2  --   --   --   --   --   --  24   ANIONGAP  --   --   --   --   --   --  11   * 147* 169*   < >  --    < > 106*   BUN  --   --   --   --   --   --  15.4   CR  --   --   -- "   --  0.86  --  0.95   GFRESTIMATED  --   --   --   --  67  --  60*   CINDY  --   --   --   --   --   --  10.2    < > = values in this interval not displayed.       Recent Labs   Lab 08/13/23  0924 08/13/23  0536 08/13/23  0129 08/12/23  2205 08/12/23  1813   * 147* 169* 164* 165*       No results for input(s): INR in the last 168 hours.      No results for input(s): TROPONIN, TROPI, TROPR in the last 168 hours.    Invalid input(s): TROP, TROPONINIES    No results found for this or any previous visit (from the past 48 hour(s)).    COVID Status:  COVID-19 PCR Results          8/16/2021    12:30 7/3/2022    17:32 11/4/2022    13:16   COVID-19 PCR Results   SARS CoV2 PCR Negative  Negative  Negative      COVID-19 Antibody Results, Testing for Immunity           No data to display                 Disclaimer: This note consists of symbols derived from keyboarding, dictation and/or voice recognition software. As a result, there may be errors in the script that have gone undetected. Please consider this when interpreting information found in this chart.

## 2023-08-13 NOTE — PROGRESS NOTES
"[facility]   General Surgery Progress Note           Assessment and Plan:   Assessment:   -Small bowel obstruction (H) [K56.609], recurrent   -2 Days Post-Op Small bowel resection with revision of anastomosis. Extensive lysis of adhesions ; Pathology: pending  -Postoperative hypoxia, on supplemental oxygen; resolved.  -Hospitalist consulted for medical management  -Urinary retention; 8/12 deluna placement  -Afebrile      Plan:   -Plan deluna removal this afternoon and monitor for UR with bladder scans  -IV fluids: D5 1/2NS+KCl @100mL/hr; wean when tolerating adequate oral intake  -Pain management: acetaminophen (TYLENOL)  -Prophylaxis: Enoxaparin (Lovenox) SQ, PPI, PCDs  -Diet: OK to start clear liquid diet  -Advance activity as tolerated, encouraged OOB and ambulate 3-4 times a day.  PT/OT.  -IS hourly as able, encouraged  -Appreciate Hospitalist assistance with medical management  -Dispo: 1-2 days when tolerating diet and return of bowel activity.  Seen and examined, agree with above.         Interval History:   Feeling well, up in chair currently, passing flatus, no bloating, no nausea.  Interested in clear liquids.  Continue increase in activity as able.  Had IV issues this am, so PT returning later.  Patient not thinking she will need home care/home PT as she has lots of help, will reassess as she gets closer to discharge.         Physical Exam:   Blood pressure 125/45, pulse 61, temperature 97.9  F (36.6  C), temperature source Oral, resp. rate 24, height 1.613 m (5' 3.5\"), weight 82.5 kg (181 lb 14.4 oz), SpO2 95 %, not currently breastfeeding.  I/O last 3 completed shifts:  In: 1692 [I.V.:1692]  Out: 775 [Urine:775]  Deluna: clear robert output in bag  Abdomen:   soft, non-distended, non-tender, abd binder in place -repositioned to low abd, normal bowel sounds.  Incision - clean, dry, skin glue intact              Data:   Pathology: pending    Recent Labs   Lab 08/12/23  0636 08/08/23  0946   WBC  " --  5.0   HGB  --  14.4   HCT  --  43.3   MCV  --  96   * 141*     Recent Labs   Lab 08/13/23  0924 08/13/23  0536 08/13/23  0129 08/12/23  0609 08/11/23  1824 08/11/23  0957 08/08/23  0946   NA  --   --   --   --   --   --  139   POTASSIUM  --   --   --   --   --   --  4.7   CHLORIDE  --   --   --   --   --   --  104   CO2  --   --   --   --   --   --  24   ANIONGAP  --   --   --   --   --   --  11   * 147* 169*   < >  --    < > 106*   BUN  --   --   --   --   --   --  15.4   CR  --   --   --   --  0.86  --  0.95   GFRESTIMATED  --   --   --   --  67  --  60*   CINDY  --   --   --   --   --   --  10.2    < > = values in this interval not displayed.       No Jackson PA-C

## 2023-08-13 NOTE — PLAN OF CARE
Pertinent assessments: VSS on room air. Afebrile. A&Ox4. POD #1. Abdominal incision closed with skin glue and open to air. Up with assist of 1, gait belt, and walker. [place]. B, 164; refusing insulin administration. NPO except for meds and ice chips. Hammond patent to collection bag.     Major Shift Events: none.    Treatment Plan: Advance diet, monitor return of bowel function.    Bedside Nurse: Michael Mcconnell RN

## 2023-08-13 NOTE — PLAN OF CARE
Pertinent assessments: Alert &Oriented x4, VSS. On RA. LS clear. BS hypoactive. Passing gas. No BM since surgery. Scheduled Tylenol given. Up in her chair. PIV on left forearm infiltrated. Arm is edematous. IV fluids stopped. Ice applied & elevated the arm. Boarders marked. MD notified. Order for Hyaluronidase received. Pt. Refused Hyaluronidase. Verbal encouragement ineffective. Restarted IV fluids on Right arm. Advanced diet to clear liquid & tolerating well. Bg were 143 & 173. Coverage given.    Major Shift Events: PIV on left forearm infiltrated. Arm elevated, Ice applied & Boarders marked. MD notified.     Treatment Plan: IV fluids, Advance diet.   Monitor for bowel return & urinary retention

## 2023-08-13 NOTE — PLAN OF CARE
To Do:  End of Shift Summary  For vital signs and complete assessments, please see documentation flowsheets.     Pertinent assessments: Assumed cares [id number]. VSS on RA. A&Ox4. POD #2. Denies pain, SOB and nausea. Abdominal incision closed with skin glue and open to air. Up with assist of 1, gait belt, and walker. [place].  and 147. No insulin given. NPO except for meds and ice chips. Hammond patent to collection bag draining good output.     Major Shift Events: none.    Treatment Plan: Advance diet, monitor return of bowel function.    Bedside Nurse: Spike Troy RN

## 2023-08-13 NOTE — CONSULTS
Care Management Initial Consult    General Information  Assessment completed with: Lexii Hawkins  Type of CM/SW Visit: Initial Assessment    Primary Care Provider verified and updated as needed: Yes   Readmission within the last 30 days:        Reason for Consult: discharge planning  Advance Care Planning: Advance Care Planning Reviewed: present on chart          Communication Assessment  Patient's communication style: spoken language (English or Bilingual)    Hearing Difficulty or Deaf: yes   Wear Glasses or Blind: yes    Cognitive  Cognitive/Neuro/Behavioral: WDL  Level of Consciousness: alert  Arousal Level: opens eyes spontaneously  Orientation: oriented x 4  Mood/Behavior: calm, cooperative  Best Language: 0 - No aphasia  Speech: clear    Living Environment:   People in home: alone     Current living Arrangements: house, 1 stair to enter, 10 stairs to the lower level - only needs to go to the lower level for laundry    Able to return to prior arrangements: yes       Family/Social Support:  Care provided by: self  Provides care for: no one  Marital Status:   Children, Neighbor          Description of Support System: Supportive, Involved    Support Assessment: Adequate social supports, Adequate family and caregiver support    Current Resources:   Patient receiving home care services: No     Community Resources: None  Equipment currently used at home: walker, rolling - only uses when walking at the fitness center, cane, straight  Supplies currently used at home: None    Employment/Financial:  Employment Status: retired        Financial Concerns: No concerns identified   Referral to Financial Worker: No       Does the patient's insurance plan have a 3 day qualifying hospital stay waiver?  No    Lifestyle & Psychosocial Needs:  Social Determinants of Health     Tobacco Use: Low Risk  (8/11/2023)    Patient History     Smoking Tobacco Use: Never     Smokeless Tobacco Use: Never     Passive Exposure: Never  "  Alcohol Use: Not on file   Financial Resource Strain: Not on file   Food Insecurity: Not on file   Transportation Needs: Not on file   Physical Activity: Not on file   Stress: Not on file   Social Connections: Not on file   Intimate Partner Violence: Not on file   Depression: Not at risk (5/22/2023)    PHQ-2     PHQ-2 Score: 0   Housing Stability: Not on file       Functional Status:  Prior to admission patient needed assistance:   Dependent ADLs:: Ambulation-cane, Ambulation-walker  Dependent IADLs:: Independent       Mental Health Status:  Mental Health Status: No Current Concerns       Chemical Dependency Status:  Chemical Dependency Status: No Current Concerns             Values/Beliefs:  Spiritual, Cultural Beliefs, Quaker Practices, Values that affect care:            Values/Beliefs Comment: Jain    Additional Information:  Sw met with the pt to discuss her discharge plan.  Pt was sitting in her chair when Sw arrived.  Pt was in a pleasant mood and contributed appropriately to the conversation.  Pt informed Sw that she is independent at baseline.  She still drives, but reports that most everything she needs is within a few blocks or a mile of her home.  She stated that she has a very supportive family, supportive neighbors, and support through her [employer].  She does not have concerns with returning home.  Her dtr lives nearby and has twin children (pt's grandchildren) who are very helpful.  She has preplanned meals that she took care of prior to her admission.  Pt said that she has basically been on a clear liquid diet for a few months now because she was afraid if she ate a regular diet, she would have a \"flare up.\"  Sw discussed the recommendation of HC PT/OT with the pt.  She said that with the support she has, she does not think that she will need it.  She told Sw that they can send a HC referral to [facility] in case she needs it, but is hopeful that she won't need it.  If [facility] does not " have the capacity to admit the pt, she is open to an alternative agency.  Pt reported that prior to admission, she walks at a local fitness center.  With her walker, she was walking about 2 miles 3 times a week.  She said that closer to her admission, she started to not feel as well, so she wasn't walking quite 2 miles.  She is hopeful she will get back to walking 2 miles.  Pt said that she has a cane she can use at home if needed, she has a lift chair, and a raised toilet seat that will help her upon return home.    Sw told the pt that they will send the HC referral and follow-up with her closer to discharge to determine if she still wants/needs HC.    Sw will continue with discharge planning and will be available as needed until discharge.    SRAVANTHI Cabrera, [facility]  Inpatient Care Coordination  [facility]  [phone]

## 2023-08-14 ENCOUNTER — APPOINTMENT (OUTPATIENT)
Dept: PHYSICAL THERAPY | Facility: CLINIC | Age: 83
DRG: 330 | End: 2023-08-14
Attending: SURGERY
Payer: COMMERCIAL

## 2023-08-14 ENCOUNTER — APPOINTMENT (OUTPATIENT)
Dept: OCCUPATIONAL THERAPY | Facility: CLINIC | Age: 83
DRG: 330 | End: 2023-08-14
Attending: SURGERY
Payer: COMMERCIAL

## 2023-08-14 LAB
GLUCOSE BLDC GLUCOMTR-MCNC: 115 MG/DL (ref 70–99)
GLUCOSE BLDC GLUCOMTR-MCNC: 118 MG/DL (ref 70–99)
GLUCOSE BLDC GLUCOMTR-MCNC: 141 MG/DL (ref 70–99)
GLUCOSE BLDC GLUCOMTR-MCNC: 89 MG/DL (ref 70–99)
GLUCOSE BLDC GLUCOMTR-MCNC: 97 MG/DL (ref 70–99)
GLUCOSE BLDC GLUCOMTR-MCNC: 97 MG/DL (ref 70–99)
PATH REPORT.COMMENTS IMP SPEC: NORMAL
PATH REPORT.COMMENTS IMP SPEC: NORMAL
PATH REPORT.FINAL DX SPEC: NORMAL
PATH REPORT.GROSS SPEC: NORMAL
PATH REPORT.MICROSCOPIC SPEC OTHER STN: NORMAL
PATH REPORT.RELEVANT HX SPEC: NORMAL
PHOTO IMAGE: NORMAL

## 2023-08-14 PROCEDURE — 120N000001 HC R&B MED SURG/OB

## 2023-08-14 PROCEDURE — 250N000013 HC RX MED GY IP 250 OP 250 PS 637: Performed by: PHYSICIAN ASSISTANT

## 2023-08-14 PROCEDURE — 97116 GAIT TRAINING THERAPY: CPT | Mod: GP

## 2023-08-14 PROCEDURE — 250N000011 HC RX IP 250 OP 636: Mod: JZ | Performed by: SURGERY

## 2023-08-14 PROCEDURE — 99232 SBSQ HOSP IP/OBS MODERATE 35: CPT | Performed by: INTERNAL MEDICINE

## 2023-08-14 PROCEDURE — 97535 SELF CARE MNGMENT TRAINING: CPT | Mod: GO

## 2023-08-14 PROCEDURE — 97530 THERAPEUTIC ACTIVITIES: CPT | Mod: GP

## 2023-08-14 PROCEDURE — 250N000013 HC RX MED GY IP 250 OP 250 PS 637: Performed by: STUDENT IN AN ORGANIZED HEALTH CARE EDUCATION/TRAINING PROGRAM

## 2023-08-14 PROCEDURE — 250N000013 HC RX MED GY IP 250 OP 250 PS 637: Performed by: SURGERY

## 2023-08-14 PROCEDURE — C9113 INJ PANTOPRAZOLE SODIUM, VIA: HCPCS | Mod: JZ | Performed by: SURGERY

## 2023-08-14 RX ORDER — SENNOSIDES 8.6 MG
1 TABLET ORAL 2 TIMES DAILY
Status: DISCONTINUED | OUTPATIENT
Start: 2023-08-14 | End: 2023-08-16 | Stop reason: HOSPADM

## 2023-08-14 RX ADMIN — ACETAMINOPHEN 975 MG: 325 TABLET, FILM COATED ORAL at 09:48

## 2023-08-14 RX ADMIN — SENNOSIDES 1 TABLET: 8.6 TABLET, FILM COATED ORAL at 21:25

## 2023-08-14 RX ADMIN — SENNOSIDES 1 TABLET: 8.6 TABLET, FILM COATED ORAL at 11:25

## 2023-08-14 RX ADMIN — PANTOPRAZOLE SODIUM 40 MG: 40 INJECTION, POWDER, FOR SOLUTION INTRAVENOUS at 09:48

## 2023-08-14 RX ADMIN — LATANOPROST 1 DROP: 50 SOLUTION OPHTHALMIC at 21:25

## 2023-08-14 RX ADMIN — Medication 5 MG: at 01:46

## 2023-08-14 RX ADMIN — Medication 5 MG: at 21:25

## 2023-08-14 RX ADMIN — ACETAMINOPHEN 975 MG: 325 TABLET, FILM COATED ORAL at 01:46

## 2023-08-14 RX ADMIN — ENOXAPARIN SODIUM 40 MG: 40 INJECTION SUBCUTANEOUS at 14:38

## 2023-08-14 ASSESSMENT — ACTIVITIES OF DAILY LIVING (ADL)
ADLS_ACUITY_SCORE: 24
ADLS_ACUITY_SCORE: 27

## 2023-08-14 NOTE — PLAN OF CARE
Goal Outcome Evaluation:    Assumed cares [id number]. POD#3. Pt A&Ox4 s/p small bowel resection. Reported mild pain in abdomen with movement. Tolerable with scheduled Tylenol. Denies N/V. Bowel sounds normoactive. No BM this shift. Tolerating clears.     Patient unable to sleep, and said hasn't slept for 3 days. The writer asked the crosscover, and Melatonin 5mg ordered. Patient sat in a chair some time. Did not sleep much still.   Waked in the room with assist of 1, gaitbelt and a walker.

## 2023-08-14 NOTE — PLAN OF CARE
Goal Outcome Evaluation:    Assumed care [id number]    To Do:  End of Shift Summary  For vital signs and complete assessments, please see documentation flowsheets.     Pertinent assessments: POD #3 small bowel resection , A/Ox4, up assist of one with G/walker, VSS, denies sob, on RA. Ambulated at the gutierrez 3 times this shift. Hammond to be removed per order.     Major Shift Events diet advanced.     Treatment Plan: encourage activity, continue with poc.     Bedside Nurse: Robinson Salvador RN     Vital signs:  Temp: 98.4  F (36.9  C) Temp src: Oral BP: 112/49 Pulse: 60   Resp: 18 SpO2: 96 % O2 Device: None (Room air)

## 2023-08-14 NOTE — PLAN OF CARE
"Pertinent assessments: VSS on room air. Afebrile. A&Ox4. POD #2. Abdominal incision closed with skin glue and open to air. Up with assist of 1, gait belt, and walker. [place]. B, 118; refusing insulin administration. Tolerating clear liquids. Saline locked PIV. Deluna patent to collection bag.      Major Shift Events: Pt did not want deluna removed this evening. Pt states, \"I will wait until Dr. Franco comes in in the morning.\"     Treatment Plan: Advance diet, monitor return of bowel function.     Bedside Nurse: Michael Mcconnell RN   "

## 2023-08-14 NOTE — PROGRESS NOTES
"[facility]   General Surgery Progress Note           Assessment and Plan:   Assessment:   -Small bowel obstruction (H) [K56.609], recurrent   -3 Days Post-Op Small bowel resection with revision of anastomosis. Extensive lysis of adhesions ; Pathology: pending  -Postoperative hypoxia, on supplemental oxygen; resolved.  -Hospitalist consulted for medical management  -Urinary retention; 8/12 deluna placement; remove today  -Afebrile      Plan:   -Plan deluna removal this afternoon and monitor for UR with bladder scans  -IV fluids: D5 1/2NS+KCl @100mL/hr; wean when tolerating adequate oral intake  -Pain management: acetaminophen (TYLENOL)  -Prophylaxis: Enoxaparin (Lovenox) SQ, PPI, PCDs  -Diet: OK to increase to full liquids  -Advance activity as tolerated, encouraged OOB and ambulate 3-4 times a day.  PT/OT.  -IS hourly as able, encouraged  -Appreciate Hospitalist assistance with medical management  -Dispo: 1-2 days when tolerating diet and return of bowel activity.         Interval History:   Comfortable in bed, has been up to chair and walking with walker.  Tolerating clear liquids, no bloating or nausea.  No BM yet.  Not sleeping much.  Plan deluna out today and bladder scan to assess for resolution of UR issues.  Encouraged continued activity, start full liquids, possible discharge tomorrow if doing well.           Physical Exam:   Blood pressure 112/49, pulse 60, temperature 98.4  F (36.9  C), temperature source Oral, resp. rate 18, height 1.613 m (5' 3.5\"), weight 82.5 kg (181 lb 14.4 oz), SpO2 96 %, not currently breastfeeding.  I/O last 3 completed shifts:  In: -   Out: 3800 [Urine:3800]  Deluna: clear light yellow output in bag  Abdomen:   soft, non-distended, non-tender, abd binder in place -repositioned to low abd, normal bowel sounds.  Incision - clean, dry, skin glue intact              Data:   Pathology: pending    Recent Labs   Lab 08/12/23  0636 08/08/23  0946   WBC  --  5.0   HGB  --  14.4 "   HCT  --  43.3   MCV  --  96   * 141*     Recent Labs   Lab 08/14/23  0810 08/14/23  0103 08/13/23  2127 08/12/23  0609 08/11/23  1824 08/11/23  0957 08/08/23  0946   NA  --   --   --   --   --   --  139   POTASSIUM  --   --   --   --   --   --  4.7   CHLORIDE  --   --   --   --   --   --  104   CO2  --   --   --   --   --   --  24   ANIONGAP  --   --   --   --   --   --  11   GLC 89 97 118*   < >  --    < > 106*   BUN  --   --   --   --   --   --  15.4   CR  --   --   --   --  0.86  --  0.95   GFRESTIMATED  --   --   --   --  67  --  60*   CINDY  --   --   --   --   --   --  10.2    < > = values in this interval not displayed.       DORIS Serra and agree,    Rafi Franco MD  [facility]

## 2023-08-14 NOTE — PROGRESS NOTES
Care Management Follow Up    Length of Stay (days): 3    Expected Discharge Date: 08/15/2023     Concerns to be Addressed: discharge planning     Patient plan of care discussed at interdisciplinary rounds: Yes    Anticipated Discharge Disposition: Home     Anticipated Discharge Services: None  Anticipated Discharge DME: Walker    Patient/family educated on Medicare website which has current facility and service quality ratings: yes  Education Provided on the Discharge Plan: Yes  Patient/Family in Agreement with the Plan: yes    Referrals Placed by CM/SW: Homecare  Private pay costs discussed: Not applicable    Additional Information:  Pt was accepted by [facility].  Sw met with the pt to update her on the discharge plan.  Pt stated that she no longer feels the need for HC services.  Her dtr will be able to be with her all day on Thursday, so she will not be alone.  Her dtr is helping her make accommodations within the home, so she will not have to bend over a lot to do things.  She said that all of the DME that PT was recommending, she already has and uses.  Sw confirmed with Sw that she wants the referral for HC canceled and she said yes.    Sw called [facility] and cancelled the referral.    Sw will continue with discharge planning and will be available as needed until discharge.  Pt knows how to contact Sw if she changes her mind on HC.    SRAVANTHI Cabrera, [facility]  Inpatient Care Coordination  [facility]  [phone]

## 2023-08-14 NOTE — PROGRESS NOTES
[facility]  Hospitalist Progress Note  Martín Hernandez M.D., M.B.A.   08/14/2023    Reason for Stay/active problem list    SBO          Assessment and Plan:        Summary of Stay:  This patient is a 82 year old female with a PMH significant for DM2, CKD, HTN, HLD, hypothyroid, severe AORTIC STENOSIS s/p TAVR 11/2022, CHB s/p pacemaker 11/2022, pAFib-on DOAC, GERD/Shah's and history of recurrent SBOs who is s/p small bowel resection with revision of anastomosis; extensive lysis of adhesions.         Problem List with Assessment and Plan:    #. S/p Small bowel resection with revision of anastomosis. Extensive lysis of adhesions   doing well, cont  pain control as per primary.  - diet per primary team-currently on clear liquid diet.     #. DM2  Last A1c 6.0 (8/8/23). Diet controlled. Will cover with sliding scale insulin while here.      #. HTN  Takes Norvasc and Lisinopril as OP. BP has been on the lower end post-op. Will continue to hold PTA meds for now. Resume as able based on BP trend.      #. HLD  Hold zocor while admitted     #.  Hypothyroid  Last TSH 2.52 in [place] 2023. Hold levothyroxine while NPO, resume when diet advances.      #. GERD/history of Shah's  On IV Protonix, transition to PTA PO Protonix once diet advances     #. History of pAFib  Noted on initial pacemaker  in March 2023. Started on Eliquis for KAPVA2RVss score of 5.   Resume PTA Eliquis per Gen Surgery recs.      #.  Glaucoma  Resumed PTA Xalatan.      #. Urinary Retention  --Patient noted to have some urinary retention, Hammond placed 8/12.  May discontinue Hammond and monitor for voiding    #.  Insomnia  -- Melatonin not effective, started on Ambien as needed    VTE Prophylaxis: Enoxaparin (Lovenox) SQ  Code Status: Full Code  Diet: Clear Liquid Diet    Hammond Catheter: PRESENT, indication: Strict 1-2 Hour I&O    Family updated today: No     Disposition: May discharge home when okay with primary team pending return of  "bowel function and diet tolerance.          Interval History (Subjective):        Patient is seen and examined by me today and medical record reviewed.Overnight events noted and care discussed with nursing staff.She is doing well.she has no pain, nausea or vomiting.  She did not not have bowel movement yet but able to pass gas.  She has difficulty sleeping and would like to get medication for sleep.   she is wondering when she will be discharged.  I told her that surgical team will make that decision when she is able to eat and have bowel movement.                    Physical Exam:        Last Vital Signs:  /49 (BP Location: Left arm)   Pulse 60   Temp 98.4  F (36.9  C) (Oral)   Resp 18   Ht 1.613 m (5' 3.5\")   Wt 82.5 kg (181 lb 14.4 oz)   LMP  (LMP Unknown)   SpO2 96%   BMI 31.71 kg/m      I/O last 3 completed shifts:  In: -   Out: 3800 [Urine:3800]    Wt Readings from Last 5 Encounters:   08/11/23 82.5 kg (181 lb 14.4 oz)   08/08/23 82.6 kg (182 lb 3.2 oz)   07/25/23 85.3 kg (188 lb)   07/05/23 85.3 kg (188 lb)   05/22/23 84.4 kg (186 lb)        Constitutional: Awake, alert, cooperative, no apparent distress     Respiratory: Clear to auscultation bilaterally, no crackles or wheezing   Cardiovascular: Regular rate and rhythm, normal S1 and S2, and no murmur noted   Abdomen: Normal bowel sounds, soft, non-distended, non-tender   Skin: No new rashes, no cyanosis, dry to touch   Neuro: Alert with  no new focal weakness   Extremities: No edema   Other(s):        All other systems: Negative          Medications:        All current medications were reviewed with changes reflected in problem list.         Data:      All new lab and imaging data was reviewed.      Data reviewed today: I reviewed all new labs and imaging results over the last 24 hours. I personally reviewed       Recent Labs   Lab 08/12/23  0636 08/08/23  0946   WBC  --  5.0   HGB  --  14.4   HCT  --  43.3   MCV  --  96   * 141* "       No results for input(s): CULT in the last 168 hours.  Recent Labs   Lab 08/14/23  0810 08/14/23  0103 08/13/23 2127 08/12/23  0609 08/11/23  1824 08/11/23  0957 08/08/23  0946   NA  --   --   --   --   --   --  139   POTASSIUM  --   --   --   --   --   --  4.7   CHLORIDE  --   --   --   --   --   --  104   CO2  --   --   --   --   --   --  24   ANIONGAP  --   --   --   --   --   --  11   GLC 89 97 118*   < >  --    < > 106*   BUN  --   --   --   --   --   --  15.4   CR  --   --   --   --  0.86  --  0.95   GFRESTIMATED  --   --   --   --  67  --  60*   CINDY  --   --   --   --   --   --  10.2    < > = values in this interval not displayed.         Recent Labs   Lab 08/14/23  0810 08/14/23  0103 08/13/23  2127 08/13/23  1800 08/13/23  1412   GLC 89 97 118* 153* 173*         No results for input(s): INR in the last 168 hours.      No results for input(s): TROPONIN, TROPI, TROPR in the last 168 hours.    Invalid input(s): TROP, TROPONINIES    No results found for this or any previous visit (from the past 48 hour(s)).    COVID Status:  COVID-19 PCR Results          8/16/2021    12:30 7/3/2022    17:32 11/4/2022    13:16   COVID-19 PCR Results   SARS CoV2 PCR Negative  Negative  Negative      COVID-19 Antibody Results, Testing for Immunity           No data to display                 Disclaimer: This note consists of symbols derived from keyboarding, dictation and/or voice recognition software. As a result, there may be errors in the script that have gone undetected. Please consider this when interpreting information found in this chart.

## 2023-08-15 ENCOUNTER — APPOINTMENT (OUTPATIENT)
Dept: OCCUPATIONAL THERAPY | Facility: CLINIC | Age: 83
DRG: 330 | End: 2023-08-15
Attending: SURGERY
Payer: COMMERCIAL

## 2023-08-15 ENCOUNTER — APPOINTMENT (OUTPATIENT)
Dept: PHYSICAL THERAPY | Facility: CLINIC | Age: 83
DRG: 330 | End: 2023-08-15
Attending: SURGERY
Payer: COMMERCIAL

## 2023-08-15 LAB
ANION GAP SERPL CALCULATED.3IONS-SCNC: 7 MMOL/L (ref 7–15)
BUN SERPL-MCNC: 11.6 MG/DL (ref 8–23)
CALCIUM SERPL-MCNC: 8.9 MG/DL (ref 8.8–10.2)
CHLORIDE SERPL-SCNC: 106 MMOL/L (ref 98–107)
CREAT SERPL-MCNC: 0.71 MG/DL (ref 0.51–0.95)
DEPRECATED HCO3 PLAS-SCNC: 24 MMOL/L (ref 22–29)
ERYTHROCYTE [DISTWIDTH] IN BLOOD BY AUTOMATED COUNT: 13 % (ref 10–15)
FERRITIN SERPL-MCNC: 477 NG/ML (ref 11–328)
GFR SERPL CREATININE-BSD FRML MDRD: 84 ML/MIN/1.73M2
GLUCOSE BLDC GLUCOMTR-MCNC: 105 MG/DL (ref 70–99)
GLUCOSE BLDC GLUCOMTR-MCNC: 156 MG/DL (ref 70–99)
GLUCOSE SERPL-MCNC: 102 MG/DL (ref 70–99)
HCT VFR BLD AUTO: 26.4 % (ref 35–47)
HGB BLD-MCNC: 8.8 G/DL (ref 11.7–15.7)
IRON BINDING CAPACITY (ROCHE): 193 UG/DL (ref 240–430)
IRON SATN MFR SERPL: 25 % (ref 15–46)
IRON SERPL-MCNC: 48 UG/DL (ref 37–145)
MCH RBC QN AUTO: 32.7 PG (ref 26.5–33)
MCHC RBC AUTO-ENTMCNC: 33.3 G/DL (ref 31.5–36.5)
MCV RBC AUTO: 98 FL (ref 78–100)
PLATELET # BLD AUTO: 129 10E3/UL (ref 150–450)
POTASSIUM SERPL-SCNC: 4.5 MMOL/L (ref 3.4–5.3)
RBC # BLD AUTO: 2.69 10E6/UL (ref 3.8–5.2)
SODIUM SERPL-SCNC: 137 MMOL/L (ref 136–145)
WBC # BLD AUTO: 5.3 10E3/UL (ref 4–11)

## 2023-08-15 PROCEDURE — 250N000011 HC RX IP 250 OP 636: Mod: JZ | Performed by: SURGERY

## 2023-08-15 PROCEDURE — 97530 THERAPEUTIC ACTIVITIES: CPT | Mod: GP

## 2023-08-15 PROCEDURE — 99232 SBSQ HOSP IP/OBS MODERATE 35: CPT | Performed by: INTERNAL MEDICINE

## 2023-08-15 PROCEDURE — C9113 INJ PANTOPRAZOLE SODIUM, VIA: HCPCS | Mod: JZ | Performed by: SURGERY

## 2023-08-15 PROCEDURE — 250N000013 HC RX MED GY IP 250 OP 250 PS 637: Performed by: PHYSICIAN ASSISTANT

## 2023-08-15 PROCEDURE — 83550 IRON BINDING TEST: CPT | Performed by: INTERNAL MEDICINE

## 2023-08-15 PROCEDURE — 36415 COLL VENOUS BLD VENIPUNCTURE: CPT | Performed by: INTERNAL MEDICINE

## 2023-08-15 PROCEDURE — 120N000001 HC R&B MED SURG/OB

## 2023-08-15 PROCEDURE — 80048 BASIC METABOLIC PNL TOTAL CA: CPT | Performed by: INTERNAL MEDICINE

## 2023-08-15 PROCEDURE — 85014 HEMATOCRIT: CPT | Performed by: INTERNAL MEDICINE

## 2023-08-15 PROCEDURE — 97535 SELF CARE MNGMENT TRAINING: CPT | Mod: GO

## 2023-08-15 PROCEDURE — 250N000013 HC RX MED GY IP 250 OP 250 PS 637: Performed by: INTERNAL MEDICINE

## 2023-08-15 PROCEDURE — 82728 ASSAY OF FERRITIN: CPT | Performed by: INTERNAL MEDICINE

## 2023-08-15 PROCEDURE — 97116 GAIT TRAINING THERAPY: CPT | Mod: GP

## 2023-08-15 RX ORDER — SACCHAROMYCES BOULARDII 250 MG
250 CAPSULE ORAL
Status: DISCONTINUED | OUTPATIENT
Start: 2023-08-16 | End: 2023-08-16 | Stop reason: HOSPADM

## 2023-08-15 RX ORDER — FERROUS GLUCONATE 324(38)MG
324 TABLET ORAL 2 TIMES DAILY
Status: DISCONTINUED | OUTPATIENT
Start: 2023-08-15 | End: 2023-08-16 | Stop reason: HOSPADM

## 2023-08-15 RX ADMIN — ZOLPIDEM TARTRATE 2.5 MG: 5 TABLET, FILM COATED ORAL at 21:11

## 2023-08-15 RX ADMIN — SENNOSIDES 1 TABLET: 8.6 TABLET, FILM COATED ORAL at 07:55

## 2023-08-15 RX ADMIN — FERROUS GLUCONATE 324 MG: 324 TABLET ORAL at 11:39

## 2023-08-15 RX ADMIN — SENNOSIDES 1 TABLET: 8.6 TABLET, FILM COATED ORAL at 20:28

## 2023-08-15 RX ADMIN — ZOLPIDEM TARTRATE 2.5 MG: 5 TABLET, FILM COATED ORAL at 00:10

## 2023-08-15 RX ADMIN — LATANOPROST 1 DROP: 50 SOLUTION OPHTHALMIC at 21:11

## 2023-08-15 RX ADMIN — PANTOPRAZOLE SODIUM 40 MG: 40 INJECTION, POWDER, FOR SOLUTION INTRAVENOUS at 07:53

## 2023-08-15 RX ADMIN — ENOXAPARIN SODIUM 40 MG: 40 INJECTION SUBCUTANEOUS at 14:54

## 2023-08-15 RX ADMIN — FERROUS GLUCONATE 324 MG: 324 TABLET ORAL at 20:28

## 2023-08-15 ASSESSMENT — ACTIVITIES OF DAILY LIVING (ADL)
ADLS_ACUITY_SCORE: 24

## 2023-08-15 NOTE — PROGRESS NOTES
"[facility]   General Surgery Progress Note           Assessment and Plan:   Assessment:   -Small bowel obstruction (H) [K56.609], recurrent   -4 Days Post-Op Small bowel resection with revision of anastomosis. Extensive lysis of adhesions ; Pathology: pending  -Postoperative hypoxia, on supplemental oxygen; resolved.  -Hospitalist consulted for medical management  -Urinary retention; 8/12 deluna placement; removed 8/14  -Hgb 8.8 today, 14.4 at pre-op on 8/8; acute blood loss, iron has been on hold, monitor for bleed at new anastomosis; asymptomatic anemia  -Afebrile      Plan:   -Recheck Hgb in am, restart iron supplement today  -IV fluids: D5 1/2NS+KCl @100mL/hr; wean when tolerating adequate oral intake  -Pain management: acetaminophen (TYLENOL)  -Prophylaxis: Enoxaparin (Lovenox) SQ, PPI, PCDs  -Diet: OK to increase to soft diet  -Advance activity as tolerated, encouraged OOB and ambulate 3-4 times a day.  PT/OT.  -IS hourly as able, encouraged  -Appreciate Hospitalist assistance with medical management  -Dispo: 1-2 days when return of bowel activity, HGB stable/improving.         Interval History:   Comfortable in chair, denies symptoms of lightheadedness when up.  Notes iron supplement on hold since admission.  Discussed concerns about drop in HGB, will restart iron, monitor stool for blood if BM, recheck HGB in am.  Ok to increase to soft diet in meantime.         Physical Exam:   Blood pressure 120/51, pulse 60, temperature 98  F (36.7  C), temperature source Oral, resp. rate 18, height 1.613 m (5' 3.5\"), weight 82.5 kg (181 lb 14.4 oz), SpO2 96 %, not currently breastfeeding.  I/O last 3 completed shifts:  In: -   Out: 825 [Urine:825]  Deluna: out  Abdomen:   soft, non-distended, non-tender, abd binder in place -repositioned to low abd, normal bowel sounds.  Incision - clean, dry, skin glue intact              Data:   Pathology: Small bowel anastomosis, excision:  -Small bowel with focal mucosal " erosions.  -Negative for dysplasia or malignancy.  Body of note also revealed resected anastomosis opening measured at 3mm    Recent Labs   Lab 08/15/23  0656 08/12/23  0636 08/08/23  0946   WBC 5.3  --  5.0   HGB 8.8*  --  14.4   HCT 26.4*  --  43.3   MCV 98  --  96   * 143* 141*     Recent Labs   Lab 08/15/23  0656 08/15/23  0446 08/14/23  2353 08/12/23  0609 08/11/23  1824 08/11/23  0957 08/08/23  0946     --   --   --   --   --  139   POTASSIUM 4.5  --   --   --   --   --  4.7   CHLORIDE 106  --   --   --   --   --  104   CO2 24  --   --   --   --   --  24   ANIONGAP 7  --   --   --   --   --  11   * 105* 118*   < >  --    < > 106*   BUN 11.6  --   --   --   --   --  15.4   CR 0.71  --   --   --  0.86  --  0.95   GFRESTIMATED 84  --   --   --  67  --  60*   CINDY 8.9  --   --   --   --   --  10.2    < > = values in this interval not displayed.       No Jackson PA-C  Text page: [phone], 8 am to 4 pm  After 4 pm, call [phone]     Seen and agree,    Rafi Franco MD  [facility]

## 2023-08-15 NOTE — PLAN OF CARE
Pertinent assessments: VSS on room air. Afebrile. A&Ox4. POD #3. Abdominal incision closed with skin glue and open to air. Up with assist of 1, gait belt, and walker. [place]. Tolerating clear liquids. Saline locked PIV. B, 141.     Major Shift Events: Hammond removed, met due to void.     Treatment Plan: Advance diet, monitor return of bowel function.     Bedside Nurse: Michael Mcconnell RN

## 2023-08-15 NOTE — PLAN OF CARE
To Do:  End of Shift Summary  For vital signs and complete assessments, please see documentation flowsheets.     Pertinent assessments: A/Ox4, denies pain, N/V. Pt had trouble sleeping, PRN Ambien given, slept 4 hours. Ab incision open to air with liquid bandage. No BM on shift.    Major Shift Events: None     Treatment Plan: Diet advancement. Symptom management. Monitor bowel function.     Bedside Nurse: Laura Coronado RN

## 2023-08-16 ENCOUNTER — APPOINTMENT (OUTPATIENT)
Dept: OCCUPATIONAL THERAPY | Facility: CLINIC | Age: 83
DRG: 330 | End: 2023-08-16
Attending: SURGERY
Payer: COMMERCIAL

## 2023-08-16 VITALS
OXYGEN SATURATION: 97 % | WEIGHT: 181.9 LBS | HEART RATE: 61 BPM | SYSTOLIC BLOOD PRESSURE: 113 MMHG | HEIGHT: 64 IN | RESPIRATION RATE: 16 BRPM | DIASTOLIC BLOOD PRESSURE: 51 MMHG | TEMPERATURE: 98.3 F | BODY MASS INDEX: 31.05 KG/M2

## 2023-08-16 LAB
HGB BLD-MCNC: 8.9 G/DL (ref 11.7–15.7)
HOLD SPECIMEN: NORMAL
RETICS # AUTO: 0.09 10E6/UL (ref 0.03–0.1)
RETICS/RBC NFR AUTO: 3.3 % (ref 0.5–2)

## 2023-08-16 PROCEDURE — 250N000011 HC RX IP 250 OP 636: Mod: JZ | Performed by: SURGERY

## 2023-08-16 PROCEDURE — 250N000013 HC RX MED GY IP 250 OP 250 PS 637: Performed by: PHYSICIAN ASSISTANT

## 2023-08-16 PROCEDURE — 85045 AUTOMATED RETICULOCYTE COUNT: CPT | Performed by: INTERNAL MEDICINE

## 2023-08-16 PROCEDURE — 85018 HEMOGLOBIN: CPT | Performed by: PHYSICIAN ASSISTANT

## 2023-08-16 PROCEDURE — C9113 INJ PANTOPRAZOLE SODIUM, VIA: HCPCS | Mod: JZ | Performed by: SURGERY

## 2023-08-16 PROCEDURE — 97530 THERAPEUTIC ACTIVITIES: CPT | Mod: GO

## 2023-08-16 PROCEDURE — 99232 SBSQ HOSP IP/OBS MODERATE 35: CPT | Performed by: INTERNAL MEDICINE

## 2023-08-16 PROCEDURE — 36415 COLL VENOUS BLD VENIPUNCTURE: CPT | Performed by: PHYSICIAN ASSISTANT

## 2023-08-16 RX ADMIN — ENOXAPARIN SODIUM 40 MG: 40 INJECTION SUBCUTANEOUS at 12:04

## 2023-08-16 RX ADMIN — SENNOSIDES 1 TABLET: 8.6 TABLET, FILM COATED ORAL at 09:50

## 2023-08-16 RX ADMIN — Medication 250 MG: at 09:50

## 2023-08-16 RX ADMIN — PANTOPRAZOLE SODIUM 40 MG: 40 INJECTION, POWDER, FOR SOLUTION INTRAVENOUS at 09:50

## 2023-08-16 RX ADMIN — FERROUS GLUCONATE 324 MG: 324 TABLET ORAL at 09:50

## 2023-08-16 ASSESSMENT — ACTIVITIES OF DAILY LIVING (ADL)
ADLS_ACUITY_SCORE: 26
ADLS_ACUITY_SCORE: 24
ADLS_ACUITY_SCORE: 24
ADLS_ACUITY_SCORE: 26
ADLS_ACUITY_SCORE: 24
ADLS_ACUITY_SCORE: 24
ADLS_ACUITY_SCORE: 31
ADLS_ACUITY_SCORE: 24

## 2023-08-16 NOTE — PROGRESS NOTES
[facility]   General Surgery Progress Note           Assessment and Plan:   Assessment:   -Small bowel obstruction (H) [K56.609], recurrent   -5 Days Post-Op Small bowel resection with revision of anastomosis. Extensive lysis of adhesions; Pathology: focal mucosal erosions, resected anastomosis opening measured at 3mm  -Postoperative hypoxia, on supplemental oxygen; resolved  -Hospitalist consulted for medical management  -Urinary retention; 8/12 deluna placement; removed 8/14  -Hgb 8.8-->8.9 today, 14.4 at pre-op on 8/8; acute blood loss, iron restarted, stable; asymptomatic anemia  -Afebrile      Plan:   -IV fluids: D5 1/2NS+KCl @100mL/hr; wean when tolerating adequate oral intake  -Pain management: available  -Prophylaxis: Enoxaparin (Lovenox) SQ, PPI, PCDs  -Diet: OK to increase to soft diet, probiotic  -Advance activity as tolerated, encouraged OOB and ambulate 3-4 times a day.  PT/OT.  -IS hourly as able, encouraged  -Appreciate Hospitalist assistance with medical management  -Dispo: OK to discharge if cleared by Hospitalist; likely after son off work in afternoon.  Plan follow up with PCP in 5-7 days and follow up with Dr Franco in 2-3 weeks.  Continue soft diet, increase to regular diet as tolerated, protein shake daily.  Stool softeners/miralax as needed.  Discharge instructions were reviewed with the patient in detail.  All her questions/concerns were addressed.  She is aware that a printed copy of instructions will be given to her upon discharge.         Interval History:   Feeling well, tolerating diet, mild incision soreness with movement, no need for pain meds.  Had a large BM, no obvious blood, dark in color which is normal for her with her iron supplementation.  Discussed diet at home, restart of Eliquis, follow up plan with PCP and Dr Franco.         Physical Exam:   Blood pressure 113/51, pulse 61, temperature 98.3  F (36.8  C), temperature source Oral, resp. rate 16, height 1.613 m  "(5' 3.5\"), weight 82.5 kg (181 lb 14.4 oz), SpO2 97 %, not currently breastfeeding.  I/O last 3 completed shifts:  In: -   Out: 200 [Urine:200]  Abdomen:   soft, non-distended, non-tender, abd binder in place -repositioned to low abd, normal bowel sounds.  Incision - clean, dry, skin glue intact              Data:   Pathology: Small bowel anastomosis, excision:  -Small bowel with focal mucosal erosions.  -Negative for dysplasia or malignancy.  Body of note also revealed resected anastomosis opening measured at 3mm    Recent Labs   Lab 08/16/23  0700 08/15/23  0656 08/12/23  0636   WBC  --  5.3  --    HGB 8.9* 8.8*  --    HCT  --  26.4*  --    MCV  --  98  --    PLT  --  129* 143*     Recent Labs   Lab 08/15/23  2010 08/15/23  0656 08/15/23  0446 08/12/23  0609 08/11/23  1824   NA  --  137  --   --   --    POTASSIUM  --  4.5  --   --   --    CHLORIDE  --  106  --   --   --    CO2  --  24  --   --   --    ANIONGAP  --  7  --   --   --    * 102* 105*   < >  --    BUN  --  11.6  --   --   --    CR  --  0.71  --   --  0.86   GFRESTIMATED  --  84  --   --  67   CINDY  --  8.9  --   --   --     < > = values in this interval not displayed.       No Jackson PA-C  Text page: [phone], 8 am to 4 pm  After 4 pm, call [phone]         "

## 2023-08-16 NOTE — DISCHARGE INSTRUCTIONS
HOME CARE FOLLOWING ABDOMINAL SURGERY  ERIN Haro, ANTONIA Lance C. Pratt, J. Shaheen    Special instructions for Lexii Stratton:  --Plan follow up with PCP in 5-7 days and follow up with Dr Franco in 2-3 weeks.   --Continue soft diet, increase to regular diet as tolerated, protein shake daily. Stool softeners/miralax as needed.      INCISIONAL CARE:  Leave skin glue in place until it wears/flakes off.     BATHING:  All bathing options are ok; shower and/or bath.    ACTIVITY:  Light Activity -- you may immediately be up and about as tolerated.  Driving -- you may drive when comfortable and off narcotic pain medications.  Light Work -- resume when comfortable off pain medications.  (If you can drive, you probably can work.)  Strenuous Work/Activity -- limit lifting to 20 pounds for 4 weeks.  Then, progressively increase with time.  Active Sports (running, biking, etc.) -- cautiously resume after 6 weeks.    DISCOMFORT:  Use pain medications as prescribed by your surgeon.  Take the pain medication with some food, when possible, to minimize side effects.  Expect gradual improvement.    DIET:  Return to diet you were on before surgery, unless you are given specific diet instructions.  Drink plenty of fluids.  While taking pain medications, increase dietary fiber or add a fiber supplementation like Metamucil or Citrucel to help prevent constipation - a possible side effect of pain medications.    NAUSEA:  If nauseated from the anesthetic/pain meds; rest in bed, get up cautiously with assistance, and drink clear liquids (juice, tea, broth).    RETURN APPOINTMENT:  Schedule a follow-up visit 2-3 weeks after discharge from the hospital.  Office Phone:  [phone]     CONTACT US IF THE FOLLOWING DEVELOPS:   1. A fever that is above 101     2. If there is a large amount of drainage, bleeding, or swelling.   3. Severe pain that is not relieved by your prescription.   4. Drainage that is thick,  cloudy, yellow, green or white.   5. Any other questions not answered by  Frequently Asked Questions  sheet.      FREQUENTLY ASKED QUESTIONS:    Q:  How should my incision look?    A:  Normally your incision will appear slightly swollen with light redness directly along the incision itself as it heals.  It may feel like a bump or ridge as the healing/scarring happens, and over time (3-4 months) this bump or ridge feeling should slowly go away.  In general, clear or pink watery drainage can be normal at first as your incision heals, but should decrease over time.    Q:  How do I know if my incision is infected?  A:  Look at your incision for signs of infection, like redness around the incision spreading to surrounding skin, or drainage of cloudy or foul-smelling drainage.  If you feel warm, check your temperature to see if you are running a fever.    **If any of these things occur, please notify the nurse at our office.  We may need you to come into the office for an incision check.      Q:  How do I take care of my incision?  A:  If you have a dressing in place - Starting the day after surgery, replace the dressing 1-2 times a day until there is no further drainage from the incision.  At that time, a dressing is no longer needed.  Try to minimize tape on the skin if irritation is occurring at the tape sites.  If you have significant irritation from tape on the skin, please call the office to discuss other method of dressing your incision.    Small pieces of tape called  steri-strips  may be present directly overlying your incision; these may be removed 10 days after surgery unless otherwise specified by your surgeon.  If these tapes start to loosen at the ends, you may trim them back until they fall off or are removed.    A:  If you had  Dermabond  tissue glue used as a dressing (this causes your incision to look shiny with a clear covering over it) - This type of dressing wears off with time and does not require  more dressings over the top unless it is draining around the glue as it wears off.  Do not apply ointments or lotions over the incisions until the glue has completely worn off.    Q:  There is a piece of tape or a sticky  lead  still on my skin.  Can I remove this?  A:  Sometimes the sticky  leads  used for monitoring during surgery or for evaluation in the emergency department are not all removed while you are in the hospital.  These sometimes have a tab or metal dot on them.  You can easily remove these on your own, like taking off a band-aid.  If there is a gel substance under the  lead , simply wipe/clean it off with a washcloth or paper towel.      Q:  What can I do to minimize constipation (very hard stools, or lack of stools)?  A:  Stay well hydrated.  Increase your dietary fiber intake or take a fiber supplement -with plenty of water.  Walk around frequently.  You may consider an over-the-counter stool-softener.  Your Pharmacist can assist you with choosing one that is stocked at your pharmacy.  Constipation is also one of the most common side effects of pain medication.  If you are using pain medication, be pro-active and try to PREVENT problems with constipation by taking the steps above BEFORE constipation becomes a problem.    Q:  What do I do if I need more pain medications?  A:  Call the office to receive refills.  Be aware that certain pain meds cannot be called into a pharmacy and actually require a paper prescription.  A change may be made in your pain med as you progress thru your recovery period or if you have side effects to certain meds.    --Pain meds are NOT refilled after 5pm on weekdays, and NOT AT ALL on the weekends, so please look ahead to prevent problems.      Q:  Why am I having a hard time sleeping now that I am at home?  A:  Many medications you receive while you are in the hospital can impact your sleep for a number of days after your surgery/hospitalization.  Decreased level of  activity and naps during the day may also make sleeping at night difficult.  Try to minimize day-time naps, and get up frequently during the day to walk around your home during your recovery time.  Sleep aides may be of some help, but are not recommended for long-term use.      Q:  I am having some back discomfort.  What should I do?  A:  This may be related to certain positioning that was required for your surgery, extended periods of time in bed, or other changes in your overall activity level.  You may try ice, heat, acetaminophen, or ibuprofen to treat this temporarily.  Note that many pain medications have acetaminophen in them and would state this on the prescription bottle.  Be sure not to exceed the maximum of 4000mg per day of acetaminophen.     **If the pain you are having does not resolve, is severe, or is a flare of back pain you have had on other occasions prior to surgery, please contact your primary physician for further recommendations or for an appointment to be examined at their office.    Q:  Why am I having headaches?  A:  Headaches can be caused by many things:  caffeine withdrawal, use of pain meds, dehydration, high blood pressure, lack of sleep, over-activity/exhaustion, flare-up of usual migraine headaches.  If you feel this is related to muscle tension (a band-like feeling around the head, or a pressure at the low-back of the head) you may try ice or heat to this area.  You may need to drink more fluids (try electrolyte drink like Gatorade), rest, or take your usual migraine medications.   **If your headaches do not resolve, worsen, are accompanied by other symptoms, or if your blood pressure is high, please call your primary physician for recommendation and/or examination.    Q:  I am unable to urinate.  What do I do?  A:  A small percentage of people can have difficulty urinating initially after surgery.  This includes being able to urinate only a very small amount at a time and feeling  discomfort or pressure in the very low abdomen.  This is called  urinary retention , and is actually an urgent situation.  Proceed to your nearest Emergency department for evaluation (not an Urgent Care Center).  Sometimes the bladder does not work correctly after certain medications you receive during surgery, or related to certain procedures.  You may need to have a catheter placed until your bladder recovers.  When planning to go to an Emergency department, it may help to call the ER to let them know you are coming in for this problem after a surgery.  This may help you get in quicker to be evaluated.  **If you have symptoms of a urinary tract infection, please contact your primary physician for the proper evaluation and treatment.          If you have other questions, please call the office Monday thru Friday between 8am and 4:30pm to discuss with the nurse or physician assistant.  #[phone]    There is a surgeon ON CALL on weekday evenings and over the weekend in case of urgent need only, and may be contacted at the same number.    If you are having an emergency, call 911 or proceed to your nearest emergency department.

## 2023-08-16 NOTE — PROGRESS NOTES
"[facility]    Medicine Progress Note - Hospitalist Service    Date of Admission:  8/11/2023    Assessment & Plan   Leixi Stratton is a 82 year old female admitted on 8/11/2023 for surgical management of recurrent SBO.  She underwent small bowel resection with revision of anastomosis; extensive lysis of adhesions on 8/11 and the hospitalist service has been following in a supportive role.     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      DX:  1.  POD #4 s/p small bowel resection, revision of anastomosis and JULEE. Doing well and currently on soft diet.   2.  Diet-controlled DM.  Has been covered with ISS while hospitalized.    3.  Essential HTN.   4.  Hx A fib complicated by SSS.  Now s/p pacer; on Eliquis at baseline.   5.  GERD/Shah's.   6.  Post-op urinary retention.  Had cahteter placed, but that has now been removed.     PLAN:  1.  Check Iron labs and consider replacing iron IV while here. Or, if iron looks ok, could defer on Iron supplementation.   2.  Antihypertensives on hold.   3.  Encourage activity.   4.  Cont IVF support for now. Wean as diet is tolerated.   5.  Surgical team to recommend when resumption of Apixaban would be ok.        Diet: Snacks/Supplements Adult: Ensure Enlive; Between Meals  Combination Diet Regular Diet; Six Small Feedings Adult, Bariatric Diet Soft    DVT Prophylaxis: Enoxaparin (Lovenox) SQ  Hammond Catheter: Not present  Lines: None     Cardiac Monitoring: None  Code Status: Full Code      Clinically Significant Risk Factors                # Thrombocytopenia: Lowest platelets = 129 in last 2 days, will monitor for bleeding   # Hypertension: Noted on problem list        # Obesity: Estimated body mass index is 31.71 kg/m  as calculated from the following:    Height as of this encounter: 1.613 m (5' 3.5\").    Weight as of this encounter: 82.5 kg (181 lb 14.4 oz).       # " Pacemaker present       Disposition Plan      Expected Discharge Date: 08/16/2023      Destination: home with help/services            Anjel Dexter MD  Hospitalist Service  [facility]  Securely message with [employer] (more info)  Text page via [employer] Paging/Directory   ______________________________________________________________________    Interval History   Chart reviewed, pt interviewed.     Pt reports she is doing well. No N/V and pain control is good.     Physical Exam   Vital Signs: Temp: 98.1  F (36.7  C) Temp src: Oral BP: (!) 101/37 Pulse: 61   Resp: 18 SpO2: 99 % O2 Device: None (Room air)    Weight: 181 lbs 14.4 oz    Constitutional: awake, alert, cooperative, no apparent distress, and appears stated age  ENT: normocepalic, without obvious abnormality  Respiratory: No increased work of breathing, good air exchange, clear to auscultation bilaterally, no crackles or wheezing  Cardiovascular: regular rate and rhythm and no murmur noted  GI: Generally decreased bowel sounds, soft, non-distended, non-tender.  Limited deepp palpation due to recent surgery.  Musculoskeletal: no lower extremity pitting edema present    Medical Decision Making       35 MINUTES SPENT BY ME on the date of service doing chart review, history, exam, documentation & further activities per the note.      Data   Results for orders placed or performed during the hospital encounter of 08/11/23 (from the past 24 hour(s))   Glucose by meter   Result Value Ref Range    GLUCOSE BY METER POCT 118 (H) 70 - 99 mg/dL   Glucose by meter   Result Value Ref Range    GLUCOSE BY METER POCT 105 (H) 70 - 99 mg/dL   Basic metabolic panel   Result Value Ref Range    Sodium 137 136 - 145 mmol/L    Potassium 4.5 3.4 - 5.3 mmol/L    Chloride 106 98 - 107 mmol/L    Carbon Dioxide (CO2) 24 22 - 29 mmol/L    Anion Gap 7 7 - 15 mmol/L    Urea Nitrogen 11.6 8.0 - 23.0 mg/dL    Creatinine 0.71 0.51 - 0.95 mg/dL    Calcium 8.9 8.8 - 10.2 mg/dL     Glucose 102 (H) 70 - 99 mg/dL    GFR Estimate 84 >60 mL/min/1.73m2   CBC with platelets   Result Value Ref Range    WBC Count 5.3 4.0 - 11.0 10e3/uL    RBC Count 2.69 (L) 3.80 - 5.20 10e6/uL    Hemoglobin 8.8 (L) 11.7 - 15.7 g/dL    Hematocrit 26.4 (L) 35.0 - 47.0 %    MCV 98 78 - 100 fL    MCH 32.7 26.5 - 33.0 pg    MCHC 33.3 31.5 - 36.5 g/dL    RDW 13.0 10.0 - 15.0 %    Platelet Count 129 (L) 150 - 450 10e3/uL   Iron and iron binding capacity   Result Value Ref Range    Iron 48 37 - 145 ug/dL    Iron Binding Capacity 193 (L) 240 - 430 ug/dL    Iron Sat Index 25 15 - 46 %   Ferritin   Result Value Ref Range    Ferritin 477 (H) 11 - 328 ng/mL   Glucose by meter   Result Value Ref Range    GLUCOSE BY METER POCT 156 (H) 70 - 99 mg/dL

## 2023-08-16 NOTE — PROGRESS NOTES
"[facility]    Medicine Progress Note - Hospitalist Service    Date of Admission:  8/11/2023    Assessment & Plan   Lexii Stratton is a 82 year old female admitted on 8/11/2023 for surgical management of recurrent SBO.  She underwent small bowel resection with revision of anastomosis; extensive lysis of adhesions on 8/11 and the hospitalist service has been following in a supportive role.     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      DX:  1.  POD #5 s/p small bowel resection, revision of anastomosis and JULEE. Doing well and currently on soft diet.   2.  Diet-controlled DM.  Has been covered with ISS while hospitalized.    3.  Essential HTN.   4.  Hx A fib complicated by SSS.  Now s/p pacer; on Eliquis at baseline.   5.  GERD/Shah's.   6.  Post-op urinary retention.  Had cahteter placed, but that has now been removed. Doing well.     PLAN:  1.  OK for discharge at this time.   2.  No need for Iron supplementation.   3.  Resume Apixaban.        Diet: Snacks/Supplements Adult: Ensure Enlive; Between Meals  Combination Diet Regular Diet; Six Small Feedings Adult, Bariatric Diet Soft    DVT Prophylaxis: Enoxaparin (Lovenox) SQ  Hammond Catheter: Not present  Lines: None     Cardiac Monitoring: None  Code Status: Full Code      Clinically Significant Risk Factors                # Thrombocytopenia: Lowest platelets = 129 in last 2 days, will monitor for bleeding   # Hypertension: Noted on problem list        # Obesity: Estimated body mass index is 31.71 kg/m  as calculated from the following:    Height as of this encounter: 1.613 m (5' 3.5\").    Weight as of this encounter: 82.5 kg (181 lb 14.4 oz).       # Pacemaker present       Disposition Plan     Expected Discharge Date: 08/16/2023      Destination: home with help/services            Anjel Dexter MD  Hospitalist Service  [facility]  Securely message with [employer] (more info)  Text page via [employer] Paging/Directory   ______________________________________________________________________    Interval History   Small BM yesterday. Stable night.     Ready to go home.      Physical Exam   Vital Signs: Temp: 98.3  F (36.8  C) Temp src: Oral BP: 113/51 Pulse: 61   Resp: 16 SpO2: 97 % O2 Device: None (Room air)    Weight: 181 lbs 14.4 oz    Constitutional: awake, alert, cooperative, no apparent distress, and appears stated age  ENT: normocepalic, without obvious abnormality  Respiratory: No increased work of breathing, good air exchange, clear to auscultation bilaterally, no crackles or wheezing  Cardiovascular: regular rate and rhythm and no murmur noted  GI: Generally decreased bowel sounds, soft, non-distended, non-tender.  Limited deepp palpation due to recent surgery.  Musculoskeletal: no lower extremity pitting edema present    Medical Decision Making       35 MINUTES SPENT BY ME on the date of service doing chart review, history, exam, documentation & further activities per the note.      Data   Results for orders placed or performed during the hospital encounter of 08/11/23 (from the past 24 hour(s))   Glucose by meter   Result Value Ref Range    GLUCOSE BY METER POCT 156 (H) 70 - 99 mg/dL   Hemoglobin   Result Value Ref Range    Hemoglobin 8.9 (L) 11.7 - 15.7 g/dL   Reticulocyte count   Result Value Ref Range    % Reticulocyte 3.3 (H) 0.5 - 2.0 %    Absolute Reticulocyte 0.089 0.025 - 0.095 10e6/uL   Extra Green Top Tube (LAB USE ONLY)   Result Value Ref Range    Hold Specimen JIC

## 2023-08-16 NOTE — PLAN OF CARE
Goal Outcome Evaluation:       Discharge Note    Patient discharged to home via private vehicle  accompanied by son.  IV: Discontinued  Prescriptions N/A.   Belongings reviewed and sent with patient.   Home medications returned to patient: NA  Equipment sent with: patient.   patient verbalizes understanding of discharge instructions. AVS given to patient.  Additional education completed?  NA

## 2023-08-16 NOTE — PLAN OF CARE
To Do:  End of Shift Summary  For vital signs and complete assessments, please see documentation flowsheets.     Pertinent assessments: Assumed cares [id number]. Pt A&O x4, VSS, On RA. Denies pain, SOB and Nausea. Ab incision open to air with liquid bandage. No BM on this shift. Has Abd binder in place for comfort, walked on the hallway before going to bed. Given PRN Ambien for sleep, scheduled iron tablet and senna.    Major Shift Events: none    Treatment Plan: Diet advancement. Symptom management. Monitor bowel function. POD 4    Bedside Nurse: Spike Tryo RN

## 2023-08-16 NOTE — PLAN OF CARE
Goal Outcome Evaluation:             To Do:  End of Shift Summary  For vital signs and complete assessments, please see documentation flowsheets.     Pertinent assessments: Pt A/Ox4. VSS. RA. Denies pain. Up with 1 GB/walker. Voiding per BR. Generalized bruising to upper extremities and 1+ pitting edema to LE. Tolerating soft diet.    Major Shift Events Medically ready for discharge. Son to  this afternoon.  Treatment Plan: ADAT. Resume apixaban.

## 2023-08-16 NOTE — PLAN OF CARE
Occupational Therapy Discharge Summary    Reason for therapy discharge:    All goals and outcomes met, no further needs identified.    Progress towards therapy goal(s). See goals on Care Plan in [facility] electronic health record for goal details.  Goals met    Therapy recommendation(s):    No further therapy is recommended.

## 2023-08-16 NOTE — PLAN OF CARE
Goal Outcome Evaluation:                  End of Shift Summary  For vital signs and complete assessments, please see documentation flowsheets.     Pertinent assessments: A/Ox4, denies pain, N/V.  Ab incision open to air with liquid bandage. No BM on shift. Has Abd binder in place for comfort, worked with therapies. Had a dose of senna this AM.     Major Shift Events: restarted home iron as Hgb 8.8 this AM, ordered iron binding studies, had bowel movement    Treatment Plan: Diet advancement. Symptom management. Monitor bowel function. POD 4    Bedside Nurse: Ciarra Starr RN

## 2023-08-17 NOTE — DISCHARGE SUMMARY
Surgery Discharge Summary    Lexii Stratton MRN# [id number]   YOB: 1940 Age: 82 year old     Date of Admission:  8/11/2023  Date of Discharge:  8/16/2023  4:13 PM  Admitting Physician:  Rafi Franco MD  Discharging Service:  General Surgery   Primary Provider: Jesenia Madrigal    Discharge Diagnosis:   Principle Diagnosis:  Small bowel obstruction (H) [K56.609]    Secondary Diagnosis:  Postoperative urinary retention    Brief HPI: Lexii Stratton is a 82 year old year-old female has been having intermittent bowel obstructions for years. These have accelerated recently, resulting in frequent episodes of painful bowel obstruction. Only a few of these have resulted in hospitalization. At the last hospitalization, CT scan showed an obvious narrowing at the old anastomosis. We therefore discussed the options of continued observation versus trying to revise this anastomosis through a lateral incision, as the patient had extensive scar tissue related to previous surgeries and previous hernia repair. The patient agreed to proceed.      Hospital Course: Lexii Stratton underwent a small bowel resection with revision of anastomosis and extensive lysis of adhesions without complications. Please see op note for further details. The patient had a routine hospital course and recovered as anticipated. She did have urinary retention and a postoperative ileus (as expected) which both resolved with time. She was seen by the hospitalist service for medical management, please see their notes for those specific details. On POD #2 she was started on a clear liquid diet and POD#3 advanced to full liquid diet and was also transitioned successfully to oral pain medications. Hammond was also removed. She was then ambulating independently and voiding spontaneously. By day of discharge (POD#5) she remained afebrile, was tolerating an oral diet, had adequate pain control on oral medications and was ambulating  "independently thus medically appropriate for discharge to home. She will follow-up with us in two weeks was advised to call sooner with any questions or concerns.     Inpatient Consultations: Consultation during this admission received from internal medicine and PT/OT.    Procedures:   Procedure(s):  Small bowel resection, Revision of anastamosis     Disposition:   Discharged to home     Discharge Condition  Discharge condition: Stable   Discharge vitals: Blood pressure 113/51, pulse 61, temperature 98.3  F (36.8  C), temperature source Oral, resp. rate 16, height 1.613 m (5' 3.5\"), weight 82.5 kg (181 lb 14.4 oz), SpO2 97 %, not currently breastfeeding.     Discharge Medications:   Discharge Medication List as of 8/16/2023  3:06 PM        CONTINUE these medications which have NOT CHANGED    Details   acetaminophen (TYLENOL) 500 MG tablet Take 1,000 mg by mouth 2 times daily (2 x 500 mg = 1000 mg), Historical      amLODIPine (NORVASC) 5 MG tablet TAKE ONE TABLET BY MOUTH EVERY NIGHT AT BEDTIME, Disp-90 tablet, R-3, E-Prescribe      amoxicillin (AMOXIL) 500 MG capsule Take 2,000 mg by mouth once as needed (1 hour prior to dental procedures 4 x 500 mg = 2000 mg), Historical      apixaban ANTICOAGULANT (ELIQUIS ANTICOAGULANT) 5 MG tablet Take 1 tablet (5 mg) by mouth 2 times daily, Disp-180 tablet, R-3, E-Prescribe      Biotin 5000 MCG PO TABS Take 1 tablet by mouth daily, Historical      calcium carbonate (OS-CINDY) 500 MG tablet Take 1 tablet by mouth every other day, Historical      CENTRUM SILVER OR TABS Take 1 tablet by mouth daily, Disp-30, R-0, Historical      Cranberry 450 MG CAPS Take 1 capsule by mouth daily (with dinner), Historical      ferrous gluconate (FERGON) 324 (38 FE) MG tablet Take 1 tablet (324 mg) by mouth 2 times daily, Disp-100 tablet, R-0, No Print Out      latanoprost (XALATAN) 0.005 % ophthalmic solution Place 1 drop into both eyes At Bedtime , Historical      levothyroxine " (SYNTHROID/LEVOTHROID) 50 MCG tablet Take 1 tablet (50 mcg) by mouth daily, Disp-90 tablet, R-3, E-Prescribe      lisinopril (ZESTRIL) 30 MG tablet Take 1 tablet (30 mg) by mouth every morning, Disp-90 tablet, R-3, E-Prescribe      magnesium 250 MG tablet Take 1 tablet by mouth daily, Historical      nitroGLYcerin (NITROSTAT) 0.4 MG sublingual tablet For chest pain place 1 tablet under the tongue every 5 minutes for 3 doses. If symptoms persist 5 minutes after 1st dose call 911., Disp-25 tablet, R-11, E-Prescribe      OMEGA-3 FATTY ACIDS 1200 MG OR CAPS Take 1 capsule by mouth daily, R-0, Historical      pantoprazole (PROTONIX) 20 MG EC tablet Take 1 tablet (20 mg) by mouth daily, Disp-90 tablet, R-3, E-Prescribe      polyethylene glycol (MIRALAX) 17 GM/Dose powder Take 17 g (1 Capful) by mouth daily Decrease frequency if developing loose stools., Disp-510 g, R-0, E-Prescribe      potassium 99 MG TABS Take 1 tablet by mouth daily (with dinner) , Historical      Probiotic Product (ACIDOPHILUS PROBIOTIC BLEND) CAPS Take 1 capsule by mouth daily (with breakfast) , Disp-30 capsule, R-0, Historical      simvastatin (ZOCOR) 20 MG tablet TAKE ONE TABLET BY MOUTH EVERY NIGHT AT BEDTIME, Disp-90 tablet, R-3, E-Prescribe      VITAMIN D, CHOLECALCIFEROL, PO Take 2,000 Units by mouth daily, Historical             Discharge Instructions:     HOME CARE FOLLOWING ABDOMINAL SURGERY  ERIN Haro, ANTONIA Lance C. Pratt, J. Shaheen    INCISIONAL CARE:  Replace the bandage over your incision (or incisions) until all drainage stops, or if more comfortable to have in place.  If present, leave the steri-strips (white paper tapes) in place for 14 days after surgery.  If you have staples in your incision at the time of discharge, they will be removed at your follow-up appointment.  If Dermabond (a type of skin glue) is present, leave in place until it wears/flakes off.     BATHING:  Avoid baths for 1 week after  surgery.  Showers are okay.  You may wash your hair at any time.  Gently pat your incision dry after bathing.    ACTIVITY:  Light Activity -- you may immediately be up and about as tolerated.  Driving -- you may drive when comfortable and off narcotic pain medications.  Light Work -- resume when comfortable off pain medications.  (If you can drive, you probably can work.)  Strenuous Work/Activity -- limit lifting to 20 pounds for 4 weeks.  Then, progressively increase with time.  Active Sports (running, biking, etc.) -- cautiously resume after 6 weeks.    DISCOMFORT:  Use pain medications as prescribed by your surgeon.  Take the pain medication with some food, when possible, to minimize side effects.  Expect gradual improvement.    DIET:  Return to diet you were on before surgery, unless you are given specific diet instructions.  Drink plenty of fluids.  While taking pain medications, increase dietary fiber or add a fiber supplementation like Metamucil or Citrucel to help prevent constipation - a possible side effect of pain medications.    NAUSEA:  If nauseated from the anesthetic/pain meds; rest in bed, get up cautiously with assistance, and drink clear liquids (juice, tea, broth).    RETURN APPOINTMENT:  Schedule a follow-up visit 2-3 weeks after discharge from the hospital.  Office Phone:  [phone]     CONTACT US IF THE FOLLOWING DEVELOPS:   1. A fever that is above 101     2. If there is a large amount of drainage, bleeding, or swelling.   3. Severe pain that is not relieved by your prescription.   4. Drainage that is thick, cloudy, yellow, green or white.   5. Any other questions not answered by  Frequently Asked Questions  sheet.        I did not personally see or examine the patient on the day of discharge.  Summary was completed by chart review.   Jamison Witt PA-C

## 2023-08-17 NOTE — PLAN OF CARE
"Physical Therapy Discharge Summary    Reason for therapy discharge:    Discharged to home.    Progress towards therapy goal(s). See goals on Care Plan in [facility] electronic health record for goal details.  Goals not met.  Barriers to achieving goals:   discharge from facility.    Therapy recommendation(s):    Continued therapy is recommended.  Rationale/Recommendations:  Per prior PT recommendation, \"Anticipate with ongoing PT and continued recovery from surgery, patient should be able to return home with some assist from family/friends as needed and Home PT; recommend use of walker; has a lift chair at home also; if patient unable to meet PT goals by discharge and doesn't have adequate assist available, may need TCU\".      "

## 2023-08-18 ENCOUNTER — PATIENT OUTREACH (OUTPATIENT)
Dept: CARE COORDINATION | Facility: CLINIC | Age: 83
End: 2023-08-18
Payer: COMMERCIAL

## 2023-08-18 NOTE — PROGRESS NOTES
Clinic Care Coordination Contact  [facility]/Voicemail       Clinical Data: Care Coordinator Outreach  Outreach attempted x 2.  Left message on patient's voicemail with call back information and requested return call.  Plan:  Care Coordinator will do no further outreaches at this time.    Yara FAULKNER Community Health Worker  Clinic Care Coordination  [facility]  Phone: [phone]

## 2023-08-29 ENCOUNTER — OFFICE VISIT (OUTPATIENT)
Dept: FAMILY MEDICINE | Facility: CLINIC | Age: 83
End: 2023-08-29
Payer: COMMERCIAL

## 2023-08-29 VITALS
OXYGEN SATURATION: 100 % | HEART RATE: 62 BPM | BODY MASS INDEX: 31.74 KG/M2 | WEIGHT: 185.9 LBS | DIASTOLIC BLOOD PRESSURE: 72 MMHG | RESPIRATION RATE: 16 BRPM | SYSTOLIC BLOOD PRESSURE: 128 MMHG | TEMPERATURE: 97.7 F | HEIGHT: 64 IN

## 2023-08-29 DIAGNOSIS — Z87.19 HX SBO: Primary | ICD-10-CM

## 2023-08-29 DIAGNOSIS — D64.9 POSTOPERATIVE ANEMIA: ICD-10-CM

## 2023-08-29 DIAGNOSIS — E11.9 TYPE 2 DIABETES MELLITUS WITHOUT COMPLICATION, WITHOUT LONG-TERM CURRENT USE OF INSULIN (H): ICD-10-CM

## 2023-08-29 LAB — HGB BLD-MCNC: 11.4 G/DL (ref 11.7–15.7)

## 2023-08-29 PROCEDURE — 99214 OFFICE O/P EST MOD 30 MIN: CPT | Performed by: INTERNAL MEDICINE

## 2023-08-29 PROCEDURE — 85018 HEMOGLOBIN: CPT | Performed by: INTERNAL MEDICINE

## 2023-08-29 PROCEDURE — 36415 COLL VENOUS BLD VENIPUNCTURE: CPT | Performed by: INTERNAL MEDICINE

## 2023-08-29 ASSESSMENT — PAIN SCALES - GENERAL: PAINLEVEL: NO PAIN (0)

## 2023-08-29 NOTE — PROGRESS NOTES
Assessment & Plan     (Z87.19) Hx SBO  (primary encounter diagnosis)  Comment: successful surgery 8/11/2023; bowel function resumed  Plan: keep active;     (D64.9) Postoperative anemia  Comment: She has been on Iron 2 per day even prior to surgery.  Plan: Hemoglobin        If HGB<11, then resume 2 per day , if HGB<12.5, then continue iron 1 per day and will reassess at Wellness visit in November.    (E11.9) Type 2 diabetes mellitus without complication, without long-term current use of insulin (H)  Comment: DM well controlled on no DM medication; continue with healthy diet and regular exercise.   Plan: continue healthy diet and regular exercise.     Review of external notes as documented elsewhere in note  Review of the result(s) of each unique test - chart reviewed including labs meds, surgical note, hospital records and surgery follow up.   Ordering of each unique test  40 minutes spent by me on the date of the encounter doing chart review, history and exam, documentation and further activities per the note       MED REC REQUIRED  Post Medication Reconciliation Status: discharge medications reconciled, continue medications without change  MEDICATIONS:   No orders of the defined types were placed in this encounter.        - Continue other medications without change  FUTURE LABS:       - labs today and will reassess labs at November Wellness visit  Regular exercise    Jesenia Madrigal MD  Internal Medicine  [facility] MONIK Shultz is a 82 year old, presenting for the following health issues:  Surgical Followup      8/29/2023     8:54 AM   Additional Questions   Roomed by Tony CROCKETT   Accompanied by No one         8/29/2023     8:54 AM   Patient Reported Additional Medications   Patient reports taking the following new medications None       HPI       Hospital Follow-up Visit:    Hospital/Nursing Home/IP Rehab Facility: [facility]  Date of  "Admission: 08/11/2023  Date of Discharge: 08/16/2023  Reason(s) for Admission: Small bowel obstruction    Was your hospitalization related to COVID-19? No   Problems taking medications regularly:  None  Medication changes since discharge: None  Problems adhering to non-medication therapy:  None    Summary of hospitalization:  [facility] discharge summary reviewed- much improved.   Diagnostic Tests/Treatments reviewed.  Follow up needed: HGB was low post op but stable; interested in rechecking HGB  Other Healthcare Providers Involved in Patient s Care:         Surgical follow-up appointment - as scheduled  related to multiple, recurrent small bowel obstructions. Doing well  Update since discharge: improved.         Plan of care communicated with patient               Review of Systems   CONSTITUTIONAL: NEGATIVE for fever, chills, change in weight  ENT/MOUTH: NEGATIVE for ear, mouth and throat problems  RESP: NEGATIVE for significant cough or SOB  CV: NEGATIVE for chest pain, palpitations or peripheral edema  GI: doing well after lysis of adhesion.reporting normal bowel movements  MUSCULOSKELETAL: getting back to walking a bit.  HEME/ALLERGY/IMMUNE: post op anemia, would like to reassess HGB today  PSYCHIATRIC: NEGATIVE for changes in mood or affect      Objective    /72 (BP Location: Right arm, Patient Position: Sitting, Cuff Size: Adult Regular)   Pulse 62   Temp 97.7  F (36.5  C) (Oral)   Resp 16   Ht 1.613 m (5' 3.5\")   Wt 84.3 kg (185 lb 14.4 oz)   LMP  (LMP Unknown)   SpO2 100%   BMI 32.41 kg/m    Body mass index is 32.41 kg/m .  Physical Exam   GENERAL: healthy, alert and no distress  EYES: Eyes grossly normal to inspection and anicteric  NECK: no adenopathy, no asymmetry, masses, or scars and thyroid normal to palpation  RESP: lungs clear to auscultation - no rales, rhonchi or wheezes  CV: regular rates and rhythm, normal S1 S2, soft, systolic murmur, peripheral pulses strong, and " no peripheral edema  ABDOMEN: soft, nontender, bowel sounds normal, and well-healed incision - no erythema or induration.  MS: no gross musculoskeletal defects noted, no edema  NEURO: Normal strength and tone, mentation intact and speech normal  PSYCH: mentation appears normal, affect normal/bright

## 2023-09-05 ENCOUNTER — OFFICE VISIT (OUTPATIENT)
Dept: SURGERY | Facility: CLINIC | Age: 83
End: 2023-09-05
Payer: COMMERCIAL

## 2023-09-05 VITALS
WEIGHT: 185 LBS | OXYGEN SATURATION: 96 % | DIASTOLIC BLOOD PRESSURE: 66 MMHG | BODY MASS INDEX: 31.58 KG/M2 | HEART RATE: 56 BPM | HEIGHT: 64 IN | SYSTOLIC BLOOD PRESSURE: 118 MMHG

## 2023-09-05 DIAGNOSIS — Z09 SURGICAL FOLLOWUP VISIT: Primary | ICD-10-CM

## 2023-09-05 PROCEDURE — 99024 POSTOP FOLLOW-UP VISIT: CPT | Performed by: SURGERY

## 2023-09-05 NOTE — LETTER
September 6, 2023        RE:   Lexii Stratton 1940      Dear Colleague,    Thank you for referring your patient, Lexii Stratton, to [facility] at [facility]. Please see a copy of my visit note below.    The patient returns today to follow-up after her laparotomy and revision of anastomosis.  She is extremely happy, and that she is now able to eat food in a way she has not been able to do for years.  At the time of surgery, she was found to have a pinpoint opening between the proximal and distal small bowel at the anastomosis.    The patient's incision is well-healed.    The patient is doing extremely well.  She may return to see me on an as-needed basis.  She may gradually increase her diet as tolerated.      Again, thank you for allowing me to participate in the care of your patient.      Sincerely,      MD CHERYL Negrete/marcello      D: 8/6/2023  T: 8:15 am

## 2023-09-05 NOTE — PROGRESS NOTES
The patient returns today to follow-up after her laparotomy and revision of anastomosis.  She is extremely happy, and that she is now able to eat food in a way she has not been able to do for years.  At the time of surgery, she was found to have a pinpoint opening between the proximal and distal small bowel at the anastomosis.    The patient's incision is well-healed.    The patient is doing extremely well.  She may return to see me on an as-needed basis.  She may gradually increase her diet as tolerated.    Rafi Franco MD  [facility]    Please route or send letter to:  Primary Care Provider (PCP)

## 2023-09-11 ENCOUNTER — PATIENT OUTREACH (OUTPATIENT)
Dept: CARE COORDINATION | Facility: CLINIC | Age: 83
End: 2023-09-11
Payer: COMMERCIAL

## 2023-10-03 ENCOUNTER — ANCILLARY PROCEDURE (OUTPATIENT)
Dept: CARDIOLOGY | Facility: CLINIC | Age: 83
End: 2023-10-03
Attending: INTERNAL MEDICINE
Payer: COMMERCIAL

## 2023-10-03 DIAGNOSIS — I44.2 COMPLETE HEART BLOCK (H): Primary | ICD-10-CM

## 2023-10-03 DIAGNOSIS — Z95.0 CARDIAC PACEMAKER IN SITU: ICD-10-CM

## 2023-10-03 DIAGNOSIS — I44.2 COMPLETE HEART BLOCK (H): ICD-10-CM

## 2023-10-03 LAB
MDC_IDC_EPISODE_DTM: NORMAL
MDC_IDC_EPISODE_ID: NORMAL
MDC_IDC_EPISODE_TYPE: NORMAL
MDC_IDC_LEAD_IMPLANT_DT: NORMAL
MDC_IDC_LEAD_IMPLANT_DT: NORMAL
MDC_IDC_LEAD_LOCATION: NORMAL
MDC_IDC_LEAD_LOCATION: NORMAL
MDC_IDC_LEAD_LOCATION_DETAIL_1: NORMAL
MDC_IDC_LEAD_LOCATION_DETAIL_1: NORMAL
MDC_IDC_LEAD_MFG: NORMAL
MDC_IDC_LEAD_MFG: NORMAL
MDC_IDC_LEAD_MODEL: NORMAL
MDC_IDC_LEAD_MODEL: NORMAL
MDC_IDC_LEAD_POLARITY_TYPE: NORMAL
MDC_IDC_LEAD_POLARITY_TYPE: NORMAL
MDC_IDC_LEAD_SERIAL: NORMAL
MDC_IDC_LEAD_SERIAL: NORMAL
MDC_IDC_MSMT_BATTERY_DTM: NORMAL
MDC_IDC_MSMT_BATTERY_REMAINING_LONGEVITY: 132 MO
MDC_IDC_MSMT_BATTERY_REMAINING_PERCENTAGE: 100 %
MDC_IDC_MSMT_BATTERY_STATUS: NORMAL
MDC_IDC_MSMT_LEADCHNL_RA_IMPEDANCE_VALUE: 555 OHM
MDC_IDC_MSMT_LEADCHNL_RA_PACING_THRESHOLD_AMPLITUDE: 0.7 V
MDC_IDC_MSMT_LEADCHNL_RA_PACING_THRESHOLD_PULSEWIDTH: 0.4 MS
MDC_IDC_MSMT_LEADCHNL_RV_IMPEDANCE_VALUE: 673 OHM
MDC_IDC_MSMT_LEADCHNL_RV_PACING_THRESHOLD_AMPLITUDE: 1 V
MDC_IDC_MSMT_LEADCHNL_RV_PACING_THRESHOLD_PULSEWIDTH: 0.4 MS
MDC_IDC_PG_IMPLANT_DTM: NORMAL
MDC_IDC_PG_MFG: NORMAL
MDC_IDC_PG_MODEL: NORMAL
MDC_IDC_PG_SERIAL: NORMAL
MDC_IDC_PG_TYPE: NORMAL
MDC_IDC_SESS_CLINIC_NAME: NORMAL
MDC_IDC_SESS_DTM: NORMAL
MDC_IDC_SESS_TYPE: NORMAL
MDC_IDC_SET_BRADY_AT_MODE_SWITCH_MODE: NORMAL
MDC_IDC_SET_BRADY_AT_MODE_SWITCH_RATE: 170 {BEATS}/MIN
MDC_IDC_SET_BRADY_LOWRATE: 60 {BEATS}/MIN
MDC_IDC_SET_BRADY_MAX_SENSOR_RATE: 130 {BEATS}/MIN
MDC_IDC_SET_BRADY_MAX_TRACKING_RATE: 130 {BEATS}/MIN
MDC_IDC_SET_BRADY_MODE: NORMAL
MDC_IDC_SET_BRADY_PAV_DELAY_HIGH: 200 MS
MDC_IDC_SET_BRADY_PAV_DELAY_LOW: 250 MS
MDC_IDC_SET_BRADY_SAV_DELAY_HIGH: 200 MS
MDC_IDC_SET_BRADY_SAV_DELAY_LOW: 250 MS
MDC_IDC_SET_LEADCHNL_RA_PACING_AMPLITUDE: 5 V
MDC_IDC_SET_LEADCHNL_RA_PACING_CAPTURE_MODE: NORMAL
MDC_IDC_SET_LEADCHNL_RA_PACING_POLARITY: NORMAL
MDC_IDC_SET_LEADCHNL_RA_PACING_PULSEWIDTH: 0.4 MS
MDC_IDC_SET_LEADCHNL_RA_SENSING_ADAPTATION_MODE: NORMAL
MDC_IDC_SET_LEADCHNL_RA_SENSING_POLARITY: NORMAL
MDC_IDC_SET_LEADCHNL_RA_SENSING_SENSITIVITY: 0.25 MV
MDC_IDC_SET_LEADCHNL_RV_PACING_AMPLITUDE: 1.4 V
MDC_IDC_SET_LEADCHNL_RV_PACING_CAPTURE_MODE: NORMAL
MDC_IDC_SET_LEADCHNL_RV_PACING_POLARITY: NORMAL
MDC_IDC_SET_LEADCHNL_RV_PACING_PULSEWIDTH: 0.4 MS
MDC_IDC_SET_LEADCHNL_RV_SENSING_ADAPTATION_MODE: NORMAL
MDC_IDC_SET_LEADCHNL_RV_SENSING_POLARITY: NORMAL
MDC_IDC_SET_LEADCHNL_RV_SENSING_SENSITIVITY: 1.5 MV
MDC_IDC_SET_ZONE_DETECTION_INTERVAL: 375 MS
MDC_IDC_SET_ZONE_TYPE: NORMAL
MDC_IDC_SET_ZONE_VENDOR_TYPE: NORMAL
MDC_IDC_STAT_AT_BURDEN_PERCENT: 1 %
MDC_IDC_STAT_AT_DTM_END: NORMAL
MDC_IDC_STAT_AT_DTM_START: NORMAL
MDC_IDC_STAT_BRADY_DTM_END: NORMAL
MDC_IDC_STAT_BRADY_DTM_START: NORMAL
MDC_IDC_STAT_BRADY_RA_PERCENT_PACED: 94 %
MDC_IDC_STAT_BRADY_RV_PERCENT_PACED: 3 %
MDC_IDC_STAT_EPISODE_RECENT_COUNT: 0
MDC_IDC_STAT_EPISODE_RECENT_COUNT: 0
MDC_IDC_STAT_EPISODE_RECENT_COUNT: 1
MDC_IDC_STAT_EPISODE_RECENT_COUNT: 3
MDC_IDC_STAT_EPISODE_RECENT_COUNT: 5
MDC_IDC_STAT_EPISODE_RECENT_COUNT_DTM_END: NORMAL
MDC_IDC_STAT_EPISODE_RECENT_COUNT_DTM_START: NORMAL
MDC_IDC_STAT_EPISODE_TYPE: NORMAL
MDC_IDC_STAT_EPISODE_VENDOR_TYPE: NORMAL

## 2023-10-03 PROCEDURE — 93294 REM INTERROG EVL PM/LDLS PM: CPT | Performed by: INTERNAL MEDICINE

## 2023-10-03 PROCEDURE — 93296 REM INTERROG EVL PM/IDS: CPT | Performed by: INTERNAL MEDICINE

## 2023-10-11 ENCOUNTER — HOSPITAL ENCOUNTER (OUTPATIENT)
Dept: MAMMOGRAPHY | Facility: CLINIC | Age: 83
Discharge: HOME | End: 2023-10-11
Attending: INTERNAL MEDICINE | Admitting: INTERNAL MEDICINE
Payer: COMMERCIAL

## 2023-10-11 DIAGNOSIS — Z12.31 VISIT FOR SCREENING MAMMOGRAM: ICD-10-CM

## 2023-10-11 PROCEDURE — 77067 SCR MAMMO BI INCL CAD: CPT

## 2023-10-25 ENCOUNTER — TRANSFERRED RECORDS (OUTPATIENT)
Dept: HEALTH INFORMATION MANAGEMENT | Facility: CLINIC | Age: 83
End: 2023-10-25
Payer: COMMERCIAL

## 2023-11-08 ENCOUNTER — ANCILLARY PROCEDURE (OUTPATIENT)
Dept: CARDIOLOGY | Facility: CLINIC | Age: 83
End: 2023-11-08
Attending: INTERNAL MEDICINE
Payer: COMMERCIAL

## 2023-11-08 ENCOUNTER — LAB (OUTPATIENT)
Dept: LAB | Facility: CLINIC | Age: 83
End: 2023-11-08
Attending: INTERNAL MEDICINE
Payer: COMMERCIAL

## 2023-11-08 ENCOUNTER — OFFICE VISIT (OUTPATIENT)
Dept: CARDIOLOGY | Facility: CLINIC | Age: 83
End: 2023-11-08
Payer: COMMERCIAL

## 2023-11-08 ENCOUNTER — HOSPITAL ENCOUNTER (OUTPATIENT)
Dept: CARDIOLOGY | Facility: CLINIC | Age: 83
Discharge: HOME | End: 2023-11-08
Attending: INTERNAL MEDICINE | Admitting: INTERNAL MEDICINE
Payer: COMMERCIAL

## 2023-11-08 VITALS
HEIGHT: 64 IN | DIASTOLIC BLOOD PRESSURE: 64 MMHG | BODY MASS INDEX: 29.86 KG/M2 | OXYGEN SATURATION: 100 % | HEART RATE: 60 BPM | SYSTOLIC BLOOD PRESSURE: 117 MMHG | WEIGHT: 174.9 LBS

## 2023-11-08 DIAGNOSIS — Z95.0 CARDIAC PACEMAKER IN SITU: ICD-10-CM

## 2023-11-08 DIAGNOSIS — Z95.3 S/P TAVR (TRANSCATHETER AORTIC VALVE REPLACEMENT), BIOPROSTHETIC: ICD-10-CM

## 2023-11-08 DIAGNOSIS — I44.2 COMPLETE HEART BLOCK (H): ICD-10-CM

## 2023-11-08 DIAGNOSIS — I50.30 DIASTOLIC HEART FAILURE, UNSPECIFIED HF CHRONICITY (H): ICD-10-CM

## 2023-11-08 DIAGNOSIS — E78.5 HYPERLIPIDEMIA LDL GOAL <130: ICD-10-CM

## 2023-11-08 DIAGNOSIS — I35.0 SEVERE AORTIC STENOSIS: Primary | ICD-10-CM

## 2023-11-08 DIAGNOSIS — I48.0 PAF (PAROXYSMAL ATRIAL FIBRILLATION) (H): ICD-10-CM

## 2023-11-08 DIAGNOSIS — I10 ESSENTIAL HYPERTENSION, BENIGN: ICD-10-CM

## 2023-11-08 LAB
ANION GAP SERPL CALCULATED.3IONS-SCNC: 10 MMOL/L (ref 7–15)
BUN SERPL-MCNC: 23.4 MG/DL (ref 8–23)
CALCIUM SERPL-MCNC: 10.2 MG/DL (ref 8.8–10.2)
CHLORIDE SERPL-SCNC: 102 MMOL/L (ref 98–107)
CREAT SERPL-MCNC: 1.05 MG/DL (ref 0.51–0.95)
DEPRECATED HCO3 PLAS-SCNC: 27 MMOL/L (ref 22–29)
EGFRCR SERPLBLD CKD-EPI 2021: 53 ML/MIN/1.73M2
ERYTHROCYTE [DISTWIDTH] IN BLOOD BY AUTOMATED COUNT: 12.6 % (ref 10–15)
GLUCOSE SERPL-MCNC: 120 MG/DL (ref 70–99)
HCT VFR BLD AUTO: 47.7 % (ref 35–47)
HGB BLD-MCNC: 15.2 G/DL (ref 11.7–15.7)
LVEF ECHO: NORMAL
MCH RBC QN AUTO: 30.7 PG (ref 26.5–33)
MCHC RBC AUTO-ENTMCNC: 31.9 G/DL (ref 31.5–36.5)
MCV RBC AUTO: 96 FL (ref 78–100)
PLATELET # BLD AUTO: 143 10E3/UL (ref 150–450)
POTASSIUM SERPL-SCNC: 4.6 MMOL/L (ref 3.4–5.3)
RBC # BLD AUTO: 4.95 10E6/UL (ref 3.8–5.2)
SODIUM SERPL-SCNC: 139 MMOL/L (ref 135–145)
WBC # BLD AUTO: 6.1 10E3/UL (ref 4–11)

## 2023-11-08 PROCEDURE — 93280 PM DEVICE PROGR EVAL DUAL: CPT | Performed by: INTERNAL MEDICINE

## 2023-11-08 PROCEDURE — 93306 TTE W/DOPPLER COMPLETE: CPT

## 2023-11-08 PROCEDURE — 36415 COLL VENOUS BLD VENIPUNCTURE: CPT | Performed by: INTERNAL MEDICINE

## 2023-11-08 PROCEDURE — 93000 ELECTROCARDIOGRAM COMPLETE: CPT | Performed by: NURSE PRACTITIONER

## 2023-11-08 PROCEDURE — 93306 TTE W/DOPPLER COMPLETE: CPT | Mod: 26 | Performed by: INTERNAL MEDICINE

## 2023-11-08 PROCEDURE — 85027 COMPLETE CBC AUTOMATED: CPT | Performed by: INTERNAL MEDICINE

## 2023-11-08 PROCEDURE — 80048 BASIC METABOLIC PNL TOTAL CA: CPT | Performed by: INTERNAL MEDICINE

## 2023-11-08 PROCEDURE — 99214 OFFICE O/P EST MOD 30 MIN: CPT | Mod: 25 | Performed by: NURSE PRACTITIONER

## 2023-11-08 NOTE — LETTER
11/8/2023    Jesenia Madrigal MD  [address]  [facility] [address]    RE: Lexii MERCADO Viral       Dear Colleague,     I had the pleasure of seeing Lexii Stratton in the [facility].      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     She returns to the office today for a 1 year post TAVR follow up.  She  has been doing well over the last year with the exception of a hospitalization in August where she underwent a small bowel resection with revision of anastomosis and extensive lysis of adhesions without complication.  She has since recovered well.      She offers no cardiac complaint, denies chest pain, shortness of breath, palpitations, PND, orthopnea, presyncope, syncope, or lower extremity edema.  NYHA class I.      Echocardiogram today showed a normal ejection fraction estimated at 55 to 60%, normal RV size and function, mild left atrial enlargement, and a well-seated bioprosthetic aortic valve with a mean gradient of 3.6 mmHg.    EKG today shows sinus rhythm with a rate of 60    Device interrogation showed 94% atrial pacing and 3% ventricular pacing, underlying rhythm was sinus bradycardia in the 30s.  AT/A-fib burden was less than 1%.  She is on Eliquis without any evidence of bleeding.    Blood pressure is well controlled at 117/64, managed on amlodipine and lisinopril  BMP showed a sodium of 139, potassium 4.6, BUN 23.4, creatinine 1.05, GFR 53.  Hemoglobin 15.2, hematocrit 47.7, WBC 6.1, and platelet 143    She continues to remain very active completing 32  sessions of cardiac rehab and recently joined a gym where she walks a 2 mile track 3 times a week.    Compliant with all medications.     Assessment and Plan   Severe aortic stenosis -status post TAVR on 11/8/2022 using a 26 mm Medtronic evolute Fx valve. Echocardiogram today showed a normal ejection fraction estimated at 55 to 60%,  a well-seated bioprosthetic aortic valve with a mean gradient of 3.6 mmHg.  Follow-up echocardiogram in 1 year.  Complete heart block - status post dual-chamber pacemaker. Device interrogation showed 94% atrial pacing and 3% ventricular pacing, underlying rhythm was sinus bradycardia in the 30s.     3.  Atrial fibrillation - AT/A-fib burden was less than 1%.  On chronic Eliquis.  She notes moderate bruising on Eliquis.  We discussed possible Watchman implant which she would like to think about.  She is also concerned Eliquis may become too expensive but will continue with it for now.   4.  Hypertension -well-controlled, continue amlodipine and lisinopril  5.  Diastolic heart failure -euvolemic upon exam, weight stable.  On no diuretics  6.  Hyperlipidemia -on simvastatin    PAST MEDICAL HISTORY:  Past Medical History:   Diagnosis Date    Aortic valve stenosis 8/18/2022    Arthritis     Diabetes (H)     Type 2-no meds... Dieet and exercise only as of 9/15/20    Esophageal reflux     Hernia, abdominal     History of blood transfusion 1984    Hypertension     No cardiologist    Incontinence of urine     Mumps     6 years old    Obese     Osteoarthritis     Other and unspecified hyperlipidemia     Other chronic pain     back    Peptic ulcer, unspecified site, unspecified as acute or chronic, without mention of hemorrhage, perforation, or obstruction     Small bowel obstruction (H)     Thyroid disease hypothyroidism    Urinary incontinence     Walking troubles        CURRENT MEDICATIONS:  Current Outpatient Medications   Medication Sig Dispense Refill    acetaminophen (TYLENOL) 500 MG tablet  Take 1,000 mg by mouth 2 times daily (2 x 500 mg = 1000 mg)      amLODIPine (NORVASC) 5 MG tablet TAKE ONE TABLET BY MOUTH EVERY NIGHT AT BEDTIME 90 tablet 3    amoxicillin (AMOXIL) 500 MG capsule Take 2,000 mg by mouth once as needed (1 hour prior to dental procedures 4 x 500 mg = 2000 mg)      apixaban ANTICOAGULANT (ELIQUIS ANTICOAGULANT) 5 MG tablet Take 1 tablet (5 mg) by mouth 2 times daily 180 tablet 3    Biotin 5000 MCG PO TABS Take 1 tablet by mouth daily      calcium carbonate (OS-CINDY) 500 MG tablet Take 1 tablet by mouth every other day      CENTRUM SILVER OR TABS Take 1 tablet by mouth daily 30 0    Cranberry 450 MG CAPS Take 1 capsule by mouth daily (with dinner)      ferrous gluconate (FERGON) 324 (38 FE) MG tablet Take 1 tablet (324 mg) by mouth 2 times daily (Patient taking differently: Take 1 tablet by mouth daily (with breakfast)) 100 tablet 0    latanoprost (XALATAN) 0.005 % ophthalmic solution Place 1 drop into both eyes At Bedtime       levothyroxine (SYNTHROID/LEVOTHROID) 50 MCG tablet Take 1 tablet (50 mcg) by mouth daily 90 tablet 3    lisinopril (ZESTRIL) 30 MG tablet Take 1 tablet (30 mg) by mouth every morning 90 tablet 3    magnesium 250 MG tablet Take 1 tablet by mouth daily      nitroGLYcerin (NITROSTAT) 0.4 MG sublingual tablet For chest pain place 1 tablet under the tongue every 5 minutes for 3 doses. If symptoms persist 5 minutes after 1st dose call 911. (Patient taking differently: 0.4 mg every 5 minutes as needed For chest pain place 1 tablet under the tongue every 5 minutes for 3 doses. If symptoms persist 5 minutes after 1st dose call 911.) 25 tablet 11    OMEGA-3 FATTY ACIDS 1200 MG OR CAPS Take 1 capsule by mouth daily  0    pantoprazole (PROTONIX) 20 MG EC tablet Take 1 tablet (20 mg) by mouth daily 90 tablet 3    potassium 99 MG TABS Take 1 tablet by mouth daily (with dinner)       Probiotic Product (ACIDOPHILUS PROBIOTIC BLEND) CAPS Take 1 capsule by mouth daily (with  breakfast)  30 capsule 0    simvastatin (ZOCOR) 20 MG tablet TAKE ONE TABLET BY MOUTH EVERY NIGHT AT BEDTIME 90 tablet 3    VITAMIN D, CHOLECALCIFEROL, PO Take 2,000 Units by mouth daily         PAST SURGICAL HISTORY:  Past Surgical History:   Procedure Laterality Date    ABDOMEN SURGERY      ARTHROPLASTY HIP Right 11/09/2016    Procedure: ARTHROPLASTY HIP;  Surgeon: Angel Lund MD;  Location:  OR    AS REPAIR INCISIONAL HERNIA,REDUCIBLE  1998    inc hernia ( Yamileth surgery)    BIOPSY      BLADDER SURGERY      hyperdistention surgery and sling    BREAST SURGERY      CARDIAC SURGERY  2022    CHOLECYSTECTOMY  1987    COLONOSCOPY  12/03/2011    Procedure:COLONOSCOPY; COLONOSCOPY; Surgeon:SEMAJ GRAHAM; Location: GI    COLONOSCOPY N/A 03/29/2018    Procedure: COLONOSCOPY;  COLONOSCOPY Rm 544;  Surgeon: Marco Gusman MD;  Location:  GI    CV CORONARY ANGIOGRAM N/A 09/30/2022    Procedure: Coronary Angiogram;  Surgeon: Garcia Barber MD;  Location:  HEART CARDIAC CATH LAB    CV TRANSCATHETER AORTIC VALVE REPLACEMENT-FEMORAL APPROACH N/A 11/08/2022    Procedure: Transcatheter Aortic Valve Replacement-Femoral Approach;  Surgeon: Yulia Castano MD;  Location: [facility] CARDIAC CATH LAB    CYSTOSCOPY N/A 09/06/2017    Procedure: CYSTOSCOPY;  CYSTOSCOPY AND HYDRODISTENTION ;  Surgeon: Eyal Bai MD;  Location:  OR    ENT SURGERY      Tonsillectomy    EP PACEMAKER DEVICE & LEAD IMPLANT- RIGHT ATRIAL & RIGHT VENTRICULAR N/A 11/08/2022    Procedure: Pacemaker Device & Lead Implant - Right Atrial & Right Ventricular;  Surgeon: William Boyd MD;  Location:  HEART CARDIAC CATH LAB    ESOPHAGOSCOPY, GASTROSCOPY, DUODENOSCOPY (EGD), COMBINED N/A 09/26/2016    Procedure: COMBINED ESOPHAGOSCOPY, GASTROSCOPY, DUODENOSCOPY (EGD), BIOPSY SINGLE OR MULTIPLE;  Surgeon: Marco Monaco MD;  Location:  GI    EYE SURGERY Left 05/2019    GENITOURINARY SURGERY      GYN SURGERY       Hysterectomy    HERNIA REPAIR, UMBILICAL  2010    Dr. Kirt Franco times 3    IMPLANT STIMULATOR SACRAL NERVE STAGE ONE N/A 2020    Procedure: sacral neurostimulation stage one implant of neurostimulator lead;  Surgeon: Shahnaz Carbone MD;  Location:  OR    IMPLANT STIMULATOR SACRAL NERVE STAGE TWO Right 2020    Procedure: Removal of interstem lead, NOT implanting neurostimulator;  Surgeon: Shahnaz Carbone MD;  Location:  OR    ORTHOPEDIC SURGERY      [facility] - Dr. Lund- bilateral knee replacement    RESECT SMALL BOWEL WITHOUT OSTOMY N/A 2023    Procedure: Small bowel resection, Revision of anastamosis;  Surgeon: Rafi Franco MD;  Location:  OR    [facility] NONSPECIFIC PROCEDURE      T&A    [facility] NONSPECIFIC PROCEDURE      Vaginal Hysterectomy (has her ovaries)    Z NONSPECIFIC PROCEDURE      PPTL    ZZ NONSPECIFIC PROCEDURE      L shoulder to remove bone spurs    ZZ NONSPECIFIC PROCEDURE      cysto and durasphere Dr Demarco    [facility] NONSPECIFIC PROCEDURE  2005    cysto and durasphere    ZZC NONSPECIFIC PROCEDURE  2007    cysto and durasphere    ZZC NONSPECIFIC PROCEDURE  2009    retropubic TVT sling       ALLERGIES     Allergies   Allergen Reactions    Amoxicillin-Pot Clavulanate Diarrhea    Hydrocodone      Keeps patient awake    Morphine And Related Nausea and Vomiting    Naproxen Itching and Rash    Seasonal Allergies      Hay fever in fall, ragweed and russian thistles    Sulfa Antibiotics Nausea       FAMILY HISTORY:  Family History   Problem Relation Age of Onset    Heart Disease Mother         heart surgery , new valve    Gallbladder Disease Mother     Coronary Artery Disease Mother     Hyperlipidemia Mother     Asthma Mother     Hypertension Mother     Anesthesia Reaction Mother     Cancer Father         throat ca,  76    Coronary Artery Disease Father     Other Cancer Father     Heart Disease Maternal Grandmother     Coronary  "Artery Disease Maternal Grandmother     Heart Disease Maternal Grandfather     Coronary Artery Disease Maternal Grandfather     Cancer Brother         lung    Aortic stenosis Brother         TAVR    Diabetes Brother         Half Brother       SOCIAL HISTORY:  Social History     Socioeconomic History    Marital status: Single     Spouse name: None    Number of children: None    Years of education: None    Highest education level: None   Tobacco Use    Smoking status: Never     Passive exposure: Never    Smokeless tobacco: Never    Tobacco comments:     have never smoked anything   Vaping Use    Vaping Use: Never used   Substance and Sexual Activity    Alcohol use: No    Drug use: No    Sexual activity: Not Currently     Partners: Male     Birth control/protection: None     Comment: have had a hysterectomy   Other Topics Concern    Parent/sibling w/ CABG, MI or angioplasty before 65F 55M? No         Review of Systems:  Skin:  Negative     Eyes:  Negative    ENT:  Negative    Respiratory:  Negative    Cardiovascular:  Negative    Gastroenterology: Negative    Genitourinary:  Negative    Musculoskeletal:    arthritis  Neurologic:  Positive for    Psychiatric:  Negative    Heme/Lymph/Imm:  Negative    Endocrine:  Positive for thyroid disorder    EXAM:  /64 (BP Location: Right arm, Patient Position: Sitting, Cuff Size: Adult Regular)   Pulse 60   Ht 1.613 m (5' 3.5\")   Wt 79.3 kg (174 lb 14.4 oz)   LMP  (LMP Unknown)   SpO2 100%   BMI 30.50 kg/m    Physical Exam:  Vitals: /64 (BP Location: Right arm, Patient Position: Sitting, Cuff Size: Adult Regular)   Pulse 60   Ht 1.613 m (5' 3.5\")   Wt 79.3 kg (174 lb 14.4 oz)   LMP  (LMP Unknown)   SpO2 100%   BMI 30.50 kg/m      Constitutional:  cooperative, alert and oriented, well developed, well nourished, in no acute distress        Skin:  warm and dry to the touch, no apparent skin lesions or masses noted surgical scars well-healed      Head:  " normocephalic, no masses or lesions        Eyes:  pupils equal and round        ENT:  no pallor or cyanosis, dentition good        Neck:  carotid pulses are full and equal bilaterally        Chest:  normal breath sounds, clear to auscultation, normal A-P diameter, normal symmetry, normal respiratory excursion, no use of accessory muscles        Cardiac: regular rhythm     no presence of murmur            Abdomen:  not assessed this visit        Extremities and Back:  no edema        Neurological:  no gross motor deficits        Labs:  LIPID RESULTS:  Lab Results   Component Value Date    CHOL 193 05/18/2023    CHOL 157 02/24/2021    HDL 49 (L) 05/18/2023    HDL 50 02/24/2021     (H) 05/18/2023    LDL 85 02/24/2021    TRIG 159 (H) 05/18/2023    TRIG 110 02/24/2021    CHOLHDLRATIO 3.3 06/29/2015       LIVER ENZYME RESULTS:  Lab Results   Component Value Date    AST 36 07/05/2023    AST 21 02/24/2021    ALT 15 07/05/2023    ALT 18 02/24/2021       CBC RESULTS:  Lab Results   Component Value Date    WBC 6.1 11/08/2023    WBC 11.3 (H) 03/12/2021    RBC 4.95 11/08/2023    RBC 4.56 03/12/2021    HGB 15.2 11/08/2023    HGB 13.8 03/12/2021    HCT 47.7 (H) 11/08/2023    HCT 43.7 03/12/2021    MCV 96 11/08/2023    MCV 96 03/12/2021    MCH 30.7 11/08/2023    MCH 30.3 03/12/2021    MCHC 31.9 11/08/2023    MCHC 31.6 03/12/2021    RDW 12.6 11/08/2023    RDW 13.1 03/12/2021     (L) 11/08/2023     03/12/2021       BMP RESULTS:  Lab Results   Component Value Date     11/08/2023     02/24/2021    POTASSIUM 4.6 11/08/2023    POTASSIUM 4.2 11/09/2022    POTASSIUM 3.9 02/24/2021    CHLORIDE 102 11/08/2023    CHLORIDE 108 11/09/2022    CHLORIDE 107 02/24/2021    CO2 27 11/08/2023    CO2 22 11/09/2022    CO2 28 02/24/2021    ANIONGAP 10 11/08/2023    ANIONGAP 10 11/09/2022    ANIONGAP 5 02/24/2021     (H) 11/08/2023     (H) 08/15/2023    GLC 95 11/09/2022     (H) 02/24/2021    BUN 23.4  (H) 11/08/2023    BUN 16 11/09/2022    BUN 18 02/24/2021    CR 1.05 (H) 11/08/2023    CR 0.90 02/24/2021    GFRESTIMATED 53 (L) 11/08/2023    GFRESTIMATED 56 (L) 07/05/2023    GFRESTIMATED 60 (L) 02/24/2021    GFRESTBLACK 70 02/24/2021    CINDY 10.2 11/08/2023    CINDY 9.9 02/24/2021        A1C RESULTS:  Lab Results   Component Value Date    A1C 6.0 (H) 08/08/2023    A1C 6.1 (H) 02/24/2021       INR RESULTS:  Lab Results   Component Value Date    INR 1.06 10/27/2022    INR 1.07 09/30/2022    INR 1.15 (H) 03/28/2018    INR 1.65 (H) 07/03/2009         KCCQ  Q1A 5  Q1B 5  Q1C 5  Q2 5  Q3 6  Q4 7  Q5 5  Q6 5  Q7 5  Q8A 5  Q8B 5  Q8C 5    ANDREW Dhillon, CNP  11/8/2023        Thank you for allowing me to participate in the care of your patient.      Sincerely,     ANDREW Dhillon CNP     [facility]  cc:   Yulia Castano MD  [address] [address]

## 2023-11-08 NOTE — PROGRESS NOTES
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     She returns to the office today for a 1 year post TAVR follow up.  She  has been doing well over the last year with the exception of a hospitalization in August where she underwent a small bowel resection with revision of anastomosis and extensive lysis of adhesions without complication.  She has since recovered well.      She offers no cardiac complaint, denies chest pain, shortness of breath, palpitations, PND, orthopnea, presyncope, syncope, or lower extremity edema.  NYHA class I.      Echocardiogram today showed a normal ejection fraction estimated at 55 to 60%, normal RV size and function, mild left atrial enlargement, and a well-seated bioprosthetic aortic valve with a mean gradient of 3.6 mmHg.    EKG today shows sinus rhythm with a rate of 60    Device interrogation showed 94% atrial pacing and 3% ventricular pacing, underlying rhythm was sinus bradycardia in the 30s.  AT/A-fib burden was less than 1%.  She is on Eliquis without any evidence of bleeding.    Blood pressure is well controlled at 117/64, managed on amlodipine and lisinopril  BMP showed a sodium of 139, potassium 4.6, BUN 23.4, creatinine 1.05, GFR 53.  Hemoglobin 15.2, hematocrit 47.7, WBC 6.1, and platelet 143    She continues to remain very active completing 32 sessions of cardiac rehab and recently joined a gym where she walks a 2 mile track 3 times a week.    Compliant with all medications.     Assessment and Plan   Severe aortic stenosis -status post TAVR on 11/8/2022  using a 26 mm Medtronic evolute Fx valve. Echocardiogram today showed a normal ejection fraction estimated at 55 to 60%,  a well-seated bioprosthetic aortic valve with a mean gradient of 3.6 mmHg.  Follow-up echocardiogram in 1 year.  Complete heart block - status post dual-chamber pacemaker. Device interrogation showed 94% atrial pacing and 3% ventricular pacing, underlying rhythm was sinus bradycardia in the 30s.     3.  Atrial fibrillation - AT/A-fib burden was less than 1%.  On chronic Eliquis.  She notes moderate bruising on Eliquis.  We discussed possible Watchman implant which she would like to think about.  She is also concerned Eliquis may become too expensive but will continue with it for now.   4.  Hypertension -well-controlled, continue amlodipine and lisinopril  5.  Diastolic heart failure -euvolemic upon exam, weight stable.  On no diuretics  6.  Hyperlipidemia -on simvastatin    PAST MEDICAL HISTORY:  Past Medical History:   Diagnosis Date    Aortic valve stenosis 8/18/2022    Arthritis     Diabetes (H)     Type 2-no meds... Dieet and exercise only as of 9/15/20    Esophageal reflux     Hernia, abdominal     History of blood transfusion 1984    Hypertension     No cardiologist    Incontinence of urine     Mumps     6 years old    Obese     Osteoarthritis     Other and unspecified hyperlipidemia     Other chronic pain     back    Peptic ulcer, unspecified site, unspecified as acute or chronic, without mention of hemorrhage, perforation, or obstruction     Small bowel obstruction (H)     Thyroid disease hypothyroidism    Urinary incontinence     Walking troubles        CURRENT MEDICATIONS:  Current Outpatient Medications   Medication Sig Dispense Refill    acetaminophen (TYLENOL) 500 MG tablet Take 1,000 mg by mouth 2 times daily (2 x 500 mg = 1000 mg)      amLODIPine (NORVASC) 5 MG tablet TAKE ONE TABLET BY MOUTH EVERY NIGHT AT BEDTIME 90 tablet 3    amoxicillin (AMOXIL) 500 MG capsule Take 2,000 mg by  mouth once as needed (1 hour prior to dental procedures 4 x 500 mg = 2000 mg)      apixaban ANTICOAGULANT (ELIQUIS ANTICOAGULANT) 5 MG tablet Take 1 tablet (5 mg) by mouth 2 times daily 180 tablet 3    Biotin 5000 MCG PO TABS Take 1 tablet by mouth daily      calcium carbonate (OS-CINDY) 500 MG tablet Take 1 tablet by mouth every other day      CENTRUM SILVER OR TABS Take 1 tablet by mouth daily 30 0    Cranberry 450 MG CAPS Take 1 capsule by mouth daily (with dinner)      ferrous gluconate (FERGON) 324 (38 FE) MG tablet Take 1 tablet (324 mg) by mouth 2 times daily (Patient taking differently: Take 1 tablet by mouth daily (with breakfast)) 100 tablet 0    latanoprost (XALATAN) 0.005 % ophthalmic solution Place 1 drop into both eyes At Bedtime       levothyroxine (SYNTHROID/LEVOTHROID) 50 MCG tablet Take 1 tablet (50 mcg) by mouth daily 90 tablet 3    lisinopril (ZESTRIL) 30 MG tablet Take 1 tablet (30 mg) by mouth every morning 90 tablet 3    magnesium 250 MG tablet Take 1 tablet by mouth daily      nitroGLYcerin (NITROSTAT) 0.4 MG sublingual tablet For chest pain place 1 tablet under the tongue every 5 minutes for 3 doses. If symptoms persist 5 minutes after 1st dose call 911. (Patient taking differently: 0.4 mg every 5 minutes as needed For chest pain place 1 tablet under the tongue every 5 minutes for 3 doses. If symptoms persist 5 minutes after 1st dose call 911.) 25 tablet 11    OMEGA-3 FATTY ACIDS 1200 MG OR CAPS Take 1 capsule by mouth daily  0    pantoprazole (PROTONIX) 20 MG EC tablet Take 1 tablet (20 mg) by mouth daily 90 tablet 3    potassium 99 MG TABS Take 1 tablet by mouth daily (with dinner)       Probiotic Product (ACIDOPHILUS PROBIOTIC BLEND) CAPS Take 1 capsule by mouth daily (with breakfast)  30 capsule 0    simvastatin (ZOCOR) 20 MG tablet TAKE ONE TABLET BY MOUTH EVERY NIGHT AT BEDTIME 90 tablet 3    VITAMIN D, CHOLECALCIFEROL, PO Take 2,000 Units by mouth daily         PAST SURGICAL  HISTORY:  Past Surgical History:   Procedure Laterality Date    ABDOMEN SURGERY      ARTHROPLASTY HIP Right 11/09/2016    Procedure: ARTHROPLASTY HIP;  Surgeon: Angel Lund MD;  Location:  OR    AS REPAIR INCISIONAL HERNIA,REDUCIBLE  1998    inc hernia ( Yamileth surgery)    BIOPSY      BLADDER SURGERY      hyperdistention surgery and sling    BREAST SURGERY      CARDIAC SURGERY  2022    CHOLECYSTECTOMY  1987    COLONOSCOPY  12/03/2011    Procedure:COLONOSCOPY; COLONOSCOPY; Surgeon:SEMAJ GRAHAM; Location: GI    COLONOSCOPY N/A 03/29/2018    Procedure: COLONOSCOPY;  COLONOSCOPY Rm 544;  Surgeon: Marco Gusman MD;  Location:  GI    CV CORONARY ANGIOGRAM N/A 09/30/2022    Procedure: Coronary Angiogram;  Surgeon: Garcia Barber MD;  Location: [facility] CARDIAC CATH LAB    CV TRANSCATHETER AORTIC VALVE REPLACEMENT-FEMORAL APPROACH N/A 11/08/2022    Procedure: Transcatheter Aortic Valve Replacement-Femoral Approach;  Surgeon: Yulia Castano MD;  Location: [facility] CARDIAC CATH LAB    CYSTOSCOPY N/A 09/06/2017    Procedure: CYSTOSCOPY;  CYSTOSCOPY AND HYDRODISTENTION ;  Surgeon: Eyal Bai MD;  Location:  OR    ENT SURGERY      Tonsillectomy    EP PACEMAKER DEVICE & LEAD IMPLANT- RIGHT ATRIAL & RIGHT VENTRICULAR N/A 11/08/2022    Procedure: Pacemaker Device & Lead Implant - Right Atrial & Right Ventricular;  Surgeon: William Boyd MD;  Location:  HEART CARDIAC CATH LAB    ESOPHAGOSCOPY, GASTROSCOPY, DUODENOSCOPY (EGD), COMBINED N/A 09/26/2016    Procedure: COMBINED ESOPHAGOSCOPY, GASTROSCOPY, DUODENOSCOPY (EGD), BIOPSY SINGLE OR MULTIPLE;  Surgeon: Marco Monaco MD;  Location:  GI    EYE SURGERY Left 05/2019    GENITOURINARY SURGERY      GYN SURGERY      Hysterectomy    HERNIA REPAIR, UMBILICAL  07/08/2010    Dr. Kirt Franco times 3    IMPLANT STIMULATOR SACRAL NERVE STAGE ONE N/A 09/17/2020    Procedure: sacral neurostimulation stage one implant of  neurostimulator lead;  Surgeon: Shahnaz Carbone MD;  Location:  OR    IMPLANT STIMULATOR SACRAL NERVE STAGE TWO Right 2020    Procedure: Removal of interstem lead, NOT implanting neurostimulator;  Surgeon: Shahnaz Carbone MD;  Location:  OR    ORTHOPEDIC SURGERY      O - Dr. Lund- bilateral knee replacement    RESECT SMALL BOWEL WITHOUT OSTOMY N/A 2023    Procedure: Small bowel resection, Revision of anastamosis;  Surgeon: Rafi Franco MD;  Location:  OR    [facility] NONSPECIFIC PROCEDURE      T&A    [facility] NONSPECIFIC PROCEDURE      Vaginal Hysterectomy (has her ovaries)    Z NONSPECIFIC PROCEDURE      PPTL    [facility] NONSPECIFIC PROCEDURE      L shoulder to remove bone spurs    Z NONSPECIFIC PROCEDURE      cysto and durasphere Dr Demarco    [facility] NONSPECIFIC PROCEDURE  2005    cysto and durasphere    ZZC NONSPECIFIC PROCEDURE  2007    cysto and durasphere    ZZ NONSPECIFIC PROCEDURE  2009    retropubic TVT sling       ALLERGIES     Allergies   Allergen Reactions    Amoxicillin-Pot Clavulanate Diarrhea    Hydrocodone      Keeps patient awake    Morphine And Related Nausea and Vomiting    Naproxen Itching and Rash    Seasonal Allergies      Hay fever in fall, ragweed and russian thistles    Sulfa Antibiotics Nausea       FAMILY HISTORY:  Family History   Problem Relation Age of Onset    Heart Disease Mother         heart surgery , new valve    Gallbladder Disease Mother     Coronary Artery Disease Mother     Hyperlipidemia Mother     Asthma Mother     Hypertension Mother     Anesthesia Reaction Mother     Cancer Father         throat ca,  76    Coronary Artery Disease Father     Other Cancer Father     Heart Disease Maternal Grandmother     Coronary Artery Disease Maternal Grandmother     Heart Disease Maternal Grandfather     Coronary Artery Disease Maternal Grandfather     Cancer Brother         lung    Aortic stenosis Brother         TAVR     "Diabetes Brother         Half Brother       SOCIAL HISTORY:  Social History     Socioeconomic History    Marital status: Single     Spouse name: None    Number of children: None    Years of education: None    Highest education level: None   Tobacco Use    Smoking status: Never     Passive exposure: Never    Smokeless tobacco: Never    Tobacco comments:     have never smoked anything   Vaping Use    Vaping Use: Never used   Substance and Sexual Activity    Alcohol use: No    Drug use: No    Sexual activity: Not Currently     Partners: Male     Birth control/protection: None     Comment: have had a hysterectomy   Other Topics Concern    Parent/sibling w/ CABG, MI or angioplasty before 65F 55M? No         Review of Systems:  Skin:  Negative     Eyes:  Negative    ENT:  Negative    Respiratory:  Negative    Cardiovascular:  Negative    Gastroenterology: Negative    Genitourinary:  Negative    Musculoskeletal:    arthritis  Neurologic:  Positive for    Psychiatric:  Negative    Heme/Lymph/Imm:  Negative    Endocrine:  Positive for thyroid disorder    EXAM:  /64 (BP Location: Right arm, Patient Position: Sitting, Cuff Size: Adult Regular)   Pulse 60   Ht 1.613 m (5' 3.5\")   Wt 79.3 kg (174 lb 14.4 oz)   LMP  (LMP Unknown)   SpO2 100%   BMI 30.50 kg/m    Physical Exam:  Vitals: /64 (BP Location: Right arm, Patient Position: Sitting, Cuff Size: Adult Regular)   Pulse 60   Ht 1.613 m (5' 3.5\")   Wt 79.3 kg (174 lb 14.4 oz)   LMP  (LMP Unknown)   SpO2 100%   BMI 30.50 kg/m      Constitutional:  cooperative, alert and oriented, well developed, well nourished, in no acute distress        Skin:  warm and dry to the touch, no apparent skin lesions or masses noted surgical scars well-healed      Head:  normocephalic, no masses or lesions        Eyes:  pupils equal and round        ENT:  no pallor or cyanosis, dentition good        Neck:  carotid pulses are full and equal bilaterally        Chest:  normal " breath sounds, clear to auscultation, normal A-P diameter, normal symmetry, normal respiratory excursion, no use of accessory muscles        Cardiac: regular rhythm     no presence of murmur            Abdomen:  not assessed this visit        Extremities and Back:  no edema        Neurological:  no gross motor deficits        Labs:  LIPID RESULTS:  Lab Results   Component Value Date    CHOL 193 05/18/2023    CHOL 157 02/24/2021    HDL 49 (L) 05/18/2023    HDL 50 02/24/2021     (H) 05/18/2023    LDL 85 02/24/2021    TRIG 159 (H) 05/18/2023    TRIG 110 02/24/2021    CHOLHDLRATIO 3.3 06/29/2015       LIVER ENZYME RESULTS:  Lab Results   Component Value Date    AST 36 07/05/2023    AST 21 02/24/2021    ALT 15 07/05/2023    ALT 18 02/24/2021       CBC RESULTS:  Lab Results   Component Value Date    WBC 6.1 11/08/2023    WBC 11.3 (H) 03/12/2021    RBC 4.95 11/08/2023    RBC 4.56 03/12/2021    HGB 15.2 11/08/2023    HGB 13.8 03/12/2021    HCT 47.7 (H) 11/08/2023    HCT 43.7 03/12/2021    MCV 96 11/08/2023    MCV 96 03/12/2021    MCH 30.7 11/08/2023    MCH 30.3 03/12/2021    MCHC 31.9 11/08/2023    MCHC 31.6 03/12/2021    RDW 12.6 11/08/2023    RDW 13.1 03/12/2021     (L) 11/08/2023     03/12/2021       BMP RESULTS:  Lab Results   Component Value Date     11/08/2023     02/24/2021    POTASSIUM 4.6 11/08/2023    POTASSIUM 4.2 11/09/2022    POTASSIUM 3.9 02/24/2021    CHLORIDE 102 11/08/2023    CHLORIDE 108 11/09/2022    CHLORIDE 107 02/24/2021    CO2 27 11/08/2023    CO2 22 11/09/2022    CO2 28 02/24/2021    ANIONGAP 10 11/08/2023    ANIONGAP 10 11/09/2022    ANIONGAP 5 02/24/2021     (H) 11/08/2023     (H) 08/15/2023    GLC 95 11/09/2022     (H) 02/24/2021    BUN 23.4 (H) 11/08/2023    BUN 16 11/09/2022    BUN 18 02/24/2021    CR 1.05 (H) 11/08/2023    CR 0.90 02/24/2021    GFRESTIMATED 53 (L) 11/08/2023    GFRESTIMATED 56 (L) 07/05/2023    GFRESTIMATED 60 (L) 02/24/2021     GFRESTBLACK 70 02/24/2021    CINDY 10.2 11/08/2023    CINDY 9.9 02/24/2021        A1C RESULTS:  Lab Results   Component Value Date    A1C 6.0 (H) 08/08/2023    A1C 6.1 (H) 02/24/2021       INR RESULTS:  Lab Results   Component Value Date    INR 1.06 10/27/2022    INR 1.07 09/30/2022    INR 1.15 (H) 03/28/2018    INR 1.65 (H) 07/03/2009         KCCQ  Q1A 5  Q1B 5  Q1C 5  Q2 5  Q3 6  Q4 7  Q5 5  Q6 5  Q7 5  Q8A 5  Q8B 5  Q8C 5    Kathy Olvera, APRN, CNP  11/8/2023

## 2023-11-08 NOTE — PATIENT INSTRUCTIONS
Thanks for participating in a office visit with the [facility] Heart clinic today.    Doing well on a cardiac standpoint   Reviewed results of echocardiogram - aortic valve is working well.   Labs are stable.   EKG shows normal rhythm  Blood pressures well controlled   Continue current medical therapy   Encourage continued exercise   Echocardiogram in 1 year  Follow up in 1 year  Pacemaker checks as scheduled   Reviewed possible Watchman device       Please call my nurse at  [phone] with any questions or concerns.    Scheduling phone number: [phone]  Reminder: Please bring in all current medications, over the counter supplements and vitamin bottles to your next appointment.

## 2023-11-15 ASSESSMENT — ENCOUNTER SYMPTOMS
SORE THROAT: 0
FEVER: 0
FREQUENCY: 1
WEAKNESS: 0
NERVOUS/ANXIOUS: 0
HEMATURIA: 0
BREAST MASS: 0
PARESTHESIAS: 0
CHILLS: 0
COUGH: 0
DIZZINESS: 0
SHORTNESS OF BREATH: 0
DYSURIA: 0
MYALGIAS: 1
ARTHRALGIAS: 1
NAUSEA: 0
HEMATOCHEZIA: 0
CONSTIPATION: 0
PALPITATIONS: 0
DIARRHEA: 0
HEADACHES: 0
EYE PAIN: 0
HEARTBURN: 0
ABDOMINAL PAIN: 0
JOINT SWELLING: 0

## 2023-11-15 ASSESSMENT — ACTIVITIES OF DAILY LIVING (ADL): CURRENT_FUNCTION: NO ASSISTANCE NEEDED

## 2023-11-17 ENCOUNTER — MYC MEDICAL ADVICE (OUTPATIENT)
Dept: CARDIOLOGY | Facility: CLINIC | Age: 83
End: 2023-11-17
Payer: COMMERCIAL

## 2023-11-17 ENCOUNTER — TELEPHONE (OUTPATIENT)
Dept: CARDIOLOGY | Facility: CLINIC | Age: 83
End: 2023-11-17
Payer: COMMERCIAL

## 2023-11-17 NOTE — TELEPHONE ENCOUNTER
"Received pt call re: results from device check in [facility] on 11/8/23, they are not yet available in [employer] for review.  They remain in preliminary status and are awaiting co-signature from the MD.  Pt has an appt at the beginning of next week with her PCP and would like to have a .  Offered pt to cut and paste the results into a [employer] message with the understanding that it is an \"unofficial\" version and has not yet been reviewed by the physician.  Pt verbalized understanding.    BEN Briseno  "

## 2023-11-22 ENCOUNTER — OFFICE VISIT (OUTPATIENT)
Dept: FAMILY MEDICINE | Facility: CLINIC | Age: 83
End: 2023-11-22
Attending: INTERNAL MEDICINE
Payer: COMMERCIAL

## 2023-11-22 VITALS
RESPIRATION RATE: 15 BRPM | HEIGHT: 64 IN | OXYGEN SATURATION: 100 % | WEIGHT: 196.3 LBS | TEMPERATURE: 98 F | BODY MASS INDEX: 33.51 KG/M2 | HEART RATE: 61 BPM | DIASTOLIC BLOOD PRESSURE: 68 MMHG | SYSTOLIC BLOOD PRESSURE: 118 MMHG

## 2023-11-22 DIAGNOSIS — E08.65: ICD-10-CM

## 2023-11-22 DIAGNOSIS — E78.5 HYPERLIPIDEMIA LDL GOAL <130: ICD-10-CM

## 2023-11-22 DIAGNOSIS — E66.811 OBESITY (BMI 30.0-34.9): ICD-10-CM

## 2023-11-22 DIAGNOSIS — K21.9 GASTROESOPHAGEAL REFLUX DISEASE WITHOUT ESOPHAGITIS: ICD-10-CM

## 2023-11-22 DIAGNOSIS — E03.9 ACQUIRED HYPOTHYROIDISM: ICD-10-CM

## 2023-11-22 DIAGNOSIS — Z00.00 ENCOUNTER FOR MEDICARE ANNUAL WELLNESS EXAM: Primary | ICD-10-CM

## 2023-11-22 DIAGNOSIS — Z95.0 CARDIAC PACEMAKER IN SITU: ICD-10-CM

## 2023-11-22 DIAGNOSIS — I10 ESSENTIAL HYPERTENSION, BENIGN: ICD-10-CM

## 2023-11-22 DIAGNOSIS — Z95.3 S/P TAVR (TRANSCATHETER AORTIC VALVE REPLACEMENT), BIOPROSTHETIC: ICD-10-CM

## 2023-11-22 LAB
ALBUMIN SERPL BCG-MCNC: 4.2 G/DL (ref 3.5–5.2)
ALP SERPL-CCNC: 104 U/L (ref 40–150)
ALT SERPL W P-5'-P-CCNC: 17 U/L (ref 0–50)
ANION GAP SERPL CALCULATED.3IONS-SCNC: 12 MMOL/L (ref 7–15)
AST SERPL W P-5'-P-CCNC: 32 U/L (ref 0–45)
BILIRUB SERPL-MCNC: 0.6 MG/DL
BUN SERPL-MCNC: 24.1 MG/DL (ref 8–23)
CALCIUM SERPL-MCNC: 10.1 MG/DL (ref 8.8–10.2)
CHLORIDE SERPL-SCNC: 102 MMOL/L (ref 98–107)
CHOLEST SERPL-MCNC: 205 MG/DL
CREAT SERPL-MCNC: 0.9 MG/DL (ref 0.51–0.95)
DEPRECATED HCO3 PLAS-SCNC: 25 MMOL/L (ref 22–29)
EGFRCR SERPLBLD CKD-EPI 2021: 64 ML/MIN/1.73M2
GLUCOSE SERPL-MCNC: 118 MG/DL (ref 70–99)
HBA1C MFR BLD: 6.1 % (ref 0–5.6)
HDLC SERPL-MCNC: 56 MG/DL
LDLC SERPL CALC-MCNC: 119 MG/DL
NONHDLC SERPL-MCNC: 149 MG/DL
POTASSIUM SERPL-SCNC: 4.2 MMOL/L (ref 3.4–5.3)
PROT SERPL-MCNC: 7.2 G/DL (ref 6.4–8.3)
SODIUM SERPL-SCNC: 139 MMOL/L (ref 135–145)
TRIGL SERPL-MCNC: 149 MG/DL
VIT D+METAB SERPL-MCNC: 114 NG/ML (ref 20–50)

## 2023-11-22 PROCEDURE — 36415 COLL VENOUS BLD VENIPUNCTURE: CPT | Performed by: INTERNAL MEDICINE

## 2023-11-22 PROCEDURE — 80061 LIPID PANEL: CPT | Performed by: INTERNAL MEDICINE

## 2023-11-22 PROCEDURE — 82306 VITAMIN D 25 HYDROXY: CPT | Mod: GZ | Performed by: INTERNAL MEDICINE

## 2023-11-22 PROCEDURE — G0439 PPPS, SUBSEQ VISIT: HCPCS | Performed by: INTERNAL MEDICINE

## 2023-11-22 PROCEDURE — 80053 COMPREHEN METABOLIC PANEL: CPT | Performed by: INTERNAL MEDICINE

## 2023-11-22 PROCEDURE — 83036 HEMOGLOBIN GLYCOSYLATED A1C: CPT | Performed by: INTERNAL MEDICINE

## 2023-11-22 PROCEDURE — 99214 OFFICE O/P EST MOD 30 MIN: CPT | Mod: 25 | Performed by: INTERNAL MEDICINE

## 2023-11-22 RX ORDER — SIMVASTATIN 20 MG
TABLET ORAL
Qty: 90 TABLET | Refills: 3 | Status: SHIPPED | OUTPATIENT
Start: 2023-11-22

## 2023-11-22 RX ORDER — AMLODIPINE BESYLATE 5 MG/1
TABLET ORAL
Qty: 90 TABLET | Refills: 3 | Status: SHIPPED | OUTPATIENT
Start: 2023-11-22

## 2023-11-22 RX ORDER — LEVOTHYROXINE SODIUM 50 UG/1
50 TABLET ORAL DAILY
Qty: 90 TABLET | Refills: 3 | Status: SHIPPED | OUTPATIENT
Start: 2023-11-22

## 2023-11-22 RX ORDER — LISINOPRIL 30 MG/1
30 TABLET ORAL EVERY MORNING
Qty: 90 TABLET | Refills: 3 | Status: SHIPPED | OUTPATIENT
Start: 2023-11-22

## 2023-11-22 RX ORDER — PANTOPRAZOLE SODIUM 20 MG/1
20 TABLET, DELAYED RELEASE ORAL DAILY
Qty: 90 TABLET | Refills: 3 | Status: SHIPPED | OUTPATIENT
Start: 2023-11-22

## 2023-11-22 ASSESSMENT — ENCOUNTER SYMPTOMS
FREQUENCY: 1
HEARTBURN: 0
JOINT SWELLING: 0
ABDOMINAL PAIN: 0
BREAST MASS: 0
DIARRHEA: 0
PARESTHESIAS: 0
MYALGIAS: 1
HEMATOCHEZIA: 0
FEVER: 0
SORE THROAT: 0
DYSURIA: 0
NAUSEA: 0
COUGH: 0
EYE PAIN: 0
NERVOUS/ANXIOUS: 0
ARTHRALGIAS: 1
PALPITATIONS: 0
CONSTIPATION: 0
HEMATURIA: 0
DIZZINESS: 0
HEADACHES: 0
SHORTNESS OF BREATH: 0
CHILLS: 0
WEAKNESS: 0

## 2023-11-22 ASSESSMENT — ACTIVITIES OF DAILY LIVING (ADL): CURRENT_FUNCTION: NO ASSISTANCE NEEDED

## 2023-11-22 ASSESSMENT — PAIN SCALES - GENERAL: PAINLEVEL: WORST PAIN (10)

## 2023-11-22 NOTE — PATIENT INSTRUCTIONS
Patient Education   Personalized Prevention Plan  You are due for the preventive services outlined below.  Your care team is available to assist you in scheduling these services.  If you have already completed any of these items, please share that information with your care team to update in your medical record.  Health Maintenance Due   Topic Date Due     Heart Failure Action Plan  Never done     A1C Lab  11/08/2023     Annual Wellness Visit  11/21/2023

## 2023-11-22 NOTE — PROGRESS NOTES
"Chief Complaint   Patient presents with    Wellness Visit    Hypertension    Lipids    Thyroid Problem    Diabetes       SUBJECTIVE:   Lexii is a 82 year old, presenting for the following:  Wellness Visit, Hypertension, Lipids, Thyroid Problem, and Diabetes        11/22/2023     9:30 AM   Additional Questions   Roomed by Nan TOMLINSON LPN       Are you in the first 12 months of your Medicare coverage?  No    Healthy Habits:     In general, how would you rate your overall health?  Good    Frequency of exercise:  2-3 days/week    Duration of exercise:  30-45 minutes    Do you usually eat at least 4 servings of fruit and vegetables a day, include whole grains    & fiber and avoid regularly eating high fat or \"junk\" foods?  No    Taking medications regularly:  Yes    Medication side effects:  None    Ability to successfully perform activities of daily living:  No assistance needed    Home Safety:  No safety concerns identified    Hearing Impairment:  Difficult to understand a speaker at a public meeting or [employer] service    In the past 6 months, have you been bothered by leaking of urine? Yes    In general, how would you rate your overall mental or emotional health?  Good    Additional concerns today:  No          Have you ever done Advance Care Planning? (For example, a Health Directive, POLST, or a discussion with a medical provider or your loved ones about your wishes): Yes, advance care planning is on file.       Fall risk  Fallen 2 or more times in the past year?: No  Any fall with injury in the past year?: No    Cognitive Screening   1) Repeat 3 items (Leader, Season, Table)    2) Clock draw: NORMAL  3) 3 item recall: Recalls 3 objects  Results: 3 items recalled: COGNITIVE IMPAIRMENT LESS LIKELY    Mini-CogTM Copyright LUCIE Velasquez. Licensed by the author for use in [facility]; reprinted with permission (minerva@.[facility]). All rights reserved.      Do you have sleep apnea, excessive snoring or daytime " drowsiness? : no    Reviewed and updated as needed this visit by clinical staff   Tobacco  Allergies  Meds  Problems  Med Hx  Surg Hx  Fam Hx          Reviewed and updated as needed this visit by Provider   Tobacco  Allergies  Meds  Problems  Med Hx  Surg Hx  Fam Hx         Social History     Tobacco Use    Smoking status: Never     Passive exposure: Never    Smokeless tobacco: Never    Tobacco comments:     have never smoked anything   Substance Use Topics    Alcohol use: No             11/15/2023    12:53 PM   Alcohol Use   Prescreen: >3 drinks/day or >7 drinks/week? No     Do you have a current opioid prescription? No  Do you use any other controlled substances or medications that are not prescribed by a provider? None        Diabetes Follow-up    How often are you checking your blood sugar? Not at all  What concerns do you have today about your diabetes? None   Do you have any of these symptoms? (Select all that apply)  No numbness or tingling in feet.  No redness, sores or blisters on feet.  No complaints of excessive thirst.  No reports of blurry vision.  No significant changes to weight.        Hyperlipidemia Follow-Up    Are you regularly taking any medication or supplement to lower your cholesterol?   Yes- Simvastatin  Are you having muscle aches or other side effects that you think could be caused by your cholesterol lowering medication?  Yes- Simvastatin     Hypertension Follow-up    Do you check your blood pressure regularly outside of the clinic? Yes   Are you following a low salt diet? Yes  Are your blood pressures ever more than 140 on the top number (systolic) OR more   than 90 on the bottom number (diastolic), for example 140/90? No    BP Readings from Last 2 Encounters:   11/22/23 118/68   11/08/23 117/64     Hemoglobin A1C (%)   Date Value   08/08/2023 6.0 (H)   05/18/2023 6.0 (H)   02/24/2021 6.1 (H)   08/26/2020 5.9 (H)     LDL Cholesterol Calculated (mg/dL)   Date Value    05/18/2023 112 (H)   07/28/2022 62   02/24/2021 85   08/26/2020 83         Hypothyroidism Follow-up    Since last visit, patient describes the following symptoms: Weight stable, no hair loss, no skin changes, no constipation, no loose stools    Current providers sharing in care for this patient include:   Patient Care Team:  Jesenia Madrigal MD as PCP - General (Internal Medicine)  Jesenia Madrigal MD as Assigned PCP  Jesenia Madrigal MD as Referring Physician (Internal Medicine)  Isaura Kulkarni CNP as Assigned Heart and Vascular Provider  Brandie Venegas EP as Cardiac Rehabilitation Therapist  Rafi Franco MD as Assigned Surgical Provider    The following health maintenance items are reviewed in Epic and correct as of today:  Health Maintenance   Topic Date Due    HF ACTION PLAN  Never done    A1C  11/08/2023    MEDICARE ANNUAL WELLNESS VISIT  11/21/2023    COVID-19 Vaccine (7 - 2023-24 season) 12/04/2023    EYE EXAM  04/13/2024    BMP  05/08/2024    LIPID  05/18/2024    MICROALBUMIN  05/18/2024    DIABETIC FOOT EXAM  05/22/2024    ALT  07/05/2024    ANNUAL REVIEW OF HM ORDERS  08/08/2024    MAMMO SCREENING  10/11/2024    CBC  11/08/2024    HEMOGLOBIN  11/08/2024    FALL RISK ASSESSMENT  11/22/2024    DEXA  12/07/2027    ADVANCE CARE PLANNING  12/19/2027    COLORECTAL CANCER SCREENING  03/29/2028    TSH W/FREE T4 REFLEX  Completed    PHQ-2 (once per calendar year)  Completed    INFLUENZA VACCINE  Completed    Pneumococcal Vaccine: 65+ Years  Completed    URINALYSIS  Completed    ZOSTER IMMUNIZATION  Completed    RSV VACCINE (Pregnancy & 60+)  Completed    IPV IMMUNIZATION  Aged Out    HPV IMMUNIZATION  Aged Out    MENINGITIS IMMUNIZATION  Aged Out    RSV MONOCLONAL ANTIBODY  Aged Out    DTAP/TDAP/TD IMMUNIZATION  Discontinued     Labs reviewed in EPIC  BP Readings from Last 3 Encounters:   11/22/23 118/68   11/08/23 117/64   09/05/23 118/66    Wt Readings from Last 3 Encounters:    11/22/23 89 kg (196 lb 4.8 oz)   11/08/23 79.3 kg (174 lb 14.4 oz)   09/05/23 83.9 kg (185 lb)                  Patient Active Problem List   Diagnosis    Generalized osteoarthrosis, unspecified site    Esophageal reflux    Obesity    Other symptoms involving urinary system    Acquired hypothyroidism    Hypersomnia with sleep apnea    Essential hypertension, benign    Impaired fasting glucose    Hyperlipidemia LDL goal <130    Advance Care Planning    Chest pain syndrome    Partial small bowel obstruction (H)    Anemia, unspecified    Iron and its compounds causing adverse effect in therapeutic use(E934.0)    S/P total hip arthroplasty    Overactive bladder    Elevated hemoglobin A1c    Pelvic floor dysfunction    Diabetes mellitus, type 2 (H)    SBO (small bowel obstruction) (H)    Chronic kidney disease, stage 3a (H)    Small bowel obstruction (H)    Aortic valve stenosis    Status post coronary angiogram    Aortic stenosis, severe    Diabetes mellitus due to underlying condition, controlled, with hyperglycemia, without long-term current use of insulin (H)    Complete heart block (H)    Cardiac pacemaker in situ    Diastolic heart failure (H)     Past Surgical History:   Procedure Laterality Date    ABDOMEN SURGERY      ARTHROPLASTY HIP Right 11/09/2016    Procedure: ARTHROPLASTY HIP;  Surgeon: Angel Lund MD;  Location: [facility] OR    AS REPAIR INCISIONAL HERNIA,REDUCIBLE  1998    inc hernia ( Yamileth surgery)    BIOPSY      BLADDER SURGERY      hyperdistention surgery and sling    BREAST SURGERY      CARDIAC SURGERY  2022    CHOLECYSTECTOMY  1987    COLONOSCOPY  12/03/2011    Procedure:COLONOSCOPY; COLONOSCOPY; Surgeon:SEMAJ GRAHAM; Location: GI    COLONOSCOPY N/A 03/29/2018    Procedure: COLONOSCOPY;  COLONOSCOPY Rm 544;  Surgeon: Marco Gusman MD;  Location:  GI    CV CORONARY ANGIOGRAM N/A 09/30/2022    Procedure: Coronary Angiogram;  Surgeon: Garcia Barber MD;  Location: [facility]  CARDIAC CATH LAB    CV TRANSCATHETER AORTIC VALVE REPLACEMENT-FEMORAL APPROACH N/A 11/08/2022    Procedure: Transcatheter Aortic Valve Replacement-Femoral Approach;  Surgeon: Yulia Castano MD;  Location:  HEART CARDIAC CATH LAB    CYSTOSCOPY N/A 09/06/2017    Procedure: CYSTOSCOPY;  CYSTOSCOPY AND HYDRODISTENTION ;  Surgeon: Eyal Bai MD;  Location:  OR    ENT SURGERY      Tonsillectomy    EP PACEMAKER DEVICE & LEAD IMPLANT- RIGHT ATRIAL & RIGHT VENTRICULAR N/A 11/08/2022    Procedure: Pacemaker Device & Lead Implant - Right Atrial & Right Ventricular;  Surgeon: William Boyd MD;  Location:  HEART CARDIAC CATH LAB    ESOPHAGOSCOPY, GASTROSCOPY, DUODENOSCOPY (EGD), COMBINED N/A 09/26/2016    Procedure: COMBINED ESOPHAGOSCOPY, GASTROSCOPY, DUODENOSCOPY (EGD), BIOPSY SINGLE OR MULTIPLE;  Surgeon: Marco Monaco MD;  Location:  GI    EYE SURGERY Left 05/2019    GENITOURINARY SURGERY      GYN SURGERY      Hysterectomy    HERNIA REPAIR, UMBILICAL  07/08/2010    Dr. Kirt Franco times 3    IMPLANT STIMULATOR SACRAL NERVE STAGE ONE N/A 09/17/2020    Procedure: sacral neurostimulation stage one implant of neurostimulator lead;  Surgeon: Shahnaz Carbone MD;  Location:  OR    IMPLANT STIMULATOR SACRAL NERVE STAGE TWO Right 09/24/2020    Procedure: Removal of interstem lead, NOT implanting neurostimulator;  Surgeon: Shahnaz Carbone MD;  Location:  OR    ORTHOPEDIC SURGERY  2009    [facility] - Dr. Lund- bilateral knee replacement    RESECT SMALL BOWEL WITHOUT OSTOMY N/A 8/11/2023    Procedure: Small bowel resection, Revision of anastamosis;  Surgeon: Rafi Franco MD;  Location:  OR    ZZC NONSPECIFIC PROCEDURE  1946    T&A    ZZC NONSPECIFIC PROCEDURE  1984    Vaginal Hysterectomy (has her ovaries)    ZZC NONSPECIFIC PROCEDURE  1973    PPTL    ZZC NONSPECIFIC PROCEDURE  1994    L shoulder to remove bone spurs    ZZC NONSPECIFIC PROCEDURE  2004    cysto and  durasphere Dr Demarco    [facility] NONSPECIFIC PROCEDURE  2005    cysto and durasphere    [facility] NONSPECIFIC PROCEDURE  2007    cysto and durasphere    [facility] NONSPECIFIC PROCEDURE  2009    retropubic TVT sling       Social History     Tobacco Use    Smoking status: Never     Passive exposure: Never    Smokeless tobacco: Never    Tobacco comments:     have never smoked anything   Substance Use Topics    Alcohol use: No     Family History   Problem Relation Age of Onset    Heart Disease Mother         heart surgery , new valve    Gallbladder Disease Mother     Coronary Artery Disease Mother     Hyperlipidemia Mother     Asthma Mother     Hypertension Mother     Anesthesia Reaction Mother     Cancer Father         throat ca,  76    Coronary Artery Disease Father     Other Cancer Father     Heart Disease Maternal Grandmother     Coronary Artery Disease Maternal Grandmother     Heart Disease Maternal Grandfather     Coronary Artery Disease Maternal Grandfather     Cancer Brother         lung    Aortic stenosis Brother         TAVR    Diabetes Brother         Half Brother         Current Outpatient Medications   Medication Sig Dispense Refill    acetaminophen (TYLENOL) 500 MG tablet Take 1,000 mg by mouth 2 times daily (2 x 500 mg = 1000 mg)      amLODIPine (NORVASC) 5 MG tablet TAKE ONE TABLET BY MOUTH EVERY NIGHT AT BEDTIME 90 tablet 3    amoxicillin (AMOXIL) 500 MG capsule Take 2,000 mg by mouth once as needed (1 hour prior to dental procedures 4 x 500 mg = 2000 mg)      apixaban ANTICOAGULANT (ELIQUIS ANTICOAGULANT) 5 MG tablet Take 1 tablet (5 mg) by mouth 2 times daily 180 tablet 3    Biotin 5000 MCG PO TABS Take 1 tablet by mouth daily      calcium carbonate (OS-CINDY) 500 MG tablet Take 1 tablet by mouth every other day      CENTRUM SILVER OR TABS Take 1 tablet by mouth daily 30 0    Cranberry 450 MG CAPS Take 1 capsule by mouth daily (with dinner)      ferrous gluconate (FERGON) 324 (38 FE) MG tablet Take 1 tablet (324  mg) by mouth 2 times daily (Patient taking differently: Take 1 tablet by mouth daily (with breakfast)) 100 tablet 0    latanoprost (XALATAN) 0.005 % ophthalmic solution Place 1 drop into both eyes At Bedtime       levothyroxine (SYNTHROID/LEVOTHROID) 50 MCG tablet Take 1 tablet (50 mcg) by mouth daily 90 tablet 3    lisinopril (ZESTRIL) 30 MG tablet Take 1 tablet (30 mg) by mouth every morning 90 tablet 3    magnesium 250 MG tablet Take 1 tablet by mouth daily      nitroGLYcerin (NITROSTAT) 0.4 MG sublingual tablet For chest pain place 1 tablet under the tongue every 5 minutes for 3 doses. If symptoms persist 5 minutes after 1st dose call 911. (Patient taking differently: 0.4 mg every 5 minutes as needed For chest pain place 1 tablet under the tongue every 5 minutes for 3 doses. If symptoms persist 5 minutes after 1st dose call 911.) 25 tablet 11    OMEGA-3 FATTY ACIDS 1200 MG OR CAPS Take 1 capsule by mouth daily  0    pantoprazole (PROTONIX) 20 MG EC tablet Take 1 tablet (20 mg) by mouth daily 90 tablet 3    potassium 99 MG TABS Take 1 tablet by mouth daily (with dinner)       Probiotic Product (ACIDOPHILUS PROBIOTIC BLEND) CAPS Take 1 capsule by mouth daily (with breakfast)  30 capsule 0    simvastatin (ZOCOR) 20 MG tablet TAKE ONE TABLET BY MOUTH EVERY NIGHT AT BEDTIME 90 tablet 3    VITAMIN D, CHOLECALCIFEROL, PO Take 2,000 Units by mouth daily       Allergies   Allergen Reactions    Amoxicillin-Pot Clavulanate Diarrhea    Hydrocodone      Keeps patient awake    Morphine And Related Nausea and Vomiting    Naproxen Itching and Rash    Seasonal Allergies      Hay fever in fall, ragweed and russian thistles    Sulfa Antibiotics Nausea     Recent Labs   Lab Test 11/08/23  1006 08/15/23  0656 08/11/23  1824 08/08/23  0946 07/05/23  1756 07/05/23  1755 05/18/23  0829 11/08/22  0904 10/27/22  1553 09/30/22  0754 07/28/22  0759 07/03/22  1531 02/01/22  0801 08/16/21  1114 02/24/21  0856 08/26/20  0933   A1C  --   --    --  6.0*  --   --  6.0*  --   --   --  5.6  --  5.6   < > 6.1* 5.9*   LDL  --   --   --   --   --   --  112*  --   --   --  62  --  73  --  85 83   HDL  --   --   --   --   --   --  49*  --   --   --  50  --  50  --  50 43*   TRIG  --   --   --   --   --   --  159*  --   --   --  101  --  128  --  110 133   ALT  --   --   --   --   --  15 13  --  20  --  15   < > 18   < > 18 18   CR 1.05* 0.71   < > 0.95   < > 0.94 0.77   < > 0.70   < > 0.92   < > 1.00   < > 0.90 1.06*   GFRESTIMATED 53* 84   < > 60*   < > 60* 77   < > 86   < > 62   < > 56*   < > 60* 50*   GFRESTBLACK  --   --   --   --   --   --   --   --   --   --   --   --   --   --  70 58*   POTASSIUM 4.6 4.5  --  4.7   < > 4.5 5.0   < > 4.0   < > 3.8   < > 4.1   < > 3.9 4.5   TSH  --   --   --   --   --   --  2.52  --   --   --   --   --  2.56  --  2.21  --     < > = values in this interval not displayed.        Pertinent mammograms are reviewed under the imaging tab.    Review of Systems   Constitutional:  Negative for chills and fever.   HENT:  Positive for hearing loss. Negative for congestion, ear pain and sore throat.    Eyes:  Negative for pain and visual disturbance.   Respiratory:  Negative for cough and shortness of breath.    Cardiovascular:  Negative for chest pain, palpitations and peripheral edema.   Gastrointestinal:  Negative for abdominal pain, constipation, diarrhea, heartburn, hematochezia and nausea.   Breasts:  Negative for tenderness, breast mass and discharge.   Genitourinary:  Positive for frequency and urgency. Negative for dysuria, genital sores, hematuria, pelvic pain, vaginal bleeding and vaginal discharge.   Musculoskeletal:  Positive for arthralgias and myalgias. Negative for joint swelling.   Skin:  Negative for rash.   Neurological:  Negative for dizziness, weakness, headaches and paresthesias.   Psychiatric/Behavioral:  Negative for mood changes. The patient is not nervous/anxious.          OBJECTIVE:   /68   Pulse 61    "Temp 98  F (36.7  C) (Oral)   Resp 15   Ht 1.613 m (5' 3.5\")   Wt 89 kg (196 lb 4.8 oz)   LMP  (LMP Unknown)   SpO2 100%   BMI 34.23 kg/m   Estimated body mass index is 34.23 kg/m  as calculated from the following:    Height as of this encounter: 1.613 m (5' 3.5\").    Weight as of this encounter: 89 kg (196 lb 4.8 oz).  Physical Exam  GENERAL: healthy, alert and no distress  EYES: Eyes grossly normal to inspection  NECK: no adenopathy, no asymmetry, masses, or scars and thyroid normal to palpation  RESP: lungs clear to auscultation - no rales, rhonchi ; or wheezes  CV: s/p elective TAVR and Post op PPM 11/8/2023;  keeping active.  ABDOMEN: soft, nontender, no hepatosplenomegaly, no masses and bowel sounds normal  MS: no gross musculoskeletal defects noted, no edema  NEURO: Normal strength and tone, sensory exam grossly normal, and mentation intact  PSYCH: mentation appears normal, affect normal/bright  Diabetic foot exam: normal DP and PT pulses, no trophic changes or ulcerative lesions, normal sensory exam, and normal monofilament exam    Diagnostic Test Results:  Labs reviewed in Epic          ASSESSMENT/PLAN:    (Z00.00) Encounter for Medicare annual wellness exam  (primary encounter diagnosis)  Comment: HEALTH CARE MAINTENANCE reviewed  Plan: mammo up to date; immunizations reviewed.     (I10) Essential hypertension, benign  Comment: BP well controlled on current therapy;  Plan: amLODIPine (NORVASC) 5 MG tablet, lisinopril         (ZESTRIL) 30 MG tablet, Comprehensive metabolic        panel (BMP + Alb, Alk Phos, ALT, AST, Total.         Bili, TP)        Continue current medications    (E03.9) Acquired hypothyroidism  Comment: low dose thyroid replacement; previous labs reviewed. no change in therapy  Plan: levothyroxine (SYNTHROID/LEVOTHROID) 50 MCG         tablet          (K21.9) Gastroesophageal reflux disease without esophagitis  Comment: PPI use reviewed.   Plan: pantoprazole (PROTONIX) 20 MG EC " "tablet,         Vitamin D Deficiency        Pt reminded of benefits of maximizing calcium and vit D to help with bone health (due to being on PPI)     (E78.5) Hyperlipidemia LDL goal <130  Comment: LDL at goal   Plan: simvastatin (ZOCOR) 20 MG tablet, Lipid panel         reflex to direct LDL Fasting, Comprehensive         metabolic panel (BMP + Alb, Alk Phos, ALT, AST,        Total. Bili, TP)          (E08.65) Diabetes mellitus due to underlying condition, controlled, with hyperglycemia, without long-term current use of insulin (H)  Comment: Diabetes well controlled; medications reviewed.   Plan: Hemoglobin A1c, Comprehensive metabolic panel         (BMP + Alb, Alk Phos, ALT, AST, Total. Bili,         TP); Foot Exam    (E66.9) Obesity (BMI 30.0-34.9)  Comment: keeping active; exercising regularly; walking at [employer] [place]  Plan: keep active- walking     (Z95.3) S/p TAVR (transcatheter aortic valve replacement), bioprosthetic  Comment: s/p 11/8/2023 TAVR, elective TAVR; needed PPM following TAVR.  Plan: regular exercise    (Z95.0) Cardiac pacemaker in situ  Comment: 11/8/2023 PPM placed following TAVR; doing well with PPM; keeping acitve  Plan:       Review of the result(s) of each unique test - chart reviewed including medications, labs and plan of care,   Ordering of each unique test  Prescription drug management       BMI:   Estimated body mass index is 32.77 kg/m  as calculated from the following:    Height as of this encounter: 1.67 m (5' 5.75\").    Weight as of this encounter: 91.4 kg (201 lb 8 oz).   Weight management plan: Discussed healthy diet and exercise guidelines    MEDICATIONS:   Orders Placed This Encounter   Medications    amLODIPine (NORVASC) 5 MG tablet     Sig: TAKE ONE TABLET BY MOUTH EVERY NIGHT AT BEDTIME     Dispense:  90 tablet     Refill:  3    levothyroxine (SYNTHROID/LEVOTHROID) 50 MCG tablet     Sig: Take 1 tablet (50 mcg) by mouth daily     Dispense:  90 tablet     Refill:  3    " lisinopril (ZESTRIL) 30 MG tablet     Sig: Take 1 tablet (30 mg) by mouth every morning     Dispense:  90 tablet     Refill:  3    pantoprazole (PROTONIX) 20 MG EC tablet     Sig: Take 1 tablet (20 mg) by mouth daily     Dispense:  90 tablet     Refill:  3    simvastatin (ZOCOR) 20 MG tablet     Sig: TAKE ONE TABLET BY MOUTH EVERY NIGHT AT BEDTIME     Dispense:  90 tablet     Refill:  3          - Continue other medications without change  FUTURE LABS:       - Schedule fasting labs in 6 months  FUTURE APPOINTMENTS:       - Make follow-up visit after next lab draw  Regular exercise      Jesenia Madrigal MD  Internal Medicine   [facility]      30 minutes in addition to HEALTH CARE MAINTENANCE are spent with patient, over 50% of that time spent providing counselling, discussing and reviewing medical conditions/concerns, meds and potential side effects.

## 2023-11-24 LAB
MDC_IDC_LEAD_CONNECTION_STATUS: NORMAL
MDC_IDC_LEAD_CONNECTION_STATUS: NORMAL
MDC_IDC_LEAD_IMPLANT_DT: NORMAL
MDC_IDC_LEAD_IMPLANT_DT: NORMAL
MDC_IDC_LEAD_LOCATION: NORMAL
MDC_IDC_LEAD_LOCATION: NORMAL
MDC_IDC_LEAD_LOCATION_DETAIL_1: NORMAL
MDC_IDC_LEAD_LOCATION_DETAIL_1: NORMAL
MDC_IDC_LEAD_MFG: NORMAL
MDC_IDC_LEAD_MFG: NORMAL
MDC_IDC_LEAD_MODEL: NORMAL
MDC_IDC_LEAD_MODEL: NORMAL
MDC_IDC_LEAD_POLARITY_TYPE: NORMAL
MDC_IDC_LEAD_POLARITY_TYPE: NORMAL
MDC_IDC_LEAD_SERIAL: NORMAL
MDC_IDC_LEAD_SERIAL: NORMAL
MDC_IDC_MSMT_BATTERY_DTM: NORMAL
MDC_IDC_MSMT_BATTERY_REMAINING_LONGEVITY: 144 MO
MDC_IDC_MSMT_BATTERY_REMAINING_PERCENTAGE: 100 %
MDC_IDC_MSMT_BATTERY_STATUS: NORMAL
MDC_IDC_MSMT_LEADCHNL_RA_IMPEDANCE_VALUE: 599 OHM
MDC_IDC_MSMT_LEADCHNL_RA_PACING_THRESHOLD_AMPLITUDE: 0.5 V
MDC_IDC_MSMT_LEADCHNL_RA_PACING_THRESHOLD_PULSEWIDTH: 0.4 MS
MDC_IDC_MSMT_LEADCHNL_RV_IMPEDANCE_VALUE: 722 OHM
MDC_IDC_MSMT_LEADCHNL_RV_PACING_THRESHOLD_AMPLITUDE: 0.9 V
MDC_IDC_MSMT_LEADCHNL_RV_PACING_THRESHOLD_PULSEWIDTH: 0.4 MS
MDC_IDC_PG_IMPLANT_DTM: NORMAL
MDC_IDC_PG_MFG: NORMAL
MDC_IDC_PG_MODEL: NORMAL
MDC_IDC_PG_SERIAL: NORMAL
MDC_IDC_PG_TYPE: NORMAL
MDC_IDC_SESS_CLINIC_NAME: NORMAL
MDC_IDC_SESS_DTM: NORMAL
MDC_IDC_SESS_TYPE: NORMAL
MDC_IDC_SET_BRADY_AT_MODE_SWITCH_MODE: NORMAL
MDC_IDC_SET_BRADY_AT_MODE_SWITCH_RATE: 170 {BEATS}/MIN
MDC_IDC_SET_BRADY_LOWRATE: 60 {BEATS}/MIN
MDC_IDC_SET_BRADY_MAX_SENSOR_RATE: 130 {BEATS}/MIN
MDC_IDC_SET_BRADY_MAX_TRACKING_RATE: 130 {BEATS}/MIN
MDC_IDC_SET_BRADY_MODE: NORMAL
MDC_IDC_SET_BRADY_PAV_DELAY_HIGH: 200 MS
MDC_IDC_SET_BRADY_PAV_DELAY_LOW: 250 MS
MDC_IDC_SET_BRADY_SAV_DELAY_HIGH: 200 MS
MDC_IDC_SET_BRADY_SAV_DELAY_LOW: 250 MS
MDC_IDC_SET_LEADCHNL_RA_PACING_AMPLITUDE: 2 V
MDC_IDC_SET_LEADCHNL_RA_PACING_CAPTURE_MODE: NORMAL
MDC_IDC_SET_LEADCHNL_RA_PACING_POLARITY: NORMAL
MDC_IDC_SET_LEADCHNL_RA_PACING_PULSEWIDTH: 0.4 MS
MDC_IDC_SET_LEADCHNL_RA_SENSING_ADAPTATION_MODE: NORMAL
MDC_IDC_SET_LEADCHNL_RA_SENSING_POLARITY: NORMAL
MDC_IDC_SET_LEADCHNL_RA_SENSING_SENSITIVITY: 0.25 MV
MDC_IDC_SET_LEADCHNL_RV_PACING_AMPLITUDE: 1.4 V
MDC_IDC_SET_LEADCHNL_RV_PACING_CAPTURE_MODE: NORMAL
MDC_IDC_SET_LEADCHNL_RV_PACING_POLARITY: NORMAL
MDC_IDC_SET_LEADCHNL_RV_PACING_PULSEWIDTH: 0.4 MS
MDC_IDC_SET_LEADCHNL_RV_SENSING_ADAPTATION_MODE: NORMAL
MDC_IDC_SET_LEADCHNL_RV_SENSING_POLARITY: NORMAL
MDC_IDC_SET_LEADCHNL_RV_SENSING_SENSITIVITY: 1.5 MV
MDC_IDC_SET_ZONE_DETECTION_INTERVAL: 375 MS
MDC_IDC_SET_ZONE_STATUS: NORMAL
MDC_IDC_SET_ZONE_TYPE: NORMAL
MDC_IDC_SET_ZONE_VENDOR_TYPE: NORMAL
MDC_IDC_STAT_AT_BURDEN_PERCENT: 1 %
MDC_IDC_STAT_AT_DTM_END: NORMAL
MDC_IDC_STAT_AT_DTM_START: NORMAL
MDC_IDC_STAT_BRADY_DTM_END: NORMAL
MDC_IDC_STAT_BRADY_DTM_START: NORMAL
MDC_IDC_STAT_BRADY_RA_PERCENT_PACED: 94 %
MDC_IDC_STAT_BRADY_RV_PERCENT_PACED: 3 %
MDC_IDC_STAT_EPISODE_RECENT_COUNT: 0
MDC_IDC_STAT_EPISODE_RECENT_COUNT: 0
MDC_IDC_STAT_EPISODE_RECENT_COUNT: 3
MDC_IDC_STAT_EPISODE_RECENT_COUNT_DTM_END: NORMAL
MDC_IDC_STAT_EPISODE_RECENT_COUNT_DTM_START: NORMAL
MDC_IDC_STAT_EPISODE_TYPE: NORMAL
MDC_IDC_STAT_EPISODE_VENDOR_TYPE: NORMAL
MDC_IDC_STAT_EPISODE_VENDOR_TYPE: NORMAL

## 2024-02-09 ENCOUNTER — TELEPHONE (OUTPATIENT)
Dept: FAMILY MEDICINE | Facility: CLINIC | Age: 84
End: 2024-02-09

## 2024-02-09 ENCOUNTER — HOSPITAL ENCOUNTER (EMERGENCY)
Facility: CLINIC | Age: 84
Discharge: HOME | End: 2024-02-09
Attending: EMERGENCY MEDICINE | Admitting: EMERGENCY MEDICINE
Payer: COMMERCIAL

## 2024-02-09 ENCOUNTER — TELEPHONE (OUTPATIENT)
Dept: OTOLARYNGOLOGY | Facility: CLINIC | Age: 84
End: 2024-02-09

## 2024-02-09 VITALS
OXYGEN SATURATION: 97 % | TEMPERATURE: 97.4 F | DIASTOLIC BLOOD PRESSURE: 80 MMHG | RESPIRATION RATE: 18 BRPM | SYSTOLIC BLOOD PRESSURE: 148 MMHG | HEART RATE: 66 BPM

## 2024-02-09 DIAGNOSIS — Z79.01 CHRONIC ANTICOAGULATION: ICD-10-CM

## 2024-02-09 DIAGNOSIS — R04.0 EPISTAXIS: Primary | ICD-10-CM

## 2024-02-09 DIAGNOSIS — R04.0 EPISTAXIS: ICD-10-CM

## 2024-02-09 DIAGNOSIS — Z79.01 ANTICOAGULATED: ICD-10-CM

## 2024-02-09 LAB
ERYTHROCYTE [DISTWIDTH] IN BLOOD BY AUTOMATED COUNT: 13 % (ref 10–15)
HCT VFR BLD AUTO: 41.8 % (ref 35–47)
HGB BLD-MCNC: 13.5 G/DL (ref 11.7–15.7)
MCH RBC QN AUTO: 31.4 PG (ref 26.5–33)
MCHC RBC AUTO-ENTMCNC: 32.3 G/DL (ref 31.5–36.5)
MCV RBC AUTO: 97 FL (ref 78–100)
PLATELET # BLD AUTO: 170 10E3/UL (ref 150–450)
RBC # BLD AUTO: 4.3 10E6/UL (ref 3.8–5.2)
WBC # BLD AUTO: 10.4 10E3/UL (ref 4–11)

## 2024-02-09 PROCEDURE — 36415 COLL VENOUS BLD VENIPUNCTURE: CPT | Performed by: EMERGENCY MEDICINE

## 2024-02-09 PROCEDURE — 99283 EMERGENCY DEPT VISIT LOW MDM: CPT

## 2024-02-09 PROCEDURE — 85027 COMPLETE CBC AUTOMATED: CPT | Performed by: EMERGENCY MEDICINE

## 2024-02-09 RX ORDER — LIDOCAINE HYDROCHLORIDE 20 MG/ML
SOLUTION OROPHARYNGEAL
Status: DISCONTINUED
Start: 2024-02-09 | End: 2024-02-09 | Stop reason: HOSPADM

## 2024-02-09 RX ORDER — OXYMETAZOLINE HYDROCHLORIDE 0.05 G/100ML
SPRAY NASAL
Status: DISCONTINUED
Start: 2024-02-09 | End: 2024-02-09 | Stop reason: HOSPADM

## 2024-02-09 RX ORDER — LIDOCAINE HYDROCHLORIDE AND EPINEPHRINE 10; 10 MG/ML; UG/ML
INJECTION, SOLUTION INFILTRATION; PERINEURAL
Status: DISCONTINUED
Start: 2024-02-09 | End: 2024-02-09 | Stop reason: HOSPADM

## 2024-02-09 ASSESSMENT — ACTIVITIES OF DAILY LIVING (ADL)
ADLS_ACUITY_SCORE: 38
ADLS_ACUITY_SCORE: 38

## 2024-02-09 NOTE — ED TRIAGE NOTES
Pt states nosebleed started 45 min PTA. She has been getting them more frequently over the past 3-4 weeks though they have never lasted this long. Pt is on Eliquis. Pt appears anxious.

## 2024-02-09 NOTE — TELEPHONE ENCOUNTER
Patient calling and states she was ER this am for nose bleed.  Patient on Eliquis.  Patient states they put a balloon in and told her to see Dr. Madrigal within 3 days.  Does Dr. Madrigal do this?  Please advise and call patient with plan and to schedule if appropriate.  Teresa Bone RN

## 2024-02-09 NOTE — TELEPHONE ENCOUNTER
Cass[facility] called back and can work pt in on 2/14/24 in [place].  Cass will call pt to schedule and to let pt know if she has any significant amount of bleeding she should go to ED.    Dr Madrigal out of office, routed to POD Dr Foy.  Pt in ED for epistaxis, pt is also on chronic anticoagulation.  ED placed Rhino Rocket and told pt to follow up with PCP in 2 days.  AM POD recommended ENT appt for this.  ENT won't see anticoagulated pt's for removal until 5 days.      Discrepancy between ED and ENT follow up recommendations.  Could you please advise if ok for pt to wait the 5 days to follow up in ENT office?    Ronna Foster RN, BSN  [facility]

## 2024-02-09 NOTE — TELEPHONE ENCOUNTER
Bakari'd up ENT urgent referral.  Please edit as needed and route back for RN's to call ENT's to find someplace to see pt.    Ronna Foster RN, BSN  [facility]

## 2024-02-09 NOTE — ED PROVIDER NOTES
History     Chief Complaint:  Epistaxis       HPI   Lexii Stratton is a 83 year old female who is on Eliquis secondary to a TAVR presents with a nosebleed starting approximately 45 minutes prior to arrival.  She denies any trauma anything going up into her nose no pain but noticed bleeding out of both sides she has been feeling on the back of her throat felt like it has been choking her no chest pain otherwise no shortness of breath.  She said she is use pressure but still continued bleeding and therefore presented to the ER.  The patient says she has had more recent nosebleeds recently.        Review of External Notes:  11/8/2023 note reviewed    Medications:    acetaminophen (TYLENOL) 500 MG tablet  amLODIPine (NORVASC) 5 MG tablet  amoxicillin (AMOXIL) 500 MG capsule  apixaban ANTICOAGULANT (ELIQUIS ANTICOAGULANT) 5 MG tablet  Biotin 5000 MCG PO TABS  calcium carbonate (OS-CINDY) 500 MG tablet  CENTRUM SILVER OR TABS  Cranberry 450 MG CAPS  ferrous gluconate (FERGON) 324 (38 FE) MG tablet  latanoprost (XALATAN) 0.005 % ophthalmic solution  levothyroxine (SYNTHROID/LEVOTHROID) 50 MCG tablet  lisinopril (ZESTRIL) 30 MG tablet  magnesium 250 MG tablet  nitroGLYcerin (NITROSTAT) 0.4 MG sublingual tablet  OMEGA-3 FATTY ACIDS 1200 MG OR CAPS  pantoprazole (PROTONIX) 20 MG EC tablet  potassium 99 MG TABS  Probiotic Product (ACIDOPHILUS PROBIOTIC BLEND) CAPS  simvastatin (ZOCOR) 20 MG tablet  VITAMIN D, CHOLECALCIFEROL, PO        Past Medical History:    Past Medical History:   Diagnosis Date    Aortic valve stenosis 8/18/2022    Arthritis     Diabetes (H)     Esophageal reflux     Hernia, abdominal     History of blood transfusion 1984    Hypertension     Incontinence of urine     Mumps     Obese     Osteoarthritis     Other and unspecified hyperlipidemia     Other chronic pain     Peptic ulcer, unspecified site, unspecified as acute or chronic, without mention of hemorrhage, perforation, or obstruction     Small  bowel obstruction (H)     Thyroid disease hypothyroidism    Urinary incontinence     Walking troubles        Past Surgical History:    Past Surgical History:   Procedure Laterality Date    ABDOMEN SURGERY      ARTHROPLASTY HIP Right 11/09/2016    Procedure: ARTHROPLASTY HIP;  Surgeon: Angel Lund MD;  Location:  OR    AS REPAIR INCISIONAL HERNIA,REDUCIBLE  1998    inc hernia ( Yamileth surgery)    BIOPSY      BLADDER SURGERY      hyperdistention surgery and sling    BREAST SURGERY      CARDIAC SURGERY  2022    CHOLECYSTECTOMY  1987    COLONOSCOPY  12/03/2011    Procedure:COLONOSCOPY; COLONOSCOPY; Surgeon:SEMAJ GRAHAM; Location: GI    COLONOSCOPY N/A 03/29/2018    Procedure: COLONOSCOPY;  COLONOSCOPY Rm 544;  Surgeon: Marco Gusman MD;  Location[facility]    CV CORONARY ANGIOGRAM N/A 09/30/2022    Procedure: Coronary Angiogram;  Surgeon: Garica Barber MD;  Location: [facility] CARDIAC CATH LAB    CV TRANSCATHETER AORTIC VALVE REPLACEMENT-FEMORAL APPROACH N/A 11/08/2022    Procedure: Transcatheter Aortic Valve Replacement-Femoral Approach;  Surgeon: Yulia Castano MD;  Location: [facility] CARDIAC CATH LAB    CYSTOSCOPY N/A 09/06/2017    Procedure: CYSTOSCOPY;  CYSTOSCOPY AND HYDRODISTENTION ;  Surgeon: Eyal Bai MD;  Location:  OR    ENT SURGERY      Tonsillectomy    EP PACEMAKER DEVICE & LEAD IMPLANT- RIGHT ATRIAL & RIGHT VENTRICULAR N/A 11/08/2022    Procedure: Pacemaker Device & Lead Implant - Right Atrial & Right Ventricular;  Surgeon: William Boyd MD;  Location: [facility] CARDIAC CATH LAB    ESOPHAGOSCOPY, GASTROSCOPY, DUODENOSCOPY (EGD), COMBINED N/A 09/26/2016    Procedure: COMBINED ESOPHAGOSCOPY, GASTROSCOPY, DUODENOSCOPY (EGD), BIOPSY SINGLE OR MULTIPLE;  Surgeon: Marco Monaco MD;  Location:  GI    EYE SURGERY Left 05/2019    GENITOURINARY SURGERY      GYN SURGERY      Hysterectomy    HERNIA REPAIR, UMBILICAL  07/08/2010    Dr. Kirt Franco times 3     IMPLANT STIMULATOR SACRAL NERVE STAGE ONE N/A 09/17/2020    Procedure: sacral neurostimulation stage one implant of neurostimulator lead;  Surgeon: Shahnaz Carbone MD;  Location: [facility] OR    IMPLANT STIMULATOR SACRAL NERVE STAGE TWO Right 09/24/2020    Procedure: Removal of interstem lead, NOT implanting neurostimulator;  Surgeon: Shahnaz Carbone MD;  Location:  OR    ORTHOPEDIC SURGERY  2009    [facility] - Dr. Lund- bilateral knee replacement    RESECT SMALL BOWEL WITHOUT OSTOMY N/A 8/11/2023    Procedure: Small bowel resection, Revision of anastamosis;  Surgeon: Rafi Franco MD;  Location:  OR    Z NONSPECIFIC PROCEDURE  1946    T&A    ZZC NONSPECIFIC PROCEDURE  1984    Vaginal Hysterectomy (has her ovaries)    ZZ NONSPECIFIC PROCEDURE  1973    PPTL    ZZC NONSPECIFIC PROCEDURE  1994    L shoulder to remove bone spurs    ZZC NONSPECIFIC PROCEDURE  2004    cysto and durasphere Dr Demarco    ZZC NONSPECIFIC PROCEDURE  2005    cysto and durasphere    ZZC NONSPECIFIC PROCEDURE  2007    cysto and durasphere    ZZC NONSPECIFIC PROCEDURE  2009    retropubic TVT sling          Physical Exam   Patient Vitals for the past 24 hrs:   BP Temp Temp src Pulse Resp SpO2   02/09/24 0605 (!) 148/80 -- -- 66 18 97 %   02/09/24 0242 (!) 166/83 97.4  F (36.3  C) Temporal 65 20 100 %        Physical Exam  General: Uncomfortable with a nasal clamp in place    HEENT: There is a nasal Clamp in place blood in her posterior oropharynx.  There is clot in both naris when cleaned out no active bleeding plate.    Cardiovascular: Good cap refill.    Respiratory: Breathing non labored.     Musculoskeletal: No tenderness. No bony deformity.     Skin: No rashes or petechiae     Neurologic: non focal      Psychiatric: Appropriate       Emergency Department Course       Laboratory:  Labs Ordered and Resulted from Time of ED Arrival to Time of ED Departure   CBC WITH PLATELETS - Normal       Result Value    WBC Count  10.4      RBC Count 4.30      Hemoglobin 13.5      Hematocrit 41.8      MCV 97      MCH 31.4      MCHC 32.3      RDW 13.0      Platelet Count 170          Procedures   Nasal packing  Location epistaxis  I first had the patient blow her nose then infused Afrin atomized lidocaine with epinephrine  A pledget infused with Afrin was placed this was removed and the patient started to bleed from the right nare  Merisel was placed in the right nare and the area of bleeding sterile saline was placed  This is still bled and therefore a Rhino Rocket was placed in the right nare with insufflation of air hemostasis was obtained.    Emergency Department Course & Assessments:             Interventions:  Medications   oxymetazoline (AFRIN) 0.05 % spray (has no administration in time range)   lidocaine (viscous) (XYLOCAINE) 2 % solution (has no administration in time range)   lidocaine 1% with EPINEPHrine 1:100,000 1 %-1:202124 injection (has no administration in time range)   silver nitrate (ARZOL) Misc (has no administration in time range)        Assessments:  I first removes the nasal clamp attempted to have the patient swallow to see if we could clear out any blood in the posterior pharynx.  The patient said she still felt bleeding.  I had her blow her nose.  Afrin and lidocaine were atomized in the naris I put pledgets covered with Afrin and lidocaine and a nasal clamp in place.    I       Social Determinants of Health affecting care:  Healthcare Access/Compliance     Disposition:  The patient was discharged to home.     Impression & Plan        Medical Decision Making:  The patient is on Eliquis due to a TAVR and therefore needs the anticoagulation.  She has had some bleeding there is a large clot that was removed but she does not complain of symptoms such as lightheadedness or shortness of breath.  The patient complained of blood still going on the back of her throat.  I therefore to blow her nose and was getting ready to  cauterize her nose but it started to bleed.  Therefore merocel packing was placed however she did bleed despite this.  It was therefore removed and a Rhino Rocket was placed.  I did have to recheck her frequently and add more air to insufflated further but hemostasis was obtained.  Her CBC does not show thrombocytopenia no significant anemia and the patient was discharged home in good condition to follow-up with her primary care doctor in 2 days.    Diagnosis:    ICD-10-CM    1. Epistaxis  R04.0       2. Chronic anticoagulation  Z79.01            Discharge Medications:  Discharge Medication List as of 2/9/2024  6:00 AM               2/9/2024   Nakul Aden MD Farnan, Christopher M, MD  02/09/24 0703

## 2024-02-09 NOTE — TELEPHONE ENCOUNTER
Called ENT scheduling phone number.  Spoke to Alie.    [facility]      [facility].  [place]. [phone].    Pt reports she already sees Dr Nuñez [facility].  Pt reports ED told her she was supposed to see provider within 48 hours.  Pt reports still ocassional small amount of bleeding when she stands up.    Called [facility] nurse triage.  LMTCB

## 2024-02-09 NOTE — TELEPHONE ENCOUNTER
"Messaged Dr Foy to advised on below and he advised:  \"I'd defer to ENT in this and have her see them after 5d\"    Called pt and confirmed, she is scheduled at [facility] [place] on 2/14/24.  Routed to [facility] to update that pt has appt scheduled.    Ronna Foster RN, BSN  [facility]    "

## 2024-02-09 NOTE — TELEPHONE ENCOUNTER
Received call from nurse Odell at [facility].  Their providers won't remove before 5 days for anticoagulated patients.  Cass is sending message to her ENT providers to try to get pt scheduled for 2/14/24.  She will call us back to update today.    Ronna Foster RN, BSN  [facility]

## 2024-03-07 ENCOUNTER — NURSE TRIAGE (OUTPATIENT)
Dept: FAMILY MEDICINE | Facility: CLINIC | Age: 84
End: 2024-03-07

## 2024-03-07 ENCOUNTER — VIRTUAL VISIT (OUTPATIENT)
Dept: URGENT CARE | Facility: CLINIC | Age: 84
End: 2024-03-07
Payer: COMMERCIAL

## 2024-03-07 DIAGNOSIS — U07.1 INFECTION DUE TO 2019 NOVEL CORONAVIRUS: Primary | ICD-10-CM

## 2024-03-07 PROCEDURE — 99441 PR PHYSICIAN TELEPHONE EVALUATION 5-10 MIN: CPT | Mod: 93

## 2024-03-07 NOTE — PATIENT INSTRUCTIONS
For informational purposes only. Not to replace the advice of your health care provider. Copyright   2022 [facility]. All rights reserved. Clinically reviewed by Valencia Simental, PharmD, BCACP. popexpert [id number] - REV 06/23.  COVID-19 Outpatient Treatments  Your care team can help you find the best treatments for COVID-19. Talk to a health care provider or refer to the FDA medicine fact sheets below.  Paxlovid (nirmatrelvir and ritonavir): https://www.paxlovid.Lifesquare/resources  Molnupiravir (Lagevrio): https://www.fda.gov/media/235370/download  Important: We can only prescribe Paxlovid or Molnupiravir when it can be started within 5 days of first having symptoms.  Paxlovid (nimatrelvir and ritonavir)  How it works  Two medicines (nirmatrelvir and ritonavir) are taken together. They stop the virus from growing. Less amount of virus is easier for your body to fight.  Benefits  Lowers risk of a hospital stay or death from COVID-19.  How to take  Medicine comes in a daily container with both medicine tablets. Take by mouth twice daily (once in the morning, once at night) for 5 days.  The number of tablets to take varies by patient.  Don't chew or break capsules. Swallow whole.  When to take  Take it as soon as possible and within 5 days of your first symptoms.  Who can take it  Patients must be 12 years or older weigh at least 88 pounds. Paxlovid is the preferred treatment for pregnant patients.  Possible side effects  Can cause altered sense of taste, diarrhea (loose, watery stools), high blood pressure, muscle aches.  Medicine conflicts  Some medicines may conflict with Paxlovid and may cause serious side effects.  Tell your care team about all the medicines you take, including prescription and over-the-counter medicines, vitamins, and herbal supplements.  Your care team will review your medicines to make sure that you can safely take Paxlovid.  Cautions  Paxlovid is not advised for patients with severe  kidney or liver disease. If you have kidney or liver problems, the dose may need to be changed.  If you're pregnant or breastfeeding, talk to your care team about your options.  If you take hormonal birth control (such as the Pill), then you or your partner should also use a non-hormonal form of birth control (such as a condom). Keep doing this for 1 menstrual cycle after your last dose of Paxlovid.  Molnupiravir (lagevrio)  How it works  Stops the virus from growing. Less amount of virus is easier for your body to fight.  Benefits  Lowers risk of a hospital stay or death from COVID-19.  How to take  Take 4 capsules by mouth every 12 hours (4 in the morning and 4 at night) for 5 days. Don't chew or break capsules. Swallow whole.  When to take  Take as soon as possible and within 5 days of your first symptoms.  Who can take it  Patients must be 18 years or older.   Possible side effects  Diarrhea (loose, watery stools), nausea (feeling sick to your stomach), dizziness, headaches.  Medicine conflicts  Right now, there are no known conflicts with other drugs. But tell your care team about all medicines you take.  Cautions  This medicine is not advised for patients who are pregnant.  If you are someone who could become pregnant, use trusted birth control until 4 days after your last dose of molnupiravir.  If your partner could become pregnant, you should use trusted birth control until 3 months after your last dose of molnupiravir.      Coping with Life After COVID-19  Being in the hospital because of COVID-19 is scary. Going home can be scary, too. You may face changes to your life, the way you work or what you can eat. It s hard to adjust to change, and it s normal to feel afraid, frustrated or even angry. These feelings usually go away over time. If your feelings don t start to get better, it s called  adjustment disorder.      What signs should I look out for?  Adjustment disorder can happen to anyone in a time of  stress. It makes it hard to cope with daily life. You may feel lonely or fight with loved ones, even if you re glad to be home. Watch for these signs:  Fear or worry  Hard time focusing  Sadness or anger  Trouble talking to family or friends  Feeling like you don t fit in or isolating yourself  Problems with sleep   Drinking alcohol or taking drugs to cope    What can I do?  You can help yourself get better. Feeling you have control helps you move forward. You may wonder if you ll be able to do things you did before. Be patient. Do your best to make the most of every day. Try to build relationships, be as active as you can, eat right and keep a sense of humor. Avoid smoking and drinking too much alcohol. Call your family doctor or clinic if you re not sure what to do. They can guide you to care or other services.    When should I get help?  Think about getting counseling if your sadness or frustration gets worse. Together with a trained counselor, you can talk about your problems adjusting to life after your hospital stay. You can come up with new ways to handle changes that give you more control. Your family doctor or care team can help you find a counselor.     Get help if you re thinking about hurting yourself. If you need help right away, call 911 or go to the nearest emergency room. You can also try the Crisis Text Line.    Crisis Text Line: 250-562 (http://www.crisistextline.org)  The Crisis Text Line serves anyone, in any crisis. It gives free, 24/7 support. Here's how it works:  Text HOME to 418099 from anywhere in the [place], anytime, about any type of crisis.  A live, trained Crisis Counselor will text you back quickly.  The volunteer Crisis Counselor can help you move from a  hot moment  to a  cool moment.  They can also help you work out a safety plan.

## 2024-03-07 NOTE — TELEPHONE ENCOUNTER
Pt is on amlodipine.  She does not meet the nurse only protocol.  She would need a virtual visit with a provider.      Called the pt to discuss.  She tested positive for covid today and her symptoms started today.  She has a fever of 99.1, a little bit of a cough.  She is very fatigued.      Pt is hoping to get treatment.  Scheduled her with a virtual urgent care provider for today.        Reason for Disposition   HIGH RISK patient (e.g., weak immune system, age > 64 years, obesity with BMI of 30 or higher, pregnant, chronic lung disease or other chronic medical condition) and COVID symptoms (e.g., cough, fever)  (Exceptions: Already seen by doctor or NP/PA and no new or worsening symptoms.)    Additional Information   Negative: SEVERE difficulty breathing (e.g., struggling for each breath, speaks in single words)   Negative: Difficult to awaken or acting confused (e.g., disoriented, slurred speech)   Negative: Bluish (or gray) lips or face now   Negative: Shock suspected (e.g., cold/pale/clammy skin, too weak to stand, low BP, rapid pulse)   Negative: Sounds like a life-threatening emergency to the triager   Negative: Diagnosed or suspected COVID-19 and symptoms lasting 3 or more weeks   Negative: COVID-19 exposure and no symptoms   Negative: COVID-19 vaccine reaction suspected (e.g., fever, headache, muscle aches) occurring 1 to 3 days after getting vaccine   Negative: COVID-19 vaccine, questions about   Negative: Lives with someone known to have influenza (flu test positive) and flu-like symptoms (e.g., cough, runny nose, sore throat, SOB; with or without fever)   Negative: Possible COVID-19 symptoms and triager concerned about severity of symptoms or other causes   Negative: COVID-19 and breastfeeding, questions about   Negative: SEVERE or constant chest pain or pressure  (Exception: Mild central chest pain, present only when coughing.)   Negative: MODERATE difficulty breathing (e.g., speaks in phrases, SOB even  "at rest, pulse 100-120)   Negative: Headache and stiff neck (can't touch chin to chest)   Negative: Oxygen level (e.g., pulse oximetry) 90% or lower   Negative: Chest pain or pressure  (Exception: MILD central chest pain, present only when coughing.)   Negative: Drinking very little and dehydration suspected (e.g., no urine > 12 hours, very dry mouth, very lightheaded)   Negative: Patient sounds very sick or weak to the triager   Negative: MILD difficulty breathing (e.g., minimal/no SOB at rest, SOB with walking, pulse <100)   Negative: Fever > 103 F (39.4 C)   Negative: Fever > 101 F (38.3 C) and over 60 years of age   Negative: Fever > 100.0 F (37.8 C) and bedridden (e.g., CVA, chronic illness, recovering from surgery)    Answer Assessment - Initial Assessment Questions  1. COVID-19 DIAGNOSIS: \"How do you know that you have COVID?\" (e.g., positive lab test or self-test, diagnosed by doctor or NP/PA, symptoms after exposure).      Home test   2. COVID-19 EXPOSURE: \"Was there any known exposure to COVID before the symptoms began?\" CDC Definition of close contact: within 6 feet (2 meters) for a total of 15 minutes or more over a 24-hour period.       Not that she knows about   3. ONSET: \"When did the COVID-19 symptoms start?\"       3/7/24   4. WORST SYMPTOM: \"What is your worst symptom?\" (e.g., cough, fever, shortness of breath, muscle aches)      Fatigue is the worst, little bit of a runny nose, fever of 99.1, a little bit of a cough  5. COUGH: \"Do you have a cough?\" If Yes, ask: \"How bad is the cough?\"        A little bit of a cough  6. FEVER: \"Do you have a fever?\" If Yes, ask: \"What is your temperature, how was it measured, and when did it start?\"      99.1   7. RESPIRATORY STATUS: \"Describe your breathing?\" (e.g., normal; shortness of breath, wheezing, unable to speak)       Breathing is fine   8. BETTER-SAME-WORSE: \"Are you getting better, staying the same or getting worse compared to yesterday?\"  If getting " "worse, ask, \"In what way?\"      Just started today   9. OTHER SYMPTOMS: \"Do you have any other symptoms?\"  (e.g., chills, fatigue, headache, loss of smell or taste, muscle pain, sore throat)      Fatigue   10. HIGH RISK DISEASE: \"Do you have any chronic medical problems?\" (e.g., asthma, heart or lung disease, weak immune system, obesity, etc.)        Had a heart valve replaced, has a pacemaker  11. VACCINE: \"Have you had the COVID-19 vaccine?\" If Yes, ask: \"Which one, how many shots, when did you get it?\"        Last vaccine was 10/9/23  12. PREGNANCY: \"Is there any chance you are pregnant?\" \"When was your last menstrual period?\"        N/a  13. O2 SATURATION MONITOR:  \"Do you use an oxygen saturation monitor (pulse oximeter) at home?\" If Yes, ask \"What is your reading (oxygen level) today?\" \"What is your usual oxygen saturation reading?\" (e.g., 95%)        unsure    Protocols used: Coronavirus (COVID-19) Diagnosed or [id number]    "

## 2024-03-07 NOTE — PROGRESS NOTES
"Lexii is a 83 year old who is being evaluated via a billable telephone visit.      What phone number would you like to be contacted at? [phone]  How would you like to obtain your AVS? Ranjan  Originating Location (pt. Location): Home    Distant Location (provider location):  Off-site    Assessment & Plan     Infection due to 2019 novel coronavirus  On medications that are contraindicated with Paxlovid use. GFR is 56  - molnupiravir (LAGEVRIO) 200 MG capsule; Take 4 capsules (800 mg) by mouth every 12 hours for 5 days    COVID-19 positive patient.  Encounter for consideration of medication intervention. Patient does qualify for a prescription. Full discussion with patient including medication options, risks and benefits. Potential drug interactions reviewed with patient.     Treatment Planned Molnupiravir (18+) Patient is aware that effectiveness is less for this medication. Rx sent to [facility]  [facility] [phone] [address] [place] [address]    Hours:  Mon-Fri: 8:00a - 5:00p     Drive Thru available  Temporary change to home medications:  None     Estimated body mass index is 34.23 kg/m  as calculated from the following:    Height as of 11/22/23: 1.613 m (5' 3.5\").    Weight as of 11/22/23: 89 kg (196 lb 4.8 oz).  GFR Estimate   Date Value Ref Range Status   11/22/2023 64 >60 mL/min/1.73m2 Final   02/24/2021 60 (L) >60 mL/min/[1.73_m2] Final     Comment:     Non  GFR Calc  Starting 12/18/2018, serum creatinine based estimated GFR (eGFR) will be   calculated using the Chronic Kidney Disease Epidemiology Collaboration   (CKD-EPI) equation.       GFR, ESTIMATED POCT   Date Value Ref Range Status   07/05/2023 56 (L) >60 mL/min/1.73m2 Final     Lab Results   Component Value Date    [id number] Negative 11/04/2022       Return in about 1 week (around 3/14/2024), or if symptoms worsen or fail to improve.    Subjective   Lexii is a 83 year old, presenting for " the following health issues:  No chief complaint on file.    HPI       COVID-19 Symptom Review  How many days ago did these symptoms start? today    Are any of the following symptoms significant for you?  New or worsening difficulty breathing? No  Worsening cough? Yes, it's a dry cough.   Fever or chills? Yes, the highest temperature was 99.1  Headache: No  Sore throat: No  Chest pain: No  Diarrhea: No  Body aches? No    What treatments has patient tried? none   Does patient live in a nursing home, group home, or shelter? No  Does patient have a way to get food/medications during quarantined? Yes, I have a friend or family member who can help me.                Review of Systems  Constitutional, HEENT, cardiovascular, pulmonary, gi and gu systems are negative, except as otherwise noted.      Objective           Vitals:  No vitals were obtained today due to virtual visit.    Physical Exam   General: Alert and no distress //Respiratory: No audible wheeze, cough, or shortness of breath // Psychiatric:  Appropriate affect, tone, and pace of words            Phone call duration: 5 minutes  Signed Electronically by: [facility]

## 2024-03-07 NOTE — TELEPHONE ENCOUNTER
COVID Positive/Requesting COVID treatment    Patient is positive for COVID and requesting treatment options.    Date of positive COVID test (PCR or at home)? 3/7/2024 at home  Current COVID symptoms: fever or chills, cough, and fatigue (very slight fever)  Date COVID symptoms began: 3/7/2024 AM    Message should be routed to clinic RN pool. Best phone number to use for call back: [phone]

## 2024-03-11 ENCOUNTER — ANCILLARY PROCEDURE (OUTPATIENT)
Dept: CARDIOLOGY | Facility: CLINIC | Age: 84
End: 2024-03-11
Attending: INTERNAL MEDICINE
Payer: COMMERCIAL

## 2024-03-11 DIAGNOSIS — Z95.0 CARDIAC PACEMAKER IN SITU: ICD-10-CM

## 2024-03-11 DIAGNOSIS — I44.2 COMPLETE HEART BLOCK (H): ICD-10-CM

## 2024-03-11 PROCEDURE — 93296 REM INTERROG EVL PM/IDS: CPT | Performed by: INTERNAL MEDICINE

## 2024-03-11 PROCEDURE — 93294 REM INTERROG EVL PM/LDLS PM: CPT | Performed by: INTERNAL MEDICINE

## 2024-03-12 LAB
MDC_IDC_EPISODE_DTM: NORMAL
MDC_IDC_EPISODE_DURATION: 138 S
MDC_IDC_EPISODE_DURATION: 14 S
MDC_IDC_EPISODE_DURATION: 50 S
MDC_IDC_EPISODE_ID: NORMAL
MDC_IDC_EPISODE_TYPE: NORMAL
MDC_IDC_EPISODE_TYPE_INDUCED: NO
MDC_IDC_LEAD_CONNECTION_STATUS: NORMAL
MDC_IDC_LEAD_CONNECTION_STATUS: NORMAL
MDC_IDC_LEAD_IMPLANT_DT: NORMAL
MDC_IDC_LEAD_IMPLANT_DT: NORMAL
MDC_IDC_LEAD_LOCATION: NORMAL
MDC_IDC_LEAD_LOCATION: NORMAL
MDC_IDC_LEAD_LOCATION_DETAIL_1: NORMAL
MDC_IDC_LEAD_LOCATION_DETAIL_1: NORMAL
MDC_IDC_LEAD_MFG: NORMAL
MDC_IDC_LEAD_MFG: NORMAL
MDC_IDC_LEAD_MODEL: NORMAL
MDC_IDC_LEAD_MODEL: NORMAL
MDC_IDC_LEAD_POLARITY_TYPE: NORMAL
MDC_IDC_LEAD_POLARITY_TYPE: NORMAL
MDC_IDC_LEAD_SERIAL: NORMAL
MDC_IDC_LEAD_SERIAL: NORMAL
MDC_IDC_MSMT_BATTERY_DTM: NORMAL
MDC_IDC_MSMT_BATTERY_REMAINING_LONGEVITY: 144 MO
MDC_IDC_MSMT_BATTERY_REMAINING_PERCENTAGE: 100 %
MDC_IDC_MSMT_BATTERY_STATUS: NORMAL
MDC_IDC_MSMT_LEADCHNL_RA_IMPEDANCE_VALUE: 560 OHM
MDC_IDC_MSMT_LEADCHNL_RA_PACING_THRESHOLD_AMPLITUDE: 0.5 V
MDC_IDC_MSMT_LEADCHNL_RA_PACING_THRESHOLD_PULSEWIDTH: 0.4 MS
MDC_IDC_MSMT_LEADCHNL_RV_IMPEDANCE_VALUE: 647 OHM
MDC_IDC_MSMT_LEADCHNL_RV_PACING_THRESHOLD_AMPLITUDE: 2.9 V
MDC_IDC_MSMT_LEADCHNL_RV_PACING_THRESHOLD_PULSEWIDTH: 0.4 MS
MDC_IDC_PG_IMPLANT_DTM: NORMAL
MDC_IDC_PG_MFG: NORMAL
MDC_IDC_PG_MODEL: NORMAL
MDC_IDC_PG_SERIAL: NORMAL
MDC_IDC_PG_TYPE: NORMAL
MDC_IDC_SESS_CLINIC_NAME: NORMAL
MDC_IDC_SESS_DTM: NORMAL
MDC_IDC_SESS_TYPE: NORMAL
MDC_IDC_SET_BRADY_AT_MODE_SWITCH_MODE: NORMAL
MDC_IDC_SET_BRADY_AT_MODE_SWITCH_RATE: 170 {BEATS}/MIN
MDC_IDC_SET_BRADY_LOWRATE: 60 {BEATS}/MIN
MDC_IDC_SET_BRADY_MAX_SENSOR_RATE: 130 {BEATS}/MIN
MDC_IDC_SET_BRADY_MAX_TRACKING_RATE: 130 {BEATS}/MIN
MDC_IDC_SET_BRADY_MODE: NORMAL
MDC_IDC_SET_BRADY_PAV_DELAY_HIGH: 200 MS
MDC_IDC_SET_BRADY_PAV_DELAY_LOW: 250 MS
MDC_IDC_SET_BRADY_SAV_DELAY_HIGH: 200 MS
MDC_IDC_SET_BRADY_SAV_DELAY_LOW: 250 MS
MDC_IDC_SET_LEADCHNL_RA_PACING_AMPLITUDE: 2 V
MDC_IDC_SET_LEADCHNL_RA_PACING_CAPTURE_MODE: NORMAL
MDC_IDC_SET_LEADCHNL_RA_PACING_POLARITY: NORMAL
MDC_IDC_SET_LEADCHNL_RA_PACING_PULSEWIDTH: 0.4 MS
MDC_IDC_SET_LEADCHNL_RA_SENSING_ADAPTATION_MODE: NORMAL
MDC_IDC_SET_LEADCHNL_RA_SENSING_POLARITY: NORMAL
MDC_IDC_SET_LEADCHNL_RA_SENSING_SENSITIVITY: 0.25 MV
MDC_IDC_SET_LEADCHNL_RV_PACING_AMPLITUDE: 1.4 V
MDC_IDC_SET_LEADCHNL_RV_PACING_CAPTURE_MODE: NORMAL
MDC_IDC_SET_LEADCHNL_RV_PACING_POLARITY: NORMAL
MDC_IDC_SET_LEADCHNL_RV_PACING_PULSEWIDTH: 0.4 MS
MDC_IDC_SET_LEADCHNL_RV_SENSING_ADAPTATION_MODE: NORMAL
MDC_IDC_SET_LEADCHNL_RV_SENSING_POLARITY: NORMAL
MDC_IDC_SET_LEADCHNL_RV_SENSING_SENSITIVITY: 1.5 MV
MDC_IDC_SET_ZONE_DETECTION_INTERVAL: 375 MS
MDC_IDC_SET_ZONE_STATUS: NORMAL
MDC_IDC_SET_ZONE_TYPE: NORMAL
MDC_IDC_SET_ZONE_VENDOR_TYPE: NORMAL
MDC_IDC_STAT_AT_BURDEN_PERCENT: 1 %
MDC_IDC_STAT_AT_DTM_END: NORMAL
MDC_IDC_STAT_AT_DTM_START: NORMAL
MDC_IDC_STAT_BRADY_DTM_END: NORMAL
MDC_IDC_STAT_BRADY_DTM_START: NORMAL
MDC_IDC_STAT_BRADY_RA_PERCENT_PACED: 98 %
MDC_IDC_STAT_BRADY_RV_PERCENT_PACED: 4 %
MDC_IDC_STAT_EPISODE_RECENT_COUNT: 0
MDC_IDC_STAT_EPISODE_RECENT_COUNT: 0
MDC_IDC_STAT_EPISODE_RECENT_COUNT: 1
MDC_IDC_STAT_EPISODE_RECENT_COUNT_DTM_END: NORMAL
MDC_IDC_STAT_EPISODE_RECENT_COUNT_DTM_START: NORMAL
MDC_IDC_STAT_EPISODE_TYPE: NORMAL
MDC_IDC_STAT_EPISODE_VENDOR_TYPE: NORMAL
MDC_IDC_STAT_EPISODE_VENDOR_TYPE: NORMAL

## 2024-03-14 ENCOUNTER — MYC MEDICAL ADVICE (OUTPATIENT)
Dept: FAMILY MEDICINE | Facility: CLINIC | Age: 84
End: 2024-03-14
Payer: COMMERCIAL

## 2024-03-16 ENCOUNTER — HEALTH MAINTENANCE LETTER (OUTPATIENT)
Age: 84
End: 2024-03-16

## 2024-04-24 ENCOUNTER — TRANSFERRED RECORDS (OUTPATIENT)
Dept: HEALTH INFORMATION MANAGEMENT | Facility: CLINIC | Age: 84
End: 2024-04-24
Payer: COMMERCIAL

## 2024-05-20 ENCOUNTER — TRANSFERRED RECORDS (OUTPATIENT)
Dept: HEALTH INFORMATION MANAGEMENT | Facility: CLINIC | Age: 84
End: 2024-05-20
Payer: COMMERCIAL

## 2024-05-21 ENCOUNTER — OFFICE VISIT (OUTPATIENT)
Dept: FAMILY MEDICINE | Facility: CLINIC | Age: 84
End: 2024-05-21
Payer: COMMERCIAL

## 2024-05-21 VITALS
HEART RATE: 67 BPM | RESPIRATION RATE: 18 BRPM | WEIGHT: 215 LBS | DIASTOLIC BLOOD PRESSURE: 77 MMHG | BODY MASS INDEX: 36.7 KG/M2 | TEMPERATURE: 97.8 F | OXYGEN SATURATION: 98 % | SYSTOLIC BLOOD PRESSURE: 142 MMHG | HEIGHT: 64 IN

## 2024-05-21 DIAGNOSIS — K21.9 GASTROESOPHAGEAL REFLUX DISEASE, UNSPECIFIED WHETHER ESOPHAGITIS PRESENT: ICD-10-CM

## 2024-05-21 DIAGNOSIS — E08.65: ICD-10-CM

## 2024-05-21 DIAGNOSIS — I48.20 CHRONIC ATRIAL FIBRILLATION (H): ICD-10-CM

## 2024-05-21 DIAGNOSIS — N18.31 CHRONIC KIDNEY DISEASE, STAGE 3A (H): ICD-10-CM

## 2024-05-21 DIAGNOSIS — E03.9 ACQUIRED HYPOTHYROIDISM: ICD-10-CM

## 2024-05-21 DIAGNOSIS — Z86.2 HISTORY OF ANEMIA: ICD-10-CM

## 2024-05-21 DIAGNOSIS — K22.70 BARRETT'S ESOPHAGUS WITHOUT DYSPLASIA: ICD-10-CM

## 2024-05-21 DIAGNOSIS — Z76.89 ENCOUNTER TO ESTABLISH CARE: Primary | ICD-10-CM

## 2024-05-21 DIAGNOSIS — I50.30 DIASTOLIC HEART FAILURE, UNSPECIFIED HF CHRONICITY (H): ICD-10-CM

## 2024-05-21 DIAGNOSIS — E11.9 TYPE 2 DIABETES MELLITUS WITHOUT COMPLICATION, WITHOUT LONG-TERM CURRENT USE OF INSULIN (H): ICD-10-CM

## 2024-05-21 DIAGNOSIS — I10 ESSENTIAL HYPERTENSION, BENIGN: ICD-10-CM

## 2024-05-21 DIAGNOSIS — I44.2 COMPLETE HEART BLOCK (H): ICD-10-CM

## 2024-05-21 PROBLEM — I35.0 AORTIC STENOSIS, SEVERE: Status: ACTIVE | Noted: 2022-11-08

## 2024-05-21 PROBLEM — Z87.19 HX OF SMALL BOWEL OBSTRUCTION: Status: ACTIVE | Noted: 2021-08-16

## 2024-05-21 LAB
CREAT UR-MCNC: 221 MG/DL
HBA1C MFR BLD: 6.7 % (ref 0–5.6)
HOLD SPECIMEN: NORMAL
HOLD SPECIMEN: NORMAL
MICROALBUMIN UR-MCNC: 22.2 MG/L
MICROALBUMIN/CREAT UR: 10.05 MG/G CR (ref 0–25)

## 2024-05-21 PROCEDURE — 99214 OFFICE O/P EST MOD 30 MIN: CPT | Mod: 25 | Performed by: STUDENT IN AN ORGANIZED HEALTH CARE EDUCATION/TRAINING PROGRAM

## 2024-05-21 PROCEDURE — 91320 SARSCV2 VAC 30MCG TRS-SUC IM: CPT | Performed by: STUDENT IN AN ORGANIZED HEALTH CARE EDUCATION/TRAINING PROGRAM

## 2024-05-21 PROCEDURE — 36415 COLL VENOUS BLD VENIPUNCTURE: CPT | Performed by: STUDENT IN AN ORGANIZED HEALTH CARE EDUCATION/TRAINING PROGRAM

## 2024-05-21 PROCEDURE — 82570 ASSAY OF URINE CREATININE: CPT | Performed by: STUDENT IN AN ORGANIZED HEALTH CARE EDUCATION/TRAINING PROGRAM

## 2024-05-21 PROCEDURE — 84443 ASSAY THYROID STIM HORMONE: CPT | Performed by: STUDENT IN AN ORGANIZED HEALTH CARE EDUCATION/TRAINING PROGRAM

## 2024-05-21 PROCEDURE — 83036 HEMOGLOBIN GLYCOSYLATED A1C: CPT | Performed by: STUDENT IN AN ORGANIZED HEALTH CARE EDUCATION/TRAINING PROGRAM

## 2024-05-21 PROCEDURE — 90480 ADMN SARSCOV2 VAC 1/ONLY CMP: CPT | Performed by: STUDENT IN AN ORGANIZED HEALTH CARE EDUCATION/TRAINING PROGRAM

## 2024-05-21 PROCEDURE — 82043 UR ALBUMIN QUANTITATIVE: CPT | Performed by: STUDENT IN AN ORGANIZED HEALTH CARE EDUCATION/TRAINING PROGRAM

## 2024-05-21 RX ORDER — FERROUS GLUCONATE 324(38)MG
324 TABLET ORAL
Qty: 90 TABLET | Refills: 1 | Status: SHIPPED | OUTPATIENT
Start: 2024-05-21

## 2024-05-21 ASSESSMENT — PAIN SCALES - GENERAL: PAINLEVEL: SEVERE PAIN (6)

## 2024-05-21 NOTE — PROGRESS NOTES
"  Assessment & Plan     Encounter to establish care    History of anemia  See problem list for more details. On daily iron supplement. Placed refill for patient today.  - ferrous gluconate (FERGON) 324 (38 Fe) MG tablet  Dispense: 90 tablet; Refill: 1    Diabetes mellitus due to underlying condition, controlled, with hyperglycemia, without long-term current use of insulin (H)  A1c of 6.7. Lifestyle controlled. Follow up in 6 months for recheck.   - Hemoglobin A1c  - Albumin Random Urine Quantitative with Creat Ratio    Gastroesophageal reflux disease, unspecified whether esophagitis present  On daily pantoprazole. After patient left office, chart review shows last EGD in 2016 revealing chronic atrophic gastritis with focal intestinal metaplasia, last vitamin B12 level>2000. Also EGD in 2011 with evidence of Shah's esophagus. Will need follow up EGD to evaluate this. Added to problem list.    Acquired hypothyroidism  Due for TSH, ordered. On 50mcg levothyroxine.    Essential hypertension, benign  On 30 mg lisinopril and 5mg amlodipine daily. BP just mildly elevated in clinic today. Will continue to monitor.    Chronic kidney disease, stage 3a (H)  Stable, likely 2/2 HTN, DM2 as above.  Last BMP on 11/2023.     Chronic atrial fibrillation (H)  Following with Cardiology. Has PPM and on DOAC which is prescribed through them.     Complete heart block (H)  Following with Cardiology. S/p PPM    Diastolic heart failure, unspecified HF chronicity (H)  Following with Cardiology. Managed through them.    BMI  Estimated body mass index is 37.49 kg/m  as calculated from the following:    Height as of this encounter: 1.613 m (5' 3.5\").    Weight as of this encounter: 97.5 kg (215 lb).     Follow up in 6 months for annual physical as already set up    Robert Randle MD  [facility] [place]  5/21/2024    Melyssa Shultz is a 83 year old, presenting for the following health issues:  Establish Care        5/21/2024 "    10:16 AM   Additional Questions   Roomed by Shahnaz PICHARDO     History of Present Illness       Back Pain:  She presents for follow up of back pain. Patient's back pain is a chronic problem.  Location of back pain:  Right lower back, right middle of back, left side of neck and right buttock  Description of back pain: shooting  Back pain spreads: right buttocks, right thigh, right knee, left shoulder and left side of neck    Since patient first noticed back pain, pain is: always present, but gets better and worse  Does back pain interfere with her job:  Not applicable       Reason for visit:  Scheduled yearly check with Dr. Madrigal a year ago - now following up with new provider.  Symptom onset:  3-4 weeks ago  Symptoms include:  On set of several areas of painful arthritis.  Symptom intensity:  Moderate  Symptom progression:  Worsening  Had these symptoms before:  Yes  Has tried/received treatment for these symptoms:  Yes  Previous treatment was successful:  No  What makes it worse:  Walking, bending, standing  What makes it better:  Tried several over the counter medications; doing physical therapy and exercises    She eats 2-3 servings of fruits and vegetables daily.She consumes 0 sweetened beverage(s) daily.She exercises with enough effort to increase her heart rate 30 to 60 minutes per day.  She exercises with enough effort to increase her heart rate 4 days per week.   She is taking medications regularly.     Would like to establish care.    Wrist pain  Working with sports med, s/p few injections which are no longer helpful.  Will be seeing hand surgeon on Tuesday for potential surgical options.    DM2  Diet controlled, has always been diet controlled.    HTN  Not unusual for blood pressure to be higher at office visits.  Takes blood pressures at home occasionally.   Walks 2 miles 3x per week.  Taking lisinopril  and amlodipine consistently.     Hypothyroid  Is taking the levothyroxine. No issues.     Hx of  "anemia  Taking daily iron supplement.  Has been on this for years.   But last visit, decreased to once daily.    Vitamin D  Only taking this M, W, F now instead of daily.  Thinks is at 2000U daily    HLD  On the simvastatin. No issues        Objective    BP (!) 164/77 (BP Location: Right arm, Patient Position: Sitting, Cuff Size: Adult Large)   Pulse 67   Temp 97.8  F (36.6  C) (Oral)   Resp 18   Ht 1.613 m (5' 3.5\")   Wt 97.5 kg (215 lb)   LMP  (LMP Unknown)   SpO2 98%   BMI 37.49 kg/m    Body mass index is 37.49 kg/m .    Physical Exam   GENERAL: healthy, alert and no distress  HEAD: Normocephalic, atraumatic.   EYES: Normal conjunctivae, sclera.   RESP: lungs clear to auscultation - no rales, rhonchi or wheezes  CV: regular rate and rhythm, normal S1 S2, 3/6 systolic murmur best appreciated at RUSB.  ABDOMEN: soft, no TTP x4 quadrants. No hepatomegaly or masses appreciated. BS normactive.  MSK: no gross musculoskeletal defects noted.  SKIN: no suspicious lesions or rashes.  EXT: Warm and well perfused. 2+ pitting edema up to mid-calves bilaterally.  NEURO: CNII-XII grossly intact. No focal deficits.  PSYCH: Groomed, dressed appropriately for weather. Normal mood with consistent affect.     Signed Electronically by: Robert Randle MD    "

## 2024-05-22 LAB — TSH SERPL DL<=0.005 MIU/L-ACNC: 2.09 UIU/ML (ref 0.3–4.2)

## 2024-05-28 ENCOUNTER — TRANSFERRED RECORDS (OUTPATIENT)
Dept: HEALTH INFORMATION MANAGEMENT | Facility: CLINIC | Age: 84
End: 2024-05-28
Payer: COMMERCIAL

## 2024-06-24 ENCOUNTER — ANCILLARY PROCEDURE (OUTPATIENT)
Dept: CARDIOLOGY | Facility: CLINIC | Age: 84
End: 2024-06-24
Attending: INTERNAL MEDICINE
Payer: COMMERCIAL

## 2024-06-24 DIAGNOSIS — Z95.0 CARDIAC PACEMAKER IN SITU: ICD-10-CM

## 2024-06-24 DIAGNOSIS — I44.2 COMPLETE HEART BLOCK (H): Primary | ICD-10-CM

## 2024-06-24 DIAGNOSIS — I44.2 COMPLETE HEART BLOCK (H): ICD-10-CM

## 2024-06-24 PROCEDURE — 93294 REM INTERROG EVL PM/LDLS PM: CPT | Performed by: INTERNAL MEDICINE

## 2024-06-24 PROCEDURE — 93296 REM INTERROG EVL PM/IDS: CPT | Performed by: INTERNAL MEDICINE

## 2024-06-25 LAB
MDC_IDC_EPISODE_DTM: NORMAL
MDC_IDC_EPISODE_DURATION: 14 S
MDC_IDC_EPISODE_DURATION: 17 S
MDC_IDC_EPISODE_DURATION: 3 S
MDC_IDC_EPISODE_ID: NORMAL
MDC_IDC_EPISODE_TYPE: NORMAL
MDC_IDC_EPISODE_TYPE_INDUCED: NO
MDC_IDC_EPISODE_TYPE_INDUCED: NO
MDC_IDC_LEAD_CONNECTION_STATUS: NORMAL
MDC_IDC_LEAD_CONNECTION_STATUS: NORMAL
MDC_IDC_LEAD_IMPLANT_DT: NORMAL
MDC_IDC_LEAD_IMPLANT_DT: NORMAL
MDC_IDC_LEAD_LOCATION: NORMAL
MDC_IDC_LEAD_LOCATION: NORMAL
MDC_IDC_LEAD_LOCATION_DETAIL_1: NORMAL
MDC_IDC_LEAD_LOCATION_DETAIL_1: NORMAL
MDC_IDC_LEAD_MFG: NORMAL
MDC_IDC_LEAD_MFG: NORMAL
MDC_IDC_LEAD_MODEL: NORMAL
MDC_IDC_LEAD_MODEL: NORMAL
MDC_IDC_LEAD_POLARITY_TYPE: NORMAL
MDC_IDC_LEAD_POLARITY_TYPE: NORMAL
MDC_IDC_LEAD_SERIAL: NORMAL
MDC_IDC_LEAD_SERIAL: NORMAL
MDC_IDC_MSMT_BATTERY_DTM: NORMAL
MDC_IDC_MSMT_BATTERY_REMAINING_LONGEVITY: 150 MO
MDC_IDC_MSMT_BATTERY_REMAINING_PERCENTAGE: 100 %
MDC_IDC_MSMT_BATTERY_STATUS: NORMAL
MDC_IDC_MSMT_LEADCHNL_RA_IMPEDANCE_VALUE: 561 OHM
MDC_IDC_MSMT_LEADCHNL_RA_PACING_THRESHOLD_AMPLITUDE: 0.5 V
MDC_IDC_MSMT_LEADCHNL_RA_PACING_THRESHOLD_PULSEWIDTH: 0.4 MS
MDC_IDC_MSMT_LEADCHNL_RV_IMPEDANCE_VALUE: 678 OHM
MDC_IDC_MSMT_LEADCHNL_RV_PACING_THRESHOLD_AMPLITUDE: 1 V
MDC_IDC_MSMT_LEADCHNL_RV_PACING_THRESHOLD_PULSEWIDTH: 0.4 MS
MDC_IDC_PG_IMPLANT_DTM: NORMAL
MDC_IDC_PG_MFG: NORMAL
MDC_IDC_PG_MODEL: NORMAL
MDC_IDC_PG_SERIAL: NORMAL
MDC_IDC_PG_TYPE: NORMAL
MDC_IDC_SESS_CLINIC_NAME: NORMAL
MDC_IDC_SESS_DTM: NORMAL
MDC_IDC_SESS_TYPE: NORMAL
MDC_IDC_SET_BRADY_AT_MODE_SWITCH_MODE: NORMAL
MDC_IDC_SET_BRADY_AT_MODE_SWITCH_RATE: 170 {BEATS}/MIN
MDC_IDC_SET_BRADY_LOWRATE: 60 {BEATS}/MIN
MDC_IDC_SET_BRADY_MAX_SENSOR_RATE: 130 {BEATS}/MIN
MDC_IDC_SET_BRADY_MAX_TRACKING_RATE: 130 {BEATS}/MIN
MDC_IDC_SET_BRADY_MODE: NORMAL
MDC_IDC_SET_BRADY_PAV_DELAY_HIGH: 200 MS
MDC_IDC_SET_BRADY_PAV_DELAY_LOW: 250 MS
MDC_IDC_SET_BRADY_SAV_DELAY_HIGH: 200 MS
MDC_IDC_SET_BRADY_SAV_DELAY_LOW: 250 MS
MDC_IDC_SET_LEADCHNL_RA_PACING_AMPLITUDE: 2 V
MDC_IDC_SET_LEADCHNL_RA_PACING_CAPTURE_MODE: NORMAL
MDC_IDC_SET_LEADCHNL_RA_PACING_POLARITY: NORMAL
MDC_IDC_SET_LEADCHNL_RA_PACING_PULSEWIDTH: 0.4 MS
MDC_IDC_SET_LEADCHNL_RA_SENSING_ADAPTATION_MODE: NORMAL
MDC_IDC_SET_LEADCHNL_RA_SENSING_POLARITY: NORMAL
MDC_IDC_SET_LEADCHNL_RA_SENSING_SENSITIVITY: 0.25 MV
MDC_IDC_SET_LEADCHNL_RV_PACING_AMPLITUDE: 1.4 V
MDC_IDC_SET_LEADCHNL_RV_PACING_CAPTURE_MODE: NORMAL
MDC_IDC_SET_LEADCHNL_RV_PACING_POLARITY: NORMAL
MDC_IDC_SET_LEADCHNL_RV_PACING_PULSEWIDTH: 0.4 MS
MDC_IDC_SET_LEADCHNL_RV_SENSING_ADAPTATION_MODE: NORMAL
MDC_IDC_SET_LEADCHNL_RV_SENSING_POLARITY: NORMAL
MDC_IDC_SET_LEADCHNL_RV_SENSING_SENSITIVITY: 1.5 MV
MDC_IDC_SET_ZONE_DETECTION_INTERVAL: 375 MS
MDC_IDC_SET_ZONE_STATUS: NORMAL
MDC_IDC_SET_ZONE_TYPE: NORMAL
MDC_IDC_SET_ZONE_VENDOR_TYPE: NORMAL
MDC_IDC_STAT_AT_BURDEN_PERCENT: 1 %
MDC_IDC_STAT_AT_DTM_END: NORMAL
MDC_IDC_STAT_AT_DTM_START: NORMAL
MDC_IDC_STAT_BRADY_DTM_END: NORMAL
MDC_IDC_STAT_BRADY_DTM_START: NORMAL
MDC_IDC_STAT_BRADY_RA_PERCENT_PACED: 97 %
MDC_IDC_STAT_BRADY_RV_PERCENT_PACED: 4 %
MDC_IDC_STAT_EPISODE_RECENT_COUNT: 0
MDC_IDC_STAT_EPISODE_RECENT_COUNT: 0
MDC_IDC_STAT_EPISODE_RECENT_COUNT: 3
MDC_IDC_STAT_EPISODE_RECENT_COUNT_DTM_END: NORMAL
MDC_IDC_STAT_EPISODE_RECENT_COUNT_DTM_START: NORMAL
MDC_IDC_STAT_EPISODE_TYPE: NORMAL
MDC_IDC_STAT_EPISODE_VENDOR_TYPE: NORMAL
MDC_IDC_STAT_EPISODE_VENDOR_TYPE: NORMAL

## 2024-06-26 ENCOUNTER — OFFICE VISIT (OUTPATIENT)
Dept: FAMILY MEDICINE | Facility: CLINIC | Age: 84
End: 2024-06-26
Payer: COMMERCIAL

## 2024-06-26 VITALS
OXYGEN SATURATION: 99 % | WEIGHT: 215 LBS | HEIGHT: 64 IN | RESPIRATION RATE: 16 BRPM | SYSTOLIC BLOOD PRESSURE: 145 MMHG | BODY MASS INDEX: 36.7 KG/M2 | DIASTOLIC BLOOD PRESSURE: 63 MMHG | TEMPERATURE: 98 F | HEART RATE: 62 BPM

## 2024-06-26 DIAGNOSIS — Z95.2 S/P TAVR (TRANSCATHETER AORTIC VALVE REPLACEMENT): ICD-10-CM

## 2024-06-26 DIAGNOSIS — Z01.818 PRE-OP EXAM: Primary | ICD-10-CM

## 2024-06-26 DIAGNOSIS — M18.11 OSTEOARTHRITIS OF CARPOMETACARPAL (CMC) JOINT OF RIGHT THUMB, UNSPECIFIED OSTEOARTHRITIS TYPE: ICD-10-CM

## 2024-06-26 DIAGNOSIS — I48.20 CHRONIC ATRIAL FIBRILLATION (H): ICD-10-CM

## 2024-06-26 DIAGNOSIS — I50.30 DIASTOLIC HEART FAILURE, UNSPECIFIED HF CHRONICITY (H): ICD-10-CM

## 2024-06-26 DIAGNOSIS — Z86.2 HISTORY OF ANEMIA: ICD-10-CM

## 2024-06-26 DIAGNOSIS — I44.2 COMPLETE HEART BLOCK (H): ICD-10-CM

## 2024-06-26 DIAGNOSIS — E08.65: ICD-10-CM

## 2024-06-26 DIAGNOSIS — I35.0 AORTIC STENOSIS, SEVERE: ICD-10-CM

## 2024-06-26 DIAGNOSIS — Z95.0 CARDIAC PACEMAKER IN SITU: ICD-10-CM

## 2024-06-26 PROBLEM — E66.01 CLASS 2 SEVERE OBESITY DUE TO EXCESS CALORIES WITH SERIOUS COMORBIDITY IN ADULT (H): Status: ACTIVE | Noted: 2024-06-26

## 2024-06-26 PROBLEM — E66.812 CLASS 2 SEVERE OBESITY DUE TO EXCESS CALORIES WITH SERIOUS COMORBIDITY IN ADULT (H): Status: ACTIVE | Noted: 2024-06-26

## 2024-06-26 LAB
ANION GAP SERPL CALCULATED.3IONS-SCNC: 11 MMOL/L (ref 7–15)
BUN SERPL-MCNC: 16.3 MG/DL (ref 8–23)
CALCIUM SERPL-MCNC: 10 MG/DL (ref 8.8–10.2)
CHLORIDE SERPL-SCNC: 104 MMOL/L (ref 98–107)
CREAT SERPL-MCNC: 0.86 MG/DL (ref 0.51–0.95)
DEPRECATED HCO3 PLAS-SCNC: 23 MMOL/L (ref 22–29)
EGFRCR SERPLBLD CKD-EPI 2021: 67 ML/MIN/1.73M2
GLUCOSE SERPL-MCNC: 148 MG/DL (ref 70–99)
POTASSIUM SERPL-SCNC: 4.2 MMOL/L (ref 3.4–5.3)
SODIUM SERPL-SCNC: 138 MMOL/L (ref 135–145)

## 2024-06-26 PROCEDURE — G2211 COMPLEX E/M VISIT ADD ON: HCPCS | Performed by: STUDENT IN AN ORGANIZED HEALTH CARE EDUCATION/TRAINING PROGRAM

## 2024-06-26 PROCEDURE — 99214 OFFICE O/P EST MOD 30 MIN: CPT | Performed by: STUDENT IN AN ORGANIZED HEALTH CARE EDUCATION/TRAINING PROGRAM

## 2024-06-26 PROCEDURE — 93000 ELECTROCARDIOGRAM COMPLETE: CPT | Performed by: STUDENT IN AN ORGANIZED HEALTH CARE EDUCATION/TRAINING PROGRAM

## 2024-06-26 PROCEDURE — 36415 COLL VENOUS BLD VENIPUNCTURE: CPT | Performed by: STUDENT IN AN ORGANIZED HEALTH CARE EDUCATION/TRAINING PROGRAM

## 2024-06-26 PROCEDURE — 80048 BASIC METABOLIC PNL TOTAL CA: CPT | Performed by: STUDENT IN AN ORGANIZED HEALTH CARE EDUCATION/TRAINING PROGRAM

## 2024-06-26 ASSESSMENT — PAIN SCALES - GENERAL: PAINLEVEL: MILD PAIN (2)

## 2024-06-26 NOTE — PROGRESS NOTES
Preoperative Evaluation  [facility]  [address] [address]  Phone: [phone]  Primary Provider: [facility]  Pre-op Performing Provider: Robert Randle MD  Jun 26, 2024 6/21/2024   Surgical Information   What procedure is being done? Basilar Thumb Joint Arthhritis   Facility or Hospital where procedure/surgery will be performed: [facility]   Who is doing the procedure / surgery? Mitch Reyes M.D.   Date of surgery / procedure: July 2, 2024   Time of surgery / procedure: Approximately 10:30 a.m. - don't have actual time from surgery center.   Where do you plan to recover after surgery? at home with family      Fax number for surgical facility: [phone]    Assessment & Plan     The proposed surgical procedure is considered INTERMEDIATE risk.    Pre-op exam  Osteoarthritis of carpometacarpal (CMC) joint of right thumb, unspecified osteoarthritis type    Chronic atrial fibrillation (H)  Diastolic heart failure, unspecified HF chronicity (H)  - EKG 12-lead complete w/read - Clinics    Complete heart block (H)  Cardiac pacemaker in situ  Discussed providing Pacemaker information on day on surgery  - EKG 12-lead complete w/read - Clinics    Aortic stenosis, severe  S/P TAVR (transcatheter aortic valve replacement)  Doing very well overall.     Diabetes mellitus due to underlying condition, controlled, with hyperglycemia, without long-term current use of insulin (H)  Last A1c of 6.7 one month ago and well controlled.  - Basic metabolic panel  (Ca, Cl, CO2, Creat, Gluc, K, Na, BUN)    History of anemia  Longstanding. Taking iron supplement.      Implanted Device   - Type of device: [employer] [id number] ACCOLADE MRI EL Patient advised to bring device information on day of surgery.   - For full details on implanted device, refer to device management note in Epic from date: 6/24/24    Preoperative Medication  Instructions  Antiplatelet or Anticoagulation Medication Instructions   - apixaban (Eliquis), edoxaban (Savaysa), rivaroxaban (Xarelto): Bleeding risk is low for this procedure AND CrCl (>=) 50 mL/min. DO NOT TAKE 1 day before surgery.      Additional Medication Instructions  Take all scheduled medications on the day of surgery  Hold all supplements 14 days prior to surgery  Hold all NSAIDs 7-10 days prior to surgery  Hold all topical creams on day of surgery    Recommendation  Approval given to proceed with proposed procedure, without further diagnostic evaluation.    Robert Randle MD  [facility], [place]  6/27/2024    Melyssa Shultz is a 83 year old, presenting for the following:  Pre-Op Exam        6/26/2024     9:04 AM   Additional Questions   Roomed by izzy     HPI related to upcoming procedure:   Pain in R thumb        6/21/2024   Pre-Op Questionnaire   Have you ever had a heart attack or stroke? No   Have you ever had surgery on your heart or blood vessels, such as a stent placement, a coronary artery bypass, or surgery on an artery in your head, neck, heart, or legs? (!) YES, TAVR and pacemaker placement   Do you have chest pain with activity? No   Do you have a history of heart failure? No   Do you currently have a cold, bronchitis or symptoms of other infection? No   Do you have a cough, shortness of breath, or wheezing? No   Do you or anyone in your family have previous history of blood clots? No   Do you or does anyone in your family have a serious bleeding problem such as prolonged bleeding following surgeries or cuts? No   Have you ever had problems with anemia or been told to take iron pills? (!) YES, for HERMILO   Have you had any abnormal blood loss such as black, tarry or bloody stools, or abnormal vaginal bleeding? No   Have you ever had a blood transfusion? (!) YES, last transfusion was 2018   Have you ever had a transfusion reaction? No   Are you willing to have a blood transfusion if  it is medically needed before, during, or after your surgery? Yes   Have you or any of your relatives ever had problems with anesthesia? (!) YES, mother with what sounds like delirium but patient without problems   Do you have sleep apnea, excessive snoring or daytime drowsiness? No   Do you have any artifical heart valves or other implanted medical devices like a pacemaker, defibrillator, or continuous glucose monitor? (!) YES, TAVR, total knee replacement, pacemaker   What type of device do you have? Pacemaker   Name of the clinic that manages your device [facility]   Do you have artificial joints? (!) YES   Are you allergic to latex? No      Health Care Directive  Patient has a Health Care Directive on file    Preoperative Review of    reviewed - no record of controlled substances prescribed.    Status of Chronic Conditions:  See problem list for active medical problems.  Problems all longstanding and stable, except as noted/documented.  See ROS for pertinent symptoms related to these conditions.    Patient Active Problem List    Diagnosis Date Noted    Hyperlipidemia LDL goal <130 10/31/2010     Priority: High    Impaired fasting glucose 07/16/2007     Priority: High    Essential hypertension, benign 06/25/2006     Priority: High    Chronic atrial fibrillation (H) 05/21/2024     Priority: Medium     AT/A-fib burden was less than 1% on last device check  On DOAC, considering Watchman device      History of anemia 05/21/2024     Priority: Medium     Hx of HERMILO from 2008 to 2018 which has since returned to normal range except dip in 2023 from post op blood loss. EGD and colonoscopy in 2016 unremarkable.     Nothing further to do, continue daily iron supplement      Diabetes mellitus, type 2 (H) 05/21/2024     Priority: Medium    Shah's esophagus without dysplasia 05/21/2024     Priority: Medium     Needs repeat EGD      Complete heart block (H) 11/08/2023     Priority: Medium     status post  dual-chamber pacemaker       Cardiac pacemaker in situ 11/08/2023     Priority: Medium    Diastolic heart failure (H) 11/08/2023     Priority: Medium    Diabetes mellitus due to underlying condition, controlled, with hyperglycemia, without long-term current use of insulin (H) 05/22/2023     Priority: Medium    Aortic stenosis, severe 11/08/2022     Priority: Medium     s/p elective TAVR 11/8/2023;  needed PPM following TAVR       Status post coronary angiogram 09/30/2022     Priority: Medium    Chronic kidney disease, stage 3a (H) 02/07/2022     Priority: Medium    H/O small bowel obstruction 08/16/2021     Priority: Medium     Recurrent SBO since 2014, at least 4 hospitalizations.  Underwent surgical procedure 8/2023 without any side effects whatsoever      Pelvic floor dysfunction 05/08/2019     Priority: Medium    Overactive bladder 02/01/2017     Priority: Medium     [facility] Dr. Bai; has tried 7 medications without benefit, Interstim x 2, Botox in bladder, x 5; no infection now. Ongoing symptoms; defer to Urology.      S/P total hip arthroplasty 11/09/2016     Priority: Medium    Iron and its compounds causing adverse effect in therapeutic use(E934.0) 09/19/2016     Priority: Medium    Chest pain syndrome 07/02/2013     Priority: Medium    Hypersomnia with sleep apnea 11/23/2004     Priority: Medium     Problem list name updated by automated process. Provider to review      Acquired hypothyroidism 11/11/2004     Priority: Medium    Generalized osteoarthrosis, unspecified site 10/03/2002     Priority: Medium    Esophageal reflux 10/03/2002     Priority: Medium    Obesity 10/03/2002     Priority: Medium     Problem list name updated by automated process. Provider to review        Past Medical History:   Diagnosis Date    Aortic valve stenosis 8/18/2022    Arthritis     Diabetes (H)     Type 2-no meds... Dieet and exercise only as of 9/15/20    Esophageal reflux     Hernia, abdominal     History of  blood transfusion 1984    Hypertension     No cardiologist    Incontinence of urine     Mumps     6 years old    Obese     Osteoarthritis     Other and unspecified hyperlipidemia     Other chronic pain     back    Peptic ulcer, unspecified site, unspecified as acute or chronic, without mention of hemorrhage, perforation, or obstruction     Small bowel obstruction (H)     Thyroid disease hypothyroidism    Urinary incontinence     Walking troubles      Past Surgical History:   Procedure Laterality Date    ABDOMEN SURGERY      ARTHROPLASTY HIP Right 11/09/2016    Procedure: ARTHROPLASTY HIP;  Surgeon: Angel Lund MD;  Location:  OR    AS REPAIR INCISIONAL HERNIA,REDUCIBLE  1998    inc hernia ( Yamileth surgery)    BIOPSY      BLADDER SURGERY      hyperdistention surgery and sling    BREAST SURGERY      CARDIAC SURGERY  2022    CHOLECYSTECTOMY  1987    COLONOSCOPY  12/03/2011    Procedure:COLONOSCOPY; COLONOSCOPY; Surgeon:SEMAJ GRAHAM; Location: GI    COLONOSCOPY N/A 03/29/2018    Procedure: COLONOSCOPY;  COLONOSCOPY Rm 544;  Surgeon: Marco Gusman MD;  Location:  GI    CV CORONARY ANGIOGRAM N/A 09/30/2022    Procedure: Coronary Angiogram;  Surgeon: Garcia Barber MD;  Location: [facility] CARDIAC CATH LAB    CV TRANSCATHETER AORTIC VALVE REPLACEMENT-FEMORAL APPROACH N/A 11/08/2022    Procedure: Transcatheter Aortic Valve Replacement-Femoral Approach;  Surgeon: Yulia Castano MD;  Location: [facility] CARDIAC CATH LAB    CYSTOSCOPY N/A 09/06/2017    Procedure: CYSTOSCOPY;  CYSTOSCOPY AND HYDRODISTENTION ;  Surgeon: Eyal Bai MD;  Location:  OR    ENT SURGERY      Tonsillectomy    EP PACEMAKER DEVICE & LEAD IMPLANT- RIGHT ATRIAL & RIGHT VENTRICULAR N/A 11/08/2022    Procedure: Pacemaker Device & Lead Implant - Right Atrial & Right Ventricular;  Surgeon: William Boyd MD;  Location: [facility] CARDIAC CATH LAB    ESOPHAGOSCOPY, GASTROSCOPY, DUODENOSCOPY (EGD), COMBINED N/A  09/26/2016    Procedure: COMBINED ESOPHAGOSCOPY, GASTROSCOPY, DUODENOSCOPY (EGD), BIOPSY SINGLE OR MULTIPLE;  Surgeon: Marco Monaco MD;  Location:  GI    EYE SURGERY Left 05/2019    GENITOURINARY SURGERY      GYN SURGERY      Hysterectomy    HERNIA REPAIR, UMBILICAL  07/08/2010    Dr. Kirt Franco times 3    IMPLANT STIMULATOR SACRAL NERVE STAGE ONE N/A 09/17/2020    Procedure: sacral neurostimulation stage one implant of neurostimulator lead;  Surgeon: Shahnaz Carbone MD;  Location:  OR    IMPLANT STIMULATOR SACRAL NERVE STAGE TWO Right 09/24/2020    Procedure: Removal of interstem lead, NOT implanting neurostimulator;  Surgeon: Shahnaz Carbone MD;  Location:  OR    ORTHOPEDIC SURGERY  2009    [facility] - Dr. Lund- bilateral knee replacement    RESECT SMALL BOWEL WITHOUT OSTOMY N/A 8/11/2023    Procedure: Small bowel resection, Revision of anastamosis;  Surgeon: Rafi Franco MD;  Location:  OR    [facility] NONSPECIFIC PROCEDURE  1946    T&A    Z NONSPECIFIC PROCEDURE  1984    Vaginal Hysterectomy (has her ovaries)    Z NONSPECIFIC PROCEDURE  1973    PPTL    ZZ NONSPECIFIC PROCEDURE  1994    L shoulder to remove bone spurs    ZZC NONSPECIFIC PROCEDURE  2004    cysto and durasphere Dr Demarco    [facility] NONSPECIFIC PROCEDURE  2005    cysto and durasphere    ZZC NONSPECIFIC PROCEDURE  2007    cysto and durasphere    ZZ NONSPECIFIC PROCEDURE  2009    retropubic TVT sling     Current Outpatient Medications   Medication Sig Dispense Refill    acetaminophen (TYLENOL) 500 MG tablet Take 1,000 mg by mouth 2 times daily (2 x 500 mg = 1000 mg)      amLODIPine (NORVASC) 5 MG tablet TAKE ONE TABLET BY MOUTH EVERY NIGHT AT BEDTIME 90 tablet 3    amoxicillin (AMOXIL) 500 MG capsule Take 2,000 mg by mouth once as needed (1 hour prior to dental procedures 4 x 500 mg = 2000 mg)      apixaban ANTICOAGULANT (ELIQUIS ANTICOAGULANT) 5 MG tablet Take 1 tablet (5 mg) by mouth 2 times daily 180 tablet  3    Biotin 5000 MCG PO TABS Take 1 tablet by mouth daily      calcium carbonate (OS-CINDY) 500 MG tablet Take 1 tablet by mouth every other day      CENTRUM SILVER OR TABS Take 1 tablet by mouth daily 30 0    Cranberry 450 MG CAPS Take 1 capsule by mouth daily (with dinner)      ferrous gluconate (FERGON) 324 (38 Fe) MG tablet Take 1 tablet (324 mg) by mouth daily (with breakfast) 90 tablet 1    latanoprost (XALATAN) 0.005 % ophthalmic solution Place 1 drop into both eyes At Bedtime       levothyroxine (SYNTHROID/LEVOTHROID) 50 MCG tablet Take 1 tablet (50 mcg) by mouth daily 90 tablet 3    lisinopril (ZESTRIL) 30 MG tablet Take 1 tablet (30 mg) by mouth every morning 90 tablet 3    magnesium 250 MG tablet Take 1 tablet by mouth daily      nitroGLYcerin (NITROSTAT) 0.4 MG sublingual tablet For chest pain place 1 tablet under the tongue every 5 minutes for 3 doses. If symptoms persist 5 minutes after 1st dose call 911. (Patient taking differently: 0.4 mg every 5 minutes as needed For chest pain place 1 tablet under the tongue every 5 minutes for 3 doses. If symptoms persist 5 minutes after 1st dose call 911.) 25 tablet 11    OMEGA-3 FATTY ACIDS 1200 MG OR CAPS Take 1 capsule by mouth daily  0    pantoprazole (PROTONIX) 20 MG EC tablet Take 1 tablet (20 mg) by mouth daily 90 tablet 3    potassium 99 MG TABS Take 1 tablet by mouth daily (with dinner)       Probiotic Product (ACIDOPHILUS PROBIOTIC BLEND) CAPS Take 1 capsule by mouth daily (with breakfast)  30 capsule 0    simvastatin (ZOCOR) 20 MG tablet TAKE ONE TABLET BY MOUTH EVERY NIGHT AT BEDTIME 90 tablet 3    VITAMIN D, CHOLECALCIFEROL, PO Take 2,000 Units by mouth daily         Allergies   Allergen Reactions    Amoxicillin-Pot Clavulanate Diarrhea    Hydrocodone      Keeps patient awake    Morphine And Codeine Nausea and Vomiting    Naproxen Itching and Rash    Seasonal Allergies      Hay fever in fall, ragweed and russian thistles    Sulfa Antibiotics Nausea     "    Social History     Tobacco Use    Smoking status: Never     Passive exposure: Never    Smokeless tobacco: Never    Tobacco comments:     have never smoked anything   Substance Use Topics    Alcohol use: No     History   Drug Use No     Review of Systems  Constitutional, neuro, ENT, endocrine, pulmonary, cardiac, gastrointestinal, genitourinary, musculoskeletal, integument and psychiatric systems are negative, except as otherwise noted.    Objective    BP (!) 145/63   Pulse 62   Temp 98  F (36.7  C)   Resp 16   Ht 1.613 m (5' 3.5\")   Wt 97.5 kg (215 lb)   LMP  (LMP Unknown)   SpO2 99%   BMI 37.49 kg/m     Estimated body mass index is 37.49 kg/m  as calculated from the following:    Height as of this encounter: 1.613 m (5' 3.5\").    Weight as of this encounter: 97.5 kg (215 lb).\    Physical Exam  GENERAL: healthy, alert and no distress  HEAD: Normocephalic, atraumatic.   EYES: PERRL. Normal conjunctivae, sclera.   ENT: Normal EAC and TMs bilaterally. Normal oropharynx.   NECK: Supple. No lymphadenopathy appreciated. Trachea midline.   RESP: lungs clear to auscultation - no rales, rhonchi or wheezes  CV: regular rate and rhythm, normal S1 S2, 2/6 systolic murmur best appreciated at RUSB>LUSB=LLSB=apex. No click, rub or gallop.  1+ pitting edema up to knees bilaterally.  ABDOMEN: soft, no TTP x4 quadrants. No hepatomegaly or masses appreciated. BS normactive.  MSK: no gross musculoskeletal defects noted.  SKIN: no suspicious lesions or rashes.  EXT: Warm and well perfused.   NEURO: CNII-XII grossly intact. No focal deficits.  PSYCH: Groomed, dressed appropriately for weather. Normal mood with consistent affect.     Recent Labs   Lab Test 05/21/24  1014 02/09/24  0548 11/22/23  1033 11/08/23  1006   HGB  --  13.5  --  15.2   PLT  --  170  --  143*   NA  --   --  139 139   POTASSIUM  --   --  4.2 4.6   CR  --   --  0.90 1.05*   A1C 6.7*  --  6.1*  --       Diagnostics  Labs pending at this time.  Results will " be reviewed when available.   EKG required for significant arrhythmia and not completed in the last 90 days.     EKG:  Indication: Pre op  Impression: Left axis deviation. Poor R wave progression. Similar to last EKG on 11/2023.     Revised Cardiac Risk Index (RCRI)  The patient has the following serious cardiovascular risks for perioperative complications:   - Congestive Heart Failure (pulmonary edema, PND, s3 aria, CXR with pulmonary congestion, basilar rales) = 1 point     RCRI Interpretation: 1 point: Class II (low risk - 0.9% complication rate)       Signed Electronically by: Robert Randle MD  Copy of this evaluation report is provided to requesting physician.

## 2024-06-26 NOTE — PATIENT INSTRUCTIONS
How to Take Your Medication Before Surgery  Preoperative Medication Instructions   Antiplatelet or Anticoagulation Medication Instructions   - apixaban (Eliquis), edoxaban (Savaysa), rivaroxaban (Xarelto): Bleeding risk is low for this procedure AND CrCl (>=) 50 mL/min. DO NOT TAKE 1 day before surgery.      Additional Medication Instructions  Take all scheduled medications on the day of surgery       Hold all supplements 14 days prior to surgery  Hold all NSAIDs 7-10 days prior to surgery  Hold all topical creams on day of surgery

## 2024-07-02 ENCOUNTER — HOSPITAL ENCOUNTER (OUTPATIENT)
Facility: CLINIC | Age: 84
Discharge: HOME | End: 2024-07-02
Attending: ORTHOPAEDIC SURGERY | Admitting: ORTHOPAEDIC SURGERY
Payer: COMMERCIAL

## 2024-07-02 ENCOUNTER — ANESTHESIA EVENT (OUTPATIENT)
Dept: SURGERY | Facility: CLINIC | Age: 84
End: 2024-07-02
Payer: COMMERCIAL

## 2024-07-02 ENCOUNTER — ANESTHESIA (OUTPATIENT)
Dept: SURGERY | Facility: CLINIC | Age: 84
End: 2024-07-02
Payer: COMMERCIAL

## 2024-07-02 ENCOUNTER — APPOINTMENT (OUTPATIENT)
Dept: GENERAL RADIOLOGY | Facility: CLINIC | Age: 84
End: 2024-07-02
Attending: ORTHOPAEDIC SURGERY
Payer: COMMERCIAL

## 2024-07-02 VITALS
RESPIRATION RATE: 16 BRPM | SYSTOLIC BLOOD PRESSURE: 135 MMHG | HEART RATE: 61 BPM | TEMPERATURE: 97.2 F | OXYGEN SATURATION: 95 % | BODY MASS INDEX: 37.51 KG/M2 | WEIGHT: 215.1 LBS | DIASTOLIC BLOOD PRESSURE: 87 MMHG

## 2024-07-02 DIAGNOSIS — G89.18 ACUTE POSTOPERATIVE PAIN: Primary | ICD-10-CM

## 2024-07-02 LAB — GLUCOSE BLDC GLUCOMTR-MCNC: 146 MG/DL (ref 70–99)

## 2024-07-02 PROCEDURE — 250N000011 HC RX IP 250 OP 636: Performed by: ORTHOPAEDIC SURGERY

## 2024-07-02 PROCEDURE — 250N000011 HC RX IP 250 OP 636: Performed by: ANESTHESIOLOGY

## 2024-07-02 PROCEDURE — 999N000141 HC STATISTIC PRE-PROCEDURE NURSING ASSESSMENT: Performed by: ORTHOPAEDIC SURGERY

## 2024-07-02 PROCEDURE — 250N000011 HC RX IP 250 OP 636: Performed by: NURSE ANESTHETIST, CERTIFIED REGISTERED

## 2024-07-02 PROCEDURE — 82962 GLUCOSE BLOOD TEST: CPT

## 2024-07-02 PROCEDURE — 250N000009 HC RX 250: Performed by: ANESTHESIOLOGY

## 2024-07-02 PROCEDURE — 250N000013 HC RX MED GY IP 250 OP 250 PS 637: Performed by: ANESTHESIOLOGY

## 2024-07-02 PROCEDURE — 272N000001 HC OR GENERAL SUPPLY STERILE: Performed by: ORTHOPAEDIC SURGERY

## 2024-07-02 PROCEDURE — 250N000025 HC SEVOFLURANE, PER MIN: Performed by: ORTHOPAEDIC SURGERY

## 2024-07-02 PROCEDURE — 360N000084 HC SURGERY LEVEL 4 W/ FLUORO, PER MIN: Performed by: ORTHOPAEDIC SURGERY

## 2024-07-02 PROCEDURE — 710N000012 HC RECOVERY PHASE 2, PER MINUTE: Performed by: ORTHOPAEDIC SURGERY

## 2024-07-02 PROCEDURE — 370N000017 HC ANESTHESIA TECHNICAL FEE, PER MIN: Performed by: ORTHOPAEDIC SURGERY

## 2024-07-02 PROCEDURE — 250N000009 HC RX 250: Performed by: NURSE ANESTHETIST, CERTIFIED REGISTERED

## 2024-07-02 PROCEDURE — 710N000010 HC RECOVERY PHASE 1, LEVEL 2, PER MIN: Performed by: ORTHOPAEDIC SURGERY

## 2024-07-02 PROCEDURE — 258N000003 HC RX IP 258 OP 636: Performed by: NURSE ANESTHETIST, CERTIFIED REGISTERED

## 2024-07-02 PROCEDURE — 999N000179 XR SURGERY CARM FLUORO LESS THAN 5 MIN W STILLS: Mod: TC

## 2024-07-02 RX ORDER — BUPIVACAINE HYDROCHLORIDE 5 MG/ML
INJECTION, SOLUTION EPIDURAL; INTRACAUDAL PRN
Status: DISCONTINUED | OUTPATIENT
Start: 2024-07-02 | End: 2024-07-02 | Stop reason: HOSPADM

## 2024-07-02 RX ORDER — FENTANYL CITRATE 50 UG/ML
INJECTION, SOLUTION INTRAMUSCULAR; INTRAVENOUS
Status: COMPLETED | OUTPATIENT
Start: 2024-07-02 | End: 2024-07-02

## 2024-07-02 RX ORDER — FENTANYL CITRATE 50 UG/ML
INJECTION, SOLUTION INTRAMUSCULAR; INTRAVENOUS PRN
Status: DISCONTINUED | OUTPATIENT
Start: 2024-07-02 | End: 2024-07-02

## 2024-07-02 RX ORDER — LIDOCAINE HYDROCHLORIDE 20 MG/ML
INJECTION, SOLUTION INFILTRATION; PERINEURAL PRN
Status: DISCONTINUED | OUTPATIENT
Start: 2024-07-02 | End: 2024-07-02

## 2024-07-02 RX ORDER — HYDRALAZINE HYDROCHLORIDE 20 MG/ML
5 INJECTION INTRAMUSCULAR; INTRAVENOUS ONCE
Status: COMPLETED | OUTPATIENT
Start: 2024-07-02 | End: 2024-07-02

## 2024-07-02 RX ORDER — DEXAMETHASONE SODIUM PHOSPHATE 4 MG/ML
4 INJECTION, SOLUTION INTRA-ARTICULAR; INTRALESIONAL; INTRAMUSCULAR; INTRAVENOUS; SOFT TISSUE
Status: DISCONTINUED | OUTPATIENT
Start: 2024-07-02 | End: 2024-07-02 | Stop reason: HOSPADM

## 2024-07-02 RX ORDER — CEFAZOLIN SODIUM/WATER 2 G/20 ML
2 SYRINGE (ML) INTRAVENOUS SEE ADMIN INSTRUCTIONS
Status: DISCONTINUED | OUTPATIENT
Start: 2024-07-02 | End: 2024-07-02 | Stop reason: HOSPADM

## 2024-07-02 RX ORDER — NALOXONE HYDROCHLORIDE 0.4 MG/ML
0.1 INJECTION, SOLUTION INTRAMUSCULAR; INTRAVENOUS; SUBCUTANEOUS
Status: DISCONTINUED | OUTPATIENT
Start: 2024-07-02 | End: 2024-07-02 | Stop reason: HOSPADM

## 2024-07-02 RX ORDER — HYDROMORPHONE HCL IN WATER/PF 6 MG/30 ML
0.4 PATIENT CONTROLLED ANALGESIA SYRINGE INTRAVENOUS EVERY 5 MIN PRN
Status: DISCONTINUED | OUTPATIENT
Start: 2024-07-02 | End: 2024-07-02 | Stop reason: HOSPADM

## 2024-07-02 RX ORDER — SODIUM CHLORIDE, SODIUM LACTATE, POTASSIUM CHLORIDE, CALCIUM CHLORIDE 600; 310; 30; 20 MG/100ML; MG/100ML; MG/100ML; MG/100ML
INJECTION, SOLUTION INTRAVENOUS CONTINUOUS PRN
Status: DISCONTINUED | OUTPATIENT
Start: 2024-07-02 | End: 2024-07-02

## 2024-07-02 RX ORDER — OXYCODONE HYDROCHLORIDE 5 MG/1
5-10 TABLET ORAL EVERY 4 HOURS PRN
Qty: 30 TABLET | Refills: 0 | Status: SHIPPED | OUTPATIENT
Start: 2024-07-02

## 2024-07-02 RX ORDER — ONDANSETRON 2 MG/ML
4 INJECTION INTRAMUSCULAR; INTRAVENOUS EVERY 30 MIN PRN
Status: DISCONTINUED | OUTPATIENT
Start: 2024-07-02 | End: 2024-07-02 | Stop reason: HOSPADM

## 2024-07-02 RX ORDER — ONDANSETRON 2 MG/ML
INJECTION INTRAMUSCULAR; INTRAVENOUS PRN
Status: DISCONTINUED | OUTPATIENT
Start: 2024-07-02 | End: 2024-07-02

## 2024-07-02 RX ORDER — SODIUM CHLORIDE, SODIUM LACTATE, POTASSIUM CHLORIDE, CALCIUM CHLORIDE 600; 310; 30; 20 MG/100ML; MG/100ML; MG/100ML; MG/100ML
INJECTION, SOLUTION INTRAVENOUS CONTINUOUS
Status: DISCONTINUED | OUTPATIENT
Start: 2024-07-02 | End: 2024-07-02 | Stop reason: HOSPADM

## 2024-07-02 RX ORDER — LABETALOL HYDROCHLORIDE 5 MG/ML
10 INJECTION, SOLUTION INTRAVENOUS
Status: DISCONTINUED | OUTPATIENT
Start: 2024-07-02 | End: 2024-07-02 | Stop reason: HOSPADM

## 2024-07-02 RX ORDER — ONDANSETRON 4 MG/1
4 TABLET, ORALLY DISINTEGRATING ORAL EVERY 30 MIN PRN
Status: DISCONTINUED | OUTPATIENT
Start: 2024-07-02 | End: 2024-07-02 | Stop reason: HOSPADM

## 2024-07-02 RX ORDER — DEXAMETHASONE SODIUM PHOSPHATE 4 MG/ML
INJECTION, SOLUTION INTRA-ARTICULAR; INTRALESIONAL; INTRAMUSCULAR; INTRAVENOUS; SOFT TISSUE PRN
Status: DISCONTINUED | OUTPATIENT
Start: 2024-07-02 | End: 2024-07-02

## 2024-07-02 RX ORDER — LIDOCAINE 40 MG/G
CREAM TOPICAL
Status: DISCONTINUED | OUTPATIENT
Start: 2024-07-02 | End: 2024-07-02 | Stop reason: HOSPADM

## 2024-07-02 RX ORDER — BUPIVACAINE HYDROCHLORIDE AND EPINEPHRINE 2.5; 5 MG/ML; UG/ML
INJECTION, SOLUTION INFILTRATION; PERINEURAL
Status: COMPLETED | OUTPATIENT
Start: 2024-07-02 | End: 2024-07-02

## 2024-07-02 RX ORDER — ACETAMINOPHEN 325 MG/1
975 TABLET ORAL
Status: COMPLETED | OUTPATIENT
Start: 2024-07-02 | End: 2024-07-02

## 2024-07-02 RX ORDER — FENTANYL CITRATE 50 UG/ML
50 INJECTION, SOLUTION INTRAMUSCULAR; INTRAVENOUS EVERY 5 MIN PRN
Status: DISCONTINUED | OUTPATIENT
Start: 2024-07-02 | End: 2024-07-02 | Stop reason: HOSPADM

## 2024-07-02 RX ORDER — FENTANYL CITRATE 50 UG/ML
25 INJECTION, SOLUTION INTRAMUSCULAR; INTRAVENOUS EVERY 5 MIN PRN
Status: DISCONTINUED | OUTPATIENT
Start: 2024-07-02 | End: 2024-07-02 | Stop reason: HOSPADM

## 2024-07-02 RX ORDER — TRIAMCINOLONE ACETONIDE 40 MG/ML
INJECTION, SUSPENSION INTRA-ARTICULAR; INTRAMUSCULAR PRN
Status: DISCONTINUED | OUTPATIENT
Start: 2024-07-02 | End: 2024-07-02 | Stop reason: HOSPADM

## 2024-07-02 RX ORDER — CEFAZOLIN SODIUM/WATER 2 G/20 ML
2 SYRINGE (ML) INTRAVENOUS
Status: COMPLETED | OUTPATIENT
Start: 2024-07-02 | End: 2024-07-02

## 2024-07-02 RX ORDER — HYDROMORPHONE HCL IN WATER/PF 6 MG/30 ML
0.2 PATIENT CONTROLLED ANALGESIA SYRINGE INTRAVENOUS EVERY 5 MIN PRN
Status: DISCONTINUED | OUTPATIENT
Start: 2024-07-02 | End: 2024-07-02 | Stop reason: HOSPADM

## 2024-07-02 RX ORDER — OXYCODONE HYDROCHLORIDE 5 MG/1
5 TABLET ORAL
Status: DISCONTINUED | OUTPATIENT
Start: 2024-07-02 | End: 2024-07-02 | Stop reason: HOSPADM

## 2024-07-02 RX ORDER — PROPOFOL 10 MG/ML
INJECTION, EMULSION INTRAVENOUS PRN
Status: DISCONTINUED | OUTPATIENT
Start: 2024-07-02 | End: 2024-07-02

## 2024-07-02 RX ORDER — OXYCODONE HYDROCHLORIDE 5 MG/1
5 TABLET ORAL
Status: COMPLETED | OUTPATIENT
Start: 2024-07-02 | End: 2024-07-02

## 2024-07-02 RX ADMIN — HYDRALAZINE HYDROCHLORIDE 5 MG: 20 INJECTION INTRAMUSCULAR; INTRAVENOUS at 17:12

## 2024-07-02 RX ADMIN — ACETAMINOPHEN 975 MG: 325 TABLET, FILM COATED ORAL at 16:28

## 2024-07-02 RX ADMIN — FENTANYL CITRATE 100 MCG: 50 INJECTION, SOLUTION INTRAMUSCULAR; INTRAVENOUS at 14:42

## 2024-07-02 RX ADMIN — BUPIVACAINE HYDROCHLORIDE AND EPINEPHRINE BITARTRATE 30 ML: 2.5; .005 INJECTION, SOLUTION INFILTRATION; PERINEURAL at 13:40

## 2024-07-02 RX ADMIN — HYDROMORPHONE HYDROCHLORIDE 0.4 MG: 0.2 INJECTION, SOLUTION INTRAMUSCULAR; INTRAVENOUS; SUBCUTANEOUS at 16:51

## 2024-07-02 RX ADMIN — SODIUM CHLORIDE, POTASSIUM CHLORIDE, SODIUM LACTATE AND CALCIUM CHLORIDE: 600; 310; 30; 20 INJECTION, SOLUTION INTRAVENOUS at 14:16

## 2024-07-02 RX ADMIN — FENTANYL CITRATE 50 MCG: 50 INJECTION, SOLUTION INTRAMUSCULAR; INTRAVENOUS at 16:10

## 2024-07-02 RX ADMIN — PROPOFOL 180 MG: 10 INJECTION, EMULSION INTRAVENOUS at 14:42

## 2024-07-02 RX ADMIN — Medication 2 G: at 14:37

## 2024-07-02 RX ADMIN — FENTANYL CITRATE 25 MCG: 50 INJECTION, SOLUTION INTRAMUSCULAR; INTRAVENOUS at 15:59

## 2024-07-02 RX ADMIN — FENTANYL CITRATE 25 MCG: 50 INJECTION, SOLUTION INTRAMUSCULAR; INTRAVENOUS at 16:20

## 2024-07-02 RX ADMIN — DEXAMETHASONE SODIUM PHOSPHATE 8 MG: 4 INJECTION, SOLUTION INTRA-ARTICULAR; INTRALESIONAL; INTRAMUSCULAR; INTRAVENOUS; SOFT TISSUE at 14:42

## 2024-07-02 RX ADMIN — LIDOCAINE HYDROCHLORIDE 50 MG: 20 INJECTION, SOLUTION INFILTRATION; PERINEURAL at 14:42

## 2024-07-02 RX ADMIN — HYDROMORPHONE HYDROCHLORIDE 0.4 MG: 0.2 INJECTION, SOLUTION INTRAMUSCULAR; INTRAVENOUS; SUBCUTANEOUS at 16:39

## 2024-07-02 RX ADMIN — HYDROMORPHONE HYDROCHLORIDE 0.4 MG: 0.2 INJECTION, SOLUTION INTRAMUSCULAR; INTRAVENOUS; SUBCUTANEOUS at 16:26

## 2024-07-02 RX ADMIN — ONDANSETRON 4 MG: 2 INJECTION INTRAMUSCULAR; INTRAVENOUS at 14:55

## 2024-07-02 RX ADMIN — FENTANYL CITRATE 100 MCG: 50 INJECTION INTRAMUSCULAR; INTRAVENOUS at 13:35

## 2024-07-02 RX ADMIN — OXYCODONE HYDROCHLORIDE 5 MG: 5 TABLET ORAL at 17:06

## 2024-07-02 ASSESSMENT — ENCOUNTER SYMPTOMS: DYSRHYTHMIAS: 1

## 2024-07-02 ASSESSMENT — ACTIVITIES OF DAILY LIVING (ADL)
ADLS_ACUITY_SCORE: 36

## 2024-07-02 NOTE — BRIEF OP NOTE
[facility]    Brief Operative Note    Pre-operative diagnosis: Primary osteoarthritis of first carpometacarpal joint of right thumb hand [M18.12]: Left shoulder arthritis  Arthritis of right wrist [M19.031]  Post-operative diagnosis same    Procedure: Right thumb trapezial excision, Right - Finger  Ligament reconstruction, tendon interposition arthroplasty, Right - Hand  Cortisone Injection Shoulder, Left - Shoulder    Surgeon: Surgeons and Role:     * Mitch Reyes MD - Primary  Anesthesia: Other   Estimated Blood Loss: 2cc    Drains  Specimens:  Findings:   Advanced arthritis  Complications: none.  Implants:

## 2024-07-02 NOTE — PROGRESS NOTES
/79 on left calf.  Discussed with Dr Tucker and pt ok to go to phase 2, no further treatment of BP at this time, ok to discharge home with this BP.  BP on /88 but pt did not tolerate BP on arm.

## 2024-07-02 NOTE — ANESTHESIA CARE TRANSFER NOTE
Patient: Leixi Stratton    Procedure: Procedure(s):  Right thumb trapezial excision  Ligament reconstruction, tendon interposition arthroplasty  Cortisone Injection Shoulder       Diagnosis: Primary osteoarthritis of first carpometacarpal joint of left hand [M18.12]  Arthritis of right wrist [M19.031]  Diagnosis Additional Information: No value filed.    Anesthesia Type:   General     Note:    Oropharynx: oropharynx clear of all foreign objects  Level of Consciousness: awake  Oxygen Supplementation: face mask    Independent Airway: airway patency satisfactory and stable        Patient transferred to: PACU    Handoff Report: Identifed the Patient, Identified the Reponsible Provider, Reviewed the pertinent medical history, Discussed the surgical course, Reviewed Intra-OP anesthesia mangement and issues during anesthesia, Set expectations for post-procedure period and Allowed opportunity for questions and acknowledgement of understanding      Vitals:  Vitals Value Taken Time   /79 07/02/24 1720   Temp 97.1  F (36.2  C) 07/02/24 1550   Pulse 59 07/02/24 1726   Resp 23 07/02/24 1726   SpO2 100 % 07/02/24 1726   Vitals shown include unfiled device data.    Electronically Signed By: Urvashi Tucker MD  July 2, 2024  5:27 PM

## 2024-07-02 NOTE — DISCHARGE INSTRUCTIONS
Maximum acetaminophen (Tylenol) dose from all sources should not exceed 4 grams (4000 mg) per day.               You received 975 mg of Tylenol at 4:30  pm. Next possible dose at 1030 PM.             You may start your Eliquis today.    DR. KARIE MCKEON M.D.           [facility] PHONE NUMBER:  [phone]

## 2024-07-02 NOTE — PROGRESS NOTES
Call placed to Dr Geiger to touch base and update him of pain 10/10 despite IV pain meds.  Pt tearful and c/o pain to backside of RUE, possibly from tourniquet during surgery.  No skin issues noted.  Will continue to treat with IV dilaudid as pt tolerates.

## 2024-07-02 NOTE — ANESTHESIA POSTPROCEDURE EVALUATION
Patient: Lexii Stratton    Procedure: Procedure(s):  Right thumb trapezial excision  Ligament reconstruction, tendon interposition arthroplasty  Cortisone Injection Shoulder       Anesthesia Type:  General    Note:  Disposition: Outpatient   Postop Pain Control:    PONV: No   Neuro/Psych: Uneventful            Sign Out: Acceptable/Baseline neuro status   Airway/Respiratory: Uneventful            Sign Out: Acceptable/Baseline resp. status   CV/Hemodynamics: Uneventful            Sign Out: Acceptable CV status; No obvious hypovolemia; No obvious fluid overload   Other NRE:    DID A NON-ROUTINE EVENT OCCUR? No           Last vitals:  Vitals Value Taken Time   /79 07/02/24 1720   Temp 97.1  F (36.2  C) 07/02/24 1550   Pulse 59 07/02/24 1726   Resp 23 07/02/24 1726   SpO2 100 % 07/02/24 1726   Vitals shown include unfiled device data.    Electronically Signed By: Urvashi Tucker MD  July 2, 2024  5:27 PM

## 2024-07-02 NOTE — ANESTHESIA PROCEDURE NOTES
Airway       Patient location during procedure: OR  Staff -        Anesthesiologist:  Urvashi Tucker MD       CRNA: Shannen Nagy APRN CRNA       Performed By: anesthesiologist and CRNA  Consent for Airway        Urgency: elective  Indications and Patient Condition       Indications for airway management: wang-procedural       Induction type:intravenous       Mask difficulty assessment: 1 - vent by mask    Final Airway Details       Final airway type: supraglottic airway    Supraglottic Airway Details        Type: LMA       Brand: I-Gel       LMA size: 4    Post intubation assessment        Placement verified by: capnometry, equal breath sounds and chest rise        Number of attempts at approach: 1       Number of other approaches attempted: 0       Secured with: commercial tube renner       Ease of procedure: easy       Dentition: Unchanged

## 2024-07-02 NOTE — OP NOTE
Preoperative diagnosis: Right basilar thumb joint osteoarthritis, left shoulder glenohumeral joint arthritis    Postop diagnosis: The same    Operative procedure:    1.  right trapezial excision with ligament reconstruction and tendon interposition arthroplasty    2.  Left shoulder glenohumeral joint cortisone injection    Surgeon: Mitch Reyes MD    Anesthesia: General with regional block    Preoperative history: 83-year-old woman with progressively debilitating pain in the right basilar thumb joint failed conservative treatment.  Also has glenohumeral joint arthritis and left shoulder would like cortisone injection today as well    Procedure: After obtaining informed consent brought to the operating placed operative table supine position.  Regional anesthesia performed duction of general anesthesia was performed preoperative antibiotics were given.  The left shoulder was sterilely prepped and using sterile technique 1 cc of triamcinolone and 6 cc of Marcaine were injected into the left shoulder glenohumeral joint without difficulty.    The right upper extremities prepped and draped sterilely attention was directed to the basilar thumb joint where a curved incision was made between the dorsal and glabra skin.  Dissection then proceeded down through subcutaneous tissue sensory nerves retracted out of harm's way capsular incision was made along the base over the dorsal aspect of the right carpal metacarpal joint.  Subperiosteal dissection was performed along the proximal metacarpal and trapezium.  Complete bone-on-bone articulation was noted and advanced osteoarthritis noted.    The joystick was placed into the trapezium was circumferentially dissected and completely excised.  All osteophytes synovitis and bony debris were removed.  A drill hole was passed through the proximal metacarpal exiting out proximal and palmar.  0 FiberWire suture was placed along the metaphyseal flare of the index finger and passed  through the drill hole in the metacarpal to suspend the thumb and its ideal location.    The flexor carpi radialis tendon was then identified at the wrist crease and through 1.5 cm incision in the mid forearm the FCR was identified in the forearm.  The ulnar one half of the FCR was then harvested split longitudinally down to its insertion site at the index metacarpal.  Tendon graft was passed through the drill hole in the metacarpal and used to suspend the thumb into its ideal location.  With the thumb held in place the ligament reconstruction was then completed by suturing the tendon graft back onto itself with multiple 3-0 Ethibond sutures the remaining length of the tendon was involved onto itself and secured as an interposition graft between the dorsal and palmar capsule.  0 FiberWire suture was placed and incorporated into the intermetacarpal space into the position graft in secured it up into the intermetacarpal metacarpal trapezoid space.  C-arm imaging and verified complete resection arthroplasty in good suspension of the thumb at its ideal location smooth articulation and good stability was verified.  Capsular closure was completed with multiple 3 Ethibond sutures.  Wound was then irrigated and closed 5-0 nylon suture.  Sterile dressing and a well-padded short arm thumb spica splint was then applied Toller procedure well with no complications and transferred to recovery in stable condition.

## 2024-07-02 NOTE — ANESTHESIA PROCEDURE NOTES
Brachial plexus Procedure Note    Pre-Procedure   Staff -        Anesthesiologist:  Urvashi Tucker MD       Performed By: anesthesiologist       Referred By: [place]       Location: pre-op       Pre-Anesthestic Checklist: patient identified, IV checked, site marked, risks and benefits discussed, informed consent, monitors and equipment checked, pre-op evaluation, at physician/surgeon's request and post-op pain management  Timeout:       Correct Patient: Yes        Correct Procedure: Yes        Correct Site: Yes        Correct Position: Yes        Correct Laterality: Yes        Site Marked: Yes  Procedure Documentation  Procedure: Brachial plexus       Diagnosis: WRIST PAIN       Laterality: right       Patient Position: sitting       Patient Prep/Sterile Barriers: sterile gloves, mask       Skin prep: Chloraprep       Local skin infiltrated with 3 mL of 1% lidocaine.  (supraclavicular approach).       Needle Gauge: 20.        Needle Length (Inches): 4        Ultrasound guided       1. Ultrasound was used to identify targeted nerve, plexus, vascular marker, or fascial plane and place a needle adjacent to it in real-time.       2. Ultrasound was used to visualize the spread of anesthetic in close proximity to the above referenced structure.       4. The visualized anatomic structures appeared normal.       5. There were no apparent abnormal pathologic findings.    Assessment/Narrative         The placement was negative for: blood aspirated, painful injection and site bleeding       Paresthesias: No.       Bolus given via needle. no blood aspirated via catheter.        Secured via.        Insertion/Infusion Method: Single Shot       Complications: none    Medication(s) Administered   Bupivacaine 0.25% w/ 1:200K Epi (Injection) - Injection   30 mL - 7/2/2024 1:40:00 PM  fentaNYL (PF) (SUBLIMAZE) injection - Intravenous   100 mcg - 7/2/2024 1:35:00 PM    FOR [facility] ([facility] ONLY:   Pain Team Contact  "information: please page the Pain Team Via [facility]. Search \"Pain\". During daytime hours, please page the attending first. At night please page the resident first.      "

## 2024-07-02 NOTE — ANESTHESIA PREPROCEDURE EVALUATION
Anesthesia Pre-Procedure Evaluation    Patient: Lexii Stratton   MRN: [id number] : 1940        Procedure : Procedure(s):  Right thumb trapezial excision  Ligament reconstruction, tendon interposition arthroplasty          Past Medical History:   Diagnosis Date    Aortic valve stenosis 2022    Arthritis     Diabetes (H)     Type 2-no meds... Dieet and exercise only as of 9/15/20    Esophageal reflux     Hernia, abdominal     History of blood transfusion     Hypertension     No cardiologist    Incontinence of urine     Mumps     6 years old    Obese     Osteoarthritis     Other and unspecified hyperlipidemia     Other chronic pain     back    Peptic ulcer, unspecified site, unspecified as acute or chronic, without mention of hemorrhage, perforation, or obstruction     Small bowel obstruction (H)     Thyroid disease hypothyroidism    Urinary incontinence     Walking troubles       Past Surgical History:   Procedure Laterality Date    ABDOMEN SURGERY      ARTHROPLASTY HIP Right 2016    Procedure: ARTHROPLASTY HIP;  Surgeon: Angel Lund MD;  Location: [facility] OR    AS REPAIR INCISIONAL HERNIA,REDUCIBLE  1998    inc hernia ( Yamileth surgery)    BIOPSY      BLADDER SURGERY      hyperdistention surgery and sling    BREAST SURGERY      CARDIAC SURGERY      CHOLECYSTECTOMY      COLONOSCOPY  2011    Procedure:COLONOSCOPY; COLONOSCOPY; Surgeon:SEMAJ GRAHAM; Location: GI    COLONOSCOPY N/A 2018    Procedure: COLONOSCOPY;  COLONOSCOPY Rm 544;  Surgeon: Marco Gusman MD;  Location:  GI    CV CORONARY ANGIOGRAM N/A 2022    Procedure: Coronary Angiogram;  Surgeon: Garcia Barber MD;  Location: [facility] CARDIAC CATH LAB    CV TRANSCATHETER AORTIC VALVE REPLACEMENT-FEMORAL APPROACH N/A 2022    Procedure: Transcatheter Aortic Valve Replacement-Femoral Approach;  Surgeon: Yulia Castano MD;  Location: [facility] CARDIAC CATH LAB    CYSTOSCOPY N/A  09/06/2017    Procedure: CYSTOSCOPY;  CYSTOSCOPY AND HYDRODISTENTION ;  Surgeon: Eyal Bai MD;  Location:  OR    ENT SURGERY      Tonsillectomy    EP PACEMAKER DEVICE & LEAD IMPLANT- RIGHT ATRIAL & RIGHT VENTRICULAR N/A 11/08/2022    Procedure: Pacemaker Device & Lead Implant - Right Atrial & Right Ventricular;  Surgeon: William Boyd MD;  Location:  HEART CARDIAC CATH LAB    ESOPHAGOSCOPY, GASTROSCOPY, DUODENOSCOPY (EGD), COMBINED N/A 09/26/2016    Procedure: COMBINED ESOPHAGOSCOPY, GASTROSCOPY, DUODENOSCOPY (EGD), BIOPSY SINGLE OR MULTIPLE;  Surgeon: Marco Monaco MD;  Location:  GI    EYE SURGERY Left 05/2019    GENITOURINARY SURGERY      GYN SURGERY      Hysterectomy    HERNIA REPAIR, UMBILICAL  07/08/2010    Dr. Kirt Franco times 3    IMPLANT STIMULATOR SACRAL NERVE STAGE ONE N/A 09/17/2020    Procedure: sacral neurostimulation stage one implant of neurostimulator lead;  Surgeon: Shahnaz Carbone MD;  Location:  OR    IMPLANT STIMULATOR SACRAL NERVE STAGE TWO Right 09/24/2020    Procedure: Removal of interstem lead, NOT implanting neurostimulator;  Surgeon: Shahnaz Carbone MD;  Location:  OR    ORTHOPEDIC SURGERY  2009    [facility] - Dr. Lund- bilateral knee replacement    RESECT SMALL BOWEL WITHOUT OSTOMY N/A 8/11/2023    Procedure: Small bowel resection, Revision of anastamosis;  Surgeon: Rafi Franco MD;  Location:  OR    [facility] NONSPECIFIC PROCEDURE  1946    T&A    ZZC NONSPECIFIC PROCEDURE  1984    Vaginal Hysterectomy (has her ovaries)    ZZC NONSPECIFIC PROCEDURE  1973    PPTL    ZZC NONSPECIFIC PROCEDURE  1994    L shoulder to remove bone spurs    ZZC NONSPECIFIC PROCEDURE  2004    cysto and durasphere Dr Demarco    ZZC NONSPECIFIC PROCEDURE  2005    cysto and durasphere    ZZC NONSPECIFIC PROCEDURE  2007    cysto and durasphere    ZZC NONSPECIFIC PROCEDURE  2009    retropubic TVT sling      Allergies   Allergen Reactions    Amoxicillin-Pot  Clavulanate Diarrhea    Hydrocodone      Keeps patient awake    Morphine And Codeine Nausea and Vomiting    Naproxen Itching and Rash    Seasonal Allergies      Hay fever in fall, ragweed and russian thistles    Sulfa Antibiotics Nausea      Social History     Tobacco Use    Smoking status: Never     Passive exposure: Never    Smokeless tobacco: Never    Tobacco comments:     have never smoked anything   Substance Use Topics    Alcohol use: No      Wt Readings from Last 1 Encounters:   07/02/24 97.6 kg (215 lb 1.6 oz)        Anesthesia Evaluation   Pt has had prior anesthetic. Type: General.    No history of anesthetic complications       ROS/MED HX  ENT/Pulmonary:  - neg pulmonary ROS   (+)     RAMIRO risk factors,  hypertension, obese,                             (-) recent URI   Neurologic:  - neg neurologic ROS     Cardiovascular:     (+)  hypertension- -   -  - -      CHF        pacemaker ([employer] Accolade (D) Remote PPM Device Check AP: 97% : 4% Mode: DDDR 60/130 Presenting Rhythm: AP/VS Historical underlying rhythm: SB 30's bpm as of 11/2023), Reason placed: CHB.   - Patient is dependent on pacemaker.      dysrhythmias, a-fib,  valvular problems/murmurs type: AS s/p TAVR.         METS/Exercise Tolerance:     Hematologic:       Musculoskeletal:   (+)  arthritis,             GI/Hepatic:     (+) GERD, Asymptomatic on medication,                  Renal/Genitourinary:     (+) renal disease, type: CRI,            Endo:     (+)  type II DM,   Using insulin,     thyroid problem, hypothyroidism,    Obesity,       Psychiatric/Substance Use:       Infectious Disease:       Malignancy:       Other:      (+)  , H/O Chronic Pain,         Physical Exam    Airway        Mallampati: III   TM distance: > 3 FB   Neck ROM: full   Mouth opening: > 3 cm    Respiratory Devices and Support         Dental       (+) Modest Abnormalities - crowns, retainers, 1 or 2 missing teeth      Cardiovascular   cardiovascular exam normal           Pulmonary   pulmonary exam normal                OUTSIDE LABS:  CBC:   Lab Results   Component Value Date    WBC 10.4 02/09/2024    WBC 6.1 11/08/2023    HGB 13.5 02/09/2024    HGB 15.2 11/08/2023    HCT 41.8 02/09/2024    HCT 47.7 (H) 11/08/2023     02/09/2024     (L) 11/08/2023     BMP:   Lab Results   Component Value Date     06/26/2024     11/22/2023    POTASSIUM 4.2 06/26/2024    POTASSIUM 4.2 11/22/2023    CHLORIDE 104 06/26/2024    CHLORIDE 102 11/22/2023    CO2 23 06/26/2024    CO2 25 11/22/2023    BUN 16.3 06/26/2024    BUN 24.1 (H) 11/22/2023    CR 0.86 06/26/2024    CR 0.90 11/22/2023     (H) 06/26/2024     (H) 11/22/2023     COAGS:   Lab Results   Component Value Date    PTT 29 09/30/2022    INR 1.06 10/27/2022     POC:   Lab Results   Component Value Date     (H) 09/24/2020     HEPATIC:   Lab Results   Component Value Date    ALBUMIN 4.2 11/22/2023    PROTTOTAL 7.2 11/22/2023    ALT 17 11/22/2023    AST 32 11/22/2023    ALKPHOS 104 11/22/2023    BILITOTAL 0.6 11/22/2023     OTHER:   Lab Results   Component Value Date    LACT 0.9 08/11/2023    A1C 6.7 (H) 05/21/2024    CINDY 10.0 06/26/2024    PHOS 3.5 11/09/2022    MAG 1.8 11/09/2022    LIPASE 151 07/03/2022    TSH 2.09 05/21/2024    T4 1.32 02/25/2019    CRP 12.7 (H) 07/30/2013    SED 68 (H) 09/08/2016       Anesthesia Plan    ASA Status:  3    NPO Status:  NPO Appropriate    Anesthesia Type: General (Pt declines MAC).     - Airway: LMA   Induction: Intravenous.   Maintenance: Balanced.        Consents    Anesthesia Plan(s) and associated risks, benefits, and realistic alternatives discussed. Questions answered and patient/representative(s) expressed understanding.     - Discussed:     - Discussed with:  Patient      - Extended Intubation/Ventilatory Support Discussed: No.      - Patient is DNR/DNI Status: No     Use of blood products discussed: No .     Postoperative Care    Pain management: IV  "analgesics, Peripheral nerve block (Single Shot), Multi-modal analgesia, Oral pain medications.   PONV prophylaxis: Ondansetron (or other 5HT-3), Dexamethasone or Solumedrol     Comments:    Other Comments: The surgeon has given a verbal order transferring care of this patient to me for the performance of a regional analgesia block for either post-op pain control or primary anesthetic. It is requested of me because I am uniquely trained and qualified to perform this block and the surgeon is neither trained nor qualified to perform this procedure.    Ultrasound guided peripheral nerve block(s) discussed. The patient was informed that undergoing the block is not required for surgery to proceed and is optional, but they can be beneficial in reducing post operative pain and pain medication requirements for a period of time (several hours to a few days). Block rationale, procedure, expected results, and block specific side effects reviewed with the patient. Risks, including but not limited to bleeding, infection, nerve damage, damage to surrounding structures, medication adverse/allergic reactions, and block failure discussed. Other anesthetic/pain control options discussed.  Questions encouraged and answered.  The patient wishes to proceed.                Urvashi Tucker MD    I have reviewed the pertinent notes and labs in the chart from the past 30 days and (re)examined the patient.  Any updates or changes from those notes are reflected in this note.            # Drug Induced Coagulation Defect: home medication list includes an anticoagulant medication   # DMII: A1C = 6.7 % (Ref range: 0.0 - 5.6 %) within past 6 months  # Obesity: Estimated body mass index is 37.51 kg/m  as calculated from the following:    Height as of 6/26/24: 1.613 m (5' 3.5\").    Weight as of this encounter: 97.6 kg (215 lb 1.6 oz).      "

## 2024-07-08 ENCOUNTER — TRANSFERRED RECORDS (OUTPATIENT)
Dept: HEALTH INFORMATION MANAGEMENT | Facility: CLINIC | Age: 84
End: 2024-07-08
Payer: COMMERCIAL

## 2024-08-05 ENCOUNTER — TRANSFERRED RECORDS (OUTPATIENT)
Dept: HEALTH INFORMATION MANAGEMENT | Facility: CLINIC | Age: 84
End: 2024-08-05
Payer: COMMERCIAL

## 2024-09-11 ENCOUNTER — PATIENT OUTREACH (OUTPATIENT)
Dept: CARE COORDINATION | Facility: CLINIC | Age: 84
End: 2024-09-11
Payer: COMMERCIAL

## 2024-09-27 ENCOUNTER — TRANSFERRED RECORDS (OUTPATIENT)
Dept: HEALTH INFORMATION MANAGEMENT | Facility: CLINIC | Age: 84
End: 2024-09-27
Payer: COMMERCIAL

## 2024-09-30 DIAGNOSIS — Z95.3 S/P TAVR (TRANSCATHETER AORTIC VALVE REPLACEMENT), BIOPROSTHETIC: Primary | ICD-10-CM

## 2024-10-09 ENCOUNTER — ANCILLARY PROCEDURE (OUTPATIENT)
Dept: CARDIOLOGY | Facility: CLINIC | Age: 84
End: 2024-10-09
Attending: INTERNAL MEDICINE
Payer: COMMERCIAL

## 2024-10-09 DIAGNOSIS — I44.2 COMPLETE HEART BLOCK (H): ICD-10-CM

## 2024-10-09 DIAGNOSIS — Z95.0 CARDIAC PACEMAKER IN SITU: ICD-10-CM

## 2024-10-09 PROCEDURE — 93280 PM DEVICE PROGR EVAL DUAL: CPT | Performed by: INTERNAL MEDICINE

## 2024-10-10 LAB
MDC_IDC_LEAD_CONNECTION_STATUS: NORMAL
MDC_IDC_LEAD_CONNECTION_STATUS: NORMAL
MDC_IDC_LEAD_IMPLANT_DT: NORMAL
MDC_IDC_LEAD_IMPLANT_DT: NORMAL
MDC_IDC_LEAD_LOCATION: NORMAL
MDC_IDC_LEAD_LOCATION: NORMAL
MDC_IDC_LEAD_LOCATION_DETAIL_1: NORMAL
MDC_IDC_LEAD_LOCATION_DETAIL_1: NORMAL
MDC_IDC_LEAD_MFG: NORMAL
MDC_IDC_LEAD_MFG: NORMAL
MDC_IDC_LEAD_MODEL: NORMAL
MDC_IDC_LEAD_MODEL: NORMAL
MDC_IDC_LEAD_POLARITY_TYPE: NORMAL
MDC_IDC_LEAD_POLARITY_TYPE: NORMAL
MDC_IDC_LEAD_SERIAL: NORMAL
MDC_IDC_LEAD_SERIAL: NORMAL
MDC_IDC_MSMT_BATTERY_DTM: NORMAL
MDC_IDC_MSMT_BATTERY_REMAINING_LONGEVITY: 162 MO
MDC_IDC_MSMT_BATTERY_REMAINING_PERCENTAGE: 100 %
MDC_IDC_MSMT_BATTERY_STATUS: NORMAL
MDC_IDC_MSMT_LEADCHNL_RA_IMPEDANCE_VALUE: 564 OHM
MDC_IDC_MSMT_LEADCHNL_RA_LEAD_CHANNEL_STATUS: NORMAL
MDC_IDC_MSMT_LEADCHNL_RA_PACING_THRESHOLD_AMPLITUDE: 0.5 V
MDC_IDC_MSMT_LEADCHNL_RA_PACING_THRESHOLD_AMPLITUDE: 0.6 V
MDC_IDC_MSMT_LEADCHNL_RA_PACING_THRESHOLD_PULSEWIDTH: 0.4 MS
MDC_IDC_MSMT_LEADCHNL_RA_PACING_THRESHOLD_PULSEWIDTH: 0.4 MS
MDC_IDC_MSMT_LEADCHNL_RV_IMPEDANCE_VALUE: 659 OHM
MDC_IDC_MSMT_LEADCHNL_RV_PACING_THRESHOLD_AMPLITUDE: 0.8 V
MDC_IDC_MSMT_LEADCHNL_RV_PACING_THRESHOLD_AMPLITUDE: 0.9 V
MDC_IDC_MSMT_LEADCHNL_RV_PACING_THRESHOLD_PULSEWIDTH: 0.4 MS
MDC_IDC_MSMT_LEADCHNL_RV_PACING_THRESHOLD_PULSEWIDTH: 0.4 MS
MDC_IDC_PG_IMPLANT_DTM: NORMAL
MDC_IDC_PG_MFG: NORMAL
MDC_IDC_PG_MODEL: NORMAL
MDC_IDC_PG_SERIAL: NORMAL
MDC_IDC_PG_TYPE: NORMAL
MDC_IDC_SESS_CLINIC_NAME: NORMAL
MDC_IDC_SESS_DTM: NORMAL
MDC_IDC_SESS_TYPE: NORMAL
MDC_IDC_SET_BRADY_AT_MODE_SWITCH_MODE: NORMAL
MDC_IDC_SET_BRADY_AT_MODE_SWITCH_RATE: 170 {BEATS}/MIN
MDC_IDC_SET_BRADY_LOWRATE: 60 {BEATS}/MIN
MDC_IDC_SET_BRADY_MAX_SENSOR_RATE: 130 {BEATS}/MIN
MDC_IDC_SET_BRADY_MAX_TRACKING_RATE: 130 {BEATS}/MIN
MDC_IDC_SET_BRADY_MODE: NORMAL
MDC_IDC_SET_BRADY_PAV_DELAY_HIGH: 200 MS
MDC_IDC_SET_BRADY_PAV_DELAY_LOW: 250 MS
MDC_IDC_SET_BRADY_SAV_DELAY_HIGH: 200 MS
MDC_IDC_SET_BRADY_SAV_DELAY_LOW: 250 MS
MDC_IDC_SET_LEADCHNL_RA_PACING_AMPLITUDE: 2 V
MDC_IDC_SET_LEADCHNL_RA_PACING_CAPTURE_MODE: NORMAL
MDC_IDC_SET_LEADCHNL_RA_PACING_POLARITY: NORMAL
MDC_IDC_SET_LEADCHNL_RA_PACING_PULSEWIDTH: 0.4 MS
MDC_IDC_SET_LEADCHNL_RA_SENSING_ADAPTATION_MODE: NORMAL
MDC_IDC_SET_LEADCHNL_RA_SENSING_POLARITY: NORMAL
MDC_IDC_SET_LEADCHNL_RA_SENSING_SENSITIVITY: 0.25 MV
MDC_IDC_SET_LEADCHNL_RV_PACING_AMPLITUDE: 1.4 V
MDC_IDC_SET_LEADCHNL_RV_PACING_CAPTURE_MODE: NORMAL
MDC_IDC_SET_LEADCHNL_RV_PACING_POLARITY: NORMAL
MDC_IDC_SET_LEADCHNL_RV_PACING_PULSEWIDTH: 0.4 MS
MDC_IDC_SET_LEADCHNL_RV_SENSING_ADAPTATION_MODE: NORMAL
MDC_IDC_SET_LEADCHNL_RV_SENSING_POLARITY: NORMAL
MDC_IDC_SET_LEADCHNL_RV_SENSING_SENSITIVITY: 1.5 MV
MDC_IDC_SET_ZONE_DETECTION_INTERVAL: 375 MS
MDC_IDC_SET_ZONE_STATUS: NORMAL
MDC_IDC_SET_ZONE_TYPE: NORMAL
MDC_IDC_SET_ZONE_VENDOR_TYPE: NORMAL
MDC_IDC_STAT_AT_BURDEN_PERCENT: 1 %
MDC_IDC_STAT_AT_DTM_END: NORMAL
MDC_IDC_STAT_AT_DTM_START: NORMAL
MDC_IDC_STAT_BRADY_DTM_END: NORMAL
MDC_IDC_STAT_BRADY_DTM_START: NORMAL
MDC_IDC_STAT_BRADY_RA_PERCENT_PACED: 97 %
MDC_IDC_STAT_BRADY_RV_PERCENT_PACED: 4 %
MDC_IDC_STAT_EPISODE_RECENT_COUNT: 0
MDC_IDC_STAT_EPISODE_RECENT_COUNT: 0
MDC_IDC_STAT_EPISODE_RECENT_COUNT: 3
MDC_IDC_STAT_EPISODE_RECENT_COUNT_DTM_END: NORMAL
MDC_IDC_STAT_EPISODE_RECENT_COUNT_DTM_START: NORMAL
MDC_IDC_STAT_EPISODE_TYPE: NORMAL
MDC_IDC_STAT_EPISODE_VENDOR_TYPE: NORMAL
MDC_IDC_STAT_EPISODE_VENDOR_TYPE: NORMAL

## 2024-10-12 ENCOUNTER — HEALTH MAINTENANCE LETTER (OUTPATIENT)
Age: 84
End: 2024-10-12

## 2024-10-14 ENCOUNTER — HOSPITAL ENCOUNTER (OUTPATIENT)
Dept: MAMMOGRAPHY | Facility: CLINIC | Age: 84
Discharge: HOME | End: 2024-10-14
Attending: STUDENT IN AN ORGANIZED HEALTH CARE EDUCATION/TRAINING PROGRAM | Admitting: STUDENT IN AN ORGANIZED HEALTH CARE EDUCATION/TRAINING PROGRAM
Payer: COMMERCIAL

## 2024-10-14 DIAGNOSIS — Z12.31 VISIT FOR SCREENING MAMMOGRAM: ICD-10-CM

## 2024-10-14 PROCEDURE — 77063 BREAST TOMOSYNTHESIS BI: CPT

## 2024-10-15 NOTE — PATIENT INSTRUCTIONS
Care Due:                  Date            Visit Type   Department     Provider  --------------------------------------------------------------------------------                                EP -                              PRIMARY [facility]  Last Visit: 06-      CARE ([facility])   PRIMARY CARE   Davon Brock                              EP - PRIMARY [facility]  Next Visit: 12-      CARE ([facility])   PRIMARY CARE   Davon Brock                                                            Last  Test          Frequency    Reason                     Performed    Due Date  --------------------------------------------------------------------------------    HBA1C.......  6 months...  OZEMPIC..................  04-   10-    Lipid Panel.  12 months..  rosuvastatin.............  04- 04-    [facility] Embedded Care Due Messages. Reference number: [id number].   10/15/2024 12:15:49 PM CDT   Referral for TAVR. Our nurse coordinator will be in touch.  Stop Fenofibrate. Continue Simvastatin.    If you have any questions or concerns, please contact my nurses at [phone].

## 2024-10-24 ENCOUNTER — HOSPITAL ENCOUNTER (OUTPATIENT)
Dept: CARDIOLOGY | Facility: CLINIC | Age: 84
Discharge: HOME | End: 2024-10-24
Attending: NURSE PRACTITIONER | Admitting: NURSE PRACTITIONER
Payer: COMMERCIAL

## 2024-10-24 DIAGNOSIS — Z95.3 S/P TAVR (TRANSCATHETER AORTIC VALVE REPLACEMENT), BIOPROSTHETIC: ICD-10-CM

## 2024-10-24 LAB — LVEF ECHO: NORMAL

## 2024-10-24 PROCEDURE — 93306 TTE W/DOPPLER COMPLETE: CPT | Mod: 26 | Performed by: INTERNAL MEDICINE

## 2024-10-24 PROCEDURE — 93306 TTE W/DOPPLER COMPLETE: CPT

## 2024-10-29 ENCOUNTER — TRANSFERRED RECORDS (OUTPATIENT)
Dept: MULTI SPECIALTY CLINIC | Facility: CLINIC | Age: 84
End: 2024-10-29

## 2024-10-29 LAB — RETINOPATHY: NORMAL

## 2024-11-04 ENCOUNTER — OFFICE VISIT (OUTPATIENT)
Dept: CARDIOLOGY | Facility: CLINIC | Age: 84
End: 2024-11-04
Payer: COMMERCIAL

## 2024-11-04 VITALS
DIASTOLIC BLOOD PRESSURE: 78 MMHG | HEART RATE: 67 BPM | SYSTOLIC BLOOD PRESSURE: 142 MMHG | OXYGEN SATURATION: 99 % | HEIGHT: 64 IN | BODY MASS INDEX: 38.84 KG/M2 | WEIGHT: 227.5 LBS

## 2024-11-04 DIAGNOSIS — I27.20 PULMONARY HYPERTENSION (H): ICD-10-CM

## 2024-11-04 DIAGNOSIS — I10 ESSENTIAL HYPERTENSION, BENIGN: ICD-10-CM

## 2024-11-04 DIAGNOSIS — I35.0 AORTIC STENOSIS, SEVERE: Primary | ICD-10-CM

## 2024-11-04 DIAGNOSIS — Z95.2 S/P TAVR (TRANSCATHETER AORTIC VALVE REPLACEMENT): ICD-10-CM

## 2024-11-04 DIAGNOSIS — Z95.0 CARDIAC PACEMAKER IN SITU: ICD-10-CM

## 2024-11-04 DIAGNOSIS — I44.2 COMPLETE HEART BLOCK (H): ICD-10-CM

## 2024-11-04 DIAGNOSIS — E78.5 HYPERLIPIDEMIA LDL GOAL <130: ICD-10-CM

## 2024-11-04 DIAGNOSIS — I48.20 CHRONIC ATRIAL FIBRILLATION (H): ICD-10-CM

## 2024-11-04 PROCEDURE — 99214 OFFICE O/P EST MOD 30 MIN: CPT | Performed by: NURSE PRACTITIONER

## 2024-11-04 RX ORDER — FUROSEMIDE 20 MG/1
20 TABLET ORAL DAILY
Qty: 90 TABLET | Refills: 1 | Status: SHIPPED | OUTPATIENT
Start: 2024-11-04

## 2024-11-04 NOTE — LETTER
11/4/2024    Robert Randle MD  [address]  [facility] [address]    RE: Lexii Stratton       Dear Colleague,     I had the pleasure of seeing Lexii Stratton in the [facility].  CARDIOLOGY CLINIC NOTE    PRIMARY CARDIOLOGIST  Dr. 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  Her primary complaint is exertional shortness of breath, progressive over the last year.  In August 2023, she had a small bowel obstruction status post revision of anastomosis and extensive lysis of adhesions.  Since then, she regained her appetite and has gained approximately 50 pounds over the last year.  She works out 3 times a week but notes more frequent rest periods which she didn't had a year ago.  She denies chest pain, palpitations, PND, orthopnea, presyncope or edema.    Echocardiogram on 10/24/2024 shows a well-seated bioprosthetic aortic valve with a mean gradient of 8.4 mmHg.  There is mild pulmonary hypertension (35 to 45 mmHg), normal ejection fraction estimated at 60 to 65% and mild tricuspid regurgitation.    Pre-TAVR coronary angiogram showed minimal nonocclusive CAD.    Blood pressure is mildly elevated at 142/78  Last BMP was unremarkable.  A1c 6.7  Lipid panel showed a total cholesterol of 205, HDL 56, , and triglycerides 149    Device interrogation on 10/9/2024 showed 97% atrial pacing and 4% ventricular pacing, underlying rhythm is sinus bradycardia with bigeminal PVCs.  There is no atrial or  ventricular arrhythmias noted.    Compliant with all medications.      PAST MEDICAL HISTORY:  Past Medical History:   Diagnosis Date     Aortic valve stenosis 8/18/2022     Arthritis      Diabetes (H)     Type 2-no meds... Dieet and exercise only as of 9/15/20     Esophageal reflux      Hernia, abdominal      History of blood transfusion 1984     Hypertension     No cardiologist     Incontinence of urine      Mumps     6 years old     Obese      Osteoarthritis      Other and unspecified hyperlipidemia      Other chronic pain     back     Peptic ulcer, unspecified site, unspecified as acute or chronic, without mention of hemorrhage, perforation, or obstruction      Small bowel obstruction (H)      Thyroid disease hypothyroidism     Urinary incontinence      Walking troubles        MEDICATIONS:  Current Outpatient Medications   Medication Sig Dispense Refill     acetaminophen (TYLENOL) 500 MG tablet Take 1,000 mg by mouth 2 times daily (2 x 500 mg = 1000 mg)       amLODIPine (NORVASC) 5 MG tablet TAKE ONE TABLET BY MOUTH EVERY NIGHT AT BEDTIME 90 tablet 3     amoxicillin (AMOXIL) 500 MG capsule Take 2,000 mg by mouth once as needed (1 hour prior to dental procedures 4 x 500 mg = 2000 mg)       apixaban ANTICOAGULANT (ELIQUIS ANTICOAGULANT) 5 MG tablet Take 1 tablet (5 mg) by mouth 2 times daily 180 tablet 3     Biotin 5000 MCG PO TABS Take 1 tablet by mouth daily       calcium carbonate (OS-CINDY) 500 MG tablet Take 1 tablet by mouth every other day       CENTRUM SILVER OR TABS Take 1 tablet by mouth daily 30 0     Cranberry 450 MG CAPS Take 1 capsule by mouth daily (with dinner)       ferrous gluconate (FERGON) 324 (38 Fe) MG tablet Take 1 tablet (324 mg) by mouth daily (with breakfast) 90 tablet 1     furosemide (LASIX) 20 MG tablet Take 1 tablet (20 mg) by mouth daily. 90 tablet 1     latanoprost (XALATAN) 0.005 % ophthalmic solution Place 1 drop into both eyes At Bedtime        levothyroxine (SYNTHROID/LEVOTHROID)  50 MCG tablet Take 1 tablet (50 mcg) by mouth daily 90 tablet 3     lisinopril (ZESTRIL) 30 MG tablet Take 1 tablet (30 mg) by mouth every morning 90 tablet 3     magnesium 250 MG tablet Take 1 tablet by mouth daily       nitroGLYcerin (NITROSTAT) 0.4 MG sublingual tablet For chest pain place 1 tablet under the tongue every 5 minutes for 3 doses. If symptoms persist 5 minutes after 1st dose call 911. 25 tablet 11     OMEGA-3 FATTY ACIDS 1200 MG OR CAPS Take 1 capsule by mouth daily  0     pantoprazole (PROTONIX) 20 MG EC tablet Take 1 tablet (20 mg) by mouth daily 90 tablet 3     potassium 99 MG TABS Take 1 tablet by mouth daily (with dinner)        Probiotic Product (ACIDOPHILUS PROBIOTIC BLEND) CAPS Take 1 capsule by mouth daily (with breakfast)  30 capsule 0     simvastatin (ZOCOR) 20 MG tablet TAKE ONE TABLET BY MOUTH EVERY NIGHT AT BEDTIME 90 tablet 3     VITAMIN D, CHOLECALCIFEROL, PO Take 2,000 Units by mouth daily       No current facility-administered medications for this visit.       SOCIAL HISTORY:  I have reviewed this patient's social history and updated it with pertinent information if needed. Lexii Stratton  reports that she has never smoked. She has never been exposed to tobacco smoke. She has never used smokeless tobacco. She reports that she does not drink alcohol and does not use drugs.    PHYSICAL EXAM:  Pulse:  [67] 67  BP: (142)/(78) 142/78  SpO2:  [99 %] 99 %  227 lbs 8 oz    Constitutional: alert, no distress  Respiratory: Good bilateral air entry  Cardiovascular: Regular rate and rhythm  GI: nondistended  Neuropsychiatric: appropriate affact    ASSESSMENT/PLAN:  Pertinent issues addressed/ reviewed during this cardiology visit  Severe aortic stenosis - status post TAVR on 11/8/2022 using a 26 mm Medtronic evolute Fx valve.   Echocardiogram on 10/24/2024 shows a well-seated bioprosthetic aortic valve with a mean gradient of 8.4 mmHg.  Surveillance echocardiogram in 1 year.  2.  Pulmonary  hypertension -  mild pulmonary hypertension (35 to 45 mmHg) and echo.   3.  Exertional dyspnea -likely multifactorial due to obesity (50 pound weight gain over the last year), deconditioning, sedentary lifestyle, and pulmonary hypertension.  Recommend starting low-dose Lasix 20 mg daily.  Follow-up BMP in 2 weeks.  I will have my nurses reach out in 2 weeks for an update in symptoms.  Encourage exercise and weight loss.  4.  Atrial fibrillation -well-controlled on no rate controlling agents.  Eliquis for stroke prevention.  5.  Hypertension -mildly elevated today.  Continue lisinopril and amlodipine.  6.  CHB/pacemaker - Device interrogation on 10/9/2024 showed 97% atrial pacing and 4% ventricular pacing, underlying rhythm is sinus bradycardia with bigeminal PVCs.  There is no atrial or ventricular arrhythmias noted.  7.  Hyperlipidemia -due for fasting lipid panel, continue simvastatin.    Follow-up in 1 year with JULIANA with limited echocardiogram prior.       It was a pleasure seeing this patient in clinic today. Please do not hesitate to contact me with any future questions.     ANDREW Dhillon, CNP  Cardiology - [facility]  11/04/2024       The level of medical decision making during this visit was of moderate complexity.    This note was completed in part using dictation via the Dragon voice recognition software. Some word and grammatical errors may occur and must be interpreted in the appropriate clinical context.  If there are any questions pertaining to this issue, please contact me for further clarification.      Thank you for allowing me to participate in the care of your patient.      Sincerely,     ANDREW Dhillon CNP     [facility]  cc:   Jesenia Madrigal MD  [address] [place] [address]

## 2024-11-04 NOTE — PROGRESS NOTES
CARDIOLOGY CLINIC NOTE    PRIMARY CARDIOLOGIST  Dr. 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  Her primary complaint is exertional shortness of breath, progressive over the last year.  In August 2023, she had a small bowel obstruction status post revision of anastomosis and extensive lysis of adhesions.  Since then, she regained her appetite and has gained approximately 50 pounds over the last year.  She works out 3 times a week but notes more frequent rest periods which she didn't had a year ago.  She denies chest pain, palpitations, PND, orthopnea, presyncope or edema.    Echocardiogram on 10/24/2024 shows a well-seated bioprosthetic aortic valve with a mean gradient of 8.4 mmHg.  There is mild pulmonary hypertension (35 to 45 mmHg), normal ejection fraction estimated at 60 to 65% and mild tricuspid regurgitation.    Pre-TAVR coronary angiogram showed minimal nonocclusive CAD.    Blood pressure is mildly elevated at 142/78  Last BMP was unremarkable.  A1c 6.7  Lipid panel showed a total cholesterol of 205, HDL 56, , and triglycerides 149    Device interrogation on 10/9/2024 showed 97% atrial pacing and 4% ventricular pacing, underlying rhythm is sinus bradycardia with bigeminal PVCs.  There is no atrial or ventricular arrhythmias noted.    Compliant with all medications.      PAST MEDICAL HISTORY:  Past Medical History:   Diagnosis Date    Aortic valve stenosis 8/18/2022    Arthritis     Diabetes (H)     Type 2-no  meds... Dieet and exercise only as of 9/15/20    Esophageal reflux     Hernia, abdominal     History of blood transfusion 1984    Hypertension     No cardiologist    Incontinence of urine     Mumps     6 years old    Obese     Osteoarthritis     Other and unspecified hyperlipidemia     Other chronic pain     back    Peptic ulcer, unspecified site, unspecified as acute or chronic, without mention of hemorrhage, perforation, or obstruction     Small bowel obstruction (H)     Thyroid disease hypothyroidism    Urinary incontinence     Walking troubles        MEDICATIONS:  Current Outpatient Medications   Medication Sig Dispense Refill    acetaminophen (TYLENOL) 500 MG tablet Take 1,000 mg by mouth 2 times daily (2 x 500 mg = 1000 mg)      amLODIPine (NORVASC) 5 MG tablet TAKE ONE TABLET BY MOUTH EVERY NIGHT AT BEDTIME 90 tablet 3    amoxicillin (AMOXIL) 500 MG capsule Take 2,000 mg by mouth once as needed (1 hour prior to dental procedures 4 x 500 mg = 2000 mg)      apixaban ANTICOAGULANT (ELIQUIS ANTICOAGULANT) 5 MG tablet Take 1 tablet (5 mg) by mouth 2 times daily 180 tablet 3    Biotin 5000 MCG PO TABS Take 1 tablet by mouth daily      calcium carbonate (OS-CINDY) 500 MG tablet Take 1 tablet by mouth every other day      CENTRUM SILVER OR TABS Take 1 tablet by mouth daily 30 0    Cranberry 450 MG CAPS Take 1 capsule by mouth daily (with dinner)      ferrous gluconate (FERGON) 324 (38 Fe) MG tablet Take 1 tablet (324 mg) by mouth daily (with breakfast) 90 tablet 1    furosemide (LASIX) 20 MG tablet Take 1 tablet (20 mg) by mouth daily. 90 tablet 1    latanoprost (XALATAN) 0.005 % ophthalmic solution Place 1 drop into both eyes At Bedtime       levothyroxine (SYNTHROID/LEVOTHROID) 50 MCG tablet Take 1 tablet (50 mcg) by mouth daily 90 tablet 3    lisinopril (ZESTRIL) 30 MG tablet Take 1 tablet (30 mg) by mouth every morning 90 tablet 3    magnesium 250 MG tablet Take 1 tablet by mouth daily      nitroGLYcerin  (NITROSTAT) 0.4 MG sublingual tablet For chest pain place 1 tablet under the tongue every 5 minutes for 3 doses. If symptoms persist 5 minutes after 1st dose call 911. 25 tablet 11    OMEGA-3 FATTY ACIDS 1200 MG OR CAPS Take 1 capsule by mouth daily  0    pantoprazole (PROTONIX) 20 MG EC tablet Take 1 tablet (20 mg) by mouth daily 90 tablet 3    potassium 99 MG TABS Take 1 tablet by mouth daily (with dinner)       Probiotic Product (ACIDOPHILUS PROBIOTIC BLEND) CAPS Take 1 capsule by mouth daily (with breakfast)  30 capsule 0    simvastatin (ZOCOR) 20 MG tablet TAKE ONE TABLET BY MOUTH EVERY NIGHT AT BEDTIME 90 tablet 3    VITAMIN D, CHOLECALCIFEROL, PO Take 2,000 Units by mouth daily       No current facility-administered medications for this visit.       SOCIAL HISTORY:  I have reviewed this patient's social history and updated it with pertinent information if needed. Lexii Stratton  reports that she has never smoked. She has never been exposed to tobacco smoke. She has never used smokeless tobacco. She reports that she does not drink alcohol and does not use drugs.    PHYSICAL EXAM:  Pulse:  [67] 67  BP: (142)/(78) 142/78  SpO2:  [99 %] 99 %  227 lbs 8 oz    Constitutional: alert, no distress  Respiratory: Good bilateral air entry  Cardiovascular: Regular rate and rhythm  GI: nondistended  Neuropsychiatric: appropriate affact    ASSESSMENT/PLAN:  Pertinent issues addressed/ reviewed during this cardiology visit  Severe aortic stenosis - status post TAVR on 11/8/2022 using a 26 mm Medtronic evolute Fx valve.   Echocardiogram on 10/24/2024 shows a well-seated bioprosthetic aortic valve with a mean gradient of 8.4 mmHg.  Surveillance echocardiogram in 1 year.  2.  Pulmonary hypertension -  mild pulmonary hypertension (35 to 45 mmHg) and echo.   3.  Exertional dyspnea -likely multifactorial due to obesity (50 pound weight gain over the last year), deconditioning, sedentary lifestyle, and pulmonary hypertension.   Recommend starting low-dose Lasix 20 mg daily.  Follow-up BMP in 2 weeks.  I will have my nurses reach out in 2 weeks for an update in symptoms.  Encourage exercise and weight loss.  4.  Atrial fibrillation -well-controlled on no rate controlling agents.  Eliquis for stroke prevention.  5.  Hypertension -mildly elevated today.  Continue lisinopril and amlodipine.  6.  CHB/pacemaker - Device interrogation on 10/9/2024 showed 97% atrial pacing and 4% ventricular pacing, underlying rhythm is sinus bradycardia with bigeminal PVCs.  There is no atrial or ventricular arrhythmias noted.  7.  Hyperlipidemia -due for fasting lipid panel, continue simvastatin.    Follow-up in 1 year with JULIANA with limited echocardiogram prior.       It was a pleasure seeing this patient in clinic today. Please do not hesitate to contact me with any future questions.     ANDREW Dhillon, CNP  Cardiology - [facility]  11/04/2024       The level of medical decision making during this visit was of moderate complexity.    This note was completed in part using dictation via the Dragon voice recognition software. Some word and grammatical errors may occur and must be interpreted in the appropriate clinical context.  If there are any questions pertaining to this issue, please contact me for further clarification.

## 2024-11-08 ENCOUNTER — TELEPHONE (OUTPATIENT)
Dept: CARDIOLOGY | Facility: CLINIC | Age: 84
End: 2024-11-08
Payer: COMMERCIAL

## 2024-11-08 DIAGNOSIS — I48.0 PAF (PAROXYSMAL ATRIAL FIBRILLATION) (H): ICD-10-CM

## 2024-11-08 NOTE — TELEPHONE ENCOUNTER
Patient had visit with Kathy GIORDANO On 11-4-24 and she was wondering if Kathy refilled her Eliquis.  Needs a refill [facility] to [facility]. Contacted patient refilled Eliquis for a year. Domi Oconnor RN on 11/8/2024 at 1:43 PM

## 2024-11-18 ENCOUNTER — LAB (OUTPATIENT)
Dept: LAB | Facility: CLINIC | Age: 84
End: 2024-11-18
Payer: COMMERCIAL

## 2024-11-18 DIAGNOSIS — I27.20 PULMONARY HYPERTENSION (H): ICD-10-CM

## 2024-11-18 LAB
ANION GAP SERPL CALCULATED.3IONS-SCNC: 13 MMOL/L (ref 7–15)
BUN SERPL-MCNC: 20.6 MG/DL (ref 8–23)
CALCIUM SERPL-MCNC: 10.1 MG/DL (ref 8.8–10.4)
CHLORIDE SERPL-SCNC: 103 MMOL/L (ref 98–107)
CREAT SERPL-MCNC: 1.07 MG/DL (ref 0.51–0.95)
EGFRCR SERPLBLD CKD-EPI 2021: 51 ML/MIN/1.73M2
GLUCOSE SERPL-MCNC: 147 MG/DL (ref 70–99)
HCO3 SERPL-SCNC: 26 MMOL/L (ref 22–29)
POTASSIUM SERPL-SCNC: 4.3 MMOL/L (ref 3.4–5.3)
SODIUM SERPL-SCNC: 142 MMOL/L (ref 135–145)

## 2024-11-19 ENCOUNTER — TELEPHONE (OUTPATIENT)
Dept: CARDIOLOGY | Facility: CLINIC | Age: 84
End: 2024-11-19
Payer: COMMERCIAL

## 2024-11-19 DIAGNOSIS — I27.20 PULMONARY HYPERTENSION (H): ICD-10-CM

## 2024-11-19 NOTE — TELEPHONE ENCOUNTER
"Called pt to get updates on sxs.     Pt states, \"Not much has changed\". Going to the bathroom more. Last Thursday fell, she lost balance (had to use Life Alert). No injuries.   BAL is unchanged. Pt states if its just her back issues that cause her to be a little more SOB when walking.     Recent home wt 218 # (morning no clothes).   On 11/4/24 in clinic wt 227 lbs   Pt states she has been watching her diet since she was last seen and eating healthier too.     Reviewed BMP results from 11/18/24 with pt. Advised if provider recommends changes, we will reach out.    Pt see PMD next month and will review glucose level noted on BMP. Routing update to Kathy Olvera CNP.     ----- Message -----  From: Kathy Olvera APRN CNP  Sent: 11/4/2024   2:43 PM CST  To: [facility] Nursing Team    Hello,    Patient was seen in the clinic today and started on low-dose Lasix for exertional dyspnea, weight gain and mild pulmonary hypertension on echo.  Could we please follow-up with her in 2 weeks for an update in symptoms?  He is scheduled for a follow-up BMP in 2 weeks.    Thank you  Kathy\"      Samantha CONTRERAS  [facility]    "

## 2024-11-20 RX ORDER — FUROSEMIDE 20 MG/1
20 TABLET ORAL DAILY PRN
COMMUNITY
Start: 2024-11-20

## 2024-11-20 NOTE — TELEPHONE ENCOUNTER
Updated patient with recommendations from Kathy, as well as lab results. Patient verbalized understanding and agreed with plan of care. Medication list updated.

## 2024-11-20 NOTE — TELEPHONE ENCOUNTER
Please have patient stop daily lasix and use as needed for leg swelling, BAL and weight gain of 5 lbs in a week.   BMP showed a mild elevation in creatinine and BUN.     Thanks  Kathy

## 2024-12-03 ENCOUNTER — PATIENT OUTREACH (OUTPATIENT)
Dept: CARE COORDINATION | Facility: CLINIC | Age: 84
End: 2024-12-03

## 2024-12-05 SDOH — HEALTH STABILITY: PHYSICAL HEALTH: ON AVERAGE, HOW MANY MINUTES DO YOU ENGAGE IN EXERCISE AT THIS LEVEL?: 40 MIN

## 2024-12-05 SDOH — HEALTH STABILITY: PHYSICAL HEALTH: ON AVERAGE, HOW MANY DAYS PER WEEK DO YOU ENGAGE IN MODERATE TO STRENUOUS EXERCISE (LIKE A BRISK WALK)?: 3 DAYS

## 2024-12-05 ASSESSMENT — SOCIAL DETERMINANTS OF HEALTH (SDOH): HOW OFTEN DO YOU GET TOGETHER WITH FRIENDS OR RELATIVES?: ONCE A WEEK

## 2024-12-10 ENCOUNTER — OFFICE VISIT (OUTPATIENT)
Dept: FAMILY MEDICINE | Facility: CLINIC | Age: 84
End: 2024-12-10
Payer: COMMERCIAL

## 2024-12-10 VITALS
DIASTOLIC BLOOD PRESSURE: 76 MMHG | SYSTOLIC BLOOD PRESSURE: 138 MMHG | WEIGHT: 221 LBS | TEMPERATURE: 97.2 F | BODY MASS INDEX: 37.73 KG/M2 | RESPIRATION RATE: 14 BRPM | OXYGEN SATURATION: 99 % | HEART RATE: 60 BPM | HEIGHT: 64 IN

## 2024-12-10 DIAGNOSIS — E78.5 HYPERLIPIDEMIA LDL GOAL <130: ICD-10-CM

## 2024-12-10 DIAGNOSIS — Z00.00 ENCOUNTER FOR MEDICARE ANNUAL WELLNESS EXAM: Primary | ICD-10-CM

## 2024-12-10 DIAGNOSIS — E08.65: ICD-10-CM

## 2024-12-10 DIAGNOSIS — Z86.2 HISTORY OF ANEMIA: ICD-10-CM

## 2024-12-10 DIAGNOSIS — Z91.81 PERSONAL HISTORY OF FALL: ICD-10-CM

## 2024-12-10 DIAGNOSIS — N18.31 CHRONIC KIDNEY DISEASE, STAGE 3A (H): ICD-10-CM

## 2024-12-10 DIAGNOSIS — E03.9 ACQUIRED HYPOTHYROIDISM: ICD-10-CM

## 2024-12-10 DIAGNOSIS — K22.70 BARRETT'S ESOPHAGUS WITHOUT DYSPLASIA: ICD-10-CM

## 2024-12-10 DIAGNOSIS — K21.9 GASTROESOPHAGEAL REFLUX DISEASE WITHOUT ESOPHAGITIS: ICD-10-CM

## 2024-12-10 DIAGNOSIS — E66.01 MORBID OBESITY DUE TO EXCESS CALORIES (H): ICD-10-CM

## 2024-12-10 DIAGNOSIS — I10 ESSENTIAL HYPERTENSION, BENIGN: ICD-10-CM

## 2024-12-10 LAB
CHOLEST SERPL-MCNC: 189 MG/DL
ERYTHROCYTE [DISTWIDTH] IN BLOOD BY AUTOMATED COUNT: 12.6 % (ref 10–15)
EST. AVERAGE GLUCOSE BLD GHB EST-MCNC: 160 MG/DL
FASTING STATUS PATIENT QL REPORTED: YES
HBA1C MFR BLD: 7.2 % (ref 0–5.6)
HCT VFR BLD AUTO: 46.1 % (ref 35–47)
HDLC SERPL-MCNC: 44 MG/DL
HGB BLD-MCNC: 15.3 G/DL (ref 11.7–15.7)
LDLC SERPL CALC-MCNC: 105 MG/DL
MCH RBC QN AUTO: 30.8 PG (ref 26.5–33)
MCHC RBC AUTO-ENTMCNC: 33.2 G/DL (ref 31.5–36.5)
MCV RBC AUTO: 93 FL (ref 78–100)
NONHDLC SERPL-MCNC: 145 MG/DL
PLATELET # BLD AUTO: 162 10E3/UL (ref 150–450)
RBC # BLD AUTO: 4.97 10E6/UL (ref 3.8–5.2)
TRIGL SERPL-MCNC: 202 MG/DL
TSH SERPL DL<=0.005 MIU/L-ACNC: 2.03 UIU/ML (ref 0.3–4.2)
WBC # BLD AUTO: 5.8 10E3/UL (ref 4–11)

## 2024-12-10 RX ORDER — LISINOPRIL 30 MG/1
30 TABLET ORAL EVERY MORNING
Qty: 90 TABLET | Refills: 3 | Status: SHIPPED | OUTPATIENT
Start: 2024-12-10

## 2024-12-10 RX ORDER — AMLODIPINE BESYLATE 5 MG/1
TABLET ORAL
Qty: 90 TABLET | Refills: 3 | Status: SHIPPED | OUTPATIENT
Start: 2024-12-10

## 2024-12-10 RX ORDER — LEVOTHYROXINE SODIUM 50 UG/1
50 TABLET ORAL DAILY
Qty: 90 TABLET | Refills: 3 | Status: SHIPPED | OUTPATIENT
Start: 2024-12-10

## 2024-12-10 RX ORDER — PANTOPRAZOLE SODIUM 20 MG/1
20 TABLET, DELAYED RELEASE ORAL DAILY
Qty: 90 TABLET | Refills: 3 | Status: SHIPPED | OUTPATIENT
Start: 2024-12-10

## 2024-12-10 RX ORDER — SIMVASTATIN 20 MG
TABLET ORAL
Qty: 90 TABLET | Refills: 3 | Status: SHIPPED | OUTPATIENT
Start: 2024-12-10 | End: 2024-12-12

## 2024-12-10 ASSESSMENT — PAIN SCALES - GENERAL: PAINLEVEL_OUTOF10: NO PAIN (0)

## 2024-12-10 NOTE — PROGRESS NOTES
Preventive Care Visit  [facility]  Robert Randle MD, [facility]  Dec 10, 2024    Assessment & Plan     Encounter for Medicare annual wellness exam  Doing well overall. Due for annual lipid, ordered. CRC UTD, no longer needing ongoing screening. Patient prefers ongoing annual mammogram (discussed insufficient evidence on r/b). DEXA UTD (normal in 2012). UTD on vaccinations.    Personal history of fall  Last month, had to press Life Alert as she couldn't get back up after fall. Did not fully discuss but did strongly encourage PT. She is hesitant and will consider.     Diabetes mellitus due to underlying condition, controlled, with hyperglycemia, without long-term current use of insulin (H)  A1c of 7.2, uncontrolled with lifestyle management. On statin therapy. Did not fully discuss, needs follow up visit for foot exam, discussion on next steps.   - HEMOGLOBIN A1C    Essential hypertension, benign  BP controlled on 30mg lisinopril and 5mg amlodipine daily.   - lisinopril (ZESTRIL) 30 MG tablet  Dispense: 90 tablet; Refill: 3  - amLODIPine (NORVASC) 5 MG tablet  Dispense: 90 tablet; Refill: 3    Chronic kidney disease, stage 3a (H)  Likely 2/2 HTN, DM2. Continue to monitor BMP.    Hyperlipidemia LDL goal <130  On simvastatin. Plan to obtain lipid panel today.   - Lipid panel reflex to direct LDL Non-fasting  - simvastatin (ZOCOR) 20 MG tablet  Dispense: 90 tablet; Refill: 3    Acquired hypothyroidism  On 50mcg levothyroxine. Does note feeling cold frequently. Will obtain repeat TSH.   - TSH with free T4 reflex  - levothyroxine (SYNTHROID/LEVOTHROID) 50 MCG tablet  Dispense: 90 tablet; Refill: 3    Shah's esophagus without dysplasia  Last EGD years ago in 2016. Is on daily PPI. Needs repeat EGD for monitoring, ordered as below.  - Adult GI  Referral - Procedure Only  - pantoprazole (PROTONIX) 20 MG EC tablet  Dispense: 90 tablet; Refill: 3    Gastroesophageal reflux disease  "without esophagitis  - pantoprazole (PROTONIX) 20 MG EC tablet  Dispense: 90 tablet; Refill: 3    History of anemia  On daily iron supplement (prescribed from 2012 through 2018 but recently re-prescribed in 2024) noted after patient left office. Placed message to patient to ask if she has been taking iron consistently all these years (with gaps by taking OTC formulation between 2018 through 2024 OR if she recently restarted iron supplement in 2024). If recently restarted, would recommend stopping iron with recheck in hgb in 3-6 months.  - CBC with platelets    Morbid obesity due to excess calories (H)  Likely contributing to DM2, HTN as above    Of note, follows with Cardiology for diastolic CHF, heart block s/p pacemaker, atrial fibrillation who is managing with pharmacotherapy.    ADDENDUM (12/12/24)  Lipids returning above goal. Sent in message with transition from simvastatin to atorvastatin with repeat lipid panel in 3 months.     BMI  Estimated body mass index is 38.53 kg/m  as calculated from the following:    Height as of this encounter: 1.613 m (5' 3.5\").    Weight as of this encounter: 100.2 kg (221 lb).     Counseling  Appropriate preventive services were discussed with this patient.  Checklist reviewing preventive services available has been given to the patient.    Follow up in 6 months for DM2 recheck    Robert Randle MD  [facility]  12/10/2024      Melyssa Shultz is a 83 year old, presenting for the following:  Medicare Visit        12/10/2024     9:05 AM   Additional Questions   Roomed by Shahnaz Torres VF     Via the Health Maintenance questionnaire, the patient has reported the following services have been completed -Eye Exam: [facility] 2024-10-29, this information has been sent to the abstraction team.    HPI    Fall  The patient reports a fall in November and confirms that her life alert system works. She has a history of knee surgery and had hand surgery earlier " this year.  She fell in November while trying to change furnace filters in the basement and couldn't get up due to her knee surgery, requiring the use of her life alert system.The patient is considering physical therapy for balance and strengthening to prevent future falls and has previously done physical therapy for her back at [facility].     DM2  Lifestyle managed. No medications for this.    HTN  On amlodipine, 5mg and lisinopril 30mg daily    Hypothyroidism  Taking her medication as usual.   Feels she has been colder than usual lately.     GERD/Shah's esophagus  Taking daily pantoprazole. No issues.   Last EGD was many years ago.  Dad  of throat cancer, she states this was esophageal.     HLD  Is on simvastatin.     Anemia  Taking her iron supplement consistently.    Arthritis  She had surgery on her right hand's CMC joint, which healed well without physical therapy. The patient is now experiencing bone-on-bone pain in her left shoulder and has received two cortisone shots, with the second shot on  providing longer-lasting relief. 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  Despite arthritis, the patient walks two miles three times a week at [facility] to strengthen her joints.     Health Care Directive  Patient has a Health Care Directive on file  Advance care planning document is on file and is current.      2024   General Health   How would you rate your overall physical health? (!) FAIR   Feel stress (tense, anxious, or unable to sleep) To some extent      (!) STRESS CONCERN      2024   Nutrition   Diet: Low salt            2024   Exercise   Days per week of moderate/strenous exercise 3 days   Average minutes spent exercising at this level 40 min            2024   Social Factors   Frequency of gathering with friends or relatives Once a week   Worry food won't  last until get money to buy more No   Food not last or not have enough money for food? No   Do you have housing? (Housing is defined as stable permanent housing and does not include staying ouside in a car, in a tent, in an abandoned building, in an overnight shelter, or couch-surfing.) Yes   Are you worried about losing your housing? No   Lack of transportation? No   Unable to get utilities (heat,electricity)? No            12/5/2024   Fall Risk   Fallen 2 or more times in the past year? No     Yes    Trouble with walking or balance? No     No        Patient-reported    Multiple values from one day are sorted in reverse-chronological order      Fell and couldn't get up without assistance. Had the life alert which she had to press in order to get back on her feet. Put her name onto the list for [facility] facility.  Lives alone with laundry downstairs.         12/5/2024   Activities of Daily Living- Home Safety   Needs help with the following daily activites None of the above   Safety concerns in the home None of the above            12/5/2024   Dental   Dentist two times every year? Yes            12/5/2024   Hearing Screening   Hearing concerns? (!) IT'S HARD TO FOLLOW A CONVERSATION IN A NOISY RESTAURANT OR CROWDED ROOM.            12/5/2024   Driving Risk Screening   Patient/family members have concerns about driving No            12/5/2024   General Alertness/Fatigue Screening   Have you been more tired than usual lately? (!) YES            12/5/2024   Urinary Incontinence Screening   Bothered by leaking urine in past 6 months Yes            12/5/2024   TB Screening   Were you born outside of the [place]? No            Today's PHQ-2 Score:       12/9/2024     3:15 PM   PHQ-2 ( 1999 [employer])   Q1: Little interest or pleasure in doing things 0    Q2: Feeling down, depressed or hopeless 0    PHQ-2 Score 0    Q1: Little interest or pleasure in doing things Not at all   Q2: Feeling down, depressed or  hopeless Not at all   PHQ-2 Score 0       Patient-reported           12/5/2024   Substance Use   Alcohol more than 3/day or more than 7/wk Not Applicable   Do you have a current opioid prescription? No   How severe/bad is pain from 1 to 10? 7/10   Do you use any other substances recreationally? No        Social History     Tobacco Use    Smoking status: Never     Passive exposure: Never    Smokeless tobacco: Never    Tobacco comments:     have never smoked anything   Vaping Use    Vaping status: Never Used   Substance Use Topics    Alcohol use: No    Drug use: No         10/14/2024   LAST FHS-7 RESULTS   1st degree relative breast or ovarian cancer No   Any relative bilateral breast cancer No   Any male have breast cancer No   Any ONE woman have BOTH breast AND ovarian cancer No   Any woman with breast cancer before 50yrs No   2 or more relatives with breast AND/OR ovarian cancer No   2 or more relatives with breast AND/OR bowel cancer No      Mammogram Screening - After age 74- determine frequency with patient based on health status, life expectancy and patient goals    Reviewed and updated as needed this visit by Provider   Tobacco  Allergies  Meds  Problems   Surg Hx  Fam Hx            Current providers sharing in care for this patient include:  Patient Care Team:  Robert Randle MD as PCP - General (Family Practice)  Jesenia Madrigal MD as Assigned PCP  Jesenia Madrigal MD as Referring Physician (Internal Medicine)  Rafi Franco MD as Assigned Surgical Provider  Kathy Olvera APRN CNP as Assigned Heart and Vascular Provider    The following health maintenance items are reviewed in Epic and correct as of today:  Health Maintenance   Topic Date Due    HF ACTION PLAN  Never done    ANNUAL REVIEW OF HM ORDERS  08/08/2024    A1C  08/21/2024    ALT  11/22/2024    LIPID  11/22/2024    DIABETIC FOOT EXAM  11/22/2024    HEMOGLOBIN  02/09/2025    CBC  02/09/2025    MICROALBUMIN  05/21/2025     "MAMMO SCREENING  10/14/2025    EYE EXAM  10/29/2025    BMP  11/18/2025    MEDICARE ANNUAL WELLNESS VISIT  12/10/2025    FALL RISK ASSESSMENT  12/10/2025    DEXA  12/07/2027    COLORECTAL CANCER SCREENING  03/29/2028    ADVANCE CARE PLANNING  12/10/2029    TSH W/FREE T4 REFLEX  Completed    PHQ-2 (once per calendar year)  Completed    INFLUENZA VACCINE  Completed    Pneumococcal Vaccine: 65+ Years  Completed    URINALYSIS  Completed    ZOSTER IMMUNIZATION  Completed    RSV VACCINE  Completed    COVID-19 Vaccine  Completed    HPV IMMUNIZATION  Aged Out    MENINGITIS IMMUNIZATION  Aged Out    RSV MONOCLONAL ANTIBODY  Aged Out    DTAP/TDAP/TD IMMUNIZATION  Discontinued        Objective    Exam  /76   Pulse 60   Temp 97.2  F (36.2  C) (Oral)   Resp 14   Ht 1.613 m (5' 3.5\")   Wt 100.2 kg (221 lb)   LMP  (LMP Unknown)   SpO2 99%   BMI 38.53 kg/m     Estimated body mass index is 38.53 kg/m  as calculated from the following:    Height as of this encounter: 1.613 m (5' 3.5\").    Weight as of this encounter: 100.2 kg (221 lb).    Physical Exam  GENERAL: healthy, alert and no distress  HEAD: Normocephalic, atraumatic.   EYES: PERRL. Normal conjunctivae, sclera.   ENT: Normal EAC and TMs bilaterally. Normal oropharynx.   NECK: Supple. No lymphadenopathy appreciated. Trachea midline. Thyroid not enlarged, not TTP.  RESP: lungs clear to auscultation - no rales, rhonchi or wheezes  CV: regular rate and rhythm, 2/6 systolic murmur best appreciated at RUSB, normal S1 S2, no murmur, click, rub or gallop.  1-2+ pitting edema up to knees bilaterally.   ABDOMEN: soft, no TTP x4 quadrants. No hepatomegaly or masses appreciated. BS normactive.  MSK: no gross musculoskeletal defects noted.  SKIN: no suspicious lesions or rashes.  EXT: Warm and well perfused. DP pulses 2+ bilaterally.  NEURO: CNII-XII grossly intact. No focal deficits.  PSYCH: Groomed, dressed appropriately for weather. Normal mood with consistent affect. "         12/10/2024   Mini Cog   Clock Draw Score 2 Normal   3 Item Recall 3 objects recalled   Mini Cog Total Score 5        Signed Electronically by: Robert Randle MD

## 2024-12-10 NOTE — PATIENT INSTRUCTIONS
Patient Education   Preventive Care Advice   This is general advice given by our system to help you stay healthy. However, your care team may have specific advice just for you. Please talk to your care team about your preventive care needs.  Nutrition  Eat 5 or more servings of fruits and vegetables each day.  Try wheat bread, brown rice and whole grain pasta (instead of white bread, rice, and pasta).  Get enough calcium and vitamin D. Check the label on foods and aim for 100% of the RDA (recommended daily allowance).  Lifestyle  Exercise at least 150 minutes each week  (30 minutes a day, 5 days a week).  Do muscle strengthening activities 2 days a week. These help control your weight and prevent disease.  No smoking.  Wear sunscreen to prevent skin cancer.  Have a dental exam and cleaning every 6 months.  Yearly exams  See your health care team every year to talk about:  Any changes in your health.  Any medicines your care team has prescribed.  Preventive care, family planning, and ways to prevent chronic diseases.  Shots (vaccines)   HPV shots (up to age 26), if you've never had them before.  Hepatitis B shots (up to age 59), if you've never had them before.  COVID-19 shot: Get this shot when it's due.  Flu shot: Get a flu shot every year.  Tetanus shot: Get a tetanus shot every 10 years.  Pneumococcal, hepatitis A, and RSV shots: Ask your care team if you need these based on your risk.  Shingles shot (for age 50 and up)  General health tests  Diabetes screening:  Starting at age 35, Get screened for diabetes at least every 3 years.  If you are younger than age 35, ask your care team if you should be screened for diabetes.  Cholesterol test: At age 39, start having a cholesterol test every 5 years, or more often if advised.  Bone density scan (DEXA): At age 50, ask your care team if you should have this scan for osteoporosis (brittle bones).  Hepatitis C: Get tested at least once in your life.  STIs (sexually  transmitted infections)  Before age 24: Ask your care team if you should be screened for STIs.  After age 24: Get screened for STIs if you're at risk. You are at risk for STIs (including HIV) if:  You are sexually active with more than one person.  You don't use condoms every time.  You or a partner was diagnosed with a sexually transmitted infection.  If you are at risk for HIV, ask about PrEP medicine to prevent HIV.  Get tested for HIV at least once in your life, whether you are at risk for HIV or not.  Cancer screening tests  Cervical cancer screening: If you have a cervix, begin getting regular cervical cancer screening tests starting at age 21.  Breast cancer scan (mammogram): If you've ever had breasts, begin having regular mammograms starting at age 40. This is a scan to check for breast cancer.  Colon cancer screening: It is important to start screening for colon cancer at age 45.  Have a colonoscopy test every 10 years (or more often if you're at risk) Or, ask your provider about stool tests like a FIT test every year or Cologuard test every 3 years.  To learn more about your testing options, visit:   .  For help making a decision, visit:   https://bit.ly/ay81583.  Prostate cancer screening test: If you have a prostate, ask your care team if a prostate cancer screening test (PSA) at age 55 is right for you.  Lung cancer screening: If you are a current or former smoker ages 50 to 80, ask your care team if ongoing lung cancer screenings are right for you.  For informational purposes only. Not to replace the advice of your health care provider. Copyright   2023 [facility] "[employer]. All rights reserved. Clinically reviewed by the [facility] Transitions Program. Aloompa [id number] - REV 01/24.  Hearing Loss: Care Instructions  Overview     Hearing loss is a sudden or slow decrease in how well you hear. It can range from slight to profound. Permanent hearing loss can occur with aging. It also can  happen when you are exposed long-term to loud noise. Examples include listening to loud music, riding motorcycles, or being around other loud machines.  Hearing loss can affect your work and home life. It can make you feel lonely or depressed. You may feel that you have lost your independence. But hearing aids and other devices can help you hear better and feel connected to others.  Follow-up care is a key part of your treatment and safety. Be sure to make and go to all appointments, and call your doctor if you are having problems. It's also a good idea to know your test results and keep a list of the medicines you take.  How can you care for yourself at home?  Avoid loud noises whenever possible. This helps keep your hearing from getting worse.  Always wear hearing protection around loud noises.  Wear a hearing aid as directed.  A professional can help you pick a hearing aid that will work best for you.  You can also get hearing aids over the counter for mild to moderate hearing loss.  Have hearing tests as your doctor suggests. They can show whether your hearing has changed. Your hearing aid may need to be adjusted.  Use other devices as needed. These may include:  Telephone amplifiers and hearing aids that can connect to a television, stereo, radio, or microphone.  Devices that use lights or vibrations. These alert you to the doorbell, a ringing telephone, or a baby monitor.  Television closed-captioning. This shows the words at the bottom of the screen. Most new TVs can do this.  TTY (text telephone). This lets you type messages back and forth on the telephone instead of talking or listening. These devices are also called TDD. When messages are typed on the keyboard, they are sent over the phone line to a receiving TTY. The message is shown on a monitor.  Use text messaging, social media, and email if it is hard for you to communicate by telephone.  Try to learn a listening technique called speechreading. It is  "not lipreading. You pay attention to people's gestures, expressions, posture, and tone of voice. These clues can help you understand what a person is saying. Face the person you are talking to, and have them face you. Make sure the lighting is good. You need to see the other person's face clearly.  Think about counseling if you need help to adjust to your hearing loss.  When should you call for help?  Watch closely for changes in your health, and be sure to contact your doctor if:    You think your hearing is getting worse.     You have new symptoms, such as dizziness or nausea.   Where can you learn more?  Go to https://www.[employer].net/patiented  Enter R798 in the search box to learn more about \"Hearing Loss: Care Instructions.\"  Current as of: September 27, 2023  Content Version: 14.2    2024 [employer].   Care instructions adapted under license by your healthcare professional. If you have questions about a medical condition or this instruction, always ask your healthcare professional. [employer] disclaims any warranty or liability for your use of this information.    Learning About Stress  What is stress?     Stress is your body's response to a hard situation. Your body can have a physical, emotional, or mental response. Stress is a fact of life for most people, and it affects everyone differently. What causes stress for you may not be stressful for someone else.  A lot of things can cause stress. You may feel stress when you go on a job interview, take a test, or run a race. This kind of short-term stress is normal and even useful. It can help you if you need to work hard or react quickly. For example, stress can help you finish an important job on time.  Long-term stress is caused by ongoing stressful situations or events. Examples of long-term stress include long-term health problems, ongoing problems at work, or conflicts in your family. Long-term stress can harm your health.  How " does stress affect your health?  When you are stressed, your body responds as though you are in danger. It makes hormones that speed up your heart, make you breathe faster, and give you a burst of energy. This is called the fight-or-flight stress response. If the stress is over quickly, your body goes back to normal and no harm is done.  But if stress happens too often or lasts too long, it can have bad effects. Long-term stress can make you more likely to get sick, and it can make symptoms of some diseases worse. If you tense up when you are stressed, you may develop neck, shoulder, or low back pain. Stress is linked to high blood pressure and heart disease.  Stress also harms your emotional health. It can make you cason, tense, or depressed. Your relationships may suffer, and you may not do well at work or school.  What can you do to manage stress?  You can try these things to help manage stress:   Do something active. Exercise or activity can help reduce stress. Walking is a great way to get started. Even everyday activities such as housecleaning or yard work can help.  Try yoga or lisandro chi. These techniques combine exercise and meditation. You may need some training at first to learn them.  Do something you enjoy. For example, listen to music or go to a movie. Practice your hobby or do volunteer work.  Meditate. This can help you relax, because you are not worrying about what happened before or what may happen in the future.  Do guided imagery. Imagine yourself in any setting that helps you feel calm. You can use online videos, books, or a teacher to guide you.  Do breathing exercises. For example:  From a standing position, bend forward from the waist with your knees slightly bent. Let your arms dangle close to the floor.  Breathe in slowly and deeply as you return to a standing position. Roll up slowly and lift your head last.  Hold your breath for just a few seconds in the standing position.  Breathe out  "slowly and bend forward from the waist.  Let your feelings out. Talk, laugh, cry, and express anger when you need to. Talking with supportive friends or family, a counselor, or a sona leader about your feelings is a healthy way to relieve stress. Avoid discussing your feelings with people who make you feel worse.  Write. It may help to write about things that are bothering you. This helps you find out how much stress you feel and what is causing it. When you know this, you can find better ways to cope.  What can you do to prevent stress?  You might try some of these things to help prevent stress:  Manage your time. This helps you find time to do the things you want and need to do.  Get enough sleep. Your body recovers from the stresses of the day while you are sleeping.  Get support. Your family, friends, and community can make a difference in how you experience stress.  Limit your news feed. Avoid or limit time on social media or news that may make you feel stressed.  Do something active. Exercise or activity can help reduce stress. Walking is a great way to get started.  Where can you learn more?  Go to https://www.[employer].net/patiented  Enter N032 in the search box to learn more about \"Learning About Stress.\"  Current as of: October 24, 2023  Content Version: 14.2 2024 [employer].   Care instructions adapted under license by your healthcare professional. If you have questions about a medical condition or this instruction, always ask your healthcare professional. [employer] disclaims any warranty or liability for your use of this information.    Learning About Sleeping Well  What does sleeping well mean?     Sleeping well means getting enough sleep to feel good and stay healthy. How much sleep is enough varies among people.  The number of hours you sleep and how you feel when you wake up are both important. If you do not feel refreshed, you probably need more sleep. Another sign of " "not getting enough sleep is feeling tired during the day.  Experts recommend that adults get at least 7 or more hours of sleep per day. Children and older adults need more sleep.  Why is getting enough sleep important?  Getting enough quality sleep is a basic part of good health. When your sleep suffers, your physical health, mood, and your thoughts can suffer too. You may find yourself feeling more grumpy or stressed. Not getting enough sleep also can lead to serious problems, including injury, accidents, anxiety, and depression.  What might cause poor sleeping?  Many things can cause sleep problems, including:  Changes to your sleep schedule.  Stress. Stress can be caused by fear about a single event, such as giving a speech. Or you may have ongoing stress, such as worry about work or school.  Depression, anxiety, and other mental or emotional conditions.  Changes in your sleep habits or surroundings. This includes changes that happen where you sleep, such as noise, light, or sleeping in a different bed. It also includes changes in your sleep pattern, such as having jet lag or working a late shift.  Health problems, such as pain, breathing problems, and restless legs syndrome.  Lack of regular exercise.  Using alcohol, nicotine, or caffeine before bed.  How can you help yourself?  Here are some tips that may help you sleep more soundly and wake up feeling more refreshed.  Your sleeping area   Use your bedroom only for sleeping and sex. A bit of light reading may help you fall asleep. But if it doesn't, do your reading elsewhere in the house. Try not to use your TV, computer, smartphone, or tablet while you are in bed.  Be sure your bed is big enough to stretch out comfortably, especially if you have a sleep partner.  Keep your bedroom quiet, dark, and cool. Use curtains, blinds, or a sleep mask to block out light. To block out noise, use earplugs, soothing music, or a \"white noise\" machine.  Your evening and " "bedtime routine   Create a relaxing bedtime routine. You might want to take a warm shower or bath, or listen to soothing music.  Go to bed at the same time every night. And get up at the same time every morning, even if you feel tired.  What to avoid   Limit caffeine (coffee, tea, caffeinated sodas) during the day, and don't have any for at least 6 hours before bedtime.  Avoid drinking alcohol before bedtime. Alcohol can cause you to wake up more often during the night.  Try not to smoke or use tobacco, especially in the evening. Nicotine can keep you awake.  Limit naps during the day, especially close to bedtime.  Avoid lying in bed awake for too long. If you can't fall asleep or if you wake up in the middle of the night and can't get back to sleep within about 20 minutes, get out of bed and go to another room until you feel sleepy.  Avoid taking medicine right before bed that may keep you awake or make you feel hyper or energized. Your doctor can tell you if your medicine may do this and if you can take it earlier in the day.  If you can't sleep   Imagine yourself in a peaceful, pleasant scene. Focus on the details and feelings of being in a place that is relaxing.  Get up and do a quiet or boring activity until you feel sleepy.  Avoid drinking any liquids before going to bed to help prevent waking up often to use the bathroom.  Where can you learn more?  Go to https://www.[employer].net/patiented  Enter J942 in the search box to learn more about \"Learning About Sleeping Well.\"  Current as of: July 10, 2023  Content Version: 14.2 2024 [facility] [employer].   Care instructions adapted under license by your healthcare professional. If you have questions about a medical condition or this instruction, always ask your healthcare professional. Healthwise, Incorporated disclaims any warranty or liability for your use of this information.    Bladder Training: Care Instructions  Your Care Instructions     Bladder " training is used to treat urge incontinence and stress incontinence. Urge incontinence means that the need to urinate comes on so fast that you can't get to a toilet in time. Stress incontinence means that you leak urine because of pressure on your bladder. For example, it may happen when you laugh, cough, or lift something heavy.  Bladder training can increase how long you can wait before you have to urinate. It can also help your bladder hold more urine. And it can give you better control over the urge to urinate.  It is important to remember that bladder training takes a few weeks to a few months to make a difference. You may not see results right away, but don't give up.  Follow-up care is a key part of your treatment and safety. Be sure to make and go to all appointments, and call your doctor if you are having problems. It's also a good idea to know your test results and keep a list of the medicines you take.  How can you care for yourself at home?  Work with your doctor to come up with a bladder training program that is right for you. You may use one or more of the following methods.  Delayed urination  In the beginning, try to keep from urinating for 5 minutes after you first feel the need to go.  While you wait, take deep, slow breaths to relax. Kegel exercises can also help you delay the need to go to the bathroom.  After some practice, when you can easily wait 5 minutes to urinate, try to wait 10 minutes before you urinate.  Slowly increase the waiting period until you are able to control when you have to urinate.  Scheduled urination  Empty your bladder when you first wake up in the morning.  Schedule times throughout the day when you will urinate.  Start by going to the bathroom every hour, even if you don't need to go.  Slowly increase the time between trips to the bathroom.  When you have found a schedule that works well for you, keep doing it.  If you wake up during the night and have to urinate, do  "it. Apply your schedule to waking hours only.  Kegel exercises  These tighten and strengthen pelvic muscles, which can help you control the flow of urine. (If doing these exercises causes pain, stop doing them and talk with your doctor.) To do Kegel exercises:  Squeeze your muscles as if you were trying not to pass gas. Or squeeze your muscles as if you were stopping the flow of urine. Your belly, legs, and buttocks shouldn't move.  Hold the squeeze for 3 seconds, then relax for 5 to 10 seconds.  Start with 3 seconds, then add 1 second each week until you are able to squeeze for 10 seconds.  Repeat the exercise 10 times a session. Do 3 to 8 sessions a day.  When should you call for help?  Watch closely for changes in your health, and be sure to contact your doctor if:    Your incontinence is getting worse.     You do not get better as expected.   Where can you learn more?  Go to https://www.[employer].net/patiented  Enter V684 in the search box to learn more about \"Bladder Training: Care Instructions.\"  Current as of: November 15, 2023  Content Version: 14.2 2024 [facility] [employer].   Care instructions adapted under license by your healthcare professional. If you have questions about a medical condition or this instruction, always ask your healthcare professional. Healthwise[employer] Incorporated disclaims any warranty or liability for your use of this information.       "

## 2024-12-12 RX ORDER — ATORVASTATIN CALCIUM 20 MG/1
20 TABLET, FILM COATED ORAL DAILY
Qty: 90 TABLET | Refills: 3 | Status: SHIPPED | OUTPATIENT
Start: 2024-12-12

## 2024-12-23 ENCOUNTER — TRANSFERRED RECORDS (OUTPATIENT)
Dept: HEALTH INFORMATION MANAGEMENT | Facility: CLINIC | Age: 84
End: 2024-12-23
Payer: COMMERCIAL

## 2024-12-31 ENCOUNTER — MYC MEDICAL ADVICE (OUTPATIENT)
Dept: FAMILY MEDICINE | Facility: CLINIC | Age: 84
End: 2024-12-31
Payer: COMMERCIAL

## 2024-12-31 NOTE — TELEPHONE ENCOUNTER
Routing to Dr. Robert Randle. Please see pt's MCM and advise. Ok to switch back to simvastatin?     ISABEL BaileyN, RN     [facility]    12/31/2024 at 8:07 AM

## 2025-01-10 ENCOUNTER — TRANSFERRED RECORDS (OUTPATIENT)
Dept: HEALTH INFORMATION MANAGEMENT | Facility: CLINIC | Age: 85
End: 2025-01-10
Payer: COMMERCIAL

## 2025-01-16 ENCOUNTER — ANCILLARY PROCEDURE (OUTPATIENT)
Dept: CARDIOLOGY | Facility: CLINIC | Age: 85
End: 2025-01-16
Attending: INTERNAL MEDICINE
Payer: COMMERCIAL

## 2025-01-16 DIAGNOSIS — I44.2 COMPLETE HEART BLOCK (H): ICD-10-CM

## 2025-01-16 DIAGNOSIS — Z95.0 CARDIAC PACEMAKER IN SITU: ICD-10-CM

## 2025-01-16 LAB
MDC_IDC_EPISODE_DTM: NORMAL
MDC_IDC_EPISODE_DURATION: 13 S
MDC_IDC_EPISODE_ID: NORMAL
MDC_IDC_EPISODE_TYPE: NORMAL
MDC_IDC_EPISODE_TYPE_INDUCED: NO
MDC_IDC_LEAD_CONNECTION_STATUS: NORMAL
MDC_IDC_LEAD_CONNECTION_STATUS: NORMAL
MDC_IDC_LEAD_IMPLANT_DT: NORMAL
MDC_IDC_LEAD_IMPLANT_DT: NORMAL
MDC_IDC_LEAD_LOCATION: NORMAL
MDC_IDC_LEAD_LOCATION: NORMAL
MDC_IDC_LEAD_LOCATION_DETAIL_1: NORMAL
MDC_IDC_LEAD_LOCATION_DETAIL_1: NORMAL
MDC_IDC_LEAD_MFG: NORMAL
MDC_IDC_LEAD_MFG: NORMAL
MDC_IDC_LEAD_MODEL: NORMAL
MDC_IDC_LEAD_MODEL: NORMAL
MDC_IDC_LEAD_POLARITY_TYPE: NORMAL
MDC_IDC_LEAD_POLARITY_TYPE: NORMAL
MDC_IDC_LEAD_SERIAL: NORMAL
MDC_IDC_LEAD_SERIAL: NORMAL
MDC_IDC_MSMT_BATTERY_DTM: NORMAL
MDC_IDC_MSMT_BATTERY_REMAINING_LONGEVITY: 138 MO
MDC_IDC_MSMT_BATTERY_REMAINING_PERCENTAGE: 100 %
MDC_IDC_MSMT_BATTERY_STATUS: NORMAL
MDC_IDC_MSMT_LEADCHNL_RA_IMPEDANCE_VALUE: 571 OHM
MDC_IDC_MSMT_LEADCHNL_RA_PACING_THRESHOLD_AMPLITUDE: 0.5 V
MDC_IDC_MSMT_LEADCHNL_RA_PACING_THRESHOLD_PULSEWIDTH: 0.4 MS
MDC_IDC_MSMT_LEADCHNL_RV_IMPEDANCE_VALUE: 654 OHM
MDC_IDC_MSMT_LEADCHNL_RV_PACING_THRESHOLD_AMPLITUDE: 2.6 V
MDC_IDC_MSMT_LEADCHNL_RV_PACING_THRESHOLD_PULSEWIDTH: 0.4 MS
MDC_IDC_PG_IMPLANT_DTM: NORMAL
MDC_IDC_PG_MFG: NORMAL
MDC_IDC_PG_MODEL: NORMAL
MDC_IDC_PG_SERIAL: NORMAL
MDC_IDC_PG_TYPE: NORMAL
MDC_IDC_SESS_CLINIC_NAME: NORMAL
MDC_IDC_SESS_DTM: NORMAL
MDC_IDC_SESS_TYPE: NORMAL
MDC_IDC_SET_BRADY_AT_MODE_SWITCH_MODE: NORMAL
MDC_IDC_SET_BRADY_AT_MODE_SWITCH_RATE: 170 {BEATS}/MIN
MDC_IDC_SET_BRADY_LOWRATE: 60 {BEATS}/MIN
MDC_IDC_SET_BRADY_MAX_SENSOR_RATE: 130 {BEATS}/MIN
MDC_IDC_SET_BRADY_MAX_TRACKING_RATE: 130 {BEATS}/MIN
MDC_IDC_SET_BRADY_MODE: NORMAL
MDC_IDC_SET_BRADY_PAV_DELAY_HIGH: 200 MS
MDC_IDC_SET_BRADY_PAV_DELAY_LOW: 250 MS
MDC_IDC_SET_BRADY_SAV_DELAY_HIGH: 200 MS
MDC_IDC_SET_BRADY_SAV_DELAY_LOW: 250 MS
MDC_IDC_SET_LEADCHNL_RA_PACING_AMPLITUDE: 2 V
MDC_IDC_SET_LEADCHNL_RA_PACING_CAPTURE_MODE: NORMAL
MDC_IDC_SET_LEADCHNL_RA_PACING_POLARITY: NORMAL
MDC_IDC_SET_LEADCHNL_RA_PACING_PULSEWIDTH: 0.4 MS
MDC_IDC_SET_LEADCHNL_RA_SENSING_ADAPTATION_MODE: NORMAL
MDC_IDC_SET_LEADCHNL_RA_SENSING_POLARITY: NORMAL
MDC_IDC_SET_LEADCHNL_RA_SENSING_SENSITIVITY: 0.25 MV
MDC_IDC_SET_LEADCHNL_RV_PACING_AMPLITUDE: 1.3 V
MDC_IDC_SET_LEADCHNL_RV_PACING_CAPTURE_MODE: NORMAL
MDC_IDC_SET_LEADCHNL_RV_PACING_POLARITY: NORMAL
MDC_IDC_SET_LEADCHNL_RV_PACING_PULSEWIDTH: 0.4 MS
MDC_IDC_SET_LEADCHNL_RV_SENSING_ADAPTATION_MODE: NORMAL
MDC_IDC_SET_LEADCHNL_RV_SENSING_POLARITY: NORMAL
MDC_IDC_SET_LEADCHNL_RV_SENSING_SENSITIVITY: 1.5 MV
MDC_IDC_SET_ZONE_DETECTION_INTERVAL: 375 MS
MDC_IDC_SET_ZONE_STATUS: NORMAL
MDC_IDC_SET_ZONE_TYPE: NORMAL
MDC_IDC_SET_ZONE_VENDOR_TYPE: NORMAL
MDC_IDC_STAT_AT_BURDEN_PERCENT: 0 %
MDC_IDC_STAT_AT_DTM_END: NORMAL
MDC_IDC_STAT_AT_DTM_START: NORMAL
MDC_IDC_STAT_BRADY_DTM_END: NORMAL
MDC_IDC_STAT_BRADY_DTM_START: NORMAL
MDC_IDC_STAT_BRADY_RA_PERCENT_PACED: 97 %
MDC_IDC_STAT_BRADY_RV_PERCENT_PACED: 4 %
MDC_IDC_STAT_EPISODE_RECENT_COUNT: 0
MDC_IDC_STAT_EPISODE_RECENT_COUNT: 0
MDC_IDC_STAT_EPISODE_RECENT_COUNT: 1
MDC_IDC_STAT_EPISODE_RECENT_COUNT_DTM_END: NORMAL
MDC_IDC_STAT_EPISODE_RECENT_COUNT_DTM_START: NORMAL
MDC_IDC_STAT_EPISODE_TYPE: NORMAL
MDC_IDC_STAT_EPISODE_VENDOR_TYPE: NORMAL
MDC_IDC_STAT_EPISODE_VENDOR_TYPE: NORMAL

## 2025-01-16 PROCEDURE — 93296 REM INTERROG EVL PM/IDS: CPT | Performed by: INTERNAL MEDICINE

## 2025-01-16 PROCEDURE — 93294 REM INTERROG EVL PM/LDLS PM: CPT | Performed by: INTERNAL MEDICINE

## 2025-01-21 ENCOUNTER — TELEPHONE (OUTPATIENT)
Dept: GASTROENTEROLOGY | Facility: CLINIC | Age: 85
End: 2025-01-21
Payer: COMMERCIAL

## 2025-01-21 NOTE — TELEPHONE ENCOUNTER
"Per triage needs hospital and [facility] due to pulm htn, patient denies and also stated she is not diabetic. For now scheduled at  with [facility] and informed patient to schedule pre-op with PCP. Patient expressed understanding.    Endoscopy Scheduling Screen    Have you had any respiratory illness or flu-like symptoms in the last 10 days?  No    What is your communication preference for Instructions and/or Bowel Prep?   MyChart    What insurance is in the chart?  Other:  [employer]ICA    Ordering/Referring Provider: Robert Randle MD in [facility]     (If ordering provider performs procedure, schedule with ordering provider unless otherwise instructed. )    BMI: Estimated body mass index is 38.53 kg/m  as calculated from the following:    Height as of 12/10/24: 1.613 m (5' 3.5\").    Weight as of 12/10/24: 100.2 kg (221 lb).     Sedation Ordered  moderate sedation.   If patient BMI > 50 do not schedule in [facility].    If patient BMI > 45 do not schedule at [facility].    Are you taking methadone or Suboxone?  NO, No RN review required.    Have you been diagnosed and are being treated for severe PTSD or severe anxiety?  NO, No RN review required.    Are you taking any prescription medications for pain 3 or more times per week?   NO, No RN review required.    Do you have a history of malignant hyperthermia?  No    (Females) Are you currently pregnant?   No     Have you been diagnosed or told you have pulmonary hypertension?   Yes MAC required in hospital setting only. PAC evaluation required if scheduled at [facility].    Do you have an LVAD?  No    Have you been told you have moderate to severe sleep apnea?  No.    Have you been told you have COPD, asthma, or any other lung disease?  No    Do you have any heart conditions?  Yes     In the past year, have you had any hospitalizations for heart related issues including cardiomyopathy, heart attack, or stent placement?  No    Do you have any implantable devices in your body (pacemaker, ICD)?  Pacemaker " "(schedule colonoscopy in Hospital Only)    Do you take nitroglycerine?  No    Have you ever had or are you waiting for an organ transplant?  No. Continue scheduling, no site restrictions.    Have you had a stroke or transient ischemic attack (TIA aka \"mini stroke\" in the last 6 months?   No    Have you been diagnosed with or been told you have cirrhosis of the liver?   No.    Are you currently on dialysis?   No    Do you need assistance transferring?   No    BMI: Estimated body mass index is 38.53 kg/m  as calculated from the following:    Height as of 12/10/24: 1.613 m (5' 3.5\").    Weight as of 12/10/24: 100.2 kg (221 lb).     Is patients BMI > 40 and scheduling location [facility]?  No    Do you take an injectable or oral medication for weight loss or diabetes (excluding insulin)?  No    Do you take the medication Naltrexone?  No    Do you take blood thinners?  Yes, you must contact your prescribing provider for directions on holding or bridging with a different medication.       Prep   Are you currently on dialysis or do you have chronic kidney disease?  No    Do you have a diagnosis of diabetes?  No    Do you have a diagnosis of cystic fibrosis (CF)?  No    On a regular basis do you go 3 -5 days between bowel movements?  No    BMI > 40?  No    Preferred Pharmacy:      [facility] [address] [address]  Phone: [phone] Fax: [phone]      Final Scheduling Details     Procedure scheduled  Upper endoscopy (EGD)    Surgeon:  NATALI     Date of procedure:  5/21     Pre-OP / PAC:   Yes - Pre-op required, informed patient to schedule with PCP    Location  [facility] - Per RN assessment.    Sedation   MAC/Deep Sedation - Per RN assessment.      Patient Reminders:   You will receive a call from a Nurse to review instructions and health history.  This assessment must be completed prior to your procedure.  Failure to complete the Nurse assessment " may result in the procedure being cancelled.      On the day of your procedure, please designate an adult(s) who can drive you home stay with you for the next 24 hours. The medicines used in the exam will make you sleepy. You will not be able to drive.      You cannot take public transportation, ride share services, or non-medical taxi service without a responsible caregiver.  Medical transport services are allowed with the requirement that a responsible caregiver will receive you at your destination.  We require that drivers and caregivers are confirmed prior to your procedure.

## 2025-01-22 NOTE — TELEPHONE ENCOUNTER
Endocrine Refill protocol for oral medications    Protocol Criteria:  PASSED  Reason: N/A    If below requirement is met, send a 90-day supply with 1 refill per provider protocol.    Verify appointment with Endocrinology completed in the last 6 months or scheduled in the next 3 months.    Last completed office visit: 12/31/2024 Sandra Madison MD   Next scheduled Follow up:   Future Appointments   Date Time Provider Department Center   3/4/2025  3:00 PM [facility]           Ok to transition back to simvastatin.     If symptoms persist, please help set up visit.    Thanks,    Robert Randle MD  [facility] [place]  12/31/2024

## 2025-01-27 ENCOUNTER — MYC MEDICAL ADVICE (OUTPATIENT)
Dept: CARDIOLOGY | Facility: CLINIC | Age: 85
End: 2025-01-27
Payer: COMMERCIAL

## 2025-01-27 NOTE — TELEPHONE ENCOUNTER
Patient has Medicare Advantage through [employer].     Xarelto/Eliquis  --Patient is will pay 100% of the first $295 in drug costs (first month will be as much as $370)  --Subsequent fills will be $140/mo.  --If/when total out of pocket drug costs exceed $2000, patient will pay $0 for all covered drugs for the remainder of the year.     Dabigatran  --Patient is will pay 100% of the first $295 in drug costs (fills will be $179/mo until fulfilled)  --Subsequent fills will be $90/mo.  --If/when total out of pocket drug costs exceed $2000, patient will pay $0 for all covered drugs for the remainder of the year.     Patients with Medicare Advantage plans such as this are able to change their Medicare plans once before March 31st.  There may be plans that are more cost effective.  Plans can be reviewed at medicare.gov.  The Senior Linkage Line provides free assistance in choosing a plan by calling [phone].     Malou Macdonald  Pharmacy Technician/Liaison, Discharge Pharmacy   [phone] (voice or text)  [email or web address]  Pharmacy test claims are estimates and may not reflect final costs.  Suggested alternatives aim to be cost-effective and may not be therapeutically equivalent as this consult is informational and does not constitute medical advice.  Clinical decisions should be made by qualified healthcare providers.

## 2025-01-31 ENCOUNTER — TRANSFERRED RECORDS (OUTPATIENT)
Dept: HEALTH INFORMATION MANAGEMENT | Facility: CLINIC | Age: 85
End: 2025-01-31
Payer: COMMERCIAL

## 2025-02-04 ASSESSMENT — PATIENT HEALTH QUESTIONNAIRE - PHQ9
SUM OF ALL RESPONSES TO PHQ QUESTIONS 1-9: 10
SUM OF ALL RESPONSES TO PHQ QUESTIONS 1-9: 10

## 2025-02-05 ENCOUNTER — APPOINTMENT (OUTPATIENT)
Dept: CT IMAGING | Facility: CLINIC | Age: 85
DRG: 638 | End: 2025-02-05
Attending: STUDENT IN AN ORGANIZED HEALTH CARE EDUCATION/TRAINING PROGRAM
Payer: COMMERCIAL

## 2025-02-05 ENCOUNTER — OFFICE VISIT (OUTPATIENT)
Dept: FAMILY MEDICINE | Facility: CLINIC | Age: 85
End: 2025-02-05
Payer: COMMERCIAL

## 2025-02-05 ENCOUNTER — HOSPITAL ENCOUNTER (INPATIENT)
Facility: CLINIC | Age: 85
DRG: 638 | End: 2025-02-05
Attending: STUDENT IN AN ORGANIZED HEALTH CARE EDUCATION/TRAINING PROGRAM | Admitting: INTERNAL MEDICINE
Payer: COMMERCIAL

## 2025-02-05 VITALS
TEMPERATURE: 97.2 F | HEIGHT: 64 IN | RESPIRATION RATE: 14 BRPM | OXYGEN SATURATION: 98 % | DIASTOLIC BLOOD PRESSURE: 92 MMHG | WEIGHT: 206 LBS | SYSTOLIC BLOOD PRESSURE: 142 MMHG | BODY MASS INDEX: 35.17 KG/M2 | HEART RATE: 86 BPM

## 2025-02-05 DIAGNOSIS — S09.90XA INJURY OF HEAD, INITIAL ENCOUNTER: ICD-10-CM

## 2025-02-05 DIAGNOSIS — B37.0 THRUSH: ICD-10-CM

## 2025-02-05 DIAGNOSIS — M47.817 SPONDYLOSIS OF LUMBOSACRAL REGION WITHOUT MYELOPATHY OR RADICULOPATHY: ICD-10-CM

## 2025-02-05 DIAGNOSIS — D69.6 THROMBOCYTOPENIA: ICD-10-CM

## 2025-02-05 DIAGNOSIS — R73.9 HYPERGLYCEMIA: ICD-10-CM

## 2025-02-05 DIAGNOSIS — N18.31 CHRONIC KIDNEY DISEASE, STAGE 3A (H): ICD-10-CM

## 2025-02-05 DIAGNOSIS — E66.812 CLASS 2 SEVERE OBESITY DUE TO EXCESS CALORIES WITH SERIOUS COMORBIDITY AND BODY MASS INDEX (BMI) OF 35.0 TO 35.9 IN ADULT (H): ICD-10-CM

## 2025-02-05 DIAGNOSIS — N17.9 AKI (ACUTE KIDNEY INJURY): ICD-10-CM

## 2025-02-05 DIAGNOSIS — E11.9 TYPE 2 DIABETES MELLITUS WITHOUT COMPLICATION, WITHOUT LONG-TERM CURRENT USE OF INSULIN (H): ICD-10-CM

## 2025-02-05 DIAGNOSIS — E11.8 TYPE 2 DIABETES MELLITUS WITH COMPLICATION, WITHOUT LONG-TERM CURRENT USE OF INSULIN (H): ICD-10-CM

## 2025-02-05 DIAGNOSIS — Z95.2 S/P TAVR (TRANSCATHETER AORTIC VALVE REPLACEMENT): ICD-10-CM

## 2025-02-05 DIAGNOSIS — D72.829 LEUKOCYTOSIS, UNSPECIFIED TYPE: ICD-10-CM

## 2025-02-05 DIAGNOSIS — K21.9 GASTROESOPHAGEAL REFLUX DISEASE WITHOUT ESOPHAGITIS: ICD-10-CM

## 2025-02-05 DIAGNOSIS — E88.89 KETOSIS (H): ICD-10-CM

## 2025-02-05 DIAGNOSIS — E66.01 CLASS 2 SEVERE OBESITY DUE TO EXCESS CALORIES WITH SERIOUS COMORBIDITY AND BODY MASS INDEX (BMI) OF 35.0 TO 35.9 IN ADULT (H): ICD-10-CM

## 2025-02-05 DIAGNOSIS — I50.30 DIASTOLIC HEART FAILURE, UNSPECIFIED HF CHRONICITY (H): ICD-10-CM
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
## 2025-02-05 DIAGNOSIS — R13.19 ESOPHAGEAL DYSPHAGIA: Primary | ICD-10-CM

## 2025-02-05 DIAGNOSIS — K22.70 BARRETT'S ESOPHAGUS WITHOUT DYSPLASIA: ICD-10-CM

## 2025-02-05 DIAGNOSIS — E87.5 HYPERKALEMIA: Primary | ICD-10-CM

## 2025-02-05 DIAGNOSIS — E87.1 HYPONATREMIA: ICD-10-CM

## 2025-02-05 DIAGNOSIS — I48.20 CHRONIC ATRIAL FIBRILLATION (H): ICD-10-CM

## 2025-02-05 PROBLEM — N39.41 URGE INCONTINENCE OF URINE: Status: ACTIVE | Noted: 2025-02-05

## 2025-02-05 PROBLEM — N39.3 STRESS INCONTINENCE IN FEMALE: Status: ACTIVE | Noted: 2025-02-05

## 2025-02-05 LAB
ALBUMIN SERPL BCG-MCNC: 3.7 G/DL (ref 3.5–5.2)
ALP SERPL-CCNC: 110 U/L (ref 40–150)
ALT SERPL W P-5'-P-CCNC: 25 U/L (ref 0–50)
ANION GAP SERPL CALCULATED.3IONS-SCNC: 14 MMOL/L (ref 7–15)
ANION GAP SERPL CALCULATED.3IONS-SCNC: 15 MMOL/L (ref 7–15)
AST SERPL W P-5'-P-CCNC: 26 U/L (ref 0–45)
B-OH-BUTYR SERPL-SCNC: 0.53 MMOL/L
BASE EXCESS BLDV CALC-SCNC: -0.6 MMOL/L (ref -3–3)
BASOPHILS # BLD AUTO: 0 10E3/UL (ref 0–0.2)
BASOPHILS NFR BLD AUTO: 0 %
BILIRUB DIRECT SERPL-MCNC: 0.25 MG/DL (ref 0–0.3)
BILIRUB SERPL-MCNC: 0.8 MG/DL
BUN SERPL-MCNC: 45.9 MG/DL (ref 8–23)
BUN SERPL-MCNC: 46.1 MG/DL (ref 8–23)
CALCIUM SERPL-MCNC: 9.4 MG/DL (ref 8.8–10.4)
CALCIUM SERPL-MCNC: 9.4 MG/DL (ref 8.8–10.4)
CHLORIDE SERPL-SCNC: 90 MMOL/L (ref 98–107)
CHLORIDE SERPL-SCNC: 95 MMOL/L (ref 98–107)
CREAT SERPL-MCNC: 1.13 MG/DL (ref 0.51–0.95)
CREAT SERPL-MCNC: 1.22 MG/DL (ref 0.51–0.95)
EGFRCR SERPLBLD CKD-EPI 2021: 44 ML/MIN/1.73M2
EGFRCR SERPLBLD CKD-EPI 2021: 48 ML/MIN/1.73M2
EOSINOPHIL # BLD AUTO: 0 10E3/UL (ref 0–0.7)
EOSINOPHIL NFR BLD AUTO: 0 %
ERYTHROCYTE [DISTWIDTH] IN BLOOD BY AUTOMATED COUNT: 12 % (ref 10–15)
EST. AVERAGE GLUCOSE BLD GHB EST-MCNC: 266 MG/DL
GLUCOSE BLDC GLUCOMTR-MCNC: 446 MG/DL (ref 70–99)
GLUCOSE SERPL-MCNC: 700 MG/DL (ref 70–99)
GLUCOSE SERPL-MCNC: 749 MG/DL (ref 70–99)
HBA1C MFR BLD: 10.9 %
HCO3 BLDV-SCNC: 24 MMOL/L (ref 21–28)
HCO3 BLDV-SCNC: 27 MMOL/L (ref 21–28)
HCO3 SERPL-SCNC: 17 MMOL/L (ref 22–29)
HCO3 SERPL-SCNC: 18 MMOL/L (ref 22–29)
HCT VFR BLD AUTO: 41 % (ref 35–47)
HGB BLD-MCNC: 14.3 G/DL (ref 11.7–15.7)
IMM GRANULOCYTES # BLD: 0.1 10E3/UL
IMM GRANULOCYTES NFR BLD: 1 %
LACTATE BLD-SCNC: 1.7 MMOL/L
LYMPHOCYTES # BLD AUTO: 0.8 10E3/UL (ref 0.8–5.3)
LYMPHOCYTES NFR BLD AUTO: 6 %
MAGNESIUM SERPL-MCNC: 1.8 MG/DL (ref 1.7–2.3)
MCH RBC QN AUTO: 31.4 PG (ref 26.5–33)
MCHC RBC AUTO-ENTMCNC: 34.9 G/DL (ref 31.5–36.5)
MCV RBC AUTO: 90 FL (ref 78–100)
MONOCYTES # BLD AUTO: 0.7 10E3/UL (ref 0–1.3)
MONOCYTES NFR BLD AUTO: 5 %
NEUTROPHILS # BLD AUTO: 12.6 10E3/UL (ref 1.6–8.3)
NEUTROPHILS NFR BLD AUTO: 89 %
NRBC # BLD AUTO: 0 10E3/UL
NRBC BLD AUTO-RTO: 0 /100
O2/TOTAL GAS SETTING VFR VENT: 0 %
OXYHGB MFR BLDV: 50 % (ref 70–75)
PCO2 BLDV: 40 MM HG (ref 40–50)
PCO2 BLDV: 41 MM HG (ref 40–50)
PH BLDV: 7.39 [PH] (ref 7.32–7.43)
PH BLDV: 7.43 [PH] (ref 7.32–7.43)
PLATELET # BLD AUTO: 100 10E3/UL (ref 150–450)
PO2 BLDV: 23 MM HG (ref 25–47)
PO2 BLDV: 28 MM HG (ref 25–47)
POTASSIUM SERPL-SCNC: 5.7 MMOL/L (ref 3.4–5.3)
POTASSIUM SERPL-SCNC: 7.1 MMOL/L (ref 3.4–5.3)
POTASSIUM SERPL-SCNC: 8.1 MMOL/L (ref 3.4–5.3)
PROT SERPL-MCNC: 6.5 G/DL (ref 6.4–8.3)
RBC # BLD AUTO: 4.56 10E6/UL (ref 3.8–5.2)
SAO2 % BLDV: 42 % (ref 70–75)
SAO2 % BLDV: 50.6 % (ref 70–75)
SODIUM SERPL-SCNC: 121 MMOL/L (ref 135–145)
SODIUM SERPL-SCNC: 128 MMOL/L (ref 135–145)
WBC # BLD AUTO: 14.2 10E3/UL (ref 4–11)

## 2025-02-05 PROCEDURE — 84132 ASSAY OF SERUM POTASSIUM: CPT | Performed by: STUDENT IN AN ORGANIZED HEALTH CARE EDUCATION/TRAINING PROGRAM

## 2025-02-05 PROCEDURE — 85048 AUTOMATED LEUKOCYTE COUNT: CPT | Performed by: STUDENT IN AN ORGANIZED HEALTH CARE EDUCATION/TRAINING PROGRAM

## 2025-02-05 PROCEDURE — 82805 BLOOD GASES W/O2 SATURATION: CPT | Performed by: STUDENT IN AN ORGANIZED HEALTH CARE EDUCATION/TRAINING PROGRAM

## 2025-02-05 PROCEDURE — 83036 HEMOGLOBIN GLYCOSYLATED A1C: CPT | Performed by: INTERNAL MEDICINE

## 2025-02-05 PROCEDURE — 36415 COLL VENOUS BLD VENIPUNCTURE: CPT | Performed by: STUDENT IN AN ORGANIZED HEALTH CARE EDUCATION/TRAINING PROGRAM

## 2025-02-05 PROCEDURE — 96374 THER/PROPH/DIAG INJ IV PUSH: CPT

## 2025-02-05 PROCEDURE — 85004 AUTOMATED DIFF WBC COUNT: CPT | Performed by: STUDENT IN AN ORGANIZED HEALTH CARE EDUCATION/TRAINING PROGRAM

## 2025-02-05 PROCEDURE — 250N000009 HC RX 250: Performed by: STUDENT IN AN ORGANIZED HEALTH CARE EDUCATION/TRAINING PROGRAM

## 2025-02-05 PROCEDURE — 82248 BILIRUBIN DIRECT: CPT | Performed by: STUDENT IN AN ORGANIZED HEALTH CARE EDUCATION/TRAINING PROGRAM

## 2025-02-05 PROCEDURE — 83605 ASSAY OF LACTIC ACID: CPT

## 2025-02-05 PROCEDURE — 85018 HEMOGLOBIN: CPT | Performed by: STUDENT IN AN ORGANIZED HEALTH CARE EDUCATION/TRAINING PROGRAM

## 2025-02-05 PROCEDURE — 93005 ELECTROCARDIOGRAM TRACING: CPT

## 2025-02-05 PROCEDURE — 70450 CT HEAD/BRAIN W/O DYE: CPT

## 2025-02-05 PROCEDURE — 258N000003 HC RX IP 258 OP 636: Performed by: INTERNAL MEDICINE

## 2025-02-05 PROCEDURE — 250N000012 HC RX MED GY IP 250 OP 636 PS 637: Performed by: STUDENT IN AN ORGANIZED HEALTH CARE EDUCATION/TRAINING PROGRAM

## 2025-02-05 PROCEDURE — 80048 BASIC METABOLIC PNL TOTAL CA: CPT | Performed by: STUDENT IN AN ORGANIZED HEALTH CARE EDUCATION/TRAINING PROGRAM

## 2025-02-05 PROCEDURE — 80053 COMPREHEN METABOLIC PANEL: CPT | Performed by: STUDENT IN AN ORGANIZED HEALTH CARE EDUCATION/TRAINING PROGRAM

## 2025-02-05 PROCEDURE — 82010 KETONE BODYS QUAN: CPT | Performed by: STUDENT IN AN ORGANIZED HEALTH CARE EDUCATION/TRAINING PROGRAM

## 2025-02-05 PROCEDURE — 82435 ASSAY OF BLOOD CHLORIDE: CPT | Performed by: STUDENT IN AN ORGANIZED HEALTH CARE EDUCATION/TRAINING PROGRAM

## 2025-02-05 PROCEDURE — 250N000009 HC RX 250: Performed by: INTERNAL MEDICINE

## 2025-02-05 PROCEDURE — 82962 GLUCOSE BLOOD TEST: CPT

## 2025-02-05 PROCEDURE — 258N000003 HC RX IP 258 OP 636: Performed by: STUDENT IN AN ORGANIZED HEALTH CARE EDUCATION/TRAINING PROGRAM

## 2025-02-05 PROCEDURE — 83735 ASSAY OF MAGNESIUM: CPT | Performed by: STUDENT IN AN ORGANIZED HEALTH CARE EDUCATION/TRAINING PROGRAM

## 2025-02-05 PROCEDURE — 250N000011 HC RX IP 250 OP 636: Mod: JZ | Performed by: STUDENT IN AN ORGANIZED HEALTH CARE EDUCATION/TRAINING PROGRAM

## 2025-02-05 PROCEDURE — 82803 BLOOD GASES ANY COMBINATION: CPT

## 2025-02-05 PROCEDURE — 99223 1ST HOSP IP/OBS HIGH 75: CPT | Performed by: INTERNAL MEDICINE

## 2025-02-05 PROCEDURE — 99291 CRITICAL CARE FIRST HOUR: CPT | Mod: 25

## 2025-02-05 PROCEDURE — 96361 HYDRATE IV INFUSION ADD-ON: CPT

## 2025-02-05 PROCEDURE — 120N000013 HC R&B IMCU

## 2025-02-05 RX ORDER — DEXTROSE MONOHYDRATE 100 MG/ML
INJECTION, SOLUTION INTRAVENOUS CONTINUOUS PRN
Status: DISCONTINUED | OUTPATIENT
Start: 2025-02-05 | End: 2025-02-08 | Stop reason: HOSPADM

## 2025-02-05 RX ORDER — NYSTATIN 100000 [USP'U]/ML
500000 SUSPENSION ORAL 4 TIMES DAILY
Status: DISCONTINUED | OUTPATIENT
Start: 2025-02-06 | End: 2025-02-08 | Stop reason: HOSPADM

## 2025-02-05 RX ORDER — TRAMADOL HYDROCHLORIDE 50 MG/1
50 TABLET ORAL
Status: DISCONTINUED | OUTPATIENT
Start: 2025-02-05 | End: 2025-02-08 | Stop reason: HOSPADM

## 2025-02-05 RX ORDER — POLYETHYLENE GLYCOL 3350 17 G/17G
17 POWDER, FOR SOLUTION ORAL 2 TIMES DAILY PRN
Status: DISCONTINUED | OUTPATIENT
Start: 2025-02-05 | End: 2025-02-08 | Stop reason: HOSPADM

## 2025-02-05 RX ORDER — NICOTINE POLACRILEX 4 MG
15-30 LOZENGE BUCCAL
Status: DISCONTINUED | OUTPATIENT
Start: 2025-02-05 | End: 2025-02-05

## 2025-02-05 RX ORDER — ACETAMINOPHEN 650 MG/1
650 SUPPOSITORY RECTAL EVERY 4 HOURS PRN
Status: DISCONTINUED | OUTPATIENT
Start: 2025-02-05 | End: 2025-02-08 | Stop reason: HOSPADM

## 2025-02-05 RX ORDER — CALCIUM GLUCONATE 20 MG/ML
2 INJECTION, SOLUTION INTRAVENOUS ONCE
Status: COMPLETED | OUTPATIENT
Start: 2025-02-05 | End: 2025-02-05

## 2025-02-05 RX ORDER — ACETAMINOPHEN 325 MG/1
650 TABLET ORAL EVERY 4 HOURS PRN
Status: DISCONTINUED | OUTPATIENT
Start: 2025-02-05 | End: 2025-02-08 | Stop reason: HOSPADM

## 2025-02-05 RX ORDER — DEXTROSE MONOHYDRATE 25 G/50ML
25-50 INJECTION, SOLUTION INTRAVENOUS
Status: DISCONTINUED | OUTPATIENT
Start: 2025-02-05 | End: 2025-02-05

## 2025-02-05 RX ORDER — NICOTINE POLACRILEX 4 MG
15-30 LOZENGE BUCCAL
Status: DISCONTINUED | OUTPATIENT
Start: 2025-02-05 | End: 2025-02-08

## 2025-02-05 RX ORDER — LIDOCAINE 40 MG/G
CREAM TOPICAL
Status: DISCONTINUED | OUTPATIENT
Start: 2025-02-05 | End: 2025-02-08 | Stop reason: HOSPADM

## 2025-02-05 RX ORDER — CALCIUM GLUCONATE 94 MG/ML
2 INJECTION, SOLUTION INTRAVENOUS ONCE
Status: DISCONTINUED | OUTPATIENT
Start: 2025-02-05 | End: 2025-02-05

## 2025-02-05 RX ORDER — CALCIUM CARBONATE 500 MG/1
1000 TABLET, CHEWABLE ORAL 4 TIMES DAILY PRN
Status: DISCONTINUED | OUTPATIENT
Start: 2025-02-05 | End: 2025-02-08 | Stop reason: HOSPADM

## 2025-02-05 RX ORDER — DEXTROSE MONOHYDRATE 25 G/50ML
25-50 INJECTION, SOLUTION INTRAVENOUS
Status: DISCONTINUED | OUTPATIENT
Start: 2025-02-05 | End: 2025-02-08

## 2025-02-05 RX ORDER — LIDOCAINE 40 MG/G
CREAM TOPICAL
Status: DISCONTINUED | OUTPATIENT
Start: 2025-02-05 | End: 2025-02-05

## 2025-02-05 RX ORDER — CLOTRIMAZOLE 10 MG/1
10 LOZENGE ORAL
Qty: 70 LOZENGE | Refills: 0 | Status: SHIPPED | OUTPATIENT
Start: 2025-02-05 | End: 2025-02-05

## 2025-02-05 RX ORDER — SODIUM CHLORIDE 9 MG/ML
INJECTION, SOLUTION INTRAVENOUS CONTINUOUS
Status: ACTIVE | OUTPATIENT
Start: 2025-02-05 | End: 2025-02-06

## 2025-02-05 RX ORDER — ONDANSETRON 2 MG/ML
4 INJECTION INTRAMUSCULAR; INTRAVENOUS EVERY 6 HOURS PRN
Status: DISCONTINUED | OUTPATIENT
Start: 2025-02-05 | End: 2025-02-08 | Stop reason: HOSPADM

## 2025-02-05 RX ORDER — AMOXICILLIN 250 MG
1 CAPSULE ORAL 2 TIMES DAILY PRN
Status: DISCONTINUED | OUTPATIENT
Start: 2025-02-05 | End: 2025-02-08 | Stop reason: HOSPADM

## 2025-02-05 RX ORDER — CLOTRIMAZOLE 10 MG/1
10 LOZENGE ORAL
Qty: 70 LOZENGE | Refills: 0 | Status: ON HOLD | OUTPATIENT
Start: 2025-02-05

## 2025-02-05 RX ORDER — ONDANSETRON 4 MG/1
4 TABLET, ORALLY DISINTEGRATING ORAL EVERY 6 HOURS PRN
Status: DISCONTINUED | OUTPATIENT
Start: 2025-02-05 | End: 2025-02-08 | Stop reason: HOSPADM

## 2025-02-05 RX ORDER — ACETAMINOPHEN 500 MG
1000 TABLET ORAL ONCE
Status: COMPLETED | OUTPATIENT
Start: 2025-02-05 | End: 2025-02-06

## 2025-02-05 RX ORDER — AMOXICILLIN 250 MG
2 CAPSULE ORAL 2 TIMES DAILY PRN
Status: DISCONTINUED | OUTPATIENT
Start: 2025-02-05 | End: 2025-02-08 | Stop reason: HOSPADM

## 2025-02-05 RX ADMIN — SODIUM CHLORIDE: 9 INJECTION, SOLUTION INTRAVENOUS at 23:47

## 2025-02-05 RX ADMIN — SODIUM BICARBONATE 50 MEQ: 84 INJECTION INTRAVENOUS at 21:20

## 2025-02-05 RX ADMIN — SODIUM CHLORIDE 1000 ML: 9 INJECTION, SOLUTION INTRAVENOUS at 19:10

## 2025-02-05 RX ADMIN — SODIUM CHLORIDE 9.3 UNITS: 9 INJECTION, SOLUTION INTRAVENOUS at 22:37

## 2025-02-05 RX ADMIN — INSULIN HUMAN 5.5 UNITS/HR: 1 INJECTION, SOLUTION INTRAVENOUS at 23:47

## 2025-02-05 RX ADMIN — CALCIUM GLUCONATE 2 G: 20 INJECTION, SOLUTION INTRAVENOUS at 21:31

## 2025-02-05 ASSESSMENT — ACTIVITIES OF DAILY LIVING (ADL)
DRESSING/BATHING_DIFFICULTY: YES
ADLS_ACUITY_SCORE: 58
DIFFICULTY_EATING/SWALLOWING: NO
CHANGE_IN_FUNCTIONAL_STATUS_SINCE_ONSET_OF_CURRENT_ILLNESS/INJURY: YES
NUMBER_OF_TIMES_PATIENT_HAS_FALLEN_WITHIN_LAST_SIX_MONTHS: 1
TOILETING_ISSUES: YES
DOING_ERRANDS_INDEPENDENTLY_DIFFICULTY: YES
FALL_HISTORY_WITHIN_LAST_SIX_MONTHS: YES
EQUIPMENT_CURRENTLY_USED_AT_HOME: WALKER, ROLLING
ADLS_ACUITY_SCORE: 58
ADLS_ACUITY_SCORE: 58
WALKING_OR_CLIMBING_STAIRS_DIFFICULTY: YES

## 2025-02-05 ASSESSMENT — PAIN SCALES - GENERAL: PAINLEVEL_OUTOF10: NO PAIN (0)

## 2025-02-05 ASSESSMENT — COLUMBIA-SUICIDE SEVERITY RATING SCALE - C-SSRS
2. HAVE YOU ACTUALLY HAD ANY THOUGHTS OF KILLING YOURSELF IN THE PAST MONTH?: NO
6. HAVE YOU EVER DONE ANYTHING, STARTED TO DO ANYTHING, OR PREPARED TO DO ANYTHING TO END YOUR LIFE?: NO
1. IN THE PAST MONTH, HAVE YOU WISHED YOU WERE DEAD OR WISHED YOU COULD GO TO SLEEP AND NOT WAKE UP?: NO

## 2025-02-05 NOTE — PROGRESS NOTES
Assessment & Plan     Esophageal dysphagia  Gastroesophageal reflux disease without esophagitis  Shah's esophagus without dysplasia  New esophageal dysphagia to solids in setting of known GERD and Shah's esophagus. She notes worsening GERD symptoms recently. Currently on pantoprazole 20mg daily, plan to increase to BID dosing if GERD symptoms persistent after thrush medication completed, see below.  Does have routine Shah esophageal monitoring EGD scheduled for May, will change referral to priority based on new symptomatology.   - Adult GI  Referral - Procedure Only    Chronic atrial fibrillation (H)  S/P TAVR (transcatheter aortic valve replacement)  On apixaban daily but increase in cost is prohibitive. Plan to transition to warfarin, referring to anticoagulation clinic to assist. Of note, needs to be completed within remaining three week supply.   - Anticoagulation Clinic Referral    Spondylosis of lumbosacral region without myelopathy or radiculopathy  Upcoming epidural procedure and needs recommendations on holding anticoagulation. Defer to Cardiology who is currently managing.    Thrush  In setting of recent DM2 diagnosis. Treating with clotrimazoann marie pike.     Type 2 diabetes mellitus without complication, without long-term current use of insulin (H)  New diagnosis of DM2 as of 5/2024, lifestyle managed. Most recent A1c about 2 months ago now mildly uncontrolled. Due to above concerns, did not have sufficient time to address. Plan to follow up in 2-4 weeks once EGD obtained for GERD, dysphagia to solid foods.     Chronic kidney disease, stage 3a (H)  Last BMP on 11/18/24 is overall stable.    Diastolic heart failure, unspecified HF chronicity (H)  Following with Cardiology. Last visit on 11/2024.    Class 2 severe obesity due to excess calories with serious comorbidity and body mass index (BMI) of 35.0 to 35.9 in adult (H)  Likely contributing to DM2 as above.    45 minutes spent on  "precharting, in room with patient.    Note written after patient fell at home (evening of 2/5/25) and is now currently hospitalized for severe hyperglycemia which, in retrospect, fits patterns of dry mouth, thrush development. Considering that dysphagia to solids with increased intake of ice cream, pudding, proteins was the impetus to hyperglycemia.    BMI  Estimated body mass index is 35.36 kg/m  as calculated from the following:    Height as of this encounter: 1.626 m (5' 4\").    Weight as of this encounter: 93.4 kg (206 lb).     Follow up in 2 weeks as already scheduled    Robert Randle MD  [facility]  2/6/2025    Melyssa Shultz is a 84 year old, presenting for the following health issues:  Diabetes          2/5/2025     1:45 PM   Additional Questions   Roomed by Michelle FRANK MA   Accompanied by self         2/5/2025     1:45 PM   Patient Reported Additional Medications   Patient reports taking the following new medications none     History of Present Illness       Reason for visit:  Follow up as prescribed by Dr. Robert Randle    She eats 0-1 servings of fruits and vegetables daily.She consumes 0 sweetened beverage(s) daily.She exercises with enough effort to increase her heart rate 30 to 60 minutes per day.  She exercises with enough effort to increase her heart rate 3 or less days per week.   She is taking medications regularly.     Diabetes Follow-up    How often are you checking your blood sugar? Not at all  What concerns do you have today about your diabetes? None   Do you have any of these symptoms? (Select all that apply)  Weight loss      BP Readings from Last 2 Encounters:   02/05/25 (!) 142/92   12/10/24 138/76     Hemoglobin A1C (%)   Date Value   12/10/2024 7.2 (H)   05/21/2024 6.7 (H)   02/24/2021 6.1 (H)   08/26/2020 5.9 (H)     LDL Cholesterol Calculated (mg/dL)   Date Value   12/10/2024 105 (H)   11/22/2023 119 (H)   02/24/2021 85   08/26/2020 83     Arthritis pain  Has been " "experiencing significant arthritis pain in her shoulder and back but they do not seem to be helping, rather just worsening.   She will be having an injection soon and provider is hoping to have this achieved prior to transitioning to warfarin (see below).   She needs to know how many days she should be off Eliquis prior to injection.    Atrial fibrillation/TAVR  Is on Eliquis currently.   Was costing about $150 every 3 months but now will be over $500 every 3 months.  She is not going to be able to afford this.   Was directed to PCP to assist in transitioning to warfarin.    Heartburn  Having significant symptoms in the past 2 weeks.  Feels a burning sensation initially and now food is also getting stuck deep in the mid-chest.  Feels like whenever swallowing, it just ends about mid-esophagus without advancing.    Isn't able to eat meats because of it.   Living on protein shakes, vanilla pudding, ice cream now.   Is taking the pantoprazole once daily which she has been doing long-term.   Mouth feels really dry too. Mildly sore throat.    Takes once a day iron supplement over the counter.         Objective    BP (!) 142/92 (BP Location: Right arm, Patient Position: Sitting, Cuff Size: Adult Large)   Pulse 86   Temp 97.2  F (36.2  C) (Oral)   Resp 14   Ht 1.626 m (5' 4\")   Wt 93.4 kg (206 lb)   LMP  (LMP Unknown)   SpO2 98%   BMI 35.36 kg/m    Body mass index is 35.36 kg/m .    Physical Exam   GENERAL: healthy, alert and no distress  HEAD: Normocephalic, atraumatic.   EYES: Normal conjunctivae, sclera.   ENT: Normal EAC and TMs bilaterally. White, scrapable plaque over posterior tongue along with white papules on erythematous base over soft palate. See image below.      NECK: Supple. No lymphadenopathy appreciated. Trachea midline.   RESP: lungs clear to auscultation - no rales, rhonchi or wheezes  CV: regular rate and rhythm, 2/6 systolic murmur best appreciated at RUSB, no click, rub or gallop.  1+ pitting " edema up to mid-calves bilaterally.   ABDOMEN: soft, no TTP x4 quadrants. No hepatomegaly or masses appreciated. BS normactive.  MSK: no gross musculoskeletal defects noted.  SKIN: no suspicious lesions or rashes.  EXT: Warm and well perfused.   NEURO: CNII-XII grossly intact. No focal deficits.  PSYCH: Groomed, dressed appropriately for weather.  Logical, linear thought process. Mood is stressed with tearful affect.     Signed Electronically by: Robert Randle MD

## 2025-02-05 NOTE — PROGRESS NOTES
{PROVIDER CHARTING PREFERENCE:125210}    Subjective   Lexii is a 84 year old, presenting for the following health issues:  Diabetes        2/5/2025     1:45 PM   Additional Questions   Roomed by Michelle FRANK MA   Accompanied by self         2/5/2025     1:45 PM   Patient Reported Additional Medications   Patient reports taking the following new medications none     History of Present Illness       Reason for visit:  Follow up as prescribed by Dr. Robert Randle    She eats 0-1 servings of fruits and vegetables daily.She consumes 0 sweetened beverage(s) daily.She exercises with enough effort to increase her heart rate 30 to 60 minutes per day.  She exercises with enough effort to increase her heart rate 3 or less days per week.   She is taking medications regularly.       {MA/LPN/RN Pre-Provider Visit Orders- hCG/UA/Strep (Optional):371253}  Diabetes Follow-up    How often are you checking your blood sugar? Not at all  What concerns do you have today about your diabetes? None   Do you have any of these symptoms? (Select all that apply)  No numbness or tingling in feet.  No redness, sores or blisters on feet.  No complaints of excessive thirst.  No reports of blurry vision.  No significant changes to weight.      BP Readings from Last 2 Encounters:   02/05/25 (!) 142/92   12/10/24 138/76     Hemoglobin A1C (%)   Date Value   12/10/2024 7.2 (H)   05/21/2024 6.7 (H)   02/24/2021 6.1 (H)   08/26/2020 5.9 (H)     LDL Cholesterol Calculated (mg/dL)   Date Value   12/10/2024 105 (H)   11/22/2023 119 (H)   02/24/2021 85   08/26/2020 83       {Reference  Diabetes Management Resources  Blood Glucose Log - 3 weeks  Blood Glucose Log with Food and Insulin Record :558867}    {additonal problems for provider to add (Optional):664920}    {ROS Picklists (Optional):681743}      Objective    BP (!) 142/92 (BP Location: Right arm, Patient Position: Sitting, Cuff Size: Adult Large)   Pulse 86   Temp 97.2  F (36.2  C) (Oral)   Resp 14    "Ht 1.626 m (5' 4\")   Wt 93.4 kg (206 lb)   LMP  (LMP Unknown)   SpO2 98%   BMI 35.36 kg/m    Body mass index is 35.36 kg/m .  Physical Exam   {Exam List (Optional):913606}    {Diagnostic Test Results (Optional):469162}        Signed Electronically by: Robert Randle MD  {Email feedback regarding this note to [email or web address]   :147693}  "

## 2025-02-05 NOTE — PATIENT INSTRUCTIONS
For your throat/mouth symptoms:  Start the clotrimazole troches, 5 times daily for up to 14 days.     If this resolves your heartburn, great, no need to increase the pantoprazole, however if you feel like your symptoms are persisting despite the clotrimazole, please reach out and I will increase the pantoprazole for you.

## 2025-02-05 NOTE — ED TRIAGE NOTES
Arrives via EMS from home. States she fell due to slipping on the ICE, denies LOC, states she hit her head.     + for thinner due to AFIB.     VSS en route except for hypertensive and .     20 g L AC.

## 2025-02-06 ENCOUNTER — TELEPHONE (OUTPATIENT)
Dept: FAMILY MEDICINE | Facility: CLINIC | Age: 85
End: 2025-02-06

## 2025-02-06 VITALS
WEIGHT: 207.67 LBS | OXYGEN SATURATION: 99 % | BODY MASS INDEX: 35.65 KG/M2 | RESPIRATION RATE: 16 BRPM | HEART RATE: 67 BPM | DIASTOLIC BLOOD PRESSURE: 50 MMHG | SYSTOLIC BLOOD PRESSURE: 154 MMHG | TEMPERATURE: 99.2 F

## 2025-02-06 LAB
ALBUMIN UR-MCNC: NEGATIVE MG/DL
ANION GAP SERPL CALCULATED.3IONS-SCNC: 11 MMOL/L (ref 7–15)
ANION GAP SERPL CALCULATED.3IONS-SCNC: 16 MMOL/L (ref 7–15)
APPEARANCE UR: CLEAR
ATRIAL RATE - MUSE: 64 BPM
BACTERIA #/AREA URNS HPF: ABNORMAL /HPF
BACTERIA BLD CULT: NORMAL
BACTERIA BLD CULT: NORMAL
BASOPHILS # BLD AUTO: 0 10E3/UL (ref 0–0.2)
BASOPHILS NFR BLD AUTO: 0 %
BILIRUB UR QL STRIP: NEGATIVE
BUN SERPL-MCNC: 31.5 MG/DL (ref 8–23)
BUN SERPL-MCNC: 40.3 MG/DL (ref 8–23)
CALCIUM SERPL-MCNC: 10.2 MG/DL (ref 8.8–10.4)
CALCIUM SERPL-MCNC: 9 MG/DL (ref 8.8–10.4)
CHLORIDE SERPL-SCNC: 100 MMOL/L (ref 98–107)
CHLORIDE SERPL-SCNC: 107 MMOL/L (ref 98–107)
COLOR UR AUTO: ABNORMAL
CREAT SERPL-MCNC: 0.83 MG/DL (ref 0.51–0.95)
CREAT SERPL-MCNC: 1.03 MG/DL (ref 0.51–0.95)
DIASTOLIC BLOOD PRESSURE - MUSE: NORMAL MMHG
EGFRCR SERPLBLD CKD-EPI 2021: 53 ML/MIN/1.73M2
EGFRCR SERPLBLD CKD-EPI 2021: 69 ML/MIN/1.73M2
EOSINOPHIL # BLD AUTO: 0 10E3/UL (ref 0–0.7)
EOSINOPHIL NFR BLD AUTO: 0 %
ERYTHROCYTE [DISTWIDTH] IN BLOOD BY AUTOMATED COUNT: 12.3 % (ref 10–15)
GLUCOSE BLDC GLUCOMTR-MCNC: 112 MG/DL (ref 70–99)
GLUCOSE BLDC GLUCOMTR-MCNC: 113 MG/DL (ref 70–99)
GLUCOSE BLDC GLUCOMTR-MCNC: 118 MG/DL (ref 70–99)
GLUCOSE BLDC GLUCOMTR-MCNC: 119 MG/DL (ref 70–99)
GLUCOSE BLDC GLUCOMTR-MCNC: 127 MG/DL (ref 70–99)
GLUCOSE BLDC GLUCOMTR-MCNC: 131 MG/DL (ref 70–99)
GLUCOSE BLDC GLUCOMTR-MCNC: 137 MG/DL (ref 70–99)
GLUCOSE BLDC GLUCOMTR-MCNC: 139 MG/DL (ref 70–99)
GLUCOSE BLDC GLUCOMTR-MCNC: 144 MG/DL (ref 70–99)
GLUCOSE BLDC GLUCOMTR-MCNC: 160 MG/DL (ref 70–99)
GLUCOSE BLDC GLUCOMTR-MCNC: 161 MG/DL (ref 70–99)
GLUCOSE BLDC GLUCOMTR-MCNC: 173 MG/DL (ref 70–99)
GLUCOSE BLDC GLUCOMTR-MCNC: 186 MG/DL (ref 70–99)
GLUCOSE BLDC GLUCOMTR-MCNC: 191 MG/DL (ref 70–99)
GLUCOSE BLDC GLUCOMTR-MCNC: 204 MG/DL (ref 70–99)
GLUCOSE BLDC GLUCOMTR-MCNC: 257 MG/DL (ref 70–99)
GLUCOSE SERPL-MCNC: 115 MG/DL (ref 70–99)
GLUCOSE SERPL-MCNC: 324 MG/DL (ref 70–99)
GLUCOSE UR STRIP-MCNC: >=1000 MG/DL
HCO3 SERPL-SCNC: 20 MMOL/L (ref 22–29)
HCO3 SERPL-SCNC: 22 MMOL/L (ref 22–29)
HCT VFR BLD AUTO: 38.3 % (ref 35–47)
HGB BLD-MCNC: 13.3 G/DL (ref 11.7–15.7)
HGB UR QL STRIP: NEGATIVE
IMM GRANULOCYTES # BLD: 0.1 10E3/UL
IMM GRANULOCYTES NFR BLD: 1 %
INTERPRETATION ECG - MUSE: NORMAL
KETONES UR STRIP-MCNC: ABNORMAL MG/DL
LACTATE SERPL-SCNC: 2.2 MMOL/L (ref 0.7–2)
LACTATE SERPL-SCNC: 3.6 MMOL/L (ref 0.7–2)
LEUKOCYTE ESTERASE UR QL STRIP: ABNORMAL
LYMPHOCYTES # BLD AUTO: 1.1 10E3/UL (ref 0.8–5.3)
LYMPHOCYTES NFR BLD AUTO: 10 %
MCH RBC QN AUTO: 31.7 PG (ref 26.5–33)
MCHC RBC AUTO-ENTMCNC: 34.7 G/DL (ref 31.5–36.5)
MCV RBC AUTO: 91 FL (ref 78–100)
MONOCYTES # BLD AUTO: 0.6 10E3/UL (ref 0–1.3)
MONOCYTES NFR BLD AUTO: 6 %
MUCOUS THREADS #/AREA URNS LPF: PRESENT /LPF
NEUTROPHILS # BLD AUTO: 8.8 10E3/UL (ref 1.6–8.3)
NEUTROPHILS NFR BLD AUTO: 83 %
NITRATE UR QL: POSITIVE
NRBC # BLD AUTO: 0 10E3/UL
NRBC BLD AUTO-RTO: 0 /100
P AXIS - MUSE: 56 DEGREES
PH UR STRIP: 6.5 [PH] (ref 5–7)
PLATELET # BLD AUTO: 85 10E3/UL (ref 150–450)
POTASSIUM SERPL-SCNC: 4.2 MMOL/L (ref 3.4–5.3)
POTASSIUM SERPL-SCNC: 4.3 MMOL/L (ref 3.4–5.3)
PR INTERVAL - MUSE: 202 MS
QRS DURATION - MUSE: 100 MS
QT - MUSE: 402 MS
QTC - MUSE: 414 MS
R AXIS - MUSE: -26 DEGREES
RBC # BLD AUTO: 4.19 10E6/UL (ref 3.8–5.2)
RBC URINE: 6 /HPF
SODIUM SERPL-SCNC: 136 MMOL/L (ref 135–145)
SODIUM SERPL-SCNC: 140 MMOL/L (ref 135–145)
SP GR UR STRIP: 1.03 (ref 1–1.03)
SQUAMOUS EPITHELIAL: 1 /HPF
SYSTOLIC BLOOD PRESSURE - MUSE: NORMAL MMHG
T AXIS - MUSE: 43 DEGREES
UROBILINOGEN UR STRIP-MCNC: NORMAL MG/DL
VENTRICULAR RATE- MUSE: 64 BPM
WBC # BLD AUTO: 10.7 10E3/UL (ref 4–11)
WBC URINE: 3 /HPF

## 2025-02-06 PROCEDURE — 250N000013 HC RX MED GY IP 250 OP 250 PS 637: Performed by: INTERNAL MEDICINE

## 2025-02-06 PROCEDURE — 99233 SBSQ HOSP IP/OBS HIGH 50: CPT | Performed by: INTERNAL MEDICINE

## 2025-02-06 PROCEDURE — 36415 COLL VENOUS BLD VENIPUNCTURE: CPT | Performed by: INTERNAL MEDICINE

## 2025-02-06 PROCEDURE — 85041 AUTOMATED RBC COUNT: CPT | Performed by: INTERNAL MEDICINE

## 2025-02-06 PROCEDURE — 83605 ASSAY OF LACTIC ACID: CPT | Performed by: INTERNAL MEDICINE

## 2025-02-06 PROCEDURE — 120N000001 HC R&B MED SURG/OB

## 2025-02-06 PROCEDURE — 250N000012 HC RX MED GY IP 250 OP 636 PS 637: Performed by: INTERNAL MEDICINE

## 2025-02-06 PROCEDURE — 87088 URINE BACTERIA CULTURE: CPT | Performed by: STUDENT IN AN ORGANIZED HEALTH CARE EDUCATION/TRAINING PROGRAM

## 2025-02-06 PROCEDURE — 81001 URINALYSIS AUTO W/SCOPE: CPT | Performed by: STUDENT IN AN ORGANIZED HEALTH CARE EDUCATION/TRAINING PROGRAM

## 2025-02-06 PROCEDURE — 87040 BLOOD CULTURE FOR BACTERIA: CPT | Performed by: INTERNAL MEDICINE

## 2025-02-06 PROCEDURE — 87186 SC STD MICRODIL/AGAR DIL: CPT | Performed by: STUDENT IN AN ORGANIZED HEALTH CARE EDUCATION/TRAINING PROGRAM

## 2025-02-06 PROCEDURE — 80048 BASIC METABOLIC PNL TOTAL CA: CPT | Performed by: INTERNAL MEDICINE

## 2025-02-06 PROCEDURE — 85018 HEMOGLOBIN: CPT | Performed by: INTERNAL MEDICINE

## 2025-02-06 PROCEDURE — 84520 ASSAY OF UREA NITROGEN: CPT | Performed by: INTERNAL MEDICINE

## 2025-02-06 PROCEDURE — 85004 AUTOMATED DIFF WBC COUNT: CPT | Performed by: INTERNAL MEDICINE

## 2025-02-06 PROCEDURE — 258N000003 HC RX IP 258 OP 636: Performed by: INTERNAL MEDICINE

## 2025-02-06 RX ORDER — DEXTROSE MONOHYDRATE 25 G/50ML
25-50 INJECTION, SOLUTION INTRAVENOUS
Status: DISCONTINUED | OUTPATIENT
Start: 2025-02-06 | End: 2025-02-06

## 2025-02-06 RX ORDER — ATORVASTATIN CALCIUM 20 MG/1
20 TABLET, FILM COATED ORAL DAILY
Status: DISCONTINUED | OUTPATIENT
Start: 2025-02-06 | End: 2025-02-06

## 2025-02-06 RX ORDER — NALOXONE HYDROCHLORIDE 0.4 MG/ML
0.2 INJECTION, SOLUTION INTRAMUSCULAR; INTRAVENOUS; SUBCUTANEOUS
Status: DISCONTINUED | OUTPATIENT
Start: 2025-02-06 | End: 2025-02-08 | Stop reason: HOSPADM

## 2025-02-06 RX ORDER — NALOXONE HYDROCHLORIDE 0.4 MG/ML
0.4 INJECTION, SOLUTION INTRAMUSCULAR; INTRAVENOUS; SUBCUTANEOUS
Status: DISCONTINUED | OUTPATIENT
Start: 2025-02-06 | End: 2025-02-08 | Stop reason: HOSPADM

## 2025-02-06 RX ORDER — SIMVASTATIN 20 MG
20 TABLET ORAL AT BEDTIME
Status: DISCONTINUED | OUTPATIENT
Start: 2025-02-06 | End: 2025-02-08 | Stop reason: HOSPADM

## 2025-02-06 RX ORDER — AMLODIPINE BESYLATE 5 MG/1
5 TABLET ORAL EVERY EVENING
Status: DISCONTINUED | OUTPATIENT
Start: 2025-02-06 | End: 2025-02-08 | Stop reason: HOSPADM

## 2025-02-06 RX ORDER — LEVOTHYROXINE SODIUM 50 UG/1
50 TABLET ORAL DAILY
Status: DISCONTINUED | OUTPATIENT
Start: 2025-02-06 | End: 2025-02-08 | Stop reason: HOSPADM

## 2025-02-06 RX ORDER — PANTOPRAZOLE SODIUM 20 MG/1
20 TABLET, DELAYED RELEASE ORAL DAILY
Status: DISCONTINUED | OUTPATIENT
Start: 2025-02-06 | End: 2025-02-08 | Stop reason: HOSPADM

## 2025-02-06 RX ORDER — NICOTINE POLACRILEX 4 MG
15-30 LOZENGE BUCCAL
Status: DISCONTINUED | OUTPATIENT
Start: 2025-02-06 | End: 2025-02-06

## 2025-02-06 RX ORDER — LATANOPROST 50 UG/ML
1 SOLUTION/ DROPS OPHTHALMIC AT BEDTIME
Status: DISCONTINUED | OUTPATIENT
Start: 2025-02-06 | End: 2025-02-08 | Stop reason: HOSPADM

## 2025-02-06 RX ORDER — ACETAMINOPHEN 500 MG
1000 TABLET ORAL 2 TIMES DAILY
Status: DISCONTINUED | OUTPATIENT
Start: 2025-02-06 | End: 2025-02-08 | Stop reason: HOSPADM

## 2025-02-06 RX ORDER — FERROUS GLUCONATE 324(38)MG
324 TABLET ORAL
Status: DISCONTINUED | OUTPATIENT
Start: 2025-02-06 | End: 2025-02-08 | Stop reason: HOSPADM

## 2025-02-06 RX ORDER — SIMVASTATIN 20 MG
20 TABLET ORAL AT BEDTIME
COMMUNITY

## 2025-02-06 RX ADMIN — NYSTATIN 500000 UNITS: 100000 SUSPENSION ORAL at 21:01

## 2025-02-06 RX ADMIN — INSULIN ASPART 1 UNITS: 100 INJECTION, SOLUTION INTRAVENOUS; SUBCUTANEOUS at 18:23

## 2025-02-06 RX ADMIN — PANTOPRAZOLE SODIUM 20 MG: 20 TABLET, DELAYED RELEASE ORAL at 10:55

## 2025-02-06 RX ADMIN — FERROUS GLUCONATE 324 MG: 324 TABLET ORAL at 10:59

## 2025-02-06 RX ADMIN — SIMVASTATIN 20 MG: 20 TABLET, FILM COATED ORAL at 21:01

## 2025-02-06 RX ADMIN — SODIUM CHLORIDE 1000 ML: 9 INJECTION, SOLUTION INTRAVENOUS at 01:46

## 2025-02-06 RX ADMIN — LATANOPROST 1 DROP: 50 SOLUTION/ DROPS OPHTHALMIC at 21:02

## 2025-02-06 RX ADMIN — NYSTATIN 500000 UNITS: 100000 SUSPENSION ORAL at 08:43

## 2025-02-06 RX ADMIN — SENNOSIDES AND DOCUSATE SODIUM 1 TABLET: 50; 8.6 TABLET ORAL at 21:01

## 2025-02-06 RX ADMIN — NYSTATIN 500000 UNITS: 100000 SUSPENSION ORAL at 12:05

## 2025-02-06 RX ADMIN — INSULIN GLARGINE 10 UNITS: 100 INJECTION, SOLUTION SUBCUTANEOUS at 11:16

## 2025-02-06 RX ADMIN — ACETAMINOPHEN 1000 MG: 500 TABLET, FILM COATED ORAL at 10:55

## 2025-02-06 RX ADMIN — LEVOTHYROXINE SODIUM 50 MCG: 0.05 TABLET ORAL at 10:55

## 2025-02-06 RX ADMIN — APIXABAN 5 MG: 5 TABLET, FILM COATED ORAL at 10:56

## 2025-02-06 RX ADMIN — ACETAMINOPHEN 1000 MG: 500 TABLET, FILM COATED ORAL at 21:01

## 2025-02-06 RX ADMIN — NYSTATIN 500000 UNITS: 100000 SUSPENSION ORAL at 18:09

## 2025-02-06 RX ADMIN — APIXABAN 5 MG: 5 TABLET, FILM COATED ORAL at 21:01

## 2025-02-06 RX ADMIN — AMLODIPINE BESYLATE 5 MG: 5 TABLET ORAL at 21:01

## 2025-02-06 RX ADMIN — LISINOPRIL 30 MG: 20 TABLET ORAL at 10:59

## 2025-02-06 ASSESSMENT — ACTIVITIES OF DAILY LIVING (ADL)
ADLS_ACUITY_SCORE: 60
ADLS_ACUITY_SCORE: 58
ADLS_ACUITY_SCORE: 58
ADLS_ACUITY_SCORE: 60
ADLS_ACUITY_SCORE: 59
ADLS_ACUITY_SCORE: 57
ADLS_ACUITY_SCORE: 60
ADLS_ACUITY_SCORE: 59
ADLS_ACUITY_SCORE: 60
ADLS_ACUITY_SCORE: 58
ADLS_ACUITY_SCORE: 59
ADLS_ACUITY_SCORE: 60
ADLS_ACUITY_SCORE: 59
ADLS_ACUITY_SCORE: 59
ADLS_ACUITY_SCORE: 60
ADLS_ACUITY_SCORE: 59
ADLS_ACUITY_SCORE: 60

## 2025-02-06 NOTE — CONSULTS
"NUTRITION EDUCATION    REASON FOR ASSESSMENT:  Provider order - Nutrition Education: Uncontrolled diabetes mellitus     CURRENT DIET:  Moderate consistent carbohydrate (60 g CHO per meal)     NUTRITION DIAGNOSIS:  Food- and nutrition-related knowledge deficit R/t eating with diabetes as evidenced by new diabetes diagnosis and need for nutrition education.     INTERVENTIONS:  Nutrition Prescription:  Carb controlled, well balanced diet     Implementation:      *  Nutrition Education (Content):   A)  Provided handouts from the NCM[employer]AND that provide information on carbohydrate counting, the plate method, and label reading.    B)  Discussed carbohydrate sources, serving sizes, reviewed how to balance meals, emphasized the need to eat 3 meals per day (no longer than 4-5 hours apart), limiting snacks to <15 g carb, reviewed how to read a nutrition label. Encouraged frequent blood sugar monitoring and tracking of carbohydrate intake in order to accurately dose insulin.       *  Nutrition Education (Application):   A)  Discussed current eating habits and recommended alternative food choices      *  Anticipate good compliance      *  Diet Education - refer to Education Flowsheet    Goals:      *  Patient will verbalize understanding of diet      *  All of the above goals met during the education session    Follow Up/Monitoring:      *  Provided RD contact information for future questions      *  Recommended Out-Patient Nutrition Referral, if further diet instructions are needed      Tara Chambers, MS, RD, LD  Clinical Dietitian II  [place] Message Group: \"Dietitian [Heather]\"  Office Phone: [phone]  Pagers: 3rd floor/ICU: [phone]  All other floors: [phone]  Weekend/holiday: [phone]    "

## 2025-02-06 NOTE — ED PROVIDER NOTES
Emergency Department Note      History of Present Illness     Chief Complaint   Fall      HPI   Lexii Stratton is a very pleasant 84 year old female with history of hypertension, CKD, chronic atrial fibrillation on apixaban, urinary incontinence, aortic stenosis status post TAVR, obesity, heart failure with preserved ejection fraction, among others presenting with fall.  Patient arrives via EMS from home.  She was walking up her steps, slipped on a patch of ice, fell backwards and hit her head.  No LOC.  No laceration.  She does take apixaban as noted above.  Patient states she was feeling well prior to this fall.  Vital signs were stable for EMS but they did note the patient's blood glucose was 410.  Patient denies history of type 2 diabetes.  She states she has been prediabetic in the past and her primary care clinician is monitoring that.    Independent Historian   None    Review of External Notes   I personally reviewed notes from the patient's clinic visit dated  12/10/2024 . This provided me with information regarding patient's baseline medical problems including A1c of 7.2.     I personally reviewed the patient's chart, including available medication list and available past medical history, past surgical history, family history, and social history.    Physical Exam     Patient Vitals for the past 24 hrs:   BP Temp Temp src Pulse Resp SpO2   02/05/25 1821 -- -- -- -- -- 97 %   02/05/25 1723 (!) 166/85 -- -- 82 18 99 %   02/05/25 1700 (!) 166/85 97.5  F (36.4  C) Oral 82 18 99 %     Physical Exam  Vitals and nursing note reviewed.   Constitutional:       Appearance: She is obese. She is not ill-appearing or diaphoretic.   HENT:      Head: Atraumatic.      Mouth/Throat:      Mouth: Mucous membranes are dry.   Eyes:      General: No scleral icterus.     Extraocular Movements: Extraocular movements intact.      Conjunctiva/sclera: Conjunctivae normal.   Cardiovascular:      Rate and Rhythm: Normal rate and  regular rhythm.      Heart sounds: Normal heart sounds.   Pulmonary:      Effort: Pulmonary effort is normal.      Breath sounds: Normal breath sounds.   Abdominal:      Palpations: Abdomen is soft.      Tenderness: There is no abdominal tenderness. There is no guarding.   Musculoskeletal:         General: No swelling, tenderness, deformity or signs of injury. Normal range of motion.      Cervical back: Normal range of motion. No tenderness.   Skin:     General: Skin is warm and dry.      Coloration: Skin is not pale.   Neurological:      General: No focal deficit present.      Mental Status: She is alert and oriented to person, place, and time.      Motor: No weakness.         Diagnostics     Lab Results   Labs Ordered and Resulted from Time of ED Arrival to Time of ED Departure   BASIC METABOLIC PANEL - Abnormal       Result Value    Sodium 121 (*)     Potassium 8.1 (*)     Chloride 90 (*)     Carbon Dioxide (CO2) 17 (*)     Anion Gap 14      Urea Nitrogen 45.9 (*)     Creatinine 1.22 (*)     GFR Estimate 44 (*)     Calcium 9.4      Glucose 749 (*)    BASIC METABOLIC PANEL - Abnormal    Sodium 128 (*)     Potassium 7.1 (*)     Chloride 95 (*)     Carbon Dioxide (CO2) 18 (*)     Anion Gap 15      Urea Nitrogen 46.1 (*)     Creatinine 1.13 (*)     GFR Estimate 48 (*)     Calcium 9.4      Glucose 700 (*)    KETONE BETA-HYDROXYBUTYRATE QUANTITATIVE, RAPID - Abnormal    Ketone (Beta-Hydroxybutyrate) Quantitative 0.53 (*)    CBC WITH PLATELETS AND DIFFERENTIAL - Abnormal    WBC Count 14.2 (*)     RBC Count 4.56      Hemoglobin 14.3      Hematocrit 41.0      MCV 90      MCH 31.4      MCHC 34.9      RDW 12.0      Platelet Count 100 (*)     % Neutrophils 89      % Lymphocytes 6      % Monocytes 5      % Eosinophils 0      % Basophils 0      % Immature Granulocytes 1      NRBCs per 100 WBC 0      Absolute Neutrophils 12.6 (*)     Absolute Lymphocytes 0.8      Absolute Monocytes 0.7      Absolute Eosinophils 0.0       Absolute Basophils 0.0      Absolute Immature Granulocytes 0.1      Absolute NRBCs 0.0     BLOOD GAS VENOUS - Abnormal    pH Venous 7.39      pCO2 Venous 40      pO2 Venous 28      Bicarbonate Venous 24      Base Excess/Deficit Venous -0.6      FIO2 0      Oxyhemoglobin Venous 50 (*)     O2 Sat, Venous 50.6 (*)    POTASSIUM - Abnormal    Potassium 5.7 (*)    ISTAT GASES LACTATE VENOUS POCT - Abnormal    Lactic Acid POCT 1.7      Bicarbonate Venous POCT 27      O2 Sat, Venous POCT 42 (*)     pCO2 Venous POCT 41      pH Venous POCT 7.43      pO2 Venous POCT 23 (*)    HEMOGLOBIN A1C - Abnormal    Estimated Average Glucose 266 (*)     Hemoglobin A1C 10.9 (*)    MAGNESIUM - Normal    Magnesium 1.8     HEPATIC FUNCTION PANEL - Normal    Protein Total 6.5      Albumin 3.7      Bilirubin Total 0.8      Alkaline Phosphatase 110      AST 26      ALT 25      Bilirubin Direct 0.25     GLUCOSE MONITOR NURSING POCT   GLUCOSE MONITOR NURSING POCT   ROUTINE UA WITH MICROSCOPIC REFLEX TO CULTURE   GLUCOSE MONITOR NURSING POCT   GLUCOSE MONITOR NURSING POCT       Imaging   CT Head w/o Contrast   Final Result   IMPRESSION:   1.  No CT evidence for acute intracranial process.   2.  Brain atrophy and presumed chronic microvascular ischemic changes as above.          EKG   ECG results from 02/05/25   EKG 12 lead     Value    Systolic Blood Pressure     Diastolic Blood Pressure     Ventricular Rate 64    Atrial Rate 64    VT Interval 202    QRS Duration 100        QTc 414    P Axis 56    R AXIS -26    T Axis 43    Interpretation ECG      Sinus rhythm with sinus arrhythmia  Minimal voltage criteria for LVH, may be normal variant ( [employer] product )  Borderline ECG  When compared with ECG of 09-Nov-2022 07:21,  Sinus rhythm has replaced Electronic ventricular pacemaker           Independent Interpretation - See ED Course Below    ED Course      Medications Administered   Medications   glucose gel 15-30 g (has no administration in time  range)     Or   dextrose 50 % injection 25-50 mL (has no administration in time range)     Or   glucagon injection 1 mg (has no administration in time range)   insulin regular 1 unit/mL injection 9.3 Units (has no administration in time range)   dextrose 10% infusion (has no administration in time range)   insulin regular (MYXREDLIN) 1 unit/mL infusion (has no administration in time range)   calcium gluconate 2 g in  mL intermittent infusion (2 g Intravenous $Given 2/5/25 2131)   sodium zirconium cyclosilicate (LOKELMA) packet 15 g (has no administration in time range)   acetaminophen (TYLENOL) tablet 1,000 mg (has no administration in time range)   traMADol (ULTRAM) tablet 50 mg (has no administration in time range)   sodium chloride 0.9% BOLUS 1,000 mL (0 mLs Intravenous Stopped 2/5/25 2114)   sodium bicarbonate 8.4 % injection 50 mEq (50 mEq Intravenous $Given 2/5/25 2120)       Procedures   Procedures   None performed    Discussion of Management - See ED Course Below    ED Course   Independent Interpretation / Discussion of Management / Repeat Assessments  ED Course as of 02/05/25 2208 Wed Feb 05, 2025   1758 CT Head w/o Contrast  I independently interpreted the patient's non-contrast head CT; reassuring against acute intracranial hemorrhage.     2124 I discussed the patient's presentation, workup, assessment, plan and disposition with hospitalist Dr Wheeler. Plan for admission with insulin gtt         Additional Documentation  None    Medical Decision Making / Diagnosis     CMS Diagnoses: None    MIPS       None    MDM   Patient presenting with fall and head injury.  Vital signs on arrival notable for evaded blood pressure but otherwise reassuring.  Physical exam is notable for the patient appearing clinically dry.  Initial evaluation consisted of head CT, which was reassuring against intracranial hemorrhage.  I was able to clear the patient's cervical spine clinically.  There is no other evidence  of injuries on exam.  Due to medics having recorded blood glucose greater than 400, I did obtain a BMP to rule out diabetic emergency as DKA or HHS.  This initial BMP showed significant hyperglycemia, and hyperkalemia without evidence of acidosis.  It was also consistent with JEFRY.  Initial potassium was thought to be falsely elevated due to moderate hemolysis so a repeat BMP was obtained, with similar results. I obtained an EKG, which showed no acute changes of hyperkalemia, and ordered sodium bicarbonate's and gluconate.  Also give the patient a bolus of fluids for hyperglycemia and hypovolemia.  When we confirmed accuracy of hyperkalemia, I ordered a bolus of insulin.  Patient's elevated ketones but no acidemia.  Presentation consistent with diabetic ketosis without acidemia.  I discussed with hospitalist, and plan will be for starting the patient on insulin drip and admitting to [facility] status.  Patient does not have a mild leukocytosis, which may be attributable to trauma but could also reflect an occult infectious process.  Patient has baseline urinary incontinence.  I did order urinalysis to evaluate for UTI.    Critical Care  The critical condition was: DKA and Hyperkalemia    Critical interventions performed or strongly considered: Arrange Definitive Care: OR/cath lab/neurointervention/IR/endoscopy/dialysis/ICU and Infusion: naloxone, vasopressors, insulin, chronotropics, ionotropics, antiepileptics    Critical care time was 42 minutes exclusive of time spent on separately billable procedures.    Disposition   The patient was admitted to the hospital.     Diagnosis     ICD-10-CM    1. Hyperkalemia  E87.5       2. Fall from slip, trip, or stumble, initial encounter  W01.0XXA       3. Type 2 diabetes mellitus with complication, without long-term current use of insulin (H)  E11.8       4. Ketosis (H)  E88.89       5. Hyponatremia  E87.1       6. Hyperglycemia  R73.9       7. Injury of head, initial encounter   S09.90XA       8. JEFRY (acute kidney injury)  N17.9       9. Leukocytosis, unspecified type  D72.829       10. Thrombocytopenia  D69.6            Discharge Medications   New Prescriptions    No medications on file        Shiraz Stapels MD  02/05/25 [id number]

## 2025-02-06 NOTE — PROGRESS NOTES
Cross Cover    Called for lactate 3.6.  here with honk and severely elevated BGs  Rec'd 1 L NS in the ER and now on 125 ml3  Suspect profoundly dehydrated  Give another liter and domi in 2 hours

## 2025-02-06 NOTE — TELEPHONE ENCOUNTER
PCP would like cardiology's input on hold recommendations for Eliquis prior to epidural steroid injection. Routing to cardiology team to inquire.    Please route response back to [place] Nurse pool.    Radha Calero RN on 2/6/2025 at 11:35 AM

## 2025-02-06 NOTE — TELEPHONE ENCOUNTER
"Called patient, left voicemail, and asked patient to call back. Attempt # 1.     Per OV note on 2/5/25 \"Arthritis pain  Has been experiencing significant arthritis pain in her shoulder and back but they do not seem to be helping, rather just worsening.   She will be having an injection soon and provider is hoping to have this achieved prior to transitioning to warfarin (see below).   She needs to know how many days she should be off Eliquis prior to injection.\"    Coleen Mulligan RN 2/6/2025  [facility]  "

## 2025-02-06 NOTE — H&P
[facility]  Hospitalist Admission Note  Name: Lexii Stratton    MRN: [id number]  YOB: 1940    Age: 84 year old  Date of admission: 2/5/2025  Primary care provider: Robert Randle    Chief Complaint: Fall, severe hyperglycemia    Lexii Stratton is a 84 year old female with PMH including relatively recent diagnosis of type 2 diabetes in December 2024 with A1c of 7.2 not currently on any medical treatments, A-fib on Eliquis with plan to transition to warfarin due to cost, history of TAVR, back pain, GERD who presents with a fall on ice.  She was actually at the doctor today for much of the day and when returning home she was climbing some steps to get into her house and slipped on ice on the first step and landed on her backside and did hit her head.  She denies losing consciousness.  Neighbors apparently saw this happen and brought her inside right away so she was not exposed to the cold for any extended period of time.  EMS were called and blood sugar was noted to be in the 400s in the field and she was brought to the ER largely because she is on a blood thinner and did hit her head.    Here in the ER she was hypertensive but otherwise hemodynamically stable and not hypoxic.  Lab workup was notable for a blood glucose of 749 with ketones of 0.53 and BMP showing potassium of 8.1 with carbon dioxide of 17, BUN of 45.9 and creatinine of 1.22.  Anion gap was normal.  LFTs lactic acid were also normal.  CT head was negative and the remainder of her trauma workup was negative in the ER.    She was given shifting medications for hyperkalemia as well as 1 L IV fluids.  I plan to start her on insulin drip, carb counting insulin and maintenance fluids overnight with close monitoring of potassium.    Assessment and Plan:   Severe hyperglycemia with hyperkalemia: Somewhat surprising given relatively recent diagnosis with A1c of only 7.2 in December.  Given shifting medications in the ER.   Certainly hyperglycemia and lack of adequate insulin is playing a role but also consider component from lisinopril.  Hemoglobin is normal so doubt hemolysis.  -- Admit under inpatient status  -- Start insulin drip, maintenance IV fluids at 125 mL/h ordered for 16 hours  -- Recheck BMP now and in the morning  -- Give a dose of Lokelma  -- Low potassium diet  -- Cardiac monitoring  -- Hold lisinopril    2.   Uncontrolled type 2 diabetes: Not currently on any diabetic medications.  Will repeat an A1c and determine if she simply should be on insulin for a while moving forward.  -- Check A1c  -- Insulin drip ordered for now  -- Also have carb counting insulin ordered  -- Sitter transition to subcu regimen tomorrow    3.   History of atrial fibrillation on DOAC: Due to cost apparently she is planning to transition to warfarin.  Has been referred by her primary care doctor to anticoagulation clinic.    4.   Fall on ice: CT head negative, traumatic workup in the ER negative.  Denies losing consciousness and denies headache at this time.  No focal deficits.  She believes that she mostly landed on her backside and hit her head.    5.   Pseudohyponatremia: Due to severe hyperglycemia    6..  History of hypertension: Anticipate needing to hold lisinopril moving forward.    7.   Mild thrombocytopenia: Appears to have been running in the mid 100s for some time, currently somewhat lower at 100.    8.  Leukocytosis: No infection identified.  Check a urine analysis for completeness.  Repeat CBC in the morning as well.  Monitor off antibiotics for any infectious symptoms.     9.  Thrush: Likely due to hyperglycemia.  Will treat with nystatin swish and spit and of course control her blood sugars    10.  Suspected right arm IV infiltration: Is having a new IV placed now.  Ice and elevate, will order Tylenol and a one-time as needed dose of tramadol for pain.    DVT Prophylaxis: DOAC  Code Status: Full Code  Discharge Dispo: Admit under  "inpatient status  Medically Ready for Discharge: Anticipated in 2-4 Days      Clinically Significant Risk Factors Present on Admission        # Hyperkalemia: Highest K = 8.1 mmol/L in last 2 days, will monitor as appropriate  # Hyponatremia: Lowest Na = 121 mmol/L in last 2 days, will monitor as appropriate  # Hypochloremia: Lowest Cl = 90 mmol/L in last 2 days, will monitor as appropriate       # Drug Induced Coagulation Defect: home medication list includes an anticoagulant medication  # Thrombocytopenia: Lowest platelets = 100 in last 2 days, will monitor for bleeding   # Hypertension: Noted on problem list          # DMII: A1C = 7.2 % (Ref range: 0.0 - 5.6 %) within past 6 months    # Obesity: Estimated body mass index is 35.36 kg/m  as calculated from the following:    Height as of an earlier encounter on 2/5/25: 1.626 m (5' 4\").    Weight as of an earlier encounter on 2/5/25: 93.4 kg (206 lb).        # Pacemaker present             History of Present Illness:    Lexii Stratton is a 84 year old female with PMH including relatively recent diagnosis of type 2 diabetes in December 2024 with A1c of 7.2 not currently on any medical treatments, A-fib on Eliquis with plan to transition to warfarin due to cost, history of TAVR, back pain, GERD who presents with a fall on ice.  EMS were called and blood sugar was noted to be in the 400s in the field and she was brought to the ER.    She reports that she was diagnosed with diabetes in December.  She was actually following up with her primary care doctor today and upon arriving home was going up the steps to get into her home and slipped on ice on the first step and fell backwards onto her backside and did hit her head.  Her neighbor saw this and immediately came over to help her up and got her inside and put blankets on her.  She reports she was not out for any extended period of time because they saw this happen in real-time.    She came to the ER with medics in part " because she is on a DOAC and hit her head and later this was at risk for bleeding.    She has lost perhaps a few pounds in the past few months his appetite has been poor.  She has been dealing with thrush in her mouth feels like gravel per her report.  She is also been undergoing outpatient workup for back pain and recently underwent some trigger point injections and moving forward is going to be undergoing an STEFFANIE with orthopedic surgery.    She denies fevers or chills.  She has had urinary frequency.  Feels cold now.    She is complaining of right arm pain at the point of her IV insertion which became severe after she received medications in the ER.     Past Medical History:  Past Medical History:   Diagnosis Date    Aortic valve stenosis 08/18/2022    Arthritis     Diabetes (H)     Type 2-no meds... Dieet and exercise only as of 9/15/20    Esophageal reflux     Hernia, abdominal     History of blood transfusion 1984    Hypertension     No cardiologist    Incontinence of urine     Mumps     6 years old    Obese     Osteoarthritis     Other and unspecified hyperlipidemia     Other chronic pain     back    Peptic ulcer, unspecified site, unspecified as acute or chronic, without mention of hemorrhage, perforation, or obstruction     S/P total hip arthroplasty 11/09/2016    Small bowel obstruction (H)     Thyroid disease hypothyroidism    Urinary incontinence     Walking troubles      Past Surgical History:  Past Surgical History:   Procedure Laterality Date    ABDOMEN SURGERY      ARTHROPLASTY HIP Right 11/09/2016    Procedure: ARTHROPLASTY HIP;  Surgeon: Angel Lund MD;  Location: [facility] OR    AS REPAIR INCISIONAL HERNIA,REDUCIBLE  1998    inc hernia ( Yamileth surgery)    BIOPSY      BLADDER SURGERY      hyperdistention surgery and sling    BREAST SURGERY      CARDIAC SURGERY  2022    CHOLECYSTECTOMY  1987    COLONOSCOPY  12/03/2011    Procedure:COLONOSCOPY; COLONOSCOPY; Surgeon:SEMAJ GRAHAM; Location: GI     COLONOSCOPY N/A 03/29/2018    Procedure: COLONOSCOPY;  COLONOSCOPY Rm 544;  Surgeon: Marco Gusman MD;  Location:  GI    CV CORONARY ANGIOGRAM N/A 09/30/2022    Procedure: Coronary Angiogram;  Surgeon: Garcia Barber MD;  Location:  HEART CARDIAC CATH LAB    CV TRANSCATHETER AORTIC VALVE REPLACEMENT-FEMORAL APPROACH N/A 11/08/2022    Procedure: Transcatheter Aortic Valve Replacement-Femoral Approach;  Surgeon: Yulia Castano MD;  Location: [facility] CARDIAC CATH LAB    CYSTOSCOPY N/A 09/06/2017    Procedure: CYSTOSCOPY;  CYSTOSCOPY AND HYDRODISTENTION ;  Surgeon: Eyal Bai MD;  Location:  OR    ENT SURGERY      Tonsillectomy    EP PACEMAKER DEVICE & LEAD IMPLANT- RIGHT ATRIAL & RIGHT VENTRICULAR N/A 11/08/2022    Procedure: Pacemaker Device & Lead Implant - Right Atrial & Right Ventricular;  Surgeon: William Boyd MD;  Location:  HEART CARDIAC CATH LAB    ESOPHAGOSCOPY, GASTROSCOPY, DUODENOSCOPY (EGD), COMBINED N/A 09/26/2016    Procedure: COMBINED ESOPHAGOSCOPY, GASTROSCOPY, DUODENOSCOPY (EGD), BIOPSY SINGLE OR MULTIPLE;  Surgeon: Marco Monaco MD;  Location:  GI    EYE SURGERY Left 05/2019    GENITOURINARY SURGERY      GYN SURGERY      Hysterectomy    HERNIA REPAIR, UMBILICAL  07/08/2010    Dr. Kirt Franco times 3    IMPLANT STIMULATOR SACRAL NERVE STAGE ONE N/A 09/17/2020    Procedure: sacral neurostimulation stage one implant of neurostimulator lead;  Surgeon: Shahnaz Carbone MD;  Location:  OR    IMPLANT STIMULATOR SACRAL NERVE STAGE TWO Right 09/24/2020    Procedure: Removal of interstem lead, NOT implanting neurostimulator;  Surgeon: Shahnaz Carbone MD;  Location: [facility] OR    INJECT STEROID (LOCATION) Left 7/2/2024    Procedure: Cortisone Injection Shoulder;  Surgeon: Mitch Reyes MD;  Location: [facility] OR    INTERPOSITION TENDON HAND Right 7/2/2024    Procedure: Ligament reconstruction, tendon interposition arthroplasty;   Surgeon: Mitch Reyes MD;  Location:  OR    ORTHOPEDIC SURGERY  2009    [facility] - Dr. Lund- bilateral knee replacement    RESECT SMALL BOWEL WITHOUT OSTOMY N/A 2023    Procedure: Small bowel resection, Revision of anastamosis;  Surgeon: Rafi Franco MD;  Location: [facility] OR    SUTURE SUSPENSIONPLASTY THUMB Right 2024    Procedure: Right thumb trapezial excision;  Surgeon: Mitch Reyes MD;  Location: [facility] OR    ZZC NONSPECIFIC PROCEDURE      T&A    ZZC NONSPECIFIC PROCEDURE      Vaginal Hysterectomy (has her ovaries)    ZZ NONSPECIFIC PROCEDURE      PPTL    ZZC NONSPECIFIC PROCEDURE      L shoulder to remove bone spurs    ZZC NONSPECIFIC PROCEDURE      cysto and durasphere Dr Demarco    ZZ NONSPECIFIC PROCEDURE  2005    cysto and durasphere    ZZC NONSPECIFIC PROCEDURE  2007    cysto and durasphere    ZZC NONSPECIFIC PROCEDURE  2009    retropubic TVT sling     Social History:  Social History     Tobacco Use    Smoking status: Never     Passive exposure: Never    Smokeless tobacco: Never    Tobacco comments:     have never smoked anything   Substance Use Topics    Alcohol use: No     Social History     Social History Narrative    Not on file     Family History:  Family History   Problem Relation Age of Onset    Heart Disease Mother         heart surgery , new valve    Gallbladder Disease Mother     Coronary Artery Disease Mother     Hyperlipidemia Mother     Asthma Mother     Hypertension Mother     Anesthesia Reaction Mother     Throat cancer Father         throat ca,  76    Coronary Artery Disease Father     Cancer Brother         lung    Aortic stenosis Brother         TAVR    Diabetes Brother         Half Brother    Heart Disease Maternal Grandmother     Coronary Artery Disease Maternal Grandmother     Heart Disease Maternal Grandfather     Coronary Artery Disease Maternal Grandfather      Allergies:  Allergies   Allergen Reactions    Morphine And Codeine Nausea  and Vomiting    Amoxicillin-Pot Clavulanate Diarrhea    Hydrocodone      Keeps patient awake    Naproxen Itching and Rash    Seasonal Allergies      Hay fever in fall, ragweed and russian thistles    Sulfa Antibiotics Nausea     Medications:  Current Facility-Administered Medications   Medication Dose Route Frequency Provider Last Rate Last Admin    calcium gluconate 2 g in  mL intermittent infusion  2 g Intravenous Once Shiraz Staples MD        dextrose 10% infusion   Intravenous Continuous PRN Janusz Wheeler MD        glucose gel 15-30 g  15-30 g Oral Q15 Min PRN Shiraz Staples MD        Or    dextrose 50 % injection 25-50 mL  25-50 mL Intravenous Q15 Min PRN Shiraz Staples MD        Or    glucagon injection 1 mg  1 mg Subcutaneous Q15 Min PRN Shiraz Staples MD        insulin regular (MYXREDLIN) 1 unit/mL infusion  0-24 Units/hr Intravenous Continuous Janusz Wheeler MD        insulin regular 1 unit/mL injection 9.3 Units  0.1 Units/kg Intravenous Once Shiraz Staples MD         Current Outpatient Medications   Medication Sig Dispense Refill    acetaminophen (TYLENOL) 500 MG tablet Take 1,000 mg by mouth 2 times daily (2 x 500 mg = 1000 mg)      amLODIPine (NORVASC) 5 MG tablet TAKE ONE TABLET BY MOUTH EVERY NIGHT AT BEDTIME 90 tablet 3    amoxicillin (AMOXIL) 500 MG capsule Take 2,000 mg by mouth once as needed (1 hour prior to dental procedures 4 x 500 mg = 2000 mg)      apixaban ANTICOAGULANT (ELIQUIS ANTICOAGULANT) 5 MG tablet Take 1 tablet (5 mg) by mouth 2 times daily. 180 tablet 3    atorvastatin (LIPITOR) 20 MG tablet Take 1 tablet (20 mg) by mouth daily. 90 tablet 3    Biotin 5000 MCG PO TABS Take 1 tablet by mouth daily      calcium carbonate (OS-CINDY) 500 MG tablet Take 1 tablet by mouth every other day      CENTRUM SILVER OR TABS Take 1 tablet by mouth daily 30 0    clotrimazole (MYCELEX) 10 MG lozenge Place 1 lozenge (10 mg) inside cheek 5 times  daily. 70 lozenge 0    Cranberry 450 MG CAPS Take 1 capsule by mouth daily (with dinner)      ferrous gluconate (FERGON) 324 (38 Fe) MG tablet Take 1 tablet (324 mg) by mouth daily (with breakfast) 90 tablet 1    latanoprost (XALATAN) 0.005 % ophthalmic solution Place 1 drop into both eyes At Bedtime       levothyroxine (SYNTHROID/LEVOTHROID) 50 MCG tablet Take 1 tablet (50 mcg) by mouth daily. 90 tablet 3    lisinopril (ZESTRIL) 30 MG tablet Take 1 tablet (30 mg) by mouth every morning. 90 tablet 3    magnesium 250 MG tablet Take 1 tablet by mouth daily      OMEGA-3 FATTY ACIDS 1200 MG OR CAPS Take 1 capsule by mouth daily  0    pantoprazole (PROTONIX) 20 MG EC tablet Take 1 tablet (20 mg) by mouth daily. 90 tablet 3    potassium 99 MG TABS Take 1 tablet by mouth daily (with dinner)       Probiotic Product (ACIDOPHILUS PROBIOTIC BLEND) CAPS Take 1 capsule by mouth daily (with breakfast)  30 capsule 0    VITAMIN D, CHOLECALCIFEROL, PO Take 2,000 Units by mouth daily           Review of Systems:  A Comprehensive greater than 10 system review of systems was carried out.  Pertinent positives and negatives are noted above.  Otherwise negative for contributory information.     Physical Exam:  Blood pressure (!) 166/85, pulse 82, temperature 97.5  F (36.4  C), temperature source Oral, resp. rate 18, SpO2 97%, not currently breastfeeding.  Wt Readings from Last 1 Encounters:   02/05/25 93.4 kg (206 lb)       Exam:  General: Somewhat anxious appearing woman lying in bed, no focal deficits noted.  HEENT: NC/AT, eyes anicteric, external occular movements intact, face symmetric.  Thrush on her tongue.  Cardiac: RRR, S1, S2.    Pulmonary: Normal chest rise, normal work of breathing.  Lungs CTA BL  Abdomen: soft, non-tender, non-distended.  Bowel Sounds Present.  No guarding.  Extremities: no deformities.  Warm, well perfused.  Right arm with IV in place, no apparent induration or skin changes.  Skin: no rashes or lesions noted.   Warm and Dry.  Neuro: No focal deficits noted.  Speech clear.  Coordination and strength grossly normal.  Psych: Appropriate affect.    I have personally reviewed the following data including lab tests and imaging:  EKG: Sinus rhythm with sinus arrhythmia  Imaging:  Results for orders placed or performed during the hospital encounter of 02/05/25   CT Head w/o Contrast    Narrative    EXAM: CT HEAD W/O CONTRAST  LOCATION: [facility]  DATE: 2/5/2025    INDICATION: head strike on thinners  COMPARISON: None.  TECHNIQUE: Routine CT Head without IV contrast. Multiplanar reformats. Dose reduction techniques were used.    FINDINGS:  INTRACRANIAL CONTENTS: No intracranial hemorrhage, extraaxial collection, or mass effect.  No CT evidence of acute infarct. Mild presumed chronic small vessel ischemic changes. Mild generalized volume loss. No hydrocephalus.     VISUALIZED ORBITS/SINUSES/MASTOIDS: Prior bilateral cataract surgery. Visualized portions of the orbits are otherwise unremarkable. No paranasal sinus mucosal disease. No middle ear or mastoid effusion.    BONES/SOFT TISSUES: No acute abnormality.      Impression    IMPRESSION:  1.  No CT evidence for acute intracranial process.  2.  Brain atrophy and presumed chronic microvascular ischemic changes as above.     Labs:  Recent Labs   Lab 02/05/25  1930   WBC 14.2*   HGB 14.3   HCT 41.0   MCV 90   *          Lab Results   Component Value Date     02/05/2025     02/05/2025     11/18/2024     02/24/2021     08/26/2020     02/20/2020    Lab Results   Component Value Date    CHLORIDE 95 02/05/2025    CHLORIDE 90 02/05/2025    CHLORIDE 103 11/18/2024    CHLORIDE 108 11/09/2022    CHLORIDE 105 10/27/2022    CHLORIDE 109 09/30/2022    CHLORIDE 107 02/24/2021    CHLORIDE 106 08/26/2020    CHLORIDE 108 02/20/2020    Lab Results   Component Value Date    BUN 46.1 02/05/2025    BUN 45.9 02/05/2025    BUN 20.6  11/18/2024    BUN 16 11/09/2022    BUN 18 10/27/2022    BUN 18 09/30/2022    BUN 18 02/24/2021    BUN 29 08/26/2020    BUN 23 02/20/2020      Lab Results   Component Value Date    POTASSIUM 7.1 02/05/2025    POTASSIUM 8.1 02/05/2025    POTASSIUM 4.3 11/18/2024    POTASSIUM 4.2 11/09/2022    POTASSIUM 3.8 11/08/2022    POTASSIUM 4.0 10/27/2022    POTASSIUM 3.9 02/24/2021    POTASSIUM 4.5 08/26/2020    POTASSIUM 4.2 02/20/2020    Lab Results   Component Value Date    CO2 18 02/05/2025    CO2 17 02/05/2025    CO2 26 11/18/2024    CO2 22 11/09/2022    CO2 25 10/27/2022    CO2 26 09/30/2022    CO2 28 02/24/2021    CO2 25 08/26/2020    CO2 25 02/20/2020    Lab Results   Component Value Date    CR 1.13 02/05/2025    CR 1.22 02/05/2025    CR 1.07 11/18/2024    CR 0.90 02/24/2021    CR 1.06 08/26/2020    CR 0.79 02/20/2020        Recent Labs   Lab 02/05/25  1930 02/05/25  1802   * 749*     Recent Labs   Lab 02/05/25 2028   AST 26   ALT 25   ALKPHOS 110   BILITOTAL 0.8       I've spent 60 minutes in chart review, ordering medications and tests, obtaining additional history from the EMR and the ER provider, evaluating the patient and in documentation for this encounter.    Danny Wheeler MD  Hospitalist  [facility]

## 2025-02-06 NOTE — CONSULTS
Discharge Pharmacy Test Claim    Patient has pharmacy benefits through [employer] Medicare advantage plan with an unmet drug deductible with $241.52 remaining. Requested test claims listed below with copays before and after the drug deductible is met. [place] pharmacy has one-time use 30-day free trial vouchers available for eliquis or xarelto.    Test Claim Initial Copay Copay after Drug Deductible is met   eliquis 319.24 130.85   xarelto 317.46 129.08   warfarin 0.00 0.00   enoxaparin 493.94 373.18     Winnie Craig Malou [address] 2/6  [facility] Liaison, CLAIRE  Ph: [phone]  Fax: [phone]  Available on Teams and [employer]  Disclaimer: Pharmacy test claims are estimates and may not reflect final costs. Suggested alternatives aim to be cost-effective and may not be therapeutically equivalent. This consult is informational and does not constitute medical advice. Clinical decisions should be made by qualified healthcare providers.

## 2025-02-06 NOTE — TELEPHONE ENCOUNTER
Eliquis can be held for 48 hrs prior to epidural injection. Recommend resuming per direction of the performing physician.     Thanks  Kathy

## 2025-02-06 NOTE — PROVIDER NOTIFICATION
Pt triggered sepsis protocol, lactic acid back at 3.6. cross cover text paged. Has IVF at 125 ml/hour. Will await new orders.

## 2025-02-06 NOTE — ED NOTES
"[facility]  ED Nurse Handoff Report    ED Chief complaint: Fall  . ED Diagnosis:   Final diagnoses:   Fall from slip, trip, or stumble, initial encounter   Type 2 diabetes mellitus with complication, without long-term current use of insulin (H)   Ketosis (H)       Allergies:   Allergies   Allergen Reactions    Morphine And Codeine Nausea and Vomiting    Amoxicillin-Pot Clavulanate Diarrhea    Hydrocodone      Keeps patient awake    Naproxen Itching and Rash    Seasonal Allergies      Hay fever in fall, ragweed and russian thistles    Sulfa Antibiotics Nausea       Code Status: Full Code    Activity level - Baseline/Home:  standby.  Activity Level - Current:   independent.   Lift room needed: No.   Bariatric: No   Needed: No   Isolation: No.   Infection: Not Applicable.     Respiratory status: Room air    Vital Signs (within 30 minutes):   Vitals:    02/05/25 1700 02/05/25 1723 02/05/25 1821   BP: (!) 166/85 (!) 166/85    Pulse: 82 82    Resp: 18 18    Temp: 97.5  F (36.4  C)     TempSrc: Oral     SpO2: 99% 99% 97%       Cardiac Rhythm:  ,      Pain level:    Patient confused: No.   Patient Falls Risk: arm band in place, patient and family education, activity supervised, and mobility aid in reach.   Elimination Status: Has voided     Patient Report - Initial Complaint: Arrives via EMS from home. States she fell due to slipping on the ICE, denies LOC, states she hit her head.     + for thinner due to AFIB.     VSS en route except for hypertensive and .     20 g L AC. .   Focused Assessment: Head CT negative, unfortunately have concerning labs thus prompting a DKA work up and treatment.   Difficult line placements. Required US line multiple times. Patient tearful and upset throughout workout, very much appears to have a difficult time coping with EVERY aspect of this work up and hospitalization, including line placement, blood draws, the beds being \"ungodly uncomfortable.\" And " further complaints.      Abnormal Results:   Labs Ordered and Resulted from Time of ED Arrival to Time of ED Departure   BASIC METABOLIC PANEL - Abnormal       Result Value    Sodium 121 (*)     Potassium 8.1 (*)     Chloride 90 (*)     Carbon Dioxide (CO2) 17 (*)     Anion Gap 14      Urea Nitrogen 45.9 (*)     Creatinine 1.22 (*)     GFR Estimate 44 (*)     Calcium 9.4      Glucose 749 (*)    BASIC METABOLIC PANEL - Abnormal    Sodium 128 (*)     Potassium 7.1 (*)     Chloride 95 (*)     Carbon Dioxide (CO2) 18 (*)     Anion Gap 15      Urea Nitrogen 46.1 (*)     Creatinine 1.13 (*)     GFR Estimate 48 (*)     Calcium 9.4      Glucose 700 (*)    KETONE BETA-HYDROXYBUTYRATE QUANTITATIVE, RAPID - Abnormal    Ketone (Beta-Hydroxybutyrate) Quantitative 0.53 (*)    CBC WITH PLATELETS AND DIFFERENTIAL - Abnormal    WBC Count 14.2 (*)     RBC Count 4.56      Hemoglobin 14.3      Hematocrit 41.0      MCV 90      MCH 31.4      MCHC 34.9      RDW 12.0      Platelet Count 100 (*)     % Neutrophils 89      % Lymphocytes 6      % Monocytes 5      % Eosinophils 0      % Basophils 0      % Immature Granulocytes 1      NRBCs per 100 WBC 0      Absolute Neutrophils 12.6 (*)     Absolute Lymphocytes 0.8      Absolute Monocytes 0.7      Absolute Eosinophils 0.0      Absolute Basophils 0.0      Absolute Immature Granulocytes 0.1      Absolute NRBCs 0.0     BLOOD GAS VENOUS - Abnormal    pH Venous 7.39      pCO2 Venous 40      pO2 Venous 28      Bicarbonate Venous 24      Base Excess/Deficit Venous -0.6      FIO2 0      Oxyhemoglobin Venous 50 (*)     O2 Sat, Venous 50.6 (*)    POTASSIUM - Abnormal    Potassium 5.7 (*)    ISTAT GASES LACTATE VENOUS POCT - Abnormal    Lactic Acid POCT 1.7      Bicarbonate Venous POCT 27      O2 Sat, Venous POCT 42 (*)     pCO2 Venous POCT 41      pH Venous POCT 7.43      pO2 Venous POCT 23 (*)    HEMOGLOBIN A1C - Abnormal    Estimated Average Glucose 266 (*)     Hemoglobin A1C 10.9 (*)    MAGNESIUM -  Normal    Magnesium 1.8     HEPATIC FUNCTION PANEL - Normal    Protein Total 6.5      Albumin 3.7      Bilirubin Total 0.8      Alkaline Phosphatase 110      AST 26      ALT 25      Bilirubin Direct 0.25     GLUCOSE MONITOR NURSING POCT   GLUCOSE MONITOR NURSING POCT   ROUTINE UA WITH MICROSCOPIC REFLEX TO CULTURE   GLUCOSE MONITOR NURSING POCT   GLUCOSE MONITOR NURSING POCT        CT Head w/o Contrast   Final Result   IMPRESSION:   1.  No CT evidence for acute intracranial process.   2.  Brain atrophy and presumed chronic microvascular ischemic changes as above.          Treatments provided: see MAR / CHART / RESULTS  Family Comments: patient has been on the phone with her son multiple times.   OBS brochure/video discussed/provided to patient:  No  ED Medications:   Medications   glucose gel 15-30 g (has no administration in time range)     Or   dextrose 50 % injection 25-50 mL (has no administration in time range)     Or   glucagon injection 1 mg (has no administration in time range)   insulin regular 1 unit/mL injection 9.3 Units (has no administration in time range)   dextrose 10% infusion (has no administration in time range)   insulin regular (MYXREDLIN) 1 unit/mL infusion (has no administration in time range)   calcium gluconate 2 g in  mL intermittent infusion (2 g Intravenous $Given 2/5/25 2131)   sodium zirconium cyclosilicate (LOKELMA) packet 15 g (has no administration in time range)   sodium chloride 0.9% BOLUS 1,000 mL (0 mLs Intravenous Stopped 2/5/25 2114)   sodium bicarbonate 8.4 % injection 50 mEq (50 mEq Intravenous $Given 2/5/25 2120)       Drips infusing:  No  For the majority of the shift this patient was Green.   Interventions performed were see MAR / CHART / RESULTS.    Sepsis treatment initiated: No    Cares/treatment/interventions/medications to be completed following ED care: NA    ED Nurse Name: Chichi Snider RN  9:57 PM

## 2025-02-06 NOTE — PROGRESS NOTES
[facility]    Medicine Progress Note - Hospitalist Service    Date of Admission:  2/5/2025    Assessment & Plan      Lexii Stratton is a 84 year old female with PMH including relatively recent diagnosis of type 2 diabetes in December 2024 with A1c of 7.2 not currently on any medical treatments, A-fib on Eliquis with plan to transition to warfarin due to cost, history of TAVR, back pain, GERD who presents with a fall on ice.  She was actually at the doctor today for much of the day and when returning home she was climbing some steps to get into her house and slipped on ice on the first step and landed on her backside and did hit her head.  She denies losing consciousness.  Neighbors apparently saw this happen and brought her inside right away so she was not exposed to the cold for any extended period of time.  EMS were called and blood sugar was noted to be in the 400s in the field and she was brought to the ER largely because she is on a blood thinner and did hit her head.     Here in the ER she was hypertensive but otherwise hemodynamically stable and not hypoxic.  Lab workup was notable for a blood glucose of 749 with ketones of 0.53 and BMP showing potassium of 8.1 with carbon dioxide of 17, BUN of 45.9 and creatinine of 1.22.  Anion gap was normal.  LFTs lactic acid were also normal.  CT head was negative and the remainder of her trauma workup was negative in the ER.     She was given shifting medications for hyperkalemia as well as 1 L IV fluids.  I plan to start her on insulin drip, carb counting insulin and maintenance fluids overnight with close monitoring of potassium.    Problem list:    #Diabetes mellitus type 2 complications with severe hyperglycemia with hyperosmolar nonketotic state upon presentation  #Hyperkalemia    -Continue current inpatient care.  -See if we can continue to titrate and discontinue insulin infusion.  -Most recent metabolic panel showing no significant  acidosis,  -Glucose levels within acceptable ranges between 110- 180  -A1c did show elevated levels at 10.9.  -She had a prior A1c done back in December 2024 which was already at 7.2  -She shared with me that close a decade ago she was informed and told about that she likely had a prediabetes but did not require any pharmacotherapy at that time and did well with dietary restrictions and lifestyle modifications  -I discussed with her extensively that she presented with glucose toxicity and likely she will not be responding well right away with oral diabetic regimen.  -She is amenable and insulin therapy even upon discharge.  Discussed with her that she will likely need insulin therapy at least in the next 6 to 8 weeks and hopefully can be transition to oral regimen if responding.  -Diabetic supplies, glucometer ordered.  Please provide diabetic education as she will need to monitor and check her blood sugar at home.  -Patient lives by herself but still confident in self administering medications  -She will benefit for diabetes education from nursing, pharmacy and nutrition service    #History of atrial fibrillation on DOAC  -Patient mentioned that she is hopeful to be transitioned to a much cheaper alternatives such as warfarin given she likely will not be able to afford continuation of DOAC due to being cost prohibitive  -I requested a pharmacy liaison service input and see if there is a program that can assist her with her medications  -If it remained cost prohibitive then transition to warfarin upon discharge    #Drug-induced coagulopathy    #Apparent fall on ice  -Earlier traumatic workup were negative  -Denies any syncope or presyncopal event.  No symptoms of chest pain or shortness of breath  -Will ask physical therapy input    History of hypertension  -Resumption of patient's amlodipine and ACE inhibitors      #History of dyslipidemia  -Resumption of patient's statins    #Oral thrush, likely has oral  "candidiasis with her underlying uncontrolled diabetes  -Nystatin swish and spit as ordered earlier    #Pseudohyponatremia likely from uncontrolled diabetes mellitus              Diet: Combination Diet Regular Diet Adult; 3 gm K Diet (low potassium)    DVT Prophylaxis: Enoxaparin (Lovenox) SQ, Pneumatic Compression Devices, and Ambulate every shift  Hammond Catheter: Not present  Lines: None     Cardiac Monitoring: ACTIVE order. Indication: Electrolyte Imbalance (24 hours)- Magnesium <1.3 mg/ml; Potassium < =2.8 or > 5.5 mg/ml  Code Status: Full Code      Clinically Significant Risk Factors Present on Admission        # Hyperkalemia: Highest K = 8.1 mmol/L in last 2 days, will monitor as appropriate  # Hyponatremia: Lowest Na = 121 mmol/L in last 2 days, will monitor as appropriate  # Hypochloremia: Lowest Cl = 90 mmol/L in last 2 days, will monitor as appropriate       # Drug Induced Coagulation Defect: home medication list includes an anticoagulant medication  # Thrombocytopenia: Lowest platelets = 85 in last 2 days, will monitor for bleeding   # Hypertension: Noted on problem list          # DMII: A1C = 10.9 % (Ref range: <5.7 %) within past 6 months    # Obesity: Estimated body mass index is 35.65 kg/m  as calculated from the following:    Height as of an earlier encounter on 2/5/25: 1.626 m (5' 4\").    Weight as of this encounter: 94.2 kg (207 lb 10.8 oz).        # Pacemaker present       Social Drivers of Health    Depression: At risk (2/5/2025)    PHQ-2     PHQ-2 Score: 5   Physical Activity: Insufficiently Active (12/5/2024)    Exercise Vital Sign     Days of Exercise per Week: 3 days     Minutes of Exercise per Session: 40 min   Stress: Stress Concern Present (12/5/2024)    [employer] - Occupational Stress Questionnaire     Feeling of Stress : To some extent   Social Connections: Unknown (12/5/2024)    Social Connection and Isolation Panel [NHANES]     Frequency of Social " Gatherings with Friends and Family: Once a week          Disposition Plan     Medically Ready for Discharge: Anticipated in 2-4 Days             Miguel Jarquin MD, MD  Hospitalist Service  [facility]  Securely message with [employer] (more info)  Text page via [employer] Paging/Directory   ______________________________________________________________________    Interval History   I assumed medicine service care today.  Seen and examined.  Chart reviewed.  I met this very pleasant lady this morning while she is laying comfortably in bed.  Fortunately she is endorsing no ongoing symptoms of any nausea or vomiting.  Demonstrating stable hemodynamics overnight.  No mental status changes.  Denies any uncontrolled pain.  She is afebrile.  Not requiring oxygen support.  She mentioned to me that she has been having issues with frequent thirst, increasing generalized weakness and unfortunately had a fall event that led to this hospitalization.  She is not aware of any recent diagnosis of diabetes mellitus.        Physical Exam   Vital Signs: Temp: 98.7  F (37.1  C) Temp src: Oral BP: (!) 151/76 Pulse: 89   Resp: 18 SpO2: 97 % O2 Device: None (Room air)    Weight: 207 lbs 10.77 oz    HEENT; Atraumatic, normocephalic, pinkish conjuctiva, pupils bilateral reactive   Skin: warm and moist, no rashes  Lungs: equal chest expansion, clear to auscultation, no wheezes, no stridor, no crackles,   Heart: normal rate, normal rhythm, no rubs or gallops.   Abdomen: normal bowel sounds, no tenderness, no peritoneal signs, no guarding  Extremities: no deformities, no edema   Neuro; follow commands, alert and oriented x3, spontaneous speech, coherent, moves all extremities spontaneously  Psych; no hallucination, euthymic mood, not agitated      Medical Decision Making       50 MINUTES SPENT BY ME on the date of service doing chart review, history, exam, documentation & further activities per the note.  MANAGEMENT  DISCUSSED with the following over the past 24 hours: Yes   NOTE(S)/MEDICAL RECORDS REVIEWED over the past 24 hours: Yes       Data     I have personally reviewed the following data over the past 24 hrs:    10.7  \   13.3   / 85 (L)     140 107 31.5 (H) /  118 (H)   4.3 22 0.83 \     ALT: 25 AST: 26 AP: 110 TBILI: 0.8   ALB: 3.7 TOT PROTEIN: 6.5 LIPASE: N/A     TSH: N/A T4: N/A A1C: 10.9 (H)     Procal: N/A CRP: N/A Lactic Acid: 2.2 (H)         Imaging results reviewed over the past 24 hrs:   Recent Results (from the past 24 hours)   CT Head w/o Contrast    Narrative    EXAM: CT HEAD W/O CONTRAST  LOCATION: [facility]  DATE: 2/5/2025    INDICATION: head strike on thinners  COMPARISON: None.  TECHNIQUE: Routine CT Head without IV contrast. Multiplanar reformats. Dose reduction techniques were used.    FINDINGS:  INTRACRANIAL CONTENTS: No intracranial hemorrhage, extraaxial collection, or mass effect.  No CT evidence of acute infarct. Mild presumed chronic small vessel ischemic changes. Mild generalized volume loss. No hydrocephalus.     VISUALIZED ORBITS/SINUSES/MASTOIDS: Prior bilateral cataract surgery. Visualized portions of the orbits are otherwise unremarkable. No paranasal sinus mucosal disease. No middle ear or mastoid effusion.    BONES/SOFT TISSUES: No acute abnormality.      Impression    IMPRESSION:  1.  No CT evidence for acute intracranial process.  2.  Brain atrophy and presumed chronic microvascular ischemic changes as above.

## 2025-02-06 NOTE — PHARMACY-ADMISSION MEDICATION HISTORY
Pharmacist Admission Medication History    Admission medication history is complete. The information provided in this note is only as accurate as the sources available at the time of the update.    Information Source(s): Pt, Epic list, CLinic notes, sure scripts via in-person    Pertinent Information: pt was on lasix 20mg daily, started in November for SOB by cardiology.  Per follow-up with provider after two weeks and report no change in SOB, so cardiology told her to stop lasix.  Pt was on zocor, this was switched to lipitor 12/12/24 due to labs, but pt went back to zocor with MD approval due to intolerance of lipitor    Changes made to PTA medication list:  Added: zocor  Deleted: lipitor  Changed: None    Allergies reviewed with patient and updates made in EHR:  yes, added lipitor per pt request (intolerant)    Medication History Completed By: Adriana Ceballos [facility] 2/6/2025 9:29 AM    PTA Med List   Medication Sig Note Last Dose/Taking    acetaminophen (TYLENOL) 500 MG tablet Take 1,000 mg by mouth 2 times daily (2 x 500 mg = 1000 mg)  2/5/2025 Morning    amLODIPine (NORVASC) 5 MG tablet TAKE ONE TABLET BY MOUTH EVERY NIGHT AT BEDTIME  2/4/2025 Evening    amoxicillin (AMOXIL) 500 MG capsule Take 2,000 mg by mouth once as needed (1 hour prior to dental procedures 4 x 500 mg = 2000 mg)  More than a month    apixaban ANTICOAGULANT (ELIQUIS ANTICOAGULANT) 5 MG tablet Take 1 tablet (5 mg) by mouth 2 times daily.  Taking    Biotin 5000 MCG PO TABS Take 1 tablet by mouth daily  2/5/2025 Morning    calcium carbonate (OS-CINDY) 500 MG tablet Take 1 tablet by mouth every other day  2/5/2025 Morning    CENTRUM SILVER OR TABS Take 1 tablet by mouth daily  2/5/2025 Morning    clotrimazole (MYCELEX) 10 MG lozenge Place 1 lozenge (10 mg) inside cheek 5 times daily. 2/6/2025: New Rx, did not start yet but has script at home Taking    Cranberry 450 MG CAPS Take 1 capsule by mouth daily (with dinner)  2/4/2025 Evening    ferrous  gluconate (FERGON) 324 (38 Fe) MG tablet Take 1 tablet (324 mg) by mouth daily (with breakfast)  2/5/2025 Morning    latanoprost (XALATAN) 0.005 % ophthalmic solution Place 1 drop into both eyes At Bedtime   2/5/2025 Evening    levothyroxine (SYNTHROID/LEVOTHROID) 50 MCG tablet Take 1 tablet (50 mcg) by mouth daily.  2/5/2025 Morning    lisinopril (ZESTRIL) 30 MG tablet Take 1 tablet (30 mg) by mouth every morning.  2/5/2025 Morning    magnesium 250 MG tablet Take 1 tablet by mouth daily  2/5/2025 Morning    OMEGA-3 FATTY ACIDS 1200 MG OR CAPS Take 1 capsule by mouth daily  2/5/2025 Morning    pantoprazole (PROTONIX) 20 MG EC tablet Take 1 tablet (20 mg) by mouth daily.  2/5/2025 Morning    potassium 99 MG TABS Take 1 tablet by mouth daily (with dinner)   2/5/2025 Morning    Probiotic Product (ACIDOPHILUS PROBIOTIC BLEND) CAPS Take 1 capsule by mouth daily (with breakfast)   2/5/2025 Morning    simvastatin (ZOCOR) 20 MG tablet Take 20 mg by mouth at bedtime.  2/4/2025 Evening    VITAMIN D, CHOLECALCIFEROL, PO Take 2,000 Units by mouth daily  2/5/2025 Morning

## 2025-02-06 NOTE — ED NOTES
"US line placed due to multiple IV push medications, and possible ICU level care.     Flushed and labs pulled off line without issues.     Exited room to gather medication entered room in less than five minutes.     Flushed with saline without issues. Then pushed ordered sodium bicarb, without issues. Patient then began reporting pain at the IV site and along her \"entire arm.\"     Assessed site and difficult to know if there is a fluid collection due to body habitus.     ICE pack placed and MD updated.   "

## 2025-02-06 NOTE — PLAN OF CARE
Admit to floor-0700    VSS on RA x HTN. A/O x4, pleasant. Insulin gtt running, NS running. 1L bolus for elevated lactic, improved from 3.6 to 2.2, MD tidwell. K 4.2. Tele demand a-pacing. Tal.     BP (!) 153/48 (BP Location: Right arm)   Pulse 63   Temp 98.7  F (37.1  C) (Oral)   Resp 22   Wt 94.2 kg (207 lb 10.8 oz)   LMP  (LMP Unknown)   SpO2 99%   BMI 35.65 kg/m         Goal Outcome Evaluation:      Plan of Care Reviewed With: patient    Overall Patient Progress: improvingOverall Patient Progress: improving    Outcome Evaluation: BGs improving, on insulin gtt.      Problem: Adult Inpatient Plan of Care  Goal: Plan of Care Review  Description: The Plan of Care Review/Shift note should be completed every shift.  The Outcome Evaluation is a brief statement about your assessment that the patient is improving, declining, or no change.  This information will be displayed automatically on your shift  note.  Outcome: Progressing  Flowsheets (Taken 2/6/2025 0457)  Outcome Evaluation: BGs improving, on insulin gtt.  Plan of Care Reviewed With: patient  Overall Patient Progress: improving  Goal: Absence of Hospital-Acquired Illness or Injury  Outcome: Progressing  Goal: Optimal Comfort and Wellbeing  Outcome: Progressing  Goal: Readiness for Transition of Care  Outcome: Progressing     Problem: Glycemic Control Impaired  Goal: Blood Glucose Level Within Targeted Range  Outcome: Progressing  Goal: Minimize Risk of Hypoglycemia  Outcome: Progressing     Problem: Fall Injury Risk  Goal: Absence of Fall and Fall-Related Injury  Outcome: Progressing

## 2025-02-07 ENCOUNTER — APPOINTMENT (OUTPATIENT)
Dept: PHYSICAL THERAPY | Facility: CLINIC | Age: 85
DRG: 638 | End: 2025-02-07
Attending: INTERNAL MEDICINE
Payer: COMMERCIAL

## 2025-02-07 ENCOUNTER — APPOINTMENT (OUTPATIENT)
Dept: OCCUPATIONAL THERAPY | Facility: CLINIC | Age: 85
DRG: 638 | End: 2025-02-07
Attending: INTERNAL MEDICINE
Payer: COMMERCIAL

## 2025-02-07 LAB
GLUCOSE BLDC GLUCOMTR-MCNC: 222 MG/DL (ref 70–99)
GLUCOSE BLDC GLUCOMTR-MCNC: 230 MG/DL (ref 70–99)
GLUCOSE BLDC GLUCOMTR-MCNC: 238 MG/DL (ref 70–99)
GLUCOSE BLDC GLUCOMTR-MCNC: 269 MG/DL (ref 70–99)
GLUCOSE BLDC GLUCOMTR-MCNC: 274 MG/DL (ref 70–99)

## 2025-02-07 PROCEDURE — 250N000013 HC RX MED GY IP 250 OP 250 PS 637: Performed by: INTERNAL MEDICINE

## 2025-02-07 PROCEDURE — 97535 SELF CARE MNGMENT TRAINING: CPT | Mod: GO | Performed by: OCCUPATIONAL THERAPIST

## 2025-02-07 PROCEDURE — 99418 PROLNG IP/OBS E/M EA 15 MIN: CPT | Performed by: INTERNAL MEDICINE

## 2025-02-07 PROCEDURE — 97530 THERAPEUTIC ACTIVITIES: CPT | Mod: GP

## 2025-02-07 PROCEDURE — 120N000001 HC R&B MED SURG/OB

## 2025-02-07 PROCEDURE — 97161 PT EVAL LOW COMPLEX 20 MIN: CPT | Mod: GP

## 2025-02-07 PROCEDURE — 99233 SBSQ HOSP IP/OBS HIGH 50: CPT | Performed by: INTERNAL MEDICINE

## 2025-02-07 PROCEDURE — 97165 OT EVAL LOW COMPLEX 30 MIN: CPT | Mod: GO | Performed by: OCCUPATIONAL THERAPIST

## 2025-02-07 RX ORDER — NICOTINE POLACRILEX 4 MG
15-30 LOZENGE BUCCAL
Status: DISCONTINUED | OUTPATIENT
Start: 2025-02-07 | End: 2025-02-08 | Stop reason: HOSPADM

## 2025-02-07 RX ORDER — DEXTROSE MONOHYDRATE 25 G/50ML
25-50 INJECTION, SOLUTION INTRAVENOUS
Status: DISCONTINUED | OUTPATIENT
Start: 2025-02-07 | End: 2025-02-08 | Stop reason: HOSPADM

## 2025-02-07 RX ADMIN — LISINOPRIL 30 MG: 20 TABLET ORAL at 08:43

## 2025-02-07 RX ADMIN — NYSTATIN 500000 UNITS: 100000 SUSPENSION ORAL at 12:22

## 2025-02-07 RX ADMIN — APIXABAN 5 MG: 5 TABLET, FILM COATED ORAL at 21:52

## 2025-02-07 RX ADMIN — NYSTATIN 500000 UNITS: 100000 SUSPENSION ORAL at 16:14

## 2025-02-07 RX ADMIN — POLYETHYLENE GLYCOL 3350 17 G: 17 POWDER, FOR SOLUTION ORAL at 12:26

## 2025-02-07 RX ADMIN — SIMVASTATIN 20 MG: 20 TABLET, FILM COATED ORAL at 21:53

## 2025-02-07 RX ADMIN — METFORMIN HYDROCHLORIDE 500 MG: 500 TABLET ORAL at 17:11

## 2025-02-07 RX ADMIN — INSULIN ASPART 2 UNITS: 100 INJECTION, SOLUTION INTRAVENOUS; SUBCUTANEOUS at 12:22

## 2025-02-07 RX ADMIN — FERROUS GLUCONATE 324 MG: 324 TABLET ORAL at 08:43

## 2025-02-07 RX ADMIN — ACETAMINOPHEN 1000 MG: 500 TABLET, FILM COATED ORAL at 21:53

## 2025-02-07 RX ADMIN — NYSTATIN 500000 UNITS: 100000 SUSPENSION ORAL at 08:44

## 2025-02-07 RX ADMIN — INSULIN GLARGINE 10 UNITS: 100 INJECTION, SOLUTION SUBCUTANEOUS at 08:46

## 2025-02-07 RX ADMIN — LEVOTHYROXINE SODIUM 50 MCG: 0.05 TABLET ORAL at 08:43

## 2025-02-07 RX ADMIN — LATANOPROST 1 DROP: 50 SOLUTION/ DROPS OPHTHALMIC at 21:52

## 2025-02-07 RX ADMIN — APIXABAN 5 MG: 5 TABLET, FILM COATED ORAL at 08:43

## 2025-02-07 RX ADMIN — ACETAMINOPHEN 1000 MG: 500 TABLET, FILM COATED ORAL at 08:43

## 2025-02-07 RX ADMIN — AMLODIPINE BESYLATE 5 MG: 5 TABLET ORAL at 21:52

## 2025-02-07 RX ADMIN — INSULIN ASPART 3 UNITS: 100 INJECTION, SOLUTION INTRAVENOUS; SUBCUTANEOUS at 17:54

## 2025-02-07 RX ADMIN — PANTOPRAZOLE SODIUM 20 MG: 20 TABLET, DELAYED RELEASE ORAL at 08:43

## 2025-02-07 RX ADMIN — NYSTATIN 500000 UNITS: 100000 SUSPENSION ORAL at 21:53

## 2025-02-07 ASSESSMENT — ACTIVITIES OF DAILY LIVING (ADL)
ADLS_ACUITY_SCORE: 62
ADLS_ACUITY_SCORE: 60
ADLS_ACUITY_SCORE: 60
ADLS_ACUITY_SCORE: 62
ADLS_ACUITY_SCORE: 60
ADLS_ACUITY_SCORE: 62
ADLS_ACUITY_SCORE: 60
ADLS_ACUITY_SCORE: 62
ADLS_ACUITY_SCORE: 62
ADLS_ACUITY_SCORE: 60
ADLS_ACUITY_SCORE: 60
ADLS_ACUITY_SCORE: 62
ADLS_ACUITY_SCORE: 60
ADLS_ACUITY_SCORE: 60
ADLS_ACUITY_SCORE: 62
ADLS_ACUITY_SCORE: 60

## 2025-02-07 NOTE — PROGRESS NOTES
02/07/25 1403   Appointment Info   Signing Clinician's Name / Credentials (PT) Annie Jose DPT   Living Environment   People in Home alone   Current Living Arrangements house   Home Accessibility stairs to enter home;stairs within home   Number of Stairs, Main Entrance 1   Stair Railings, Main Entrance railings safe and in good condition   Transportation Anticipated car, drives self;family or friend will provide   Living Environment Comments Pt lives in a rambler, laundry in basement   Self-Care   Usual Activity Tolerance good   Current Activity Tolerance moderate   Regular Exercise Yes   Activity/Exercise Type walking   Exercise Amount/Frequency 3-5 times/wk   Equipment Currently Used at Home walker, rolling;cane, straight;shower chair   Fall history within last six months yes   Number of times patient has fallen within last six months 1   Activity/Exercise/Self-Care Comment Pt IND at baseline, occasionally uses cane in house. Uses 4WW in community and on walks. Pt reports walking 2 miles 3x/wk.   General Information   Onset of Illness/Injury or Date of Surgery 02/05/25   Referring Physician Miguel Jarquin MD   Patient/Family Therapy Goals Statement (PT) Return home   Pertinent History of Current Problem (include personal factors and/or comorbidities that impact the POC) Lexii Stratton is a 84 year old female with PMH including relatively recent diagnosis of type 2 diabetes in December 2024 with A1c of 7.2 not currently on any medical treatments, A-fib on Eliquis with plan to transition to warfarin due to cost, history of TAVR, back pain, GERD who presents with a fall on ice.  She was actually at the doctor today for much of the day and when returning home she was climbing some steps to get into her house and slipped on ice on the first step and landed on her backside and did hit her head.  She denies losing consciousness.  Neighbors apparently saw this happen and brought her inside right away so she  was not exposed to the cold for any extended period of time.  EMS were called and blood sugar was noted to be in the 400s in the field and she was brought to the ER largely because she is on a blood thinner and did hit her head.   Cognition   Affect/Mental Status (Cognition) WFL   Orientation Status (Cognition) oriented x 4   Follows Commands (Cognition) WFL   Pain Assessment   Patient Currently in Pain No   Posture    Posture Forward head position;Protracted shoulders   Range of Motion (ROM)   Range of Motion ROM is WFL   Strength (Manual Muscle Testing)   Strength (Manual Muscle Testing) Able to perform R SLR;Able to perform L SLR   Bed Mobility   Comment, (Bed Mobility) Not observed, pt in recliner   Transfers   Comment, (Transfers) Sit to stand SBA   Gait/Stairs (Locomotion)   Comment, (Gait/Stairs) Pt amb w/ FWW and CGA   Balance   Balance Comments Good seated and standing w/ FWW   Sensory Examination   Sensory Perception patient reports no sensory changes   Clinical Impression   Criteria for Skilled Therapeutic Intervention Yes, treatment indicated   PT Diagnosis (PT) Impaired functional mobility and gait   Influenced by the following impairments Pain, weakness, decreased activity tolerance, impaired balance   Functional limitations due to impairments Limited functional mobility requiring AD and assist   Clinical Presentation (PT Evaluation Complexity) stable   Clinical Presentation Rationale Based on PMH, current status, and social support   Clinical Decision Making (Complexity) low complexity   Planned Therapy Interventions (PT) balance training;bed mobility training;gait training;stair training;transfer training;progressive activity/exercise;strengthening   Risk & Benefits of therapy have been explained evaluation/treatment results reviewed;care plan/treatment goals reviewed;risks/benefits reviewed;current/potential barriers reviewed;participants voiced agreement with care plan;participants included;patient    PT Total Evaluation Time   PT Eval, Low Complexity Minutes (39651) 10   Physical Therapy Goals   PT Frequency Daily   PT Predicted Duration/Target Date for Goal Attainment 02/14/25   PT Goals Bed Mobility;Transfers;Gait;Stairs   PT: Bed Mobility Independent;Supine to/from sit   PT: Transfers Modified independent;Sit to/from stand;Assistive device   PT: Gait Modified independent;Assistive device;150 feet   PT: Stairs Supervision/stand-by assist;1 stair   Interventions   Interventions Quick Adds Therapeutic Activity;Gait Training   Therapeutic Activity   Therapeutic Activities: dynamic activities to improve functional performance Minutes (65495) 18   Symptoms Noted During/After Treatment None   Treatment Detail/Skilled Intervention Pt in recliner upon therapist arrival, agreeable to PT. Pt amb >700' w/ FWW and CGA, quickly progressed to SBA. Pt demos fair speed and stability. Pt able to hold conversation during ambulation, denied SOB. Pt sat in recliner after amb, able to shift hips back. All needs w/in reach, chair alarm on, provider in room.   PT Discharge Planning   PT Plan Progress amb, assess bed mob, 1 stair   PT Discharge Recommendation (DC Rec) home with assist   PT Rationale for DC Rec Pt near baseline, currently SBA w/ FWW. Anticipate w/ further IP PT pt will progress and meet goals. Pt reports her daughter can assist w/ heavier ADLs.   PT Brief overview of current status SBA w/ FWW. Goals of therapy will be to address safe mobility and make recs for d/c to next level of care. Pt and RN will continue to follow all falls risk precautions as documented by RN staff while hospitalized   PT Total Distance Amb During Session (feet) 700   Physical Therapy Time and Intention   Timed Code Treatment Minutes 18   Total Session Time (sum of timed and untimed services) 28

## 2025-02-07 NOTE — TELEPHONE ENCOUNTER
Attempted to contact pt x2. Left voicemail with callback number    Shahnaz Durham RN on 2/7/2025 at 8:25 AM

## 2025-02-07 NOTE — PROGRESS NOTES
02/07/25 1122   Appointment Info   Signing Clinician's Name / Credentials (OT) John Bai EdD, OTR/L   Rehab Comments (OT) Initial evaluation and treatment   Living Environment   People in Home alone   Current Living Arrangements house  (rambler with basement)   Home Accessibility no concerns;stairs to enter home;stairs within home   Number of Stairs, Main Entrance 1   Number of Stairs, Within Home, Primary greater than 10 stairs   Stair Railings, Within Home, Primary railings safe and in good condition   Transportation Anticipated car, drives self   Self-Care   Usual Activity Tolerance good   Current Activity Tolerance moderate   Regular Exercise Other (see comments)  (pt has been going to a club and walking 2 miles 3x a week.  Has had to slow down and stop in the last 2 weeks secondary to her back pain.)   Equipment Currently Used at Home walker, rolling   Fall history within last six months yes   Number of times patient has fallen within last six months 1   General Information   Onset of Illness/Injury or Date of Surgery 02/07/25   Referring Physician Josefa Singh   Patient/Family Therapy Goal Statement (OT) feel better and return home.  Pt is concerned with her ongoing lower back issues.  Says she will be having a epidural when she is able to schedule it.   Additional Occupational Profile Info/Pertinent History of Current Problem Lexii Stratton is a 84 year old female with PMH including relatively recent diagnosis of type 2 diabetes in December 2024 with A1c of 7.2 not currently on any medical treatments, A-fib on Eliquis with plan to transition to warfarin due to cost, history of TAVR, back pain, GERD who presents with a fall on ice.  She was actually at the doctor today for much of the day and when returning home she was climbing some steps to get into her house and slipped on ice on the first step and landed on her backside and did hit her head.  She denies losing consciousness.  Neighbors  apparently saw this happen and brought her inside right away so she was not exposed to the cold for any extended period of time.  EMS were called and blood sugar was noted to be in the 400s in the field and she was brought to the ER largely because she is on a blood thinner and did hit her head.  Being seen for hyperglycemia and DM2 complications.   Existing Precautions/Restrictions fall  (has hearing aides which have lost power due to her stay and not being able to be charged up.)   Cognitive Status Examination   Orientation Status orientation to person, place and time   Cognitive Status Comments pt appears generally oriented, mentioning details about home and her care that are appropriate and match chart information.   Cognitive Screens/Assessments   Cognitive Assessments Completed [facility] Mental Status Exam (UMS):  Total Score out of /30 29/30   [facility] Norms 27-30 equals normal   [facility] Domains assessed: orientation, memory, attention, executive functions   SLUMS Interpretation   (Pt scored within normal limits at this time.  No cognitive concerns at this time.)   Visual Perception   Visual Impairment/Limitations WFL;corrective lenses for reading   Pain Assessment   Patient Currently in Pain No   Range of Motion Comprehensive   General Range of Motion no range of motion deficits identified   Strength Comprehensive (MMT)   General Manual Muscle Testing (MMT) Assessment upper extremity strength deficits identified   Coordination   Upper Extremity Coordination No deficits were identified   Bed Mobility   Bed Mobility supine-sit   Supine-Sit [place] (Bed Mobility) supervision;verbal cues;contact guard   Assistive Device (Bed Mobility) bed rails   Transfers   Transfers sit-stand transfer   Sit-Stand Transfer   Sit-Stand [place] (Transfers) supervision;verbal cues;contact guard   Clinical Impression   Criteria for Skilled Therapeutic Interventions Met (OT) Yes, treatment indicated   OT  Diagnosis decreased endurance for daily cares   OT Problem List-Impairments impacting ADL problems related to;activity tolerance impaired;hearing;pain   Assessment of Occupational Performance 3-5 Performance Deficits   Identified Performance Deficits decreased endurance for dressing, household chores, exercise regimen.   Planned Therapy Interventions (OT) ADL retraining;home program guidelines;progressive activity/exercise   Clinical Decision Making Complexity (OT) problem focused assessment/low complexity   Risk & Benefits of therapy have been explained evaluation/treatment results reviewed;care plan/treatment goals reviewed;patient   OT Total Evaluation Time   OT Eval, Low Complexity Minutes (50436) 15   OT Goals   Therapy Frequency (OT) Daily   OT Predicted Duration/Target Date for Goal Attainment 02/10/25   Interventions   Interventions Quick Adds Self-Care/Home Management   Self-Care/Home Management   Self-Care/Home Mgmt/ADL, Compensatory, Meal Prep Minutes (92790) 25   Symptoms Noted During/After Treatment (Meal Preparation/Planning Training) fatigue   Treatment Detail/Skilled Intervention OT: Pt sleeping but woke quickly to her name.  Willing to participate with OOB activity, asked to try walking in the gutierrez.  Min A to help support shoulder to sit up on the EOB.  Attempted putting socks on to move in the room.  Stiff but was able to don right sock.  Needed left sock started for her to pull it the rest the way up.  This is not as good as she usually is.  Used walker to ambulate about 100 feet in the gutierrez.  Would have liked to walk further.  Returned to her room and sat in the recliner, chair alarm on.   OT Discharge Planning   OT Plan Endurance ADLs with grooming, transfers, ambulation as tolerated.   OT Discharge Recommendation (DC Rec) home;home with assist   OT Rationale for DC Rec OT: Pt demonstrated good cognitive skills and fair to good physical mobility for ADLS.  Is limited by previous knee replacement  surgeries and back pain.  Will need to use the AE she has at home for LE dressing and transfers to return to dressing.  Has a full flight of stairs to the basement for her laundry.  Has been doing those tasks so far, anticipate with progress and additional activity she will be able to build strength and endurance to go home.  Has several pieces of AE at home and a daughter in town who she thinks can help on the weekends for some errands.   OT Brief overview of current status Goals of therapy will be to address safe mobility and ADLS and make recommendations for discharge to the next level of care.  Pt and RN will continue to follow all fall risk precautions as documented by RN staff while hospitalized.   OT Total Distance Amb During Session (feet) 100   Total Session Time   Timed Code Treatment Minutes 25   Total Session Time (sum of timed and untimed services) 40

## 2025-02-07 NOTE — PROGRESS NOTES
[facility]    Medicine Progress Note - Hospitalist Service    Date of Admission:  2/5/2025    Assessment & Plan     Lexii Stratton is a 84 year old female with PMH including relatively recent diagnosis of type 2 diabetes in December 2024 with A1c of 7.2 not currently on any medical treatments or diet modifications, A-fib on Eliquis with plan to transition to warfarin due to cost, history of TAVR, back pain, GERD who presents with a fall on ice.  She was actually at the doctor today for much of the day and when returning home she was climbing some steps to get into her house and slipped on ice on the first step and landed on her backside and did hit her head.  She denies losing consciousness.  Neighbors apparently saw this happen and brought her inside right away so she was not exposed to the cold for any extended period of time.  EMS were called and blood sugar was noted to be in the 400s in the field and she was brought to the ER largely because she is on a blood thinner and did hit her head.     Here in the ER she was hypertensive but otherwise hemodynamically stable and not hypoxic.  Lab workup was notable for a blood glucose of 749 with ketones of 0.53 and BMP showing potassium of 8.1 with carbon dioxide of 17, BUN of 45.9 and creatinine of 1.22.  Anion gap was normal.  LFTs lactic acid were also normal.  CT head was negative and the remainder of her trauma workup was negative in the ER.     She was given shifting medications for hyperkalemia as well as 1 L IV fluids.  I plan to start her on insulin drip, carb counting insulin and maintenance fluids overnight with close monitoring of potassium.     Problem list:     Diabetes mellitus type 2 complications with severe hyperglycemia with hyperosmolar nonketotic state upon presentation                 Hyperkalemia - resolved     She is currently on Lantus 10 unit in the am and ssi prn+ cci     Appears to be very overwhelmed with the idea of  a sliding scale insulin - thinks that she could do a daily insulin injection if consistent dose    Blood sugars have again been in the 200's  Will increase lantus to 12units qam  Start metformin bid (creat 0.83 2/6/25)  Continue with sliding scale insulin prn while in the hospital    Continue to monitor blood glucose, modified diet, and insulin administration today    Hope to be able to discharge home 2/8/25 with daily lantus and metformin bid with CLOSE outpt f/up with PCP, diabetic educator    Is comfortable with a glucometer    2. History of atrial fibrillation on DOAC    Pt is in the process of transitioning from DOAC to warfarin d/t cost issues once current supply completed (3/2025)    Also notes that she needs an epidural injection in her back at some point with holding of anticoagulation    Continue with current medications without changes     3. Drug-induced coagulopathy     4.  Traumatic mechanical fall on ice    No fractures and head CT head negative  Doing well with PT/OT today    5. History of hypertension  Continue with PTA amlodipine and ACE inhibitor     6. History of dyslipidemia  Continue with PTA statins     7.  Oral thrush, improving     likely has oral candidiasis with her underlying uncontrolled diabetes  -Nystatin swish and spit as ordered earlier - will continue     Disposition - lives alone in house and has no one to help her with medications/insulin and is overwhelmed this afternoon.    Discussed home care/RN with her but has been driving some    Feeling more confident this afternoon about simplified lantus/metformin plan with close outpt clinic f/up    PPE Used:  Mask, gloves          Diet: Moderate Consistent Carb (60 g CHO per Meal) Diet    DVT Prophylaxis: DOAC  Hammond Catheter: Not present  Lines: None     Cardiac Monitoring: ACTIVE order. Indication: Electrolyte Imbalance (24 hours)- Magnesium <1.3 mg/ml; Potassium < =2.8 or > 5.5 mg/ml  Code Status: Full Code      Clinically  "Significant Risk Factors        # Hyperkalemia: Highest K = 8.1 mmol/L in last 2 days, will monitor as appropriate  # Hyponatremia: Lowest Na = 121 mmol/L in last 2 days, will monitor as appropriate  # Hypochloremia: Lowest Cl = 90 mmol/L in last 2 days, will monitor as appropriate        # Thrombocytopenia: Lowest platelets = 85 in last 2 days, will monitor for bleeding   # Hypertension: Noted on problem list           # DMII: A1C = 10.9 % (Ref range: <5.7 %) within past 6 months, PRESENT ON ADMISSION  # Obesity: Estimated body mass index is 35.74 kg/m  as calculated from the following:    Height as of an earlier encounter on 2/5/25: 1.626 m (5' 4\").    Weight as of this encounter: 94.4 kg (208 lb 3.2 oz)., PRESENT ON ADMISSION      # Pacemaker present       Disposition Plan     Medically Ready for Discharge: Anticipated Tomorrow             Josefa Aden DO  Hospitalist Service  [facility]  Securely message with [employer] (more info)  Text page via [employer] Paging/Directory   ______________________________________________________________________    Interval History     Near tears and overwhelmed with sliding scale insulin prn with meals, worried about hyperglycemia when discharging.  Lives alone; has given herself lantus injections x 2 but is worried about it unless it is a clear, consistent dose 1x's/day.    No HA, n/v, F/C.  Felt good walking with PT in the halls.  No other concerns per nursing overnight.     Appetite good.  Worried that she will return to eating poorly when she discharges home.    Physical Exam   Vital Signs: Temp: 99.2  F (37.3  C) Temp src: Oral BP: (!) 154/50 Pulse: 67   Resp: 16 SpO2: 99 % O2 Device: None (Room air)    Weight: 208 lbs 3.2 oz    GEN:  Alert, oriented x 3, appears comfortable, no overt respiratory distress.  Easily conversant  HEENT:  Normocephalic/atraumatic, no scleral icterus, no nasal discharge  Very mild thrush towards posterior tongue  CV:  " Regular rate and rhythm, no loud murmur or JVD.  S1 + S2 noted  LUNGS:  Clear to auscultation ant/lat bilaterally without rales/rhonchi/wheezing/retractions.  Symmetric chest rise on inhalation noted.  ABD:  Active bowel sounds, soft, non-tender/non-distended.  No clear rebound/guarding/rigidity.  EXT:  No significant pretibial edema bilaterally.  No cyanosis.    SKIN:  Dry to touch, no new exanthems noted in the visualized areas.  PSYCH:  near tears at times, cooperative, maintains direct eye contact    Medical Decision Making       68 MINUTES SPENT BY ME on the date of service doing chart review, history, exam, documentation & further activities per the note.    >25 minutes spent in room in direct pt care discussing diabetes disease process, treatment, diet, glucometer readings and her HgA1c lab trends.    Data   Medications   Current Facility-Administered Medications   Medication Dose Route Frequency Provider Last Rate Last Admin    dextrose 10% infusion   Intravenous Continuous PRN Janusz Wheeler MD        Patient is already receiving anticoagulation with heparin, enoxaparin (LOVENOX), warfarin (COUMADIN)  or other anticoagulant medication   Does not apply Continuous PRN Janusz Wheeler MD         Current Facility-Administered Medications   Medication Dose Route Frequency Provider Last Rate Last Admin    acetaminophen (TYLENOL) tablet 1,000 mg  1,000 mg Oral BID Miguel Jarquin MD   1,000 mg at 02/06/25 2101    amLODIPine (NORVASC) tablet 5 mg  5 mg Oral QPM Miguel Jarquin MD   5 mg at 02/06/25 2101    apixaban ANTICOAGULANT (ELIQUIS) tablet 5 mg  5 mg Oral BID Miguel Jarquin MD   5 mg at 02/06/25 2101    ferrous gluconate (FERGON) tablet 324 mg  324 mg Oral Daily with breakfast Miguel Jarquin MD   324 mg at 02/06/25 1059    insulin aspart (NovoLOG) injection (RAPID ACTING)  1-7 Units Subcutaneous TID AC Miguel Jarquin MD   1 Units at 02/06/25 1823     insulin aspart (NovoLOG) injection (RAPID ACTING)  1-5 Units Subcutaneous At Bedtime Miguel Jarquin MD        insulin aspart (NovoLOG) injection (RAPID ACTING)   Subcutaneous TID w/meals Janusz Wheeler MD        insulin glargine (LANTUS PEN) injection 10 Units  10 Units Subcutaneous QAM AC Miguel Jarquin MD   10 Units at 02/06/25 1116    latanoprost (XALATAN) 0.005 % ophthalmic solution 1 drop  1 drop Both Eyes At Bedtime Miguel Jarquin MD   1 drop at 02/06/25 2102    levothyroxine (SYNTHROID/LEVOTHROID) tablet 50 mcg  50 mcg Oral Daily Miguel Jarquin MD   50 mcg at 02/06/25 1055    lisinopril (ZESTRIL) tablet 30 mg  30 mg Oral QAM Miguel Jarquin MD   30 mg at 02/06/25 1059    nystatin (MYCOSTATIN) suspension 500,000 Units  500,000 Units Swish & Spit 4x Daily Janusz Wheeler MD   500,000 Units at 02/06/25 2101    pantoprazole (PROTONIX) EC tablet 20 mg  20 mg Oral Daily Miguel Jarquin MD   20 mg at 02/06/25 1055    simvastatin (ZOCOR) tablet 20 mg  20 mg Oral At Bedtime Miguel Jarquin MD   20 mg at 02/06/25 2101    sodium chloride (PF) 0.9% PF flush 3 mL  3 mL Intracatheter Q8H Janusz Wheeler MD        sodium chloride (PF) 0.9% PF flush 3 mL  3 mL Intracatheter Q8H Janusz Wheeler MD   3 mL at 02/06/25 0844     Labs and Imaging results below reviewed today.  Recent Labs   Lab 02/06/25  0607 02/05/25  1930   WBC 10.7 14.2*   HGB 13.3 14.3   HCT 38.3 41.0   MCV 91 90   PLT 85* 100*     Recent Labs   Lab 02/07/25  1146 02/07/25  0815 02/07/25  0205 02/06/25  0608 02/06/25  0607 02/06/25  0120 02/06/25  0027 02/05/25  2259 02/05/25 2110 02/05/25  1930   NA  --   --   --   --  140  --  136  --   --  128*   POTASSIUM  --   --   --   --  4.3  --  4.2  --  5.7* 7.1*   CHLORIDE  --   --   --   --  107  --  100  --   --  95*   CO2  --   --   --   --  22  --  20*  --   --  18*   ANIONGAP  --   --   --   --  11  --   "16*  --   --  15   * 222* 230*   < > 115*   < > 324*   < >  --  700*   BUN  --   --   --   --  31.5*  --  40.3*  --   --  46.1*   CR  --   --   --   --  0.83  --  1.03*  --   --  1.13*   GFRESTIMATED  --   --   --   --  69  --  53*  --   --  48*   CINDY  --   --   --   --  9.0  --  10.2  --   --  9.4    < > = values in this interval not displayed.     Recent Labs   Lab 02/07/25  1146 02/07/25  0815 02/07/25  0205 02/06/25  0608 02/06/25  0607 02/06/25  0120 02/06/25  0027 02/05/25  2259 02/05/25 2110 02/05/25 2028 02/05/25  1930 02/05/25  1802   NA  --   --   --   --  140  --  136  --   --   --  128* 121*   POTASSIUM  --   --   --   --  4.3  --  4.2  --  5.7*  --  7.1* 8.1*   CHLORIDE  --   --   --   --  107  --  100  --   --   --  95* 90*   CO2  --   --   --   --  22  --  20*  --   --   --  18* 17*   ANIONGAP  --   --   --   --  11  --  16*  --   --   --  15 14   * 222* 230*   < > 115*   < > 324*   < >  --   --  700* 749*   BUN  --   --   --   --  31.5*  --  40.3*  --   --   --  46.1* 45.9*   CR  --   --   --   --  0.83  --  1.03*  --   --   --  1.13* 1.22*   GFRESTIMATED  --   --   --   --  69  --  53*  --   --   --  48* 44*   CINDY  --   --   --   --  9.0  --  10.2  --   --   --  9.4 9.4   MAG  --   --   --   --   --   --   --   --   --   --   --  1.8   PROTTOTAL  --   --   --   --   --   --   --   --   --  6.5  --   --    ALBUMIN  --   --   --   --   --   --   --   --   --  3.7  --   --    BILITOTAL  --   --   --   --   --   --   --   --   --  0.8  --   --    ALKPHOS  --   --   --   --   --   --   --   --   --  110  --   --    AST  --   --   --   --   --   --   --   --   --  26  --   --    ALT  --   --   --   --   --   --   --   --   --  25  --   --     < > = values in this interval not displayed.     Recent Labs   Lab 02/07/25  1146 02/07/25  0815 02/07/25  0205 02/06/25  2110 02/06/25  1727   * 222* 230* 191* 186*     No results for input(s): \"TSH\" in the last 168 hours.  Recent Labs   Lab " 02/06/25  0038   COLOR Light Yellow   APPEARANCE Clear   URINEGLC >=1000*   URINEBILI Negative   URINEKETONE Trace*   SG 1.031   UBLD Negative   URINEPH 6.5   PROTEIN Negative   NITRITE Positive*   LEUKEST Trace*   RBCU 6*   WBCU 3       No results found for this or any previous visit (from the past 24 hours).

## 2025-02-07 NOTE — PLAN OF CARE
"A/O4. Up A1 walker/gaitbelt. Ambulating halls. Denies pain, SOB. Tele SR, demand A paced. Purewick removed. VSS ex HTN. 2 PIV SL. Off insulin drip/IVF. Education packet for diabetes given. Pt self demonstrate Insulin pen injections. Still needs more teaching on insulin pens and how to take blood sugar. Alarms on.     Goal Outcome Evaluation:      Plan of Care Reviewed With: patient    Overall Patient Progress: improvingOverall Patient Progress: improving    Outcome Evaluation: Off insulin drip, ambulating halls.      Problem: Adult Inpatient Plan of Care  Goal: Plan of Care Review  Description: The Plan of Care Review/Shift note should be completed every shift.  The Outcome Evaluation is a brief statement about your assessment that the patient is improving, declining, or no change.  This information will be displayed automatically on your shift  note.  Outcome: Progressing  Flowsheets (Taken 2/6/2025 1830)  Outcome Evaluation: Off insulin drip, ambulating halls.  Plan of Care Reviewed With: patient  Overall Patient Progress: improving  Goal: Patient-Specific Goal (Individualized)  Description: You can add care plan individualizations to a care plan. Examples of Individualization might be:  \"Parent requests to be called daily at 9am for status\", \"I have a hard time hearing out of my right ear\", or \"Do not touch me to wake me up as it startles  me\".  Outcome: Progressing  Goal: Absence of Hospital-Acquired Illness or Injury  Outcome: Progressing  Intervention: Identify and Manage Fall Risk  Recent Flowsheet Documentation  Taken 2/6/2025 1628 by Pau Castillo, RN  Safety Promotion/Fall Prevention: safety round/check completed  Taken 2/6/2025 1500 by Pau Castillo, RN  Safety Promotion/Fall Prevention: safety round/check completed  Taken 2/6/2025 0857 by Pau Castillo, RN  Safety Promotion/Fall Prevention: safety round/check completed  Intervention: Prevent Skin Injury  Recent Flowsheet Documentation  Taken " 2/6/2025 0857 by Pau Castillo RN  Body Position: position changed independently  Intervention: Prevent and Manage VTE (Venous Thromboembolism) Risk  Recent Flowsheet Documentation  Taken 2/6/2025 0857 by Pau Castillo RN  VTE Prevention/Management: SCDs off (sequential compression devices)  Intervention: Prevent Infection  Recent Flowsheet Documentation  Taken 2/6/2025 0857 by Pau Castillo RN  Infection Prevention: rest/sleep promoted  Goal: Optimal Comfort and Wellbeing  Outcome: Progressing  Goal: Readiness for Transition of Care  Outcome: Progressing

## 2025-02-08 ENCOUNTER — APPOINTMENT (OUTPATIENT)
Dept: PHYSICAL THERAPY | Facility: CLINIC | Age: 85
DRG: 638 | End: 2025-02-08
Payer: COMMERCIAL

## 2025-02-08 ENCOUNTER — APPOINTMENT (OUTPATIENT)
Dept: OCCUPATIONAL THERAPY | Facility: CLINIC | Age: 85
DRG: 638 | End: 2025-02-08
Payer: COMMERCIAL

## 2025-02-08 VITALS
WEIGHT: 208.2 LBS | BODY MASS INDEX: 35.74 KG/M2 | OXYGEN SATURATION: 97 % | DIASTOLIC BLOOD PRESSURE: 63 MMHG | RESPIRATION RATE: 18 BRPM | TEMPERATURE: 98.7 F | HEART RATE: 62 BPM | SYSTOLIC BLOOD PRESSURE: 160 MMHG

## 2025-02-08 LAB
ANION GAP SERPL CALCULATED.3IONS-SCNC: 10 MMOL/L (ref 7–15)
BACTERIA UR CULT: ABNORMAL
BACTERIA UR CULT: ABNORMAL
BUN SERPL-MCNC: 24.3 MG/DL (ref 8–23)
CALCIUM SERPL-MCNC: 8.7 MG/DL (ref 8.8–10.4)
CHLORIDE SERPL-SCNC: 106 MMOL/L (ref 98–107)
CREAT SERPL-MCNC: 0.76 MG/DL (ref 0.51–0.95)
EGFRCR SERPLBLD CKD-EPI 2021: 77 ML/MIN/1.73M2
GLUCOSE BLDC GLUCOMTR-MCNC: 203 MG/DL (ref 70–99)
GLUCOSE BLDC GLUCOMTR-MCNC: 226 MG/DL (ref 70–99)
GLUCOSE BLDC GLUCOMTR-MCNC: 240 MG/DL (ref 70–99)
GLUCOSE SERPL-MCNC: 233 MG/DL (ref 70–99)
HCO3 SERPL-SCNC: 21 MMOL/L (ref 22–29)
HOLD SPECIMEN: NORMAL
POTASSIUM SERPL-SCNC: 4.5 MMOL/L (ref 3.4–5.3)
SODIUM SERPL-SCNC: 137 MMOL/L (ref 135–145)

## 2025-02-08 PROCEDURE — 97530 THERAPEUTIC ACTIVITIES: CPT | Mod: GP

## 2025-02-08 PROCEDURE — 250N000013 HC RX MED GY IP 250 OP 250 PS 637: Performed by: INTERNAL MEDICINE

## 2025-02-08 PROCEDURE — 97116 GAIT TRAINING THERAPY: CPT | Mod: GP

## 2025-02-08 PROCEDURE — 80048 BASIC METABOLIC PNL TOTAL CA: CPT | Performed by: INTERNAL MEDICINE

## 2025-02-08 PROCEDURE — 82435 ASSAY OF BLOOD CHLORIDE: CPT | Performed by: INTERNAL MEDICINE

## 2025-02-08 PROCEDURE — 82374 ASSAY BLOOD CARBON DIOXIDE: CPT | Performed by: INTERNAL MEDICINE

## 2025-02-08 PROCEDURE — 99239 HOSP IP/OBS DSCHRG MGMT >30: CPT | Performed by: INTERNAL MEDICINE

## 2025-02-08 PROCEDURE — 36415 COLL VENOUS BLD VENIPUNCTURE: CPT | Performed by: INTERNAL MEDICINE

## 2025-02-08 PROCEDURE — 97535 SELF CARE MNGMENT TRAINING: CPT | Mod: GO | Performed by: OCCUPATIONAL THERAPIST

## 2025-02-08 RX ORDER — PEN NEEDLE, DIABETIC 32GX 5/32"
NEEDLE, DISPOSABLE MISCELLANEOUS
Qty: 100 EACH | Refills: 1 | Status: SHIPPED | OUTPATIENT
Start: 2025-02-08

## 2025-02-08 RX ORDER — NYSTATIN 100000 [USP'U]/ML
500000 SUSPENSION ORAL 4 TIMES DAILY
Qty: 473 ML | Refills: 0 | Status: SHIPPED | OUTPATIENT
Start: 2025-02-08

## 2025-02-08 RX ADMIN — FERROUS GLUCONATE 324 MG: 324 TABLET ORAL at 11:23

## 2025-02-08 RX ADMIN — ACETAMINOPHEN 1000 MG: 500 TABLET, FILM COATED ORAL at 11:23

## 2025-02-08 RX ADMIN — LEVOTHYROXINE SODIUM 50 MCG: 0.05 TABLET ORAL at 11:24

## 2025-02-08 RX ADMIN — APIXABAN 5 MG: 5 TABLET, FILM COATED ORAL at 11:24

## 2025-02-08 RX ADMIN — INSULIN ASPART 2 UNITS: 100 INJECTION, SOLUTION INTRAVENOUS; SUBCUTANEOUS at 11:53

## 2025-02-08 RX ADMIN — METFORMIN HYDROCHLORIDE 500 MG: 500 TABLET ORAL at 11:24

## 2025-02-08 RX ADMIN — PANTOPRAZOLE SODIUM 20 MG: 20 TABLET, DELAYED RELEASE ORAL at 11:24

## 2025-02-08 RX ADMIN — LISINOPRIL 30 MG: 20 TABLET ORAL at 11:24

## 2025-02-08 RX ADMIN — INSULIN ASPART 2 UNITS: 100 INJECTION, SOLUTION INTRAVENOUS; SUBCUTANEOUS at 07:36

## 2025-02-08 ASSESSMENT — ACTIVITIES OF DAILY LIVING (ADL)
ADLS_ACUITY_SCORE: 62
ADLS_ACUITY_SCORE: 59
ADLS_ACUITY_SCORE: 62
ADLS_ACUITY_SCORE: 59
ADLS_ACUITY_SCORE: 62
ADLS_ACUITY_SCORE: 59
ADLS_ACUITY_SCORE: 62
ADLS_ACUITY_SCORE: 59

## 2025-02-08 NOTE — PLAN OF CARE
"Orientation: A&O x 4  Vitals/Tele: VSS on RA  IV Access/drains: PIV removed  Diet: Mod Carb  Mobility: A1GBW  GI/: Continent of B&B  Wound/Skin: WNL - scattered bruising  Discharge Plan: Patient picked up by son & discharged with belongings, education - all questions asked were answered, all medications provided to patient & signed for, patient aware of follow up visits & AVS review complete    See Flow sheets for assessment      Goal Outcome Evaluation:      Plan of Care Reviewed With: patient    Overall Patient Progress: improving    Outcome Evaluation: Pt. demonstrated teach back method for blood glucose checks and insulin administration and verbalized an understanding of signs and symptoms of hypoglycemia & hyperglycemia. Pt stated she will follow up with her primary care MD upon discharge.    Problem: Adult Inpatient Plan of Care  Goal: Plan of Care Review  Description: The Plan of Care Review/Shift note should be completed every shift.  The Outcome Evaluation is a brief statement about your assessment that the patient is improving, declining, or no change.  This information will be displayed automatically on your shift  note.  Outcome: Met  Flowsheets (Taken 2/8/2025 2917)  Outcome Evaluation: Pt. demonstrated teach back method for blood glucose checks and insulin administration and verbalized an understanding of signs and symptoms of hypoglycemia & hyperglycemia. Pt stated she will follow up with her primary care MD upon discharge.  Plan of Care Reviewed With: patient  Goal: Patient-Specific Goal (Individualized)  Description: You can add care plan individualizations to a care plan. Examples of Individualization might be:  \"Parent requests to be called daily at 9am for status\", \"I have a hard time hearing out of my right ear\", or \"Do not touch me to wake me up as it startles  me\".  Outcome: Met  Goal: Absence of Hospital-Acquired Illness or Injury  Outcome: Met  Intervention: Identify and Manage Fall " Risk  Recent Flowsheet Documentation  Taken 2/8/2025 0720 by Polina Benitez, RN  Safety Promotion/Fall Prevention: safety round/check completed  Goal: Optimal Comfort and Wellbeing  Outcome: Met  Goal: Readiness for Transition of Care  Outcome: Met     Problem: Glycemic Control Impaired  Goal: Blood Glucose Level Within Targeted Range  Outcome: Met  Goal: Minimize Risk of Hypoglycemia  Outcome: Met     Problem: Fall Injury Risk  Goal: Absence of Fall and Fall-Related Injury  Outcome: Met  Intervention: Promote Injury-Free Environment  Recent Flowsheet Documentation  Taken 2/8/2025 0720 by Polina Benitez, RN  Safety Promotion/Fall Prevention: safety round/check completed     Problem: Diabetes  Goal: Optimal Coping  Outcome: Met  Goal: Optimal Functional Ability  Outcome: Met  Intervention: Optimize Functional Ability  Recent Flowsheet Documentation  Taken 2/8/2025 1500 by Polina Benitez, RN  Activity Management: activity adjusted per tolerance  Goal: Blood Glucose Level Within Target Range  Outcome: Met  Goal: Minimize Risk of Hypoglycemia  Outcome: Met

## 2025-02-08 NOTE — PLAN OF CARE
"Pt alert and orientedx4. Assist of x1 with GB and walker. Ambulated hallways. Purwick in place. Blood sugar checks ; at 2am was 240. TELE-SR, occassional A  pacing. for possible discharge today after final teaching on insulin use.  Problem: Adult Inpatient Plan of Care  Goal: Plan of Care Review  Description: The Plan of Care Review/Shift note should be completed every shift.  The Outcome Evaluation is a brief statement about your assessment that the patient is improving, declining, or no change.  This information will be displayed automatically on your shift  note.  Outcome: Progressing  Flowsheets (Taken 2/8/2025 0542)  Outcome Evaluation: pt ambulated hallways before sleeping . denied pain.  Plan of Care Reviewed With: patient  Overall Patient Progress: improving  Goal: Patient-Specific Goal (Individualized)  Description: You can add care plan individualizations to a care plan. Examples of Individualization might be:  \"Parent requests to be called daily at 9am for status\", \"I have a hard time hearing out of my right ear\", or \"Do not touch me to wake me up as it startles  me\".  Outcome: Progressing  Goal: Absence of Hospital-Acquired Illness or Injury  Outcome: Progressing  Intervention: Identify and Manage Fall Risk  Recent Flowsheet Documentation  Taken 2/7/2025 2221 by Jenelle Dasilva RN  Safety Promotion/Fall Prevention:   activity supervised   nonskid shoes/slippers when out of bed  Intervention: Prevent Skin Injury  Recent Flowsheet Documentation  Taken 2/7/2025 2221 by Jenelle Dasilva RN  Body Position: position changed independently  Intervention: Prevent and Manage VTE (Venous Thromboembolism) Risk  Recent Flowsheet Documentation  Taken 2/7/2025 2221 by Jenelle Dasilva RN  VTE Prevention/Management: SCDs off (sequential compression devices)  Intervention: Prevent Infection  Recent Flowsheet Documentation  Taken 2/7/2025 2221 by Jenelle Dasilva RN  Infection Prevention: rest/sleep promoted  Goal: Optimal Comfort " and Wellbeing  Outcome: Progressing  Goal: Readiness for Transition of Care  Outcome: Progressing     Problem: Glycemic Control Impaired  Goal: Blood Glucose Level Within Targeted Range  Outcome: Progressing  Goal: Minimize Risk of Hypoglycemia  Outcome: Progressing     Problem: Fall Injury Risk  Goal: Absence of Fall and Fall-Related Injury  Outcome: Progressing  Intervention: Identify and Manage Contributors  Recent Flowsheet Documentation  Taken 2/7/2025 2221 by Jenelle Dasilva RN  Medication Review/Management: medications reviewed  Intervention: Promote Injury-Free Environment  Recent Flowsheet Documentation  Taken 2/7/2025 2221 by Jenelle Dasilva RN  Safety Promotion/Fall Prevention:   activity supervised   nonskid shoes/slippers when out of bed     Problem: Diabetes  Goal: Optimal Coping  Outcome: Progressing  Goal: Optimal Functional Ability  Outcome: Progressing  Intervention: Optimize Functional Ability  Recent Flowsheet Documentation  Taken 2/7/2025 2221 by Jenelle Dasilva RN  Activity Management: activity adjusted per tolerance  Activity Assistance Provided: assistance, 1 person  Goal: Blood Glucose Level Within Target Range  Outcome: Progressing  Goal: Minimize Risk of Hypoglycemia  Outcome: Progressing   Goal Outcome Evaluation:      Plan of Care Reviewed With: patient    Overall Patient Progress: improvingOverall Patient Progress: improving    Outcome Evaluation: pt ambulated hallways before sleeping . denied pain.

## 2025-02-08 NOTE — DISCHARGE SUMMARY
[facility]  Hospitalist Discharge Summary      Date of Admission:  2/5/2025  Date of Discharge:  2/8/2025  Discharging Provider: Berto Craven MD  Discharge Service: Hospitalist Service  Primary Care Physician   Robert Randle    Discharge Diagnoses   Type 2 diabetes, new diagnosis  Severe hyperglycemia with hyperosmolar nonketotic state  Hyperkalemia   History atrial fibrillation  Chronic anticoagulation  Mechanical fall on ice  Essential Hypertension  Hyperlipidemia  Dyslipidemia  [facility]     Hospital Course     Lexii Stratton is a 84 year old female with PMH including relatively recent diagnosis of type 2 diabetes in December 2024 with A1c of 7.2 not currently on any medical treatments or diet modifications, A-fib on Eliquis with plan to transition to warfarin due to cost, history of TAVR, back pain, GERD who presents with a fall on ice.  She was actually at the doctor today for much of the day and when returning home she was climbing some steps to get into her house and slipped on ice on the first step and landed on her backside and did hit her head.  She denies losing consciousness.  Neighbors apparently saw this happen and brought her inside right away so she was not exposed to the cold for any extended period of time.  EMS were called and blood sugar was noted to be in the 400s in the field and she was brought to the ER largely because she is on a blood thinner and did hit her head.     Here in the ER she was hypertensive but otherwise hemodynamically stable and not hypoxic.  Lab workup was notable for a blood glucose of 749 with ketones of 0.53 and BMP showing potassium of 8.1 with carbon dioxide of 17, BUN of 45.9 and creatinine of 1.22.  Anion gap was normal.  LFTs lactic acid were also normal.  CT head was negative and the remainder of her trauma workup was negative in the ER.     She was given shifting medications for hyperkalemia as well as 1 L IV fluids.  I plan to start her  "on insulin drip, carb counting insulin and maintenance fluids overnight with close monitoring of potassium.     Problem list:     New insulin dependent Diabetes mellitus type 2 complications with severe hyperglycemia with hyperosmolar nonketotic state upon presentation, Hyperkalemia - resolved     Patient presented with blood sugar of 749, her hemoglobin was 10.9.   per her report she was told in the past that she was pre-diabetic but did not need treatment.  She was place don insulin and metformin.  She is concerned about doing insulin so will try just using long acting lantus.  Given her age, goal would be more conservative.  She will be set up with a glucometer and will need close outpatient follow up with diabetes teaching and her primary care provider.        2. History of atrial fibrillation on DOAC, chronic anticoagulation  Patient is in the process of transitioning from DOAC to warfarin due to cost issues once current supply completed (3/2025). Also notes that she needs an epidural injection in her back at some point with holding of anticoagulation. Continue with current medications without changes     3.  Traumatic mechanical fall on ice   No fractures and head CT head negative, doing well and will return home     4. History of hypertension  Continue with her home amlodipine and ACE inhibitor     6. History of dyslipidemia  Is on a statin at home.  Consider stopping this as the risk may out weigh its benefit     7.  Oral thrush, improving  Due to oral candidiasis with her underlying uncontrolled diabetes. Continue on Nystatin swish and spit     Clinically Significant Risk Factors     # DMII: A1C = 10.9 % (Ref range: <5.7 %) within past 6 months  # Obesity: Estimated body mass index is 35.74 kg/m  as calculated from the following:    Height as of an earlier encounter on 2/5/25: 1.626 m (5' 4\").    Weight as of this encounter: 94.4 kg (208 lb 3.2 oz).       Significant Results and Procedures   Most Recent 3 " CBC's:  Recent Labs   Lab Test 02/06/25  0607 02/05/25  1930 12/10/24  1023   WBC 10.7 14.2* 5.8   HGB 13.3 14.3 15.3   MCV 91 90 93   PLT 85* 100* 162     Most Recent 3 BMP's:  Recent Labs   Lab Test 02/08/25  1152 02/08/25  0731 02/08/25  0649 02/06/25  0608 02/06/25  0607 02/06/25  0120 02/06/25  0027   NA  --   --  137  --  140  --  136   POTASSIUM  --   --  4.5  --  4.3  --  4.2   CHLORIDE  --   --  106  --  107  --  100   CO2  --   --  21*  --  22  --  20*   BUN  --   --  24.3*  --  31.5*  --  40.3*   CR  --   --  0.76  --  0.83  --  1.03*   ANIONGAP  --   --  10  --  11  --  16*   CINDY  --   --  8.7*  --  9.0  --  10.2   * 226* 233*   < > 115*   < > 324*    < > = values in this interval not displayed.   ,   Results for orders placed or performed during the hospital encounter of 02/05/25   CT Head w/o Contrast    Narrative    EXAM: CT HEAD W/O CONTRAST  LOCATION: [facility]  DATE: 2/5/2025    INDICATION: head strike on thinners  COMPARISON: None.  TECHNIQUE: Routine CT Head without IV contrast. Multiplanar reformats. Dose reduction techniques were used.    FINDINGS:  INTRACRANIAL CONTENTS: No intracranial hemorrhage, extraaxial collection, or mass effect.  No CT evidence of acute infarct. Mild presumed chronic small vessel ischemic changes. Mild generalized volume loss. No hydrocephalus.     VISUALIZED ORBITS/SINUSES/MASTOIDS: Prior bilateral cataract surgery. Visualized portions of the orbits are otherwise unremarkable. No paranasal sinus mucosal disease. No middle ear or mastoid effusion.    BONES/SOFT TISSUES: No acute abnormality.      Impression    IMPRESSION:  1.  No CT evidence for acute intracranial process.  2.  Brain atrophy and presumed chronic microvascular ischemic changes as above.          Follow up/instructions: patient needs close follow up with her primary care provider for a blood sugar check and diabetes teaching.  May need to titrate up her lantus and consider  prandial/correction scale insulin    Pending test results at discharge:      Unresulted Labs Ordered in the Past 30 Days of this Admission       Date and Time Order Name Status Description    2/6/2025  1:30 AM Urine Culture Preliminary     2/6/2025 12:08 AM Blood Culture Hand, Right Preliminary     2/6/2025 12:08 AM Blood Culture Hand, Left Preliminary              Discharge Orders      Primary Care - Care Coordination Referral      Adult Diabetes Education  Referral      Reason for your hospital stay    Fall, diabetes     Follow-up and recommended labs and tests     Follow up with primary care provider, Robert Randle, within 7 days regarding new diagnosis.  The following labs/tests are recommended: blood sugars.     Activity    Your activity upon discharge: activity as tolerated     Diet    Follow this diet upon discharge: Current Diet:Orders Placed This Encounter      Moderate Consistent Carb (60 g CHO per Meal) Diet       Discharge Disposition   Discharged to home  Condition at discharge: Stable      Consultations This Hospital Stay   PHARMACY IP CONSULT  PHARMACY LIAISON FOR MEDICATION COVERAGE CONSULT  PHYSICAL THERAPY ADULT IP CONSULT  NUTRITION SERVICES ADULT IP CONSULT  PHARMACY DISCHARGE EDUCATION BY PHARMACIST  OCCUPATIONAL THERAPY ADULT IP CONSULT  CARE MANAGEMENT / SOCIAL WORK IP CONSULT    Code Status   Full Code    Time Spent on this Encounter   IBerto MD, personally saw the patient today and spent greater than 30 minutes discharging this patient. Discussed with nursing, pharm d          This document was created using voice recognition technology.  Please excuse any typographical errors that may have occurred.  Please call with any questions.       Berto Craven MD  [facility] MEDICAL SURGICAL  [address] [place] [address]  Phone: [phone]  Fax:  [phone]  ______________________________________________________________________    Physical Exam   Vital Signs: Temp: 98.7  F (37.1  C) Temp src: Oral BP: (!) 160/63 Pulse: 62   Resp: 18 SpO2: 97 % O2 Device: None (Room air)    Weight: 208 lbs 3.2 oz    Exam stable from yesterday  GEN:  Alert, oriented x 3, appears comfortable, no overt respiratory distress. Appears stated age, is a little anxious, Easily conversant  HEENT:  MMM, Very mild thrush towards posterior tongue  CV:  Regular rate and rhythm, no murmur    LUNGS:  Clear to auscultation ant/lat bilaterally without rales/rhonchi/wheezing/retractions.  Symmetric chest rise on inhalation noted. Good air movement  ABD:  Active bowel sounds, soft, non-tender/non-distended.     EXT: MARTIN,  no edema        Discharge Medications   Current Discharge Medication List        START taking these medications    Details   blood glucose (NO BRAND SPECIFIED) lancets standard To use to test glucose level in the blood Use to test blood sugar  3  times daily as directed. To accompany glucose monitor brands per insurance coverage.  Qty: 200 each, Refills: 1    Associated Diagnoses: Type 2 diabetes mellitus with complication, without long-term current use of insulin (H)      blood glucose (NO BRAND SPECIFIED) test strip To use to test glucose level in the blood Use to test blood sugar  3 times daily as directed. To accompany glucose monitor brands per insurance coverage.  Qty: 100 strip, Refills: 0    Associated Diagnoses: Type 2 diabetes mellitus with complication, without long-term current use of insulin (H)      blood glucose calibration (NO BRAND SPECIFIED) solution Used to calibrate the blood glucose monitor as needed and as directed.  To accompany  blood glucose brands per insurance coverage  Qty: 1 each, Refills: 0    Associated Diagnoses: Type 2 diabetes mellitus with complication, without long-term current use of insulin (H)      blood glucose monitoring (NO BRAND  SPECIFIED) meter device kit Check blood sugar 3 times a day  Qty: 1 kit, Refills: 0    Associated Diagnoses: Type 2 diabetes mellitus with complication, without long-term current use of insulin (H)      insulin glargine (LANTUS PEN) 100 UNIT/ML pen Inject 15 Units subcutaneously every morning (before breakfast).  Qty: 15 mL, Refills: 0    Comments: If Lantus is not covered by insurance, may substitute Basaglar or Semglee or other insulin glargine product per insurance preference at same dose and frequency.    Associated Diagnoses: Type 2 diabetes mellitus with complication, without long-term current use of insulin (H)      insulin pen needle (BD BRIANNA U/F) 32G X 4 MM miscellaneous Use 1 pen needles daily or as directed.  Qty: 100 each, Refills: 1    Associated Diagnoses: Type 2 diabetes mellitus with complication, without long-term current use of insulin (H)      metFORMIN (GLUCOPHAGE) 500 MG tablet Take 2 tablets (1,000 mg) by mouth 2 times daily (with meals).  Qty: 120 tablet, Refills: 0    Associated Diagnoses: Type 2 diabetes mellitus with complication, without long-term current use of insulin (H)      nystatin (MYCOSTATIN) 593986 UNIT/ML suspension Swish and spit 5 mLs (500,000 Units) in mouth 4 times daily.  Qty: 473 mL, Refills: 0    Associated Diagnoses: Thrush           CONTINUE these medications which have NOT CHANGED    Details   acetaminophen (TYLENOL) 500 MG tablet Take 1,000 mg by mouth 2 times daily (2 x 500 mg = 1000 mg)      amLODIPine (NORVASC) 5 MG tablet TAKE ONE TABLET BY MOUTH EVERY NIGHT AT BEDTIME  Qty: 90 tablet, Refills: 3    Associated Diagnoses: Essential hypertension, benign      amoxicillin (AMOXIL) 500 MG capsule Take 2,000 mg by mouth once as needed (1 hour prior to dental procedures 4 x 500 mg = 2000 mg)      apixaban ANTICOAGULANT (ELIQUIS ANTICOAGULANT) 5 MG tablet Take 1 tablet (5 mg) by mouth 2 times daily.  Qty: 180 tablet, Refills: 3    Associated Diagnoses: PAF (paroxysmal  atrial fibrillation) (H)      Biotin 5000 MCG PO TABS Take 1 tablet by mouth daily      calcium carbonate (OS-CINDY) 500 MG tablet Take 1 tablet by mouth every other day      CENTRUM SILVER OR TABS Take 1 tablet by mouth daily  Qty: 30, Refills: 0    Associated Diagnoses: Need for prophylactic vaccination and inoculation against influenza; Routine general medical examination at a health care facility; Generalized osteoarthrosis, unspecified site; Other and unspecified hyperlipidemia; Esophageal reflux; Obesity, unspecified; Urinary sys symptom NEC; Other malaise and fatigue; Sleep disturbance, unspecified      clotrimazole (MYCELEX) 10 MG lozenge Place 1 lozenge (10 mg) inside cheek 5 times daily.  Qty: 70 lozenge, Refills: 0      Cranberry 450 MG CAPS Take 1 capsule by mouth daily (with dinner)      ferrous gluconate (FERGON) 324 (38 Fe) MG tablet Take 1 tablet (324 mg) by mouth daily (with breakfast)  Qty: 90 tablet, Refills: 1    Associated Diagnoses: History of anemia      latanoprost (XALATAN) 0.005 % ophthalmic solution Place 1 drop into both eyes At Bedtime       levothyroxine (SYNTHROID/LEVOTHROID) 50 MCG tablet Take 1 tablet (50 mcg) by mouth daily.  Qty: 90 tablet, Refills: 3    Associated Diagnoses: Acquired hypothyroidism      lisinopril (ZESTRIL) 30 MG tablet Take 1 tablet (30 mg) by mouth every morning.  Qty: 90 tablet, Refills: 3    Associated Diagnoses: Essential hypertension, benign      magnesium 250 MG tablet Take 1 tablet by mouth daily      OMEGA-3 FATTY ACIDS 1200 MG OR CAPS Take 1 capsule by mouth daily  Refills: 0    Associated Diagnoses: Preop general physical exam; Stress incontinence - female      pantoprazole (PROTONIX) 20 MG EC tablet Take 1 tablet (20 mg) by mouth daily.  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      VITAMIN D, CHOLECALCIFEROL, PO Take 2,000 Units by mouth daily           Allergies   Allergies   Allergen Reactions    Morphine And Codeine Nausea and Vomiting    Atorvastatin     Amoxicillin-Pot Clavulanate Diarrhea    Hydrocodone      Keeps patient awake    Naproxen Itching and Rash    Seasonal Allergies      Hay fever in fall, ragweed and russian thistles    Sulfa Antibiotics Nausea

## 2025-02-08 NOTE — PLAN OF CARE
"Summary: Fall; New DM diagnosis; Hyperglycemia  Orientation: A&O x 4  Vitals/Tele: VSS on RA; SR Occasional A pacing  IV Access/drains: PIV SL  Diet: Mod Carb  Mobility: A1GBW  GI/: Continent of B&B - intermittent urinary urgency  Wound/Skin: Scattered bruising  Discharge Plan: Potentially tomorrow to home  See Flow sheets for assessment     Problem: Adult Inpatient Plan of Care  Goal: Plan of Care Review  Description: The Plan of Care Review/Shift note should be completed every shift.  The Outcome Evaluation is a brief statement about your assessment that the patient is improving, declining, or no change.  This information will be displayed automatically on your shift  note.  Outcome: Progressing  Flowsheets (Taken 2/7/2025 1849)  Outcome Evaluation:   A&O x 4   Ambulated halls x 2     Plan of Care Reviewed With: patient  Overall Patient Progress: improving  Goal: Patient-Specific Goal (Individualized)  Description: You can add care plan individualizations to a care plan. Examples of Individualization might be:  \"Parent requests to be called daily at 9am for status\", \"I have a hard time hearing out of my right ear\", or \"Do not touch me to wake me up as it startles  me\".  Outcome: Progressing  Goal: Absence of Hospital-Acquired Illness or Injury  Outcome: Progressing  Intervention: Identify and Manage Fall Risk  Recent Flowsheet Documentation  Taken 2/7/2025 1544 by Polina Benitez RN  Safety Promotion/Fall Prevention: safety round/check completed  Taken 2/7/2025 0831 by Polina Benitez RN  Safety Promotion/Fall Prevention: safety round/check completed  Intervention: Prevent Skin Injury  Recent Flowsheet Documentation  Taken 2/7/2025 1605 by Polina Benitez RN  Body Position: position changed independently  Taken 2/7/2025 0831 by Polina Benitez RN  Body Position: position changed independently  Intervention: Prevent Infection  Recent Flowsheet Documentation  Taken 2/7/2025 0831 by Polina Benitez" RN  Infection Prevention:   rest/sleep promoted   single patient room provided  Goal: Optimal Comfort and Wellbeing  Outcome: Progressing  Goal: Readiness for Transition of Care  Outcome: Progressing     Problem: Glycemic Control Impaired  Goal: Blood Glucose Level Within Targeted Range  Outcome: Progressing  Goal: Minimize Risk of Hypoglycemia  Outcome: Progressing     Problem: Fall Injury Risk  Goal: Absence of Fall and Fall-Related Injury  Outcome: Progressing  Intervention: Promote Injury-Free Environment  Recent Flowsheet Documentation  Taken 2/7/2025 1544 by Polina Benitez RN  Safety Promotion/Fall Prevention: safety round/check completed  Taken 2/7/2025 0831 by Polina Benitez RN  Safety Promotion/Fall Prevention: safety round/check completed     Problem: Diabetes  Goal: Optimal Coping  Outcome: Progressing  Goal: Optimal Functional Ability  Outcome: Progressing  Intervention: Optimize Functional Ability  Recent Flowsheet Documentation  Taken 2/7/2025 1700 by Polina Benitez RN  Assistive Device Utilized:   gait belt   walker  Activity Management:   ambulated to bathroom   up in chair  Activity Assistance Provided: assistance, 1 person  Taken 2/7/2025 0831 by Polina Benitez RN  Activity Management: activity adjusted per tolerance  Goal: Blood Glucose Level Within Target Range  Outcome: Progressing  Goal: Minimize Risk of Hypoglycemia  Outcome: Progressing   Goal Outcome Evaluation:      Plan of Care Reviewed With: patient    Overall Patient Progress: improving    Outcome Evaluation: A&O x 4; Ambulated halls x 2;

## 2025-02-09 ENCOUNTER — MYC MEDICAL ADVICE (OUTPATIENT)
Dept: FAMILY MEDICINE | Facility: CLINIC | Age: 85
End: 2025-02-09
Payer: COMMERCIAL

## 2025-02-09 DIAGNOSIS — N30.00 ACUTE CYSTITIS WITHOUT HEMATURIA: Primary | ICD-10-CM

## 2025-02-09 RX ORDER — NITROFURANTOIN 25; 75 MG/1; MG/1
100 CAPSULE ORAL 2 TIMES DAILY
Qty: 6 CAPSULE | Refills: 0 | Status: SHIPPED | OUTPATIENT
Start: 2025-02-09

## 2025-02-09 NOTE — PLAN OF CARE
Physical Therapy Discharge Summary    Reason for therapy discharge:    Discharged to home.    Progress towards therapy goal(s). See goals on Care Plan in [facility] electronic health record for goal details.  Goals partially met.  Barriers to achieving goals:   discharge from facility.    Therapy recommendation(s):    Pt near baseline, currently SBA w/ FWW. Pt reports her family can assist w/ heavier I/ADLs.

## 2025-02-09 NOTE — RESULT ENCOUNTER NOTE
Will treat as legit given recent course and >100K citrobacter.   Connected with patient via phone and MyChart.   Reviewed discharged instructions per her request and answered several great questions.      Macrobid 100 mg PO BID x 3 days (#6).  Follow up with PCP 2/25/2025 at 11.  Sent note to PCP as well.

## 2025-02-10 ENCOUNTER — TELEPHONE (OUTPATIENT)
Dept: ANTICOAGULATION | Facility: CLINIC | Age: 85
End: 2025-02-10
Payer: COMMERCIAL

## 2025-02-10 ENCOUNTER — PATIENT OUTREACH (OUTPATIENT)
Dept: CARE COORDINATION | Facility: CLINIC | Age: 85
End: 2025-02-10
Payer: COMMERCIAL

## 2025-02-10 DIAGNOSIS — I48.20 CHRONIC ATRIAL FIBRILLATION (H): Primary | ICD-10-CM

## 2025-02-10 DIAGNOSIS — Z95.2 S/P TAVR (TRANSCATHETER AORTIC VALVE REPLACEMENT): ICD-10-CM

## 2025-02-10 RX ORDER — WARFARIN SODIUM 2 MG/1
TABLET ORAL
Qty: 180 TABLET | Refills: 0 | Status: SHIPPED | OUTPATIENT
Start: 2025-02-22

## 2025-02-10 NOTE — LETTER
[facility] CARE COORDINATION  [id number]  [facility] [address]     February 11, 2025    Lexii Stratton  [id number]  [place] [facility] [id number]      Dear Lexii,    I am a clinic care coordinator who works with Robert Randle MD with the [facility]. I wanted to thank you for spending the time to talk with me.  Below is a description of clinic care coordination and how I can further assist you.       The clinic care coordination team is made up of a registered nurse, , financial resource worker and community health worker who understand the health care system. The goal of clinic care coordination is to help you manage your health and improve access to the health care system. Our team works alongside your provider to assist you in determining your health and social needs. We can help you obtain health care and community resources, providing you with necessary information and education. We can work with you through any barriers and develop a care plan that helps coordinate and strengthen the communication between you and your care team.  Our services are voluntary and are offered without charge to you personally.    Please feel free to contact me with any questions or concerns regarding care coordination and what we can offer.      We are focused on providing you with the highest-quality healthcare experience possible.    Sincerely,     Jessy Angel RN Care Coordinator  [facility] - [place], [place], [place]  Email: [email or web address]  Phone: [phone]     Enclosed: I have enclosed a copy of the Patient Centered Plan of Care. This has helpful information and goals that we have talked about. Please keep this in an easy to access place to use as needed.

## 2025-02-10 NOTE — TELEPHONE ENCOUNTER
Brief chart review.    Recent hospital discharge for DM2, new insulin requirement. Next appt in after 2 weeks. It is ideal to be seen sooner. Please help her set up hospital follow up appt. Can use same day visit.    Thanks,    Robert Randle MD  [facility]  2/10/2025

## 2025-02-10 NOTE — TELEPHONE ENCOUNTER
Patient calling back regarding MyChart message-    Reports she is doing well and feels comfortable waiting to see Dr. Randle on 2/25/25. Does not want to schedule sooner appt.    Patient discharged from hospital on 2/8/25- diagnosed as new insulin dependent type 2 diabetes. Started on Lantus and Metformin. RN reviewed medications with patient and blood sugar monitoring. Patient is taking medications as prescribed and currently does not have questions. Reports blood sugars continue to improve daily- checking 3x/day.   Fasting BG yesterday AM- 192  Fasting BG today- 162    RN educated patient on signs/symptoms of hyper/hypoglycemia and instructed to call back if experiencing. Advised to continue writing down BG readings and bring to upcoming appt.     Patient was given an opportunity to ask questions, verbalized understanding of plan, and is agreeable.     Judi PORTILLO RN  [facility]

## 2025-02-10 NOTE — PROGRESS NOTES
Clinic Care Coordination Contact  OUTREACH with Post Discharge Assessment    Referral Information:  Referral Source: IP Handoff  Primary Diagnosis: Diabetes    Chief Complaint   Patient presents with    Clinic Care Coordination - Post Hospital    Clinic Care Coordination - Initial      Universal Utilization: 79.6% Risk of Admission or ED Visit   Clinic Utilization  Difficulty keeping appointments:: No  Compliance Concerns: No  No-Show Concerns: No  No PCP office visit in Past Year: No  Utilization      No Show Count (past year)  0             ED Visits  1             Hospital Admissions  2                    Current as of: 2/11/2025  8:06 AM              Clinical Concerns:  Current Medical Concerns:    Patient Active Problem List   Diagnosis    Generalized osteoarthrosis, unspecified site    Esophageal reflux    Acquired hypothyroidism    Hypersomnia with sleep apnea    Essential hypertension, benign    Hyperlipidemia LDL goal <130    Chest pain syndrome    Iron and its compounds causing adverse effect in therapeutic use(E934.0)    Overactive bladder    Pelvic floor dysfunction    H/O small bowel obstruction    Chronic kidney disease, stage 3a (H)    Status post coronary angiogram    Aortic stenosis, severe    Diabetes mellitus due to underlying condition, controlled, with hyperglycemia, without long-term current use of insulin (H)    Complete heart block (H)    Cardiac pacemaker in situ    Diastolic heart failure (H)    Chronic atrial fibrillation (H)    History of anemia    Shah's esophagus without dysplasia    Class 2 severe obesity due to excess calories with serious comorbidity in adult (H)    S/P TAVR (transcatheter aortic valve replacement)    Fall from slip, trip, or stumble, initial encounter    Stress incontinence in female    Urge incontinence of urine        Patient states that her blood sugar reading before evening meal yesterday was 162, and blood sugar reading during outreach before meal was 214.  During the beginning of RNCC patient outreach call with patient, she reported difficulty using the glucometer to check her blood sugars. States every time she puts the drop of blood on her finger to the test strip to check, it gives her an error message & has already used 4 test strips just trying to check her blood sugar just now. Later during outreach, patient reported she thinks she knows what she was doing wrong when she was experiencing difficulty with using the glucometer. She acknowledges recommendation to follow up with diabetes education, however hesitant to schedule appointment due to being uncertain of how she will get transportation to attend appointment. Upon additional discussion with patient regarding transportation resources, patient verbalizes she has a very strong & supportive [place] community who will be happy to assist her with transportation. Patient states that she has a lot of paperwork with information & education to review regarding diabetic diet. Patient denies any difficulty with using insulin. Patient reports she is aware of signs/symptoms of hypo/hyperglycemia. RNCC discussed & reviewed with patient importance of carrying emergency carbohydrate source.    Current Behavioral Concerns: none noted     Education Provided to patient: Care Coordination role, clinic after hours, medications, appointments, After Visit Summary discussed/reviewed. Support provided.    Pain  Pain (GOAL):: No  Health Maintenance Reviewed: Due/Overdue   Health Maintenance Due   Topic Date Due    HF ACTION PLAN  Never done    DIABETIC FOOT EXAM  11/22/2024      Clinical Pathway: Clinic Care Coordination - Diabetes Clinical Pathway    Diabetes General Information:  Diabetes type: Type 2    Diabetes Related Symptoms:  Diabetes Related Symptoms: None  Weight trend: Decreasing  Symptom course: Stable  Disease course: Stable    Diabetic Ketoacidosis (DKA) Symptoms:  DKA Symptoms: None    Hypoglycemia  "Symptoms:  Hypoglycemia: None    Hypoglycemia Complications:  Hypoglycemia Complications: Hospitalization    Monitoring Regimen:  Times checking blood sugar at home (number): 3  Times checking blood sugar at home (per): Day    Blood Glucose Ranges:  Low Glucose Range (mg/dL): 140-180  Overall Range (mg/dL): >200    Behaviors:  Meal planning/habits: None  Beverages: Water  Patient carries a carbohydrate source: Yes    Treatment Summary:  Current Treatments: Diet, Oral Medication (taken by mouth), Insulin Injections  Resources reviewed and provided:: Diabetes Education referral    Transitions of Care Outreach  Most Recent Admission Date: 2/5/2025   Most Recent Admission Diagnosis: Hyperkalemia - E87.5  Hyponatremia - E87.1  Ketosis (H) - E88.89  Thrombocytopenia - D69.6  Hyperglycemia - R73.9  JEFRY (acute kidney injury) - N17.9  Injury of head, initial encounter - S09.90XA  Fall from slip, trip, or stumble, initial encounter - W01.0XXA  Type 2 diabetes mellitus with complication, without long-term current use of insulin (H) - E11.8  Leukocytosis, unspecified type - D72.829   Most Recent Discharge Date: 2/8/2025   Most Recent Discharge Diagnosis: Fall from slip, trip, or stumble, initial encounter - W01.0XXA  Type 2 diabetes mellitus with complication, without long-term current use of insulin (H) - E11.8  Ketosis (H) - E88.89  Hyperkalemia - E87.5  Hyponatremia - E87.1  Hyperglycemia - R73.9  Injury of head, initial encounter - S09.90XA  JEFRY (acute kidney injury) - N17.9  Leukocytosis, unspecified type - D72.829  Thrombocytopenia - D69.6  Thrush - B37.0     Transitions of Care Assessment  Discharge Assessment  How are you doing now that you are home?: \"having trouble using this machine, used up four strips, when I put my finger to the strip it just gives me an error message\"  How are your symptoms? (Red Flag symptoms escalate to triage hotline per guidelines): Improved  Do you know how to contact your clinic care team " if you have future questions or changes to your health status? : Yes  Does the patient have their discharge instructions? : Yes  Does the patient have questions regarding their discharge instructions? : No  Were you started on any new medications or were there changes to any of your previous medications? : Yes  Does the patient have all of their medications?: Yes  Do you have questions regarding any of your medications? : No  Do you have all of your needed medical supplies or equipment (DME)?  (i.e. oxygen tank, CPAP, cane, etc.): Yes         Post-op (Clinicians Only)  Did the patient have surgery or a procedure: No    Medication Management:  Medication review status: Medications reviewed and no changes reported per patient.        Patient states her son is going to  antibiotic medication from the pharmacy this evening as she received a call from the hospital after she was discharged informing her that she had an infection in her urine & antibiotic medication was being ordered & sent to her pharmacy.    Additionally, patient states that she is in the process of changing her Eliquis to Warfarin medication & hold instructions for blood thinner medication prior to injection in her back. Patient reports that her chronic low back pain is not currently giving her any issues so verbalizes plan to hold off on scheduling the injection.     Functional Status:  Dependent ADLs:: Independent  Dependent IADLs:: Transportation, Laundry, Shopping, Cleaning  Bed or wheelchair confined:: No  Mobility Status: Independent w/Device  Fallen 2 or more times in the past year?: (!) Yes  Any fall with injury in the past year?: No    Living Situation:  Current living arrangement:: I live alone  Type of residence:: Town home    Lifestyle & Psychosocial Needs:    Social Drivers of Health     Food Insecurity: Low Risk  (2/7/2025)    Food Insecurity     Within the past 12 months, did you worry that your food would run out before you got  money to buy more?: No     Within the past 12 months, did the food you bought just not last and you didn t have money to get more?: No   Depression: At risk (2/5/2025)    PHQ-2     PHQ-2 Score: 5   Housing Stability: High Risk (2/7/2025)    Housing Stability     Do you have housing? : No     Are you worried about losing your housing?: No   Tobacco Use: Low Risk  (2/5/2025)    Patient History     Smoking Tobacco Use: Never     Smokeless Tobacco Use: Never     Passive Exposure: Never   Financial Resource Strain: Low Risk  (2/7/2025)    Financial Resource Strain     Within the past 12 months, have you or your family members you live with been unable to get utilities (heat, electricity) when it was really needed?: No   Alcohol Use: Not on file   Transportation Needs: Low Risk  (2/7/2025)    Transportation Needs     Within the past 12 months, has lack of transportation kept you from medical appointments, getting your medicines, non-medical meetings or appointments, work, or from getting things that you need?: No   Physical Activity: Insufficiently Active (12/5/2024)    Exercise Vital Sign     Days of Exercise per Week: 3 days     Minutes of Exercise per Session: 40 min   Interpersonal Safety: Low Risk  (2/6/2025)    Interpersonal Safety     Do you feel physically and emotionally safe where you currently live?: Yes     Within the past 12 months, have you been hit, slapped, kicked or otherwise physically hurt by someone?: No     Within the past 12 months, have you been humiliated or emotionally abused in other ways by your partner or ex-partner?: No   Stress: Stress Concern Present (12/5/2024)    [employer] [place] [employer] - Occupational Stress Questionnaire     Feeling of Stress : To some extent   Social Connections: Unknown (12/5/2024)    Social Connection and Isolation Panel [NHANES]     Frequency of Communication with Friends and Family: Not on file     Frequency of Social Gatherings with Friends and  Family: Once a week     Attends Adventist Services: Not on file     Active Member of Clubs or Organizations: Not on file     Attends Club or Organization Meetings: Not on file     Marital Status: Not on file   Health Literacy: Not on file     Diet:: Diabetic diet  Inadequate nutrition (GOAL):: No  Tube Feeding: No  Inadequate activity/exercise (GOAL):: No  Significant changes in sleep pattern (GOAL): No  Transportation means:: Regular car, Friend, Family     Adventist or spiritual beliefs that impact treatment:: No  Mental health DX:: No  Mental health management concern (GOAL):: No  Chemical Dependency Status: No Current Concerns  Chemical Dependency Management:  (NA)  Informal Support system:: Children, Friends, Eli based      Resources and Interventions:  Current Resources:   Community Resources: DME, Lifeline  Supplies Currently Used at Home: Diabetic Supplies, Compression Stockings, Incontinence Supplies, Hearing Aid Batteries  Equipment Currently Used at Home: glucometer, walker, rolling, cane, quad, grab bar, tub/shower, shower chair  Employment Status: retired  Advance Care Plan/Directive  Advanced Care Plans/Directives on file:: Yes  Status of record:: On File and Validated  Type Advanced Care Plans/Directives: Advanced Directive - On File     Care Plan:   Care Plan: Diabetes (Primary Care)       Problem: Diabetes Mangement Needs Improvement       Long-Range Goal: Demonstrate improved diabetes management       Start Date: 2/10/2025 Expected End Date: 8/29/2025    Note:     Barriers: diagnosis of multiple, chronic, complex medical conditions, new diagnosis of diabetes, provider availability - wait time to complete appointments, etc.   Strengths: motivated, engaged in Care Coordination, two local children & eli community supportive.    Patient expressed understanding of goal: Yes   Action steps to achieve this goal:  1. I will follow up with my providers as scheduled/recommended:   Primary Care Provider:  02/18/25, 04/30/25, 12/17/25   Diabetes Education: #[phone] TBD  2. I will discuss, review, schedule & complete recommended overdue health maintenance with my Primary Care Provider.   3. I will take my medications as prescribed.   4. I will contact my care team with questions, concerns, support needs. I will use the clinic as a resource and I understand I can contact my clinic with 24/7 after hours services available. Care Coordinator will remain available as needed.                                Patient/Caregiver understanding: Patient/caregiver verbalized understanding and denies any additional questions or concerns at this time. RNCC engaged in [employer] communications during encounter.      Outreach Frequency: 2 weeks, more frequently as needed  Future Appointments                In 1 week Robert Randle MD [facility]    In 4 weeks [facility]    In 2 months [facility] [address]    In 2 months Robert Randle MD [facility]    In 10 months Robert Randle MD [facility]          Follow up Plan   Discharge Follow-Up  Discharge follow up appointment scheduled in alignment with recommended follow up timeframe or Transitions of Risk Category? (Low = within 30 days; Moderate= within 14 days; High= within 7 days): Yes  Discharge Follow Up Appointment Date: 02/18/25  Discharge Follow Up Appointment Scheduled with?: Primary Care Provider    Future Appointments   Date Time Provider Department Center   2/18/2025  9:30 AM Robert Randle MD [facility]   3/12/2025  8:45 AM [facility]   4/18/2025 12:00 AM [facility] [address] [place] [address] [facility]   4/30/2025  9:30 AM Robert Randle MD [facility]   12/17/2025  8:30 AM Robert Randle MD [facility]     Outpatient Plan as outlined on AVS reviewed  with patient.    For any urgent concerns, please contact our 24 hour nurse triage line: [phone] ([phone])       RNCC will send clinic care coordination introduction letter & patient centered plan of care to patient via [employer]. Patient/caregiver was provided with writers contact information and encouraged to call with questions, concerns, support needs. RNCC will remain available as needed. RNCC will follow up with patient/caregiver again in 2 weeks.      Jessy Angel RN Care Coordinator  [facility] [place]  Email: [email or web address]  Phone: [phone]

## 2025-02-10 NOTE — TELEPHONE ENCOUNTER
"ANTICOAGULATION  MANAGEMENT: NEW REFERRAL      SUBJECTIVE/OBJECTIVE     Lexii Stratton, a 84 year old female  is newly referred to [facility].    Anticoagulation:    Previously on warfarin: No, has been on Apixaban (Eliquis); transitioning to warfarin.--patient reports she has about 12 pills left  Warfarin initiation date (approximate): Patient reports she will start warfarin on evening of 2/22/25   Indication(s): Atrial Fibrillation and Mechanical AVR   Goal Range:  2.0-3.0   Anticoagulation Bridge/Overlap: No   Referring provider: from PCP    General Dietary/Social Hx:    Typical vitamin K intake: low; variable --patient reports she only eats green beans once in a while.    Other dietary considerations:  patient drinks hi protein drink from [employer], it does NOT contain vitamin K      Social History:   Social History     Tobacco Use    Smoking status: Never     Passive exposure: Never    Smokeless tobacco: Never    Tobacco comments:     have never smoked anything   Vaping Use    Vaping status: Never Used   Substance Use Topics    Alcohol use: No    Drug use: No       In the past 2 weeks, patient estimates taking medications as instructed % of time: 100%-patient reports she uses a pill organizer    Results:        No results for input(s): \"INR\", \"[id number]\", \"F2\", \"ALMWH\" in the last 168 hours.    Wt Readings from Last 2 Encounters:   02/07/25 94.4 kg (208 lb 3.2 oz)   02/05/25 93.4 kg (206 lb)      Estimated body mass index is 35.74 kg/m  as calculated from the following:    Height as of 2/5/25: 1.626 m (5' 4\").    Weight as of 2/7/25: 94.4 kg (208 lb 3.2 oz).  Lab Results   Component Value Date    AST 26 02/05/2025    ALT 25 02/05/2025    ALBUMIN 3.7 02/05/2025     Lab Results   Component Value Date    CR 0.76 02/08/2025     Estimated Creatinine Clearance: 61.4 mL/min (based on SCr of 0.76 mg/dL).    ASSESSMENT     Goal INR 2-3, standard for indication(s) above  Establishing " initial warfarin maintenance dose (on warfarin < 30 days)   Factors that may increase sensitivity to warfarin: Age > 75, Female gender, Kidney Disease, and Low greens/vitamin K intake  Factors that may reduce sensitivity to warfarin: Weight > 90 kg  Potential significant drug interactions with home medications: None noted  Starting warfarin dose is appropriate for patient's anticipated sensitivity to warfarin--reviewed with BANDAR ARMANDO     Dosing Instructions:  start warfarin on 2/22/25, take 6 mg on 2/22 then 4 mg daily  with INR in 2 weeks           Education provided:   Please call back if any changes to your diet, medications or how you've been taking warfarin  Taking warfarin: purpose of warfarin and how it works, take warfarin at same time each day; preferably in the evening, prescribed tablet strength and color, and Importance of taking warfarin as instructed  Goal range and lab monitoring: goal range and significance of current result, Importance of therapeutic range, Importance of following up at instructed interval, and frequency of lab work when starting warfarin and importance of following up when instructed (extends after stability established)  Dietary considerations: importance of consistent vitamin K intake, impact of vitamin K foods on INR, vitamin K content of foods, Impact of protein intake on INR , and importance of notifying [facility] to changes in diet  Symptom monitoring: monitoring for bleeding signs and symptoms, monitoring for clotting signs and symptoms, monitoring for stroke signs and symptoms, when to seek medical attention/emergency care, if you hit your head or have a bad fall seek emergency care, and travel related clotting risk and prevention  Importance of notifying anticoagulation clinic for: changes in medications; a sooner lab recheck maybe needed, diarrhea, nausea/vomiting, reduced intake, cold/flu, and/or infections; a sooner lab recheck maybe needed, upcoming surgeries and  procedures 2 weeks in advance, and if you did not receive dosing instructions on the same day as your labs were checked  Contact [phone] with any changes, questions or concerns.     Education still needed:   None required      Telephone call with Lexii who verbalizes understanding and agrees to plan    Lab visit scheduled    Standing orders placed in Epic: Point of Care INR (Lab 5000)    Plan made with [facility] Pharmacist Carolyn Weller, RN  Anticoagulation Clinic  2/10/2025

## 2025-02-10 NOTE — LETTER
[facility]  Patient Centered Plan of Care  About Me:      Patient Name:  Lexii Stratton    YOB: 1940  Age:         84 year old   [place] MRN:    [id number] Telephone Information:  Home Phone [phone]   Mobile [phone]       Address:  [address] [place] [address] Email address:  [email or web address]      Emergency Contact(s)    Name Relationship Lgl Grd Work Phone Home Phone Mobile Phone   1. JUANCHO STRATTON Son    [phone]   2. MAXIMINO STRATTON Daughter    [phone]           Primary language:  English     needed? No   [place] [facility]:  [phone] op. 1  Other communication barriers:Utilization; Glasses; Hearing aides    Preferred Method of Communication:  Mail  Current living arrangement: I live alone    Mobility Status/ Medical Equipment: Independent w/Device    Health Maintenance  Health Maintenance Reviewed: Due/Overdue   Health Maintenance Due   Topic Date Due    HF ACTION PLAN  Never done    DIABETIC FOOT EXAM  11/22/2024      My Access Plan  Medical Emergency 911   Primary Clinic Line [facility] [phone]   24 Hour Appointment Line [phone] or  [phone]) (toll-free)   24 Hour Nurse Line [phone] (toll-free)   Preferred Urgent Care Other (ER ONLY)     Preferred Hospital [facility]  [phone]     Preferred Pharmacy [facility] - [address]     Behavioral Health Crisis Line The National Suicide Prevention Lifeline at 1-763.572.6782 or Text/Call 558     My Care Team Members  Patient Care Team         Relationship Specialty Notifications Start End    Robert Randle MD PCP - General Family Practice  7/2/24     Phone: [phone] Fax: [phone]         [address] [place] [address]    Jesenia Madrigal MD Referring Physician Internal Medicine  3/21/18     Phone: [phone] Fax: [phone]          [address] [place] [address]    Rafi Franco MD Assigned Surgical Provider   7/29/23     Phone: [phone] Fax: [phone]         [address] [facility] [address]    Kathy Olvera APRN CNP Assigned Heart and Vascular Provider   5/23/24     Phone: [phone] Fax: [phone] [address] [place] [address]    Robert Randle MD Assigned PCP   12/23/24     Phone: [phone] Fax: [phone]         [address] [facility] [address]    Jessy Angel, RN Lead Care Coordinator  Admissions 2/10/25     Phone: [phone]                   My Care Plans  Self Management and Treatment Plan    Care Plan  Care Plan: Diabetes (Primary Care)       Problem: Diabetes Mangement Needs Improvement       Long-Range Goal: Demonstrate improved diabetes management       Start Date: 2/10/2025 Expected End Date: 8/29/2025    Note:     Barriers: diagnosis of multiple, chronic, complex medical conditions, new diagnosis of diabetes, provider availability - wait time to complete appointments, etc.   Strengths: motivated, engaged in Care Coordination, two local [facility] community supportive.    Patient expressed understanding of goal: Yes   Action steps to achieve this goal:  1. I will follow up with my providers as scheduled/recommended:   Primary Care Provider: 02/18/25, 04/30/25, 12/17/25   Diabetes Education: #[phone] TBD  2. I will discuss, review, schedule & complete recommended overdue health maintenance with my Primary Care Provider.   3. I will take my medications as prescribed.   4. I will contact my care team with questions, concerns, support needs. I will use the clinic as a resource and I understand I can contact my clinic with 24/7 after hours services available. Care Coordinator will remain available as needed.                                Action Plans on File:                       Advance Care Plans/Directives:   Advanced Care Plan/Directives on file:  Yes    Status of Document(s): On File and Validated    Advanced Care Plan/Directives Type: Advanced Directive - On File       My Medical and Care Information  Problem List   Patient Active Problem List   Diagnosis    Generalized osteoarthrosis, unspecified site    Esophageal reflux    Acquired hypothyroidism    Hypersomnia with sleep apnea    Essential hypertension, benign    Hyperlipidemia LDL goal <130    Chest pain syndrome    Iron and its compounds causing adverse effect in therapeutic use(E934.0)    Overactive bladder    Pelvic floor dysfunction    H/O small bowel obstruction    Chronic kidney disease, stage 3a (H)    Status post coronary angiogram    Aortic stenosis, severe    Diabetes mellitus due to underlying condition, controlled, with hyperglycemia, without long-term current use of insulin (H)    Complete heart block (H)    Cardiac pacemaker in situ    Diastolic heart failure (H)    Chronic atrial fibrillation (H)    History of anemia    Shah's esophagus without dysplasia    Class 2 severe obesity due to excess calories with serious comorbidity in adult (H)    S/P TAVR (transcatheter aortic valve replacement)    Fall from slip, trip, or stumble, initial encounter    Stress incontinence in female    Urge incontinence of urine      Current Medications:  We reviewed your medications today. It will be important to review this with your provider at your next clinic visit.    Care Coordination Start Date: 2/10/2025   Frequency of Care Coordination: 2 weeks, more frequently as needed     Form Last Updated: 02/11/2025

## 2025-02-10 NOTE — TELEPHONE ENCOUNTER
Called the pt - her back is a little better.  Advised of below regarding eliquis.  She said she has a lot going on with diabetes.  She is overwhelmed.  She may put the injection off for a month or two until she gets things straightened out.  She is going to talk it over with the doctor who was going to do the injection.      Was going to see if she wanted to have a Care Coordination referral placed.  Looks like Jessy Angel, Care Coordinator has a noted in the pts chart.  Advised the pt it looks like she will be reaching out and to try to touch base with her if she can.      Will forward FYI to Dr. Randle.

## 2025-02-10 NOTE — TELEPHONE ENCOUNTER
"ANTICOAGULATION  MANAGEMENT: Discharge Review    Lexii Stratton chart reviewed for anticoagulation continuity of care    Hospital Admission on 2/5-2/8/25 for Severe hyperglycemia; fall    Discharge disposition: Home    Results:    No results for input(s): \"INR\", \"[id number]\", \"F2\", \"ALMWH\", \"AAUFH\" in the last 168 hours.  Anticoagulation inpatient management:     Patient still taking apixaban, plans to transition to warfarin 03/2025.    Anticoagulation discharge instructions:     Warfarin dosing:  patient has NOT transitioned to warfarin, yet   Bridging: No   INR goal change: No      Medication changes affecting anticoagulation: Yes: tylenol 1000 mg twice daily     Additional factors affecting anticoagulation: Yes: patient to transition from apixaban to warfarin, [facility] spoke to patient, she reports she has 12 days of eliquis left, she reports she can no longer afford warfarin.   PLAN     No adjustment to anticoagulation plan needed    Spoke with Lexii    Anticoagulation Calendar updated    Beverly Weller, RN  2/10/2025  Anticoagulation Clinic  [facility] for routing messages: yohan RUGGIERO  [facility] patient phone line: [phone]    "

## 2025-02-10 NOTE — TELEPHONE ENCOUNTER
Unable to LVM (No VM Box) to call back for an appointment. Two more attempts will be made.     Silke Santacruz  Lead   [facility] [place]

## 2025-02-11 LAB
BACTERIA BLD CULT: NO GROWTH
BACTERIA BLD CULT: NO GROWTH

## 2025-02-11 NOTE — TELEPHONE ENCOUNTER
Moved appointment to 2/18/25. Patient was unsure on if she would have a ride to have appointment on 2/12/25.     Routing to provider as FYI.     Silke Santacruz  Lead   [facility] [place]

## 2025-02-18 ENCOUNTER — ANCILLARY PROCEDURE (OUTPATIENT)
Dept: GENERAL RADIOLOGY | Facility: CLINIC | Age: 85
End: 2025-02-18
Attending: STUDENT IN AN ORGANIZED HEALTH CARE EDUCATION/TRAINING PROGRAM
Payer: COMMERCIAL

## 2025-02-18 ENCOUNTER — OFFICE VISIT (OUTPATIENT)
Dept: FAMILY MEDICINE | Facility: CLINIC | Age: 85
End: 2025-02-18
Payer: COMMERCIAL

## 2025-02-18 ENCOUNTER — TELEPHONE (OUTPATIENT)
Dept: CARDIOLOGY | Facility: CLINIC | Age: 85
End: 2025-02-18

## 2025-02-18 ENCOUNTER — ANTICOAGULATION THERAPY VISIT (OUTPATIENT)
Dept: ANTICOAGULATION | Facility: CLINIC | Age: 85
End: 2025-02-18

## 2025-02-18 VITALS
HEART RATE: 78 BPM | OXYGEN SATURATION: 99 % | SYSTOLIC BLOOD PRESSURE: 118 MMHG | HEIGHT: 64 IN | TEMPERATURE: 97.6 F | DIASTOLIC BLOOD PRESSURE: 76 MMHG | RESPIRATION RATE: 18 BRPM | BODY MASS INDEX: 35 KG/M2 | WEIGHT: 205 LBS

## 2025-02-18 DIAGNOSIS — R06.09 DOE (DYSPNEA ON EXERTION): ICD-10-CM

## 2025-02-18 DIAGNOSIS — B37.0 THRUSH: ICD-10-CM

## 2025-02-18 DIAGNOSIS — Z95.2 S/P TAVR (TRANSCATHETER AORTIC VALVE REPLACEMENT): ICD-10-CM

## 2025-02-18 DIAGNOSIS — R13.14 PHARYNGOESOPHAGEAL DYSPHAGIA: ICD-10-CM

## 2025-02-18 DIAGNOSIS — Z09 HOSPITAL DISCHARGE FOLLOW-UP: Primary | ICD-10-CM

## 2025-02-18 DIAGNOSIS — I48.20 CHRONIC ATRIAL FIBRILLATION (H): Primary | ICD-10-CM

## 2025-02-18 DIAGNOSIS — R25.1 EPISODES OF TREMBLING: ICD-10-CM

## 2025-02-18 DIAGNOSIS — E11.65 TYPE 2 DIABETES MELLITUS WITH HYPERGLYCEMIA, WITHOUT LONG-TERM CURRENT USE OF INSULIN (H): ICD-10-CM

## 2025-02-18 DIAGNOSIS — I48.20 CHRONIC ATRIAL FIBRILLATION (H): ICD-10-CM

## 2025-02-18 LAB
ALBUMIN SERPL BCG-MCNC: 4 G/DL (ref 3.5–5.2)
ALP SERPL-CCNC: 102 U/L (ref 40–150)
ALT SERPL W P-5'-P-CCNC: 29 U/L (ref 0–50)
ANION GAP SERPL CALCULATED.3IONS-SCNC: 19 MMOL/L (ref 7–15)
AST SERPL W P-5'-P-CCNC: 27 U/L (ref 0–45)
BILIRUB DIRECT SERPL-MCNC: 0.3 MG/DL (ref 0–0.3)
BILIRUB SERPL-MCNC: 0.7 MG/DL
BUN SERPL-MCNC: 43.2 MG/DL (ref 8–23)
CALCIUM SERPL-MCNC: 10.4 MG/DL (ref 8.8–10.4)
CHLORIDE SERPL-SCNC: 102 MMOL/L (ref 98–107)
CREAT SERPL-MCNC: 1.26 MG/DL (ref 0.51–0.95)
EGFRCR SERPLBLD CKD-EPI 2021: 42 ML/MIN/1.73M2
GLUCOSE SERPL-MCNC: 171 MG/DL (ref 70–99)
HCO3 SERPL-SCNC: 16 MMOL/L (ref 22–29)
INR BLD: 1 (ref 0.9–1.1)
MAGNESIUM SERPL-MCNC: 1.2 MG/DL (ref 1.7–2.3)
NT-PROBNP SERPL-MCNC: 451 PG/ML (ref 0–1800)
PHOSPHATE SERPL-MCNC: 2.9 MG/DL (ref 2.5–4.5)
POTASSIUM SERPL-SCNC: 4.8 MMOL/L (ref 3.4–5.3)
PROT SERPL-MCNC: 6.9 G/DL (ref 6.4–8.3)
SODIUM SERPL-SCNC: 137 MMOL/L (ref 135–145)
TSH SERPL DL<=0.005 MIU/L-ACNC: 2.6 UIU/ML (ref 0.3–4.2)

## 2025-02-18 PROCEDURE — 36416 COLLJ CAPILLARY BLOOD SPEC: CPT | Performed by: STUDENT IN AN ORGANIZED HEALTH CARE EDUCATION/TRAINING PROGRAM

## 2025-02-18 PROCEDURE — 82248 BILIRUBIN DIRECT: CPT | Performed by: STUDENT IN AN ORGANIZED HEALTH CARE EDUCATION/TRAINING PROGRAM

## 2025-02-18 PROCEDURE — 36415 COLL VENOUS BLD VENIPUNCTURE: CPT | Performed by: STUDENT IN AN ORGANIZED HEALTH CARE EDUCATION/TRAINING PROGRAM

## 2025-02-18 PROCEDURE — 83880 ASSAY OF NATRIURETIC PEPTIDE: CPT | Performed by: STUDENT IN AN ORGANIZED HEALTH CARE EDUCATION/TRAINING PROGRAM

## 2025-02-18 PROCEDURE — 84443 ASSAY THYROID STIM HORMONE: CPT | Performed by: STUDENT IN AN ORGANIZED HEALTH CARE EDUCATION/TRAINING PROGRAM

## 2025-02-18 PROCEDURE — 84100 ASSAY OF PHOSPHORUS: CPT | Performed by: STUDENT IN AN ORGANIZED HEALTH CARE EDUCATION/TRAINING PROGRAM

## 2025-02-18 PROCEDURE — 83735 ASSAY OF MAGNESIUM: CPT | Performed by: STUDENT IN AN ORGANIZED HEALTH CARE EDUCATION/TRAINING PROGRAM

## 2025-02-18 PROCEDURE — 71046 X-RAY EXAM CHEST 2 VIEWS: CPT | Mod: TC | Performed by: RADIOLOGY

## 2025-02-18 PROCEDURE — 85610 PROTHROMBIN TIME: CPT | Performed by: STUDENT IN AN ORGANIZED HEALTH CARE EDUCATION/TRAINING PROGRAM

## 2025-02-18 PROCEDURE — 80053 COMPREHEN METABOLIC PANEL: CPT | Performed by: STUDENT IN AN ORGANIZED HEALTH CARE EDUCATION/TRAINING PROGRAM

## 2025-02-18 RX ORDER — HYDROCHLOROTHIAZIDE 12.5 MG/1
CAPSULE ORAL
Qty: 6 EACH | Refills: 3 | Status: SHIPPED | OUTPATIENT
Start: 2025-02-18

## 2025-02-18 RX ORDER — KETOROLAC TROMETHAMINE 30 MG/ML
1 INJECTION, SOLUTION INTRAMUSCULAR; INTRAVENOUS ONCE
Qty: 1 EACH | Refills: 0 | Status: SHIPPED | OUTPATIENT
Start: 2025-02-18 | End: 2025-02-18

## 2025-02-18 ASSESSMENT — PAIN SCALES - GENERAL: PAINLEVEL_OUTOF10: NO PAIN (0)

## 2025-02-18 NOTE — TELEPHONE ENCOUNTER
"Received message from patient stating she saw PMD today. Pt was advised to make an OV with Cardiology d/t ongoing SOB.     See  OV notes for details.   \"BAL (dyspnea on exertion)  S/P TAVR (transcatheter aortic valve replacement)  Noting worsening BAL over the past couple of months. Last echo on 10/2024 with LVEF of 60-65% and mild pulmonary hypertension which was new. Last coronary angiogram in 2022 with LAD at 15% stenosis. Does follow with Cardiology and discussed BAL at last appt.  Recommendation was to try Lasix and follow up in 2 weeks to reassess. Per review, did not follow up. Discussed recommendation to follow up with Cardiology to rule out cardiac causes.  In meantime, plan to obtain additional lab work and imaging to rule out alternative etiologies. Could consider spirometry at next visit.  - XR Chest 2 Views  - BNP-N terminal pro  - TSH with free T4 reflex  - CBC with diff\"    Per 11/20/24 notes, pt was started on Lasix for 2 weeks to see if this helped her BAL, wt gain. Wt did improved but BAL was the same. Lasix was then stopped and advised to use PRN for leg swelling, BAL and wt gain of 5 lbs in a week.     Lasix was discontinued on 12/10/24 at PCP's office visit w/ . Reason is unclear.     Called pt to review.   Wt today in clinic 205 # with clothes.   Home wt around 198#  BAL is worse than when she was last seen in clinic 11/4/24. Pt would like to see her Cardiologist . pt scheduled for 3/3/25 with .     Denies CP, palpitations, weakness.     Will route to Kathy Olvera CNP to advise if any testing or recommendations at this time. Pt is aware when to report to ER if sxs worsen or she develops additional sxs.      Samantha CONTRERAS  [facility]    "

## 2025-02-18 NOTE — PATIENT INSTRUCTIONS
Please follow up with Cardiology for your worsening shortness of breath.  In the meantime, I will order some labs and a chest xray to further evaluate other potential causes.    For your diabetes, please continue taking the 15U of insulin daily along with the metformin.   I have ordered the CGM (Janneth 3) to help determine what our next steps are for you diabetic management.     For thrush, please start the clotrimazole troches and complete entire course of medication.     For the trembling, I am ordering some labs to evaluate further.     Let's have you follow up in 1-2 weeks to review the CGM.    For your difficulty swallowing, I have ordered the endoscopy to be done more urgently. Information is below. 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  Call member services at your health plan with any benefit or coverage questions.   [facility] will call you to coordinate your care as prescribed by the provider.  If you don t hear from a representative within 2 business days, please call [phone].     It was good to see you.    Dr. Jones

## 2025-02-18 NOTE — PROGRESS NOTES
Assessment & Plan     Hospital discharge follow-up  Type 2 diabetes mellitus with hyperglycemia, without long-term current use of insulin (H)  Stable since hospital discharge. On 15U daily insulin and max dose metformin. Fasting blood sugars not yet at goal but just recently started on metformin so will hold steady on diabetic regimen for now. Plan to provide CGM and follow up closely (1-2 weeks) to adjust insulin at that time. Has upcoming appt with diabetic educator as well.  - Continuous Glucose  (FREESTYLE AURELIA 3 READER) IAN  Dispense: 1 each; Refill: 0  - Continuous Glucose Sensor (FREESTYLE AURELIA 3 PLUS SENSOR) MISC  Dispense: 6 each; Refill: 3    BAL (dyspnea on exertion)  S/P TAVR (transcatheter aortic valve replacement)  Noting worsening BAL over the past couple of months. Last echo on 10/2024 with LVEF of 60-65% and mild pulmonary hypertension which was new. Last coronary angiogram in 2022 with LAD at 15% stenosis. Does follow with Cardiology and discussed BAL at last appt.  Recommendation was to try Lasix and follow up in 2 weeks to reassess. Per review, did not follow up. Discussed recommendation to follow up with Cardiology to rule out cardiac causes.  In meantime, plan to obtain additional lab work and imaging to rule out alternative etiologies. Could consider spirometry at next visit.  - XR Chest 2 Views  - BNP-N terminal pro  - TSH with free T4 reflex  - CBC with diff    Chronic atrial fibrillation (H)  - INR point of care (finger stick)    Thrush  Not cleared. Patient has full prescription of clotrimazole troches at home. Discussed starting.     Episodes of trembling  Unclear etiology. Does not correlate with low blood sugars. Plan to start with labs as below.   - Basic metabolic panel  (Ca, Cl, CO2, Creat, Gluc, K, Na, BUN)  - Magnesium  - TSH with free T4 reflex    Pharyngoesophageal dysphagia  GERD symptoms have improved now. EGD set up for 5/2025 however with new dysphagia, changed  "referral to priority on 2/5/25. Provided number to patient .     MED REC REQUIRED  Post Medication Reconciliation Status: discharge medications reconciled and changed, per note/orders  BMI  Estimated body mass index is 35.19 kg/m  as calculated from the following:    Height as of this encounter: 1.626 m (5' 4\").    Weight as of this encounter: 93 kg (205 lb).     Follow up in 1-2 weeks to review CGM and adjust insulin as needed    Robert Randle MD  [facility]  2/18/2025    Melyssa Shultz is a 84 year old, presenting for the following health issues:  Hospital F/U        2/18/2025     9:14 AM   Additional Questions   Roomed by Shahnaz PICHARDO     History of Present Illness       Reason for visit:  Follow up to hospitalizaation   She is taking medications regularly.           8/20/2021 7/8/2022 11/10/2022 2/10/2025   Post Discharge Outreach   Admission Date 8/16/2021 7/3/2022 11/8/2022    Reason for Admission recurrent small bowel obstruction abdominal pain Aortic stenosis, severe    Discharge Date 8/19/2021 7/7/2022 11/9/2022    Discharge Diagnosis recurrent SBO Mechanical SBO Severe aortic stenosis S/P TAVR (11/8/22),  Postprocedural CHB s/p temporary pacemaker,   Hypertension,  Chronic diastolic CHF, not in acute exacerbation,   Diet controlled T2DM,   GERD,   Hypothyroidism,  CKD II    How are you doing now that you are home? Mild fever last night, took some tylenol and is feeling a lot better. Patient is doing well, nervous to eat but has had SBO before and aware of diet recommendation \"Well I had a very very good nights sleep which felt wonderful. I just took a walk because it's nice outside. I probably walked about 8 minutes with the walker.\" \"having trouble using this machine, used up four strips, when I put my finger to the strip it just gives me an error message\"   How are your symptoms? (Red Flag symptoms escalate to triage hotline per guidelines) Improved Improved Improved Improved "   Do you feel your condition is stable enough to be safe at home until your provider visit? Yes Yes Yes    Does the patient have their discharge instructions?  Yes Yes Yes Yes   Does the patient have questions regarding their discharge instructions?  No No No No   Were you started on any new medications or were there changes to any of your previous medications?  No No Yes Yes   Does the patient have all of their medications? Yes Yes Yes Yes   Do you have questions regarding any of your medications?  No No No No   Do you have all of your needed medical supplies or equipment (DME)?  (i.e. oxygen tank, CPAP, cane, etc.) Yes Yes Yes Yes   Discharge follow-up appointment scheduled within 14 calendar days?  Yes No Yes    Discharge Follow Up Appointment Date 8/26/2021 11/21/2022 2/18/2025   Discharge Follow Up Appointment Scheduled with? Primary Care Provider  Primary Care Provider Primary Care Provider       Hospital Follow-up Visit:    Hospital/Nursing Home/IP Rehab Facility: [facility]  Date of Admission: 2/5/25  Date of Discharge: 2/8/25  Reason(s) for Admission:   Type 2 diabetes, new diagnosis  Severe hyperglycemia with hyperosmolar nonketotic state  Hyperkalemia   History atrial fibrillation  Chronic anticoagulation  Mechanical fall on ice  Essential Hypertension  Hyperlipidemia  Dyslipidemia  Thrush   Was the patient in the ICU or did the patient experience delirium during hospitalization?  No  Do you have any other stressors you would like to discuss with your provider? Health Concerns - diabetic management     Problems taking medications regularly:  None  Medication changes since discharge:   nystatin (MYCOSTATIN) 313254 UNIT/ML suspension,   metFORMIN (GLUCOPHAGE) 500 MG tablet,   insulin pen needle (BD BRIANNA U/F) 32G X 4 MM miscellaneous,   insulin glargine (LANTUS PEN) 100 UNIT/ML pen,   blood glucose monitoring (NO BRAND SPECIFIED) meter device kit,   blood glucose calibration (NO BRAND  "SPECIFIED) solution,   blood glucose (NO BRAND SPECIFIED) test strip,   blood glucose (NO BRAND SPECIFIED) lancets standard   Problems adhering to non-medication therapy:  None    Summary of hospitalization:  [facility] hospital discharge summary reviewed  Diagnostic Tests/Treatments reviewed.  Follow up needed: none  Other Healthcare Providers Involved in Patient s Care:         Specialist appointment - diabetic educator  Update since discharge: stable    Since then, feeling weak but stable.    First time getting out of the house since hospitalization.   Has gotten really shaky since then. No low blood sugars (under 113) at all.    Blood sugars: Fasting:  3/8/25   182  3/9/25  162  3/10  162  3/11  153  3/12  179   3/13  169  3/14  175  3/15  113    136    182    Is taking 15U of insulin daily. Lowest blood sugar was 113.   Started on metformin as well, 2 tablets in morning and 2 at night.   Tolerating well. No longer thirsty or urinating all the time.     BAL  Has been really short of breath sometimes. Has to sit down. No issues while resting. But significant dyspnea on exertion. No chest pain. First noticed even before hospitalization. No coughing at all. No nausea or vomiting. Does have very little appetite.     Was walking 2 miles 3x a week during the summer. Was doing so up until January. Now she is cm if she makes it from living room to bathroom. No swelling at all. Sleeps flat in bed for about half the night. No orthopnea or PND.     Dysphagia  No longer having the burning but still feels like the food gets stuck low in esophagus.     Stable since hospitalization, no worse but not a whole lot better.     Plan of care communicated with patient         Objective    /76 (BP Location: Right arm, Patient Position: Sitting, Cuff Size: Adult Large)   Pulse 78   Temp 97.6  F (36.4  C) (Oral)   Resp 18   Ht 1.626 m (5' 4\")   Wt 93 kg (205 lb)   LMP  (LMP Unknown)   SpO2 99%   BMI 35.19 kg/m  "   Body mass index is 35.19 kg/m .    Physical Exam   GENERAL: healthy, alert and no distress  HEAD: Normocephalic, atraumatic.   EYES: PERRL. Normal conjunctivae, sclera.   ENT: Normal EAC and TMs bilaterally. White plaque over surface of tongue and posterior oropharynx.   NECK: Supple. No lymphadenopathy appreciated. Trachea midline.   RESP: lungs clear to auscultation - no rales, rhonchi or wheezes  CV: No obvious JVD appreciated. No hepatojugular reflex. Regular rate and rhythm, normal S1 S2, no murmur, click, rub or gallop.  2+ pitting edema up to mid-calves bilaterally.   ABDOMEN: soft, no TTP x4 quadrants. No hepatomegaly or masses appreciated. BS normactive.  MSK: no gross musculoskeletal defects noted.  SKIN: no suspicious lesions or rashes.  EXT: Warm and well perfused.   NEURO: CNII-XII grossly intact. No focal deficits.  PSYCH: Groomed, dressed appropriately for weather.  Logical, linear thought process. Normal mood with consistent affect.     Signed Electronically by: Robert Randle MD

## 2025-02-18 NOTE — PROGRESS NOTES
ANTICOAGULATION MANAGEMENT     Lexii MERCADO [place] 84 year old female is on warfarin with subtherapeutic INR result. (Goal INR 2.0-3.0)    Recent labs: (last 7 days)     02/18/25  0914   INR 1.0       ASSESSMENT     Source(s): Chart Review and Patient/Caregiver Call     Warfarin doses taken: Discussed with patient she will begin warfarin 2/22/25 as advised on 2/10/25, see the New Enroll Telephone Encounter for details.  Previous result:  Today is a baseline INR  Additional findings:  Patient currently taking Eliquis, will transition to warfarin 2/22/05       PLAN     Recommended plan for no diet, medication or health factor changes affecting INR     Dosing Instructions: Start warfarin on 2/22/25 with next INR in 1 week from today, which will be 3 days after starting warfarin       Summary  As of 2/18/2025      Full warfarin instructions:  2/22: 6 mg; Otherwise 4 mg every day; Starting 2/22/2025   Next INR check:  2/25/2025               Telephone call with Lexii who verbalizes understanding and agrees to plan    Lab visit scheduled    Education provided: Please call back if any changes to your diet, medications or how you've been taking warfarin  Contact [phone] with any changes, questions or concerns.     Plan made per discussion with patient 2/10/25, see that telephone encounter for warfarin dosing plan    Radha Salas RN  2/18/2025  Anticoagulation Clinic  [employer] [place] for routing messages: yohan RUGGIERO  [facility] patient phone line: [phone]        _______________________________________________________________________     Anticoagulation Episode Summary       Current INR goal:  Other - see comment   TTR:  --   Target end date:  Indefinite   Send INR reminders to:  ANICETO RUGGIERO    Indications    Chronic atrial fibrillation (H) [I48.20]  S/P TAVR (transcatheter aortic valve replacement) [Z95.2]             Comments:  --             Anticoagulation Care Providers       Provider Role Specialty  Phone number    Robert Randle MD Referring Family Medicine [phone]

## 2025-02-19 ENCOUNTER — DOCUMENTATION ONLY (OUTPATIENT)
Dept: PEDIATRICS | Facility: CLINIC | Age: 85
End: 2025-02-19
Payer: COMMERCIAL

## 2025-02-19 ENCOUNTER — DOCUMENTATION ONLY (OUTPATIENT)
Dept: ANTICOAGULATION | Facility: CLINIC | Age: 85
End: 2025-02-19

## 2025-02-19 ENCOUNTER — HOSPITAL ENCOUNTER (EMERGENCY)
Facility: CLINIC | Age: 85
Discharge: HOME | End: 2025-02-19
Attending: EMERGENCY MEDICINE | Admitting: EMERGENCY MEDICINE
Payer: COMMERCIAL

## 2025-02-19 ENCOUNTER — TELEPHONE (OUTPATIENT)
Dept: FAMILY MEDICINE | Facility: CLINIC | Age: 85
End: 2025-02-19
Payer: COMMERCIAL

## 2025-02-19 VITALS
BODY MASS INDEX: 35.34 KG/M2 | DIASTOLIC BLOOD PRESSURE: 72 MMHG | RESPIRATION RATE: 22 BRPM | HEART RATE: 92 BPM | OXYGEN SATURATION: 98 % | SYSTOLIC BLOOD PRESSURE: 135 MMHG | TEMPERATURE: 97.1 F | WEIGHT: 205.91 LBS

## 2025-02-19 DIAGNOSIS — N17.9 AKI (ACUTE KIDNEY INJURY): ICD-10-CM

## 2025-02-19 DIAGNOSIS — E83.42 HYPOMAGNESEMIA: ICD-10-CM

## 2025-02-19 LAB
ANION GAP SERPL CALCULATED.3IONS-SCNC: 13 MMOL/L (ref 7–15)
ATRIAL RATE - MUSE: 92 BPM
BASOPHILS # BLD AUTO: 0 10E3/UL (ref 0–0.2)
BASOPHILS NFR BLD AUTO: 0 %
BUN SERPL-MCNC: 39 MG/DL (ref 8–23)
CALCIUM SERPL-MCNC: 10.3 MG/DL (ref 8.8–10.4)
CHLORIDE SERPL-SCNC: 103 MMOL/L (ref 98–107)
CREAT SERPL-MCNC: 0.99 MG/DL (ref 0.51–0.95)
CREAT SERPL-MCNC: 1.03 MG/DL (ref 0.51–0.95)
DIASTOLIC BLOOD PRESSURE - MUSE: NORMAL MMHG
EGFRCR SERPLBLD CKD-EPI 2021: 53 ML/MIN/1.73M2
EGFRCR SERPLBLD CKD-EPI 2021: 56 ML/MIN/1.73M2
EOSINOPHIL # BLD AUTO: 0.1 10E3/UL (ref 0–0.7)
EOSINOPHIL NFR BLD AUTO: 1 %
ERYTHROCYTE [DISTWIDTH] IN BLOOD BY AUTOMATED COUNT: 13.5 % (ref 10–15)
GLUCOSE SERPL-MCNC: 213 MG/DL (ref 70–99)
HCO3 SERPL-SCNC: 21 MMOL/L (ref 22–29)
HCT VFR BLD AUTO: 39.8 % (ref 35–47)
HGB BLD-MCNC: 13.3 G/DL (ref 11.7–15.7)
HOLD SPECIMEN: NORMAL
HOLD SPECIMEN: NORMAL
IMM GRANULOCYTES # BLD: 0.1 10E3/UL
IMM GRANULOCYTES NFR BLD: 1 %
INTERPRETATION ECG - MUSE: NORMAL
LYMPHOCYTES # BLD AUTO: 1.2 10E3/UL (ref 0.8–5.3)
LYMPHOCYTES NFR BLD AUTO: 12 %
MAGNESIUM SERPL-MCNC: 1.3 MG/DL (ref 1.7–2.3)
MAGNESIUM SERPL-MCNC: 1.9 MG/DL (ref 1.7–2.3)
MCH RBC QN AUTO: 31.4 PG (ref 26.5–33)
MCHC RBC AUTO-ENTMCNC: 33.4 G/DL (ref 31.5–36.5)
MCV RBC AUTO: 94 FL (ref 78–100)
MONOCYTES # BLD AUTO: 0.7 10E3/UL (ref 0–1.3)
MONOCYTES NFR BLD AUTO: 6 %
NEUTROPHILS # BLD AUTO: 8.2 10E3/UL (ref 1.6–8.3)
NEUTROPHILS NFR BLD AUTO: 80 %
NRBC # BLD AUTO: 0 10E3/UL
NRBC BLD AUTO-RTO: 0 /100
NT-PROBNP SERPL-MCNC: 336 PG/ML (ref 0–1800)
P AXIS - MUSE: 20 DEGREES
PLATELET # BLD AUTO: 146 10E3/UL (ref 150–450)
POTASSIUM SERPL-SCNC: 5.1 MMOL/L (ref 3.4–5.3)
PR INTERVAL - MUSE: 202 MS
QRS DURATION - MUSE: 88 MS
QT - MUSE: 336 MS
QTC - MUSE: 415 MS
R AXIS - MUSE: -34 DEGREES
RBC # BLD AUTO: 4.23 10E6/UL (ref 3.8–5.2)
SODIUM SERPL-SCNC: 137 MMOL/L (ref 135–145)
SYSTOLIC BLOOD PRESSURE - MUSE: NORMAL MMHG
T AXIS - MUSE: 43 DEGREES
VENTRICULAR RATE- MUSE: 92 BPM
WBC # BLD AUTO: 10.3 10E3/UL (ref 4–11)

## 2025-02-19 PROCEDURE — 93005 ELECTROCARDIOGRAM TRACING: CPT

## 2025-02-19 PROCEDURE — 96365 THER/PROPH/DIAG IV INF INIT: CPT

## 2025-02-19 PROCEDURE — 250N000011 HC RX IP 250 OP 636: Performed by: EMERGENCY MEDICINE

## 2025-02-19 PROCEDURE — 85025 COMPLETE CBC W/AUTO DIFF WBC: CPT | Performed by: EMERGENCY MEDICINE

## 2025-02-19 PROCEDURE — 83735 ASSAY OF MAGNESIUM: CPT | Performed by: EMERGENCY MEDICINE

## 2025-02-19 PROCEDURE — 82565 ASSAY OF CREATININE: CPT | Performed by: EMERGENCY MEDICINE

## 2025-02-19 PROCEDURE — 83880 ASSAY OF NATRIURETIC PEPTIDE: CPT | Performed by: EMERGENCY MEDICINE

## 2025-02-19 PROCEDURE — 36415 COLL VENOUS BLD VENIPUNCTURE: CPT | Performed by: EMERGENCY MEDICINE

## 2025-02-19 PROCEDURE — 258N000003 HC RX IP 258 OP 636: Performed by: EMERGENCY MEDICINE

## 2025-02-19 PROCEDURE — 99284 EMERGENCY DEPT VISIT MOD MDM: CPT | Mod: 25

## 2025-02-19 PROCEDURE — 80048 BASIC METABOLIC PNL TOTAL CA: CPT | Performed by: EMERGENCY MEDICINE

## 2025-02-19 RX ORDER — MAGNESIUM SULFATE HEPTAHYDRATE 40 MG/ML
2 INJECTION, SOLUTION INTRAVENOUS ONCE
Status: COMPLETED | OUTPATIENT
Start: 2025-02-19 | End: 2025-02-19

## 2025-02-19 RX ADMIN — SODIUM CHLORIDE 1000 ML: 9 INJECTION, SOLUTION INTRAVENOUS at 12:46

## 2025-02-19 RX ADMIN — MAGNESIUM SULFATE HEPTAHYDRATE 2 G: 40 INJECTION, SOLUTION INTRAVENOUS at 12:45

## 2025-02-19 ASSESSMENT — ACTIVITIES OF DAILY LIVING (ADL)
ADLS_ACUITY_SCORE: 57

## 2025-02-19 ASSESSMENT — COLUMBIA-SUICIDE SEVERITY RATING SCALE - C-SSRS
1. IN THE PAST MONTH, HAVE YOU WISHED YOU WERE DEAD OR WISHED YOU COULD GO TO SLEEP AND NOT WAKE UP?: NO
6. HAVE YOU EVER DONE ANYTHING, STARTED TO DO ANYTHING, OR PREPARED TO DO ANYTHING TO END YOUR LIFE?: NO
2. HAVE YOU ACTUALLY HAD ANY THOUGHTS OF KILLING YOURSELF IN THE PAST MONTH?: NO

## 2025-02-19 NOTE — ED PROVIDER NOTES
Emergency Department Note      History of Present Illness     Chief Complaint   Abnormal Labs      HPI   Lexii Stratton is a 84 year old female on Eliquis with history of hypertension and hyperlipidemia who presents to the ED with abnormal labs. Patient was seen for follow up yesterday after her admission here two weeks ago when they found kidney function elevated and abnormal magnesium labs and referred her here. She states she tends to be more constipated but has had looser stools the last few days. She takes an OTC Mg tablet. Patient reports chronic shortness of breath that has not acutely changed.  Has seen cardiology before for it and trialed a diuretic but it did not help so she was told to stop it.  Denies nausea, vomiting, and diarrhea. She had an Echo before November and has an appointment on 3/5/25 with Cardiology.  She had a CXR with her primary yesterday.    Independent Historian   None    Review of External Notes   None    Past Medical History     Medical History and Problem List   Aortic valve stenosis  Arthritis  Diabetes type 2  GERD  Abdominal hernia   Hypertension   Mumps   Obesity   Osteoarthritis   Hyperlipidemia   Chronic pain   Peptic ulcer   Small bowel obstruction   Thyroid disease  Urinary incontinence  Chronic afib   Shah's esophagus   Stage 3 CKD   Anemia     Medications   Norvasc  Amoxil  Eliquis   Lantus pen   Xalatan   Levothyroxine   Zestril   Metformin   Protonix   Zocor     Surgical History   Right hip arthroplasty   Abdomen surgery   Hernia repair   Cholecystectomy  Colonoscopy x2  EGD  ENT surgery  GYN surgery  Abdomen surgery  Cystoscopy  Bladder surgery  Left eye surgery  Orthopedic surgery  Sacral nerve stage two and one implant stimulator   Abdomen surgery  Biopsy  Breast surgery   Coronary angiogram  TAVR   Pacemaker device and lead implant   Small bowel resection   Suture suspensionplasty thumb   Interposition tendon hand       Physical Exam     Patient Vitals for the  past 24 hrs:   BP Temp Temp src Pulse Resp SpO2 Weight   02/19/25 1118 135/72 -- -- -- -- -- --   02/19/25 1115 -- 97.1  F (36.2  C) Temporal 92 22 98 % --   02/19/25 1110 -- -- -- -- -- -- 93.4 kg (205 lb 14.6 oz)     Physical Exam  Eyes:  Sclera white; Pupils are equal and round  ENT:    External ears and nares normal  CV:  Rate as above with regular rhythm   Resp:  Breath sounds clear and equal bilaterally    Non-labored, no retractions or accessory muscle use  GI:  Abdomen is soft, non-tender, non-distended    No rebound tenderness or peritoneal features  MS:  Moves all extremities  Skin:  Warm and dry  Neuro:  Speech is normal and fluent. No apparent deficit.      Diagnostics     Lab Results   Labs Ordered and Resulted from Time of ED Arrival to Time of ED Departure   BASIC METABOLIC PANEL - Abnormal       Result Value    Sodium 137      Potassium 5.1      Chloride 103      Carbon Dioxide (CO2) 21 (*)     Anion Gap 13      Urea Nitrogen 39.0 (*)     Creatinine 1.03 (*)     GFR Estimate 53 (*)     Calcium 10.3      Glucose 213 (*)    MAGNESIUM - Abnormal    Magnesium 1.3 (*)    CBC WITH PLATELETS AND DIFFERENTIAL - Abnormal    WBC Count 10.3      RBC Count 4.23      Hemoglobin 13.3      Hematocrit 39.8      MCV 94      MCH 31.4      MCHC 33.4      RDW 13.5      Platelet Count 146 (*)     % Neutrophils 80      % Lymphocytes 12      % Monocytes 6      % Eosinophils 1      % Basophils 0      % Immature Granulocytes 1      NRBCs per 100 WBC 0      Absolute Neutrophils 8.2      Absolute Lymphocytes 1.2      Absolute Monocytes 0.7      Absolute Eosinophils 0.1      Absolute Basophils 0.0      Absolute Immature Granulocytes 0.1      Absolute NRBCs 0.0     CREATININE - Abnormal    Creatinine 0.99 (*)     GFR Estimate 56 (*)    NT PROBNP INPATIENT - Normal    N terminal Pro BNP Inpatient 336     MAGNESIUM - Normal    Magnesium 1.9         Imaging   No orders to display       EKG   ECG taken at 1128, ECG read at  1147  Normal sinus rhythm   Left axis deviation  Minimal voltage criteria for LVH, may be normal variant    No change as compared to prior, dated 2/5/25.  Rate 92 bpm. NM interval 202 ms. QRS duration 88 ms. QT/QTc 336/415 ms. P-R-T axes 20 -34 43.    Independent Interpretation   I reviewed her chest x-ray images from yesterday which was read as no focal airspace opacities, pleural effusion, pulmonary edema or pneumothorax.  I agree with these findings.    ED Course      Medications Administered   Medications   sodium chloride 0.9% BOLUS 1,000 mL (0 mLs Intravenous Stopped 2/19/25 1343)   magnesium sulfate 2 g in 50 mL sterile water intermittent infusion (0 g Intravenous Stopped 2/19/25 1343)       Procedures   Procedures     Discussion of Management   None    ED Course   ED Course as of 02/19/25 1240   Wed Feb 19, 2025   1148 I obtained history and examined the patient as noted above.        Additional Documentation  None    Medical Decision Making / Diagnosis     CMS Diagnoses: None    MIPS       None    MDM   Renal function yesterday was definitely above her baseline.  She had previously had lab values similarly at the beginning of the month which normalized at the end of her admission.  Magnesium is low and supplementation of both was ordered IV.  Labs were rechecked.  Renal function is already improving compared to yesterday and the magnesium values were in the normal range after IV supplementation.  She needs to improve her intake of oral fluids which was discussed.  She is already on over-the-counter magnesium supplementation and will increase this to twice a day.  If she is having loose stools or diarrhea with this then an alternate form of magnesium could be prescribed.  Her primary care and had her stop her metformin yesterday.  I do not know if the stopping metformin was the reason her kidney function was already improving today.  At this point I think it is wise to continue to have her hold it today and  tomorrow while she continues to hydrate at home.  Restarted over the weekend.  Have labs rechecked next week.  If renal function is worsening again then another medication for diabetes may be required.    Disposition   The patient was discharged.     Diagnosis     ICD-10-CM    1. JEFRY (acute kidney injury)  N17.9     Improving      2. Hypomagnesemia  E83.42     Improved           Discharge Medications   New Prescriptions    No medications on file         Scribe Disclosure:  Robi TEMPLETON, am serving as a scribe at 11:47 AM on 2/19/2025 to document services personally performed by Amanda Mckinney MD based on my observations and the provider's statements to me.        Amanda Mckinney MD  02/19/25 5327

## 2025-02-19 NOTE — DISCHARGE INSTRUCTIONS
Increase your water intake.  The goal is light yellow urine in the toilet bowl.    Did not take your metformin today or tomorrow.  You can start taking it again on Friday.    Increase the magnesium that you are taking and take it twice a day.  If you get loose stools or diarrhea with this then there are other forms of magnesium that will be less likely to do this such as magnesium, magnesium glycinate, and magnesium threonate.  These are also available over-the-counter but can be prescribed by your physician.

## 2025-02-19 NOTE — ED TRIAGE NOTES
Pt arrives with c/o abnormal labs. Was seen at PCP for follow-up from recent admission 2 weeks ago. Kidney function elevated and Mag abnormal. Reports chronic SOB and generalized weakness last few days. Denies n/v/d, dysuria, fever, or CP. On eliquis, pacemaker. ABC intact. A&Ox4.

## 2025-02-19 NOTE — PROGRESS NOTES
Received call from Miryam Almonte RN, regarding acceptance of this patient in [facility] today.     The patient was hospitalized 2/5-2/8/25 after a fall & head injury. She is on anticoagulants. She was found to have severe hyperglycemia with hyperosmolar nonketotic state as well as hyperkalemia. Her blood glucose was 749 I the ER and potassium was 8.1 with carbon dioxide of 17 and creatinine of 1.22.     She had recently been diagnosed with type 2 DM in December and was not on any treatments for this. She was started on metformin and insulin and hyperkalemia was corrected.     She was seen by her PCP yesterday for a hospital follow-up visit and noted tremors as well as generalized weakness but felt stable overall. Reported fasting blood sugars in the upper 100s primarily. Is taking 15 units of insulin daily. She also reported worsening dyspnea on exertion over the last few months and was found to have bilateral lower extremity edema on exam. CXR and BNP were normal.     Patient has hx of TAVR, CAD. Had echo in Oct 2024 which showed normal EF but new pulmonary hypertension. She saw her cardiologist around that time who advised Lasix for her dyspnea on exertion as well as weight gain, but that was stopped in December. Currently denies orthopnea, PND, or weight gain.     Primary reason for [facility] referral is abnormal labs from hospital follow-up yesterday. Most significant for low CO2 (16), elevated anion gap ( 19), BUN (43.2), and reduced renal function (1.26 creatinine). Glucose was 171. PCP was hoping for IV fluids and lab recheck in the [facility].     However, due to the significantly low CO2 and recent hospitalization, I feel the patient needs a higher level of care for possible blood gases and sodium bicarb. In addition, I am hesitant to give IV fluids with her worsening (and not fully worked up yet) dyspnea on exertion and lower extremity edema, as I do not want to contribute to any potential hypervolemia. I  advised the patient go to the ER.     The [facility] continues to be available for further consults and questions should the situation change.     Svetlana Youssef PA-C

## 2025-02-19 NOTE — PROGRESS NOTES
ANTICOAGULATION  MANAGEMENT: Discharge Review    Lexii Stratton chart reviewed for anticoagulation continuity of care    Emergency room visit on 2/19/25 for abnormal labs - low magnesium.    Discharge disposition: Home    Results:    Recent labs: (last 7 days)     02/18/25  0914   INR 1.0     Anticoagulation inpatient management:     not applicable     Anticoagulation discharge instructions:     Warfarin dosing:  patient has not started warfarin yet, will begin 2/22/25   Bridging: No   INR goal change: No      Medication changes affecting anticoagulation: No    Additional factors affecting anticoagulation: No     PLAN     No adjustment to anticoagulation plan needed    Recommended follow up is scheduled  Patient not contacted    No adjustment to Anticoagulation Calendar was required    Radha Salas RN  2/19/2025  Anticoagulation Clinic  [employer] for routing messages: yohan RUGGIERO  [facility] patient phone line: [phone]

## 2025-02-19 NOTE — TELEPHONE ENCOUNTER
See below from lab results of 2/18/25.      Talked to Dr. Randle about the pt.  She did some labs yesterday and she feels the pt may be dehydrated.  She is hoping the pt can go to the [facility] today for repeat labs and IV fluids.      Called the pt.     She didn't get a lot of sleep cause she was thinking about all of this.      Advised I will call the [facility] around 9 a.m today and call her back.

## 2025-02-19 NOTE — TELEPHONE ENCOUNTER
Called the [facility] and spoke to Ja.  Advised of below.    She will have Svetlana look at it.  She said it may be smart to check her bring her blood sugar meter if they are able to take her.  They don't have one there.     Svetlana will reach out on teams.

## 2025-02-19 NOTE — TELEPHONE ENCOUNTER
"Called patient with results.     She is feeling well overall. No fevers, chills. No N/V/D. No abdominal pain. No flu like symptoms. No dizziness/lightheadedness. No urinary symptoms.     She does admit to lack of fluid intake recently. \"Knows\" she should be drinking more fluids.     Reviewed labs with her today. Discussed holding metformin with plan to be seen at [facility] tomorrow for additional workup, IVF. Adding hepatic panel to labs.     Discussed low threshold to be seen in the ED if she develops any symptoms as above or begins to feel generally unwell.     Overall Plan:  - Hold metformin  - Added hepatic panel, phosphorus to labs  - Go to [facility] on 2/18/25 for IVF  - Low threshold to be seen in ED if any sxs develop  - Routing note to nurse team to call patient at 7 AM on 2/18/25 to reassess and assist in scheduling for [facility]     Robert Randle MD  [facility], [place]  2/18/2025  "

## 2025-02-19 NOTE — TELEPHONE ENCOUNTER
Received a message back from Svetlana at the [facility].  She is concerned about the pt and everything she has going on.  She is concerned about her low CO2.  She is also concerned about giving IV fluids with reduced renal function and lower extremity edema and worsening dyspnea on exertion.  She is recommending the pt be seen in the ER.      Advised Dr. Randle.      Called the pt to advise also.

## 2025-02-20 NOTE — TELEPHONE ENCOUNTER
Reviewed labs from PCP yesterday,   NT pro BNP is normal.   Unclear if shortness of breath is cardiac in nature.   No improvement in symptoms with diuretic trial.   Follow up with Dr. Castano as scheduled.     Kathy

## 2025-02-20 NOTE — TELEPHONE ENCOUNTER
Patient notified of Pro BNP results and reviewed recent ED visit for low magnesium.  Most recent 2-19-25 1.9  Follow up 3-3-25 with Dr. Castano.  Patient verbalized understanding. Domi Oconnor RN, [phone]

## 2025-02-24 ENCOUNTER — PATIENT OUTREACH (OUTPATIENT)
Dept: CARE COORDINATION | Facility: CLINIC | Age: 85
End: 2025-02-24
Payer: COMMERCIAL

## 2025-02-24 NOTE — PROGRESS NOTES
Clinic Care Coordination Contact  Follow Up Progress Note      Assessment:   Patient states that her blood sugar this morning before breakfast was 100 and before lunch was 130. Hands shaking bad makes it difficult to write checks to pay bills or check blood sugar readings. CGM ready for  at the pharmacy, she verbalizes plan to pick this up tomorrow at her scheduled appointment & review use with diabetic educator at scheduled appointment on Wednesday. Shares that she is on a waiting/cancellation list to get her endoscopy moved up sooner. Patient expresses desire to resume previous level of physical functioning, shares she would go to the local [facility] & walk 2 miles. Additionally, patient states that her back is acting up & starting to bother her again so she has to get an injection in her back.     Care Gaps:  Health Maintenance Due   Topic Date Due    HF ACTION PLAN  Never done    DIABETIC FOOT EXAM  11/22/2024     Care Gap Goal set: Yes    Care Plans  Care Plan: Diabetes (Primary Care)       Problem: Diabetes Mangement Needs Improvement       Long-Range Goal: Demonstrate improved diabetes management       Start Date: 2/10/2025 Expected End Date: 8/29/2025    This Visit's Progress: 20%    Note:     Barriers: diagnosis of multiple, chronic, complex medical conditions, new diagnosis of diabetes, provider availability - wait time to complete appointments, etc.   Strengths: motivated, engaged in Care Coordination, two local [facility] supportive.    Patient expressed understanding of goal: Yes   Action steps to achieve this goal:  1. I will follow up with my providers as scheduled/recommended:   Primary Care Provider: 02/25/25, 04/30/25, 12/17/25   Diabetes Education: #[phone] 02/26/25  Cardiology: 03/03/25  2. I will discuss, review, schedule & complete recommended overdue health maintenance with my Primary Care Provider.   3. I will take my medications as prescribed.   4. I will  contact my care team with questions, concerns, support needs. I will use the clinic as a resource and I understand I can contact my clinic with 24/7 after hours services available. Care Coordinator will remain available as needed.                                Intervention/Education provided during outreach: As above. CC role, goal(s), clinic after hours, appointments, discussed/reviewed. Support provided.      Outreach Frequency: 2 weeks, more frequently as needed    Plan:   Patient/caregiver will call RNCC with questions, concerns, support needs. RNCC will be available as needed.    Care Coordinator will follow up in 2 weeks.     Jessy Angel RN Care Coordinator  [facility]  Email: [email or web address]  Phone: [phone]

## 2025-02-24 NOTE — PROGRESS NOTES
Clinic Care Coordination Contact  [facility]/Voicemail    Clinical Data: Care Coordinator Outreach    Outreach Documentation Number of Outreach Attempt   2/24/2025   1:22 PM 1     Left message on patient's voicemail with call back information and requested return call.    Plan: Care Coordinator will try to reach patient again in 10 business days.    Jessy Angel, RN Care Coordinator  [facility]  Email: [email or web address]  Phone: [phone]

## 2025-02-25 ENCOUNTER — LAB (OUTPATIENT)
Dept: LAB | Facility: CLINIC | Age: 85
End: 2025-02-25
Payer: COMMERCIAL

## 2025-02-25 ENCOUNTER — OFFICE VISIT (OUTPATIENT)
Dept: FAMILY MEDICINE | Facility: CLINIC | Age: 85
End: 2025-02-25
Payer: COMMERCIAL

## 2025-02-25 ENCOUNTER — TELEPHONE (OUTPATIENT)
Dept: FAMILY MEDICINE | Facility: CLINIC | Age: 85
End: 2025-02-25
Payer: COMMERCIAL

## 2025-02-25 ENCOUNTER — OFFICE VISIT (OUTPATIENT)
Dept: PEDIATRICS | Facility: CLINIC | Age: 85
End: 2025-02-25
Payer: COMMERCIAL

## 2025-02-25 ENCOUNTER — ANTICOAGULATION THERAPY VISIT (OUTPATIENT)
Dept: ANTICOAGULATION | Facility: CLINIC | Age: 85
End: 2025-02-25

## 2025-02-25 ENCOUNTER — HOSPITAL ENCOUNTER (OUTPATIENT)
Dept: CT IMAGING | Facility: CLINIC | Age: 85
Discharge: HOME | End: 2025-02-25
Attending: EMERGENCY MEDICINE
Payer: COMMERCIAL

## 2025-02-25 VITALS
OXYGEN SATURATION: 97 % | WEIGHT: 205 LBS | TEMPERATURE: 98 F | BODY MASS INDEX: 35.19 KG/M2 | HEART RATE: 105 BPM | RESPIRATION RATE: 24 BRPM

## 2025-02-25 VITALS
SYSTOLIC BLOOD PRESSURE: 112 MMHG | BODY MASS INDEX: 35 KG/M2 | TEMPERATURE: 97.7 F | OXYGEN SATURATION: 99 % | DIASTOLIC BLOOD PRESSURE: 78 MMHG | WEIGHT: 205 LBS | RESPIRATION RATE: 23 BRPM | HEART RATE: 105 BPM | HEIGHT: 64 IN

## 2025-02-25 DIAGNOSIS — R07.9 CHEST PAIN, UNSPECIFIED TYPE: Primary | ICD-10-CM

## 2025-02-25 DIAGNOSIS — Z95.2 S/P TAVR (TRANSCATHETER AORTIC VALVE REPLACEMENT): ICD-10-CM

## 2025-02-25 DIAGNOSIS — R06.09 DOE (DYSPNEA ON EXERTION): ICD-10-CM

## 2025-02-25 DIAGNOSIS — D69.6 THROMBOCYTOPENIA: ICD-10-CM

## 2025-02-25 DIAGNOSIS — E66.01 CLASS 2 SEVERE OBESITY DUE TO EXCESS CALORIES WITH SERIOUS COMORBIDITY AND BODY MASS INDEX (BMI) OF 35.0 TO 35.9 IN ADULT (H): ICD-10-CM

## 2025-02-25 DIAGNOSIS — I48.20 CHRONIC ATRIAL FIBRILLATION (H): ICD-10-CM

## 2025-02-25 DIAGNOSIS — R25.1 TREMOR: ICD-10-CM

## 2025-02-25 DIAGNOSIS — E11.8 TYPE 2 DIABETES MELLITUS WITH COMPLICATION, WITHOUT LONG-TERM CURRENT USE OF INSULIN (H): Primary | ICD-10-CM

## 2025-02-25 DIAGNOSIS — E66.812 CLASS 2 SEVERE OBESITY DUE TO EXCESS CALORIES WITH SERIOUS COMORBIDITY AND BODY MASS INDEX (BMI) OF 35.0 TO 35.9 IN ADULT (H): ICD-10-CM

## 2025-02-25 DIAGNOSIS — I48.20 CHRONIC ATRIAL FIBRILLATION (H): Primary | ICD-10-CM

## 2025-02-25 DIAGNOSIS — R53.83 OTHER FATIGUE: ICD-10-CM

## 2025-02-25 LAB
ALBUMIN UR-MCNC: NEGATIVE MG/DL
APPEARANCE UR: CLEAR
BACTERIA #/AREA URNS HPF: ABNORMAL /HPF
BASOPHILS # BLD AUTO: 0 10E3/UL (ref 0–0.2)
BASOPHILS NFR BLD AUTO: 0 %
BILIRUB UR QL STRIP: ABNORMAL
COLOR UR AUTO: YELLOW
D DIMER PPP FEU-MCNC: 0.94 UG/ML FEU (ref 0–0.5)
EOSINOPHIL # BLD AUTO: 0.2 10E3/UL (ref 0–0.7)
EOSINOPHIL NFR BLD AUTO: 1 %
ERYTHROCYTE [DISTWIDTH] IN BLOOD BY AUTOMATED COUNT: 14.2 % (ref 10–15)
GLUCOSE UR STRIP-MCNC: NEGATIVE MG/DL
HCT VFR BLD AUTO: 40.2 % (ref 35–47)
HGB BLD-MCNC: 13.5 G/DL (ref 11.7–15.7)
HGB UR QL STRIP: NEGATIVE
IMM GRANULOCYTES # BLD: 0.1 10E3/UL
IMM GRANULOCYTES NFR BLD: 1 %
INR BLD: 1.5 (ref 0.9–1.1)
KETONES UR STRIP-MCNC: ABNORMAL MG/DL
LEUKOCYTE ESTERASE UR QL STRIP: ABNORMAL
LYMPHOCYTES # BLD AUTO: 1.2 10E3/UL (ref 0.8–5.3)
LYMPHOCYTES NFR BLD AUTO: 11 %
MCH RBC QN AUTO: 32.1 PG (ref 26.5–33)
MCHC RBC AUTO-ENTMCNC: 33.6 G/DL (ref 31.5–36.5)
MCV RBC AUTO: 96 FL (ref 78–100)
MONOCYTES # BLD AUTO: 0.7 10E3/UL (ref 0–1.3)
MONOCYTES NFR BLD AUTO: 7 %
NEUTROPHILS # BLD AUTO: 8.7 10E3/UL (ref 1.6–8.3)
NEUTROPHILS NFR BLD AUTO: 80 %
NITRATE UR QL: NEGATIVE
PH UR STRIP: 5.5 [PH] (ref 5–7)
PLATELET # BLD AUTO: 154 10E3/UL (ref 150–450)
RBC # BLD AUTO: 4.2 10E6/UL (ref 3.8–5.2)
RBC #/AREA URNS AUTO: ABNORMAL /HPF
SP GR UR STRIP: 1.02 (ref 1–1.03)
UROBILINOGEN UR STRIP-ACNC: 0.2 E.U./DL
WBC # BLD AUTO: 10.9 10E3/UL (ref 4–11)
WBC #/AREA URNS AUTO: ABNORMAL /HPF

## 2025-02-25 PROCEDURE — 250N000009 HC RX 250: Performed by: EMERGENCY MEDICINE

## 2025-02-25 PROCEDURE — 250N000011 HC RX IP 250 OP 636: Performed by: EMERGENCY MEDICINE

## 2025-02-25 PROCEDURE — 99214 OFFICE O/P EST MOD 30 MIN: CPT | Performed by: EMERGENCY MEDICINE

## 2025-02-25 PROCEDURE — 99207 REFERRAL TO ACUTE AND DIAGNOSTIC SERVICES: CPT | Performed by: STUDENT IN AN ORGANIZED HEALTH CARE EDUCATION/TRAINING PROGRAM

## 2025-02-25 PROCEDURE — 71275 CT ANGIOGRAPHY CHEST: CPT

## 2025-02-25 RX ORDER — IOPAMIDOL 755 MG/ML
74 INJECTION, SOLUTION INTRAVASCULAR ONCE
Status: COMPLETED | OUTPATIENT
Start: 2025-02-25 | End: 2025-02-25

## 2025-02-25 RX ADMIN — SODIUM CHLORIDE 100 ML: 9 INJECTION, SOLUTION INTRAVENOUS at 15:37

## 2025-02-25 RX ADMIN — IOPAMIDOL 74 ML: 755 INJECTION, SOLUTION INTRAVENOUS at 15:37

## 2025-02-25 ASSESSMENT — PAIN SCALES - GENERAL: PAINLEVEL_OUTOF10: NO PAIN (0)

## 2025-02-25 NOTE — PROGRESS NOTES
ANTICOAGULATION MANAGEMENT     Lexii MERCADO Stratton 84 year old female is on warfarin with subtherapeutic INR result. (Goal INR 2.0-3.0). Day 4 after transitioning from Eliquis.     Recent labs: (last 7 days)     02/25/25  1101   INR 1.5*       ASSESSMENT     Source(s): Chart Review     Warfarin doses taken: Reviewed in chart  Diet: No new diet changes identified, per chart review Lexii hasn't had much appetite due to dysphagia. She is on a wait list for EGD to assess, scheduled for 5/21/25.  Medication/supplement changes:  nothing that should influence INR at this time.  New illness, injury, or hospitalization: Yes: dyspnea on exertion concerning at office visit today. It has been progressive over the past several months. Labs drawn, including d-dimer which was slightly elevated. Primary Care Provider attempting to send pt to [facility] to r/o PE since patient is currently transitioning from Eliquis to warfarin and INR is still not up to therapeutic goal range.  Signs or symptoms of bleeding or clotting: No  Previous result: Subtherapeutic  Additional findings:  this is day 4 of warfarin therapy if patient has been following instructions       PLAN     Unable to reach Lexii today.    Request call back for assessment. If unable to reach RN, left message to continue current dose of warfarin 4 mg tonight, unless she is told otherwise at [facility].     Follow up required to discuss out of range result .    Bren Roper RN  2/25/2025  Anticoagulation Clinic  [facility] [employer] for routing messages: yohan RUGGIERO  [facility] patient phone line: [phone]

## 2025-02-25 NOTE — PROGRESS NOTES
"  Assessment & Plan     Type 2 diabetes mellitus with complication, without long-term current use of insulin (H)  On 15U insulin and back on max metformin for the past several days. Metformin transiently held last week for JEFRY and AGMA but resumed. Received CGM, has upcoming appt with DM educator tomorrow and plans to bring CGM for wearing instructions. Continue current medications, low threshold to reduce/hold metformin based on labs, as below.   - UA reflex to Microscopic - lab collect  - Comprehensive metabolic panel (BMP + Alb, Alk Phos, ALT, AST, Total. Bili, TP)    Thrombocytopenia  - CBC with platelets and differential    Tremor  Resolved with IV magnesium in ED last week but has since returned. Currently taking 250mg Mag Oxide BID and tolerating well. Plan to obtain labs as below and adjust magnesium prn.   - Comprehensive metabolic panel (BMP + Alb, Alk Phos, ALT, AST, Total. Bili, TP)  - Magnesium    BAL (dyspnea on exertion)  Significant, progressive BAL over past several months. In midst of transitioning from oral DOAC to warfarin. Workup thus far is unremarkable (CXR, BNP, TSH) but due to tachycardia (hx of atrial fib but tachycardia with regular rhythm) at rest, plan for D-dimer today.   - CBC with platelets and differential  - UA reflex to Microscopic - lab collect  - D dimer, quantitative    Other fatigue  Significant, worsening fatigue. Labs as below.   - CBC with platelets and differential  - UA reflex to Microscopic - lab collect  - D dimer, quantitative  - Comprehensive metabolic panel (BMP + Alb, Alk Phos, ALT, AST, Total. Bili, TP)    Class 2 severe obesity due to excess calories with serious comorbidity and body mass index (BMI) of 35.0 to 35.9 in adult (H)  Likely contributing to DM2 as above.     BMI  Estimated body mass index is 35.19 kg/m  as calculated from the following:    Height as of this encounter: 1.626 m (5' 4\").    Weight as of this encounter: 93 kg (205 lb).     Follow up in 3-7 " "days pending lab workup as above    Robert Randle MD  [facility], [place]  2/25/2025    Melyssa Shultz is a 84 year old, presenting for the following health issues:  Hospital F/U        2/25/2025     9:41 AM   Additional Questions   Roomed by Shahnaz PICHARDO     History of Present Illness       Reason for visit:  Follow up to hospitalizaation   She is taking medications regularly.     ER follow up:    DM2  Just got the CGM. Will go to dietician tomorrow.   Has resumed the metformin on Friday. Back to full dose now.   Has been told to drink a bunch of fluids but this causes her to have to urinate overnight.   Is on 15U insulin daily. Lowest blood sugar was 92 yesterday.  Daily AM fasting glucose since 3/16: 136; 182, 153, 146, 148, 169, 168, 133, 131, 100, 131    Magnesium  Has been taking magnesium oxide 250 mg BID. No side effects at all. Hasn't had any diarrhea at all.   Tremor went away initially after ED but then has since returned. Not sure why she has low magnesium.     BAL  Just walking from car to room causes her to become greatly winded.  Doesn't have any ambition or 'get up and go'. No appetite. Just wants to sit around.   No chest pain at all. No palpitations.  This is not getting any worse but it is not getting any better either.    Thrush  Symptoms have resolved    Dysphagia  Is on waitlist for EGD. Sometimes if eating, feeling like it sits there and like throwing up will make her feel better.  No SOB with just sitting, only with additional exertion, no change since hospitalization.     Plan of care communicated with patient         Objective    /78   Pulse 105   Temp 97.7  F (36.5  C) (Oral)   Resp 23   Ht 1.626 m (5' 4\")   Wt 93 kg (205 lb)   LMP  (LMP Unknown)   SpO2 99%   BMI 35.19 kg/m    Body mass index is 35.19 kg/m .    Physical Exam   GENERAL: healthy, alert and no distress  HEAD: Normocephalic, atraumatic.   EYES: Normal conjunctivae, sclera.   ENT: Normal " oropharynx. No evidence of white plaque over tongue or oropharynx.   NECK: Supple.   RESP: lungs clear to auscultation - no rales, rhonchi or wheezes.  CV: regular rate and rhythm, normal S1 S2, 2/6 systolic murmur best appreciated over LLSB, click, rub or gallop.  2+ pitting edema up to knees bilaterally.   ABDOMEN: soft, no TTP x4 quadrants. No hepatomegaly or masses appreciated. BS normactive.  MSK: no gross musculoskeletal defects noted.  SKIN: no suspicious lesions or rashes.  EXT: Warm and well perfused.   NEURO: CNII-XII grossly intact. No focal deficits.  PSYCH: Groomed, dressed appropriately for weather.  Logical, linear thought process. Normal mood with consistent affect.     Signed Electronically by: Robert Randle MD

## 2025-02-25 NOTE — PROGRESS NOTES
ANTICOAGULATION MANAGEMENT     Lexii Stratton 84 year old female is on warfarin with subtherapeutic INR result. (Goal INR 2.0-3.0)    Recent labs: (last 7 days)     02/25/25  1101   INR 1.5*       ASSESSMENT     Source(s): Chart Review and Patient/Caregiver Call     Warfarin doses taken: Warfarin taken as instructed, today is day 4  Diet:  No new diet changes identified, per chart review Lexii hasn't had much appetite due to dysphagia. She is on a wait list for EGD to assess, scheduled for 5/21/25.   Medication/supplement changes: None noted  New illness, injury, or hospitalization: Yes: dyspnea on exertion concerning at office visit today. It has been progressive over the past several months. Labs drawn, including d-dimer which was slightly elevated. Primary Care Provider sent to [facility] to r/o PE since patient is currently transitioning from Eliquis to warfarin and INR is still not up to therapeutic goal range. CT Chest negative for PE, advised patient to follow up with Primary Care Provider. No other advice or treatment given.  Signs or symptoms of bleeding or clotting: No  Previous result: Supratherapeutic, baseline INR prior to starting warfarin.  Additional findings: None       PLAN     Recommended plan for no diet, medication or health factor changes affecting INR     Dosing Instructions: Continue your current warfarin dose with next INR in 3 days       Summary  As of 2/25/2025      Full warfarin instructions:  4 mg every day   Next INR check:  2/28/2025               Telephone call with Lexii who agrees to plan and repeated back plan correctly    Lab visit already scheduled    Education provided: Please call back if any changes to your diet, medications or how you've been taking warfarin  Dietary considerations: importance of consistent vitamin K intake and impact of vitamin K foods on INR  Symptom monitoring: monitoring for bleeding signs and symptoms, monitoring for clotting signs and symptoms,  monitoring for stroke signs and symptoms, and when to seek medical attention/emergency care  Importance of notifying anticoagulation clinic for: changes in medications; a sooner lab recheck maybe needed, diarrhea, nausea/vomiting, reduced intake, cold/flu, and/or infections; a sooner lab recheck maybe needed, and upcoming surgeries and procedures 2 weeks in advance  Contact [phone] with any changes, questions or concerns.     Plan made per [facility] anticoagulation protocol, new start.    Bren Roper RN  2/25/2025  Anticoagulation Clinic  [facility] for routing messages: yohan RUGGIERO  [facility] patient phone line: [phone]        _______________________________________________________________________     Anticoagulation Episode Summary       Current INR goal:  2.0-3.0   TTR:  0.0% (1 wk)   Target end date:  Indefinite   Send INR reminders to:  ANICETO RUGGIERO    Indications    Chronic atrial fibrillation (H) [I48.20]  S/P TAVR (transcatheter aortic valve replacement) [Z95.2]             Comments:  --             Anticoagulation Care Providers       Provider Role Specialty Phone number    Robert Randle MD Referring Family Medicine [phone]

## 2025-02-25 NOTE — Clinical Note
Hi.  Laureyasmin CT was negative for PE.  They mentioned some mild groundglass opacity in both lungs may represent mild pulmonary edema but her sats were 97%.  I didn't check any labs today because she had just had them checked.  I was told by Ronna Foster that you will follow up with her in the next 3-7 days to review recent lab results and discuss further treatment.  Hope that's okay. Hodan Morales MD

## 2025-02-25 NOTE — PATIENT INSTRUCTIONS
Your CT scan today was negative for a pulmonary embolism.       Your primary care doctor says she will follow up with you in the 3 to 7 days.  If you do not hear from her in 7 days, please contact your clinic.

## 2025-02-25 NOTE — RESULT ENCOUNTER NOTE
See 2/25/25 telephone encounter.  Pt scheduled for [facility].    Ronna Foster RN, BSN  [facility]

## 2025-02-25 NOTE — TELEPHONE ENCOUNTER
"Dr Robert Randle requested RN call [facility] to see if they will take pt to R/O PE.      Pt recent hospitalization for Diabetes, given insulin and metformin.        Dr Jones saw pt today, pt having Dyspnea on exertion.    See 2/25/25 result note from Dr Robert Randle:  \"Hi Triage,     This patient has been struggling with BAL recently. I saw her in clinic and she was tachycardic to 115. She is on anticoagulation but recently transitioning to warfarin in the past couple of days. I ordered a D-dimer to help rule out PE but this is unfortunately high and even just above the age-adjusted cut off. I am hoping that she can be seen in [facility] today just for PE rule out with CT. Can you please coordinate by calling [facility] first to see if they will take her and then patient to coordinate location and arrival?   Thanks,   Robert Randle MD  [facility] [place]  2/25/2025    Called [facility] [place], they do not have openings today.  Called [facility] [place], spoke to Dr Hodan Morales.  They can see pt today can do chest CT, if negative does Dr Jnoes have any follow up?  [place] [facility] will call pt to schedule.    Huddled with Dr Robert Randle, she will follow up with pt.  Las pending and will follow up with her on those in next 3-7 days.    Referral to Acute and Diagnostic Services    [phone] ([place]) [facility] [address], [address], [place] [address]    Transition to Acute & Diagnostic Services Clinic has been discussed with patient, and she agrees with next level of care.   Patient understands that evaluation/treatment at [facility] typically takes significantly longer than in clinic/urgent care (>2 hours).  The [facility] Acute and Diagnostics Services Clinic has been contacted by provider/staff to confirm patient acceptance.                      The following provider has assessed this patient for intervention at [facility], and directed the patient for referral: Robert Randle MD              "

## 2025-02-25 NOTE — PROGRESS NOTES
"Acute and Diagnostic Services Clinic Visit    Assessment & Plan     Chest pain, unspecified type  Referred from clinic specifically for CT chest PE evaluation due to recent sob and elevated d dime. Her pcp says she will follow up with the patient in the next 3-7 days for other tests done in clinic and for sob symptoms if CT scan is negative today.  Patient had creatinine checked 6 days ago showing creatinine of  0.99 with gfr 56.  She also had labs drawn this morning in clinic showing a d dimer of 0.94. Therefore, I did not repeat labs today. CT PE protocol was done per pcpc request and shows:  No pulmonary embolus.  2.  Mild groundglass opacity in both lungs may represent mild pulmonary edema. Lungs are otherwise clear.    The pcp stated they would follow up with the patient in 3-7 days for recheck/further treatment/review of labs.     - CT Chest Pulmonary Embolism w Contrast      38 minutes spent by me on the date of the encounter doing chart review, review of test results, patient visit, and documentation     BMI  Estimated body mass index is 35.19 kg/m  as calculated from the following:    Height as of an earlier encounter on 2/25/25: 1.626 m (5' 4\").    Weight as of this encounter: 93 kg (205 lb).         Patient Instructions   Your CT scan today was negative for a pulmonary embolism.       Your primary care doctor says she will follow up with you in the 3 to 7 days.  If you do not hear from her in 7 days, please contact your clinic.       No follow-ups on file.    Melyssa Shultz is a 84 year old, presenting for the following health issues:  Breathing Problem    The patient is an 85 yo female who was referred to the [facility] for CT chest PE due to recent sob and elevated d dimer in clinic. Her provider has ordered other tests and they will follow those labs. They just want to make sure she is not having a PE. The patient says she has felt sob for awhile. She says she fell, hit her head and went to the ER " awhile ago.  While there they found her blood sugar was elevated and now she has to prick her finger and check her sugars and take insulin which has felt overwhelming.  She denies viral symptoms or productive cough.            Shortness of Breath/Breathing Problem  Onset/Duration: feb 5  Progression of symptoms: same  Accompanying signs and symptoms:       SOB at rest: No       SOB with activity: YES       Pain with inspiration: No       Cough: No       Pink tinged sputum: No       Sweating: No       Nausea/vomiting: No       Lightheadedness: No       Palpitations: No       Fever/chills: No       Heartburn: No  History   Family history of coagulation disorders: No  Tobacco use: No  Previous similar symptoms: no   Precipitating factors:  Related to eating: No  Better with burping: No  Therapies tried and outcome:             Objective    LMP  (LMP Unknown)   There is no height or weight on file to calculate BMI.  Physical Exam  Vitals and nursing note reviewed.   Constitutional:       Comments: Speaks in full sentences. No sob noticed.    HENT:      Head: Normocephalic and atraumatic.      Nose: Nose normal.   Eyes:      Extraocular Movements: Extraocular movements intact.   Cardiovascular:      Rate and Rhythm: Normal rate and regular rhythm.      Heart sounds: Normal heart sounds.   Pulmonary:      Effort: Pulmonary effort is normal.      Breath sounds: Normal breath sounds.   Abdominal:      Palpations: Abdomen is soft.      Tenderness: There is no abdominal tenderness.   Musculoskeletal:      Cervical back: Normal range of motion.      Right lower leg: Edema present.      Left lower leg: Edema present.   Skin:     General: Skin is warm and dry.   Neurological:      General: No focal deficit present.      Mental Status: She is alert and oriented to person, place, and time.   Psychiatric:         Mood and Affect: Mood normal.            Results for orders placed or performed in visit on 02/25/25 (from the past 24  hours)   CT Chest Pulmonary Embolism w Contrast    Narrative    EXAM: CT CHEST PULMONARY EMBOLISM W CONTRAST  LOCATION: [facility]  DATE: 2/25/2025    INDICATION: Dyspnea and elevated D-dimer. Rule out PE.  COMPARISON: 10/11/2022  TECHNIQUE: CT chest pulmonary angiogram during arterial phase injection of IV contrast. Multiplanar reformats and MIP reconstructions were performed. Dose reduction techniques were used.   CONTRAST: 74 mL Isovue 370.    FINDINGS:  ANGIOGRAM CHEST: Pulmonary arteries are normal caliber and negative for pulmonary emboli. Thoracic aorta is negative for dissection. No CT evidence of right heart strain.    LUNGS AND PLEURA: A 3 mm pulmonary nodule in the right middle lobe is unchanged from previous and therefore benign. Mild peribronchial groundglass opacity throughout both lungs. No pleural effusion.    MEDIASTINUM/AXILLAE: TAVR. Cardiac pacemaker leads in the right atrium and right ventricle.    CORONARY ARTERY CALCIFICATION: Moderate.    UPPER ABDOMEN: Normal.    MUSCULOSKELETAL: Normal.      Impression    IMPRESSION:  1.  No pulmonary embolus.  2.  Mild groundglass opacity in both lungs may represent mild pulmonary edema. Lungs are otherwise clear.           Signed Electronically by: Hodan Morales MD

## 2025-02-26 ENCOUNTER — ALLIED HEALTH/NURSE VISIT (OUTPATIENT)
Dept: EDUCATION SERVICES | Facility: CLINIC | Age: 85
End: 2025-02-26
Attending: INTERNAL MEDICINE
Payer: COMMERCIAL

## 2025-02-26 DIAGNOSIS — E11.8 TYPE 2 DIABETES MELLITUS WITH COMPLICATION, WITHOUT LONG-TERM CURRENT USE OF INSULIN (H): ICD-10-CM

## 2025-02-26 LAB
ALBUMIN SERPL BCG-MCNC: 3.7 G/DL (ref 3.5–5.2)
ALP SERPL-CCNC: 100 U/L (ref 40–150)
ALT SERPL W P-5'-P-CCNC: 24 U/L (ref 0–50)
ANION GAP SERPL CALCULATED.3IONS-SCNC: 13 MMOL/L (ref 7–15)
AST SERPL W P-5'-P-CCNC: 43 U/L (ref 0–45)
BILIRUB SERPL-MCNC: 0.7 MG/DL
BUN SERPL-MCNC: 24.8 MG/DL (ref 8–23)
CALCIUM SERPL-MCNC: 10.3 MG/DL (ref 8.8–10.4)
CHLORIDE SERPL-SCNC: 104 MMOL/L (ref 98–107)
CREAT SERPL-MCNC: 1.01 MG/DL (ref 0.51–0.95)
EGFRCR SERPLBLD CKD-EPI 2021: 55 ML/MIN/1.73M2
GLUCOSE SERPL-MCNC: 166 MG/DL (ref 70–99)
HCO3 SERPL-SCNC: 22 MMOL/L (ref 22–29)
MAGNESIUM SERPL-MCNC: 1.7 MG/DL (ref 1.7–2.3)
POTASSIUM SERPL-SCNC: 6 MMOL/L (ref 3.4–5.3)
PROT SERPL-MCNC: 6.8 G/DL (ref 6.4–8.3)
SODIUM SERPL-SCNC: 139 MMOL/L (ref 135–145)

## 2025-02-26 PROCEDURE — 99207 PR NO CHARGE NURSE ONLY: CPT

## 2025-02-26 PROCEDURE — G0108 DIAB MANAGE TRN  PER INDIV: HCPCS

## 2025-02-26 NOTE — LETTER
2/26/2025         RE: Lexii Stratton  [address] [place] [address]        Dear Colleague,    Thank you for referring your patient, Lexii Stratton, to the [facility]. Please see a copy of my visit note below.    Diabetes Self-Management Education & Support    Presents for: Initial Assessment for new diagnosis    Type of Service: In Person Visit    Assessment   Patient requests assistance starting her new Janneth 3+ sensors. She would like better understanding of her diet and some ideas of what to eat for breakfast. She had a lower blood sugar this week of 92mg/dl and she is worried about low blood sugars.     Patient completed homework (online training and/or Getting started with CGM guide)? : No  Sensor started:: Started today in clinic  CGM Initial Settings: Freestyle Janneth  Freestyle Janneth Target Glucose Range (mg/dL):     Sensor was inserted with no resistance or bleeding at insertion site.     CGM-specific education provided on:    Freestyle Janneth sensor: insertion technique, sensor site location and rotation, insulin administration in relation to sensor placement, sensor wear, reasons to remove sensor (MRI, CT, diathermy), Vitamin C & Aspirin effects on sensor, Janneth [place] Range: frequency of scanning sensor, length of time data is visible, use of built in glucose meter, Precision X-tra test strips, Use of trends and graphs for pattern management and problem solving, and [employer] software    Care Plan and Education Provided:  Discussed pathophysiology of Type 2 DM. Healthy Eating: Balanced meals, Consistency in amount and timing of carbohydrate intake, and Plate planning method, Monitoring: Blood glucose versus Continuous Glucose Monitoring and Individual glucose targets, Taking Medication: Action of prescribed medication(s) and Side effects of prescribed medication(s), and Problem Solving: Low glucose - causes, signs/symptoms, treatment and  "prevention    Patient verbalized understanding of diabetes self-management education concepts discussed, opportunities for ongoing education and support, and recommendations provided today.    Plan  Meal Plan Recommendation: eat 3 meals a day, have small snacks between meals, if needed, use portion control, use plate planning method.   Fruit - limit to the size of a tennis ball at a meal or a snack   Medication:    Metformin- continue 2 tablets twice a day   Lantus insulin- decrease to 13 units daily    Monitoring:  Freestyle Janneth 3 or 3+    SENSOR BASICS:  Understand that your glucose sensor measures your glucose in your interstitial fluid and your blood sugar measures your glucose in your blood stream. The readings are not meant to be the same.        Your sensor glucose will lag behind your blood glucose.  \"Remember the roller coaster\".  Your blood sugar is always the front car and your sensor glucose is always the last car.  When blood sugar is moving up or down, the more difference there will be.          Take it easy while wearing the sensor.  Take extra care while bathing and getting dressed.  Wear loose fitting clothes on your sensor and try to avoid laying on your sensor.  You can shower/bathe with the sensor on.  But avoid submerging the sensor more than 3 feet for more than 30 minutes.  Gently pat to dry.    INITIAL SET UP:  There will be a 60 minute warm up for each new sensor.  Each sensor should last 15 days.  Do not take more than 1000mg of vitamin C per day or this can affect sensor accuracy.    The first 12 hours of every new sensor often a little \"off\" as it gets to know your body fluids.  Set glucose alarms for highs and lows per your preference or at the recommendations of your diabetes educator.  You will not be able to silence or turn off the urgent low alert at 54 mg/dL.  If an alarm goes off, go to your jose carlos and acknowledge it or you will continue to get alarmed every 5 minutes.  Your Janneth 3 " jose carlos or  will notify you when it is time to change your sensor.  Just remove it like a band aid and throw it in the trash, then place a new sensor.  It is recommended to switch arms with every sensor.    DAILY ROUTINE:  Keep your  within 33 feet of you to keep data communicating with your device.  If you are away from your device for more than 20 minutes, your device will alarm.  Be curious with what the sensor data shows.  How do your food choices impact your glucose?  Exercise?  Complete a fingerstick glucose check at these times:                          -when you feel differently than the sensor indicates                          -when you are not wearing a sensor    Patients using the reader can upload from home via desktop or laptop computer on [employer] or will have the  downloaded in clinic.     OTHER IMPORTANT NOTES:  If the sensor is often falling off before the 14 days are up, there are products than can help.  Talk to your diabetes educator.  You can leave your Janneth sensor on for radiology scans (Xray, CT scan or MRI), however it is recommended that you use fingersticks for accurate readings during and 1 hour after an MRI as the accuracy of the sensor is questionable during this time.  Contact the  if the sensor does not last the full 14 days for any reason, or if you have any other technical problems. Keep your [employer] sensor box with the lot and serial numbers as they often ask for this information.  -[employer] customer service phone number: [phone]     Hypoglycemia/Low blood sugar:  Signs/symptoms of low blood sugar include (but are not limited to): sweating, shaking, feeling dizzy or weak, extreme hunger, headache, feeling crabby or confused, numbness or tingling of mouth/lips.  If you have these symptoms check your blood sugar if you can and then consume carbohydrate (sugar)  This is a helpful reminder on how to treat a blood sugar if you are low:  If your  blood sugar is < 80 mg/dL, consume 15 grams of fast-acting carbohydrate (4 glucose tabs OR small single serving size bag of fruit snacks OR 4 oz juice or non-diet pop OR 2 Tbsp dried fruit OR 5-7 lifesavers OR 1 Tbsp sugar) and wait 15 minutes. Check blood sugar again and if not rising, repeat.  If your blood sugar is < 50 mg/dL, consume 30 grams of fast-acting carbohydrate (8 glucose tabs OR 8 oz juice or non-diet pop OR 4 Tbsp dried fruit OR 10-14 lifesavers OR 2 Tbsp sugar) and wait 15 minutes. Check blood sugar again and if not rising, repeat.   Follow up:  Follow-up diabetes education appointment scheduled on 3/19/25 at 10:30am.     Topics to cover at upcoming visits: Healthy Eating, Taking Medication, and Reducing Risks    Follow-up:  Upcoming Diabetes Ed Appointments     Visit Type Date Time Department    DIABETES ED 2/26/2025 10:30 AM [facility] DIABETES ED    DIABETES ED 3/19/2025 10:30 AM [facility] DI[facility]ETES ED        See Care Plan for co-developed, patient-state behavior change goals.    Education Materials Provided:  My plate planner and breakfast ideas ( 30,45, and 60 grams meals)      Subjective/Objective  Lexii is an 84 year old year old, presenting for the following diabetes education related to: Presents for: Initial Assessment for new diagnosis  Accompanied by: Self  Diabetes education in the past 24mo: (Patient-Rptd) No  Focus of Visit: Patient Unsure, CGM, Taking Medication, Healthy Eating  Type of CGM visit: Personal CGM Start  Diabetes type: (Patient-Rptd) Type 2  How confident are you filling out medical forms by yourself:: (Patient-Rptd) Quite a bit  Diabetes management related comments/concerns: (Patient-Rptd) My concerns are mostly about what I can/cannot eat  Other concerns:: None  Cultural Influences/Ethnic Background:  Not  or     Diabetes Symptoms & Complications:  Diabetes Related Symptoms: (Patient-Rptd) Fatigue  Weight trend: (Patient-Rptd) Decreasing  Complications assessed today?:  "No    Patient Problem List and Family Medical History reviewed for relevant medical history, current medical status, and diabetes risk factors.    Vitals:  LMP  (LMP Unknown)   Estimated body mass index is 35.19 kg/m  as calculated from the following:    Height as of 2/25/25: 1.626 m (5' 4\").    Weight as of 2/25/25: 93 kg (205 lb).   Last 3 BP:   BP Readings from Last 3 Encounters:   02/25/25 112/78   02/19/25 135/72   02/18/25 118/76       History   Smoking Status     Never   Smokeless Tobacco     Never       Labs:  Lab Results   Component Value Date    A1C 10.9 02/05/2025    A1C 6.1 02/24/2021     Lab Results   Component Value Date     02/25/2025     02/08/2025    GLC 95 11/09/2022     02/24/2021     Lab Results   Component Value Date     12/10/2024    LDL 85 02/24/2021     HDL Cholesterol   Date Value Ref Range Status   02/24/2021 50 >49 mg/dL Final     Direct Measure HDL   Date Value Ref Range Status   12/10/2024 44 (L) >=50 mg/dL Final   ]  GFR Estimate   Date Value Ref Range Status   02/25/2025 55 (L) >60 mL/min/1.73m2 Final     Comment:     eGFR calculated using 2021 CKD-EPI equation.   02/24/2021 60 (L) >60 mL/min/[1.73_m2] Final     Comment:     Non  GFR Calc  Starting 12/18/2018, serum creatinine based estimated GFR (eGFR) will be   calculated using the Chronic Kidney Disease Epidemiology Collaboration   (CKD-EPI) equation.       GFR, ESTIMATED POCT   Date Value Ref Range Status   07/05/2023 56 (L) >60 mL/min/1.73m2 Final     GFR Estimate If Black   Date Value Ref Range Status   02/24/2021 70 >60 mL/min/[1.73_m2] Final     Comment:      GFR Calc  Starting 12/18/2018, serum creatinine based estimated GFR (eGFR) will be   calculated using the Chronic Kidney Disease Epidemiology Collaboration   (CKD-EPI) equation.       Lab Results   Component Value Date    CR 1.01 02/25/2025    CR 0.90 02/24/2021     No results found for: \"MICROALBUMIN\"    Healthy " Eating:  Healthy Eating Assessed Today: Yes  Cultural/Mormon diet restrictions?: (Patient-Rptd) No  Do you have any food allergies or intolerances?: No  Meal planning/habits: None  Who cooks/prepares meals for you?: Self  Who purchases food in  your home?: Self  How many times a week on average do you eat food made away from home (restaurant/take-out)?: (Patient-Rptd) 0  Meals include: (Patient-Rptd) Breakfast, Lunch, Dinner  Breakfast: protein shake - atkins  Lunch: sandwich - egg salad or turkey, fruit  Dinner: fish/chicken, vegetable  Beverages: (Patient-Rptd) Water  Has patient met with a dietitian in the past?: Yes      Monitoring:  Monitoring Assessed Today: Yes  Did patient bring glucose meter to appointment? : No  Blood Glucose Meter: Accu-chek, CGM  Times checking blood sugar at home (number): (Patient-Rptd) 3  Times checking blood sugar at home (per): (Patient-Rptd) Day                Taking Medications:  Diabetes Medication(s)       Biguanides       metFORMIN (GLUCOPHAGE) 500 MG tablet Take 2 tablets (1,000 mg) by mouth 2 times daily (with meals).       Insulin       insulin glargine (LANTUS PEN) 100 UNIT/ML pen Inject 15 Units subcutaneously every morning (before breakfast).          Taking Medication Assessed Today: Yes  Current Treatments: (Patient-Rptd) Insulin Injections, Oral Medication (taken by mouth)  Problems taking diabetes medications regularly?: No  Diabetes medication side effects?: No    Problem Solving:  Problem Solving Assessed Today: Yes  Is the patient at risk for hypoglycemia?: Yes  Hypoglycemia Frequency: Never          Ina Gibson RN  Time Spent: 60 minutes  Encounter Type: Individual    Any diabetes medication dose changes were made via the CDCES Standing Orders under the patient's referring provider.

## 2025-02-26 NOTE — PATIENT INSTRUCTIONS
"Care Plan:  Meal Plan Recommendation: eat 3 meals a day, have small snacks between meals, if needed, use portion control, use plate planning method.   Fruit - limit to the size of a tennis ball at a meal or a snack   Medication:    Metformin- continue 2 tablets twice a day   Lantus insulin- decrease to 13 units daily    Monitoring:  Freestyle Janneth 3 or 3+    SENSOR BASICS:  Understand that your glucose sensor measures your glucose in your interstitial fluid and your blood sugar measures your glucose in your blood stream. The readings are not meant to be the same.        Your sensor glucose will lag behind your blood glucose.  \"Remember the roller coaster\".  Your blood sugar is always the front car and your sensor glucose is always the last car.  When blood sugar is moving up or down, the more difference there will be.          Take it easy while wearing the sensor.  Take extra care while bathing and getting dressed.  Wear loose fitting clothes on your sensor and try to avoid laying on your sensor.  You can shower/bathe with the sensor on.  But avoid submerging the sensor more than 3 feet for more than 30 minutes.  Gently pat to dry.    INITIAL SET UP:  There will be a 60 minute warm up for each new sensor.  Each sensor should last 15 days.  Do not take more than 1000mg of vitamin C per day or this can affect sensor accuracy.    The first 12 hours of every new sensor often a little \"off\" as it gets to know your body fluids.  Set glucose alarms for highs and lows per your preference or at the recommendations of your diabetes educator.  You will not be able to silence or turn off the urgent low alert at 54 mg/dL.  If an alarm goes off, go to your jose carlos and acknowledge it or you will continue to get alarmed every 5 minutes.  Your Janneth 3 jose carlos or  will notify you when it is time to change your sensor.  Just remove it like a band aid and throw it in the trash, then place a new sensor.  It is recommended to switch " arms with every sensor.    DAILY ROUTINE:  Keep your  within 33 feet of you to keep data communicating with your device.  If you are away from your device for more than 20 minutes, your device will alarm.  Be curious with what the sensor data shows.  How do your food choices impact your glucose?  Exercise?  Complete a fingerstick glucose check at these times:                          -when you feel differently than the sensor indicates                          -when you are not wearing a sensor    Patients using the reader can upload from home via desktop or laptop computer on [employer] or will have the  downloaded in clinic.     OTHER IMPORTANT NOTES:  If the sensor is often falling off before the 14 days are up, there are products than can help.  Talk to your diabetes educator.  You can leave your Janneth sensor on for radiology scans (Xray, CT scan or MRI), however it is recommended that you use fingersticks for accurate readings during and 1 hour after an MRI as the accuracy of the sensor is questionable during this time.  Contact the  if the sensor does not last the full 14 days for any reason, or if you have any other technical problems. Keep your Janneth sensor box with the lot and serial numbers as they often ask for this information.  -[employer] customer service phone number: [phone]     Hypoglycemia/Low blood sugar:  Signs/symptoms of low blood sugar include (but are not limited to): sweating, shaking, feeling dizzy or weak, extreme hunger, headache, feeling crabby or confused, numbness or tingling of mouth/lips.  If you have these symptoms check your blood sugar if you can and then consume carbohydrate (sugar)  This is a helpful reminder on how to treat a blood sugar if you are low:  If your blood sugar is < 80 mg/dL, consume 15 grams of fast-acting carbohydrate (4 glucose tabs OR small single serving size bag of fruit snacks OR 4 oz juice or non-diet pop OR 2 Tbsp dried  fruit OR 5-7 lifesavers OR 1 Tbsp sugar) and wait 15 minutes. Check blood sugar again and if not rising, repeat.  If your blood sugar is < 50 mg/dL, consume 30 grams of fast-acting carbohydrate (8 glucose tabs OR 8 oz juice or non-diet pop OR 4 Tbsp dried fruit OR 10-14 lifesavers OR 2 Tbsp sugar) and wait 15 minutes. Check blood sugar again and if not rising, repeat.   Follow up:  Follow-up diabetes education appointment scheduled on 3/19/25 at 10:30am.      Bring blood glucose meter and logbook with you to all doctor and follow-up appointments.     [facility]:  For Your Diabetes or Nutrition Education Appointments Call:  [phone]   For Diabetes or Nutrition Related Questions:   [phone]  Send [employer] Message   If you need a medication refill please contact your pharmacy. Please allow 3 business days for your refills to be completed.

## 2025-02-26 NOTE — PROGRESS NOTES
Diabetes Self-Management Education & Support    Presents for: Initial Assessment for new diagnosis    Type of Service: In Person Visit    Assessment   Patient requests assistance starting her new Janneth 3+ sensors. She would like better understanding of her diet and some ideas of what to eat for breakfast. She had a lower blood sugar this week of 92mg/dl and she is worried about low blood sugars.     Patient completed homework (online training and/or Getting started with CGM guide)? : No  Sensor started:: Started today in clinic  CGM Initial Settings: Freestyle Jnaneth  Freestyle Janneth Target Glucose Range (mg/dL):     Sensor was inserted with no resistance or bleeding at insertion site.     CGM-specific education provided on:    Freestyle Janneth sensor: insertion technique, sensor site location and rotation, insulin administration in relation to sensor placement, sensor wear, reasons to remove sensor (MRI, CT, diathermy), Vitamin C & Aspirin effects on sensor, Janneth Blanco: frequency of scanning sensor, length of time data is visible, use of built in glucose meter, Precision X-tra test strips, Use of trends and graphs for pattern management and problem solving, and [employer] software    Care Plan and Education Provided:  Discussed pathophysiology of Type 2 DM. Healthy Eating: Balanced meals, Consistency in amount and timing of carbohydrate intake, and Plate planning method, Monitoring: Blood glucose versus Continuous Glucose Monitoring and Individual glucose targets, Taking Medication: Action of prescribed medication(s) and Side effects of prescribed medication(s), and Problem Solving: Low glucose - causes, signs/symptoms, treatment and prevention    Patient verbalized understanding of diabetes self-management education concepts discussed, opportunities for ongoing education and support, and recommendations provided today.    Plan  Meal Plan Recommendation: eat 3 meals a day, have small snacks between meals, if  "needed, use portion control, use plate planning method.   Fruit - limit to the size of a tennis ball at a meal or a snack   Medication:    Metformin- continue 2 tablets twice a day   Lantus insulin- decrease to 13 units daily    Monitoring:  Freestyle Janneth 3 or 3+    SENSOR BASICS:  Understand that your glucose sensor measures your glucose in your interstitial fluid and your blood sugar measures your glucose in your blood stream. The readings are not meant to be the same.        Your sensor glucose will lag behind your blood glucose.  \"Remember the roller coaster\".  Your blood sugar is always the front car and your sensor glucose is always the last car.  When blood sugar is moving up or down, the more difference there will be.          Take it easy while wearing the sensor.  Take extra care while bathing and getting dressed.  Wear loose fitting clothes on your sensor and try to avoid laying on your sensor.  You can shower/bathe with the sensor on.  But avoid submerging the sensor more than 3 feet for more than 30 minutes.  Gently pat to dry.    INITIAL SET UP:  There will be a 60 minute warm up for each new sensor.  Each sensor should last 15 days.  Do not take more than 1000mg of vitamin C per day or this can affect sensor accuracy.    The first 12 hours of every new sensor often a little \"off\" as it gets to know your body fluids.  Set glucose alarms for highs and lows per your preference or at the recommendations of your diabetes educator.  You will not be able to silence or turn off the urgent low alert at 54 mg/dL.  If an alarm goes off, go to your jose carlos and acknowledge it or you will continue to get alarmed every 5 minutes.  Your Janneth 3 jose carlos or  will notify you when it is time to change your sensor.  Just remove it like a band aid and throw it in the trash, then place a new sensor.  It is recommended to switch arms with every sensor.    DAILY ROUTINE:  Keep your  within 33 feet of you to keep " data communicating with your device.  If you are away from your device for more than 20 minutes, your device will alarm.  Be curious with what the sensor data shows.  How do your food choices impact your glucose?  Exercise?  Complete a fingerstick glucose check at these times:                          -when you feel differently than the sensor indicates                          -when you are not wearing a sensor    Patients using the reader can upload from home via desktop or laptop computer on [employer] or will have the  downloaded in clinic.     OTHER IMPORTANT NOTES:  If the sensor is often falling off before the 14 days are up, there are products than can help.  Talk to your diabetes educator.  You can leave your Janneth sensor on for radiology scans (Xray, CT scan or MRI), however it is recommended that you use fingersticks for accurate readings during and 1 hour after an MRI as the accuracy of the sensor is questionable during this time.  Contact the  if the sensor does not last the full 14 days for any reason, or if you have any other technical problems. Keep your Janneth sensor box with the lot and serial numbers as they often ask for this information.  [employer] customer service phone number: [phone]     Hypoglycemia/Low blood sugar:  Signs/symptoms of low blood sugar include (but are not limited to): sweating, shaking, feeling dizzy or weak, extreme hunger, headache, feeling crabby or confused, numbness or tingling of mouth/lips.  If you have these symptoms check your blood sugar if you can and then consume carbohydrate (sugar)  This is a helpful reminder on how to treat a blood sugar if you are low:  If your blood sugar is < 80 mg/dL, consume 15 grams of fast-acting carbohydrate (4 glucose tabs OR small single serving size bag of fruit snacks OR 4 oz juice or non-diet pop OR 2 Tbsp dried fruit OR 5-7 lifesavers OR 1 Tbsp sugar) and wait 15 minutes. Check blood sugar again and if  not rising, repeat.  If your blood sugar is < 50 mg/dL, consume 30 grams of fast-acting carbohydrate (8 glucose tabs OR 8 oz juice or non-diet pop OR 4 Tbsp dried fruit OR 10-14 lifesavers OR 2 Tbsp sugar) and wait 15 minutes. Check blood sugar again and if not rising, repeat.   Follow up:  Follow-up diabetes education appointment scheduled on 3/19/25 at 10:30am.     Topics to cover at upcoming visits: Healthy Eating, Taking Medication, and Reducing Risks    Follow-up:  Upcoming Diabetes Ed Appointments     Visit Type Date Time Department    DIABETES ED 2/26/2025 10:30 AM [facility] DI[facility]ETES ED    DIABETES ED 3/19/2025 10:30 AM [facility] DI[facility]ETES ED        See Care Plan for co-developed, patient-state behavior change goals.    Education Materials Provided:  My plate planner and breakfast ideas ( 30,45, and 60 grams meals)      Subjective/Objective  Lexii is an 84 year old year old, presenting for the following diabetes education related to: Presents for: Initial Assessment for new diagnosis  Accompanied by: Self  Diabetes education in the past 24mo: (Patient-Rptd) No  Focus of Visit: Patient Unsure, CGM, Taking Medication, Healthy Eating  Type of CGM visit: Personal CGM Start  Diabetes type: (Patient-Rptd) Type 2  How confident are you filling out medical forms by yourself:: (Patient-Rptd) Quite a bit  Diabetes management related comments/concerns: (Patient-Rptd) My concerns are mostly about what I can/cannot eat  Other concerns:: None  Cultural Influences/Ethnic Background:  Not  or     Diabetes Symptoms & Complications:  Diabetes Related Symptoms: (Patient-Rptd) Fatigue  Weight trend: (Patient-Rptd) Decreasing  Complications assessed today?: No    Patient Problem List and Family Medical History reviewed for relevant medical history, current medical status, and diabetes risk factors.    Vitals:  LMP  (LMP Unknown)   Estimated body mass index is 35.19 kg/m  as calculated from the following:    Height as of  "2/25/25: 1.626 m (5' 4\").    Weight as of 2/25/25: 93 kg (205 lb).   Last 3 BP:   BP Readings from Last 3 Encounters:   02/25/25 112/78   02/19/25 135/72   02/18/25 118/76       History   Smoking Status    Never   Smokeless Tobacco    Never       Labs:  Lab Results   Component Value Date    A1C 10.9 02/05/2025    A1C 6.1 02/24/2021     Lab Results   Component Value Date     02/25/2025     02/08/2025    GLC 95 11/09/2022     02/24/2021     Lab Results   Component Value Date     12/10/2024    LDL 85 02/24/2021     HDL Cholesterol   Date Value Ref Range Status   02/24/2021 50 >49 mg/dL Final     Direct Measure HDL   Date Value Ref Range Status   12/10/2024 44 (L) >=50 mg/dL Final   ]  GFR Estimate   Date Value Ref Range Status   02/25/2025 55 (L) >60 mL/min/1.73m2 Final     Comment:     eGFR calculated using 2021 CKD-EPI equation.   02/24/2021 60 (L) >60 mL/min/[1.73_m2] Final     Comment:     Non  GFR Calc  Starting 12/18/2018, serum creatinine based estimated GFR (eGFR) will be   calculated using the Chronic Kidney Disease Epidemiology Collaboration   (CKD-EPI) equation.       GFR, ESTIMATED POCT   Date Value Ref Range Status   07/05/2023 56 (L) >60 mL/min/1.73m2 Final     GFR Estimate If Black   Date Value Ref Range Status   02/24/2021 70 >60 mL/min/[1.73_m2] Final     Comment:      GFR Calc  Starting 12/18/2018, serum creatinine based estimated GFR (eGFR) will be   calculated using the Chronic Kidney Disease Epidemiology Collaboration   (CKD-EPI) equation.       Lab Results   Component Value Date    CR 1.01 02/25/2025    CR 0.90 02/24/2021     No results found for: \"MICROALBUMIN\"    Healthy Eating:  Healthy Eating Assessed Today: Yes  Cultural/Taoism diet restrictions?: (Patient-Rptd) No  Do you have any food allergies or intolerances?: No  Meal planning/habits: None  Who cooks/prepares meals for you?: Self  Who purchases food in  your home?: Self  How " many times a week on average do you eat food made away from home (restaurant/take-out)?: (Patient-Rptd) 0  Meals include: (Patient-Rptd) Breakfast, Lunch, Dinner  Breakfast: protein shake - atkins  Lunch: sandwich - egg salad or turkey, fruit  Dinner: fish/chicken, vegetable  Beverages: (Patient-Rptd) Water  Has patient met with a dietitian in the past?: Yes      Monitoring:  Monitoring Assessed Today: Yes  Did patient bring glucose meter to appointment? : No  Blood Glucose Meter: Accu-chek, CGM  Times checking blood sugar at home (number): (Patient-Rptd) 3  Times checking blood sugar at home (per): (Patient-Rptd) Day                Taking Medications:  Diabetes Medication(s)       Biguanides       metFORMIN (GLUCOPHAGE) 500 MG tablet Take 2 tablets (1,000 mg) by mouth 2 times daily (with meals).       Insulin       insulin glargine (LANTUS PEN) 100 UNIT/ML pen Inject 15 Units subcutaneously every morning (before breakfast).          Taking Medication Assessed Today: Yes  Current Treatments: (Patient-Rptd) Insulin Injections, Oral Medication (taken by mouth)  Problems taking diabetes medications regularly?: No  Diabetes medication side effects?: No    Problem Solving:  Problem Solving Assessed Today: Yes  Is the patient at risk for hypoglycemia?: Yes  Hypoglycemia Frequency: Never          Ina Gibson RN  Time Spent: 60 minutes  Encounter Type: Individual    Any diabetes medication dose changes were made via the CDCES Standing Orders under the patient's referring provider.

## 2025-02-27 DIAGNOSIS — K21.9 GASTROESOPHAGEAL REFLUX DISEASE WITHOUT ESOPHAGITIS: ICD-10-CM

## 2025-02-27 DIAGNOSIS — I10 ESSENTIAL HYPERTENSION, BENIGN: ICD-10-CM

## 2025-02-27 DIAGNOSIS — K22.70 BARRETT'S ESOPHAGUS WITHOUT DYSPLASIA: ICD-10-CM

## 2025-02-27 DIAGNOSIS — E03.9 ACQUIRED HYPOTHYROIDISM: ICD-10-CM

## 2025-02-27 RX ORDER — AMLODIPINE BESYLATE 5 MG/1
TABLET ORAL
Qty: 90 TABLET | Refills: 3 | Status: CANCELLED | OUTPATIENT
Start: 2025-02-27

## 2025-02-27 RX ORDER — LEVOTHYROXINE SODIUM 50 UG/1
50 TABLET ORAL DAILY
Qty: 90 TABLET | Refills: 3 | Status: CANCELLED | OUTPATIENT
Start: 2025-02-27

## 2025-02-27 RX ORDER — PANTOPRAZOLE SODIUM 20 MG/1
20 TABLET, DELAYED RELEASE ORAL DAILY
Qty: 90 TABLET | Refills: 3 | Status: CANCELLED | OUTPATIENT
Start: 2025-02-27

## 2025-02-27 RX ORDER — LISINOPRIL 30 MG/1
30 TABLET ORAL EVERY MORNING
Qty: 90 TABLET | Refills: 3 | Status: CANCELLED | OUTPATIENT
Start: 2025-02-27

## 2025-02-27 RX ORDER — SIMVASTATIN 20 MG
20 TABLET ORAL AT BEDTIME
Qty: 90 TABLET | Refills: 0 | Status: CANCELLED | OUTPATIENT
Start: 2025-02-27

## 2025-02-27 NOTE — TELEPHONE ENCOUNTER
[facility] received call from patient requesting assistance with obtaining medication refills. Patient states that the pharmacy is informing her that the doctor cancelled the following medications, even though her bottles say their are refills remaining. Last filled: 12/10/24.      Patient is needing the following medication orders sent to:   [facility]: (): #[phone]  Lisinopril  Simvastatin  Levothyroxine  Pantoprazole  Amlodipine     Please update patient when medication orders have been sent to her pharmacy & she will follow up with [facility].     Thank you,     Jessy Angel RN Care Coordinator  [facility]  Email: [email or web address]  Phone: [phone]

## 2025-03-03 ENCOUNTER — OFFICE VISIT (OUTPATIENT)
Dept: CARDIOLOGY | Facility: CLINIC | Age: 85
End: 2025-03-03
Payer: COMMERCIAL

## 2025-03-03 ENCOUNTER — TELEPHONE (OUTPATIENT)
Dept: FAMILY MEDICINE | Facility: CLINIC | Age: 85
End: 2025-03-03

## 2025-03-03 VITALS
HEIGHT: 64 IN | SYSTOLIC BLOOD PRESSURE: 142 MMHG | OXYGEN SATURATION: 94 % | HEART RATE: 112 BPM | WEIGHT: 205 LBS | DIASTOLIC BLOOD PRESSURE: 82 MMHG | BODY MASS INDEX: 35 KG/M2

## 2025-03-03 DIAGNOSIS — I10 ESSENTIAL HYPERTENSION: Primary | ICD-10-CM

## 2025-03-03 DIAGNOSIS — I44.2 COMPLETE HEART BLOCK (H): ICD-10-CM

## 2025-03-03 DIAGNOSIS — Z95.0 CARDIAC PACEMAKER IN SITU: ICD-10-CM

## 2025-03-03 DIAGNOSIS — Z95.2 S/P TAVR (TRANSCATHETER AORTIC VALVE REPLACEMENT): ICD-10-CM

## 2025-03-03 PROCEDURE — 3077F SYST BP >= 140 MM HG: CPT | Performed by: INTERNAL MEDICINE

## 2025-03-03 PROCEDURE — 99214 OFFICE O/P EST MOD 30 MIN: CPT | Performed by: INTERNAL MEDICINE

## 2025-03-03 PROCEDURE — 3079F DIAST BP 80-89 MM HG: CPT | Performed by: INTERNAL MEDICINE

## 2025-03-03 PROCEDURE — G2211 COMPLEX E/M VISIT ADD ON: HCPCS | Performed by: INTERNAL MEDICINE

## 2025-03-03 RX ORDER — METOPROLOL SUCCINATE 25 MG/1
25 TABLET, EXTENDED RELEASE ORAL DAILY
Qty: 90 TABLET | Refills: 3 | Status: SHIPPED | OUTPATIENT
Start: 2025-03-03

## 2025-03-03 RX ORDER — METOPROLOL SUCCINATE 25 MG/1
25 TABLET, EXTENDED RELEASE ORAL DAILY
Qty: 90 TABLET | Refills: 3 | Status: SHIPPED | OUTPATIENT
Start: 2025-03-03 | End: 2025-03-03

## 2025-03-03 NOTE — LETTER
3/3/2025    Robert Randle MD  [id number]  [facility] [address]    RE: Lexii Stratton       Dear Colleague,     I had the pleasure of seeing Lexii Stratton in the [facility].  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   Recent diagnosis  DM2, A1C 10.9 on 2/5/25  Mild pulmonary hypertension on last echo from 10/24/2024  Very minimal nonobstructive coronary artery disease on coronary angiogram 9/30/2022 prior to TAVR.  Hypertension  Chronic lower extremity edema which did not respond to furosemide in the past    Worsening dyspnea, likely multifactorial with no clear/definitive cardiac cause. She seems to have had a very significant decline related to her new DM2 diagnosis and severe hyperglycemia as well as chronic back pain.  We will try adding a low dose BB for better BP control and this may also help alleviate her relative sinus tachycardia.  PFTs may be useful. If she continues to worsen we could consider cardiac stress MRI, but it seems somewhat unlikely that she has developed severe CAD since her angio.  Infiltrative cardiomyopathy is a possibility, but she does not seems to be in significant heart failure, and MRI may be difficult to tolerate due to back pain.     Plan:  Metoprolol succinate   Keep scheduled device check next month.  She plans to follow up with orthopedics for her chronic back pain.   Cardiology follow up in about 3 months.     The longitudinal plan of care for the diagnosis(es)/condition(s) as documented were addressed during this visit. Due to the added complexity in care, I will continue to support Lexii in the subsequent management and with ongoing continuity of care.      HPI:     85 yo F patient here for follow up evaluation for worsening  dyspnea. It has been progressive for sometime, but worsened significantly on 2/5/25.  She was very weak and fell, tripped on a step. She called 911. She was admitted to the hospital with hyperosmolar nonketotic state and new diagnosis DM2.  In retrospect she has had fatigue and decreasing activity tolerance since the beginning of Jan.    She is frustrated because she had switch to warfarin because eliquis has become too expensive. This is quite frustrating for her.     She has a history of transcatheter aortic valve replacement in 2022 for severe aortic stenosis, with secondary complete heart block status post pacemaker placement, hypertension, paroxysmal atrial fibrillation.  She has had multiple visits recently for dyspnea.  In February her evaluations included EKGs have showed sinus rhythm, NT proBNP has not been elevated.  D-dimer was mildly elevated but a CT showed questionable mild pulmonary edema.  She has not been responsive to diuretics in the past.  It is not clear that heart failure is playing a role in her dyspnea.      Diagnostic studies reviewed for today's visit:  Echo 10/24/2024: Status post TAVR, Medtronic 26 mm valve 11/8/2022.  Mean gradient 8 mmHg, no abnormal regurgitation.  EF 60 to 65%, normal RV size and function.    Last device check 1/16/2025: 97% atrial paced, 4% ventricular paced 1 ventricular high rate event 13 seconds with rates in the 160s on 10/9/2024.  NT proBNP on 2/18/2025 and 2/19/2025 was normal  D-dimer 2/25/25: 0.94 (elevated)  Chest CT 2/25/2025 showed no pulmonary embolus and mild groundglass opacities in both lungs which may represent mild pulmonary edema.  Lungs are otherwise clear.  Coronary angiogram 9/30/2022 showed minimal nonocclusive CAD.  15% stenosis mid LAD      CURRENT MEDICATIONS:  Current Outpatient Medications   Medication Sig Dispense Refill     acetaminophen (TYLENOL) 500 MG tablet Take 1,000 mg by mouth 2 times daily (2 x 500 mg = 1000 mg)       amLODIPine  (NORVASC) 5 MG tablet TAKE ONE TABLET BY MOUTH EVERY NIGHT AT BEDTIME 90 tablet 2     amoxicillin (AMOXIL) 500 MG capsule Take 2,000 mg by mouth once as needed (1 hour prior to dental procedures 4 x 500 mg = 2000 mg)       Biotin 5000 MCG PO TABS Take 1 tablet by mouth daily       blood glucose (NO BRAND SPECIFIED) lancets standard To use to test glucose level in the blood Use to test blood sugar  3  times daily as directed. To accompany glucose monitor brands per insurance coverage. 200 each 1     blood glucose (NO BRAND SPECIFIED) test strip To use to test glucose level in the blood Use to test blood sugar  3 times daily as directed. To accompany glucose monitor brands per insurance coverage. 100 strip 11     blood glucose calibration (NO BRAND SPECIFIED) solution Used to calibrate the blood glucose monitor as needed and as directed.  To accompany  blood glucose brands per insurance coverage 1 each 0     blood glucose monitoring (NO BRAND SPECIFIED) meter device kit Check blood sugar 3 times a day 1 kit 0     calcium carbonate (OS-CINDY) 500 MG tablet Take 1 tablet by mouth every other day       CENTRUM SILVER OR TABS Take 1 tablet by mouth daily 30 0     clotrimazole (MYCELEX) 10 MG lozenge Place 1 lozenge (10 mg) inside cheek 5 times daily. 70 lozenge 0     Continuous Glucose Sensor (FREESTYLE AURELIA 3 PLUS SENSOR) MISC Use 1 sensor every 15 days. Use to read blood sugars per 's instructions. 6 each 3     Cranberry 450 MG CAPS Take 1 capsule by mouth daily (with dinner)       ferrous gluconate (FERGON) 324 (38 Fe) MG tablet Take 1 tablet (324 mg) by mouth daily (with breakfast) 90 tablet 1     insulin glargine (LANTUS PEN) 100 UNIT/ML pen Inject 15 Units subcutaneously every morning (before breakfast). 15 mL 0     insulin pen needle (BD BRIANNA U/F) 32G X 4 MM miscellaneous Use 1 pen needles daily or as directed. 100 each 1     latanoprost (XALATAN) 0.005 % ophthalmic solution Place 1 drop into both eyes  At Bedtime        levothyroxine (SYNTHROID/LEVOTHROID) 50 MCG tablet Take 1 tablet (50 mcg) by mouth daily. 90 tablet 2     lisinopril (ZESTRIL) 30 MG tablet Take 1 tablet (30 mg) by mouth every morning. 90 tablet 2     magnesium 250 MG tablet Take 1 tablet by mouth daily       metFORMIN (GLUCOPHAGE) 500 MG tablet Take 2 tablets (1,000 mg) by mouth 2 times daily (with meals). 120 tablet 0     OMEGA-3 FATTY ACIDS 1200 MG OR CAPS Take 1 capsule by mouth daily  0     pantoprazole (PROTONIX) 20 MG EC tablet Take 1 tablet (20 mg) by mouth daily. 90 tablet 2     potassium 99 MG TABS Take 1 tablet by mouth daily (with dinner)        Probiotic Product (ACIDOPHILUS PROBIOTIC BLEND) CAPS Take 1 capsule by mouth daily (with breakfast)  30 capsule 0     simvastatin (ZOCOR) 20 MG tablet Take 1 tablet (20 mg) by mouth at bedtime. 90 tablet 1     VITAMIN D, CHOLECALCIFEROL, PO Take 2,000 Units by mouth daily       warfarin ANTICOAGULANT (COUMADIN) 2 MG tablet Take 3 tablets on day 1 (2/22/25), then 2 tablets everyday thereafter OR per INR clinic 180 tablet 0       ALLERGIES     Allergies   Allergen Reactions     Morphine And Codeine Nausea and Vomiting     Atorvastatin      Amoxicillin-Pot Clavulanate Diarrhea     Hydrocodone      Keeps patient awake     Naproxen Itching and Rash     Seasonal Allergies      Hay fever in fall, ragweed and russian thistles     Sulfa Antibiotics Nausea       PAST MEDICAL, SURGICAL, FAMILY, SOCIAL HISTORY:  History was reviewed and updated as needed, see medical record.    REVIEW OF SYSTEMS:  Skin:  not assessed     Eyes:  not assessed    ENT:  not assessed    Respiratory:  Positive for shortness of breath, dyspnea on exertion  Cardiovascular:    Positive for, fatigue  Gastroenterology: not assessed    Genitourinary:  not assessed    Musculoskeletal:  not assessed    Neurologic:  not assessed    Psychiatric:  not assessed    Heme/Lymph/Imm:  not assessed    Endocrine:  not assessed      PHYSICAL  EXAM:      BP: (!) 142/82 Pulse: 112     SpO2: 94 %      Vital Signs with Ranges  Pulse:  [112] 112  BP: (142)/(82) 142/82  SpO2:  [94 %] 94 %  205 lbs 0 oz    Constitutional: awake, alert, no distress. Noted dyspnea with ambulation  Eyes: sclera nonicteric  ENT: trachea midline  Respiratory: lungs are clear  Cardiovascular: neck veins are not distended  GI: nondistended, nontender, bowel sounds present  Skin: dry, no rash 1+ bilateral nontender pretibial edema.   Musculoskeletal: grossly normal muscle bulk and tone  Neurologic: no  gross focal deficits  Neuropsychiatric: normal affect    Recent Lab Results:  The following labs were reviewed today:    LIPID RESULTS:  Lab Results   Component Value Date    CHOL 169 02/28/2025    CHOL 157 02/24/2021    HDL 52 02/28/2025    HDL 50 02/24/2021    LDL 80 02/28/2025    LDL 85 02/24/2021    TRIG 183 (H) 02/28/2025    TRIG 110 02/24/2021    CHOLHDLRATIO 3.3 06/29/2015       LIVER ENZYME RESULTS:  Lab Results   Component Value Date    AST 43 02/25/2025    AST 21 02/24/2021    ALT 24 02/25/2025    ALT 18 02/24/2021       CBC RESULTS:  Lab Results   Component Value Date    WBC 10.9 02/25/2025    WBC 11.3 (H) 03/12/2021    RBC 4.20 02/25/2025    RBC 4.56 03/12/2021    HGB 13.5 02/25/2025    HGB 13.8 03/12/2021    HCT 40.2 02/25/2025    HCT 43.7 03/12/2021    MCV 96 02/25/2025    MCV 96 03/12/2021    MCH 32.1 02/25/2025    MCH 30.3 03/12/2021    MCHC 33.6 02/25/2025    MCHC 31.6 03/12/2021    RDW 14.2 02/25/2025    RDW 13.1 03/12/2021     02/25/2025     03/12/2021       BMP RESULTS:  Lab Results   Component Value Date     02/28/2025     02/24/2021    POTASSIUM 4.8 02/28/2025    POTASSIUM 4.2 11/09/2022    POTASSIUM 3.9 02/24/2021    CHLORIDE 103 02/28/2025    CHLORIDE 108 11/09/2022    CHLORIDE 107 02/24/2021    CO2 22 02/28/2025    CO2 22 11/09/2022    CO2 28 02/24/2021    ANIONGAP 15 02/28/2025    ANIONGAP 10 11/09/2022    ANIONGAP 5 02/24/2021    GLC  151 (H) 02/28/2025     (H) 02/08/2025    GLC 95 11/09/2022     (H) 02/24/2021    BUN 23.7 (H) 02/28/2025    BUN 16 11/09/2022    BUN 18 02/24/2021    CR 0.98 (H) 02/28/2025    CR 0.90 02/24/2021    GFRESTIMATED 57 (L) 02/28/2025    GFRESTIMATED 56 (L) 07/05/2023    GFRESTIMATED 60 (L) 02/24/2021    GFRESTBLACK 70 02/24/2021    CINDY 10.3 02/28/2025    CINDY 9.9 02/24/2021        A1C RESULTS:  Lab Results   Component Value Date    A1C 10.9 (H) 02/05/2025    A1C 6.1 (H) 02/24/2021       INR RESULTS:  Lab Results   Component Value Date    INR 3.0 (H) 02/28/2025    INR 1.5 (H) 02/25/2025    INR 1.06 10/27/2022    INR 1.07 09/30/2022    INR 1.15 (H) 03/28/2018    INR 1.65 (H) 07/03/2009                             Thank you for allowing me to participate in the care of your patient.      Sincerely,     Yulia Castano MD     [facility]  cc:   Robert Randle MD  [address] [address] [place] [address]

## 2025-03-03 NOTE — PROGRESS NOTES
[facility] Progress note    Assessment:  TAVR on 11/8/2022 using a 26 mm Medtronic evolute Fx valve   Status post dual-chamber pacemaker for complete heart block after TAVR  Exertional dyspnea, acute on chronic  Paroxysmal atrial fibrillation on warfarin.   Recent diagnosis  DM2, A1C 10.9 on 2/5/25  Mild pulmonary hypertension on last echo from 10/24/2024  Very minimal nonobstructive coronary artery disease on coronary angiogram 9/30/2022 prior to TAVR.  Hypertension  Chronic lower extremity edema which did not respond to furosemide in the past    Worsening dyspnea, likely multifactorial with no clear/definitive cardiac cause. She seems to have had a very significant decline related to her new DM2 diagnosis and severe hyperglycemia as well as chronic back pain.  We will try adding a low dose BB for better BP control and this may also help alleviate her relative sinus tachycardia.  PFTs may be useful. If she continues to worsen we could consider cardiac stress MRI, but it seems somewhat unlikely that she has developed severe CAD since her angio.  Infiltrative cardiomyopathy is a possibility, but she does not seems to be in significant heart failure, and MRI may be difficult to tolerate due to back pain.     Plan:  Metoprolol succinate   Keep scheduled device check next month.  She plans to follow up with orthopedics for her chronic back pain.   Cardiology follow up in about 3 months.     The longitudinal plan of care for the diagnosis(es)/condition(s) as documented were addressed during this visit. Due to the added complexity in care, I will continue to support Lexii in the subsequent management and with ongoing continuity of care.      HPI:     83 yo F patient here for follow up evaluation for worsening dyspnea. It has been progressive for sometime, but worsened significantly on 2/5/25.  She was very weak and fell, tripped on a step. She called 911. She was admitted to the hospital with hyperosmolar  nonketotic state and new diagnosis DM2.  In retrospect she has had fatigue and decreasing activity tolerance since the beginning of Jan.    She is frustrated because she had switch to warfarin because eliquis has become too expensive. This is quite frustrating for her.     She has a history of transcatheter aortic valve replacement in 2022 for severe aortic stenosis, with secondary complete heart block status post pacemaker placement, hypertension, paroxysmal atrial fibrillation.  She has had multiple visits recently for dyspnea.  In February her evaluations included EKGs have showed sinus rhythm, NT proBNP has not been elevated.  D-dimer was mildly elevated but a CT showed questionable mild pulmonary edema.  She has not been responsive to diuretics in the past.  It is not clear that heart failure is playing a role in her dyspnea.      Diagnostic studies reviewed for today's visit:  Echo 10/24/2024: Status post TAVR, Medtronic 26 mm valve 11/8/2022.  Mean gradient 8 mmHg, no abnormal regurgitation.  EF 60 to 65%, normal RV size and function.    Last device check 1/16/2025: 97% atrial paced, 4% ventricular paced 1 ventricular high rate event 13 seconds with rates in the 160s on 10/9/2024.  NT proBNP on 2/18/2025 and 2/19/2025 was normal  D-dimer 2/25/25: 0.94 (elevated)  Chest CT 2/25/2025 showed no pulmonary embolus and mild groundglass opacities in both lungs which may represent mild pulmonary edema.  Lungs are otherwise clear.  Coronary angiogram 9/30/2022 showed minimal nonocclusive CAD.  15% stenosis mid LAD      CURRENT MEDICATIONS:  Current Outpatient Medications   Medication Sig Dispense Refill    acetaminophen (TYLENOL) 500 MG tablet Take 1,000 mg by mouth 2 times daily (2 x 500 mg = 1000 mg)      amLODIPine (NORVASC) 5 MG tablet TAKE ONE TABLET BY MOUTH EVERY NIGHT AT BEDTIME 90 tablet 2    amoxicillin (AMOXIL) 500 MG capsule Take 2,000 mg by mouth once as needed (1 hour prior to dental procedures 4 x 500  mg = 2000 mg)      Biotin 5000 MCG PO TABS Take 1 tablet by mouth daily      blood glucose (NO BRAND SPECIFIED) lancets standard To use to test glucose level in the blood Use to test blood sugar  3  times daily as directed. To accompany glucose monitor brands per insurance coverage. 200 each 1    blood glucose (NO BRAND SPECIFIED) test strip To use to test glucose level in the blood Use to test blood sugar  3 times daily as directed. To accompany glucose monitor brands per insurance coverage. 100 strip 11    blood glucose calibration (NO BRAND SPECIFIED) solution Used to calibrate the blood glucose monitor as needed and as directed.  To accompany  blood glucose brands per insurance coverage 1 each 0    blood glucose monitoring (NO BRAND SPECIFIED) meter device kit Check blood sugar 3 times a day 1 kit 0    calcium carbonate (OS-CINDY) 500 MG tablet Take 1 tablet by mouth every other day      CENTRUM SILVER OR TABS Take 1 tablet by mouth daily 30 0    clotrimazole (MYCELEX) 10 MG lozenge Place 1 lozenge (10 mg) inside cheek 5 times daily. 70 lozenge 0    Continuous Glucose Sensor (FREESTYLE AURELIA 3 PLUS SENSOR) MISC Use 1 sensor every 15 days. Use to read blood sugars per 's instructions. 6 each 3    Cranberry 450 MG CAPS Take 1 capsule by mouth daily (with dinner)      ferrous gluconate (FERGON) 324 (38 Fe) MG tablet Take 1 tablet (324 mg) by mouth daily (with breakfast) 90 tablet 1    insulin glargine (LANTUS PEN) 100 UNIT/ML pen Inject 15 Units subcutaneously every morning (before breakfast). 15 mL 0    insulin pen needle (BD BRIANNA U/F) 32G X 4 MM miscellaneous Use 1 pen needles daily or as directed. 100 each 1    latanoprost (XALATAN) 0.005 % ophthalmic solution Place 1 drop into both eyes At Bedtime       levothyroxine (SYNTHROID/LEVOTHROID) 50 MCG tablet Take 1 tablet (50 mcg) by mouth daily. 90 tablet 2    lisinopril (ZESTRIL) 30 MG tablet Take 1 tablet (30 mg) by mouth every morning. 90 tablet 2     magnesium 250 MG tablet Take 1 tablet by mouth daily      metFORMIN (GLUCOPHAGE) 500 MG tablet Take 2 tablets (1,000 mg) by mouth 2 times daily (with meals). 120 tablet 0    OMEGA-3 FATTY ACIDS 1200 MG OR CAPS Take 1 capsule by mouth daily  0    pantoprazole (PROTONIX) 20 MG EC tablet Take 1 tablet (20 mg) by mouth daily. 90 tablet 2    potassium 99 MG TABS Take 1 tablet by mouth daily (with dinner)       Probiotic Product (ACIDOPHILUS PROBIOTIC BLEND) CAPS Take 1 capsule by mouth daily (with breakfast)  30 capsule 0    simvastatin (ZOCOR) 20 MG tablet Take 1 tablet (20 mg) by mouth at bedtime. 90 tablet 1    VITAMIN D, CHOLECALCIFEROL, PO Take 2,000 Units by mouth daily      warfarin ANTICOAGULANT (COUMADIN) 2 MG tablet Take 3 tablets on day 1 (2/22/25), then 2 tablets everyday thereafter OR per INR clinic 180 tablet 0       ALLERGIES     Allergies   Allergen Reactions    Morphine And Codeine Nausea and Vomiting    Atorvastatin     Amoxicillin-Pot Clavulanate Diarrhea    Hydrocodone      Keeps patient awake    Naproxen Itching and Rash    Seasonal Allergies      Hay fever in fall, ragweed and russian thistles    Sulfa Antibiotics Nausea       PAST MEDICAL, SURGICAL, FAMILY, SOCIAL HISTORY:  History was reviewed and updated as needed, see medical record.    REVIEW OF SYSTEMS:  Skin:  not assessed     Eyes:  not assessed    ENT:  not assessed    Respiratory:  Positive for shortness of breath, dyspnea on exertion  Cardiovascular:    Positive for, fatigue  Gastroenterology: not assessed    Genitourinary:  not assessed    Musculoskeletal:  not assessed    Neurologic:  not assessed    Psychiatric:  not assessed    Heme/Lymph/Imm:  not assessed    Endocrine:  not assessed      PHYSICAL EXAM:      BP: (!) 142/82 Pulse: 112     SpO2: 94 %      Vital Signs with Ranges  Pulse:  [112] 112  BP: (142)/(82) 142/82  SpO2:  [94 %] 94 %  205 lbs 0 oz    Constitutional: awake, alert, no distress. Noted dyspnea with ambulation  Eyes:  sclera nonicteric  ENT: trachea midline  Respiratory: lungs are clear  Cardiovascular: neck veins are not distended  GI: nondistended, nontender, bowel sounds present  Skin: dry, no rash 1+ bilateral nontender pretibial edema.   Musculoskeletal: grossly normal muscle bulk and tone  Neurologic: no  gross focal deficits  Neuropsychiatric: normal affect    Recent Lab Results:  The following labs were reviewed today:    LIPID RESULTS:  Lab Results   Component Value Date    CHOL 169 02/28/2025    CHOL 157 02/24/2021    HDL 52 02/28/2025    HDL 50 02/24/2021    LDL 80 02/28/2025    LDL 85 02/24/2021    TRIG 183 (H) 02/28/2025    TRIG 110 02/24/2021    CHOLHDLRATIO 3.3 06/29/2015       LIVER ENZYME RESULTS:  Lab Results   Component Value Date    AST 43 02/25/2025    AST 21 02/24/2021    ALT 24 02/25/2025    ALT 18 02/24/2021       CBC RESULTS:  Lab Results   Component Value Date    WBC 10.9 02/25/2025    WBC 11.3 (H) 03/12/2021    RBC 4.20 02/25/2025    RBC 4.56 03/12/2021    HGB 13.5 02/25/2025    HGB 13.8 03/12/2021    HCT 40.2 02/25/2025    HCT 43.7 03/12/2021    MCV 96 02/25/2025    MCV 96 03/12/2021    MCH 32.1 02/25/2025    MCH 30.3 03/12/2021    MCHC 33.6 02/25/2025    MCHC 31.6 03/12/2021    RDW 14.2 02/25/2025    RDW 13.1 03/12/2021     02/25/2025     03/12/2021       BMP RESULTS:  Lab Results   Component Value Date     02/28/2025     02/24/2021    POTASSIUM 4.8 02/28/2025    POTASSIUM 4.2 11/09/2022    POTASSIUM 3.9 02/24/2021    CHLORIDE 103 02/28/2025    CHLORIDE 108 11/09/2022    CHLORIDE 107 02/24/2021    CO2 22 02/28/2025    CO2 22 11/09/2022    CO2 28 02/24/2021    ANIONGAP 15 02/28/2025    ANIONGAP 10 11/09/2022    ANIONGAP 5 02/24/2021     (H) 02/28/2025     (H) 02/08/2025    GLC 95 11/09/2022     (H) 02/24/2021    BUN 23.7 (H) 02/28/2025    BUN 16 11/09/2022    BUN 18 02/24/2021    CR 0.98 (H) 02/28/2025    CR 0.90 02/24/2021    GFRESTIMATED 57 (L)  02/28/2025    GFRESTIMATED 56 (L) 07/05/2023    GFRESTIMATED 60 (L) 02/24/2021    GFRESTBLACK 70 02/24/2021    CINDY 10.3 02/28/2025    CINDY 9.9 02/24/2021        A1C RESULTS:  Lab Results   Component Value Date    A1C 10.9 (H) 02/05/2025    A1C 6.1 (H) 02/24/2021       INR RESULTS:  Lab Results   Component Value Date    INR 3.0 (H) 02/28/2025    INR 1.5 (H) 02/25/2025    INR 1.06 10/27/2022    INR 1.07 09/30/2022    INR 1.15 (H) 03/28/2018    INR 1.65 (H) 07/03/2009

## 2025-03-03 NOTE — TELEPHONE ENCOUNTER
Pt calling back re: below. Writer gave message and assisted in setting up Follow-up appt for Friday.      El Shultz,     Lipids are within acceptable range.     Your potassium is back into the normal range. Please stop the potassium supplement indefinitely.     Your kidney function is stable but is showing ongoing signs of mild dehydration. Please focus on drinking plenty of fluids so that your urine is a nice, light straw yellow color.     I am sending a message to our medical team to help set you up a follow up appointment this week.     Dr. Robert Durham, RN on 3/3/2025 at 3:27 PM

## 2025-03-03 NOTE — TELEPHONE ENCOUNTER
Writer huddled with PCP  and assisted pt in scheduling appt with PCP. Pt  prefers to be seen same day as INR visits due to transport concerns. Assisted in scheduling appt for tomorrow morning with PCP.    Fridays appt cancelled.    Shahnaz Durham RN on 3/3/2025 at 4:29 PM

## 2025-03-04 ENCOUNTER — ANTICOAGULATION THERAPY VISIT (OUTPATIENT)
Dept: ANTICOAGULATION | Facility: CLINIC | Age: 85
End: 2025-03-04

## 2025-03-04 ENCOUNTER — OFFICE VISIT (OUTPATIENT)
Dept: FAMILY MEDICINE | Facility: CLINIC | Age: 85
End: 2025-03-04
Payer: COMMERCIAL

## 2025-03-04 VITALS
RESPIRATION RATE: 22 BRPM | WEIGHT: 205.1 LBS | DIASTOLIC BLOOD PRESSURE: 85 MMHG | SYSTOLIC BLOOD PRESSURE: 137 MMHG | HEART RATE: 110 BPM | TEMPERATURE: 97.4 F | BODY MASS INDEX: 35.01 KG/M2 | OXYGEN SATURATION: 97 % | HEIGHT: 64 IN

## 2025-03-04 DIAGNOSIS — I48.20 CHRONIC ATRIAL FIBRILLATION (H): Primary | ICD-10-CM

## 2025-03-04 DIAGNOSIS — R06.09 DOE (DYSPNEA ON EXERTION): ICD-10-CM

## 2025-03-04 DIAGNOSIS — M47.817 LUMBOSACRAL SPONDYLOSIS WITHOUT MYELOPATHY: ICD-10-CM

## 2025-03-04 DIAGNOSIS — Z95.2 S/P TAVR (TRANSCATHETER AORTIC VALVE REPLACEMENT): ICD-10-CM

## 2025-03-04 DIAGNOSIS — E11.65 CONTROLLED TYPE 2 DIABETES MELLITUS WITH HYPERGLYCEMIA, WITHOUT LONG-TERM CURRENT USE OF INSULIN (H): Primary | ICD-10-CM

## 2025-03-04 DIAGNOSIS — E87.5 HYPERKALEMIA: ICD-10-CM

## 2025-03-04 DIAGNOSIS — R53.81 PHYSICAL DECONDITIONING: ICD-10-CM

## 2025-03-04 LAB — INR BLD: 2.5 (ref 0.9–1.1)

## 2025-03-04 PROCEDURE — G2211 COMPLEX E/M VISIT ADD ON: HCPCS | Performed by: STUDENT IN AN ORGANIZED HEALTH CARE EDUCATION/TRAINING PROGRAM

## 2025-03-04 PROCEDURE — 99214 OFFICE O/P EST MOD 30 MIN: CPT | Performed by: STUDENT IN AN ORGANIZED HEALTH CARE EDUCATION/TRAINING PROGRAM

## 2025-03-04 PROCEDURE — 3079F DIAST BP 80-89 MM HG: CPT | Performed by: STUDENT IN AN ORGANIZED HEALTH CARE EDUCATION/TRAINING PROGRAM

## 2025-03-04 PROCEDURE — 36416 COLLJ CAPILLARY BLOOD SPEC: CPT | Performed by: STUDENT IN AN ORGANIZED HEALTH CARE EDUCATION/TRAINING PROGRAM

## 2025-03-04 PROCEDURE — 85610 PROTHROMBIN TIME: CPT | Mod: GZ | Performed by: STUDENT IN AN ORGANIZED HEALTH CARE EDUCATION/TRAINING PROGRAM

## 2025-03-04 PROCEDURE — 3075F SYST BP GE 130 - 139MM HG: CPT | Performed by: STUDENT IN AN ORGANIZED HEALTH CARE EDUCATION/TRAINING PROGRAM

## 2025-03-04 ASSESSMENT — ENCOUNTER SYMPTOMS: BACK PAIN: 1

## 2025-03-04 NOTE — PROGRESS NOTES
"  Assessment & Plan     Controlled type 2 diabetes mellitus with hyperglycemia, without long-term current use of insulin (H)  CGM recently set up, now in place for 5 days with overall good blood sugar control however two lower blood sugar readings (low of 79 overnight) thus will decrease from 13U insulin to 11U insulin. Continue max dose metformin. Follow up with diabetic educator as already scheduled (in 2-3 weeks), then office visit with me in one month.    - MyChart if patient continues to have lower blood sugars    BAL (dyspnea on exertion)  Saw Cardiology recently. Starting BB for sinus tachycardia however cardiac causes much less likely at this point. She mentions that wonders if BAL caused by significant back pain as she may be holding her breath as she walks. Symptoms of BAL/back pain improving over past couple days after applying Voltaren gel to back. She is currently seeing Ortho through [facility] and epidural injection was set up but canceled due to recent events. Plan to refer to PT for lumbar spondylosis and stenosis along with physical deconditioning, follow up with Ortho for epidural injection. RTC in one month to re-evaluate symptoms.    Lumbosacral spondylosis without myelopathy  - Physical Therapy  Referral    Physical deconditioning  - Physical Therapy  Referral    Hyperkalemia  Most recent BMP stable. Has since discontinued potassium supplement. Do not restart.     BMI  Estimated body mass index is 35.21 kg/m  as calculated from the following:    Height as of this encounter: 1.626 m (5' 4\").    Weight as of this encounter: 93 kg (205 lb 1.6 oz).     Follow up in one month for DM2 reassessment    Robert Randle MD  [facility]  3/4/2025    Melyssa Shultz is a 84 year old, presenting for the following health issues:  Follow Up (labs) and Back Pain        3/4/2025     9:08 AM   Additional Questions   Roomed by Ngoc VARNER   Accompanied by self         3/4/2025     9:08 " AM   Patient Reported Additional Medications   Patient reports taking the following new medications n/a     History of Present Illness       Back Pain:  She presents for follow up of back pain. Patient's back pain is a chronic problem.  Location of back pain:  Right lower back and left lower back  Description of back pain: sharp  Back pain spreads: right buttocks    Since patient first noticed back pain, pain is: gradually worsening  Does back pain interfere with her job:  Not applicable       Diabetes:   She presents for follow up of diabetes.  She is checking home blood glucose three times daily.   She checks blood glucose before meals.  Blood glucose is never over 200 and never under 70.  When her blood glucose is low, the patient is asymptomatic for confusion, blurred vision, lethargy and reports not feeling dizzy, shaky, or weak.   She has no concerns regarding her diabetes at this time.   She is not experiencing numbness or burning in feet, excessive thirst, blurry vision, weight changes or redness, sores or blisters on feet.           She eats 2-3 servings of fruits and vegetables daily.She consumes 0 sweetened beverage(s) daily.She exercises with enough effort to increase her heart rate 9 or less minutes per day.  She exercises with enough effort to increase her heart rate 3 or less days per week.   She is taking medications regularly.      DM2  Last two nights, blood sugars have been too low - 79 both times. Nothing lower than that.   Is taking 13U insulin daily since meeting with nutritionist.   Also taking max metformin.   CGM data in the past 5 days: TIR is 94%; above goal is 6%. Below 0% for the last 7 days.  Is drinking plenty of fluids - urine is nice and light yellow.     No longer taking the potassium supplement.    VALERIANO  Feels SOB may actually be coming from her back pain.   She is in pain while she walks and wonders if she is holding her breath when walking.   Is not currently seeing anyone for  "this.   Was seeing [facility] and scheduled for upcoming epidural injection but ultimately canceled due to severe hyperglycemia and DM2.    SOB onset seems to correlate with her back pain worsening.   Last few mornings has been using topical cream called Voltaren and symptoms have improved.  No cough, no chest pain. No fevers.     Feeling better today. Not as shaky and as out of breath. Started feeling a bit better yesterday. Was up and doing activities yesterday.         Objective    /85 (BP Location: Right arm, Patient Position: Sitting, Cuff Size: Adult Regular)   Pulse 110   Temp 97.4  F (36.3  C) (Oral)   Resp 22   Ht 1.626 m (5' 4\")   Wt 93 kg (205 lb 1.6 oz)   LMP  (LMP Unknown)   SpO2 97%   BMI 35.21 kg/m    Body mass index is 35.21 kg/m .    Physical Exam   GENERAL: healthy, alert and no distress  HEAD: Normocephalic, atraumatic.   EYES: Normal conjunctivae, sclera.   RESP: lungs clear to auscultation - no rales, rhonchi or wheezes.  CV: regular rate and rhythm, normal S1 S2,  2/6 systolic murmur best appreciated over LLSB, no click, rub or gallop.  2+ pitting edema up to knees bilaterally.   MSK: no gross musculoskeletal defects noted.  SKIN: no suspicious lesions or rashes.  NEURO: CNII-XII grossly intact. No focal deficits.  PSYCH: Groomed, dressed appropriately for weather. Normal mood with consistent affect.     Signed Electronically by: Robert Randle MD  "

## 2025-03-04 NOTE — PROGRESS NOTES
ANTICOAGULATION MANAGEMENT     Lexii Stratton 84 year old female is on warfarin with therapeutic INR result. (Goal INR 2.0-3.0)    Recent labs: (last 7 days)     03/04/25  1012   INR 2.5*       ASSESSMENT     Source(s): Chart Review and Patient/Caregiver Call     Warfarin doses taken: Warfarin taken as instructed  Diet: No new diet changes identified  Medication/supplement changes: will be adding metoprolol.  Waiting for supply to come from mail order.  Insulin dose lowered today since patient has had low blood sugar readings recently.  New illness, injury, or hospitalization: Continues to have back pain. May start physical therapy for this.  Also may be scheduling an epidural injection.  Plans to follow up with [facility] regarding this.    Signs or symptoms of bleeding or clotting: No  Previous result: Therapeutic last visit; previously outside of goal range.  Warfarin maintenance dose was decreased 25% at last INR check.  Full effect of this change has not been seen yet.  Additional findings: New start on day 11 of warfarin       PLAN     Recommended plan for no diet, medication or health factor changes affecting INR     Dosing Instructions: Continue your current warfarin dose with next INR in 3 days       Summary  As of 3/4/2025      Full warfarin instructions:  3 mg every day   Next INR check:  3/7/2025               Telephone call with Lexii who agrees to plan and repeated back plan correctly    Lab visit scheduled    Education provided: Please call back if any changes to your diet, medications or how you've been taking warfarin  Goal range and lab monitoring: goal range and significance of current result  Importance of notifying anticoagulation clinic for: upcoming surgeries and procedures 2 weeks in advance    Plan made per [facility] anticoagulation protocol    Osiris Lobo RN  3/4/2025  Anticoagulation Clinic  [employer] for routing messages: yohan RUGGIERO  [facility] patient phone line:  [phone]        _______________________________________________________________________     Anticoagulation Episode Summary       Current INR goal:  2.0-3.0   TTR:  42.5% (2 wk)   Target end date:  Indefinite   Send INR reminders to:  ANICETO RUGGIERO    Indications    Chronic atrial fibrillation (H) [I48.20]  S/P TAVR (transcatheter aortic valve replacement) [Z95.2]             Comments:  --             Anticoagulation Care Providers       Provider Role Specialty Phone number    Robert Randle MD Referring [facility] Medicine [phone]

## 2025-03-04 NOTE — PATIENT INSTRUCTIONS
Plan for today:  Decrease insulin to 11 U daily. Let us know if you have more low blood sugar readings.  Continue on your metformin as usual.  Please follow up with diabetic educator as scheduled.   Do NOT restart your potassium supplement.     Please revisit with [facility] regarding epidural injection. In the meantime, we will plan for physical therapy for symptoms and overall deconditioning.     We will plan to follow up in one month.    Dr. Jones

## 2025-03-07 DIAGNOSIS — E11.8 TYPE 2 DIABETES MELLITUS WITH COMPLICATION, WITHOUT LONG-TERM CURRENT USE OF INSULIN (H): ICD-10-CM

## 2025-03-07 NOTE — TELEPHONE ENCOUNTER
Hi there,    Pt requesting to refill metformin 500mg. Please review/advise if appropriate    Thank you,  Leonela Samayoa PhT  [facility]

## 2025-03-10 ENCOUNTER — LAB (OUTPATIENT)
Dept: LAB | Facility: CLINIC | Age: 85
End: 2025-03-10
Payer: COMMERCIAL

## 2025-03-10 ENCOUNTER — ANTICOAGULATION THERAPY VISIT (OUTPATIENT)
Dept: ANTICOAGULATION | Facility: CLINIC | Age: 85
End: 2025-03-10

## 2025-03-10 ENCOUNTER — THERAPY VISIT (OUTPATIENT)
Dept: PHYSICAL THERAPY | Facility: CLINIC | Age: 85
End: 2025-03-10
Attending: STUDENT IN AN ORGANIZED HEALTH CARE EDUCATION/TRAINING PROGRAM
Payer: COMMERCIAL

## 2025-03-10 ENCOUNTER — TELEPHONE (OUTPATIENT)
Dept: FAMILY MEDICINE | Facility: CLINIC | Age: 85
End: 2025-03-10

## 2025-03-10 ENCOUNTER — PATIENT OUTREACH (OUTPATIENT)
Dept: CARE COORDINATION | Facility: CLINIC | Age: 85
End: 2025-03-10

## 2025-03-10 DIAGNOSIS — R53.81 PHYSICAL DECONDITIONING: ICD-10-CM

## 2025-03-10 DIAGNOSIS — I48.20 CHRONIC ATRIAL FIBRILLATION (H): Primary | ICD-10-CM

## 2025-03-10 DIAGNOSIS — M47.817 LUMBOSACRAL SPONDYLOSIS WITHOUT MYELOPATHY: ICD-10-CM

## 2025-03-10 DIAGNOSIS — Z95.2 S/P TAVR (TRANSCATHETER AORTIC VALVE REPLACEMENT): ICD-10-CM

## 2025-03-10 LAB — INR BLD: 2.8 (ref 0.9–1.1)

## 2025-03-10 PROCEDURE — 36416 COLLJ CAPILLARY BLOOD SPEC: CPT

## 2025-03-10 PROCEDURE — 97161 PT EVAL LOW COMPLEX 20 MIN: CPT | Mod: GP | Performed by: PHYSICAL THERAPIST

## 2025-03-10 PROCEDURE — 97530 THERAPEUTIC ACTIVITIES: CPT | Mod: GP | Performed by: PHYSICAL THERAPIST

## 2025-03-10 PROCEDURE — 85610 PROTHROMBIN TIME: CPT

## 2025-03-10 PROCEDURE — 97110 THERAPEUTIC EXERCISES: CPT | Mod: GP | Performed by: PHYSICAL THERAPIST

## 2025-03-10 NOTE — PROGRESS NOTES
Clinic Care Coordination Contact  [facility]/Voicemail    Clinical Data: Care Coordinator Outreach    Outreach Documentation Number of Outreach Attempt   2/24/2025   1:22 PM 1   3/10/2025   2:09 PM 1     Left message on patient's voicemail with call back information and requested return call.    Plan: Care Coordinator will try to reach patient again in 10 business days.    Jessy Angel, RN Care Coordinator  [facility] [place]  Email: [email or web address]  Phone: [phone]

## 2025-03-10 NOTE — PROGRESS NOTES
ANTICOAGULATION MANAGEMENT     Lexii Stratton 84 year old female is on warfarin with therapeutic INR result. (Goal INR 2.0-3.0)    Recent labs: (last 7 days)     03/10/25  1437   INR 2.8*       ASSESSMENT     Source(s): Chart Review and Patient/Caregiver Call     Warfarin doses taken: Warfarin taken as instructed  Diet: No new diet changes identified  Medication/supplement changes: None noted  New illness, injury, or hospitalization: No  Signs or symptoms of bleeding or clotting: No  Previous result: Therapeutic last 2(+) visits  Additional findings: None       PLAN     Recommended plan for no diet, medication or health factor changes affecting INR     Dosing Instructions: Continue your current warfarin dose with next INR in 1 week       Summary  As of 3/10/2025      Full warfarin instructions:  3 mg every day   Next INR check:  3/17/2025               Telephone call with Lexii who verbalizes understanding and agrees to plan    Lab visit scheduled    Education provided: Please call back if any changes to your diet, medications or how you've been taking warfarin    Plan made per [facility] anticoagulation protocol    Adalgisa Walker RN  3/10/2025  Anticoagulation Clinic  [employer] for routing messages: yohan RUGGIERO  [facility] patient phone line: [phone]        _______________________________________________________________________     Anticoagulation Episode Summary       Current INR goal:  2.0-3.0   TTR:  60.1% (2.9 wk)   Target end date:  Indefinite   Send INR reminders to:  ANICETO RUGGIERO    Indications    Chronic atrial fibrillation (H) [I48.20]  S/P TAVR (transcatheter aortic valve replacement) [Z95.2]             Comments:  --             Anticoagulation Care Providers       Provider Role Specialty Phone number    Robert Randle MD Referring [facility] [phone]

## 2025-03-10 NOTE — TELEPHONE ENCOUNTER
See 3/10/2025 Anticoagulation encounter for warfarin dosing instruction.  Radha Salas, RN, BSN  Anticoagulation Clinic

## 2025-03-10 NOTE — TELEPHONE ENCOUNTER
General Call    Contacts       Contact Date/Time Type Contact Phone/Fax    03/10/2025 04:38 PM CDT Phone (Incoming) Lexii Stratton (Self) [phone] (H)          Reason for Call:  Anticoagulation     What are your questions or concerns:  Calling to get INR results and dosing     Date of last appointment with provider: 3/10    Could we send this information to you in [facility] or would you prefer to receive a phone call?:   Patient would prefer a phone call   Okay to leave a detailed message?: Yes at Cell number on file:    Telephone Information:   Mobile [phone]

## 2025-03-10 NOTE — PROGRESS NOTES
PHYSICAL THERAPY EVALUATION  Type of Visit: Evaluation       Fall Risk Screen:  Fall screen completed by: PT  Have you fallen 2 or more times in the past year?: Yes  Have you fallen and had an injury in the past year?: Yes  Timed Up and Go score (seconds): 18.7 sec  Is patient a fall risk?: Yes  Fall screen comments: Balance to be part of PT POC    Subjective     Pt c/o LBP of chronic nature with flare up after fall 1 month ago 2/5/2025.  States few week prior was feeling weaker.  Saw [facility] spine 1/2025 and was going to have epidural injection but has held off for now with new diabetes dx. States slipped in garage trying to lift leg.  Testing after fall showed complication from new diabetes diagnosis caused fall.  CT scan was (-).  States has long history of OA:  B TKA, R JUANIS, L shoulder RC tear, R>L hand OA.  MD order date 3/4/2025.  Pt goal to return to walking 2 miles 3d/week.        Presenting condition or subjective complaint: Lower back pain, going down into right hip.  Date of onset: 02/05/25    Relevant medical history:     Dates & types of surgery: Both knees replaced, right hip replaced, TAVR heart procedure, several surgeries for hernia repaiar    Prior diagnostic imaging/testing results: CT scan     Prior therapy history for the same diagnosis, illness or injury: Yes Been seen at [facility] off and on for  several years.    Prior Level of Function  Transfers: Independent  Ambulation: Independent  ADL: Independent  IADL:     Living Environment  Social support: Alone   Type of home: House   Stairs to enter the home: Yes 1 Is there a railing: Yes     Ramp: No   Stairs inside the home: Yes 12 Is there a railing: Yes     Help at home: Self Cares (home health aide/personal care attendant, family, etc)  Equipment owned: Straight Cane; Walker with wheels; Grab bars; Raised toilet seat; Bath bench; Lift chair     Employment: No    Hobbies/Interests: coloring    Patient goals for therapy: Get back to my walking 2 miles  three times a week.    Pain assessment: Pain present     Objective   LUMBAR SPINE EVALUATION  PAIN: Pain Level at Rest: 2/10  Pain Level with Use: 10/10  Pain Location: lumbar spine and into R buttocks  Pain Quality: Aching, Sharp, Shooting, and Stabbing  Pain Frequency: constant  Pain is Worst: daytime  Pain is Exacerbated By: standing, walk, transfers, bending, stairs  Pain is Relieved By: cold, otc medications, rest, and voltaren gel  Pain Progression: Unchanged  INTEGUMENTARY (edema, incisions):   POSTURE: Standing Posture: Rounded shoulders, Forward head, Lordosis decreased  GAIT:   Weightbearing Status: WBAT  Assistive Device(s): Walker (four wheeled) or SEC in home settin  Gait Deviations: Antalgic  Stride length decreased  Rehana decreased  Trunk flexion  BALANCE/PROPRIOCEPTION: Single Leg Stance Eyes Open (seconds): 1 sec B with +  R>L .  Tu.7 sec  WEIGHTBEARING ALIGNMENT:   NON-WEIGHTBEARING ALIGNMENT:    ROM:   (Degrees) Left AROM Left PROM  Right AROM Right PROM   Hip Flexion       Hip Extension       Hip Abduction       Hip Adduction       Hip Internal Rotation       Hip External Rotation       Knee Flexion       Knee Extension       Lumbar Side glide     Lumbar Flexion Min/mod loss (balance related as well).  RF sitting, NE during, decreased pain after   Lumbar Extension Max loss +     Lx ROM: SB R mod loss +, L min/mod loss +/-    Pain:   End feel:   PELVIC/SI SCREEN:   STRENGTH:  poor core stab mm recruitment.  Glute med R 4-/5, L 4/5, max 4-/5 R, 4/5 L    MYOTOMES:  WFL  DTR S:   CORD SIGNS:   DERMATOMES: WNL  NEURAL TENSION: Lumbar WNL  FLEXIBILITY:  decreased R>L HS and piriformis  LUMBAR/HIP Special Tests:    PELVIS/SI SPECIAL TESTS:   FUNCTIONAL TESTS:   PALPATION:  TTP R>L Lx paraspinals  SPINAL SEGMENTAL CONCLUSIONS:  NA today      Assessment & Plan   CLINICAL IMPRESSIONS  Medical Diagnosis: Lumbosacral spondylosis without myelopathy, Physical deconditioning    Treatment Diagnosis: LBP,  deconditioning   Impression/Assessment: Patient is a 84 year old female with LBP/deconditioning  complaints.  The following significant findings have been identified: Pain, Decreased ROM/flexibility, Decreased joint mobility, Decreased strength, Decreased proprioception, Impaired gait, Impaired muscle performance, Decreased activity tolerance, and Impaired posture. These impairments interfere with their ability to perform self care tasks, recreational activities, household chores, driving , household mobility, and community mobility as compared to previous level of function.     Clinical Decision Making (Complexity):  Clinical Presentation: Stable/Uncomplicated  Clinical Presentation Rationale: based on medical and personal factors listed in PT evaluation  Clinical Decision Making (Complexity): Low complexity    PLAN OF CARE  Treatment Interventions:  Interventions: Gait Training, Manual Therapy, Neuromuscular Re-education, Therapeutic Activity, Therapeutic Exercise    Long Term Goals     PT Goal 1  Goal Identifier: Ambulation  Goal Description: Be able to ambulate 30 minutes without deviation or AD with pain <2/10  Rationale:  (for safe household ambulation;for safe community ambulation;to maintain proper body mechanics/posture while ambulating to avoid additional compensatory injury due to improper gait mechanics;to promote a healthy and active lifestyle)  Target Date: 06/02/25      Frequency of Treatment: 1x/week decreasing  every other week  Duration of Treatment: 12 weeks    Recommended Referrals to Other Professionals:   Education Assessment:   Learner/Method: Patient;Demonstration;Pictures/Video    Risks and benefits of evaluation/treatment have been explained.   Patient/Family/caregiver agrees with Plan of Care.     Evaluation Time:             Signing Clinician: Jesus Hall, PT        [place]                                                                                    OUTPATIENT PHYSICAL THERAPY      PLAN OF TREATMENT FOR OUTPATIENT REHABILITATION   Patient's Last Name, First Name, Lexii Smiley YOB: 1940   Provider's Name   [facility]   Medical Record No.  [id number]     Onset Date: 02/05/25  Start of Care Date: 03/10/25     Medical Diagnosis:  Lumbosacral spondylosis without myelopathy, Physical deconditioning      PT Treatment Diagnosis:  LBP, deconditioning Plan of Treatment  Frequency/Duration: 1x/week decreasing  every other week/ 12 weeks    Certification date from 03/10/25 to 06/02/25         See note for plan of treatment details and functional goals     Jesus Hall, PT                         I CERTIFY THE NEED FOR THESE SERVICES FURNISHED UNDER        THIS PLAN OF TREATMENT AND WHILE UNDER MY CARE     (Physician attestation of this document indicates review and certification of the therapy plan).              Referring Provider:  Robert Randle    Initial Assessment  See Epic Evaluation- Start of Care Date: 03/10/25

## 2025-03-12 DIAGNOSIS — E11.8 TYPE 2 DIABETES MELLITUS WITH COMPLICATION, WITHOUT LONG-TERM CURRENT USE OF INSULIN (H): ICD-10-CM

## 2025-03-13 ENCOUNTER — TELEPHONE (OUTPATIENT)
Dept: EDUCATION SERVICES | Facility: CLINIC | Age: 85
End: 2025-03-13
Payer: COMMERCIAL

## 2025-03-13 NOTE — TELEPHONE ENCOUNTER
Called patient back.     She got it figured out. This was her first time placing herself. It is now working.     CABRERA Javier, RDN, LD, [facility]  Diabetes     [phone]  Diabetes Education Care Team: [phone]

## 2025-03-13 NOTE — TELEPHONE ENCOUNTER
[facility] Call Center    Phone Message    May a detailed message be left on voicemail: yes     Reason for Call: Other: Pt switched her sensor today and states she is getting all sorts of messages on it. Pt would like a call back for guidance on what to do. Please advise.     Action Taken: Other: Diab ed    Travel Screening: Not Applicable     Date of Service:

## 2025-03-17 ENCOUNTER — THERAPY VISIT (OUTPATIENT)
Dept: PHYSICAL THERAPY | Facility: CLINIC | Age: 85
End: 2025-03-17
Attending: STUDENT IN AN ORGANIZED HEALTH CARE EDUCATION/TRAINING PROGRAM
Payer: COMMERCIAL

## 2025-03-17 ENCOUNTER — ANTICOAGULATION THERAPY VISIT (OUTPATIENT)
Dept: ANTICOAGULATION | Facility: CLINIC | Age: 85
End: 2025-03-17

## 2025-03-17 ENCOUNTER — LAB (OUTPATIENT)
Dept: LAB | Facility: CLINIC | Age: 85
End: 2025-03-17
Payer: COMMERCIAL

## 2025-03-17 DIAGNOSIS — I48.20 CHRONIC ATRIAL FIBRILLATION (H): Primary | ICD-10-CM

## 2025-03-17 DIAGNOSIS — R53.81 PHYSICAL DECONDITIONING: ICD-10-CM

## 2025-03-17 DIAGNOSIS — Z95.2 S/P TAVR (TRANSCATHETER AORTIC VALVE REPLACEMENT): ICD-10-CM

## 2025-03-17 DIAGNOSIS — M47.817 LUMBOSACRAL SPONDYLOSIS WITHOUT MYELOPATHY: Primary | ICD-10-CM

## 2025-03-17 LAB — INR BLD: 2.9 (ref 0.9–1.1)

## 2025-03-17 PROCEDURE — 36416 COLLJ CAPILLARY BLOOD SPEC: CPT

## 2025-03-17 PROCEDURE — 97112 NEUROMUSCULAR REEDUCATION: CPT | Mod: GP | Performed by: PHYSICAL THERAPIST

## 2025-03-17 PROCEDURE — 85610 PROTHROMBIN TIME: CPT

## 2025-03-17 PROCEDURE — 97110 THERAPEUTIC EXERCISES: CPT | Mod: GP | Performed by: PHYSICAL THERAPIST

## 2025-03-17 NOTE — PROGRESS NOTES
ANTICOAGULATION MANAGEMENT     Lexii Stratton 84 year old female is on warfarin with therapeutic INR result. (Goal INR 2.0-3.0)    Recent labs: (last 7 days)     03/17/25  1209   INR 2.9*     5/21 EGD  ASSESSMENT     Source(s): Chart Review and Patient/Caregiver Call     Warfarin doses taken: Warfarin taken as instructed  Diet: No new diet changes identified  Medication/supplement changes: None noted  New illness, injury, or hospitalization: No; however, patient reports fatigue after physical therapy today.  Signs or symptoms of bleeding or clotting: No  Previous result: Therapeutic last 2(+) visits  Additional findings: None       PLAN     Recommended plan for no diet, medication or health factor changes affecting INR     Dosing Instructions: Continue your current warfarin dose with next INR in 10 days       Summary  As of 3/17/2025      Full warfarin instructions:  3 mg every day   Next INR check:  3/27/2025               Telephone call with Lexii who verbalizes understanding and agrees to plan    Lab visit scheduled    Education provided: Taking warfarin: Importance of taking warfarin as instructed    Plan made per [facility] anticoagulation protocol    Beverly Weller RN  3/17/2025  Anticoagulation Clinic  [employer] for routing messages: yohan RUGGIERO  [facility] patient phone line: [phone]        _______________________________________________________________________     Anticoagulation Episode Summary       Current INR goal:  2.0-3.0   TTR:  70.2% (3.9 wk)   Target end date:  Indefinite   Send INR reminders to:  ANICETO RUGGIERO    Indications    Chronic atrial fibrillation (H) [I48.20]  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

## 2025-03-22 ENCOUNTER — HEALTH MAINTENANCE LETTER (OUTPATIENT)
Age: 85
End: 2025-03-22

## 2025-03-24 ENCOUNTER — THERAPY VISIT (OUTPATIENT)
Dept: PHYSICAL THERAPY | Facility: CLINIC | Age: 85
End: 2025-03-24
Attending: STUDENT IN AN ORGANIZED HEALTH CARE EDUCATION/TRAINING PROGRAM
Payer: COMMERCIAL

## 2025-03-24 DIAGNOSIS — M47.817 LUMBOSACRAL SPONDYLOSIS WITHOUT MYELOPATHY: Primary | ICD-10-CM

## 2025-03-24 DIAGNOSIS — R53.81 PHYSICAL DECONDITIONING: ICD-10-CM

## 2025-03-24 PROCEDURE — 97110 THERAPEUTIC EXERCISES: CPT | Mod: GP | Performed by: PHYSICAL THERAPIST

## 2025-03-24 PROCEDURE — 97112 NEUROMUSCULAR REEDUCATION: CPT | Mod: GP | Performed by: PHYSICAL THERAPIST

## 2025-03-27 ENCOUNTER — ANTICOAGULATION THERAPY VISIT (OUTPATIENT)
Dept: ANTICOAGULATION | Facility: CLINIC | Age: 85
End: 2025-03-27

## 2025-03-27 ENCOUNTER — LAB (OUTPATIENT)
Dept: LAB | Facility: CLINIC | Age: 85
End: 2025-03-27
Payer: COMMERCIAL

## 2025-03-27 DIAGNOSIS — I48.20 CHRONIC ATRIAL FIBRILLATION (H): Primary | ICD-10-CM

## 2025-03-27 DIAGNOSIS — Z95.2 S/P TAVR (TRANSCATHETER AORTIC VALVE REPLACEMENT): ICD-10-CM

## 2025-03-27 LAB — INR BLD: 1.9 (ref 0.9–1.1)

## 2025-03-27 NOTE — PROGRESS NOTES
ANTICOAGULATION MANAGEMENT     Lexii Stratton 84 year old female is on warfarin with subtherapeutic INR result. (Goal INR 2.0-3.0)    Recent labs: (last 7 days)     03/27/25  0957   INR 1.9*       ASSESSMENT     Source(s): Chart Review and Patient/Caregiver Call     Warfarin doses taken: Warfarin taken as instructed  Diet: No new diet changes identified  Medication/supplement changes: None noted  New illness, injury, or hospitalization: No, continues with PT, also continues with back pain, patient thinks she may need to get an injection   Signs or symptoms of bleeding or clotting: No  Previous result: Therapeutic last 2(+) visits, less than 2 weeks between therapeutic INR's  Additional findings:  Newish start, on day 34, patient denies any changes or missed warfarin dose, INR dropped considerably with no apparent reason so will increase warfarin maintenance dose today the smallest amount with current warfarin tablets.        PLAN     Recommended plan for no diet, medication or health factor changes affecting INR     Dosing Instructions: Increase your warfarin dose (4.8% change) with next INR in 6 days       Summary  As of 3/27/2025      Full warfarin instructions:  4 mg every Thu; 3 mg all other days   Next INR check:  4/2/2025               Telephone call with Lexii who verbalizes understanding and agrees to plan    Check at provider office visit 4/2/25    Education provided: Please call back if any changes to your diet, medications or how you've been taking warfarin  Contact [phone] with any changes, questions or concerns.     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

## 2025-03-30 ENCOUNTER — NURSE TRIAGE (OUTPATIENT)
Dept: NURSING | Facility: CLINIC | Age: 85
End: 2025-03-30
Payer: COMMERCIAL

## 2025-03-30 NOTE — TELEPHONE ENCOUNTER
Nurse Triage SBAR    Is this a 2nd Level Triage? NO    Situation:   hypoglycemia    Background:   Patient changed her freestyle audie sensor on Friday and it's been acting different since sensor was changed.  She's on 15 unit(s) of lantus before breakfast and metform 1000mg 2 times a day.     Assessment:   Before bed last night, BG was 140  And she was woken up by her sensor at 0345 for BG of 79  Before breakfast, BG was 106.  Gave herself lantus.    She is calling because her sensor showed 68 and but it didn't beep like it normally does.  Pt was asymptomatic.  But since her meter showed 68, she ate 3 fruit snacks.  It went back up and at the end of the call, meter said it was 102.  Finger poke states 142.    Protocol Recommended Disposition:   Home Care    Recommendation:   Advised pt to verify with finger poke if sensor shows hypoglycemia.  She can also change her sensor to see if it helps.  Educated on s/s of hypoglycemia.  Also advised patient to call diabetic educator tomorrow.  Pt verbalized understanding.    Alexsandra Guerrero, RN, BSN Nurse Triage Advisor 3/30/2025 11:23 AM       Reason for Disposition   Low blood sugar definition and treatment, questions about   Nursing judgment or information in reference    Additional Information   Negative: Unconscious or difficult to awaken   Negative: Seizure occurs   Negative: Acting confused (e.g., disoriented, slurred speech)   Negative: Very weak (e.g., can't stand)   Negative: Sounds like a life-threatening emergency to the triager   Negative: [1] Vomiting AND [2] signs of dehydration (e.g., very dry mouth, lightheaded, dark urine)   Negative: [1] Low blood sugar symptoms persist > 30 minutes AND [2] using low blood sugar Care Advice   Negative: [1] Low blood glucose (70 mg/dl [3.9 mmol/l] or below) persists > 30 minutes AND [2] using low blood sugar Care Advice   Negative: Patient sounds very sick or weak to the triager   Negative: [1] Low blood sugar symptoms with no  other adult present AND [2] hasn't tried Care Advice   Negative: [1] Low blood glucose (70 mg/dl [3.9 mmol/l] or below)) with no other adult present AND [2] hasn't tried Care Advice   Negative: Diabetes drug error or overdose (e.g., insulin error or extra dose)   Negative: [1] Caller has URGENT medication or insulin pump question AND [2] triager unable to answer question   Negative: [1] Blood glucose 70  mg/dL (3.9 mmol/L) or below OR symptomatic, now improved with Care Advice AND [2] cause unknown   Negative: [1] Caller has NON-URGENT medication or insulin pump question AND [2] triager unable to answer question   Negative: [1] Morning (before breakfast) blood glucose < 80 mg/dL (4.4 mmol/L) AND [2] more than once in past week   Negative: [1] Evening (after bedtime snack) blood glucose < 100 mg/dL (5.6 mmol/L) AND [2] more than once in past week   Negative: [1] Blood glucose 70 mg/dl (3.9 mmol/l) or below, OR symptomatic AND [2] cause known   Negative: Nursing judgment, per information in Reference   Negative: Information only call about a Well Adult (no illness or injury)   Negative: Caller can't be reached by phone   Negative: Nursing judgment or information in reference   Negative: Nursing judgment or information in reference   Negative: Nursing judgment or information in reference   Negative: Nursing judgment or information in reference   Negative: Nursing judgment or information in reference   Negative: Nursing judgment or information in reference   Negative: Nursing judgment or information in reference   Negative: Nursing judgment or information in reference   Negative: Nursing judgment or information in reference   Negative: Nursing judgment or information in reference    Protocols used: Diabetes - Low Blood Sugar-A-, No Guideline [id number]

## 2025-04-01 ENCOUNTER — THERAPY VISIT (OUTPATIENT)
Dept: PHYSICAL THERAPY | Facility: CLINIC | Age: 85
End: 2025-04-01
Payer: COMMERCIAL

## 2025-04-01 DIAGNOSIS — M47.817 LUMBOSACRAL SPONDYLOSIS WITHOUT MYELOPATHY: Primary | ICD-10-CM

## 2025-04-01 DIAGNOSIS — R53.81 PHYSICAL DECONDITIONING: ICD-10-CM

## 2025-04-01 PROCEDURE — 97112 NEUROMUSCULAR REEDUCATION: CPT | Mod: GP | Performed by: PHYSICAL THERAPIST

## 2025-04-01 PROCEDURE — 97110 THERAPEUTIC EXERCISES: CPT | Mod: GP | Performed by: PHYSICAL THERAPIST

## 2025-04-02 ENCOUNTER — OFFICE VISIT (OUTPATIENT)
Dept: FAMILY MEDICINE | Facility: CLINIC | Age: 85
End: 2025-04-02
Payer: COMMERCIAL

## 2025-04-02 ENCOUNTER — ANTICOAGULATION THERAPY VISIT (OUTPATIENT)
Dept: ANTICOAGULATION | Facility: CLINIC | Age: 85
End: 2025-04-02

## 2025-04-02 VITALS
BODY MASS INDEX: 34.01 KG/M2 | HEART RATE: 98 BPM | WEIGHT: 199.2 LBS | SYSTOLIC BLOOD PRESSURE: 128 MMHG | RESPIRATION RATE: 20 BRPM | TEMPERATURE: 97.9 F | OXYGEN SATURATION: 96 % | DIASTOLIC BLOOD PRESSURE: 81 MMHG | HEIGHT: 64 IN

## 2025-04-02 DIAGNOSIS — E11.8 TYPE 2 DIABETES MELLITUS WITH COMPLICATION, WITHOUT LONG-TERM CURRENT USE OF INSULIN (H): Primary | ICD-10-CM

## 2025-04-02 DIAGNOSIS — G25.2 ACTION TREMOR: ICD-10-CM

## 2025-04-02 DIAGNOSIS — E66.812 CLASS 2 SEVERE OBESITY DUE TO EXCESS CALORIES WITH SERIOUS COMORBIDITY AND BODY MASS INDEX (BMI) OF 35.0 TO 35.9 IN ADULT (H): ICD-10-CM

## 2025-04-02 DIAGNOSIS — Z95.2 S/P TAVR (TRANSCATHETER AORTIC VALVE REPLACEMENT): ICD-10-CM

## 2025-04-02 DIAGNOSIS — I48.20 CHRONIC ATRIAL FIBRILLATION (H): Primary | ICD-10-CM

## 2025-04-02 DIAGNOSIS — E66.01 CLASS 2 SEVERE OBESITY DUE TO EXCESS CALORIES WITH SERIOUS COMORBIDITY AND BODY MASS INDEX (BMI) OF 35.0 TO 35.9 IN ADULT (H): ICD-10-CM

## 2025-04-02 DIAGNOSIS — E08.65: ICD-10-CM

## 2025-04-02 LAB — INR BLD: 2.4 (ref 0.9–1.1)

## 2025-04-02 RX ORDER — KETOROLAC TROMETHAMINE 30 MG/ML
1 INJECTION, SOLUTION INTRAMUSCULAR; INTRAVENOUS PRN
Qty: 1 EACH | Refills: 0 | Status: SHIPPED | OUTPATIENT
Start: 2025-04-02

## 2025-04-02 RX ORDER — PEN NEEDLE, DIABETIC 32GX 5/32"
NEEDLE, DISPOSABLE MISCELLANEOUS
Qty: 100 EACH | Refills: 1 | Status: SHIPPED | OUTPATIENT
Start: 2025-04-02

## 2025-04-02 NOTE — PROGRESS NOTES
ANTICOAGULATION MANAGEMENT     Lexii Stratton 84 year old female is on warfarin with therapeutic INR result. (Goal INR 2.0-3.0)    Recent labs: (last 7 days)     04/02/25  1051   INR 2.4*       ASSESSMENT     Source(s): Chart Review  Previous INR was Subtherapeutic.  Warfarin maintenance dose was increased ~5% at last INR check.  Medication, diet, health changes since last INR chart reviewed; none identified         PLAN     Unable to reach Lexii today.    Left message for patient to call anticoagulation clinic to discuss result.    Follow up required to confirm warfarin dose taken and assess for changes    Osiris Lobo, RN  4/2/2025  Anticoagulation Clinic  [employer] for routing messages: yohan RUGGIERO  [facility] patient phone line: [phone]

## 2025-04-02 NOTE — PROGRESS NOTES
"  Assessment & Plan     Type 2 diabetes mellitus with complication, without long-term current use of insulin (H)  On max metformin and 11U long acting insulin. CGM with TIR at goal. Very mild hypoglycemic episodes. Discussed consideration to transition to oral medication but patient feels finally stabilized and would like to continue current regimen for now. Plan to decrease to 10U insulin and follow up in 2 months. With cardiac hx, consider SGLT2, +/-GLP1 at next appt.    - insulin pen needle (BD BRIANNA U/F) 32G X 4 MM miscellaneous  Dispense: 100 each; Refill: 1  - insulin glargine (LANTUS PEN) 100 UNIT/ML pen  Dispense: 15 mL; Refill: 0  - blood glucose (NO BRAND SPECIFIED) lancets standard  Dispense: 100 each; Refill: 3  - Alcohol Swabs (ALCOHOL PREP PADS) 70 % PADS  Dispense: 100 each; Refill: 4  - Continuous Glucose  (FREESTYLE AURELIA 3 READER) IAN  Dispense: 1 each; Refill: 0    Class 2 severe obesity due to excess calories with serious comorbidity and body mass index (BMI) of 35.0 to 35.9 in adult (H)  Likely contributing to DM2, as above.      Action tremor  Recent TSH and CT head WNL. Magnesium now WNL with BID supplementation. Asymmetric L postural hand tremor. Likely essential tremor, no other Parkinsonian findings. Discussed with patient.     BMI  Estimated body mass index is 34.19 kg/m  as calculated from the following:    Height as of this encounter: 1.626 m (5' 4\").    Weight as of this encounter: 90.4 kg (199 lb 3.2 oz).     Follow up in 2 months for DM2 reassessment    Robert Randle MD  [facility]  4/3/2025    Melyssa Shultz is a 84 year old, presenting for the following health issues:  Follow Up (DM)        4/2/2025    10:33 AM   Additional Questions   Roomed by Ngoc VARNER   Accompanied by self         4/2/2025    10:33 AM   Patient Reported Additional Medications   Patient reports taking the following new medications n/a     History of Present Illness       Diabetes:   " "She presents for follow up of diabetes.  She is checking home blood glucose three times daily.   She checks blood glucose before meals.  Blood glucose is sometimes over 200 and never under 70.  When her blood glucose is low, the patient is asymptomatic for confusion, blurred vision, lethargy and reports not feeling dizzy, shaky, or weak.   She has no concerns regarding her diabetes at this time.   She is not experiencing numbness or burning in feet, excessive thirst, blurry vision, weight changes or redness, sores or blisters on feet.           She eats 0-1 servings of fruits and vegetables daily.She consumes 0 sweetened beverage(s) daily.She exercises with enough effort to increase her heart rate 10 to 19 minutes per day.  She exercises with enough effort to increase her heart rate 3 or less days per week.   She is taking medications regularly.        DM2  Is on max metformin and 11U long acting insulin.  A couple of low blood sugars down to 76 or 78. No symptoms with these levels though.   Following is her recent CGM data in the past 30 days    Time of target  TIR 96%; above is 4% and below is 0% in last 90 days  Last 30 days is the same  No low blood sugars    CGM issues  Last Sunday, meter was all over the place 24 - 532 - 21.  Isn't sure if the meter is working as it also wasn't beeping for low blood sugar numbers.  Switched over to using manual glucometer and fingersticks to address blood sugars.     Tremors/shaking  Shaking is occasional. More or less when getting ready to do something.   Maybe nervous about getting out.  Still has fear of getting outside. Completed 4 sessions of PT thus far, one left.   Will likely need to go back to Ortho and get injection.  Is taking the magnesium supplement BID         Objective    /81 (BP Location: Right arm, Patient Position: Sitting, Cuff Size: Adult Large)   Pulse 98   Temp 97.9  F (36.6  C) (Oral)   Resp 20   Ht 1.626 m (5' 4\")   Wt 90.4 kg (199 lb 3.2 oz) "   LMP  (LMP Unknown)   SpO2 96%   BMI 34.19 kg/m    Body mass index is 34.19 kg/m .    Physical Exam   GENERAL: healthy, alert and no distress  HEAD: Normocephalic, atraumatic.   EYES: Normal conjunctivae, sclera.   ENT: Normal oropharynx.   NECK: Supple. No lymphadenopathy appreciated.  RESP: lungs clear to auscultation - no rales, rhonchi or wheezes  CV: regular rate and rhythm, normal S1 S2, 2/6 systolic murmur in RUSB, LUSB, LLSB, apex. 2+ pitting edema up to knees bilaterally.   MSK: no gross musculoskeletal defects noted.  SKIN: no suspicious lesions or rashes.  EXT: Warm and well perfused. DP pulses 2+ bilaterally.  NEURO: CNII-XII grossly intact. Finger-to-nose with mild action tremor on L.  2+ brachioradialis reflexes bilaterally. No resting tremor identified.  PSYCH: Groomed, dressed appropriately for weather.  Logical, linear thought process. Normal mood with consistent affect.     Signed Electronically by: Robert Randle MD

## 2025-04-02 NOTE — PROGRESS NOTES
ANTICOAGULATION MANAGEMENT     Lexii Stratton 84 year old female is on warfarin with therapeutic INR result. (Goal INR 2.0-3.0)    Recent labs: (last 7 days)     04/02/25  1051   INR 2.4*       ASSESSMENT     Source(s): Chart Review and Patient/Caregiver Call     Warfarin doses taken: Warfarin taken as instructed  Diet: No new diet changes identified  Medication/supplement changes: None noted  New illness, injury, or hospitalization: No  Signs or symptoms of bleeding or clotting: No  Previous result: Subtherapeutic  Additional findings: None       PLAN     Recommended plan for no diet, medication or health factor changes affecting INR     Dosing Instructions: Continue your current warfarin dose with next INR in 2 weeks       Summary  As of 4/2/2025      Full warfarin instructions:  4 mg every Thu; 3 mg all other days   Next INR check:  4/16/2025               Telephone call with Lexii who agrees to plan and repeated back plan correctly    Lab visit scheduled    Education provided: Please call back if any changes to your diet, medications or how you've been taking warfarin    Plan made per [facility] anticoagulation protocol    Osiris Lobo RN  4/2/2025  Anticoagulation Clinic  [employer] for routing messages: yohan RUGGIERO  [facility] patient phone line: [phone]        _______________________________________________________________________     Anticoagulation Episode Summary       Current INR goal:  2.0-3.0   TTR:  76.1% (1.4 mo)   Target end date:  Indefinite   Send INR reminders to:  ANICETO RUGGIERO    Indications    Chronic atrial fibrillation (H) [I48.20]  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

## 2025-04-03 ENCOUNTER — ALLIED HEALTH/NURSE VISIT (OUTPATIENT)
Dept: EDUCATION SERVICES | Facility: CLINIC | Age: 85
End: 2025-04-03
Payer: COMMERCIAL

## 2025-04-03 DIAGNOSIS — E11.8 TYPE 2 DIABETES MELLITUS WITH COMPLICATION, WITHOUT LONG-TERM CURRENT USE OF INSULIN (H): Primary | ICD-10-CM

## 2025-04-03 PROBLEM — E08.65: Status: ACTIVE | Noted: 2023-05-22

## 2025-04-03 NOTE — PROGRESS NOTES
Diabetes Self-Management Education & Support    Presents for: Follow-up    Type of Service: In Person Visit      Assessment  Patient is working on changes to improve her diabetes. She has some questions about the accuracy of the CGM, she has been doing some fingerstick glucose because she does not trust the sensor. She also would like some education on what she can eat. She is trying to avoid carbohydrates.    See CGM Reports in Monitoring section below.  Review of CGM report. Glucose overall average 121mg/dl,  in target 98%, above target 2% and below target 0% of the time.   Reviewed data with patient, and her fingerstick gluose results. Her readings are within 20%, in clinic today her sensor readings was 166mg/dl, and her fingerstick glucose was 161mg/dl. Discussed trend arrows and watching patterns.   Glucose at the lower end of normal. Her PCP decreased the dose yesterday to 10 units daily. Recommend she further decrease the dose if she has any glucose below 80mg/dl. Discussed healthy eating for diabetes. Encouraged her to incorporate a variety of foods and include carbohydrates. Referenced the plate planner and instructed her on how to balance her meals using the method.       Care Plan and Education Provided:  Healthy Eating: Balanced meals, Consistency in amount and timing of carbohydrate intake, Plate planning method, and Portion control, Being Active: Finding a physical activity routine that works for you, Precautions to take with exercise, and Relationship of activity to glucose, Monitoring: Blood glucose versus Continuous Glucose Monitoring, Individual glucose targets, and Log and interpret results, troubleshooting sensor issues, Taking Medication: Action of prescribed medication(s), Side effects of prescribed medication(s), Problem Solving: Low glucose - causes, signs/symptoms, treatment and prevention, Reducing Risks: Goal for A1c, how it relates to glucose and how often to check, and Healthy Coping:  Benefits of making appropriate lifestyle changes    Patient verbalized understanding of diabetes self-management education concepts discussed, opportunities for ongoing education and support, and recommendations provided today.    Plan  Meal Plan Recommendation: eat 3 meals a day, have small snacks between meals, if needed, use portion control, and use plate planning method  Exercise / activity plan: continue your physical therapy  Check blood sugars: continue to use your Janneth sensor. If you are worried about the accuracy of the reading then do a fingerstick glucose.  -  If you doubt the accuracy or have any trouble with the sensor or reader then contact the  [employer] customer service phone number: [phone]   Medication: continue on current plan, if you have any low blood sugars ( below 80) then decrease your insulin from 10 units to --> 9 units.       Follow up:  Follow-up diabetes education appointment scheduled on 5/8/25 at 10:30 am.     Follow-up:  Upcoming Diabetes Ed Appointments     Visit Type Date Time Department    DIABETES ED 4/3/2025 10:30 AM [facility] DIABETES ED        See Care Plan for co-developed, patient-state behavior change goals.    Education Materials Provided:  -- My Plate Planner       Subjective/Objective  Lexii is an 84 year old year old, presenting for the following diabetes education related to: Presents for: Follow-up  Accompanied by: Self  Diabetes education in the past 24mo: Yes  Focus of Visit: Patient Unsure, CGM, Taking Medication, Healthy Eating  Type of CGM visit: Personal CGM Follow-up  Diabetes type: Type 2  How confident are you filling out medical forms by yourself:: Quite a bit  Transportation concerns: No  Difficulty affording diabetes medication?: No  Difficulty affording diabetes testing supplies?: No  Other concerns:: None  Cultural Influences/Ethnic Background:  Not  or     Diabetes Symptoms & Complications:  Diabetes Related Symptoms: Fatigue  Weight trend:  "Decreasing  Complications assessed today?: No    Patient Problem List and Family Medical History reviewed for relevant medical history, current medical status, and diabetes risk factors.    Vitals:  LMP  (LMP Unknown)   Estimated body mass index is 34.19 kg/m  as calculated from the following:    Height as of 4/2/25: 1.626 m (5' 4\").    Weight as of 4/2/25: 90.4 kg (199 lb 3.2 oz).   Last 3 BP:   BP Readings from Last 3 Encounters:   04/02/25 128/81   03/04/25 137/85   03/03/25 (!) 142/82       History   Smoking Status    Never   Smokeless Tobacco    Never       Labs:  Lab Results   Component Value Date    A1C 10.9 02/05/2025    A1C 6.1 02/24/2021     Lab Results   Component Value Date     02/28/2025     02/08/2025    GLC 95 11/09/2022     02/24/2021     Lab Results   Component Value Date    LDL 80 02/28/2025    LDL 85 02/24/2021     HDL Cholesterol   Date Value Ref Range Status   02/24/2021 50 >49 mg/dL Final     Direct Measure HDL   Date Value Ref Range Status   02/28/2025 52 >=50 mg/dL Final   ]  GFR Estimate   Date Value Ref Range Status   02/28/2025 57 (L) >60 mL/min/1.73m2 Final     Comment:     eGFR calculated using 2021 CKD-EPI equation.   02/24/2021 60 (L) >60 mL/min/[1.73_m2] Final     Comment:     Non  GFR Calc  Starting 12/18/2018, serum creatinine based estimated GFR (eGFR) will be   calculated using the Chronic Kidney Disease Epidemiology Collaboration   (CKD-EPI) equation.       GFR, ESTIMATED POCT   Date Value Ref Range Status   07/05/2023 56 (L) >60 mL/min/1.73m2 Final     GFR Estimate If Black   Date Value Ref Range Status   02/24/2021 70 >60 mL/min/[1.73_m2] Final     Comment:      GFR Calc  Starting 12/18/2018, serum creatinine based estimated GFR (eGFR) will be   calculated using the Chronic Kidney Disease Epidemiology Collaboration   (CKD-EPI) equation.       Lab Results   Component Value Date    CR 0.98 02/28/2025    CR 0.90 02/24/2021 " "    No results found for: \"MICROALBUMIN\"    Healthy Eating:  Healthy Eating Assessed Today: Yes  Cultural/Baptism diet restrictions?: No  Do you have any food allergies or intolerances?: No  Meal planning/habits: None  Who cooks/prepares meals for you?: Self  Who purchases food in  your home?: Self  How many times a week on average do you eat food made away from home (restaurant/take-out)?: 0  Meals include: Breakfast, Lunch, Dinner  Breakfast: protein shake - atkins  Lunch: sandwich - egg salad or turkey, fruit  Dinner: fish/chicken, vegetable  Snacks: fruit, popcorn  Beverages: Water, Diet soda, Other (see Comments)  Please elaborate:: flavored water  Has patient met with a dietitian in the past?: Yes    Being Active:  Being Active Assessed Today: Yes  Exercise:: Currently not exercising  Barrier to exercise: Physical limitation    Monitoring:  Monitoring Assessed Today: Yes  Did patient bring glucose meter to appointment? : No  Blood Glucose Meter: Accu-chek, CGM  Times checking blood sugar at home (number): 3  Times checking blood sugar at home (per): Day                  Taking Medications:  Diabetes Medication(s)       Biguanides       metFORMIN (GLUCOPHAGE) 500 MG tablet Take 2 tablets (1,000 mg) by mouth 2 times daily (with meals).       Insulin       insulin glargine (LANTUS PEN) 100 UNIT/ML pen Inject 10 Units subcutaneously every morning (before breakfast).          Taking Medication Assessed Today: Yes  Current Treatments: Insulin Injections, Oral Medication (taken by mouth)  Dose schedule: Pre-breakfast  Given by: Patient  Injection/Infusion sites: Abdomen  Problems taking diabetes medications regularly?: No  Diabetes medication side effects?: No    Problem Solving:  Problem Solving Assessed Today: Yes  Is the patient at risk for hypoglycemia?: Yes  Hypoglycemia Frequency: Rarely  Hypoglycemia Treatment: Candy (fruit snacks)  Patient carries a carbohydrate source: Yes  Hypoglycemia: None     Reducing " Risks:  Reducing Risks Assessed Today: Yes  Diabetes Risks: Age over 45 years, Sedentary Lifestyle  CAD Risks: Diabetes Mellitus, Sedentary lifestyle, Post-menopausal, Obesity  Has dilated eye exam at least once a year?: Yes  Sees dentist every 6 months?: Yes  Feet checked by healthcare provider in the last year?: Yes    Healthy Coping:  Healthy Coping Assessed Today: Yes  Emotional response to diabetes: Ready to learn  Informal Support system:: Children  Stage of change: ACTION (Actively working towards change)    Ina Gibson RN  Time Spent: 60 minutes  Encounter Type: Individual    Any diabetes medication dose changes were made via the CDCES Standing Orders under the patient's referring provider.

## 2025-04-03 NOTE — LETTER
4/3/2025         RE: Lexii Stratton  [address] [address] [place] [address]        Dear Colleague,    Thank you for referring your patient, Lexii Stratton, to the [facility]. Please see a copy of my visit note below.    Diabetes Self-Management Education & Support    Presents for: Follow-up    Type of Service: In Person Visit      Assessment  Patient is working on changes to improve her diabetes. She has some questions about the accuracy of the CGM, she has been doing some fingerstick glucose because she does not trust the sensor. She also would like some education on what she can eat. She is trying to avoid carbohydrates.    See CGM Reports in Monitoring section below.  Review of CGM report. Glucose overall average 121mg/dl,  in target 98%, above target 2% and below target 0% of the time.   Reviewed data with patient, and her fingerstick gluose results. Her readings are within 20%, in clinic today her sensor readings was 166mg/dl, and her fingerstick glucose was 161mg/dl. Discussed trend arrows and watching patterns.   Glucose at the lower end of normal. Her PCP decreased the dose yesterday to 10 units daily. Recommend she further decrease the dose if she has any glucose below 80mg/dl. Discussed healthy eating for diabetes. Encouraged her to incorporate a variety of foods and include carbohydrates. Referenced the plate planner and instructed her on how to balance her meals using the method.       Care Plan and Education Provided:  Healthy Eating: Balanced meals, Consistency in amount and timing of carbohydrate intake, Plate planning method, and Portion control, Being Active: Finding a physical activity routine that works for you, Precautions to take with exercise, and Relationship of activity to glucose, Monitoring: Blood glucose versus Continuous Glucose Monitoring, Individual glucose targets, and Log and interpret results, troubleshooting sensor issues, Taking Medication:  Action of prescribed medication(s), Side effects of prescribed medication(s), Problem Solving: Low glucose - causes, signs/symptoms, treatment and prevention, Reducing Risks: Goal for A1c, how it relates to glucose and how often to check, and Healthy Coping: Benefits of making appropriate lifestyle changes    Patient verbalized understanding of diabetes self-management education concepts discussed, opportunities for ongoing education and support, and recommendations provided today.    Plan  Meal Plan Recommendation: eat 3 meals a day, have small snacks between meals, if needed, use portion control, and use plate planning method  Exercise / activity plan: continue your physical therapy  Check blood sugars: continue to use your Janneth sensor. If you are worried about the accuracy of the reading then do a fingerstick glucose.  -  If you doubt the accuracy or have any trouble with the sensor or reader then contact the  [employer] customer service phone number: [phone]   Medication: continue on current plan, if you have any low blood sugars ( below 80) then decrease your insulin from 10 units to --> 9 units.       Follow up:  Follow-up diabetes education appointment scheduled on 5/8/25 at 10:30 am.     Follow-up:  Upcoming Diabetes Ed Appointments     Visit Type Date Time Department    DIABETES ED 4/3/2025 10:30 AM [facility] DIABETES ED        See Care Plan for co-developed, patient-state behavior change goals.    Education Materials Provided:  -- My Plate Planner       Subjective/Objective  Lexii is an 84 year old year old, presenting for the following diabetes education related to: Presents for: Follow-up  Accompanied by: Self  Diabetes education in the past 24mo: Yes  Focus of Visit: Patient Unsure, CGM, Taking Medication, Healthy Eating  Type of CGM visit: Personal CGM Follow-up  Diabetes type: Type 2  How confident are you filling out medical forms by yourself:: Quite a bit  Transportation concerns: No  Difficulty  "affording diabetes medication?: No  Difficulty affording diabetes testing supplies?: No  Other concerns:: None  Cultural Influences/Ethnic Background:  Not  or     Diabetes Symptoms & Complications:  Diabetes Related Symptoms: Fatigue  Weight trend: Decreasing  Complications assessed today?: No    Patient Problem List and Family Medical History reviewed for relevant medical history, current medical status, and diabetes risk factors.    Vitals:  LMP  (LMP Unknown)   Estimated body mass index is 34.19 kg/m  as calculated from the following:    Height as of 4/2/25: 1.626 m (5' 4\").    Weight as of 4/2/25: 90.4 kg (199 lb 3.2 oz).   Last 3 BP:   BP Readings from Last 3 Encounters:   04/02/25 128/81   03/04/25 137/85   03/03/25 (!) 142/82       History   Smoking Status     Never   Smokeless Tobacco     Never       Labs:  Lab Results   Component Value Date    A1C 10.9 02/05/2025    A1C 6.1 02/24/2021     Lab Results   Component Value Date     02/28/2025     02/08/2025    GLC 95 11/09/2022     02/24/2021     Lab Results   Component Value Date    LDL 80 02/28/2025    LDL 85 02/24/2021     HDL Cholesterol   Date Value Ref Range Status   02/24/2021 50 >49 mg/dL Final     Direct Measure HDL   Date Value Ref Range Status   02/28/2025 52 >=50 mg/dL Final   ]  GFR Estimate   Date Value Ref Range Status   02/28/2025 57 (L) >60 mL/min/1.73m2 Final     Comment:     eGFR calculated using 2021 CKD-EPI equation.   02/24/2021 60 (L) >60 mL/min/[1.73_m2] Final     Comment:     Non  GFR Calc  Starting 12/18/2018, serum creatinine based estimated GFR (eGFR) will be   calculated using the Chronic Kidney Disease Epidemiology Collaboration   (CKD-EPI) equation.       GFR, ESTIMATED POCT   Date Value Ref Range Status   07/05/2023 56 (L) >60 mL/min/1.73m2 Final     GFR Estimate If Black   Date Value Ref Range Status   02/24/2021 70 >60 mL/min/[1.73_m2] Final     Comment:      " "GFR Calc  Starting 12/18/2018, serum creatinine based estimated GFR (eGFR) will be   calculated using the Chronic Kidney Disease Epidemiology Collaboration   (CKD-EPI) equation.       Lab Results   Component Value Date    CR 0.98 02/28/2025    CR 0.90 02/24/2021     No results found for: \"MICROALBUMIN\"    Healthy Eating:  Healthy Eating Assessed Today: Yes  Cultural/Congregation diet restrictions?: No  Do you have any food allergies or intolerances?: No  Meal planning/habits: None  Who cooks/prepares meals for you?: Self  Who purchases food in  your home?: Self  How many times a week on average do you eat food made away from home (restaurant/take-out)?: 0  Meals include: Breakfast, Lunch, Dinner  Breakfast: protein shake - atkins  Lunch: sandwich - egg salad or turkey, fruit  Dinner: fish/chicken, vegetable  Snacks: fruit, popcorn  Beverages: Water, Diet soda, Other (see Comments)  Please elaborate:: flavored water  Has patient met with a dietitian in the past?: Yes    Being Active:  Being Active Assessed Today: Yes  Exercise:: Currently not exercising  Barrier to exercise: Physical limitation    Monitoring:  Monitoring Assessed Today: Yes  Did patient bring glucose meter to appointment? : No  Blood Glucose Meter: Accu-chek, CGM  Times checking blood sugar at home (number): 3  Times checking blood sugar at home (per): Day                  Taking Medications:  Diabetes Medication(s)       Biguanides       metFORMIN (GLUCOPHAGE) 500 MG tablet Take 2 tablets (1,000 mg) by mouth 2 times daily (with meals).       Insulin       insulin glargine (LANTUS PEN) 100 UNIT/ML pen Inject 10 Units subcutaneously every morning (before breakfast).          Taking Medication Assessed Today: Yes  Current Treatments: Insulin Injections, Oral Medication (taken by mouth)  Dose schedule: Pre-breakfast  Given by: Patient  Injection/Infusion sites: Abdomen  Problems taking diabetes medications regularly?: No  Diabetes medication side effects?: " No    Problem Solving:  Problem Solving Assessed Today: Yes  Is the patient at risk for hypoglycemia?: Yes  Hypoglycemia Frequency: Rarely  Hypoglycemia Treatment: Candy (fruit snacks)  Patient carries a carbohydrate source: Yes  Hypoglycemia: None     Reducing Risks:  Reducing Risks Assessed Today: Yes  Diabetes Risks: Age over 45 years, Sedentary Lifestyle  CAD Risks: Diabetes Mellitus, Sedentary lifestyle, Post-menopausal, Obesity  Has dilated eye exam at least once a year?: Yes  Sees dentist every 6 months?: Yes  Feet checked by healthcare provider in the last year?: Yes    Healthy Coping:  Healthy Coping Assessed Today: Yes  Emotional response to diabetes: Ready to learn  Informal Support system:: Children  Stage of change: ACTION (Actively working towards change)    Ina Gibson RN  Time Spent: 60 minutes  Encounter Type: Individual    Any diabetes medication dose changes were made via the CDCES Standing Orders under the patient's referring provider.

## 2025-04-03 NOTE — PATIENT INSTRUCTIONS
Care Plan:  Meal Plan Recommendation: eat 3 meals a day, have small snacks between meals, if needed, use portion control, and use plate planning method  Exercise / activity plan: continue your physical therapy  Check blood sugars: continue to use your Janneth sensor. If you are worried about the accuracy of the reading then do a fingerstick glucose.  -  If you doubt the accuracy or have any trouble with the sensor or reader then contact the  [employer] customer service phone number: [phone]   Medication: continue on current plan, if you have any low blood sugars ( below 80) then decrease your insulin from 10 units to --> 9 units.       Follow up:  Follow-up diabetes education appointment scheduled on 5/8/25 at 10:30 am.      Bring blood glucose meter and logbook with you to all doctor and follow-up appointments.     [place] [facility] for the [facility]:  For Your Diabetes or Nutrition Education Appointments Call:  [phone]   For Diabetes or Nutrition Related Questions:   [phone]  Send [employer] Message   If you need a medication refill please contact your pharmacy. Please allow 3 business days for your refills to be completed.

## 2025-04-07 ENCOUNTER — THERAPY VISIT (OUTPATIENT)
Dept: PHYSICAL THERAPY | Facility: CLINIC | Age: 85
End: 2025-04-07
Payer: COMMERCIAL

## 2025-04-07 DIAGNOSIS — R53.81 PHYSICAL DECONDITIONING: ICD-10-CM

## 2025-04-07 DIAGNOSIS — M47.817 LUMBOSACRAL SPONDYLOSIS WITHOUT MYELOPATHY: Primary | ICD-10-CM

## 2025-04-07 PROCEDURE — 97112 NEUROMUSCULAR REEDUCATION: CPT | Mod: GP | Performed by: PHYSICAL THERAPIST

## 2025-04-07 PROCEDURE — 97530 THERAPEUTIC ACTIVITIES: CPT | Mod: GP | Performed by: PHYSICAL THERAPIST

## 2025-04-07 PROCEDURE — 97110 THERAPEUTIC EXERCISES: CPT | Mod: GP | Performed by: PHYSICAL THERAPIST

## 2025-04-15 ENCOUNTER — PATIENT OUTREACH (OUTPATIENT)
Dept: CARE COORDINATION | Facility: CLINIC | Age: 85
End: 2025-04-15
Payer: COMMERCIAL

## 2025-04-15 NOTE — PROGRESS NOTES
Clinic Care Coordination Contact  Follow Up Progress Note      Assessment:   Patient reports things seem to be going pretty good. She shares that she had a very good visit last week with the nutritionist. Patient shares this made her feel good that she was doing what she is supposed to be doing.   Completed 5 sessions of outpatient physical therapy. She's back to walking at the [facility] about 1.5 miles every other day, which she feels is just as good as outpatient physical therapy and doesn't cost her a $35 copay each time. Walking seems to help pain in her back and hasn't decided if she is going to schedule an appointment for steroid injection.   Shares fear of leaving her home with step that she fell on. She does have a railing and feels cane provides additional stability.   States that her family helps her out with getting the garbage cans out and getting the mail.   Shares that [facility] is [facility], has an application in, and just waiting for an opening.     Care Gaps:  Health Maintenance Due   Topic Date Due    HF ACTION PLAN  Never done    DIABETIC FOOT EXAM  11/22/2024    COVID-19 Vaccine (9 - 2024-25 season) 03/24/2025    A1C  05/05/2025    MICROALBUMIN  05/21/2025     Care Gap Goal set: Yes    Care Plans  Care Plan: Diabetes (Primary Care)       Problem: Diabetes Mangement Needs Improvement       Long-Range Goal: Demonstrate improved diabetes management       Start Date: 2/10/2025 Expected End Date: 8/29/2025    This Visit's Progress: 50% Recent Progress: 40%    Note:     Barriers: diagnosis of multiple, chronic, complex medical conditions, new diagnosis of diabetes, provider availability - wait time to complete appointments, etc.   Strengths: motivated, engaged in Care Coordination, two local children & sona community supportive.    Patient expressed understanding of goal: Yes   Action steps to achieve this goal:  1. I will follow up with  my providers as scheduled/recommended:   Primary Care Provider: 04/30/25, 06/11/25, 12/17/25   Diabetes Education: #[phone] 05/08/25  Cardiology: 06/04/25  2. I will discuss, review, schedule & complete recommended overdue health maintenance with my Primary Care Provider.   3. I will take my medications as prescribed.   4. I will contact my care team with questions, concerns, support needs. I will use the clinic as a resource and I understand I can contact my clinic with 24/7 after hours services available. Care Coordinator will remain available as needed.                                Intervention/Education provided during outreach: As above. CC role, goal(s), clinic after hours, appointments, discussed/reviewed. Support provided.      Outreach Frequency: monthly, more frequently as needed    Plan:   Patient/caregiver will call RNCC with questions, concerns, support needs. RNCC will be available as needed.    Care Coordinator will follow up in 1 month.     Jessy Angel RN Care Coordinator  [facility] [place]  Email: [email or web address]  Phone: [phone]

## 2025-04-16 ENCOUNTER — TELEPHONE (OUTPATIENT)
Dept: ANTICOAGULATION | Facility: CLINIC | Age: 85
End: 2025-04-16

## 2025-04-16 ENCOUNTER — LAB (OUTPATIENT)
Dept: LAB | Facility: CLINIC | Age: 85
End: 2025-04-16
Payer: COMMERCIAL

## 2025-04-16 ENCOUNTER — ANTICOAGULATION THERAPY VISIT (OUTPATIENT)
Dept: ANTICOAGULATION | Facility: CLINIC | Age: 85
End: 2025-04-16

## 2025-04-16 DIAGNOSIS — Z95.2 S/P TAVR (TRANSCATHETER AORTIC VALVE REPLACEMENT): ICD-10-CM

## 2025-04-16 DIAGNOSIS — I48.20 CHRONIC ATRIAL FIBRILLATION (H): Primary | ICD-10-CM

## 2025-04-16 LAB — INR BLD: 2.6 (ref 0.9–1.1)

## 2025-04-16 PROCEDURE — 36416 COLLJ CAPILLARY BLOOD SPEC: CPT

## 2025-04-16 PROCEDURE — 85610 PROTHROMBIN TIME: CPT

## 2025-04-16 NOTE — PROGRESS NOTES
ANTICOAGULATION MANAGEMENT     Lexii Stratton 84 year old female is on warfarin with therapeutic INR result. (Goal INR 2.0-3.0)    Recent labs: (last 7 days)     04/16/25  1033   INR 2.6*       ASSESSMENT     Source(s): Chart Review and Patient/Caregiver Call     Warfarin doses taken: Warfarin taken as instructed  Diet: No new diet changes identified  Medication/supplement changes: None noted  New illness, injury, or hospitalization: No  Signs or symptoms of bleeding or clotting: No  Previous result: Therapeutic last visit; previously outside of goal range  Additional findings: Upcoming surgery/procedure EGD scheduled on 5/21/25, TE routed to [facility] today        PLAN     Recommended plan for no diet, medication or health factor changes affecting INR     Dosing Instructions: Continue your current warfarin dose with next INR in 3 weeks       Summary  As of 4/16/2025      Full warfarin instructions:  4 mg every Thu; 3 mg all other days   Next INR check:  5/8/2025               Telephone call with Lexii who verbalizes understanding and agrees to plan and who agrees to plan and repeated back plan correctly    Lab visit scheduled    Education provided: Taking warfarin: Importance of taking warfarin as instructed    Plan made per [facility] anticoagulation protocol    Beverly Weller RN  4/16/2025  Anticoagulation Clinic  [employer] for routing messages: yohan RUGGIERO  [facility] patient phone line: [phone]        _______________________________________________________________________     Anticoagulation Episode Summary       Current INR goal:  2.0-3.0   TTR:  82.0% (1.9 mo)   Target end date:  Indefinite   Send INR reminders to:  ANICETO RUGGIERO    Indications    Chronic atrial fibrillation (H) [I48.20]  S/P TAVR (transcatheter aortic valve replacement) [Z95.2]             Comments:  --             Anticoagulation Care Providers       Provider Role Specialty Phone number    Robert Randle MD Referring Family Medicine  [phone]

## 2025-04-16 NOTE — TELEPHONE ENCOUNTER
AL[facility]-PROCEDURAL ANTICOAGULATION  MANAGEMENT    Lexii requesting pre-procedure hold orders for warfarin and review for bridging      Procedure date: 5/21/25       Procedure:  EGD      Procedure location and phone number (if external): [place]     Number of warfarin hold days requested and/or target INR: unknown    Pre-op date: Not applicable      Routing to Anticoagulation Pharmacist for review.     ACC pool: yohan Weller RN

## 2025-04-18 ENCOUNTER — ANCILLARY PROCEDURE (OUTPATIENT)
Dept: CARDIOLOGY | Facility: CLINIC | Age: 85
End: 2025-04-18
Attending: INTERNAL MEDICINE
Payer: COMMERCIAL

## 2025-04-18 DIAGNOSIS — I44.2 COMPLETE HEART BLOCK (H): ICD-10-CM

## 2025-04-18 DIAGNOSIS — Z95.0 CARDIAC PACEMAKER IN SITU: ICD-10-CM

## 2025-04-18 LAB
MDC_IDC_EPISODE_DTM: NORMAL
MDC_IDC_EPISODE_DURATION: 1 S
MDC_IDC_EPISODE_DURATION: 1 S
MDC_IDC_EPISODE_DURATION: 11 S
MDC_IDC_EPISODE_DURATION: 117 S
MDC_IDC_EPISODE_DURATION: 12 S
MDC_IDC_EPISODE_DURATION: 15 S
MDC_IDC_EPISODE_DURATION: 16 S
MDC_IDC_EPISODE_DURATION: 2 S
MDC_IDC_EPISODE_DURATION: 2 S
MDC_IDC_EPISODE_DURATION: 269 S
MDC_IDC_EPISODE_DURATION: 54 S
MDC_IDC_EPISODE_DURATION: 7 S
MDC_IDC_EPISODE_DURATION: 9 S
MDC_IDC_EPISODE_DURATION: NORMAL S
MDC_IDC_EPISODE_ID: NORMAL
MDC_IDC_EPISODE_TYPE: NORMAL
MDC_IDC_EPISODE_TYPE_INDUCED: NO
MDC_IDC_EPISODE_VENDOR_TYPE: NORMAL
MDC_IDC_LEAD_CONNECTION_STATUS: NORMAL
MDC_IDC_LEAD_CONNECTION_STATUS: NORMAL
MDC_IDC_LEAD_IMPLANT_DT: NORMAL
MDC_IDC_LEAD_IMPLANT_DT: NORMAL
MDC_IDC_LEAD_LOCATION: NORMAL
MDC_IDC_LEAD_LOCATION: NORMAL
MDC_IDC_LEAD_LOCATION_DETAIL_1: NORMAL
MDC_IDC_LEAD_LOCATION_DETAIL_1: NORMAL
MDC_IDC_LEAD_MFG: NORMAL
MDC_IDC_LEAD_MFG: NORMAL
MDC_IDC_LEAD_MODEL: NORMAL
MDC_IDC_LEAD_MODEL: NORMAL
MDC_IDC_LEAD_POLARITY_TYPE: NORMAL
MDC_IDC_LEAD_POLARITY_TYPE: NORMAL
MDC_IDC_LEAD_SERIAL: NORMAL
MDC_IDC_LEAD_SERIAL: NORMAL
MDC_IDC_MSMT_BATTERY_DTM: NORMAL
MDC_IDC_MSMT_BATTERY_REMAINING_LONGEVITY: 144 MO
MDC_IDC_MSMT_BATTERY_REMAINING_PERCENTAGE: 100 %
MDC_IDC_MSMT_BATTERY_STATUS: NORMAL
MDC_IDC_MSMT_LEADCHNL_RA_IMPEDANCE_VALUE: 560 OHM
MDC_IDC_MSMT_LEADCHNL_RA_PACING_THRESHOLD_AMPLITUDE: 0.5 V
MDC_IDC_MSMT_LEADCHNL_RA_PACING_THRESHOLD_PULSEWIDTH: 0.4 MS
MDC_IDC_MSMT_LEADCHNL_RV_IMPEDANCE_VALUE: 636 OHM
MDC_IDC_MSMT_LEADCHNL_RV_PACING_THRESHOLD_AMPLITUDE: 0.9 V
MDC_IDC_MSMT_LEADCHNL_RV_PACING_THRESHOLD_PULSEWIDTH: 0.4 MS
MDC_IDC_PG_IMPLANT_DTM: NORMAL
MDC_IDC_PG_MFG: NORMAL
MDC_IDC_PG_MODEL: NORMAL
MDC_IDC_PG_SERIAL: NORMAL
MDC_IDC_PG_TYPE: NORMAL
MDC_IDC_SESS_CLINIC_NAME: NORMAL
MDC_IDC_SESS_DTM: NORMAL
MDC_IDC_SESS_TYPE: NORMAL
MDC_IDC_SET_BRADY_AT_MODE_SWITCH_MODE: NORMAL
MDC_IDC_SET_BRADY_AT_MODE_SWITCH_RATE: 170 {BEATS}/MIN
MDC_IDC_SET_BRADY_LOWRATE: 60 {BEATS}/MIN
MDC_IDC_SET_BRADY_MAX_SENSOR_RATE: 130 {BEATS}/MIN
MDC_IDC_SET_BRADY_MAX_TRACKING_RATE: 130 {BEATS}/MIN
MDC_IDC_SET_BRADY_MODE: NORMAL
MDC_IDC_SET_BRADY_PAV_DELAY_HIGH: 200 MS
MDC_IDC_SET_BRADY_PAV_DELAY_LOW: 250 MS
MDC_IDC_SET_BRADY_SAV_DELAY_HIGH: 200 MS
MDC_IDC_SET_BRADY_SAV_DELAY_LOW: 250 MS
MDC_IDC_SET_LEADCHNL_RA_PACING_AMPLITUDE: 2 V
MDC_IDC_SET_LEADCHNL_RA_PACING_CAPTURE_MODE: NORMAL
MDC_IDC_SET_LEADCHNL_RA_PACING_POLARITY: NORMAL
MDC_IDC_SET_LEADCHNL_RA_PACING_PULSEWIDTH: 0.4 MS
MDC_IDC_SET_LEADCHNL_RA_SENSING_ADAPTATION_MODE: NORMAL
MDC_IDC_SET_LEADCHNL_RA_SENSING_POLARITY: NORMAL
MDC_IDC_SET_LEADCHNL_RA_SENSING_SENSITIVITY: 0.25 MV
MDC_IDC_SET_LEADCHNL_RV_PACING_AMPLITUDE: 1.4 V
MDC_IDC_SET_LEADCHNL_RV_PACING_CAPTURE_MODE: NORMAL
MDC_IDC_SET_LEADCHNL_RV_PACING_POLARITY: NORMAL
MDC_IDC_SET_LEADCHNL_RV_PACING_PULSEWIDTH: 0.4 MS
MDC_IDC_SET_LEADCHNL_RV_SENSING_ADAPTATION_MODE: NORMAL
MDC_IDC_SET_LEADCHNL_RV_SENSING_POLARITY: NORMAL
MDC_IDC_SET_LEADCHNL_RV_SENSING_SENSITIVITY: 1.5 MV
MDC_IDC_SET_ZONE_DETECTION_INTERVAL: 375 MS
MDC_IDC_SET_ZONE_STATUS: NORMAL
MDC_IDC_SET_ZONE_TYPE: NORMAL
MDC_IDC_SET_ZONE_VENDOR_TYPE: NORMAL
MDC_IDC_STAT_AT_BURDEN_PERCENT: 2 %
MDC_IDC_STAT_AT_DTM_END: NORMAL
MDC_IDC_STAT_AT_DTM_START: NORMAL
MDC_IDC_STAT_BRADY_DTM_END: NORMAL
MDC_IDC_STAT_BRADY_DTM_START: NORMAL
MDC_IDC_STAT_BRADY_RA_PERCENT_PACED: 68 %
MDC_IDC_STAT_BRADY_RV_PERCENT_PACED: 3 %
MDC_IDC_STAT_EPISODE_RECENT_COUNT: 0
MDC_IDC_STAT_EPISODE_RECENT_COUNT: 1
MDC_IDC_STAT_EPISODE_RECENT_COUNT: 4
MDC_IDC_STAT_EPISODE_RECENT_COUNT_DTM_END: NORMAL
MDC_IDC_STAT_EPISODE_RECENT_COUNT_DTM_START: NORMAL
MDC_IDC_STAT_EPISODE_TYPE: NORMAL
MDC_IDC_STAT_EPISODE_VENDOR_TYPE: NORMAL
MDC_IDC_STAT_EPISODE_VENDOR_TYPE: NORMAL

## 2025-04-18 PROCEDURE — 93294 REM INTERROG EVL PM/LDLS PM: CPT | Performed by: INTERNAL MEDICINE

## 2025-04-18 PROCEDURE — 93296 REM INTERROG EVL PM/IDS: CPT | Performed by: INTERNAL MEDICINE

## 2025-04-30 ENCOUNTER — OFFICE VISIT (OUTPATIENT)
Dept: FAMILY MEDICINE | Facility: CLINIC | Age: 85
End: 2025-04-30
Payer: COMMERCIAL

## 2025-04-30 VITALS
BODY MASS INDEX: 33.97 KG/M2 | DIASTOLIC BLOOD PRESSURE: 70 MMHG | HEART RATE: 80 BPM | OXYGEN SATURATION: 98 % | TEMPERATURE: 97.3 F | RESPIRATION RATE: 16 BRPM | HEIGHT: 64 IN | SYSTOLIC BLOOD PRESSURE: 128 MMHG | WEIGHT: 199 LBS

## 2025-04-30 DIAGNOSIS — R13.14 PHARYNGOESOPHAGEAL DYSPHAGIA: ICD-10-CM

## 2025-04-30 DIAGNOSIS — I48.20 CHRONIC ATRIAL FIBRILLATION (H): ICD-10-CM

## 2025-04-30 DIAGNOSIS — K22.70 BARRETT'S ESOPHAGUS WITHOUT DYSPLASIA: ICD-10-CM

## 2025-04-30 DIAGNOSIS — Z01.818 PRE-OP EXAM: Primary | ICD-10-CM

## 2025-04-30 DIAGNOSIS — K21.9 GASTROESOPHAGEAL REFLUX DISEASE, UNSPECIFIED WHETHER ESOPHAGITIS PRESENT: ICD-10-CM

## 2025-04-30 DIAGNOSIS — E08.65: ICD-10-CM

## 2025-04-30 DIAGNOSIS — Z79.01 ANTICOAGULATED: ICD-10-CM

## 2025-04-30 DIAGNOSIS — D69.6 THROMBOCYTOPENIA: ICD-10-CM

## 2025-04-30 DIAGNOSIS — Z95.2 S/P TAVR (TRANSCATHETER AORTIC VALVE REPLACEMENT): ICD-10-CM

## 2025-04-30 DIAGNOSIS — I44.2 COMPLETE HEART BLOCK (H): ICD-10-CM

## 2025-04-30 LAB
ANION GAP SERPL CALCULATED.3IONS-SCNC: 12 MMOL/L (ref 7–15)
BUN SERPL-MCNC: 17.9 MG/DL (ref 8–23)
CALCIUM SERPL-MCNC: 10.4 MG/DL (ref 8.8–10.4)
CHLORIDE SERPL-SCNC: 105 MMOL/L (ref 98–107)
CREAT SERPL-MCNC: 0.76 MG/DL (ref 0.51–0.95)
EGFRCR SERPLBLD CKD-EPI 2021: 77 ML/MIN/1.73M2
ERYTHROCYTE [DISTWIDTH] IN BLOOD BY AUTOMATED COUNT: 13.6 % (ref 10–15)
EST. AVERAGE GLUCOSE BLD GHB EST-MCNC: 117 MG/DL
GLUCOSE SERPL-MCNC: 120 MG/DL (ref 70–99)
HBA1C MFR BLD: 5.7 % (ref 0–5.6)
HCO3 SERPL-SCNC: 24 MMOL/L (ref 22–29)
HCT VFR BLD AUTO: 41.2 % (ref 35–47)
HGB BLD-MCNC: 13.4 G/DL (ref 11.7–15.7)
MCH RBC QN AUTO: 32.4 PG (ref 26.5–33)
MCHC RBC AUTO-ENTMCNC: 32.5 G/DL (ref 31.5–36.5)
MCV RBC AUTO: 100 FL (ref 78–100)
PLATELET # BLD AUTO: 207 10E3/UL (ref 150–450)
POTASSIUM SERPL-SCNC: 4.2 MMOL/L (ref 3.4–5.3)
RBC # BLD AUTO: 4.13 10E6/UL (ref 3.8–5.2)
SODIUM SERPL-SCNC: 141 MMOL/L (ref 135–145)
WBC # BLD AUTO: 8.6 10E3/UL (ref 4–11)

## 2025-04-30 PROCEDURE — 80048 BASIC METABOLIC PNL TOTAL CA: CPT | Performed by: STUDENT IN AN ORGANIZED HEALTH CARE EDUCATION/TRAINING PROGRAM

## 2025-04-30 PROCEDURE — 36415 COLL VENOUS BLD VENIPUNCTURE: CPT | Performed by: STUDENT IN AN ORGANIZED HEALTH CARE EDUCATION/TRAINING PROGRAM

## 2025-04-30 PROCEDURE — 83036 HEMOGLOBIN GLYCOSYLATED A1C: CPT | Performed by: STUDENT IN AN ORGANIZED HEALTH CARE EDUCATION/TRAINING PROGRAM

## 2025-04-30 PROCEDURE — 85027 COMPLETE CBC AUTOMATED: CPT | Performed by: STUDENT IN AN ORGANIZED HEALTH CARE EDUCATION/TRAINING PROGRAM

## 2025-04-30 ASSESSMENT — PAIN SCALES - GENERAL: PAINLEVEL_OUTOF10: NO PAIN (0)

## 2025-04-30 NOTE — PROGRESS NOTES
Preoperative Evaluation  [facility]  [address] [address] [facility] [address]  Phone: [phone]  Primary Provider: Robert Randle MD  Pre-op Performing Provider: Robert Randle MD  Apr 30, 2025 4/25/2025   Surgical Information   What procedure is being done? endoscopy   Facility or Hospital where procedure/surgery will be performed: [facility]   Who is doing the procedure / surgery? unknown   Date of surgery / procedure: May 21, 2025   Time of surgery / procedure: not yet scheduled   Where do you plan to recover after surgery? at home with family     Fax number for surgical facility: Note does not need to be faxed, will be available electronically in Epic.    Assessment & Plan     The proposed surgical procedure is considered LOW risk.    Pre-op exam  Shah's esophagus without dysplasia  Gastroesophageal reflux disease, unspecified whether esophagitis present  Pharyngoesophageal dysphagia  - CBC with platelets    Thrombocytopenia  - CBC with platelets    Diabetes mellitus due to underlying condition, controlled, with hyperglycemia, without long-term current use of insulin (H)  Well controlled on 10U insulin and max metformin. CGM with one low blood sugar recently otherwise 99% TIR over past 30 days. Plan to decrease to 8U insulin, continue metformin. Follow up as already scheduled.   - Hemoglobin A1c  - Basic metabolic panel  (Ca, Cl, CO2, Creat, Gluc, K, Na, BUN)    Chronic atrial fibrillation (H)  Anticoagulated  On BB and warfarin.     S/P TAVR (transcatheter aortic valve replacement)  Per AHA, antibiotics prior to EGD not routinely indicated unless high risk.***    Complete heart block (H)  S/p pacemaker in place. Last cardiac device check on 4/18/25***.      Implanted Device   - For full details on implanted device, refer to device management note in Epic from date: 4/18/25  [employer] L331 ACCOLADE MRI EL     - No identified additional risk factors other than  previously addressed    Antiplatelet or Anticoagulation Medication Instructions   - warfarin: plan to review recommendations for warfarin with anticoagulation clinic ***    Additional Medication Instructions   - Herbal medications and vitamins: DO NOT TAKE 14 days prior to surgery.  Take 5U insulin on the morning of your procedure.   Please do NOT take the metformin on the day of procedure.   You can continue the simvastatin, pantoprazole, metoprolol, lisinopril, levothyroxine, amlodipine.  Please follow instructions for warfarin plan from the anticoagulation clinic.     Recommendation  Approval given to proceed with proposed procedure, without further diagnostic evaluation.    Follow up as already scheduled in June for DM2    Robert Randle MD  [facility]  4/30/2025    Melyssa Shultz is a 84 year old, presenting for the following:    Pre op visit        4/30/2025     9:14 AM   Additional Questions   Roomed by evans cortes   Accompanied by self         4/30/2025     9:14 AM   Patient Reported Additional Medications   Patient reports taking the following new medications na     HPI: Upcoming EGD.        4/25/2025   Pre-Op Questionnaire   Have you ever had a heart attack or stroke? No   Have you ever had surgery on your heart or blood vessels, such as a stent placement, a coronary artery bypass, or surgery on an artery in your head, neck, heart, or legs? (!) YES, s/p TAVR in 2022 or 2023.    Do you have chest pain with activity? No   Do you have a history of heart failure? No   Do you currently have a cold, bronchitis or symptoms of other infection? No   Do you have a cough, shortness of breath, or wheezing? No   Do you or anyone in your family have previous history of blood clots? No   Do you or does anyone in your family have a serious bleeding problem such as prolonged bleeding following surgeries or cuts? No   Have you ever had problems with anemia or been told to take iron pills? (!) YES, taking  "one iron pill daily. Doing this for several years, maybe over 10 years even.    Have you had any abnormal blood loss such as black, tarry or bloody stools, or abnormal vaginal bleeding? No   Have you ever had a blood transfusion? (!) YES, last blood transfusion was many years ago.    Have you ever had a transfusion reaction? No   Are you willing to have a blood transfusion if it is medically needed before, during, or after your surgery? Yes   Have you or any of your relatives ever had problems with anesthesia? (!) YES, mother with terrible symptoms post op \"crazy with anesthesia\". Patient has not had problems with anesthesia herself though.   Do you have sleep apnea, excessive snoring or daytime drowsiness? No   Do you have any artifical heart valves or other implanted medical devices like a pacemaker, defibrillator, or continuous glucose monitor? (!) YES, TAVR and pacemaker   What type of device do you have? pacemaker   Name of the clinic that manages your device [facility]   Do you have artificial joints? (!) YES   Are you allergic to latex? No     Advance Care Planning  Document on file is a Health Care Directive or POLST.    Preoperative Review of    reviewed - controlled substances prescribed by other outside provider(s).    Status of Chronic Conditions:  See problem list for active medical problems.  Problems all longstanding and stable, except as noted/documented.  See ROS for pertinent symptoms related to these conditions.    DM2  Stable. No symptoms of low blood sugar.     Has been back to walking now.  Walks 12 laps around 1/7 mile route. Almost back to what she was doing in the past.   Still working on her diet and seeing the diabetic educator.    Data from the CGM:  TIR 99%; above 1%; below at 0% in past 30 days  One low blood sugar in the past 90 days  Average blood sugar is 123 total past 90 days    Other issues controlled/stable.     Patient Active Problem List    Diagnosis Date " Noted    Hyperlipidemia LDL goal <100 10/31/2010     Priority: High    Essential hypertension, benign 06/25/2006     Priority: High    Lumbosacral spondylosis without myelopathy 03/10/2025     Priority: Medium    Physical deconditioning 03/10/2025     Priority: Medium    Thrombocytopenia 02/25/2025     Priority: Medium     Intermittent, mild thrombocytopenia  Downtrending platelet count since 7/2023      Fall from slip, trip, or stumble, initial encounter 02/05/2025     Priority: Medium    Stress incontinence in female 02/05/2025     Priority: Medium    Urge incontinence of urine 02/05/2025     Priority: Medium    S/P TAVR (transcatheter aortic valve replacement) 11/04/2024     Priority: Medium    Class 2 severe obesity due to excess calories with serious comorbidity in adult (H) 06/26/2024     Priority: Medium    Chronic atrial fibrillation (H) 05/21/2024     Priority: Medium     AT/A-fib burden was less than 1% on last device check  On DOAC, considering Watchman device      History of anemia 05/21/2024     Priority: Medium     Hx of HERMILO from 2008 to 2018 which has since returned to normal range except dip in 2023 from post op blood loss. EGD and colonoscopy in 2016 unremarkable.     On iron supplement (prescribed from 2012 through 2018 but recently re-prescribed in 2024, she has taken OTC formulation between prescriptions).     Was on BID dosing, reduced to daily dosing following heart surgery ?in 2023?.      Shah's esophagus without dysplasia 05/21/2024     Priority: Medium     EGD in 2016 revealing chronic atrophic gastritis with focal intestinal metaplasia, last vitamin B12 level>2000. Also EGD in 2011 with evidence of Shah's esophagus.        Complete heart block (H) 11/08/2023     Priority: Medium     status post dual-chamber pacemaker       Cardiac pacemaker in situ 11/08/2023     Priority: Medium    Diastolic heart failure (H) 11/08/2023     Priority: Medium    Diabetes mellitus due to underlying  condition, controlled, with hyperglycemia, without long-term current use of insulin (H) 05/22/2023     Priority: Medium     New diagnosis 2025  On max metformin and 10U insulin.  On simvastatin.    Plan to transition to oral medications - consider SGLT2 or GLP1.           Aortic stenosis, severe 11/08/2022     Priority: Medium     s/p elective TAVR 11/8/2023;  needed PPM following TAVR       Status post coronary angiogram 09/30/2022     Priority: Medium    Chronic kidney disease, stage 3a (H) 02/07/2022     Priority: Medium    H/O small bowel obstruction 08/16/2021     Priority: Medium     Recurrent SBO since 2014, at least 4 hospitalizations.  Underwent surgical procedure 8/2023 without any side effects whatsoever      Pelvic floor dysfunction 05/08/2019     Priority: Medium    Overactive bladder 02/01/2017     Priority: Medium     [facility] Dr. Bai; has tried 7 medications without benefit, Interstim x 2, Botox in bladder, x 5; no infection now. Ongoing symptoms; defer to Urology.      Iron and its compounds causing adverse effect in therapeutic use(E934.0) 09/19/2016     Priority: Medium    Chest pain syndrome 07/02/2013     Priority: Medium    Hypersomnia with sleep apnea 11/23/2004     Priority: Medium     Problem list name updated by automated process. Provider to review      Acquired hypothyroidism 11/11/2004     Priority: Medium    Generalized osteoarthrosis, unspecified site 10/03/2002     Priority: Medium    Esophageal reflux 10/03/2002     Priority: Medium      Past Medical History:   Diagnosis Date    Aortic valve stenosis 08/18/2022    Arthritis     Diabetes (H)     Type 2-no meds... Dieet and exercise only as of 9/15/20    Esophageal reflux     Hernia, abdominal     History of blood transfusion 1984    Hypertension     No cardiologist    Incontinence of urine     Mumps     6 years old    Obese     Osteoarthritis     Other and unspecified hyperlipidemia     Other chronic pain     back    Peptic  ulcer, unspecified site, unspecified as acute or chronic, without mention of hemorrhage, perforation, or obstruction     S/P total hip arthroplasty 11/09/2016    Small bowel obstruction (H)     Thyroid disease hypothyroidism    Urinary incontinence     Walking troubles      Past Surgical History:   Procedure Laterality Date    ABDOMEN SURGERY      ARTHROPLASTY HIP Right 11/09/2016    Procedure: ARTHROPLASTY HIP;  Surgeon: Angel Lund MD;  Location:  OR    AS REPAIR INCISIONAL HERNIA,REDUCIBLE  1998    inc hernia ( Yamileth surgery)    BIOPSY      BLADDER SURGERY      hyperdistention surgery and sling    BREAST SURGERY      CARDIAC SURGERY  2022    CHOLECYSTECTOMY  1987    COLONOSCOPY  12/03/2011    Procedure:COLONOSCOPY; COLONOSCOPY; Surgeon:SEMAJ GRAHAM; Location: GI    COLONOSCOPY N/A 03/29/2018    Procedure: COLONOSCOPY;  COLONOSCOPY Rm 544;  Surgeon: Marco Gusman MD;  Location: [facility]    CV CORONARY ANGIOGRAM N/A 09/30/2022    Procedure: Coronary Angiogram;  Surgeon: Garcia Barber MD;  Location: [facility] CARDIAC CATH LAB    CV TRANSCATHETER AORTIC VALVE REPLACEMENT-FEMORAL APPROACH N/A 11/08/2022    Procedure: Transcatheter Aortic Valve Replacement-Femoral Approach;  Surgeon: Yulia Castano MD;  Location: [facility] CARDIAC CATH LAB    CYSTOSCOPY N/A 09/06/2017    Procedure: CYSTOSCOPY;  CYSTOSCOPY AND HYDRODISTENTION ;  Surgeon: Eyal Bai MD;  Location:  OR    ENT SURGERY      Tonsillectomy    EP PACEMAKER DEVICE & LEAD IMPLANT- RIGHT ATRIAL & RIGHT VENTRICULAR N/A 11/08/2022    Procedure: Pacemaker Device & Lead Implant - Right Atrial & Right Ventricular;  Surgeon: William Boyd MD;  Location:  HEART CARDIAC CATH LAB    ESOPHAGOSCOPY, GASTROSCOPY, DUODENOSCOPY (EGD), COMBINED N/A 09/26/2016    Procedure: COMBINED ESOPHAGOSCOPY, GASTROSCOPY, DUODENOSCOPY (EGD), BIOPSY SINGLE OR MULTIPLE;  Surgeon: Marco Monaco MD;  Location:  GI    EYE SURGERY  Left 05/2019    GENITOURINARY SURGERY      GYN SURGERY      Hysterectomy    HERNIA REPAIR, UMBILICAL  07/08/2010    Dr. Kirt Franco times 3    IMPLANT STIMULATOR SACRAL NERVE STAGE ONE N/A 09/17/2020    Procedure: sacral neurostimulation stage one implant of neurostimulator lead;  Surgeon: Shahnaz Carbone MD;  Location: [facility] OR    IMPLANT STIMULATOR SACRAL NERVE STAGE TWO Right 09/24/2020    Procedure: Removal of interstem lead, NOT implanting neurostimulator;  Surgeon: Shahnaz Carbone MD;  Location: [facility] OR    INJECT STEROID (LOCATION) Left 7/2/2024    Procedure: Cortisone Injection Shoulder;  Surgeon: Mitch Reyes MD;  Location: [facility] OR    INTERPOSITION TENDON HAND Right 7/2/2024    Procedure: Ligament reconstruction, tendon interposition arthroplasty;  Surgeon: Mitch Reyes MD;  Location: [facility] OR    ORTHOPEDIC SURGERY  2009    [facility] - Dr. Lund- bilateral knee replacement    RESECT SMALL BOWEL WITHOUT OSTOMY N/A 8/11/2023    Procedure: Small bowel resection, Revision of anastamosis;  Surgeon: Rafi Franco MD;  Location: [facility] OR    SUTURE SUSPENSIONPLASTY THUMB Right 7/2/2024    Procedure: Right thumb trapezial excision;  Surgeon: Mitch Reyes MD;  Location: [facility] OR    [facility] NONSPECIFIC PROCEDURE  1946    T&A    Z NONSPECIFIC PROCEDURE  1984    Vaginal Hysterectomy (has her ovaries)    Z NONSPECIFIC PROCEDURE  1973    PPTL    ZZC NONSPECIFIC PROCEDURE  1994    L shoulder to remove bone spurs    ZZC NONSPECIFIC PROCEDURE  2004    cysto and durasphere Dr Demarco    [facility] NONSPECIFIC PROCEDURE  2005    cysto and durasphere    ZZC NONSPECIFIC PROCEDURE  2007    cysto and durasphere    ZZC NONSPECIFIC PROCEDURE  2009    retropubic TVT sling     Current Outpatient Medications   Medication Sig Dispense Refill    acetaminophen (TYLENOL) 500 MG tablet Take 1,000 mg by mouth 2 times daily (2 x 500 mg = 1000 mg)      Alcohol Swabs (ALCOHOL PREP PADS) 70 % PADS 1 each daily. 100 each 4     amLODIPine (NORVASC) 5 MG tablet TAKE ONE TABLET BY MOUTH EVERY NIGHT AT BEDTIME 90 tablet 2    amoxicillin (AMOXIL) 500 MG capsule Take 2,000 mg by mouth once as needed (1 hour prior to dental procedures 4 x 500 mg = 2000 mg)      Biotin 5000 MCG PO TABS Take 1 tablet by mouth daily      blood glucose (NO BRAND SPECIFIED) lancets standard Use to test blood sugar 1 times daily or as directed. 100 each 3    blood glucose (NO BRAND SPECIFIED) lancets standard To use to test glucose level in the blood Use to test blood sugar  3  times daily as directed. To accompany glucose monitor brands per insurance coverage. 200 each 1    blood glucose (NO BRAND SPECIFIED) test strip To use to test glucose level in the blood Use to test blood sugar  3 times daily as directed. To accompany glucose monitor brands per insurance coverage. 100 strip 11    blood glucose calibration (NO BRAND SPECIFIED) solution Used to calibrate the blood glucose monitor as needed and as directed.  To accompany  blood glucose brands per insurance coverage 1 each 0    blood glucose monitoring (NO BRAND SPECIFIED) meter device kit Check blood sugar 3 times a day 1 kit 0    calcium carbonate (OS-CINDY) 500 MG tablet Take 1 tablet by mouth every other day      CENTRUM SILVER OR TABS Take 1 tablet by mouth daily 30 0    clotrimazole (MYCELEX) 10 MG lozenge Place 1 lozenge (10 mg) inside cheek 5 times daily. 70 lozenge 0    Continuous Glucose  (FREESTYLE AURELIA 3 READER) IAN 1 each as needed (to use daily for fasting blood sugar). 1 each 0    Continuous Glucose Sensor (FREESTYLE AURELIA 3 PLUS SENSOR) MISC Use 1 sensor every 15 days. Use to read blood sugars per 's instructions. 6 each 3    Cranberry 450 MG CAPS Take 1 capsule by mouth daily (with dinner)      ferrous gluconate (FERGON) 324 (38 Fe) MG tablet Take 1 tablet (324 mg) by mouth daily (with breakfast) 90 tablet 1    insulin glargine (LANTUS PEN) 100 UNIT/ML pen Inject 10 Units  subcutaneously every morning (before breakfast). 15 mL 0    insulin pen needle (BD BRIANNA U/F) 32G X 4 MM miscellaneous Use 1 pen needles daily or as directed. 100 each 1    latanoprost (XALATAN) 0.005 % ophthalmic solution Place 1 drop into both eyes At Bedtime       levothyroxine (SYNTHROID/LEVOTHROID) 50 MCG tablet Take 1 tablet (50 mcg) by mouth daily. 90 tablet 2    lisinopril (ZESTRIL) 30 MG tablet Take 1 tablet (30 mg) by mouth every morning. 90 tablet 2    magnesium 250 MG tablet Take 1 tablet by mouth daily      metFORMIN (GLUCOPHAGE) 500 MG tablet Take 2 tablets (1,000 mg) by mouth 2 times daily (with meals). 360 tablet 1    metoprolol succinate ER (TOPROL XL) 25 MG 24 hr tablet Take 1 tablet (25 mg) by mouth daily. 90 tablet 3    OMEGA-3 FATTY ACIDS 1200 MG OR CAPS Take 1 capsule by mouth daily  0    pantoprazole (PROTONIX) 20 MG EC tablet Take 1 tablet (20 mg) by mouth daily. 90 tablet 2    Probiotic Product (ACIDOPHILUS PROBIOTIC BLEND) CAPS Take 1 capsule by mouth daily (with breakfast)  30 capsule 0    simvastatin (ZOCOR) 20 MG tablet Take 1 tablet (20 mg) by mouth at bedtime. 90 tablet 1    VITAMIN D, CHOLECALCIFEROL, PO Take 2,000 Units by mouth daily      warfarin ANTICOAGULANT (COUMADIN) 2 MG tablet Take 3 tablets on day 1 (2/22/25), then 2 tablets everyday thereafter OR per INR clinic 180 tablet 0       Allergies   Allergen Reactions    Morphine And Codeine Nausea and Vomiting    Atorvastatin     Amoxicillin-Pot Clavulanate Diarrhea    Hydrocodone      Keeps patient awake    Naproxen Itching and Rash    Seasonal Allergies      Hay fever in fall, ragweed and russian thistles    Sulfa Antibiotics Nausea        Social History     Tobacco Use    Smoking status: Never     Passive exposure: Never    Smokeless tobacco: Never    Tobacco comments:     have never smoked anything   Substance Use Topics    Alcohol use: No     History   Drug Use No           Objective    LMP  (LMP Unknown)    Estimated body  "mass index is 34.19 kg/m  as calculated from the following:    Height as of 4/2/25: 1.626 m (5' 4\").    Weight as of 4/2/25: 90.4 kg (199 lb 3.2 oz).    GENERAL: healthy, alert and no distress  HEAD: Normocephalic, atraumatic.   EYES: Normal conjunctivae, sclera.   ENT: Normal oropharynx.   NECK: Supple. No lymphadenopathy appreciated.   RESP: lungs clear to auscultation - no rales, rhonchi or wheezes  CV: regular rate and rhythm, normal S1 S2, 2/6 systolic murmur in RUSB, LUSB, LLSB, apex, no click, rub or gallop.  2+ pitting edema up to knees bilaterally.   ABDOMEN: soft, no TTP x4 quadrants. No hepatomegaly or masses appreciated. BS normactive.  MSK: no gross musculoskeletal defects noted.  SKIN: no suspicious lesions or rashes.  EXT: Warm and well perfused.   NEURO: CNII-XII grossly intact. No focal deficits.  PSYCH: Groomed, dressed appropriately for weather.  Logical, linear thought process. Normal mood with consistent affect.     "

## 2025-04-30 NOTE — PATIENT INSTRUCTIONS
For your diabetes:  Please decrease to 8U of insulin daily. Continue all other diabetes medications as usual.     For the upcoming endoscopy procedure:  Stop supplements 14 days prior to procedure.   Take 5U insulin on the morning of your procedure.   Please do NOT take the metformin on the day of procedure.   You can continue the simvastatin, pantoprazole, metoprolol, lisinopril, levothyroxine, amlodipine.  Please follow instructions for warfarin plan from the anticoagulation clinic.

## 2025-05-07 ENCOUNTER — TELEPHONE (OUTPATIENT)
Dept: GASTROENTEROLOGY | Facility: CLINIC | Age: 85
End: 2025-05-07
Payer: COMMERCIAL

## 2025-05-07 NOTE — TELEPHONE ENCOUNTER
Pre assessment completed for upcoming procedure.   (Please see previous telephone encounter notes for complete details)    Procedure details:    Arrival time and facility location reviewed.    Pre op exam needed? Yes. Pre op exam completed on 4/30/25.    Designated  policy reviewed. Instructed to have someone stay 24 hours post procedure.       Medication review:    Medications reviewed. Please see supporting documentation below. Holding recommendations discussed (if applicable).       Prep for procedure:     Procedure prep instructions reviewed.        Any additional information needed:  N/A      Patient verbalized understanding and had no questions or concerns at this time.      Jaimie Arias RN  Endoscopy Procedure Pre Assessment   [phone] option 3

## 2025-05-07 NOTE — TELEPHONE ENCOUNTER
Pre visit planning completed.    FYI: In scheduling TE it noted patient denies pulmonary htn and diabetes. A1C was 10.9 3 months ago, patient is now better controlled on insulin and metformin (A1C 5.7 recent office visit). Mild pulmonary htn noted in October 2024 Echo that was increased from previous study.    Procedure details:    Patient scheduled for Upper endoscopy (EGD) on 5/21/25.     Arrival time: 1430. Procedure time 1530    Facility location: [facility]; [address] [place] [address]. Check in location: [facility].     Sedation type: MAC    Pre op exam needed? Yes. Pre op exam completed on 4/30/25 with Robert Randle    Indication for procedure: Shah's esopagus, dysphagia with solids.      Chart review:     Electronic implanted devices? Yes: Pacemaker    Recent diagnosis of diverticulitis within the last 6 weeks? No      Medication review:    Diabetic? Yes. Oral diabetic medications: Metformin (glucophage): HOLD day of procedure.  Insulin: Consult with managing provider.     Anticoagulants? Yes Coumadin (Warfarin): Recommended HOLD 5 days before procedure.  Consult with your managing provider.    Weight loss medication/injectable? No GLP-1 medication per patient's medication list. Nursing to verify with pre-assessment call.    Other medication HOLDING recommendations:  N/A      Prep for procedure:     Bowel prep recommendation: N/A  Due to: EGD    Procedure information and instructions sent via [employer] Leonard RN  Endoscopy Procedure Pre Assessment   [phone] option 3

## 2025-05-08 ENCOUNTER — LAB (OUTPATIENT)
Dept: LAB | Facility: CLINIC | Age: 85
End: 2025-05-08
Payer: COMMERCIAL

## 2025-05-08 ENCOUNTER — ALLIED HEALTH/NURSE VISIT (OUTPATIENT)
Dept: EDUCATION SERVICES | Facility: CLINIC | Age: 85
End: 2025-05-08
Payer: COMMERCIAL

## 2025-05-08 ENCOUNTER — RESULTS FOLLOW-UP (OUTPATIENT)
Dept: ANTICOAGULATION | Facility: CLINIC | Age: 85
End: 2025-05-08

## 2025-05-08 ENCOUNTER — ANTICOAGULATION THERAPY VISIT (OUTPATIENT)
Dept: ANTICOAGULATION | Facility: CLINIC | Age: 85
End: 2025-05-08

## 2025-05-08 VITALS — WEIGHT: 195.8 LBS | BODY MASS INDEX: 33.61 KG/M2

## 2025-05-08 DIAGNOSIS — I48.20 CHRONIC ATRIAL FIBRILLATION (H): Primary | ICD-10-CM

## 2025-05-08 DIAGNOSIS — E11.8 TYPE 2 DIABETES MELLITUS WITH COMPLICATION, WITHOUT LONG-TERM CURRENT USE OF INSULIN (H): Primary | ICD-10-CM

## 2025-05-08 DIAGNOSIS — Z95.2 S/P TAVR (TRANSCATHETER AORTIC VALVE REPLACEMENT): ICD-10-CM

## 2025-05-08 LAB — INR BLD: 2.2 (ref 0.9–1.1)

## 2025-05-08 NOTE — PROGRESS NOTES
"ANTICOAGULATION MANAGEMENT     Lexii Stratton 84 year old female is on warfarin with therapeutic INR result. (Goal INR 2.0-3.0)    Recent labs: (last 7 days)     05/08/25  1019   INR 2.2*       ASSESSMENT     Source(s): Chart Review and Patient/Caregiver Call     Warfarin doses taken: Warfarin taken as instructed  Diet: No new diet changes identified  Medication/supplement changes: None noted--other than slight decrease in insulin at today's office visit with diabetes ed RN  New illness, injury, or hospitalization: No  Signs or symptoms of bleeding or clotting: Yes: patient reports sevearl small \"red spots\" on left arm  Previous result: Therapeutic last 2(+) visits  Additional findings: Upcoming surgery/procedure 5/21/25 EGD, patient reports her paperwork recommends a 5 day warfarin hold. Writer informed patient that [facility] will contact her when procedure instructions are received from [facility], patient verbalized understanding        PLAN     Recommended plan for no diet, medication or health factor changes affecting INR     Dosing Instructions: Continue your current warfarin dose with next INR in 3 weeks       Summary  As of 5/8/2025      Full warfarin instructions:  4 mg every Thu; 3 mg all other days   Next INR check:  5/28/2025               Telephone call with Lexii who verbalizes understanding and agrees to plan    Lab visit scheduled    Education provided: Taking warfarin: Importance of taking warfarin as instructed    Plan made per [facility] anticoagulation protocol    Beverly Weller RN  5/8/2025  Anticoagulation Clinic  [employer] for routing messages: yohan RUGGIERO  [facility] patient phone line: [phone]        _______________________________________________________________________     Anticoagulation Episode Summary       Current INR goal:  2.0-3.0   TTR:  87.0% (2.6 mo)   Target end date:  Indefinite   Send INR reminders to:  ANICETO RUGGIERO    Indications    Chronic atrial fibrillation (H) " [I48.20]  S/P TAVR (transcatheter aortic valve replacement) [Z95.2]             Comments:  --             Anticoagulation Care Providers       Provider Role Specialty Phone number    Robert Randle MD Referring [facility] [phone]

## 2025-05-08 NOTE — PATIENT INSTRUCTIONS
Great job on the weight loss  and the healthy changes you have made to your diet and activity.     Care Plan:  Meal Plan Recommendation: eat 3 meals a day and use portion control  -when eating out with your friends, enjoy the food, just watch the portions. You can bring half the meal home.   - if you do eat a little more and your blood sugar is high, don't worry, walk or move your body and drink some water.   Exercise / activity plan: continue with your activity.   Check blood sugars: continue to use your Janneth sensor.  - ifthe sensor falls off early or stops working then call the [employer] Customer Service: [phone] so they can replace the sensor.     Medication:   Metformin- continue current dose   Lantus insulin- decrease to 6 units daily  - on the morning of your endoscopy procedure-DO NOT TAKE YOUR INSULIN.      Follow up:  Follow-up for annual diabetes education review in 1 year or sooner, if needed.  Call or send a mychart if you have any low blood sugars ( below 80) before you see your doctor in June.      Bring blood glucose meter and logbook with you to all doctor and follow-up appointments.     [facility] for the [facility]:  For Your Diabetes or Nutrition Education Appointments Call:  [phone]   For Diabetes or Nutrition Related Questions:   [phone]  Send [employer] Message   If you need a medication refill please contact your pharmacy. Please allow 3 business days for your refills to be completed.

## 2025-05-08 NOTE — LETTER
5/8/2025         RE: Lexii Stratton  [address] [place] [address]        Dear Colleague,    Thank you for referring your patient, Lexii Stratton, to the [facility]. Please see a copy of my visit note below.    Diabetes Self-Management Education & Support    Presents for: Follow-up    Type of Service: In Person Visit      Assessment  Patient is doing well with lifestyle changes, watching portions at meals and adding in some regular activity. She is pleased to see she has lost weight and states she is feeling good. She has had some issues with the sensor falling off or stopping early.     See CGM Reports in Monitoring section below.  Review of CGM report. Glucose overall average 108mg/dl,  in target 99%, above target 0% and below target 1% of the time.     Glucose continues to be on the low end of normal, recommend she further decrease the Lantus from 8 units to --> 6 units. She states she has 1 + a partial insulin pen left. Recommend she discuss her medication plan with her PCP at follow up in June.       Care Plan and Education Provided:  Diabetes Pathophysiology  Healthy Eating: review of Consistency in amount and timing of carbohydrate intake and Portion control  Being Active: Amount recommended (150 minutes moderate or 75 minutes vigorous activity and 2-3 days strength training per week) and Precautions to take with exercise  Monitoring: Individual glucose targets and Log and interpret results  Taking Medication: Side effects of prescribed medication(s)  Problem Solving: High glucose - causes, signs/symptoms, treatment and prevention and Low glucose - causes, signs/symptoms, treatment and prevention  Reducing Risks: Complications of diabetes, Dental care, Eye care, Foot care, Preventing cardiovascular disease, including blood pressure goals, lipid goals, recommendations for cardioprotective medications, statins, and aspirin, and Prevention, early diagnostic  measures and treatment of complications  Healthy Coping: Utilize support systems    Patient verbalized understanding of diabetes self-management education concepts discussed, opportunities for ongoing education and support, and recommendations provided today.    Plan  Meal Plan Recommendation: eat 3 meals a day and use portion control  -when eating out with your friends, enjoy the food, just watch the portions. You can bring half the meal home.   - if you do eat a little more and your blood sugar is high, don't worry, walk or move your body and drink some water.   Exercise / activity plan: continue with your activity.   Check blood sugars: continue to use your Janneth sensor.  - ifthe sensor falls off early or stops working then call the [employer] Customer Service: [phone] so they can replace the sensor.     Medication:   Metformin- continue current dose   Lantus insulin- decrease to 6 units daily  - on the morning of your endoscopy procedure-DO NOT TAKE YOUR INSULIN.      Follow up:  Follow-up for annual diabetes education review in 1 year or sooner, if needed.  Call or send a mychart if you have any low blood sugars ( below 80) before you see your doctor in June.       See Care Plan for co-developed, patient-state behavior change goals.    Education Materials Provided:  No new materials provided today      Subjective/Objective  Lexii is an 84 year old, presenting for the following diabetes education related to: Follow-up  Accompanied by: Self  Diabetes education in the past 24mo: (Patient-Rptd) Yes  Focus of Visit: Assistance w/ making life changes, CGM  Diabetes type: (Patient-Rptd) Type 2  Disease course: (Patient-Rptd) Improving  How confident are you filling out medical forms by yourself:: (Patient-Rptd) Extremely  Transportation concerns: No  Difficulty affording diabetes medication?: No  Difficulty affording diabetes testing supplies?: No  Other concerns: None  Cultural Influences/Ethnic Background:  Not  " or     Diabetes Symptoms & Complications:  Diabetes Related Symptoms: (Patient-Rptd) Fatigue  Weight trend: (Patient-Rptd) Decreasing  Symptom course: (Patient-Rptd) Stable  Disease course: (Patient-Rptd) Improving  Complications assessed today?: Yes  Autonomic neuropathy: No  CVA: No  Heart disease: Yes  Nephropathy: No  Peripheral neuropathy: No  Peripheral Vascular Disease: No  Retinopathy: No    Patient Problem List and Family Medical History reviewed for relevant medical history, current medical status, and diabetes risk factors.    Vitals:  Wt 88.8 kg (195 lb 12.8 oz)   LMP  (LMP Unknown)   BMI 33.61 kg/m    Estimated body mass index is 33.61 kg/m  as calculated from the following:    Height as of 4/30/25: 1.626 m (5' 4\").    Weight as of this encounter: 88.8 kg (195 lb 12.8 oz).   Last 3 BP:   BP Readings from Last 3 Encounters:   04/30/25 128/70   04/02/25 128/81   03/04/25 137/85       History   Smoking Status     Never   Smokeless Tobacco     Never       Labs:  Lab Results   Component Value Date    A1C 5.7 04/30/2025    A1C 6.1 02/24/2021     Lab Results   Component Value Date     04/30/2025     02/08/2025    GLC 95 11/09/2022     02/24/2021     Lab Results   Component Value Date    LDL 80 02/28/2025    LDL 85 02/24/2021     HDL Cholesterol   Date Value Ref Range Status   02/24/2021 50 >49 mg/dL Final     Direct Measure HDL   Date Value Ref Range Status   02/28/2025 52 >=50 mg/dL Final     GFR Estimate   Date Value Ref Range Status   04/30/2025 77 >60 mL/min/1.73m2 Final     Comment:     eGFR calculated using 2021 CKD-EPI equation.   02/24/2021 60 (L) >60 mL/min/[1.73_m2] Final     Comment:     Non  GFR Calc  Starting 12/18/2018, serum creatinine based estimated GFR (eGFR) will be   calculated using the Chronic Kidney Disease Epidemiology Collaboration   (CKD-EPI) equation.       GFR, ESTIMATED POCT   Date Value Ref Range Status   07/05/2023 56 (L) >60 " mL/min/1.73m2 Final     GFR Estimate If Black   Date Value Ref Range Status   02/24/2021 70 >60 mL/min/[1.73_m2] Final     Comment:      GFR Calc  Starting 12/18/2018, serum creatinine based estimated GFR (eGFR) will be   calculated using the Chronic Kidney Disease Epidemiology Collaboration   (CKD-EPI) equation.       Lab Results   Component Value Date    CR 0.76 04/30/2025    CR 0.90 02/24/2021     Lab Results   Component Value Date    MICROL 22.2 05/21/2024    UMALCR 10.05 05/21/2024    UCRR 221.0 05/21/2024           5/3/2025   Healthy Eating   Healthy Eating Assessed Today Yes   Cultural/Jehovah's witness diet restrictions? No   How many times a week on average do you eat food made away from home (restaurant/take-out)? 0   Meals include Breakfast;Lunch;Dinner   Other no change to diet   Beverages Water         5/3/2025   Being Active   Being Active Assessed Today Yes   Exercise: Yes   Days per week of moderate to strenuous exercise (like a brisk walk) 3   How intense was your typical exercise?  Light (like stretching or slow walking)       3 times a week- walking 1.5 -1.75 miles on a track at [facility].   Barrier to exercise Physical limitation         5/3/2025   Monitoring   Monitoring Assessed Today Yes   Did patient bring glucose meter to appointment?  Yes   Blood Glucose Meter FreeStyle;CGM   Times checking blood sugar at home (number) Other (see Comments)   Times checking blood sugar at home (per) Day                 Diabetes Medication(s)       Biguanides       metFORMIN (GLUCOPHAGE) 500 MG tablet Take 2 tablets (1,000 mg) by mouth 2 times daily (with meals).       Insulin       insulin glargine (LANTUS PEN) 100 UNIT/ML pen Inject 10 Units subcutaneously every morning (before breakfast).              5/3/2025   Taking Medications   Taking Medication Assessed Today Yes   Current Treatments Diet;Insulin Injections;Oral Medication (taken by mouth)   Dose schedule Pre-breakfast   Given by  Patient   Injection/Infusion sites Abdomen   Problems taking diabetes medications regularly? No   Diabetes medication side effects? No         5/3/2025   Problem Solving   Problem Solving Assessed Today Yes   Is the patient at risk for hypoglycemia? Yes   Hypoglycemia Frequency Rarely   Hypoglycemia Treatment Candy       fruit snacks   Patient carries a carbohydrate source Yes           5/3/2025   Reducing Risks   Reducing Risks Assessed Today Yes   Diabetes Risks Age over 45 years;Sedentary Lifestyle;Hyperlipidemia;Family History   CAD Risks Diabetes Mellitus;Obesity;Post-menopausal;Sedentary lifestyle;Hypertension   Has dilated eye exam at least once a year? Yes   Sees dentist every 6 months? Yes   Feet checked by healthcare provider in the last year? Yes         5/3/2025   Healthy Coping: Diabetes Distress Assessment   Healthy Coping Assessed Today Yes   I feel burned out by all of the attention and effort that diabetes demands of me. 2 - A Little Problem   It bothers me that diabetes seems to control my life. 2 - A Little Problem   I am frustrated that even when I do what I am supposed to for my diabetes, it doesn't seem to make a difference. 1 - Not a Problem   No matter how hard I try with my diabetes, it feels like it will never be good enough. 1 - Not a Problem   I am so tired of having to worry about diabetes all the time. 2 - A Little Problem   When it comes to my diabetes, I often feel like a failure. 1 - Not a Problem   It depresses me when I realize that my diabetes will likely never go away. 1 - Not a Problem   Living with diabetes is overwhelming for me. 2 - A Little Problem   T2 DDAS Total Score (0 - 1.9 Little or no DD, 2.0 - 2.9 Moderate DD,  3.0+ High DD) 1.5    Informal Support system: Children;Eli based;Friends       Patient-reported       Ina HALLN, RN, PHN, [facility]   Time Spent: 60 minutes  Encounter Type: Individual    Any diabetes medication dose [facility]ing Orders under the patient's referring provider.

## 2025-05-08 NOTE — PROGRESS NOTES
Diabetes Self-Management Education & Support    Presents for: Follow-up    Type of Service: In Person Visit      Assessment  Patient is doing well with lifestyle changes, watching portions at meals and adding in some regular activity. She is pleased to see she has lost weight and states she is feeling good. She has had some issues with the sensor falling off or stopping early.     See CGM Reports in Monitoring section below.  Review of CGM report. Glucose overall average 108mg/dl,  in target 99%, above target 0% and below target 1% of the time.     Glucose continues to be on the low end of normal, recommend she further decrease the Lantus from 8 units to --> 6 units. She states she has 1 + a partial insulin pen left. Recommend she discuss her medication plan with her PCP at follow up in June.       Care Plan and Education Provided:  Diabetes Pathophysiology  Healthy Eating: review of Consistency in amount and timing of carbohydrate intake and Portion control  Being Active: Amount recommended (150 minutes moderate or 75 minutes vigorous activity and 2-3 days strength training per week) and Precautions to take with exercise  Monitoring: Individual glucose targets and Log and interpret results  Taking Medication: Side effects of prescribed medication(s)  Problem Solving: High glucose - causes, signs/symptoms, treatment and prevention and Low glucose - causes, signs/symptoms, treatment and prevention  Reducing Risks: Complications of diabetes, Dental care, Eye care, Foot care, Preventing cardiovascular disease, including blood pressure goals, lipid goals, recommendations for cardioprotective medications, statins, and aspirin, and Prevention, early diagnostic measures and treatment of complications  Healthy Coping: Utilize support systems    Patient verbalized understanding of diabetes self-management education concepts discussed, opportunities for ongoing education and support, and recommendations provided  today.    Plan  Meal Plan Recommendation: eat 3 meals a day and use portion control  -when eating out with your friends, enjoy the food, just watch the portions. You can bring half the meal home.   - if you do eat a little more and your blood sugar is high, don't worry, walk or move your body and drink some water.   Exercise / activity plan: continue with your activity.   Check blood sugars: continue to use your Janneth sensor.  - ifthe sensor falls off early or stops working then call the [employer] Customer Service: [phone] so they can replace the sensor.     Medication:   Metformin- continue current dose   Lantus insulin- decrease to 6 units daily  - on the morning of your endoscopy procedure-DO NOT TAKE YOUR INSULIN.      Follow up:  Follow-up for annual diabetes education review in 1 year or sooner, if needed.  Call or send a mychart if you have any low blood sugars ( below 80) before you see your doctor in June.       See Care Plan for co-developed, patient-state behavior change goals.    Education Materials Provided:  No new materials provided today      Subjective/Objective  Lexii is an 84 year old, presenting for the following diabetes education related to: Follow-up  Accompanied by: Self  Diabetes education in the past 24mo: (Patient-Rptd) Yes  Focus of Visit: Assistance w/ making life changes, CGM  Diabetes type: (Patient-Rptd) Type 2  Disease course: (Patient-Rptd) Improving  How confident are you filling out medical forms by yourself:: (Patient-Rptd) Extremely  Transportation concerns: No  Difficulty affording diabetes medication?: No  Difficulty affording diabetes testing supplies?: No  Other concerns: None  Cultural Influences/Ethnic Background:  Not  or     Diabetes Symptoms & Complications:  Diabetes Related Symptoms: (Patient-Rptd) Fatigue  Weight trend: (Patient-Rptd) Decreasing  Symptom course: (Patient-Rptd) Stable  Disease course: (Patient-Rptd) Improving  Complications assessed  "today?: Yes  Autonomic neuropathy: No  CVA: No  Heart disease: Yes  Nephropathy: No  Peripheral neuropathy: No  Peripheral Vascular Disease: No  Retinopathy: No    Patient Problem List and Family Medical History reviewed for relevant medical history, current medical status, and diabetes risk factors.    Vitals:  Wt 88.8 kg (195 lb 12.8 oz)   LMP  (LMP Unknown)   BMI 33.61 kg/m    Estimated body mass index is 33.61 kg/m  as calculated from the following:    Height as of 4/30/25: 1.626 m (5' 4\").    Weight as of this encounter: 88.8 kg (195 lb 12.8 oz).   Last 3 BP:   BP Readings from Last 3 Encounters:   04/30/25 128/70   04/02/25 128/81   03/04/25 137/85       History   Smoking Status    Never   Smokeless Tobacco    Never       Labs:  Lab Results   Component Value Date    A1C 5.7 04/30/2025    A1C 6.1 02/24/2021     Lab Results   Component Value Date     04/30/2025     02/08/2025    GLC 95 11/09/2022     02/24/2021     Lab Results   Component Value Date    LDL 80 02/28/2025    LDL 85 02/24/2021     HDL Cholesterol   Date Value Ref Range Status   02/24/2021 50 >49 mg/dL Final     Direct Measure HDL   Date Value Ref Range Status   02/28/2025 52 >=50 mg/dL Final     GFR Estimate   Date Value Ref Range Status   04/30/2025 77 >60 mL/min/1.73m2 Final     Comment:     eGFR calculated using 2021 CKD-EPI equation.   02/24/2021 60 (L) >60 mL/min/[1.73_m2] Final     Comment:     Non  GFR Calc  Starting 12/18/2018, serum creatinine based estimated GFR (eGFR) will be   calculated using the Chronic Kidney Disease Epidemiology Collaboration   (CKD-EPI) equation.       GFR, ESTIMATED POCT   Date Value Ref Range Status   07/05/2023 56 (L) >60 mL/min/1.73m2 Final     GFR Estimate If Black   Date Value Ref Range Status   02/24/2021 70 >60 mL/min/[1.73_m2] Final     Comment:      GFR Calc  Starting 12/18/2018, serum creatinine based estimated GFR (eGFR) will be   calculated using " the Chronic Kidney Disease Epidemiology Collaboration   (CKD-EPI) equation.       Lab Results   Component Value Date    CR 0.76 04/30/2025    CR 0.90 02/24/2021     Lab Results   Component Value Date    MICROL 22.2 05/21/2024    UMALCR 10.05 05/21/2024    UCRR 221.0 05/21/2024           5/3/2025   Healthy Eating   Healthy Eating Assessed Today Yes   Cultural/Pent[employer]al[employer] diet restrictions? No   How many times a week on average do you eat food made away from home (restaurant/take-out)? 0   Meals include Breakfast;Lunch;Dinner   Other no change to diet   Beverages Water         5/3/2025   Being Active   Being Active Assessed Today Yes   Exercise: Yes   Days per week of moderate to strenuous exercise (like a brisk walk) 3   How intense was your typical exercise?  Light (like stretching or slow walking)       3 times a week- walking 1.5 -1.75 miles on a track at [facility] [employer].   Barrier to exercise Physical limitation         5/3/2025   Monitoring   Monitoring Assessed Today Yes   Did patient bring glucose meter to appointment?  Yes   Blood Glucose Meter FreeStyle;CGM   Times checking blood sugar at home (number) Other (see Comments)   Times checking blood sugar at home (per) Day                 Diabetes Medication(s)       Biguanides       metFORMIN (GLUCOPHAGE) 500 MG tablet Take 2 tablets (1,000 mg) by mouth 2 times daily (with meals).       Insulin       insulin glargine (LANTUS PEN) 100 UNIT/ML pen Inject 10 Units subcutaneously every morning (before breakfast).              5/3/2025   Taking Medications   Taking Medication Assessed Today Yes   Current Treatments Diet;Insulin Injections;Oral Medication (taken by mouth)   Dose schedule Pre-breakfast   Given by Patient   Injection/Infusion sites Abdomen   Problems taking diabetes medications regularly? No   Diabetes medication side effects? No         5/3/2025   Problem Solving   Problem Solving Assessed Today Yes   Is the patient at risk for hypoglycemia? Yes    Hypoglycemia Frequency Rarely   Hypoglycemia Treatment Candy       fruit snacks   Patient carries a carbohydrate source Yes           5/3/2025   Reducing Risks   Reducing Risks Assessed Today Yes   Diabetes Risks Age over 45 years;Sedentary Lifestyle;Hyperlipidemia;Family History   CAD Risks Diabetes Mellitus;Obesity;Post-menopausal;Sedentary lifestyle;Hypertension   Has dilated eye exam at least once a year? Yes   Sees dentist every 6 months? Yes   Feet checked by healthcare provider in the last year? Yes         5/3/2025   Healthy Coping: Diabetes Distress Assessment   Healthy Coping Assessed Today Yes   I feel burned out by all of the attention and effort that diabetes demands of me. 2 - A Little Problem   It bothers me that diabetes seems to control my life. 2 - A Little Problem   I am frustrated that even when I do what I am supposed to for my diabetes, it doesn't seem to make a difference. 1 - Not a Problem   No matter how hard I try with my diabetes, it feels like it will never be good enough. 1 - Not a Problem   I am so tired of having to worry about diabetes all the time. 2 - A Little Problem   When it comes to my diabetes, I often feel like a failure. 1 - Not a Problem   It depresses me when I realize that my diabetes will likely never go away. 1 - Not a Problem   Living with diabetes is overwhelming for me. 2 - A Little Problem   T2 DDAS Total Score (0 - 1.9 Little or no DD, 2.0 - 2.9 Moderate DD,  3.0+ High DD) 1.5    Informal Support system: Children;Eli based;Friends       Patient-reported       Ina LIMON, RN, PHN, [facility]   Time Spent: 60 minutes  Encounter Type: Individual    Any diabetes medication dose changes were made via the [facility] Standing Orders under the patient's referring provider.

## 2025-05-09 ENCOUNTER — MYC MEDICAL ADVICE (OUTPATIENT)
Dept: EDUCATION SERVICES | Facility: CLINIC | Age: 85
End: 2025-05-09
Payer: COMMERCIAL

## 2025-05-09 NOTE — TELEPHONE ENCOUNTER
"ALIX-PROCEDURAL ANTICOAGULATION  MANAGEMENT    ASSESSMENT     Warfarin interruption plan for EGD on 5/21/25.    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    RECOMMENDATION     Pre-Procedure:  Hold warfarin for 5 days, until after procedure starting: Friday, May 16   No Bridge    Post-Procedure:  Resume warfarin dose if okay with provider doing procedure on night of procedure, Wednesday, May 21 PM: take 6 mg x 2, then resume home dose  Recheck INR ~ 7 days after resuming warfarin     Plan routed to referring provider for approval  ?   Carolyn Mohamud [facility]    SUBJECTIVE/OBJECTIVE     Lexii Stratton, a 84 year old female    Goal Range: 2.0-3.0     Wt Readings from Last 3 Encounters:   05/08/25 88.8 kg (195 lb 12.8 oz)   04/30/25 90.3 kg (199 lb)   04/02/25 90.4 kg (199 lb 3.2 oz)      Ideal body weight: 54.7 kg (120 lb 9.5 oz)  Adjusted ideal body weight: 68.3 kg (150 lb 10.8 oz)     Estimated body mass index is 33.61 kg/m  as calculated from the following:    Height as of 4/30/25: 1.626 m (5' 4\").    Weight as of 5/8/25: 88.8 kg (195 lb 12.8 oz).    Lab Results   Component Value Date    INR 2.2 (H) 05/08/2025    INR 2.6 (H) 04/16/2025    INR 2.4 (H) 04/02/2025     Lab Results   Component Value Date    HGB 13.4 04/30/2025     04/30/2025     Lab Results   Component Value Date    CR 0.76 04/30/2025    CR 0.98 (H) 02/28/2025    CR 1.01 (H) 02/25/2025     Estimated Creatinine Clearance: 59.4 mL/min (based on SCr of 0.76 mg/dL).    "

## 2025-05-12 NOTE — TELEPHONE ENCOUNTER
Diabetes Follow-up    Subjective/Objective:    Lexii Stratton requests review of glucose and monitoring report.   Comments/concerns: see mychart message    Diabetes is being managed with Diabetes Medications   Diabetes Medication(s)       Biguanides       metFORMIN (GLUCOPHAGE) 500 MG tablet Take 2 tablets (1,000 mg) by mouth 2 times daily (with meals).       Insulin       insulin glargine (LANTUS PEN) 100 UNIT/ML pen Inject 6 Units subcutaneously every morning (before breakfast).              Assessment/Plan:  Patient on metformin and 6 units of insulin, continues with low glucose alarms. Her last A1c was 5.7% and she has made positive lifestyle changes and has lost some weight. Recommend she stop the insulin until she see's her PCP in June to assess if the insulin is needed.     Ina LIMON, RN, PHN, [facility]     Any diabetes medication dose changes were made via the CDE Protocol and Collaborative Practice Agreement with the patient's referring provider. A copy of this encounter was shared with the provider.

## 2025-05-13 ENCOUNTER — PATIENT OUTREACH (OUTPATIENT)
Dept: CARE COORDINATION | Facility: CLINIC | Age: 85
End: 2025-05-13
Payer: COMMERCIAL

## 2025-05-13 NOTE — PROGRESS NOTES
Clinic Care Coordination Contact  [facility]/Voicemail    Clinical Data: Care Coordinator Outreach    Outreach Documentation Number of Outreach Attempt   5/13/2025  10:42 AM 1     Unable to leave a message due to: Voicemail is not set up.    Plan: Care Coordinator will try to reach patient again in 10 business days.    Jessy Angel, RN Care Coordinator  [facility]  Email: [email or web address]  Phone: [phone]

## 2025-05-13 NOTE — TELEPHONE ENCOUNTER
Called patient, below warfarin hold plan discussed with the patient. Lab INR appointment already scheduled on 5/28/25  Radha Salas RN, BSN  Anticoagulation Clinic

## 2025-05-13 NOTE — TELEPHONE ENCOUNTER
[place],    Review of patient's chart notes TAVR (however this is a BIOprosthetic valve, not mechanical) and hx of atrial fibrillation.    Agree with recommendations as below.    Thanks,    Robert Randle MD  [facility]  5/13/2025

## 2025-05-20 ENCOUNTER — MYC MEDICAL ADVICE (OUTPATIENT)
Dept: FAMILY MEDICINE | Facility: CLINIC | Age: 85
End: 2025-05-20
Payer: COMMERCIAL

## 2025-05-20 DIAGNOSIS — I48.20 CHRONIC ATRIAL FIBRILLATION (H): ICD-10-CM

## 2025-05-20 DIAGNOSIS — Z95.2 S/P TAVR (TRANSCATHETER AORTIC VALVE REPLACEMENT): ICD-10-CM

## 2025-05-20 DIAGNOSIS — E11.8 TYPE 2 DIABETES MELLITUS WITH COMPLICATION, WITHOUT LONG-TERM CURRENT USE OF INSULIN (H): ICD-10-CM

## 2025-05-20 RX ORDER — WARFARIN SODIUM 2 MG/1
3-4 TABLET ORAL EVERY EVENING
Qty: 180 TABLET | Refills: 1 | Status: SHIPPED | OUTPATIENT
Start: 2025-05-20

## 2025-05-20 NOTE — TELEPHONE ENCOUNTER
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 Rx instructions and quantity supplied updated to match patient's current dosing plan. Warfarin 90 day supply with 1 refill granted per [facility] protocol     Beverly Weller RN  Anticoagulation Clinic

## 2025-05-21 ENCOUNTER — HOSPITAL ENCOUNTER (OUTPATIENT)
Facility: CLINIC | Age: 85
Discharge: HOME | End: 2025-05-21
Attending: INTERNAL MEDICINE | Admitting: INTERNAL MEDICINE
Payer: COMMERCIAL

## 2025-05-21 ENCOUNTER — DOCUMENTATION ONLY (OUTPATIENT)
Dept: ANTICOAGULATION | Facility: CLINIC | Age: 85
End: 2025-05-21

## 2025-05-21 ENCOUNTER — ANESTHESIA (OUTPATIENT)
Dept: GASTROENTEROLOGY | Facility: CLINIC | Age: 85
End: 2025-05-21
Payer: COMMERCIAL

## 2025-05-21 ENCOUNTER — ANESTHESIA EVENT (OUTPATIENT)
Dept: GASTROENTEROLOGY | Facility: CLINIC | Age: 85
End: 2025-05-21
Payer: COMMERCIAL

## 2025-05-21 VITALS
HEART RATE: 71 BPM | DIASTOLIC BLOOD PRESSURE: 81 MMHG | RESPIRATION RATE: 13 BRPM | OXYGEN SATURATION: 97 % | SYSTOLIC BLOOD PRESSURE: 133 MMHG

## 2025-05-21 LAB — UPPER GI ENDOSCOPY: NORMAL

## 2025-05-21 PROCEDURE — 43239 EGD BIOPSY SINGLE/MULTIPLE: CPT | Performed by: INTERNAL MEDICINE

## 2025-05-21 PROCEDURE — 370N000017 HC ANESTHESIA TECHNICAL FEE, PER MIN: Performed by: INTERNAL MEDICINE

## 2025-05-21 PROCEDURE — 258N000003 HC RX IP 258 OP 636: Performed by: NURSE ANESTHETIST, CERTIFIED REGISTERED

## 2025-05-21 PROCEDURE — 250N000011 HC RX IP 250 OP 636: Performed by: NURSE ANESTHETIST, CERTIFIED REGISTERED

## 2025-05-21 PROCEDURE — 88305 TISSUE EXAM BY PATHOLOGIST: CPT | Mod: TC | Performed by: INTERNAL MEDICINE

## 2025-05-21 PROCEDURE — 999N000010 HC STATISTIC ANES STAT CODE-CRNA PER MINUTE: Performed by: INTERNAL MEDICINE

## 2025-05-21 PROCEDURE — 250N000009 HC RX 250: Performed by: NURSE ANESTHETIST, CERTIFIED REGISTERED

## 2025-05-21 RX ORDER — SODIUM CHLORIDE, SODIUM LACTATE, POTASSIUM CHLORIDE, CALCIUM CHLORIDE 600; 310; 30; 20 MG/100ML; MG/100ML; MG/100ML; MG/100ML
INJECTION, SOLUTION INTRAVENOUS CONTINUOUS PRN
Status: DISCONTINUED | OUTPATIENT
Start: 2025-05-21 | End: 2025-05-21

## 2025-05-21 RX ORDER — ONDANSETRON 2 MG/ML
4 INJECTION INTRAMUSCULAR; INTRAVENOUS
Status: DISCONTINUED | OUTPATIENT
Start: 2025-05-21 | End: 2025-05-21 | Stop reason: HOSPADM

## 2025-05-21 RX ORDER — PROPOFOL 10 MG/ML
INJECTION, EMULSION INTRAVENOUS PRN
Status: DISCONTINUED | OUTPATIENT
Start: 2025-05-21 | End: 2025-05-21

## 2025-05-21 RX ORDER — LIDOCAINE 40 MG/G
CREAM TOPICAL
Status: DISCONTINUED | OUTPATIENT
Start: 2025-05-21 | End: 2025-05-21 | Stop reason: HOSPADM

## 2025-05-21 RX ORDER — LIDOCAINE HYDROCHLORIDE 20 MG/ML
INJECTION, SOLUTION INFILTRATION; PERINEURAL PRN
Status: DISCONTINUED | OUTPATIENT
Start: 2025-05-21 | End: 2025-05-21

## 2025-05-21 RX ADMIN — SODIUM CHLORIDE, SODIUM LACTATE, POTASSIUM CHLORIDE, AND CALCIUM CHLORIDE: .6; .31; .03; .02 INJECTION, SOLUTION INTRAVENOUS at 15:23

## 2025-05-21 RX ADMIN — PROPOFOL 150 MCG/KG/MIN: 10 INJECTION, EMULSION INTRAVENOUS at 15:29

## 2025-05-21 RX ADMIN — PROPOFOL 50 MG: 10 INJECTION, EMULSION INTRAVENOUS at 15:28

## 2025-05-21 RX ADMIN — LIDOCAINE HYDROCHLORIDE 60 MG: 20 INJECTION, SOLUTION INFILTRATION; PERINEURAL at 15:26

## 2025-05-21 ASSESSMENT — ENCOUNTER SYMPTOMS: DYSRHYTHMIAS: 1

## 2025-05-21 ASSESSMENT — ACTIVITIES OF DAILY LIVING (ADL)
ADLS_ACUITY_SCORE: 57
ADLS_ACUITY_SCORE: 57

## 2025-05-21 NOTE — ANESTHESIA CARE TRANSFER NOTE
Patient: Lexii Stratton    Procedure: Procedure(s):  ESOPHAGOGASTRODUODENOSCOPY, WITH BIOPSY       Diagnosis: Shah's esophagus without dysplasia [K22.70]  Diagnosis Additional Information: No value filed.    Anesthesia Type:   MAC     Note:    Oropharynx: oropharynx clear of all foreign objects and spontaneously breathing  Level of Consciousness: awake  Oxygen Supplementation: face mask  Level of Supplemental Oxygen (L/min / FiO2): 6  Independent Airway: airway patency satisfactory and stable  Dentition: dentition unchanged  Vital Signs Stable: post-procedure vital signs reviewed and stable  Report to RN Given: handoff report given  Patient transferred to: PACU  Comments: At end of procedure, spontaneous respirations, patient alert to voice, able to follow commands. Oxygen via facemask at 8 liters per minute to PACU. Oxygen tubing connected to wall O2 in PACU, SpO2, NiBP, and EKG monitors and alarms on and functioning, report on patient's clinical status given to PACU RN, RN questions answered.      Handoff Report: Identifed the Patient, Identified the Reponsible Provider, Reviewed the pertinent medical history, Discussed the surgical course, Reviewed Intra-OP anesthesia mangement and issues during anesthesia, Set expectations for post-procedure period and Allowed opportunity for questions and acknowledgement of understanding      Vitals:  Vitals Value Taken Time   /72    Temp 36.2    Pulse 64 05/21/25 15:37   Resp 26 05/21/25 15:37   SpO2 100 % 05/21/25 15:37   Vitals shown include unfiled device data.    Electronically Signed By: ANDREW Paz CRNA  May 21, 2025  3:39 PM

## 2025-05-21 NOTE — PROGRESS NOTES
"ANTICOAGULATION  MANAGEMENT: Discharge Review    Lexii Stratton chart reviewed for anticoagulation continuity of care    Outpatient surgery/procedure on 5/21/25 for EGD .    Discharge disposition: Home    Results:    No results for input(s): \"INR\", \"[id number]\", \"F2\", \"ALMWH\", \"AAUFH\" in the last 168 hours.  Anticoagulation inpatient management:     not applicable     Anticoagulation discharge instructions:     Warfarin dosing: home regimen continued + 2 booster doses per procedure plan   Bridging: No   INR goal change: No      Medication changes affecting anticoagulation: No    Additional factors affecting anticoagulation: Yes: potential for bleeding secondary to biopsy taken     PLAN     No adjustment to anticoagulation plan needed    Recommended follow up is scheduled  Patient not contacted    No adjustment to Anticoagulation Calendar was required    Beverly Weller RN  5/21/2025  Anticoagulation Clinic  [facility] for routing messages: yohan RUGGIERO  [facility] patient phone line: [phone]    "

## 2025-05-21 NOTE — ANESTHESIA PREPROCEDURE EVALUATION
Anesthesia Pre-Procedure Evaluation    Patient: Lexii Stratton   MRN: [id number] : 1940          Procedure : Procedure(s):  Esophagoscopy, gastroscopy, duodenoscopy (EGD), combined         Past Medical History:   Diagnosis Date    Aortic valve stenosis 2022    Arthritis     Diabetes (H)     Type 2-no meds... Dieet and exercise only as of 9/15/20    Esophageal reflux     Hernia, abdominal     History of blood transfusion     Hypertension     No cardiologist    Incontinence of urine     Mumps     6 years old    Obese     Osteoarthritis     Other and unspecified hyperlipidemia     Other chronic pain     back    Peptic ulcer, unspecified site, unspecified as acute or chronic, without mention of hemorrhage, perforation, or obstruction     S/P total hip arthroplasty 2016    Small bowel obstruction (H)     Thyroid disease hypothyroidism    Urinary incontinence     Walking troubles       Past Surgical History:   Procedure Laterality Date    ABDOMEN SURGERY      ARTHROPLASTY HIP Right 2016    Procedure: ARTHROPLASTY HIP;  Surgeon: Angel Lund MD;  Location: [facility] OR    AS REPAIR INCISIONAL HERNIA,REDUCIBLE  1998    inc hernia ( Yamileth surgery)    BIOPSY      BLADDER SURGERY      hyperdistention surgery and sling    BREAST SURGERY      CARDIAC SURGERY      CHOLECYSTECTOMY      COLONOSCOPY  2011    Procedure:COLONOSCOPY; COLONOSCOPY; Surgeon:SEMAJ GRAHAM; Location: GI    COLONOSCOPY N/A 2018    Procedure: COLONOSCOPY;  COLONOSCOPY Rm 544;  Surgeon: aMrco Gusman MD;  Location:  GI    CV CORONARY ANGIOGRAM N/A 2022    Procedure: Coronary Angiogram;  Surgeon: Garcia Barber MD;  Location: [facility] CARDIAC CATH LAB    CV TRANSCATHETER AORTIC VALVE REPLACEMENT-FEMORAL APPROACH N/A 2022    Procedure: Transcatheter Aortic Valve Replacement-Femoral Approach;  Surgeon: Yulia Castano MD;  Location: [facility] CARDIAC CATH LAB    CYSTOSCOPY  N/A 09/06/2017    Procedure: CYSTOSCOPY;  CYSTOSCOPY AND HYDRODISTENTION ;  Surgeon: Eyal Bai MD;  Location:  OR    ENT SURGERY      Tonsillectomy    EP PACEMAKER DEVICE & LEAD IMPLANT- RIGHT ATRIAL & RIGHT VENTRICULAR N/A 11/08/2022    Procedure: Pacemaker Device & Lead Implant - Right Atrial & Right Ventricular;  Surgeon: William Boyd MD;  Location:  HEART CARDIAC CATH LAB    ESOPHAGOSCOPY, GASTROSCOPY, DUODENOSCOPY (EGD), COMBINED N/A 09/26/2016    Procedure: COMBINED ESOPHAGOSCOPY, GASTROSCOPY, DUODENOSCOPY (EGD), BIOPSY SINGLE OR MULTIPLE;  Surgeon: Marco Monaco MD;  Location:  GI    EYE SURGERY Left 05/2019    GENITOURINARY SURGERY      GYN SURGERY      Hysterectomy    HERNIA REPAIR, UMBILICAL  07/08/2010    Dr. Kirt Franco times 3    IMPLANT STIMULATOR SACRAL NERVE STAGE ONE N/A 09/17/2020    Procedure: sacral neurostimulation stage one implant of neurostimulator lead;  Surgeon: Shhanaz Carbone MD;  Location:  OR    IMPLANT STIMULATOR SACRAL NERVE STAGE TWO Right 09/24/2020    Procedure: Removal of interstem lead, NOT implanting neurostimulator;  Surgeon: Shahnaz Carbone MD;  Location:  OR    INJECT STEROID (LOCATION) Left 7/2/2024    Procedure: Cortisone Injection Shoulder;  Surgeon: Mitch Reyes MD;  Location:  OR    INTERPOSITION TENDON HAND Right 7/2/2024    Procedure: Ligament reconstruction, tendon interposition arthroplasty;  Surgeon: Mitch Reyes MD;  Location:  OR    ORTHOPEDIC SURGERY  2009    [facility] Dr. Lund- bilateral knee replacement    RESECT SMALL BOWEL WITHOUT OSTOMY N/A 8/11/2023    Procedure: Small bowel resection, Revision of anastamosis;  Surgeon: Rafi Franco MD;  Location:  OR    SUTURE SUSPENSIONPLASTY THUMB Right 7/2/2024    Procedure: Right thumb trapezial excision;  Surgeon: Mitch Reyes MD;  Location:  OR    ZZC NONSPECIFIC PROCEDURE  1946    T&A    ZZC NONSPECIFIC PROCEDURE  1984     Vaginal Hysterectomy (has her ovaries)    [facility] NONSPECIFIC PROCEDURE  1973    PPTL    [facility] NONSPECIFIC PROCEDURE  1994    L shoulder to remove bone spurs    [facility] NONSPECIFIC PROCEDURE  2004    cysto and durasphere Dr Demarco    [facility] NONSPECIFIC PROCEDURE  2005    cysto and durasphere    [facility] NONSPECIFIC PROCEDURE  2007    cysto and durasphere    [facility] NONSPECIFIC PROCEDURE  2009    retropubic TVT sling      Allergies   Allergen Reactions    Morphine And Codeine Nausea and Vomiting    Atorvastatin     Amoxicillin-Pot Clavulanate Diarrhea    Hydrocodone      Keeps patient awake    Naproxen Itching and Rash    Seasonal Allergies      Hay fever in fall, ragweed and russian thistles    Sulfa Antibiotics Nausea      Social History     Tobacco Use    Smoking status: Never     Passive exposure: Never    Smokeless tobacco: Never    Tobacco comments:     have never smoked anything   Substance Use Topics    Alcohol use: No      Wt Readings from Last 1 Encounters:   05/08/25 88.8 kg (195 lb 12.8 oz)        Anesthesia Evaluation            ROS/MED HX  ENT/Pulmonary:    (-) sleep apnea   Neurologic:       Cardiovascular:     (+) Dyslipidemia hypertension- -   -  - -   Taking blood thinners           pacemaker, Reason placed: 3rd degree heart block;.         dysrhythmias, a-fib and 3rd Deg Heart Block,  valvular problems/murmurs  s/p TAVR 11/2023;.         METS/Exercise Tolerance:     Hematologic: Comments: Thrombocytopenia;      Musculoskeletal:       GI/Hepatic:     (+) GERD,                   Renal/Genitourinary:       Endo:     (+)  type II DM,        thyroid problem, hypothyroidism,    Obesity,       Psychiatric/Substance Use:       Infectious Disease:       Malignancy:       Other:              Physical Exam  Airway  Mallampati: II  TM distance: >3 FB  Neck ROM: full    Cardiovascular - normal exam   Dental   (+) Minor Abnormalities - some fillings, tiny chips      Pulmonary - normal exam      Neurological   Other Findings        OUTSIDE LABS:  CBC:   Lab Results   Component Value Date    WBC 8.6 04/30/2025    WBC 10.9 02/25/2025    HGB 13.4 04/30/2025    HGB 13.5 02/25/2025    HCT 41.2 04/30/2025    HCT 40.2 02/25/2025     04/30/2025     02/25/2025     BMP:   Lab Results   Component Value Date     04/30/2025     02/28/2025    POTASSIUM 4.2 04/30/2025    POTASSIUM 4.8 02/28/2025    CHLORIDE 105 04/30/2025    CHLORIDE 103 02/28/2025    CO2 24 04/30/2025    CO2 22 02/28/2025    BUN 17.9 04/30/2025    BUN 23.7 (H) 02/28/2025    CR 0.76 04/30/2025    CR 0.98 (H) 02/28/2025     (H) 04/30/2025     (H) 02/28/2025     COAGS:   Lab Results   Component Value Date    PTT 29 09/30/2022    INR 2.2 (H) 05/08/2025     POC:   Lab Results   Component Value Date     (H) 09/24/2020     HEPATIC:   Lab Results   Component Value Date    ALBUMIN 3.7 02/25/2025    PROTTOTAL 6.8 02/25/2025    ALT 24 02/25/2025    AST 43 02/25/2025    ALKPHOS 100 02/25/2025    BILITOTAL 0.7 02/25/2025     OTHER:   Lab Results   Component Value Date    LACT 2.2 (H) 02/06/2025    A1C 5.7 (H) 04/30/2025    CINDY 10.4 04/30/2025    PHOS 2.9 02/18/2025    MAG 1.7 02/25/2025    LIPASE 151 07/03/2022    TSH 2.60 02/18/2025    T4 1.32 02/25/2019    CRP 12.7 (H) 07/30/2013    SED 68 (H) 09/08/2016       Anesthesia Plan    ASA Status:  3      NPO Status: NPO Appropriate   Anesthesia Type: MAC.   Techniques and Equipment:       - Monitoring Plan: standard ASA monitoring     Consents    Anesthesia Plan(s) and associated risks, benefits, and realistic alternatives discussed. Questions answered and patient/representative(s) expressed understanding.     - Discussed:     - Discussed with:  Patient               Postoperative Care         Comments:                   CRISTIN RIOS MD    I have reviewed the pertinent notes and labs in the chart from the past 30 days and (re)examined the patient.  Any updates or changes from those notes are reflected  "in this note.    Clinically Significant Risk Factors Present on Admission                   # Hypertension: Noted on problem list           # Obesity: Estimated body mass index is 33.61 kg/m  as calculated from the following:    Height as of 4/30/25: 1.626 m (5' 4\").    Weight as of 5/8/25: 88.8 kg (195 lb 12.8 oz).        # Pacemaker present             "

## 2025-05-21 NOTE — H&P
Lexii Stratton  [id number]  female  84 year old      Reason for procedure/surgery: History of Shah's esophagus    Patient Active Problem List   Diagnosis    Generalized osteoarthrosis, unspecified site    Esophageal reflux    Acquired hypothyroidism    Hypersomnia with sleep apnea    Essential hypertension, benign    Hyperlipidemia LDL goal <100    Chest pain syndrome    Iron and its compounds causing adverse effect in therapeutic use(E934.0)    Overactive bladder    Pelvic floor dysfunction    H/O small bowel obstruction    Chronic kidney disease, stage 3a (H)    Status post coronary angiogram    Aortic stenosis, severe    Diabetes mellitus due to underlying condition, controlled, with hyperglycemia, without long-term current use of insulin (H)    Complete heart block (H)    Cardiac pacemaker in situ    Diastolic heart failure (H)    Chronic atrial fibrillation (H)    History of anemia    Shah's esophagus without dysplasia    Class 2 severe obesity due to excess calories with serious comorbidity in adult (H)    S/P TAVR (transcatheter aortic valve replacement)    Fall from slip, trip, or stumble, initial encounter    Stress incontinence in female    Urge incontinence of urine    Thrombocytopenia    Lumbosacral spondylosis without myelopathy    Physical deconditioning       Past Surgical History:    Past Surgical History:   Procedure Laterality Date    ABDOMEN SURGERY      ARTHROPLASTY HIP Right 11/09/2016    Procedure: ARTHROPLASTY HIP;  Surgeon: Angel Lund MD;  Location: [facility] OR    AS REPAIR INCISIONAL HERNIA,REDUCIBLE  1998    inc hernia ( Yamileth surgery)    BIOPSY      BLADDER SURGERY      hyperdistention surgery and sling    BREAST SURGERY      CARDIAC SURGERY  2022    CHOLECYSTECTOMY  1987    COLONOSCOPY  12/03/2011    Procedure:COLONOSCOPY; COLONOSCOPY; Surgeon:SEMAJ GRAHAM; Location: GI    COLONOSCOPY N/A 03/29/2018    Procedure: COLONOSCOPY;  COLONOSCOPY Rm 544;  Surgeon: Uzma  Marco HENDERSON MD;  Location:  GI    CV CORONARY ANGIOGRAM N/A 09/30/2022    Procedure: Coronary Angiogram;  Surgeon: Garcia Barber MD;  Location:  HEART CARDIAC CATH LAB    CV TRANSCATHETER AORTIC VALVE REPLACEMENT-FEMORAL APPROACH N/A 11/08/2022    Procedure: Transcatheter Aortic Valve Replacement-Femoral Approach;  Surgeon: Yulia Castano MD;  Location:  HEART CARDIAC CATH LAB    CYSTOSCOPY N/A 09/06/2017    Procedure: CYSTOSCOPY;  CYSTOSCOPY AND HYDRODISTENTION ;  Surgeon: Eyal Bai MD;  Location:  OR    ENT SURGERY      Tonsillectomy    EP PACEMAKER DEVICE & LEAD IMPLANT- RIGHT ATRIAL & RIGHT VENTRICULAR N/A 11/08/2022    Procedure: Pacemaker Device & Lead Implant - Right Atrial & Right Ventricular;  Surgeon: William Boyd MD;  Location:  HEART CARDIAC CATH LAB    ESOPHAGOSCOPY, GASTROSCOPY, DUODENOSCOPY (EGD), COMBINED N/A 09/26/2016    Procedure: COMBINED ESOPHAGOSCOPY, GASTROSCOPY, DUODENOSCOPY (EGD), BIOPSY SINGLE OR MULTIPLE;  Surgeon: Marco Monaco MD;  Location:  GI    EYE SURGERY Left 05/2019    GENITOURINARY SURGERY      GYN SURGERY      Hysterectomy    HERNIA REPAIR, UMBILICAL  07/08/2010    Dr. Kirt Franco times 3    IMPLANT STIMULATOR SACRAL NERVE STAGE ONE N/A 09/17/2020    Procedure: sacral neurostimulation stage one implant of neurostimulator lead;  Surgeon: Shahnaz Carbone MD;  Location:  OR    IMPLANT STIMULATOR SACRAL NERVE STAGE TWO Right 09/24/2020    Procedure: Removal of interstem lead, NOT implanting neurostimulator;  Surgeon: Shahnaz Carbone MD;  Location: [facility] OR    INJECT STEROID (LOCATION) Left 7/2/2024    Procedure: Cortisone Injection Shoulder;  Surgeon: Mitch Reyes MD;  Location:  OR    INTERPOSITION TENDON HAND Right 7/2/2024    Procedure: Ligament reconstruction, tendon interposition arthroplasty;  Surgeon: Mitch Reyes MD;  Location:  OR    ORTHOPEDIC SURGERY  2009    [facility] - Dr. Lund-  bilateral knee replacement    RESECT SMALL BOWEL WITHOUT OSTOMY N/A 8/11/2023    Procedure: Small bowel resection, Revision of anastamosis;  Surgeon: Rafi Franco MD;  Location: [facility] OR    SUTURE SUSPENSIONPLASTY THUMB Right 7/2/2024    Procedure: Right thumb trapezial excision;  Surgeon: Mitch Reyes MD;  Location: [facility] OR    [facility] NONSPECIFIC PROCEDURE  1946    T&A    [facility] NONSPECIFIC PROCEDURE  1984    Vaginal Hysterectomy (has her ovaries)    Z NONSPECIFIC PROCEDURE  1973    PPTL    [facility] NONSPECIFIC PROCEDURE  1994    L shoulder to remove bone spurs    [facility] NONSPECIFIC PROCEDURE  2004    cysto and durasphere Dr Demarco    [facility] NONSPECIFIC PROCEDURE  2005    cysto and durasphere    [facility] NONSPECIFIC PROCEDURE  2007    cysto and durasphere    [facility] NONSPECIFIC PROCEDURE  2009    retropubic TVT sling       Past Medical History:   Past Medical History:   Diagnosis Date    Aortic valve stenosis 08/18/2022    Arthritis     Diabetes (H)     Type 2-no meds... Dieet and exercise only as of 9/15/20    Esophageal reflux     Hernia, abdominal     History of blood transfusion 1984    Hypertension     No cardiologist    Incontinence of urine     Mumps     6 years old    Obese     Osteoarthritis     Other and unspecified hyperlipidemia     Other chronic pain     back    Peptic ulcer, unspecified site, unspecified as acute or chronic, without mention of hemorrhage, perforation, or obstruction     S/P total hip arthroplasty 11/09/2016    Small bowel obstruction (H)     Thyroid disease hypothyroidism    Urinary incontinence     Walking troubles        Social History:   Social History     Tobacco Use    Smoking status: Never     Passive exposure: Never    Smokeless tobacco: Never    Tobacco comments:     have never smoked anything   Substance Use Topics    Alcohol use: No       Family History:   Family History   Problem Relation Age of Onset    Heart Disease Mother         heart surgery , new valve    Gallbladder Disease Mother      Coronary Artery Disease Mother     Hyperlipidemia Mother     Asthma Mother     Hypertension Mother     Anesthesia Reaction Mother     Throat cancer Father         throat ca,  76    Coronary Artery Disease Father     Cancer Brother         lung    Aortic stenosis Brother         TAVR    Diabetes Brother         Half Brother    Heart Disease Maternal Grandmother     Coronary Artery Disease Maternal Grandmother     Heart Disease Maternal Grandfather     Coronary Artery Disease Maternal Grandfather        Allergies:   Allergies   Allergen Reactions    Morphine And Codeine Nausea and Vomiting    Atorvastatin     Amoxicillin-Pot Clavulanate Diarrhea    Hydrocodone      Keeps patient awake    Naproxen Itching and Rash    Seasonal Allergies      Hay fever in fall, ragweed and russian thistles    Sulfa Antibiotics Nausea       Active Medications:   No current outpatient medications on file.       Systemic Review:   CONSTITUTIONAL: NEGATIVE for fever, chills, change in weight  ENT/MOUTH: NEGATIVE for ear, mouth and throat problems  RESP: NEGATIVE for significant cough or SOB  CV: NEGATIVE for chest pain, palpitations or peripheral edema    Physical Examination:   Vital Signs: /63   Pulse 65   Resp 18   LMP  (LMP Unknown)   SpO2 99%   GENERAL: healthy, alert and no distress  NECK: no adenopathy, no asymmetry, masses, or scars  RESP: lungs clear to auscultation - no rales, rhonchi or wheezes  CV: regular rate and rhythm, normal S1 S2, no S3 or S4, no murmur, click or rub, no peripheral edema and peripheral pulses strong  ABDOMEN: soft, nontender, no hepatosplenomegaly, no masses and bowel sounds normal  MS: no gross musculoskeletal defects noted, no edema    Plan: Appropriate to proceed as scheduled.      Luana Chavis MD  2025    PCP:  Robert Randle

## 2025-05-21 NOTE — ANESTHESIA POSTPROCEDURE EVALUATION
Patient: Lexii Stratton    Procedure: Procedure(s):  ESOPHAGOGASTRODUODENOSCOPY, WITH BIOPSY       Anesthesia Type:  MAC    Note:     Postop Pain Control: Uneventful            Sign Out: Well controlled pain   PONV: No   Neuro/Psych: Uneventful            Sign Out: Acceptable/Baseline neuro status   Airway/Respiratory: Uneventful            Sign Out: Acceptable/Baseline resp. status   CV/Hemodynamics: Uneventful            Sign Out: Acceptable CV status; No obvious hypovolemia; No obvious fluid overload   Other NRE: NONE   DID A NON-ROUTINE EVENT OCCUR? No           Last vitals:  Vitals Value Taken Time   /81 05/21/25 16:00   Temp     Pulse 73 05/21/25 16:05   Resp 22 05/21/25 16:05   SpO2 98 % 05/21/25 16:05   Vitals shown include unfiled device data.    Electronically Signed By: Noah Horne MD  May 21, 2025  4:32 PM

## 2025-05-22 ENCOUNTER — PATIENT OUTREACH (OUTPATIENT)
Dept: CARE COORDINATION | Facility: CLINIC | Age: 85
End: 2025-05-22
Payer: COMMERCIAL

## 2025-05-22 NOTE — PROGRESS NOTES
Clinic Care Coordination Contact  Follow Up Progress Note      Assessment:   Patient reports she is waiting to hear back the results of her EGD biopsy that she completed yesterday. Shares she has been off of insulin for the past week per care team recommendation due to too many low blood sugar readings. Reports blood sugar readings remain within normal limits and no longer needing regular follow up with diabetes educator. Explains that her friend was able to help her reach out to  to get sensor replacement due to malfunction and requiring replacement after only two days when it is supposed to last for 15 days. Patient mentions she had difficulty understanding support person and was trying to hold the phone and read numbers from her device at the same time. Continues to go to the gym three times per week and follow diabetic diet. Taking Metformin medications one tablet at a time due to diarrhea side effect. Shares plan to discuss need for removal of white spots with wet washcloth around her mouth in the morning with Primary Care Provider at upcoming scheduled appointment. Verbalizes appreciation of care team support, assistance & reassurance she is correctly understanding how to manage her chronic health conditions. .     Care Gaps:  Health Maintenance Due   Topic Date Due    HF ACTION PLAN  Never done    DIABETIC FOOT EXAM  11/22/2024    COVID-19 Vaccine (9 - 2024-25 season) 03/24/2025    MICROALBUMIN  05/21/2025     Care Gap Goal set: Yes    Care Plans  Care Plan: Diabetes (Primary Care) Completed 5/22/2025      Problem: Diabetes Mangement Needs Improvement  Resolved 5/22/2025      Long-Range Goal: Demonstrate improved diabetes management  Completed 5/22/2025      Start Date: 2/10/2025 Expected End Date: 8/29/2025    This Visit's Progress: 100% Recent Progress: 50%    Note:     Barriers: diagnosis of multiple, chronic, complex medical conditions, new diagnosis of diabetes, provider availability - wait  time to complete appointments, etc.   Strengths: motivated, engaged in Care Coordination, two local children & sona community supportive.    Patient expressed understanding of goal: Yes   Action steps to achieve this goal:  1. I will follow up with my providers as scheduled/recommended:   Primary Care Provider: 04/30/25, 06/11/25, 12/17/25   Diabetes Education: #[phone] 05/08/25  Cardiology: 06/04/25  2. I will discuss, review, schedule & complete recommended overdue health maintenance with my Primary Care Provider.   3. I will take my medications as prescribed.   4. I will contact my care team with questions, concerns, support needs. I will use the clinic as a resource and I understand I can contact my clinic with 24/7 after hours services available. Care Coordinator will remain available as needed.                                Intervention/Education provided during outreach: Patient verbalizes feeling current clinic care coordination goal complete with no new goal(s), resource, support needs from clinic care coordination team identified at this time. Patient agreeable to transitioning clinic care coordination enrollment status to maintenance.       Outreach Frequency: 2 months, more frequently as needed    Plan:   Patient/caregiver will call RNCC with questions, concerns, support needs. RNCC will be available as needed.    Care Coordinator will follow up in 2 months to review for transition to clinic care coordination graduation if no outstanding concerns, resource and/or support needs present.     Jessy Angel RN Care Coordinator  [facility], [place], [place]  Email: [email or web address]  Phone: [phone]

## 2025-05-27 ENCOUNTER — TELEPHONE (OUTPATIENT)
Dept: FAMILY MEDICINE | Facility: CLINIC | Age: 85
End: 2025-05-27
Payer: COMMERCIAL

## 2025-05-27 NOTE — TELEPHONE ENCOUNTER
Spoke with patient and rescheduled appointment.  Patient requested 6/11/25 appointment be cancelled.    Jovita Garza

## 2025-05-27 NOTE — TELEPHONE ENCOUNTER
Reason for Call:  Appointment Request    Patient requesting this type of appt: Chronic Diease Management/Medication/Follow-Up    Requested provider: Robert Randle    Reason patient unable to be scheduled: Not within requested timeframe    When does patient want to be seen/preferred time: any day other than 25    Comments: she has appointment scheduled for 25, but would like to attend a  that day, would appreciate a team member to call her and assist with rescheduling.    Could we send this information to you in [place] or would you prefer to receive a phone call?:   Patient would prefer a phone call   Okay to leave a detailed message?: Yes at Home number on file [phone] (home)    Call taken on 2025 at 12:02 PM by Hillary Chin

## 2025-05-28 ENCOUNTER — LAB (OUTPATIENT)
Dept: LAB | Facility: CLINIC | Age: 85
End: 2025-05-28
Payer: COMMERCIAL

## 2025-05-28 ENCOUNTER — RESULTS FOLLOW-UP (OUTPATIENT)
Dept: ANTICOAGULATION | Facility: CLINIC | Age: 85
End: 2025-05-28

## 2025-05-28 ENCOUNTER — RESULTS FOLLOW-UP (OUTPATIENT)
Dept: GASTROENTEROLOGY | Facility: CLINIC | Age: 85
End: 2025-05-28

## 2025-05-28 ENCOUNTER — ANTICOAGULATION THERAPY VISIT (OUTPATIENT)
Dept: ANTICOAGULATION | Facility: CLINIC | Age: 85
End: 2025-05-28

## 2025-05-28 DIAGNOSIS — Z95.2 S/P TAVR (TRANSCATHETER AORTIC VALVE REPLACEMENT): ICD-10-CM

## 2025-05-28 DIAGNOSIS — I48.20 CHRONIC ATRIAL FIBRILLATION (H): Primary | ICD-10-CM

## 2025-05-28 DIAGNOSIS — B37.81 ESOPHAGEAL CANDIDIASIS (H): Primary | ICD-10-CM

## 2025-05-28 LAB
INR BLD: 1.9 (ref 0.9–1.1)
PATH REPORT.COMMENTS IMP SPEC: NORMAL
PATH REPORT.FINAL DX SPEC: NORMAL
PATH REPORT.GROSS SPEC: NORMAL
PATH REPORT.MICROSCOPIC SPEC OTHER STN: NORMAL
PATH REPORT.RELEVANT HX SPEC: NORMAL
PHOTO IMAGE: NORMAL

## 2025-05-28 PROCEDURE — 88312 SPECIAL STAINS GROUP 1: CPT | Mod: 26 | Performed by: PATHOLOGY

## 2025-05-28 PROCEDURE — 88305 TISSUE EXAM BY PATHOLOGIST: CPT | Mod: 26 | Performed by: PATHOLOGY

## 2025-05-28 PROCEDURE — 85610 PROTHROMBIN TIME: CPT

## 2025-05-28 PROCEDURE — 36416 COLLJ CAPILLARY BLOOD SPEC: CPT

## 2025-05-28 RX ORDER — FLUCONAZOLE 200 MG/1
200 TABLET ORAL DAILY
Qty: 14 TABLET | Refills: 0 | Status: SHIPPED | OUTPATIENT
Start: 2025-05-28 | End: 2025-06-11

## 2025-05-28 NOTE — PROGRESS NOTES
ANTICOAGULATION MANAGEMENT     Lexii Stratton 84 year old female is on warfarin with subtherapeutic INR result. (Goal INR 2.0-3.0)    Recent labs: (last 7 days)     05/28/25  0914   INR 1.9*       ASSESSMENT     Source(s): Chart Review and Patient/Caregiver Call     Warfarin doses taken: Booster dose(s) recently taken which may be affecting INR and Held for 5 days  recently which may be affecting INR  Diet: No new diet changes identified  Medication/supplement changes: None noted  New illness, injury, or hospitalization: Yes: EGD with biopsy completed on 4/21/25  Signs or symptoms of bleeding or clotting: No  Previous result: Therapeutic last 2(+) visits  Additional findings: Patient agreeable to moving 4 mg day from Thursday to Wednesday, INR trending up appropriately after hold/boosts so did not feel the need for another warfarin boost today.       PLAN     Recommended plan for temporary change(s) affecting INR     Dosing Instructions: Continue your current warfarin dose with next INR in 2 weeks       Summary  As of 5/28/2025      Full warfarin instructions:  4 mg every Wed; 3 mg all other days   Next INR check:  6/11/2025               Telephone call with Lexii who verbalizes understanding and agrees to plan and who agrees to plan and repeated back plan correctly    Lab visit scheduled    Education provided: Taking warfarin: Importance of taking warfarin as instructed    Plan made per [facility] anticoagulation protocol    Beverly Weller RN  5/28/2025  Anticoagulation Clinic  [place] for routing messages: yohan RUGGIERO  [facility] patient phone line: [phone]        _______________________________________________________________________     Anticoagulation Episode Summary       Current INR goal:  2.0-3.0   TTR:  82.9% (3.3 mo)   Target end date:  Indefinite   Send INR reminders to:  ANICETO RUGGIERO    Indications    Chronic atrial fibrillation (H) [I48.20]  S/P TAVR (transcatheter aortic valve replacement)  [Z95.2]             Comments:  --             Anticoagulation Care Providers       Provider Role Specialty Phone number    Robert Randle MD Referring Family Medicine [phone]

## 2025-05-29 ENCOUNTER — TELEPHONE (OUTPATIENT)
Dept: ANTICOAGULATION | Facility: CLINIC | Age: 85
End: 2025-05-29
Payer: COMMERCIAL

## 2025-05-29 DIAGNOSIS — Z95.2 S/P TAVR (TRANSCATHETER AORTIC VALVE REPLACEMENT): ICD-10-CM

## 2025-05-29 DIAGNOSIS — I48.20 CHRONIC ATRIAL FIBRILLATION (H): Primary | ICD-10-CM

## 2025-05-29 NOTE — TELEPHONE ENCOUNTER
"ANTICOAGULATION  MANAGEMENT     Interacting Medication Review    Interacting medication(s): Fluconazole (Diflucan) with warfarin.    Duration: 14 days.  Rx was sent to mail order pharmacy so patient is unsure when she will receive it.  Usually takes 3-4 days to receive prescriptions    Indication: esophageal candidiasis    New medication?: Yes, interaction may increase INR and risk of bleeding. Closer INR monitoring recommended. and With fluconazole courses > 3 days [facility] protocol Appendix G recommends empiric reduction of warfarin dose in therapeutic/supratherapeutic patients.        PLAN     Continue your current warfarin dose with next INR 3-5 days after starting fluconazole.        Summary  As of 5/29/2025      Full warfarin instructions:  4 mg every Wed; 3 mg all other days   Next INR check:  6/4/2025               Telephone call with Lexii who verbalizes understanding and agrees to plan    New recheck date updated on anticoagulation calendar     [facility] priority set/moved to \"high\" for new major drug interaction    Plan made per [facility] anticoagulation protocol    Osiris Lobo RN  5/29/2025  Anticoagulation Clinic  [facility] for routing messages: yohan RUGGIERO  [facility] patient phone line: [phone]  "

## 2025-06-04 ENCOUNTER — OFFICE VISIT (OUTPATIENT)
Dept: CARDIOLOGY | Facility: CLINIC | Age: 85
End: 2025-06-04
Attending: INTERNAL MEDICINE
Payer: COMMERCIAL

## 2025-06-04 VITALS
BODY MASS INDEX: 33.2 KG/M2 | DIASTOLIC BLOOD PRESSURE: 68 MMHG | WEIGHT: 194.5 LBS | OXYGEN SATURATION: 99 % | SYSTOLIC BLOOD PRESSURE: 132 MMHG | HEIGHT: 64 IN | HEART RATE: 68 BPM

## 2025-06-04 DIAGNOSIS — Z95.0 CARDIAC PACEMAKER IN SITU: ICD-10-CM

## 2025-06-04 DIAGNOSIS — Z95.2 S/P TAVR (TRANSCATHETER AORTIC VALVE REPLACEMENT): ICD-10-CM

## 2025-06-04 DIAGNOSIS — I44.2 COMPLETE HEART BLOCK (H): ICD-10-CM

## 2025-06-04 DIAGNOSIS — I10 ESSENTIAL HYPERTENSION: ICD-10-CM

## 2025-06-04 DIAGNOSIS — I35.0 SEVERE AORTIC STENOSIS: ICD-10-CM

## 2025-06-04 DIAGNOSIS — I48.20 CHRONIC ATRIAL FIBRILLATION (H): Primary | ICD-10-CM

## 2025-06-04 NOTE — PATIENT INSTRUCTIONS
Thanks for participating in a office visit with the [facility] clinic today.    Stable on a cardiac standpoint   Blood pressures well controlled   Reviewed recent blood work, stable.   Due for echocardiogram in November.   Continue current medical therapy.     Follow up in 6 months with JULIANA     Please call my nurse at   [phone]. Call with any questions or concerns.    Scheduling phone number: [phone].   Reminder: Please bring in all current medications, over the counter supplements and vitamin bottles to your next appointment.

## 2025-06-04 NOTE — LETTER
6/4/2025    Robert Randle MD  [address]  [facility] [address]    RE: Lexii Stratton       Dear Colleague,     I had the pleasure of seeing Lexii Stratton in the [facility].  CARDIOLOGY CLINIC NOTE    PRIMARY CARDIOLOGIST  Dr. Mota    PRIMARY CARE PHYSICIAN:  Robert Randle    HISTORY OF PRESENT ILLNESS:  Lexii Stratton is a very pleasant 83-year-old female with a past medical history significant for hypertension, chronic diastolic heart failure, hypothyroidism, hyperlipidemia and severe aortic stenosis status post TAVR on 11/8/2022 using a 26 mm Medtronic evolute Fx valve, postop heart block status post dual-chamber pacemaker implant, atrial fibrillation on anticoagulation and SBO s/p resection (8/2023)     She was last seen on 3/3/2025 in follow up with Dr. Castano. Lexii had been hospitalized the month prior after sustaining a fall and hitting her head. She states she had become very weak and tripped over a step. During her hospitalization, she was found to have newly diagnosed type 2 diabetes. Glucose was 749 with ketones of 0.53 and BMP showing potassium of 8.1 with carbon dioxide of 17, BUN of 45.9 and creatinine of 1.22. Head CT was negative. She was initiated on Insulin and metformin. Since then, she has followed with a diabetic educator and diabetes is well controlled on metformin alone. Recent A1C is 5.7    She returns to the office today for a 3 month follow up.  She does not endorse any cardiac symptoms including chest pain, shortness of breath, palpitations, PND, orthopnea, presyncope or syncope.  Upon exam, lungs are clear bilaterally, heart rate and rhythm regular, soft systolic murmur and trace bilateral lower extremity edema (chronic).     Echocardiogram on 10/24/2024 shows a well-seated bioprosthetic aortic valve with a mean gradient of 8.4 mmHg.  There is mild pulmonary hypertension (35 to 45 mmHg), normal ejection fraction estimated at 60 to 65% and mild  tricuspid regurgitation.    Pre-TAVR coronary angiogram showed minimal nonocclusive CAD.    Blood pressure is well-controlled at 132/68  Last BMP was unremarkable with the exception of an elevated glucose at 120.  Normal CBC  Lipid panel showed a total cholesterol of 169, HDL 52, LDL 80 and triglycerides 183    Device interrogation on 4/18/2025 showed 68% atrial pacing and 3% ventricular pacing, there were 24 atrial arrhythmia mode switches with 1 episode of atrial fibrillation lasting 77 hours.  She is on chronic warfarin.  There were 6 ventricular high rates with 4 for SVT lasting 1 minute 9 seconds and 1 for A-fib with RVR lasting 12 seconds.    She is back to walking regularly.  She does not smoke or use alcohol.  Compliant with all medications.      PAST MEDICAL HISTORY:  Past Medical History:   Diagnosis Date     Aortic valve stenosis 08/18/2022     Arthritis      Diabetes (H)     Type 2-no meds... Dieet and exercise only as of 9/15/20     Esophageal reflux      Hernia, abdominal      History of blood transfusion 1984     Hypertension     No cardiologist     Incontinence of urine      Mumps     6 years old     Obese      Osteoarthritis      Other and unspecified hyperlipidemia      Other chronic pain     back     Peptic ulcer, unspecified site, unspecified as acute or chronic, without mention of hemorrhage, perforation, or obstruction      S/P total hip arthroplasty 11/09/2016     Small bowel obstruction (H)      Thyroid disease hypothyroidism     Urinary incontinence      Walking troubles        MEDICATIONS:  Current Outpatient Medications   Medication Sig Dispense Refill     acetaminophen (TYLENOL) 500 MG tablet Take 1,000 mg by mouth 2 times daily (2 x 500 mg = 1000 mg)       Alcohol Swabs (ALCOHOL PREP PADS) 70 % PADS 1 each daily. 100 each 4     amLODIPine (NORVASC) 5 MG tablet TAKE ONE TABLET BY MOUTH EVERY NIGHT AT BEDTIME 90 tablet 2     amoxicillin (AMOXIL) 500 MG capsule Take 2,000 mg by mouth once  as needed (1 hour prior to dental procedures 4 x 500 mg = 2000 mg)       Biotin 5000 MCG PO TABS Take 1 tablet by mouth daily       blood glucose (NO BRAND SPECIFIED) lancets standard Use to test blood sugar 1 times daily or as directed. 100 each 3     blood glucose (NO BRAND SPECIFIED) lancets standard To use to test glucose level in the blood Use to test blood sugar  3  times daily as directed. To accompany glucose monitor brands per insurance coverage. 200 each 1     blood glucose (NO BRAND SPECIFIED) test strip To use to test glucose level in the blood Use to test blood sugar  3 times daily as directed. To accompany glucose monitor brands per insurance coverage. 100 strip 11     blood glucose calibration (NO BRAND SPECIFIED) solution Used to calibrate the blood glucose monitor as needed and as directed.  To accompany  blood glucose brands per insurance coverage 1 each 0     blood glucose monitoring (NO BRAND SPECIFIED) meter device kit Check blood sugar 3 times a day 1 kit 0     calcium carbonate (OS-CINDY) 500 MG tablet Take 1 tablet by mouth every other day       CENTRUM SILVER OR TABS Take 1 tablet by mouth daily 30 0     Continuous Glucose  (FREESTYLE AURELIA 3 READER) IAN 1 each as needed (to use daily for fasting blood sugar). 1 each 0     Continuous Glucose Sensor (FREESTYLE AURELIA 3 PLUS SENSOR) MISC Use 1 sensor every 15 days. Use to read blood sugars per 's instructions. 6 each 3     Cranberry 450 MG CAPS Take 1 capsule by mouth daily (with dinner)       ferrous gluconate (FERGON) 324 (38 Fe) MG tablet Take 1 tablet (324 mg) by mouth daily (with breakfast) 90 tablet 1     fluconazole (DIFLUCAN) 200 MG tablet Take 1 tablet (200 mg) by mouth daily for 14 days. 14 tablet 0     latanoprost (XALATAN) 0.005 % ophthalmic solution Place 1 drop into both eyes At Bedtime        levothyroxine (SYNTHROID/LEVOTHROID) 50 MCG tablet Take 1 tablet (50 mcg) by mouth daily. 90 tablet 2     lisinopril  (ZESTRIL) 30 MG tablet Take 1 tablet (30 mg) by mouth every morning. 90 tablet 2     magnesium 250 MG tablet Take 1 tablet by mouth daily       metFORMIN (GLUCOPHAGE) 500 MG tablet Take 2 tablets (1,000 mg) by mouth 2 times daily (with meals). 360 tablet 0     metoprolol succinate ER (TOPROL XL) 25 MG 24 hr tablet Take 1 tablet (25 mg) by mouth daily. 90 tablet 3     OMEGA-3 FATTY ACIDS 1200 MG OR CAPS Take 1 capsule by mouth daily  0     pantoprazole (PROTONIX) 20 MG EC tablet Take 1 tablet (20 mg) by mouth daily. 90 tablet 2     Probiotic Product (ACIDOPHILUS PROBIOTIC BLEND) CAPS Take 1 capsule by mouth daily (with breakfast)  30 capsule 0     simvastatin (ZOCOR) 20 MG tablet Take 1 tablet (20 mg) by mouth at bedtime. 90 tablet 1     VITAMIN D, CHOLECALCIFEROL, PO Take 2,000 Units by mouth daily       warfarin ANTICOAGULANT (COUMADIN) 2 MG tablet Take 1.5-2 tablets (3-4 mg) by mouth every evening. OR per INR clinic 180 tablet 1     No current facility-administered medications for this visit.       SOCIAL HISTORY:  I have reviewed this patient's social history and updated it with pertinent information if needed. Lexii Stratton  reports that she has never smoked. She has never been exposed to tobacco smoke. She has never used smokeless tobacco. She reports that she does not drink alcohol and does not use drugs.    PHYSICAL EXAM:  Pulse:  [68] 68  BP: (132)/(68) 132/68  SpO2:  [99 %] 99 %  194 lbs 8 oz    Constitutional: alert, no distress  Respiratory: Good bilateral air entry  Cardiovascular: Regular rate and rhythm  GI: nondistended  Neuropsychiatric: appropriate affact    ASSESSMENT/PLAN:  Pertinent issues addressed/ reviewed during this cardiology visit  Severe aortic stenosis - status post TAVR on 11/8/2022 using a 26 mm Medtronic evolute Fx valve.   Echocardiogram on 10/24/2024 shows a well-seated bioprosthetic aortic valve with a mean gradient of 8.4 mmHg.  Surveillance echocardiogram in November.  2.   Pulmonary hypertension -  mild pulmonary hypertension (35 to 45 mmHg) per echo.   3.  Atrial fibrillation -well-controlled on metoprolol.  Warfarin for stroke prevention.  4.  Hypertension -well-controlled, continue metoprolol, lisinopril and amlodipine.  6.  CHB/pacemaker -follows with device clinic  7.  Hyperlipidemia - on simvastatin.     Follow up in November with JULIANA with echo prior.         It was a pleasure seeing this patient in clinic today. Please do not hesitate to contact me with any future questions.     ANDREW Dhillon, CNP  Cardiology - [facility]  06/04/2025       The level of medical decision making during this visit was of moderate complexity.    This note was completed in part using dictation via the Dragon voice recognition software. Some word and grammatical errors may occur and must be interpreted in the appropriate clinical context.  If there are any questions pertaining to this issue, please contact me for further clarification.    Thank you for allowing me to participate in the care of your patient.      Sincerely,     ANDREW Dhillon CNP     [facility]  cc:   Yulia Castano MD  [address] [address]

## 2025-06-04 NOTE — PROGRESS NOTES
CARDIOLOGY CLINIC NOTE    PRIMARY CARDIOLOGIST  Dr. Mota    PRIMARY CARE PHYSICIAN:  Rboert Randle    HISTORY OF PRESENT ILLNESS:  Lexii Stratton is a very pleasant 83-year-old female with a past medical history significant for hypertension, chronic diastolic heart failure, hypothyroidism, hyperlipidemia and severe aortic stenosis status post TAVR on 11/8/2022 using a 26 mm Medtronic evolute Fx valve, postop heart block status post dual-chamber pacemaker implant, atrial fibrillation on anticoagulation and SBO s/p resection (8/2023)     She was last seen on 3/3/2025 in follow up with Dr. Castano. Lexii had been hospitalized the month prior after sustaining a fall and hitting her head. She states she had become very weak and tripped over a step. During her hospitalization, she was found to have newly diagnosed type 2 diabetes. Glucose was 749 with ketones of 0.53 and BMP showing potassium of 8.1 with carbon dioxide of 17, BUN of 45.9 and creatinine of 1.22. Head CT was negative. She was initiated on Insulin and metformin. Since then, she has followed with a diabetic educator and diabetes is well controlled on metformin alone. Recent A1C is 5.7    She returns to the office today for a 3 month follow up.  She does not endorse any cardiac symptoms including chest pain, shortness of breath, palpitations, PND, orthopnea, presyncope or syncope.  Upon exam, lungs are clear bilaterally, heart rate and rhythm regular, soft systolic murmur and trace bilateral lower extremity edema (chronic).     Echocardiogram on 10/24/2024 shows a well-seated bioprosthetic aortic valve with a mean gradient of 8.4 mmHg.  There is mild pulmonary hypertension (35 to 45 mmHg), normal ejection fraction estimated at 60 to 65% and mild tricuspid regurgitation.    Pre-TAVR coronary angiogram showed minimal nonocclusive CAD.    Blood pressure is well-controlled at 132/68  Last BMP was unremarkable with the exception of an elevated glucose at 120.   Normal CBC  Lipid panel showed a total cholesterol of 169, HDL 52, LDL 80 and triglycerides 183    Device interrogation on 4/18/2025 showed 68% atrial pacing and 3% ventricular pacing, there were 24 atrial arrhythmia mode switches with 1 episode of atrial fibrillation lasting 77 hours.  She is on chronic warfarin.  There were 6 ventricular high rates with 4 for SVT lasting 1 minute 9 seconds and 1 for A-fib with RVR lasting 12 seconds.    She is back to walking regularly.  She does not smoke or use alcohol.  Compliant with all medications.      PAST MEDICAL HISTORY:  Past Medical History:   Diagnosis Date    Aortic valve stenosis 08/18/2022    Arthritis     Diabetes (H)     Type 2-no meds... Dieet and exercise only as of 9/15/20    Esophageal reflux     Hernia, abdominal     History of blood transfusion 1984    Hypertension     No cardiologist    Incontinence of urine     Mumps     6 years old    Obese     Osteoarthritis     Other and unspecified hyperlipidemia     Other chronic pain     back    Peptic ulcer, unspecified site, unspecified as acute or chronic, without mention of hemorrhage, perforation, or obstruction     S/P total hip arthroplasty 11/09/2016    Small bowel obstruction (H)     Thyroid disease hypothyroidism    Urinary incontinence     Walking troubles        MEDICATIONS:  Current Outpatient Medications   Medication Sig Dispense Refill    acetaminophen (TYLENOL) 500 MG tablet Take 1,000 mg by mouth 2 times daily (2 x 500 mg = 1000 mg)      Alcohol Swabs (ALCOHOL PREP PADS) 70 % PADS 1 each daily. 100 each 4    amLODIPine (NORVASC) 5 MG tablet TAKE ONE TABLET BY MOUTH EVERY NIGHT AT BEDTIME 90 tablet 2    amoxicillin (AMOXIL) 500 MG capsule Take 2,000 mg by mouth once as needed (1 hour prior to dental procedures 4 x 500 mg = 2000 mg)      Biotin 5000 MCG PO TABS Take 1 tablet by mouth daily      blood glucose (NO BRAND SPECIFIED) lancets standard Use to test blood sugar 1 times daily or as directed.  100 each 3    blood glucose (NO BRAND SPECIFIED) lancets standard To use to test glucose level in the blood Use to test blood sugar  3  times daily as directed. To accompany glucose monitor brands per insurance coverage. 200 each 1    blood glucose (NO BRAND SPECIFIED) test strip To use to test glucose level in the blood Use to test blood sugar  3 times daily as directed. To accompany glucose monitor brands per insurance coverage. 100 strip 11    blood glucose calibration (NO BRAND SPECIFIED) solution Used to calibrate the blood glucose monitor as needed and as directed.  To accompany  blood glucose brands per insurance coverage 1 each 0    blood glucose monitoring (NO BRAND SPECIFIED) meter device kit Check blood sugar 3 times a day 1 kit 0    calcium carbonate (OS-CINDY) 500 MG tablet Take 1 tablet by mouth every other day      CENTRUM SILVER OR TABS Take 1 tablet by mouth daily 30 0    Continuous Glucose  (FREESTYLE AURELIA 3 READER) IAN 1 each as needed (to use daily for fasting blood sugar). 1 each 0    Continuous Glucose Sensor (FREESTYLE AURELIA 3 PLUS SENSOR) MISC Use 1 sensor every 15 days. Use to read blood sugars per 's instructions. 6 each 3    Cranberry 450 MG CAPS Take 1 capsule by mouth daily (with dinner)      ferrous gluconate (FERGON) 324 (38 Fe) MG tablet Take 1 tablet (324 mg) by mouth daily (with breakfast) 90 tablet 1    fluconazole (DIFLUCAN) 200 MG tablet Take 1 tablet (200 mg) by mouth daily for 14 days. 14 tablet 0    latanoprost (XALATAN) 0.005 % ophthalmic solution Place 1 drop into both eyes At Bedtime       levothyroxine (SYNTHROID/LEVOTHROID) 50 MCG tablet Take 1 tablet (50 mcg) by mouth daily. 90 tablet 2    lisinopril (ZESTRIL) 30 MG tablet Take 1 tablet (30 mg) by mouth every morning. 90 tablet 2    magnesium 250 MG tablet Take 1 tablet by mouth daily      metFORMIN (GLUCOPHAGE) 500 MG tablet Take 2 tablets (1,000 mg) by mouth 2 times daily (with meals). 360 tablet 0     metoprolol succinate ER (TOPROL XL) 25 MG 24 hr tablet Take 1 tablet (25 mg) by mouth daily. 90 tablet 3    OMEGA-3 FATTY ACIDS 1200 MG OR CAPS Take 1 capsule by mouth daily  0    pantoprazole (PROTONIX) 20 MG EC tablet Take 1 tablet (20 mg) by mouth daily. 90 tablet 2    Probiotic Product (ACIDOPHILUS PROBIOTIC BLEND) CAPS Take 1 capsule by mouth daily (with breakfast)  30 capsule 0    simvastatin (ZOCOR) 20 MG tablet Take 1 tablet (20 mg) by mouth at bedtime. 90 tablet 1    VITAMIN D, CHOLECALCIFEROL, PO Take 2,000 Units by mouth daily      warfarin ANTICOAGULANT (COUMADIN) 2 MG tablet Take 1.5-2 tablets (3-4 mg) by mouth every evening. OR per INR clinic 180 tablet 1     No current facility-administered medications for this visit.       SOCIAL HISTORY:  I have reviewed this patient's social history and updated it with pertinent information if needed. Lexii Stratton  reports that she has never smoked. She has never been exposed to tobacco smoke. She has never used smokeless tobacco. She reports that she does not drink alcohol and does not use drugs.    PHYSICAL EXAM:  Pulse:  [68] 68  BP: (132)/(68) 132/68  SpO2:  [99 %] 99 %  194 lbs 8 oz    Constitutional: alert, no distress  Respiratory: Good bilateral air entry  Cardiovascular: Regular rate and rhythm  GI: nondistended  Neuropsychiatric: appropriate affact    ASSESSMENT/PLAN:  Pertinent issues addressed/ reviewed during this cardiology visit  Severe aortic stenosis - status post TAVR on 11/8/2022 using a 26 mm Medtronic evolute Fx valve.   Echocardiogram on 10/24/2024 shows a well-seated bioprosthetic aortic valve with a mean gradient of 8.4 mmHg.  Surveillance echocardiogram in November.  2.  Pulmonary hypertension -  mild pulmonary hypertension (35 to 45 mmHg) per echo.   3.  Atrial fibrillation -well-controlled on metoprolol.  Warfarin for stroke prevention.  4.  Hypertension -well-controlled, continue metoprolol, lisinopril and amlodipine.  6.   CHB/pacemaker -follows with device clinic  7.  Hyperlipidemia - on simvastatin.     Follow up in November with JULIANA with echo prior.         It was a pleasure seeing this patient in clinic today. Please do not hesitate to contact me with any future questions.     ANDREW Dhillon, CNP  Cardiology - [facility]  06/04/2025       The level of medical decision making during this visit was of moderate complexity.    This note was completed in part using dictation via the Dragon voice recognition software. Some word and grammatical errors may occur and must be interpreted in the appropriate clinical context.  If there are any questions pertaining to this issue, please contact me for further clarification.

## 2025-06-05 ENCOUNTER — ANTICOAGULATION THERAPY VISIT (OUTPATIENT)
Dept: ANTICOAGULATION | Facility: CLINIC | Age: 85
End: 2025-06-05

## 2025-06-05 ENCOUNTER — RESULTS FOLLOW-UP (OUTPATIENT)
Dept: ANTICOAGULATION | Facility: CLINIC | Age: 85
End: 2025-06-05

## 2025-06-05 ENCOUNTER — LAB (OUTPATIENT)
Dept: LAB | Facility: CLINIC | Age: 85
End: 2025-06-05
Payer: COMMERCIAL

## 2025-06-05 DIAGNOSIS — I48.20 CHRONIC ATRIAL FIBRILLATION (H): Primary | ICD-10-CM

## 2025-06-05 DIAGNOSIS — Z95.2 S/P TAVR (TRANSCATHETER AORTIC VALVE REPLACEMENT): ICD-10-CM

## 2025-06-05 LAB — INR BLD: 3.2 (ref 0.9–1.1)

## 2025-06-05 NOTE — PROGRESS NOTES
ANTICOAGULATION MANAGEMENT     Lexii Stratton 84 year old female is on warfarin with supratherapeutic INR result. (Goal INR 2.0-3.0)    Recent labs: (last 7 days)     06/05/25  1455   INR 3.2*     ASSESSMENT     Source(s): Chart Review and Patient/Caregiver Call     Warfarin doses taken: Warfarin taken as instructed  Diet: No new diet changes identified  Medication/supplement changes: fluconazole 14 day course (dates: 6/2-6/16/25) which may increase INR.  New illness, injury, or hospitalization: Yes: currently being treated for esophageal candidiasis  Signs or symptoms of bleeding or clotting: No  Previous result: Subtherapeutic  Additional findings: cardiology office visit on 6/4/25-no change in care plan       PLAN     Recommended plan for temporary change(s) affecting INR     Dosing Instructions: Hold dose today then Adjust warfarin dose during interaction (40% change). Maintenance dose modified on anticoagulation calendar for interaction > 7 days; maintenance dose to be readjusted post interaction. with next INR in 4 days       Summary  As of 6/5/2025      Full warfarin instructions:  6/5: Hold; Otherwise 1 mg every Thu; 2 mg all other days   Next INR check:  6/9/2025               Telephone call with Lexii who verbalizes understanding and agrees to plan and who agrees to plan and repeated back plan correctly    Lab visit scheduled    Education provided: Taking warfarin: Importance of taking warfarin as instructed  Interaction IS anticipated between warfarin and fluconazole    Plan made per [facility] anticoagulation protocol    Beverly Weller RN  6/5/2025  Anticoagulation Clinic  [employer] for routing messages: yohan RUGGIERO  [facility] patient phone line: [phone]        _______________________________________________________________________     Anticoagulation Episode Summary       Current INR goal:  2.0-3.0   TTR:  82.4% (3.6 mo)   Target end date:  Indefinite   Send INR reminders to:  ANICETO RUGGIERO     Indications    Chronic atrial fibrillation (H) [I48.20]  S/P TAVR (transcatheter aortic valve replacement) [Z95.2]             Comments:  --             Anticoagulation Care Providers       Provider Role Specialty Phone number    Robert Randle MD [facility] [phone]

## 2025-06-09 ENCOUNTER — RESULTS FOLLOW-UP (OUTPATIENT)
Dept: ANTICOAGULATION | Facility: CLINIC | Age: 85
End: 2025-06-09

## 2025-06-09 ENCOUNTER — ANTICOAGULATION THERAPY VISIT (OUTPATIENT)
Dept: ANTICOAGULATION | Facility: CLINIC | Age: 85
End: 2025-06-09

## 2025-06-09 ENCOUNTER — LAB (OUTPATIENT)
Dept: LAB | Facility: CLINIC | Age: 85
End: 2025-06-09
Payer: COMMERCIAL

## 2025-06-09 DIAGNOSIS — I48.20 CHRONIC ATRIAL FIBRILLATION (H): Primary | ICD-10-CM

## 2025-06-09 DIAGNOSIS — Z95.2 S/P TAVR (TRANSCATHETER AORTIC VALVE REPLACEMENT): ICD-10-CM

## 2025-06-09 LAB — INR BLD: 5 (ref 0.9–1.1)

## 2025-06-09 PROCEDURE — 36416 COLLJ CAPILLARY BLOOD SPEC: CPT

## 2025-06-09 PROCEDURE — 85610 PROTHROMBIN TIME: CPT

## 2025-06-09 NOTE — PROGRESS NOTES
ANTICOAGULATION MANAGEMENT     Lexii Stratton 84 year old female is on warfarin with supratherapeutic INR result. (Goal INR 2.0-3.0)    Recent labs: (last 7 days)     06/09/25  1327   INR 5.0*       ASSESSMENT     Source(s): Chart Review and Patient/Caregiver Call     Warfarin doses taken: Warfarin taken as instructed, patient reports correct dosing instruction and reports she did hold her warfarin 6/5/25  Diet: No new diet changes identified  Medication/supplement changes: None noted  New illness, injury, or hospitalization: No  Signs or symptoms of bleeding or clotting: No  Previous result: Supratherapeutic  Additional findings: Patient is currently taking fluconazole until 6/14/25  New warfarin maintenance dose not communicated to patient yet, only the 2 days of holding warfarin discussed with the patient.       PLAN     Recommended plan for temporary change(s) affecting INR     Dosing Instructions: decrease your warfarin dose (23.1% change), hold warfarin 6/9/25 and 6/10/25 with next INR in 2 days       Summary  As of 6/9/2025      Full warfarin instructions:  6/9: Hold; 6/10: Hold; Otherwise 2 mg every Sun, Tue, Thu; 1 mg all other days   Next INR check:  6/11/2025               Telephone call with Lexii who agrees to plan and repeated back plan correctly    Lab visit scheduled    Education provided: Please call back if any changes to your diet, medications or how you've been taking warfarin  Symptom monitoring: monitoring for bleeding signs and symptoms, when to seek medical attention/emergency care, and if you hit your head or have a bad fall seek emergency care  Contact [phone] with any changes, questions or concerns.     Plan made with [facility] Pharmacist Carolyn Salas RN  6/9/2025  Anticoagulation Clinic  [employer] for routing messages: yohan RUGGIERO  [facility] patient phone line: [phone]        _______________________________________________________________________      Anticoagulation Episode Summary       Current INR goal:  2.0-3.0   TTR:  79.5% (3.7 mo)   Target end date:  Indefinite   Send INR reminders to:  ANICETO RUGGIERO    Indications    Chronic atrial fibrillation (H) [I48.20]  S/P TAVR (transcatheter aortic valve replacement) [Z95.2]             Comments:  --             Anticoagulation Care Providers       Provider Role Specialty Phone number    Robert Randle MD Referring [facility] [phone]

## 2025-06-11 ENCOUNTER — RESULTS FOLLOW-UP (OUTPATIENT)
Dept: ANTICOAGULATION | Facility: CLINIC | Age: 85
End: 2025-06-11

## 2025-06-11 ENCOUNTER — LAB (OUTPATIENT)
Dept: LAB | Facility: CLINIC | Age: 85
End: 2025-06-11
Payer: COMMERCIAL

## 2025-06-11 ENCOUNTER — TELEPHONE (OUTPATIENT)
Dept: FAMILY MEDICINE | Facility: CLINIC | Age: 85
End: 2025-06-11

## 2025-06-11 ENCOUNTER — ANTICOAGULATION THERAPY VISIT (OUTPATIENT)
Dept: ANTICOAGULATION | Facility: CLINIC | Age: 85
End: 2025-06-11

## 2025-06-11 DIAGNOSIS — I48.20 CHRONIC ATRIAL FIBRILLATION (H): Primary | ICD-10-CM

## 2025-06-11 DIAGNOSIS — Z95.2 S/P TAVR (TRANSCATHETER AORTIC VALVE REPLACEMENT): ICD-10-CM

## 2025-06-11 LAB — INR BLD: 4.2 (ref 0.9–1.1)

## 2025-06-11 PROCEDURE — 85610 PROTHROMBIN TIME: CPT

## 2025-06-11 PROCEDURE — 36416 COLLJ CAPILLARY BLOOD SPEC: CPT

## 2025-06-11 NOTE — TELEPHONE ENCOUNTER
General Call    Contacts       Contact Date/Time Type Contact Phone/Fax    06/11/2025 01:30 PM CDT Phone (Incoming) Lexii Stratton (Self) [phone] ()          Reason for Call:  return acn call    What are your questions or concerns:      Date of last appointment with provider:     Could we send this information to you in [facility] or would you prefer to receive a phone call?:   Patient would prefer a phone call   Okay to leave a detailed message?: Yes at Home number on file [phone] ([place])

## 2025-06-11 NOTE — PROGRESS NOTES
ANTICOAGULATION MANAGEMENT     Lexii Stratton 84 year old female is on warfarin with supratherapeutic INR result. (Goal INR 2.0-3.0)    Recent labs: (last 7 days)     06/11/25  0930   INR 4.2*       ASSESSMENT     Source(s): Chart Review and Patient/Caregiver Call     Warfarin doses taken: Held for 2 days  recently which may be affecting INR  Diet: No new diet changes identified  Medication/supplement changes: fluconazole 12 day course (dates: 6/2-6/14/25) which may increase INR.  New illness, injury, or hospitalization: Yes: currently being treated for esophageal candidiasis  Signs or symptoms of bleeding or clotting: No  Previous result: Supratherapeutic  Additional findings: [facility] Carolyn's note: She is supposed to end her course of fluconazole on Saturday so at least we can start the offset process! I would expect her to need 10 days (crcl = 59 ml/min) for this to occur       PLAN     Recommended plan for temporary change(s) affecting INR     Dosing Instructions: HOLD warfarin x 2 days then take 1 mg x 3 days with next INR in 5 days       Summary  As of 6/11/2025      Full warfarin instructions:  6/11: Hold; 6/12: Hold; 6/15: 1 mg; Otherwise 2 mg every Sun, Tue, Thu; 1 mg all other days   Next INR check:  6/16/2025               Telephone call with Lexii who verbalizes understanding and agrees to plan and who agrees to plan and repeated back plan correctly    Lab visit scheduled    Education provided: Taking warfarin: Importance of taking warfarin as instructed    Plan made with [facility] Pharmacist Carolyn Weller RN  6/11/2025  Anticoagulation Clinic  [employer] for routing messages: yohan RUGGIERO  [facility] patient phone line: [phone]        _______________________________________________________________________     Anticoagulation Episode Summary       Current INR goal:  2.0-3.0   TTR:  78.2% (3.8 mo)   Target end date:  Indefinite   Send INR reminders to:  ANICETO RUGGIERO    Indications     Chronic atrial fibrillation (H) [I48.20]  S/P TAVR (transcatheter aortic valve replacement) [Z95.2]             Comments:  --             Anticoagulation Care Providers       Provider Role Specialty Phone number    Robert Randle MD [facility] [phone]

## 2025-06-16 ENCOUNTER — RESULTS FOLLOW-UP (OUTPATIENT)
Dept: ANTICOAGULATION | Facility: CLINIC | Age: 85
End: 2025-06-16

## 2025-06-16 ENCOUNTER — ANTICOAGULATION THERAPY VISIT (OUTPATIENT)
Dept: ANTICOAGULATION | Facility: CLINIC | Age: 85
End: 2025-06-16

## 2025-06-16 ENCOUNTER — LAB (OUTPATIENT)
Dept: LAB | Facility: CLINIC | Age: 85
End: 2025-06-16
Payer: COMMERCIAL

## 2025-06-16 ENCOUNTER — TELEPHONE (OUTPATIENT)
Dept: FAMILY MEDICINE | Facility: CLINIC | Age: 85
End: 2025-06-16

## 2025-06-16 DIAGNOSIS — Z95.2 S/P TAVR (TRANSCATHETER AORTIC VALVE REPLACEMENT): ICD-10-CM

## 2025-06-16 DIAGNOSIS — I48.20 CHRONIC ATRIAL FIBRILLATION (H): Primary | ICD-10-CM

## 2025-06-16 LAB — INR BLD: 1.7 (ref 0.9–1.1)

## 2025-06-16 PROCEDURE — 85610 PROTHROMBIN TIME: CPT | Mod: GZ

## 2025-06-16 PROCEDURE — 36415 COLL VENOUS BLD VENIPUNCTURE: CPT | Mod: GZ

## 2025-06-16 NOTE — PROGRESS NOTES
ANTICOAGULATION MANAGEMENT     Lexii Stratton 84 year old female is on warfarin with subtherapeutic INR result. (Goal INR 2.0-3.0)    Recent labs: (last 7 days)     06/16/25  1249   INR 1.7*       ASSESSMENT     Source(s): Chart Review and Patient/Caregiver Call     Warfarin doses taken: Held for 4 1/2 days  recently which may be affecting INR  Diet: No new diet changes identified  Medication/supplement changes: 2 week fluconazole, patient reports she will take her last dose today  New illness, injury, or hospitalization: Yes: currently being treated for esophageal candidiasis  Signs or symptoms of bleeding or clotting: No  Previous result: Supratherapeutic  Additional findings: None       PLAN     Recommended plan for temporary change(s) affecting INR     Dosing Instructions: Continue your current warfarin dose with next INR in 3 days       Summary  As of 6/16/2025      Full warfarin instructions:  2 mg every Mon, Wed, Fri; 1 mg all other days   Next INR check:  6/19/2025               Telephone call with Lexii who verbalizes understanding and agrees to plan and who agrees to plan and repeated back plan correctly    Lab visit scheduled    Education provided: Taking warfarin: Importance of taking warfarin as instructed    Plan made with [facility] Pharmacist Viola Weller, RN  6/16/2025  Anticoagulation Clinic  [employer] for routing messages: yohan RUGGIERO  [facility] patient phone line: [phone]        _______________________________________________________________________     Anticoagulation Episode Summary       Current INR goal:  2.0-3.0   TTR:  76.6% (3.9 mo)   Target end date:  Indefinite   Send INR reminders to:  ANICETO RUGGIERO    Indications    Chronic atrial fibrillation (H) [I48.20]  S/P TAVR (transcatheter aortic valve replacement) [Z95.2]             Comments:  --             Anticoagulation Care Providers       Provider Role Specialty Phone number    Robert Randle MD Referring  Family Medicine [phone]

## 2025-06-16 NOTE — TELEPHONE ENCOUNTER
Writer spoke to patient, informed her that [facility] will call her once [facility] PharmD has been consulted, patient verbalized understanding.  Patient prefers [facility] call her with warfarin plan on her cell phone.    Beverly Weller RN  Anticoagulation Clinic

## 2025-06-16 NOTE — TELEPHONE ENCOUNTER
Patient Returning Call    Reason for call:  pt calling back for Inr nurse    Information relayed to patient:  none    Patient has additional questions:  Yes    What are your questions/concerns:  INR result    [facility] or would you prefer to receive a phone call?:   Patient would prefer a phone call   Okay to leave a detailed message?: Yes at Cell number on file:    Telephone Information:   Mobile [phone]

## 2025-06-19 ENCOUNTER — ANTICOAGULATION THERAPY VISIT (OUTPATIENT)
Dept: ANTICOAGULATION | Facility: CLINIC | Age: 85
End: 2025-06-19

## 2025-06-19 ENCOUNTER — LAB (OUTPATIENT)
Dept: LAB | Facility: CLINIC | Age: 85
End: 2025-06-19
Payer: COMMERCIAL

## 2025-06-19 DIAGNOSIS — I48.20 CHRONIC ATRIAL FIBRILLATION (H): Primary | ICD-10-CM

## 2025-06-19 DIAGNOSIS — Z95.2 S/P TAVR (TRANSCATHETER AORTIC VALVE REPLACEMENT): ICD-10-CM

## 2025-06-19 LAB — INR BLD: 2.5 (ref 0.9–1.1)

## 2025-06-19 NOTE — PROGRESS NOTES
ANTICOAGULATION MANAGEMENT     Lexii Stratton 84 year old female is on warfarin with therapeutic INR result. (Goal INR 2.0-3.0)    Recent labs: (last 7 days)     06/19/25  1109   INR 2.5*       ASSESSMENT     Source(s): Chart Review and Patient/Caregiver Call     Warfarin doses taken: Warfarin taken as instructed  Diet: No new diet changes identified  Medication/supplement changes: Stopped fluconazole 6/17/25. Due to long half life, protocol advises resuming prior maintenance dose 3-7 days after finishing course. We will go back to previous dose starting day 4 (Saturday 6/21)  New illness, injury, or hospitalization: No  Signs or symptoms of bleeding or clotting: No  Previous result: Subtherapeutic  Additional findings: None       PLAN     Recommended plan for ongoing change(s) affecting INR     Dosing Instructions: Continue your current warfarin dose with next INR in 1 week       Summary  As of 6/19/2025      Full warfarin instructions:  6/19: 1 mg; 6/20: 2 mg; Otherwise 4 mg every Wed; 3 mg all other days   Next INR check:  6/26/2025               Telephone call with Lexii who verbalizes understanding and agrees to plan    Lab visit scheduled    Education provided: Please call back if any changes to your diet, medications or how you've been taking warfarin    Plan made per [facility] anticoagulation protocol    Adalgisa Walker RN  6/19/2025  Anticoagulation Clinic  [employer] for routing messages: yohan RUGGIERO  [facility] patient phone line: [phone]        _______________________________________________________________________     Anticoagulation Episode Summary       Current INR goal:  2.0-3.0   TTR:  76.2% (4 mo)   Target end date:  Indefinite   Send INR reminders to:  ANICETO RUGGIERO    Indications    Chronic atrial fibrillation (H) [I48.20]  S/P TAVR (transcatheter aortic valve replacement) [Z95.2]             Comments:  --             Anticoagulation Care Providers       Provider Role Specialty  Phone number    Robert Randle MD Referring Family Medicine [phone]

## 2025-06-26 ENCOUNTER — RESULTS FOLLOW-UP (OUTPATIENT)
Dept: ANTICOAGULATION | Facility: CLINIC | Age: 85
End: 2025-06-26

## 2025-06-26 ENCOUNTER — LAB (OUTPATIENT)
Dept: LAB | Facility: CLINIC | Age: 85
End: 2025-06-26
Payer: COMMERCIAL

## 2025-06-26 ENCOUNTER — ANTICOAGULATION THERAPY VISIT (OUTPATIENT)
Dept: ANTICOAGULATION | Facility: CLINIC | Age: 85
End: 2025-06-26

## 2025-06-26 DIAGNOSIS — Z95.2 S/P TAVR (TRANSCATHETER AORTIC VALVE REPLACEMENT): ICD-10-CM

## 2025-06-26 DIAGNOSIS — I48.20 CHRONIC ATRIAL FIBRILLATION (H): Primary | ICD-10-CM

## 2025-06-26 LAB — INR BLD: 5.4 (ref 0.9–1.1)

## 2025-06-26 NOTE — PROGRESS NOTES
ANTICOAGULATION MANAGEMENT     Lexii Stratton 84 year old female is on warfarin with supratherapeutic INR result. (Goal INR 2.0-3.0)    Recent labs: (last 7 days)     06/26/25  0856   INR 5.4*       ASSESSMENT     Source(s): Chart Review and Patient/Caregiver Call     Warfarin doses taken: Warfarin taken as instructed  Diet: No new diet changes identified  Medication/supplement changes: hx: fluconazole finished on 6/17/25  New illness, injury, or hospitalization: Hx of esophageal yeast infection found on an endoscopy per pt. Pt denies any changes in her diet.  Signs or symptoms of bleeding or clotting: No  Previous result: Therapeutic last visit; previously outside of goal range  Additional findings: [facility] reviewed and thinks that perhaps it was too soon last week to restart maintenance dose which may explain the elevated INR today.       PLAN       Dosing Instructions: Hold on 6/26/25, hold on 6/27/25,2 mg on 6/28/25, 2 mg on 6/29/25 with next INR in 4 days       Summary  As of 6/26/2025      Full warfarin instructions:  6/26: Hold; 6/27: Hold; 6/28: 2 mg; 6/29: 2 mg; Otherwise 4 mg every Wed; 3 mg all other days   Next INR check:  6/30/2025               Telephone call with Lexii who verbalizes understanding and agrees to plan    Lab visit scheduled    Education provided: Contact [phone] with any changes, questions or concerns.     Plan made with [facility] Pharmacist Carolyn Pearson RN  6/26/2025  Anticoagulation Clinic  [facility] for routing messages: yohan RUGGIERO  [facility] patient phone line: [phone]        _______________________________________________________________________     Anticoagulation Episode Summary       Current INR goal:  2.0-3.0   TTR:  73.0% (4.2 mo)   Target end date:  Indefinite   Send INR reminders to:  ANICETO RUGGIERO    Indications    Chronic atrial fibrillation (H) [I48.20]  S/P TAVR (transcatheter aortic valve replacement) [Z95.2]             Comments:  --              Anticoagulation Care Providers       Provider Role Specialty Phone number    Robert Randle MD Referring Family Medicine [phone]

## 2025-06-30 ENCOUNTER — RESULTS FOLLOW-UP (OUTPATIENT)
Dept: ANTICOAGULATION | Facility: CLINIC | Age: 85
End: 2025-06-30

## 2025-06-30 ENCOUNTER — ANTICOAGULATION THERAPY VISIT (OUTPATIENT)
Dept: ANTICOAGULATION | Facility: CLINIC | Age: 85
End: 2025-06-30

## 2025-06-30 ENCOUNTER — LAB (OUTPATIENT)
Dept: LAB | Facility: CLINIC | Age: 85
End: 2025-06-30
Payer: COMMERCIAL

## 2025-06-30 DIAGNOSIS — Z95.2 S/P TAVR (TRANSCATHETER AORTIC VALVE REPLACEMENT): ICD-10-CM

## 2025-06-30 DIAGNOSIS — I48.20 CHRONIC ATRIAL FIBRILLATION (H): Primary | ICD-10-CM

## 2025-06-30 LAB — INR BLD: 1.8 (ref 0.9–1.1)

## 2025-06-30 PROCEDURE — 36416 COLLJ CAPILLARY BLOOD SPEC: CPT

## 2025-06-30 PROCEDURE — 85610 PROTHROMBIN TIME: CPT | Mod: GZ

## 2025-06-30 NOTE — PROGRESS NOTES
ANTICOAGULATION MANAGEMENT     Lexii Stratton 84 year old female is on warfarin with subtherapeutic INR result. (Goal INR 2.0-3.0)    Recent labs: (last 7 days)     06/30/25  0833   INR 1.8*       ASSESSMENT     Source(s): Chart Review and Patient/Caregiver Call     Warfarin doses taken: Held for critical INR  recently which may be affecting INR - confirmed holds 6/26 and 6/27 along with lesser doses of 2 mg 6/28 and 6/29  Diet: No new diet changes identified  Medication/supplement changes: None noted - fluconazole was stopped back on 6/17  New illness, injury, or hospitalization: No  Signs or symptoms of bleeding or clotting: No  Previous result: Supratherapeutic  Additional findings: None       PLAN     Recommended plan for temporary change(s) affecting INR     Dosing Instructions: Continue your current warfarin dose with next INR in 1 week       Summary  As of 6/30/2025      Full warfarin instructions:  4 mg every Wed; 3 mg all other days   Next INR check:  7/7/2025               Telephone call with Lexii who verbalizes understanding and agrees to plan    Lab visit scheduled    Education provided: Please call back if any changes to your diet, medications or how you've been taking warfarin    Plan made per [facility] anticoagulation protocol    Carla Salas RN  6/30/2025  Anticoagulation Clinic  [facility] for routing messages: yohan RUGGIERO  [facility] patient phone line: [phone]        _______________________________________________________________________     Anticoagulation Episode Summary       Current INR goal:  2.0-3.0   TTR:  71.7% (4.4 mo)   Target end date:  Indefinite   Send INR reminders to:  ANICETO RUGGIERO    Indications    Chronic atrial fibrillation (H) [I48.20]  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

## 2025-07-07 ENCOUNTER — LAB (OUTPATIENT)
Dept: LAB | Facility: CLINIC | Age: 85
End: 2025-07-07
Payer: COMMERCIAL

## 2025-07-07 ENCOUNTER — ANTICOAGULATION THERAPY VISIT (OUTPATIENT)
Dept: ANTICOAGULATION | Facility: CLINIC | Age: 85
End: 2025-07-07

## 2025-07-07 DIAGNOSIS — I48.20 CHRONIC ATRIAL FIBRILLATION (H): Primary | ICD-10-CM

## 2025-07-07 DIAGNOSIS — Z95.2 S/P TAVR (TRANSCATHETER AORTIC VALVE REPLACEMENT): ICD-10-CM

## 2025-07-07 LAB — INR BLD: 2.2 (ref 0.9–1.1)

## 2025-07-07 PROCEDURE — 36415 COLL VENOUS BLD VENIPUNCTURE: CPT

## 2025-07-07 PROCEDURE — 85610 PROTHROMBIN TIME: CPT

## 2025-07-07 NOTE — PROGRESS NOTES
ANTICOAGULATION MANAGEMENT     Lexii Stratton 84 year old female is on warfarin with therapeutic INR result. (Goal INR 2.0-3.0)    Recent labs: (last 7 days)     07/07/25  1111   INR 2.2*       ASSESSMENT     Source(s): Chart Review and Patient/Caregiver Call     Warfarin doses taken: Held for 2 days  recently which may be affecting INR  Diet: No new diet changes identified  Medication/supplement changes: None noted  New illness, injury, or hospitalization: No  Signs or symptoms of bleeding or clotting: No  Previous result: Subtherapeutic  Additional findings: Patient is contemplating moving to a senior apartment.       PLAN     Recommended plan for temporary change(s) affecting INR     Dosing Instructions: Continue your current warfarin dose with next INR in 9 days       Summary  As of 7/7/2025      Full warfarin instructions:  4 mg every Wed; 3 mg all other days   Next INR check:  7/16/2025               Telephone call with Lexii who verbalizes understanding and agrees to plan    Check INR at provider office visit     Education provided: Taking warfarin: Importance of taking warfarin as instructed    Plan made per [facility] anticoagulation protocol    Beverly Weller RN  7/7/2025  Anticoagulation Clinic  [address] for routing messages: yohan RUGGIERO  [facility] patient phone line: [phone]        _______________________________________________________________________     Anticoagulation Episode Summary       Current INR goal:  2.0-3.0   TTR:  70.6% (4.6 mo)   Target end date:  Indefinite   Send INR reminders to:  ANICETO RUGGIERO    Indications    Chronic atrial fibrillation (H) [I48.20]  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

## 2025-07-16 ENCOUNTER — OFFICE VISIT (OUTPATIENT)
Dept: FAMILY MEDICINE | Facility: CLINIC | Age: 85
End: 2025-07-16
Payer: COMMERCIAL

## 2025-07-16 ENCOUNTER — RESULTS FOLLOW-UP (OUTPATIENT)
Dept: ANTICOAGULATION | Facility: CLINIC | Age: 85
End: 2025-07-16

## 2025-07-16 ENCOUNTER — ANTICOAGULATION THERAPY VISIT (OUTPATIENT)
Dept: ANTICOAGULATION | Facility: CLINIC | Age: 85
End: 2025-07-16

## 2025-07-16 VITALS
OXYGEN SATURATION: 98 % | SYSTOLIC BLOOD PRESSURE: 137 MMHG | DIASTOLIC BLOOD PRESSURE: 89 MMHG | TEMPERATURE: 98.1 F | HEART RATE: 65 BPM | RESPIRATION RATE: 14 BRPM | WEIGHT: 187.7 LBS | BODY MASS INDEX: 32.04 KG/M2 | HEIGHT: 64 IN

## 2025-07-16 DIAGNOSIS — I48.20 CHRONIC ATRIAL FIBRILLATION (H): Primary | ICD-10-CM

## 2025-07-16 DIAGNOSIS — M47.817 LUMBOSACRAL SPONDYLOSIS WITHOUT MYELOPATHY: ICD-10-CM

## 2025-07-16 DIAGNOSIS — E08.65: Primary | ICD-10-CM

## 2025-07-16 DIAGNOSIS — Z95.2 S/P TAVR (TRANSCATHETER AORTIC VALVE REPLACEMENT): ICD-10-CM

## 2025-07-16 PROBLEM — G25.0 ESSENTIAL TREMOR: Status: ACTIVE | Noted: 2025-07-16

## 2025-07-16 LAB — INR BLD: 2.3 (ref 0.9–1.1)

## 2025-07-16 PROCEDURE — 3075F SYST BP GE 130 - 139MM HG: CPT | Performed by: STUDENT IN AN ORGANIZED HEALTH CARE EDUCATION/TRAINING PROGRAM

## 2025-07-16 PROCEDURE — 1126F AMNT PAIN NOTED NONE PRSNT: CPT | Performed by: STUDENT IN AN ORGANIZED HEALTH CARE EDUCATION/TRAINING PROGRAM

## 2025-07-16 PROCEDURE — 3079F DIAST BP 80-89 MM HG: CPT | Performed by: STUDENT IN AN ORGANIZED HEALTH CARE EDUCATION/TRAINING PROGRAM

## 2025-07-16 PROCEDURE — 99214 OFFICE O/P EST MOD 30 MIN: CPT | Performed by: STUDENT IN AN ORGANIZED HEALTH CARE EDUCATION/TRAINING PROGRAM

## 2025-07-16 PROCEDURE — 99207 PR FOOT EXAM NO CHARGE: CPT | Performed by: STUDENT IN AN ORGANIZED HEALTH CARE EDUCATION/TRAINING PROGRAM

## 2025-07-16 PROCEDURE — 85610 PROTHROMBIN TIME: CPT | Performed by: STUDENT IN AN ORGANIZED HEALTH CARE EDUCATION/TRAINING PROGRAM

## 2025-07-16 PROCEDURE — 36415 COLL VENOUS BLD VENIPUNCTURE: CPT | Performed by: STUDENT IN AN ORGANIZED HEALTH CARE EDUCATION/TRAINING PROGRAM

## 2025-07-16 RX ORDER — METFORMIN HYDROCHLORIDE 500 MG/1
1000 TABLET, EXTENDED RELEASE ORAL 2 TIMES DAILY WITH MEALS
Qty: 360 TABLET | Refills: 1 | Status: SHIPPED | OUTPATIENT
Start: 2025-07-16

## 2025-07-16 ASSESSMENT — PAIN SCALES - GENERAL: PAINLEVEL_OUTOF10: NO PAIN (0)

## 2025-07-16 NOTE — PROGRESS NOTES
ANTICOAGULATION MANAGEMENT     Lexii Stratton 84 year old female is on warfarin with therapeutic INR result. (Goal INR 2.0-3.0)    Recent labs: (last 7 days)     07/16/25  1208   INR 2.3*       ASSESSMENT     Source(s): Chart Review and Patient/Caregiver Call     Warfarin doses taken: Warfarin taken as instructed  Diet: No new diet changes identified  Medication/supplement changes: None noted  New illness, injury, or hospitalization: No  Signs or symptoms of bleeding or clotting: No  Previous result: Therapeutic last visit; previously outside of goal range  Additional findings: Patient will be moving to a 2 bedroom appointment on 8/1/25 (currently living in a house).  Increased stress/activity due to packing.  Provider appointment today.  No changes to care plan.       PLAN     Recommended plan for no diet, medication or health factor changes affecting INR     Dosing Instructions: Continue your current warfarin dose with next INR in 6 weeks       Summary  As of 7/16/2025      Full warfarin instructions:  4 mg every Wed; 3 mg all other days   Next INR check:  7/30/2025               Telephone call with Lexii who agrees to plan and repeated back plan correctly    Lab visit scheduled    Education provided: Please call back if any changes to your diet, medications or how you've been taking warfarin    Plan made per [facility] anticoagulation protocol    Osiris Lobo RN  7/16/2025  Anticoagulation Clinic  [facility] for routing messages: yohan RUGGIERO  [facility] patient phone line: [phone]        _______________________________________________________________________     Anticoagulation Episode Summary       Current INR goal:  2.0-3.0   TTR:  72.4% (4.9 mo)   Target end date:  Indefinite   Send INR reminders to:  ANICETO RUGGIERO    Indications    Chronic atrial fibrillation (H) [I48.20]  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

## 2025-07-16 NOTE — PROGRESS NOTES
"  Assessment & Plan     Diabetes mellitus due to underlying condition, controlled, with hyperglycemia, without long-term current use of insulin (H)  Well controlled based on fasting blood sugars with max dose metformin although is experiencing diarrhea if taking 1000mg at once. Plan to transition to XR formulation instead. Too soon for repeat A1c thus will plan for lab only visit in 2 weeks for A1c and urine albumin testing. On statin therapy. Follow up in 6 months during annual physical.  - Albumin Random Urine Quantitative with Creat Ratio  - HEMOGLOBIN A1C  - FOOT EXAM    Lumbosacral spondylosis without myelopathy   No red flag symptoms. Intermittent and not overall worsening. Following closely with [facility]. Had to cancel epidural steroid injection due to new, uncontrolled dx of diabetes. Discussed that DM2 is now well controlled and recommended she reach back out to [facility] to reschedule.     BMI  Estimated body mass index is 32.22 kg/m  as calculated from the following:    Height as of this encounter: 1.626 m (5' 4\").    Weight as of this encounter: 85.1 kg (187 lb 11.2 oz).     Follow up in 2 weeks for lab, then  for next annual physical in November    Robert Randle MD  [facility]  7/16/2025      Melyssa Shultz is a 84 year old, presenting for the following health issues:  Follow Up        7/16/2025    11:17 AM   Additional Questions   Roomed by MR   Accompanied by Self     History of Present Illness       Back Pain:  She presents for follow up of back pain. Patient's back pain is a recurring problem.  Location of back pain:  Right lower back  Description of back pain: sharp  Back pain spreads: right buttocks    Since patient first noticed back pain, pain is: always present, but gets better and worse  Does back pain interfere with her job:  Not applicable       Diabetes:   She presents for follow up of diabetes.  She is checking home blood glucose three times daily.   She checks blood glucose " before meals.  Blood glucose is never over 200 and never under 70.  When her blood glucose is low, the patient is asymptomatic for confusion, blurred vision, lethargy and reports not feeling dizzy, shaky, or weak.   She has no concerns regarding her diabetes at this time.   She is not experiencing numbness or burning in feet, excessive thirst, blurry vision, weight changes or redness, sores or blisters on feet.           She eats 2-3 servings of fruits and vegetables daily.She consumes 0 sweetened beverage(s) daily.She exercises with enough effort to increase her heart rate 30 to 60 minutes per day.  She exercises with enough effort to increase her heart rate 4 days per week.   She is taking medications regularly.        Moving into [facility] soon. Independent living. 2 bedroom, 2 bathroom. On the 3rd floor, on a balcony. Moving on August 1st.   Does have assisted living and memory care within the complex.     Dm2  No longer taking insulin, stopped quite a while back as her hypoglycemic episodes persisted.   No longer having any low readings on metformin only.  Morning fasting blood sugars are between 110 - 130.  Quit the CGM also as it continued to be defective along with many lows overnight which sent alarms but never verified by finger sticks.   Now she just does finger pokes instead.     Back pain  Has had back pain forever and ever.   Has taken a beating recently with all she has been doing getting ready to leave.   Is intermittent, right sided shooting into right hip. Not down the legs at all. Using voltaren gel.   Was following with [facility].  Had injection in her back set up but wasn't able to have this completed due to falling and new diagnosis of DM2.   No worsened symptoms at all.   Trying to walk 2 miles 3x a week.  No fecal or urinary incontinence. No saddle anesthesia.   No numbness or tingling in BLE.   Takes tylenol 1000 mg AM and 1000mg PM which is also helpful      Objective    LMP  (LMP  Unknown)   There is no height or weight on file to calculate BMI.    Physical Exam   GENERAL: healthy, alert and no distress  HEAD: Normocephalic, atraumatic.   EYES:  Normal conjunctivae, sclera.   RESP: lungs clear to auscultation - no rales, rhonchi or wheezes  CV: regular rate and rhythm, normal S1 S2, 2/6 systolic murmur in RUSB, LLSB, no click, rub or gallop.  2+ pitting edema up to mid-calves noted bilaterally.   ABDOMEN: soft, no TTP x4 quadrants. No hepatomegaly or masses appreciated. BS normactive.  MSK: no gross musculoskeletal defects noted.  SKIN: no suspicious lesions or rashes.  EXT: Warm and well perfused.   Diabetic foot exam: normal DP pulses, thick, dystrophic toenails bilaterally, no significant calluses. Normal monofilament exam bilaterally.  NEURO: CNII-XII grossly intact. No focal deficits.  PSYCH: Groomed, dressed appropriately for weather.  Logical, linear thought process. Normal mood with consistent affect.     Signed Electronically by: Robert Randle MD

## 2025-07-21 ENCOUNTER — PATIENT OUTREACH (OUTPATIENT)
Dept: CARE COORDINATION | Facility: CLINIC | Age: 85
End: 2025-07-21
Payer: COMMERCIAL

## 2025-07-21 NOTE — PROGRESS NOTES
Clinic Care Coordination Contact    Assessment: Care Coordinator contacted patient for 2 month follow up.  Patient has continued to follow the plan of care and assessment is negative for any new needs or concerns.    Enrollment status: Graduated.      Plan: No further outreaches at this time.  Patient will continue to follow the plan of care.  If new needs arise a new Care Coordination referral may be placed.  RN[facility] will send primary clinic care coordination graduation letter to patient via [email or web address].     Jessy Angel RN Care Coordinator  [facility]  Email: [email or web address]  Phone: [phone]

## 2025-07-21 NOTE — LETTER
[facility] CARE COORDINATION  [address]  [facility] [place] [facility] [address]     July 21, 2025    Lexii Stratton  [address]   [facility] [address]    Dear Lexii,  Your Care Team congratulates you on your journey to maintain wellness. This document will help guide you on your journey to maintain a healthy lifestyle.  You can use this to help you overcome any barriers you may encounter.  If you should have any questions or concerns, you can contact the members of your Care Team or contact your Primary Care Clinic for assistance.     Patient feedback is important to us and helps us continue to improve the way we care for patients as well as celebrate the things we do well. You may receive a short survey from [facility] [employer] on behalf of the care coordination team. We would appreciate hearing from you!    Health Maintenance  Health Maintenance Reviewed: Due/Overdue   Health Maintenance Due   Topic Date Due    HF ACTION PLAN  Never done    COVID-19 VACCINE (9 - 2024-25 season) 03/24/2025    MICROALBUMIN  05/21/2025    A1C  07/30/2025      My Access Plan  Medical Emergency 911   Primary Clinic Line [facility] [phone]   24 Hour Appointment Line [phone] or  [phone] ([phone]) (toll-free)   24 Hour Nurse Line [phone] (toll-free)   Preferred Urgent Care Other (ER ONLY)   Preferred [facility]  [phone]   Preferred Pharmacy [facility] - [place], [address]     Behavioral Health Crisis Line The National Suicide Prevention Lifeline at 1-765.922.6452 or 911     My Care Team Members  Patient Care Team         Relationship Specialty Notifications Start End    Robert Randle MD PCP - General Family Practice  7/2/24     Phone: [phone] Fax: [phone]         [address] [place] [address]    Jesenia Madrigal MD (Inactive) Referring Physician Internal Medicine  3/21/18      Robert Randle MD Assigned PCP   12/23/24     Phone: [phone] Fax: [phone]         [address] [place] [address]    Jessy Angel, RN Lead Care Coordinator  Admissions 2/10/25     Phone: [phone]         Ina Gibson RN Diabetes Educator Diabetes Education  2/12/25     Phone: [phone] Fax: [phone]        Kathy Olvera APRN CNP Assigned Heart and Vascular Provider   6/23/25     Phone: [phone] Fax: [phone] [address] [address]              Advance Care Plans/Directives Type:   Type Advanced Care Plans/Directives: Advanced Directive - On File  Thank you for providing us with your Advance Directive. We will keep this on file and recommend that it be updated every 2 years, if your health situation changes, if there is a death of one of your health care agents, and/or if there are changes in your marital status.    It has been your Clinic Care Team's pleasure to work with you on accomplishing your goals.    Regards,  Your Clinic Care Team

## 2025-07-28 ENCOUNTER — ANCILLARY PROCEDURE (OUTPATIENT)
Dept: CARDIOLOGY | Facility: CLINIC | Age: 85
End: 2025-07-28
Attending: INTERNAL MEDICINE
Payer: COMMERCIAL

## 2025-07-28 DIAGNOSIS — Z95.0 CARDIAC PACEMAKER IN SITU: ICD-10-CM

## 2025-07-28 DIAGNOSIS — I44.2 ATRIOVENTRICULAR BLOCK, COMPLETE (H): Primary | ICD-10-CM

## 2025-07-28 DIAGNOSIS — I44.2 COMPLETE HEART BLOCK (H): ICD-10-CM

## 2025-07-28 PROCEDURE — 93296 REM INTERROG EVL PM/IDS: CPT | Performed by: INTERNAL MEDICINE

## 2025-07-28 PROCEDURE — 93294 REM INTERROG EVL PM/LDLS PM: CPT | Performed by: INTERNAL MEDICINE

## 2025-07-30 ENCOUNTER — ANTICOAGULATION THERAPY VISIT (OUTPATIENT)
Dept: ANTICOAGULATION | Facility: CLINIC | Age: 85
End: 2025-07-30

## 2025-07-30 ENCOUNTER — RESULTS FOLLOW-UP (OUTPATIENT)
Dept: ANTICOAGULATION | Facility: CLINIC | Age: 85
End: 2025-07-30

## 2025-07-30 ENCOUNTER — LAB (OUTPATIENT)
Dept: LAB | Facility: CLINIC | Age: 85
End: 2025-07-30
Payer: COMMERCIAL

## 2025-07-30 DIAGNOSIS — I48.20 CHRONIC ATRIAL FIBRILLATION (H): Primary | ICD-10-CM

## 2025-07-30 DIAGNOSIS — E08.65: ICD-10-CM

## 2025-07-30 DIAGNOSIS — Z95.2 S/P TAVR (TRANSCATHETER AORTIC VALVE REPLACEMENT): ICD-10-CM

## 2025-07-30 LAB
CREAT UR-MCNC: 89.5 MG/DL
EST. AVERAGE GLUCOSE BLD GHB EST-MCNC: 126 MG/DL
HBA1C MFR BLD: 6 % (ref 0–5.6)
INR BLD: 2.1 (ref 0.9–1.1)
MDC_IDC_EPISODE_DTM: NORMAL
MDC_IDC_EPISODE_ID: NORMAL
MDC_IDC_EPISODE_TYPE: NORMAL
MDC_IDC_LEAD_CONNECTION_STATUS: NORMAL
MDC_IDC_LEAD_CONNECTION_STATUS: NORMAL
MDC_IDC_LEAD_IMPLANT_DT: NORMAL
MDC_IDC_LEAD_IMPLANT_DT: NORMAL
MDC_IDC_LEAD_LOCATION: NORMAL
MDC_IDC_LEAD_LOCATION: NORMAL
MDC_IDC_LEAD_LOCATION_DETAIL_1: NORMAL
MDC_IDC_LEAD_LOCATION_DETAIL_1: NORMAL
MDC_IDC_LEAD_MFG: NORMAL
MDC_IDC_LEAD_MFG: NORMAL
MDC_IDC_LEAD_MODEL: NORMAL
MDC_IDC_LEAD_MODEL: NORMAL
MDC_IDC_LEAD_POLARITY_TYPE: NORMAL
MDC_IDC_LEAD_POLARITY_TYPE: NORMAL
MDC_IDC_LEAD_SERIAL: NORMAL
MDC_IDC_LEAD_SERIAL: NORMAL
MDC_IDC_MSMT_BATTERY_DTM: NORMAL
MDC_IDC_MSMT_BATTERY_REMAINING_LONGEVITY: 138 MO
MDC_IDC_MSMT_BATTERY_REMAINING_PERCENTAGE: 100 %
MDC_IDC_MSMT_BATTERY_STATUS: NORMAL
MDC_IDC_MSMT_LEADCHNL_RA_IMPEDANCE_VALUE: 559 OHM
MDC_IDC_MSMT_LEADCHNL_RA_PACING_THRESHOLD_AMPLITUDE: 0.5 V
MDC_IDC_MSMT_LEADCHNL_RA_PACING_THRESHOLD_PULSEWIDTH: 0.4 MS
MDC_IDC_MSMT_LEADCHNL_RV_IMPEDANCE_VALUE: 631 OHM
MDC_IDC_MSMT_LEADCHNL_RV_PACING_THRESHOLD_AMPLITUDE: 1 V
MDC_IDC_MSMT_LEADCHNL_RV_PACING_THRESHOLD_PULSEWIDTH: 0.4 MS
MDC_IDC_PG_IMPLANT_DTM: NORMAL
MDC_IDC_PG_MFG: NORMAL
MDC_IDC_PG_MODEL: NORMAL
MDC_IDC_PG_SERIAL: NORMAL
MDC_IDC_PG_TYPE: NORMAL
MDC_IDC_SESS_CLINIC_NAME: NORMAL
MDC_IDC_SESS_DTM: NORMAL
MDC_IDC_SESS_TYPE: NORMAL
MDC_IDC_SET_BRADY_AT_MODE_SWITCH_MODE: NORMAL
MDC_IDC_SET_BRADY_AT_MODE_SWITCH_RATE: 170 {BEATS}/MIN
MDC_IDC_SET_BRADY_LOWRATE: 60 {BEATS}/MIN
MDC_IDC_SET_BRADY_MAX_SENSOR_RATE: 130 {BEATS}/MIN
MDC_IDC_SET_BRADY_MAX_TRACKING_RATE: 130 {BEATS}/MIN
MDC_IDC_SET_BRADY_MODE: NORMAL
MDC_IDC_SET_BRADY_PAV_DELAY_HIGH: 200 MS
MDC_IDC_SET_BRADY_PAV_DELAY_LOW: 250 MS
MDC_IDC_SET_BRADY_SAV_DELAY_HIGH: 200 MS
MDC_IDC_SET_BRADY_SAV_DELAY_LOW: 250 MS
MDC_IDC_SET_LEADCHNL_RA_PACING_AMPLITUDE: 2 V
MDC_IDC_SET_LEADCHNL_RA_PACING_CAPTURE_MODE: NORMAL
MDC_IDC_SET_LEADCHNL_RA_PACING_POLARITY: NORMAL
MDC_IDC_SET_LEADCHNL_RA_PACING_PULSEWIDTH: 0.4 MS
MDC_IDC_SET_LEADCHNL_RA_SENSING_ADAPTATION_MODE: NORMAL
MDC_IDC_SET_LEADCHNL_RA_SENSING_POLARITY: NORMAL
MDC_IDC_SET_LEADCHNL_RA_SENSING_SENSITIVITY: 0.25 MV
MDC_IDC_SET_LEADCHNL_RV_PACING_AMPLITUDE: 1.5 V
MDC_IDC_SET_LEADCHNL_RV_PACING_CAPTURE_MODE: NORMAL
MDC_IDC_SET_LEADCHNL_RV_PACING_POLARITY: NORMAL
MDC_IDC_SET_LEADCHNL_RV_PACING_PULSEWIDTH: 0.4 MS
MDC_IDC_SET_LEADCHNL_RV_SENSING_ADAPTATION_MODE: NORMAL
MDC_IDC_SET_LEADCHNL_RV_SENSING_POLARITY: NORMAL
MDC_IDC_SET_LEADCHNL_RV_SENSING_SENSITIVITY: 1.5 MV
MDC_IDC_SET_ZONE_DETECTION_INTERVAL: 375 MS
MDC_IDC_SET_ZONE_STATUS: NORMAL
MDC_IDC_SET_ZONE_TYPE: NORMAL
MDC_IDC_SET_ZONE_VENDOR_TYPE: NORMAL
MDC_IDC_STAT_AT_BURDEN_PERCENT: 1 %
MDC_IDC_STAT_AT_DTM_END: NORMAL
MDC_IDC_STAT_AT_DTM_START: NORMAL
MDC_IDC_STAT_BRADY_DTM_END: NORMAL
MDC_IDC_STAT_BRADY_DTM_START: NORMAL
MDC_IDC_STAT_BRADY_RA_PERCENT_PACED: 71 %
MDC_IDC_STAT_BRADY_RV_PERCENT_PACED: 2 %
MDC_IDC_STAT_EPISODE_RECENT_COUNT: 0
MDC_IDC_STAT_EPISODE_RECENT_COUNT: 1
MDC_IDC_STAT_EPISODE_RECENT_COUNT: 4
MDC_IDC_STAT_EPISODE_RECENT_COUNT_DTM_END: NORMAL
MDC_IDC_STAT_EPISODE_RECENT_COUNT_DTM_START: NORMAL
MDC_IDC_STAT_EPISODE_TYPE: NORMAL
MDC_IDC_STAT_EPISODE_VENDOR_TYPE: NORMAL
MDC_IDC_STAT_EPISODE_VENDOR_TYPE: NORMAL
MICROALBUMIN UR-MCNC: <12 MG/L
MICROALBUMIN/CREAT UR: NORMAL MG/G{CREAT}

## 2025-07-30 PROCEDURE — 85610 PROTHROMBIN TIME: CPT | Mod: GZ

## 2025-07-30 PROCEDURE — 3044F HG A1C LEVEL LT 7.0%: CPT

## 2025-07-30 PROCEDURE — 83036 HEMOGLOBIN GLYCOSYLATED A1C: CPT

## 2025-07-30 PROCEDURE — 36415 COLL VENOUS BLD VENIPUNCTURE: CPT

## 2025-07-30 PROCEDURE — 82043 UR ALBUMIN QUANTITATIVE: CPT

## 2025-07-30 PROCEDURE — 82570 ASSAY OF URINE CREATININE: CPT

## 2025-07-30 NOTE — PROGRESS NOTES
ANTICOAGULATION MANAGEMENT     Lexii Stratton 84 year old female is on warfarin with therapeutic INR result. (Goal INR 2.0-3.0)    Recent labs: (last 7 days)     07/30/25  1034   INR 2.1*       ASSESSMENT     Source(s): Chart Review and Patient/Caregiver Call     Warfarin doses taken: Warfarin taken as instructed  Diet: No new diet changes identified  Medication/supplement changes: None noted  New illness, injury, or hospitalization: No  Signs or symptoms of bleeding or clotting: No  Previous result: Therapeutic last 2(+) visits  Additional findings: moving from her single family home to a 2-bedroom apartment on 8/1/25 - increased stress/activity due to packing, but she's looking forward to the move.       PLAN     Recommended plan for no diet, medication or health factor changes affecting INR     Dosing Instructions: Continue your current warfarin dose with next INR in 3 weeks       Summary  As of 7/30/2025      Full warfarin instructions:  4 mg every Wed; 3 mg all other days   Next INR check:  8/20/2025               Telephone call with Lexii who verbalizes understanding and agrees to plan    Lab visit scheduled    Education provided: Please call back if any changes to your diet, medications or how you've been taking warfarin  Contact [phone] with any changes, questions or concerns.     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

## 2025-08-04 ENCOUNTER — NURSE TRIAGE (OUTPATIENT)
Dept: FAMILY MEDICINE | Facility: CLINIC | Age: 85
End: 2025-08-04
Payer: COMMERCIAL

## 2025-08-05 ENCOUNTER — OFFICE VISIT (OUTPATIENT)
Dept: FAMILY MEDICINE | Facility: CLINIC | Age: 85
End: 2025-08-05
Payer: COMMERCIAL

## 2025-08-05 VITALS
HEART RATE: 60 BPM | TEMPERATURE: 97.9 F | SYSTOLIC BLOOD PRESSURE: 151 MMHG | WEIGHT: 183.7 LBS | DIASTOLIC BLOOD PRESSURE: 74 MMHG | BODY MASS INDEX: 31.36 KG/M2 | HEIGHT: 64 IN | RESPIRATION RATE: 16 BRPM | OXYGEN SATURATION: 100 %

## 2025-08-05 DIAGNOSIS — E08.65: ICD-10-CM

## 2025-08-05 DIAGNOSIS — S80.811A ABRASION OF ANTERIOR LOWER LEG, RIGHT, INITIAL ENCOUNTER: Primary | ICD-10-CM

## 2025-08-05 DIAGNOSIS — S40.811A ARM ABRASION, RIGHT, INITIAL ENCOUNTER: ICD-10-CM

## 2025-08-05 DIAGNOSIS — R60.0 PERIPHERAL EDEMA: ICD-10-CM

## 2025-08-05 PROCEDURE — 99213 OFFICE O/P EST LOW 20 MIN: CPT | Performed by: STUDENT IN AN ORGANIZED HEALTH CARE EDUCATION/TRAINING PROGRAM

## 2025-08-05 PROCEDURE — 1126F AMNT PAIN NOTED NONE PRSNT: CPT | Performed by: STUDENT IN AN ORGANIZED HEALTH CARE EDUCATION/TRAINING PROGRAM

## 2025-08-05 PROCEDURE — 3078F DIAST BP <80 MM HG: CPT | Performed by: STUDENT IN AN ORGANIZED HEALTH CARE EDUCATION/TRAINING PROGRAM

## 2025-08-05 PROCEDURE — 3077F SYST BP >= 140 MM HG: CPT | Performed by: STUDENT IN AN ORGANIZED HEALTH CARE EDUCATION/TRAINING PROGRAM

## 2025-08-05 ASSESSMENT — PAIN SCALES - GENERAL: PAINLEVEL_OUTOF10: NO PAIN (0)

## 2025-08-18 DIAGNOSIS — E11.8 TYPE 2 DIABETES MELLITUS WITH COMPLICATION, WITHOUT LONG-TERM CURRENT USE OF INSULIN (H): Primary | ICD-10-CM

## 2025-08-19 RX ORDER — LANCETS
1 EACH MISCELLANEOUS 2 TIMES DAILY
Qty: 100 EACH | Refills: 11 | Status: SHIPPED | OUTPATIENT
Start: 2025-08-19

## 2025-08-20 ENCOUNTER — ANTICOAGULATION THERAPY VISIT (OUTPATIENT)
Dept: ANTICOAGULATION | Facility: CLINIC | Age: 85
End: 2025-08-20

## 2025-08-20 ENCOUNTER — RESULTS FOLLOW-UP (OUTPATIENT)
Dept: ANTICOAGULATION | Facility: CLINIC | Age: 85
End: 2025-08-20

## 2025-08-20 ENCOUNTER — LAB (OUTPATIENT)
Dept: LAB | Facility: CLINIC | Age: 85
End: 2025-08-20
Payer: COMMERCIAL

## 2025-08-20 DIAGNOSIS — Z95.2 S/P TAVR (TRANSCATHETER AORTIC VALVE REPLACEMENT): ICD-10-CM

## 2025-08-20 DIAGNOSIS — I48.20 CHRONIC ATRIAL FIBRILLATION (H): Primary | ICD-10-CM

## 2025-08-20 LAB — INR BLD: 2.8 (ref 0.9–1.1)

## 2025-08-20 PROCEDURE — 85610 PROTHROMBIN TIME: CPT | Mod: GZ

## 2025-08-20 PROCEDURE — 36416 COLLJ CAPILLARY BLOOD SPEC: CPT

## (undated) DEVICE — INTRODUCER CATH VASC 5FRX10CM  MPIS-501-NT-U-SST

## (undated) DEVICE — TUBING SUCTION 12"X1/4" N612

## (undated) DEVICE — SU ETHILON 5-0 PC-3 18" 1865G

## (undated) DEVICE — GUIDEWIRE HI-TORQUE VERSACORE MOD J 1

## (undated) DEVICE — LINEN HALF SHEET 5512

## (undated) DEVICE — NDL BLUNT 19GA 1.5"

## (undated) DEVICE — SU MONOCRYL 4-0 PS-2 18" UND Y496G

## (undated) DEVICE — CATH ANGIO INFINITI PIGTAIL 145 6 SH 6FRX110CM  534-652S

## (undated) DEVICE — Device

## (undated) DEVICE — SU VICRYL 3-0 SH CR 8X18" J774

## (undated) DEVICE — CAST PADDING 4" UNSTERILE 9044

## (undated) DEVICE — WIRE GUIDE 0.035"X150CM EMERALD STR 502542

## (undated) DEVICE — SU VICRYL 0 CT-2 27" J334H

## (undated) DEVICE — BNDG ELASTIC 4"X5YDS UNSTERILE 6611-40

## (undated) DEVICE — SU ETHIBOND 3-0 RB-1 30" X872H

## (undated) DEVICE — INTRO TERUMO 6FRX25CM W/MARKER RSB603

## (undated) DEVICE — WIRE GUIDE 0.035"X260CM SAFE-T-J EXCHANGE G00517

## (undated) DEVICE — CATH EP PACEL 5FRX110CM 1MM RIGHT HEART CURVE 401763

## (undated) DEVICE — PREP CHLORAPREP 26ML TINTED ORANGE  260815

## (undated) DEVICE — STPL RELOAD LINEAR CUT 75MM TCR75

## (undated) DEVICE — CATH ANGIO JUDKINS JL4 6FRX100CM INFINITI 534620T

## (undated) DEVICE — INTRO TERUMO 7FRX25CM W/MARKER RSB703

## (undated) DEVICE — CABLE PACING ALLIGATOR CLIP 301-CG

## (undated) DEVICE — RAD CLOSURE ANGIOSEAL 8FR  610131

## (undated) DEVICE — ESU GROUND PAD ADULT W/CORD E7507

## (undated) DEVICE — BAG CLEAR TRASH 1.3M 39X33" P4040C

## (undated) DEVICE — SU VICRYL 2-0 TIE 12X18" J905T

## (undated) DEVICE — SYR ANGIOGRAPHY MULTIUSE KIT ACIST 014612

## (undated) DEVICE — GOWN XLG DISP 9545

## (undated) DEVICE — PREP CHLORAPREP W/ORANGE TINT 10.5ML 260715

## (undated) DEVICE — LINEN POUCH DBL 5427

## (undated) DEVICE — LINEN TOWEL PACK X10 5473

## (undated) DEVICE — TOURNIQUET SGL BLADDER 18"X4" RED 5921-218-135

## (undated) DEVICE — BLADE KNIFE SURG 15 371115

## (undated) DEVICE — WIRE GUIDE LUNDERQUIST 0.035"X260CM DBL CVD

## (undated) DEVICE — ADH SKIN CLOSURE PREMIERPRO EXOFIN 1.0ML 3470

## (undated) DEVICE — MANIFOLD KIT ANGIO AUTOMATED 014613

## (undated) DEVICE — GLOVE PROTEXIS MICRO 7.5  2D73PM75

## (undated) DEVICE — SU FIBERWIRE 0 38" BLUE 22.2MM W/TAPER NDL  AR-7250

## (undated) DEVICE — LINEN FULL SHEET 5511

## (undated) DEVICE — GLOVE PROTEXIS W/NEU-THERA 6.5  2D73TE65

## (undated) DEVICE — SU VICRYL 3-0 CT-2 27" UND J232H

## (undated) DEVICE — GLOVE PROTEXIS W/NEU-THERA 8.0  2D73TE80

## (undated) DEVICE — KIT HAND CONTROL ANGIOTOUCH ACIST 65CM AT-P65

## (undated) DEVICE — PACK MAJOR SBA15MAFSI

## (undated) DEVICE — SYR 10ML SLIP TIP W/O NDL 303134

## (undated) DEVICE — DRSG GAUZE 4X4" 8044

## (undated) DEVICE — GLOVE PROTEXIS W/NEU-THERA 7.0  2D73TE70

## (undated) DEVICE — SHEATH INTRODUCER DRYSEAL FLEX W/HYDRO CT 18FRX33CM DSF1833

## (undated) DEVICE — BLADE KNIFE BEAVER MINI BEAVER6400

## (undated) DEVICE — GLOVE BIOGEL PI SZ 8.0 40880

## (undated) DEVICE — BLADE SAW SAGITTAL LINVATEC 5023-247

## (undated) DEVICE — RAD INTRODUCER KIT MICRO 5FRX10CM .018 NITINOL G/W

## (undated) DEVICE — CAST PLASTER SPLINT 4X15" EXTRA FAST

## (undated) DEVICE — LINE MONITOR NASAL SMART CAPNOLINE ADULT LONG 12463

## (undated) DEVICE — DRSG GAUZE 4X4" TRAY

## (undated) DEVICE — KIT ENDO TURNOVER/PROCEDURE W/CLEAN A SCOPE LINERS 103888

## (undated) DEVICE — SOL WATER IRRIG 3000ML BAG 2B7117

## (undated) DEVICE — CAST PADDING 4" STERILE 9044S

## (undated) DEVICE — PAD CHUX UNDERPAD 23X24" 7136

## (undated) DEVICE — PREP CHLORAPREP 26ML TINTED HI-LITE ORANGE 930815

## (undated) DEVICE — SOL NACL 0.9% IRRIG 1000ML BOTTLE 2F7124

## (undated) DEVICE — CATH ANGIO INFINITI 3DRC 6FRX100CM 534676T

## (undated) DEVICE — STPL LINEAR CUT 75MM TLC75

## (undated) DEVICE — GUIDEWIRE VASC 0.035INX150CM INQWIRE J TIP IQ35F150J3F/A

## (undated) DEVICE — LINEN DRAPE 54X72" 5467

## (undated) DEVICE — IMM SHOULDER LG 79-84017

## (undated) DEVICE — DRSG TEGADERM 4X10" 1627

## (undated) DEVICE — DRAPE C-ARM 60X42" 1013

## (undated) DEVICE — SYR BULB IRRIG 50ML LATEX FREE 0035280

## (undated) DEVICE — INTRO SHEATH 6FRX10CM PINNACLE RSS602

## (undated) DEVICE — PACK MINOR CUSTOM RIDGES SBA32RMRMA

## (undated) DEVICE — ESU PENCIL W/HOLSTER E2350H

## (undated) DEVICE — STPL LINEAR CUT 60X3.5MM TX60B

## (undated) DEVICE — DRAPE IOBAN INCISE 23X17" 6650EZ

## (undated) DEVICE — SU VICRYL 2-0 SH 27" J317H

## (undated) DEVICE — BAG CYSTO TABLE DRAIN

## (undated) DEVICE — PACK CYSTOSCOPY SBA15CYFSI

## (undated) DEVICE — DRAPE BREAST/CHEST 29420

## (undated) DEVICE — PACK HAND SOP32HARMO

## (undated) DEVICE — SUCTION TIP POOLE K770

## (undated) DEVICE — DEFIB PRO-PADZ LVP LQD GEL ADULT 8900-2105-01

## (undated) DEVICE — EXTERNAL NEUROSTIMULATOR

## (undated) DEVICE — K-WIRE

## (undated) DEVICE — TOTE ANGIO CORP PC15AT SAN32CC83O

## (undated) DEVICE — CATH ANGIO SUPERTORQUE AL1 6FRX100CM 532-645

## (undated) DEVICE — CAST BUCKET

## (undated) DEVICE — COVER PROBE ULTRASOUND 3D W/GEL 5X96" LF 20-P3D596

## (undated) DEVICE — DRSG TEGADERM 4X4 3/4" 1626W

## (undated) DEVICE — LINEN TOWEL PACK X5 5464

## (undated) DEVICE — DRAPE C-ARM MINI 5423

## (undated) DEVICE — PACK PCMKR PERM SRG PROC LF SAN32PC573

## (undated) DEVICE — SU UMBILICAL TAPE .125X30" U16G

## (undated) DEVICE — SHEATH PRELUDE SNAP 13CM 6FR

## (undated) DEVICE — SU VICRYL 3-0 SH 27" UND J416H

## (undated) DEVICE — GLOVE BIOGEL PI MICRO SZ 7.5 48575

## (undated) DEVICE — GUIDEWIRE VASC SAFARI2 0.035X275CM H74939406XS1

## (undated) DEVICE — GLOVE BIOGEL PI MICRO SZ 8.0 48580

## (undated) DEVICE — SU VICRYL 0 CT-2 CR 8X18" J727D

## (undated) DEVICE — ESU LIGASURE IMPACT OPEN SEALER/DVDR CVD LG JAW LF4418

## (undated) RX ORDER — OXYCODONE HYDROCHLORIDE 5 MG/1
TABLET ORAL
Status: DISPENSED
Start: 2024-07-02

## (undated) RX ORDER — TRIAMCINOLONE ACETONIDE 40 MG/ML
INJECTION, SUSPENSION INTRA-ARTICULAR; INTRAMUSCULAR
Status: DISPENSED
Start: 2024-07-02

## (undated) RX ORDER — LIDOCAINE HYDROCHLORIDE 10 MG/ML
INJECTION, SOLUTION EPIDURAL; INFILTRATION; INTRACAUDAL; PERINEURAL
Status: DISPENSED
Start: 2020-09-24

## (undated) RX ORDER — PROPOFOL 10 MG/ML
INJECTION, EMULSION INTRAVENOUS
Status: DISPENSED
Start: 2023-08-11

## (undated) RX ORDER — FENTANYL CITRATE 50 UG/ML
INJECTION, SOLUTION INTRAMUSCULAR; INTRAVENOUS
Status: DISPENSED
Start: 2024-07-02

## (undated) RX ORDER — POTASSIUM CHLORIDE 1500 MG/1
TABLET, EXTENDED RELEASE ORAL
Status: DISPENSED
Start: 2022-09-30

## (undated) RX ORDER — FENTANYL CITRATE 50 UG/ML
INJECTION, SOLUTION INTRAMUSCULAR; INTRAVENOUS
Status: DISPENSED
Start: 2022-11-08

## (undated) RX ORDER — NEOSTIGMINE METHYLSULFATE 1 MG/ML
VIAL (ML) INJECTION
Status: DISPENSED
Start: 2023-08-11

## (undated) RX ORDER — BUPIVACAINE HYDROCHLORIDE 5 MG/ML
INJECTION, SOLUTION EPIDURAL; INTRACAUDAL
Status: DISPENSED
Start: 2024-07-02

## (undated) RX ORDER — CEFOTETAN DISODIUM 2 G/20ML
INJECTION, POWDER, FOR SOLUTION INTRAMUSCULAR; INTRAVENOUS
Status: DISPENSED
Start: 2023-08-11

## (undated) RX ORDER — LIDOCAINE HYDROCHLORIDE 10 MG/ML
INJECTION, SOLUTION EPIDURAL; INFILTRATION; INTRACAUDAL; PERINEURAL
Status: DISPENSED
Start: 2020-09-17

## (undated) RX ORDER — KETOROLAC TROMETHAMINE 30 MG/ML
INJECTION, SOLUTION INTRAMUSCULAR; INTRAVENOUS
Status: DISPENSED
Start: 2024-07-02

## (undated) RX ORDER — PROPOFOL 10 MG/ML
INJECTION, EMULSION INTRAVENOUS
Status: DISPENSED
Start: 2020-09-17

## (undated) RX ORDER — CEFAZOLIN SODIUM 2 G/100ML
INJECTION, SOLUTION INTRAVENOUS
Status: DISPENSED
Start: 2020-09-17

## (undated) RX ORDER — PROPOFOL 10 MG/ML
INJECTION, EMULSION INTRAVENOUS
Status: DISPENSED
Start: 2020-09-24

## (undated) RX ORDER — FENTANYL CITRATE 50 UG/ML
INJECTION, SOLUTION INTRAMUSCULAR; INTRAVENOUS
Status: DISPENSED
Start: 2022-09-30

## (undated) RX ORDER — FENTANYL CITRATE 50 UG/ML
INJECTION, SOLUTION INTRAMUSCULAR; INTRAVENOUS
Status: DISPENSED
Start: 2023-08-11

## (undated) RX ORDER — FENTANYL CITRATE 50 UG/ML
INJECTION, SOLUTION INTRAMUSCULAR; INTRAVENOUS
Status: DISPENSED
Start: 2018-03-29

## (undated) RX ORDER — FENTANYL CITRATE 50 UG/ML
INJECTION, SOLUTION INTRAMUSCULAR; INTRAVENOUS
Status: DISPENSED
Start: 2020-09-24

## (undated) RX ORDER — PROPOFOL 10 MG/ML
INJECTION, EMULSION INTRAVENOUS
Status: DISPENSED
Start: 2017-09-06

## (undated) RX ORDER — FENTANYL CITRATE 50 UG/ML
INJECTION, SOLUTION INTRAMUSCULAR; INTRAVENOUS
Status: DISPENSED
Start: 2017-09-06

## (undated) RX ORDER — HYDROMORPHONE HCL IN WATER/PF 6 MG/30 ML
PATIENT CONTROLLED ANALGESIA SYRINGE INTRAVENOUS
Status: DISPENSED
Start: 2024-07-02

## (undated) RX ORDER — BUPIVACAINE HYDROCHLORIDE 5 MG/ML
INJECTION, SOLUTION EPIDURAL; INTRACAUDAL
Status: DISPENSED
Start: 2023-08-11

## (undated) RX ORDER — ONDANSETRON 2 MG/ML
INJECTION INTRAMUSCULAR; INTRAVENOUS
Status: DISPENSED
Start: 2020-09-24

## (undated) RX ORDER — CEFAZOLIN SODIUM 2 G/100ML
INJECTION, SOLUTION INTRAVENOUS
Status: DISPENSED
Start: 2017-09-06

## (undated) RX ORDER — ONDANSETRON 2 MG/ML
INJECTION INTRAMUSCULAR; INTRAVENOUS
Status: DISPENSED
Start: 2023-08-11

## (undated) RX ORDER — HEPARIN SODIUM 200 [USP'U]/100ML
INJECTION, SOLUTION INTRAVENOUS
Status: DISPENSED
Start: 2022-11-08

## (undated) RX ORDER — LIDOCAINE HYDROCHLORIDE 10 MG/ML
INJECTION, SOLUTION EPIDURAL; INFILTRATION; INTRACAUDAL; PERINEURAL
Status: DISPENSED
Start: 2024-07-02

## (undated) RX ORDER — GLYCOPYRROLATE 0.2 MG/ML
INJECTION INTRAMUSCULAR; INTRAVENOUS
Status: DISPENSED
Start: 2020-09-24

## (undated) RX ORDER — CEFAZOLIN SODIUM/WATER 2 G/20 ML
SYRINGE (ML) INTRAVENOUS
Status: DISPENSED
Start: 2024-07-02

## (undated) RX ORDER — HEPARIN SODIUM 1000 [USP'U]/ML
INJECTION, SOLUTION INTRAVENOUS; SUBCUTANEOUS
Status: DISPENSED
Start: 2022-11-08

## (undated) RX ORDER — LIDOCAINE HYDROCHLORIDE 10 MG/ML
INJECTION, SOLUTION EPIDURAL; INFILTRATION; INTRACAUDAL; PERINEURAL
Status: DISPENSED
Start: 2022-11-08

## (undated) RX ORDER — ACETAMINOPHEN 325 MG/1
TABLET ORAL
Status: DISPENSED
Start: 2020-09-24

## (undated) RX ORDER — HYDRALAZINE HYDROCHLORIDE 20 MG/ML
INJECTION INTRAMUSCULAR; INTRAVENOUS
Status: DISPENSED
Start: 2024-07-02

## (undated) RX ORDER — BUPIVACAINE HYDROCHLORIDE 2.5 MG/ML
INJECTION, SOLUTION EPIDURAL; INFILTRATION; INTRACAUDAL
Status: DISPENSED
Start: 2022-11-08

## (undated) RX ORDER — ACETAMINOPHEN 325 MG/1
TABLET ORAL
Status: DISPENSED
Start: 2024-07-02

## (undated) RX ORDER — CEFAZOLIN SODIUM 2 G/100ML
INJECTION, SOLUTION INTRAVENOUS
Status: DISPENSED
Start: 2020-09-24

## (undated) RX ORDER — FENTANYL CITRATE 50 UG/ML
INJECTION, SOLUTION INTRAMUSCULAR; INTRAVENOUS
Status: DISPENSED
Start: 2020-09-17

## (undated) RX ORDER — DEXAMETHASONE SODIUM PHOSPHATE 4 MG/ML
INJECTION, SOLUTION INTRA-ARTICULAR; INTRALESIONAL; INTRAMUSCULAR; INTRAVENOUS; SOFT TISSUE
Status: DISPENSED
Start: 2020-09-24

## (undated) RX ORDER — VANCOMYCIN HYDROCHLORIDE 1 G/20ML
INJECTION, POWDER, LYOPHILIZED, FOR SOLUTION INTRAVENOUS
Status: DISPENSED
Start: 2020-09-17

## (undated) RX ORDER — GLYCOPYRROLATE 0.2 MG/ML
INJECTION INTRAMUSCULAR; INTRAVENOUS
Status: DISPENSED
Start: 2023-08-11